# Patient Record
Sex: MALE | Race: WHITE | NOT HISPANIC OR LATINO | URBAN - METROPOLITAN AREA
[De-identification: names, ages, dates, MRNs, and addresses within clinical notes are randomized per-mention and may not be internally consistent; named-entity substitution may affect disease eponyms.]

---

## 2019-12-20 ENCOUNTER — INPATIENT (INPATIENT)
Facility: HOSPITAL | Age: 74
LOS: 3 days | Discharge: HOME | DRG: 862 | End: 2019-12-24
Attending: INTERNAL MEDICINE | Admitting: INTERNAL MEDICINE
Payer: MEDICARE

## 2019-12-20 VITALS — WEIGHT: 169.98 LBS

## 2019-12-20 DIAGNOSIS — A41.9 SEPSIS, UNSPECIFIED ORGANISM: ICD-10-CM

## 2019-12-20 DIAGNOSIS — N17.9 ACUTE KIDNEY FAILURE, UNSPECIFIED: ICD-10-CM

## 2019-12-20 DIAGNOSIS — R41.82 ALTERED MENTAL STATUS, UNSPECIFIED: ICD-10-CM

## 2019-12-20 DIAGNOSIS — E78.5 HYPERLIPIDEMIA, UNSPECIFIED: ICD-10-CM

## 2019-12-20 DIAGNOSIS — Z95.5 PRESENCE OF CORONARY ANGIOPLASTY IMPLANT AND GRAFT: Chronic | ICD-10-CM

## 2019-12-20 DIAGNOSIS — Z95.5 PRESENCE OF CORONARY ANGIOPLASTY IMPLANT AND GRAFT: ICD-10-CM

## 2019-12-20 DIAGNOSIS — K21.9 GASTRO-ESOPHAGEAL REFLUX DISEASE WITHOUT ESOPHAGITIS: ICD-10-CM

## 2019-12-20 DIAGNOSIS — J44.9 CHRONIC OBSTRUCTIVE PULMONARY DISEASE, UNSPECIFIED: ICD-10-CM

## 2019-12-20 DIAGNOSIS — D49.1 NEOPLASM OF UNSPECIFIED BEHAVIOR OF RESPIRATORY SYSTEM: Chronic | ICD-10-CM

## 2019-12-20 DIAGNOSIS — Z29.9 ENCOUNTER FOR PROPHYLACTIC MEASURES, UNSPECIFIED: ICD-10-CM

## 2019-12-20 LAB
ALBUMIN SERPL ELPH-MCNC: 3.3 G/DL — SIGNIFICANT CHANGE UP (ref 3.3–5)
ALP SERPL-CCNC: 128 U/L — HIGH (ref 40–120)
ALT FLD-CCNC: 38 U/L — SIGNIFICANT CHANGE UP (ref 12–78)
ANION GAP SERPL CALC-SCNC: 9 MMOL/L — SIGNIFICANT CHANGE UP (ref 5–17)
AST SERPL-CCNC: 39 U/L — HIGH (ref 15–37)
BASOPHILS # BLD AUTO: 0 K/UL — SIGNIFICANT CHANGE UP (ref 0–0.2)
BASOPHILS NFR BLD AUTO: 0 % — SIGNIFICANT CHANGE UP (ref 0–2)
BILIRUB SERPL-MCNC: 1.1 MG/DL — SIGNIFICANT CHANGE UP (ref 0.2–1.2)
BUN SERPL-MCNC: 27 MG/DL — HIGH (ref 7–23)
CALCIUM SERPL-MCNC: 8.8 MG/DL — SIGNIFICANT CHANGE UP (ref 8.5–10.1)
CHLORIDE SERPL-SCNC: 107 MMOL/L — SIGNIFICANT CHANGE UP (ref 96–108)
CO2 SERPL-SCNC: 23 MMOL/L — SIGNIFICANT CHANGE UP (ref 22–31)
CREAT SERPL-MCNC: 1.4 MG/DL — HIGH (ref 0.5–1.3)
EOSINOPHIL # BLD AUTO: 0 K/UL — SIGNIFICANT CHANGE UP (ref 0–0.5)
EOSINOPHIL NFR BLD AUTO: 0 % — SIGNIFICANT CHANGE UP (ref 0–6)
FLU A RESULT: SIGNIFICANT CHANGE UP
FLU A RESULT: SIGNIFICANT CHANGE UP
FLUAV AG NPH QL: SIGNIFICANT CHANGE UP
FLUBV AG NPH QL: SIGNIFICANT CHANGE UP
GLUCOSE SERPL-MCNC: 124 MG/DL — HIGH (ref 70–99)
HCT VFR BLD CALC: 42.6 % — SIGNIFICANT CHANGE UP (ref 39–50)
HGB BLD-MCNC: 14.8 G/DL — SIGNIFICANT CHANGE UP (ref 13–17)
LACTATE SERPL-SCNC: 2.8 MMOL/L — HIGH (ref 0.7–2)
LIDOCAIN IGE QN: 45 U/L — LOW (ref 73–393)
LYMPHOCYTES # BLD AUTO: 0.31 K/UL — LOW (ref 1–3.3)
LYMPHOCYTES # BLD AUTO: 8 % — LOW (ref 13–44)
MANUAL SMEAR VERIFICATION: SIGNIFICANT CHANGE UP
MCHC RBC-ENTMCNC: 32.2 PG — SIGNIFICANT CHANGE UP (ref 27–34)
MCHC RBC-ENTMCNC: 34.7 GM/DL — SIGNIFICANT CHANGE UP (ref 32–36)
MCV RBC AUTO: 92.6 FL — SIGNIFICANT CHANGE UP (ref 80–100)
MONOCYTES # BLD AUTO: 0 K/UL — SIGNIFICANT CHANGE UP (ref 0–0.9)
MONOCYTES NFR BLD AUTO: 0 % — LOW (ref 2–14)
MYELOCYTES NFR BLD: 3 % — HIGH (ref 0–0)
NEUTROPHILS # BLD AUTO: 3.32 K/UL — SIGNIFICANT CHANGE UP (ref 1.8–7.4)
NEUTROPHILS NFR BLD AUTO: 56 % — SIGNIFICANT CHANGE UP (ref 43–77)
NEUTS BAND # BLD: 29 % — HIGH (ref 0–8)
NRBC # BLD: 0 — SIGNIFICANT CHANGE UP
NRBC # BLD: SIGNIFICANT CHANGE UP /100 WBCS (ref 0–0)
PLAT MORPH BLD: NORMAL — SIGNIFICANT CHANGE UP
PLATELET # BLD AUTO: 114 K/UL — LOW (ref 150–400)
POTASSIUM SERPL-MCNC: 4 MMOL/L — SIGNIFICANT CHANGE UP (ref 3.5–5.3)
POTASSIUM SERPL-SCNC: 4 MMOL/L — SIGNIFICANT CHANGE UP (ref 3.5–5.3)
PROT SERPL-MCNC: 6.4 G/DL — SIGNIFICANT CHANGE UP (ref 6–8.3)
RBC # BLD: 4.6 M/UL — SIGNIFICANT CHANGE UP (ref 4.2–5.8)
RBC # FLD: 14 % — SIGNIFICANT CHANGE UP (ref 10.3–14.5)
RBC BLD AUTO: NORMAL — SIGNIFICANT CHANGE UP
RSV RESULT: SIGNIFICANT CHANGE UP
RSV RNA RESP QL NAA+PROBE: SIGNIFICANT CHANGE UP
SODIUM SERPL-SCNC: 139 MMOL/L — SIGNIFICANT CHANGE UP (ref 135–145)
VARIANT LYMPHS # BLD: 4 % — SIGNIFICANT CHANGE UP (ref 0–6)
WBC # BLD: 3.9 K/UL — SIGNIFICANT CHANGE UP (ref 3.8–10.5)
WBC # FLD AUTO: 3.9 K/UL — SIGNIFICANT CHANGE UP (ref 3.8–10.5)

## 2019-12-20 PROCEDURE — 70450 CT HEAD/BRAIN W/O DYE: CPT | Mod: 26

## 2019-12-20 PROCEDURE — 71045 X-RAY EXAM CHEST 1 VIEW: CPT | Mod: 26

## 2019-12-20 PROCEDURE — 99285 EMERGENCY DEPT VISIT HI MDM: CPT

## 2019-12-20 PROCEDURE — 74176 CT ABD & PELVIS W/O CONTRAST: CPT | Mod: 26

## 2019-12-20 PROCEDURE — 93010 ELECTROCARDIOGRAM REPORT: CPT

## 2019-12-20 RX ORDER — SODIUM CHLORIDE 9 MG/ML
1000 INJECTION INTRAMUSCULAR; INTRAVENOUS; SUBCUTANEOUS ONCE
Refills: 0 | Status: COMPLETED | OUTPATIENT
Start: 2019-12-20 | End: 2019-12-20

## 2019-12-20 RX ORDER — BUDESONIDE AND FORMOTEROL FUMARATE DIHYDRATE 160; 4.5 UG/1; UG/1
2 AEROSOL RESPIRATORY (INHALATION)
Refills: 0 | Status: DISCONTINUED | OUTPATIENT
Start: 2019-12-20 | End: 2019-12-21

## 2019-12-20 RX ORDER — SODIUM CHLORIDE 9 MG/ML
1000 INJECTION INTRAMUSCULAR; INTRAVENOUS; SUBCUTANEOUS
Refills: 0 | Status: COMPLETED | OUTPATIENT
Start: 2019-12-20 | End: 2019-12-20

## 2019-12-20 RX ORDER — TIOTROPIUM BROMIDE 18 UG/1
1 CAPSULE ORAL; RESPIRATORY (INHALATION)
Qty: 0 | Refills: 0 | DISCHARGE

## 2019-12-20 RX ORDER — ACETAMINOPHEN 500 MG
650 TABLET ORAL ONCE
Refills: 0 | Status: COMPLETED | OUTPATIENT
Start: 2019-12-20 | End: 2019-12-20

## 2019-12-20 RX ORDER — ACETAMINOPHEN 500 MG
650 TABLET ORAL EVERY 6 HOURS
Refills: 0 | Status: DISCONTINUED | OUTPATIENT
Start: 2019-12-20 | End: 2019-12-21

## 2019-12-20 RX ORDER — ASPIRIN/CALCIUM CARB/MAGNESIUM 324 MG
81 TABLET ORAL DAILY
Refills: 0 | Status: DISCONTINUED | OUTPATIENT
Start: 2019-12-20 | End: 2019-12-21

## 2019-12-20 RX ORDER — IPRATROPIUM/ALBUTEROL SULFATE 18-103MCG
3 AEROSOL WITH ADAPTER (GRAM) INHALATION ONCE
Refills: 0 | Status: COMPLETED | OUTPATIENT
Start: 2019-12-20 | End: 2019-12-20

## 2019-12-20 RX ORDER — MIDODRINE HYDROCHLORIDE 2.5 MG/1
10 TABLET ORAL THREE TIMES A DAY
Refills: 0 | Status: DISCONTINUED | OUTPATIENT
Start: 2019-12-20 | End: 2019-12-20

## 2019-12-20 RX ORDER — MIDODRINE HYDROCHLORIDE 2.5 MG/1
10 TABLET ORAL ONCE
Refills: 0 | Status: COMPLETED | OUTPATIENT
Start: 2019-12-20 | End: 2019-12-20

## 2019-12-20 RX ORDER — ENOXAPARIN SODIUM 100 MG/ML
40 INJECTION SUBCUTANEOUS DAILY
Refills: 0 | Status: DISCONTINUED | OUTPATIENT
Start: 2019-12-20 | End: 2019-12-23

## 2019-12-20 RX ORDER — SODIUM CHLORIDE 9 MG/ML
1000 INJECTION INTRAMUSCULAR; INTRAVENOUS; SUBCUTANEOUS
Refills: 0 | Status: DISCONTINUED | OUTPATIENT
Start: 2019-12-20 | End: 2019-12-21

## 2019-12-20 RX ORDER — IPRATROPIUM/ALBUTEROL SULFATE 18-103MCG
3 AEROSOL WITH ADAPTER (GRAM) INHALATION EVERY 6 HOURS
Refills: 0 | Status: DISCONTINUED | OUTPATIENT
Start: 2019-12-20 | End: 2019-12-21

## 2019-12-20 RX ORDER — PANTOPRAZOLE SODIUM 20 MG/1
40 TABLET, DELAYED RELEASE ORAL
Refills: 0 | Status: DISCONTINUED | OUTPATIENT
Start: 2019-12-20 | End: 2019-12-21

## 2019-12-20 RX ORDER — LACTOBACILLUS ACIDOPHILUS 100MM CELL
1 CAPSULE ORAL
Refills: 0 | Status: DISCONTINUED | OUTPATIENT
Start: 2019-12-20 | End: 2019-12-24

## 2019-12-20 RX ORDER — TAMSULOSIN HYDROCHLORIDE 0.4 MG/1
0.4 CAPSULE ORAL AT BEDTIME
Refills: 0 | Status: DISCONTINUED | OUTPATIENT
Start: 2019-12-20 | End: 2019-12-24

## 2019-12-20 RX ORDER — BUDESONIDE AND FORMOTEROL FUMARATE DIHYDRATE 160; 4.5 UG/1; UG/1
2 AEROSOL RESPIRATORY (INHALATION)
Qty: 0 | Refills: 0 | DISCHARGE

## 2019-12-20 RX ORDER — TIOTROPIUM BROMIDE 18 UG/1
1 CAPSULE ORAL; RESPIRATORY (INHALATION) DAILY
Refills: 0 | Status: DISCONTINUED | OUTPATIENT
Start: 2019-12-20 | End: 2019-12-21

## 2019-12-20 RX ORDER — PIPERACILLIN AND TAZOBACTAM 4; .5 G/20ML; G/20ML
3.38 INJECTION, POWDER, LYOPHILIZED, FOR SOLUTION INTRAVENOUS EVERY 8 HOURS
Refills: 0 | Status: DISCONTINUED | OUTPATIENT
Start: 2019-12-20 | End: 2019-12-21

## 2019-12-20 RX ORDER — CEFTRIAXONE 500 MG/1
1000 INJECTION, POWDER, FOR SOLUTION INTRAMUSCULAR; INTRAVENOUS ONCE
Refills: 0 | Status: COMPLETED | OUTPATIENT
Start: 2019-12-20 | End: 2019-12-20

## 2019-12-20 RX ORDER — ALBUTEROL 90 UG/1
2 AEROSOL, METERED ORAL EVERY 6 HOURS
Refills: 0 | Status: DISCONTINUED | OUTPATIENT
Start: 2019-12-20 | End: 2019-12-21

## 2019-12-20 RX ORDER — PIPERACILLIN AND TAZOBACTAM 4; .5 G/20ML; G/20ML
3.38 INJECTION, POWDER, LYOPHILIZED, FOR SOLUTION INTRAVENOUS ONCE
Refills: 0 | Status: COMPLETED | OUTPATIENT
Start: 2019-12-20 | End: 2019-12-20

## 2019-12-20 RX ORDER — ATORVASTATIN CALCIUM 80 MG/1
40 TABLET, FILM COATED ORAL AT BEDTIME
Refills: 0 | Status: DISCONTINUED | OUTPATIENT
Start: 2019-12-20 | End: 2019-12-24

## 2019-12-20 RX ORDER — AZITHROMYCIN 500 MG/1
500 TABLET, FILM COATED ORAL ONCE
Refills: 0 | Status: COMPLETED | OUTPATIENT
Start: 2019-12-20 | End: 2019-12-20

## 2019-12-20 RX ADMIN — Medication 650 MILLIGRAM(S): at 21:36

## 2019-12-20 RX ADMIN — SODIUM CHLORIDE 2000 MILLILITER(S): 9 INJECTION INTRAMUSCULAR; INTRAVENOUS; SUBCUTANEOUS at 19:32

## 2019-12-20 RX ADMIN — MIDODRINE HYDROCHLORIDE 10 MILLIGRAM(S): 2.5 TABLET ORAL at 23:35

## 2019-12-20 RX ADMIN — CEFTRIAXONE 100 MILLIGRAM(S): 500 INJECTION, POWDER, FOR SOLUTION INTRAMUSCULAR; INTRAVENOUS at 21:36

## 2019-12-20 RX ADMIN — AZITHROMYCIN 255 MILLIGRAM(S): 500 TABLET, FILM COATED ORAL at 21:36

## 2019-12-20 RX ADMIN — SODIUM CHLORIDE 1000 MILLILITER(S): 9 INJECTION INTRAMUSCULAR; INTRAVENOUS; SUBCUTANEOUS at 22:15

## 2019-12-20 RX ADMIN — SODIUM CHLORIDE 1000 MILLILITER(S): 9 INJECTION INTRAMUSCULAR; INTRAVENOUS; SUBCUTANEOUS at 22:11

## 2019-12-20 RX ADMIN — SODIUM CHLORIDE 100 MILLILITER(S): 9 INJECTION INTRAMUSCULAR; INTRAVENOUS; SUBCUTANEOUS at 22:41

## 2019-12-20 RX ADMIN — PIPERACILLIN AND TAZOBACTAM 200 GRAM(S): 4; .5 INJECTION, POWDER, LYOPHILIZED, FOR SOLUTION INTRAVENOUS at 20:16

## 2019-12-20 RX ADMIN — Medication 3 MILLILITER(S): at 22:08

## 2019-12-20 RX ADMIN — Medication 650 MILLIGRAM(S): at 19:33

## 2019-12-20 RX ADMIN — SODIUM CHLORIDE 1000 MILLILITER(S): 9 INJECTION INTRAMUSCULAR; INTRAVENOUS; SUBCUTANEOUS at 23:10

## 2019-12-20 RX ADMIN — SODIUM CHLORIDE 2000 MILLILITER(S): 9 INJECTION INTRAMUSCULAR; INTRAVENOUS; SUBCUTANEOUS at 20:17

## 2019-12-20 RX ADMIN — SODIUM CHLORIDE 2000 MILLILITER(S): 9 INJECTION INTRAMUSCULAR; INTRAVENOUS; SUBCUTANEOUS at 21:26

## 2019-12-20 NOTE — H&P ADULT - NSHPSOCIALHISTORY_GEN_ALL_CORE
quit smoking 5 years ago, 50 pack years   drinks 1 glass of wine 1 x a month socially  lives with wife, performs all ADLs, ambulates on own

## 2019-12-20 NOTE — H&P ADULT - PROBLEM SELECTOR PLAN 1
Sepsis-Fever, Bandemia   -Etiology - R/O Prostatitis   -s/p Recent Prostate Biopsy on Tuesday  with Dr Machado  -? Aspiration , CT Chest shows  - CT C/A/P done- B/L perinephric stranding-Nonspecific  -Blood c/s x 2, UA. Urine c/s   -IV Fluids- NS @ 100 ml/hr  -CBC with Diff in AM   -IV Zosyn 3.375 gm IV q 8 hrs Sepsis-Fever, Bandemia   -Etiology - R/O Prostatitis   -s/p Recent Prostate Biopsy on Tuesday  with Dr Machado  -? Aspiration , CT scan  shows- Groundglass opacity with underlying reticular prominence in the lateral right middle lobe and posterior right lower lobe.   - CT A/P done- B/L perinephric stranding-Nonspecific  -Blood c/s x 2, UA. Urine c/s , RVP to follow ,  Flu A/B/RSV -NEG   -IV Fluids- NS @ 100 ml/hr  -CBC with Diff in AM   -IV Zosyn 3.375 gm IV q 8 hrs  -ID Dr Marc Sepsis-Fever, Bandemia , High Lactate  -Etiology - R/O Prostatitis   -s/p Recent Prostate Biopsy on Tuesday  with Dr Machado  -? Aspiration , CT scan  shows- Groundglass opacity with underlying reticular prominence in the lateral right middle lobe and posterior right lower lobe.   - CT A/P done- B/L perinephric stranding-Nonspecific  -Blood c/s x 2, UA. Urine c/s , RVP to follow ,  Flu A/B/RSV -NEG   -IV Fluids- NS @ 100 ml/hr  -CBC with Diff in AM , Repeat Lactate  -IV Zosyn 3.375 gm IV q 8 hrs  -ID Dr Marc -Sepsis-Fever, Bandemia , High Lactate  -Etiology - R/O Prostatitis   -s/p Recent Prostate Biopsy on Tuesday  with Dr. Machado  -? Aspiration , CT scan  shows- Groundglass opacity with underlying reticular prominence in the lateral right middle lobe and posterior right lower lobe.   - CT A/P done- B/L perinephric stranding-Nonspecific  -Blood c/s x 2, UA. Urine c/s , RVP to follow ,  Flu A/B/RSV -NEG   -IV Fluids- NS @ 100 ml/hr  -CBC with Diff in AM , Repeat Lactate  -IV Zosyn 3.375 gm IV q 8 hrs  -ID Dr Marc

## 2019-12-20 NOTE — H&P ADULT - ATTENDING COMMENTS
Pt seen, Examined, case & care plan d/w pt, residents at detail.  D/W Wife at detail.  AM labs   ID Dr Monico Díaz

## 2019-12-20 NOTE — H&P ADULT - PROBLEM SELECTOR PLAN 4
-COPD in setting of asthma  -cont home dose spiriva, symbicort, ventolin HFA, iprat-albut, budesoinde -COPD in setting of asthma  -cont home dose Spiriva, Symbicort, ventolin HFA, iprat-albut, Budesonide

## 2019-12-20 NOTE — CHART NOTE - NSCHARTNOTEFT_GEN_A_CORE
Patient is a 74y old  Male who presents with a chief complaint of Fever (20 Dec 2019 21:52)      Rapid response was called because of hypotension.     Patient was seen and examined at the bedside by the rapid response team, ICU team. Patient resting comfortably. Denies chest pain, palpitations, SOB, abd pain, n/v/c/d. Reports hx of COPD and asthma, does not use supplemental O2 at baseline. Able to use stairs and ambulates without difficulty. Admitted for sepsis 2/2 aspiration PNA after prostatitis. Patient was placed on Venti-Mask during rapid for SpO2 88%.     Rapid Response Vital Signs  BP: 76/50s  HR: 101  RR: 16  SpO2:  100% on 50% venti-mask       PHYSICAL EXAM  GENERAL: elderly Male in NAD  HEENT: AT/NC, EOMI, PERRL, moist mucous membranes are moist   LUNGS: CTA B/L, no wheezing, rales or rhonchi  HEART: RRR,+S1/+S2, no murmurs, rubs, gallops  ABDOMEN: Soft, NT, ND, no rebound, guarding rigidity, +BS in all 4 quadrants  EXTREMITIES: no clubbing, cyanosis, edema   NEURO: A&Ox3, non-focal       Assessment- Rapid Response called for 74y year old Male with a past medical history of     Plan-    - Attending made aware  - Will continue to follow, RN to call if any changes. Patient is a 74y old  Male who presents with a chief complaint of Fever (20 Dec 2019 21:52)      Rapid response was called because of hypotension.     Patient was seen and examined at the bedside by the rapid response team, ICU team. Patient resting comfortably. Denies chest pain, palpitations, SOB, abd pain, n/v/c/d. Reports hx of COPD and asthma, does not use supplemental O2 at baseline. Able to use stairs and ambulates without difficulty. Admitted for sepsis 2/2 aspiration PNA after prostate biopsy. Patient was placed on Venti-Mask during rapid for SpO2 91%, with improvement to 100%. Denies hx of CHF. Has never been intubated. Patient received Duoneb treatment prior to rapid response.    Rapid Response Vital Signs  BP: 64/38  HR: 103  RR: 34  SpO2:  91% on 2L NC   Temp: 99.9    PHYSICAL EXAM  GENERAL: NAD  HEENT: AT/NC, EOMI, PERRL, dry membranes  LUNGS: poor inspiratory effort, minimal expiratory wheeze bilaterally  HEART: RRR,+S1/+S2, no murmurs, rubs, gallops  ABDOMEN: Soft, NT, ND, no rebound, guarding rigidity, +BS in all 4 quadrants  EXTREMITIES: no clubbing, cyanosis, edema   NEURO: A&Ox3, non-focal       Assessment- Rapid Response called for 74y year old Male with a past medical history of CAD (s/p 5 stents), COPD (not on home O2), asthma, HTN, HLD, admitted for sepsis 2/2 to aspiration PNA after prostate biopsy. Rapid response called for hypotension.  1. Hypotension likely 2/2 to sepsis   -Patient received 3L NS bolus prior to rapid response.   -Stat 1L NS bolus ordered  -Continue IVF 100cc/hr for maintenance fluid  -Bladder scan, patient unable to provide urine during H&P interview, states he has not had solid food since yesterday, appears clinically dry on exam  -Continue IVF, if patient remains hypotensive despite fluid resuscitation will consider starting Midodrine   -Patient comfortable, A&Ox3, has no complaints at this time.  -Attending, Dr. Lopez, made aware and agrees with above  -Will continue to follow, RN to call if any changes.

## 2019-12-20 NOTE — H&P ADULT - RS GEN PE MLT RESP DETAILS PC
no rales/normal/respirations non-labored/rhonchi/no chest wall tenderness/breath sounds equal/good air movement/no wheezes no chest wall tenderness/normal/breath sounds equal/b/l diffuse/no rales/no wheezes/good air movement/wheezes/respirations non-labored/rhonchi

## 2019-12-20 NOTE — H&P ADULT - MUSCULOSKELETAL
negative No joint pain, swelling or deformity; no limitation of movement details… detailed exam normal strength/ROM intact/no joint warmth/no joint swelling

## 2019-12-20 NOTE — ED ADULT NURSE NOTE - OBJECTIVE STATEMENT
Pt BIB EMS c/c of SOB and possible syncopal episode per family. Pt found on couch w/ vomit around him. 1 day history of cough, chills, nausea and vomiting that resolved today. per wife she left the house and when she came back he was SOB and disoriented. Pt presents now drowsy but alert, oriented x 3. RR 26, febrile tachycardic.

## 2019-12-20 NOTE — H&P ADULT - NSHPOUTPATIENTPROVIDERS_GEN_ALL_CORE
PMD- Dr Laurent  -Urology -DR Machado  -Pulmonary -Dr Dang  Cardiology: PMD- Dr Laurent  -Urology -DR Machado  -Pulmonary -Dr Dang  Cardiology: Dr Diaz PMD- Dr Laurent  -Urology -DR Machado  -Pulmonary -Dr Dang  Cardiology: Dr Diaz  -gi dr fajardo  -heme dr giraldo   -[facility] in [place]

## 2019-12-20 NOTE — ED ADULT NURSE NOTE - PMH
Asthma    Chronic obstructive pulmonary disease    GERD (gastroesophageal reflux disease)    HLD (hyperlipidemia)    HTN (hypertension)    Nephrolithiasis    Stented coronary artery

## 2019-12-20 NOTE — ED PROVIDER NOTE - CARE PLAN
Principal Discharge DX:	Altered mental status, unspecified altered mental status type  Secondary Diagnosis:	Pneumonia due to infectious organism, unspecified laterality, unspecified part of lung

## 2019-12-20 NOTE — H&P ADULT - ASSESSMENT
75 YO M with PMHX asthma, COPD, former smoker, hx of benign lung cancer R lobe surgically removed at [facility]) CAD (s/p 5 stents ~2001), GERD, HLD, HTN presents after shaking chills followed by vomiting and confusion. Admit for sepsis in setting of recent prostates biopsy, r/o prostatitis, aspiration PNA vs chemical pneumonitis.

## 2019-12-20 NOTE — ED PROVIDER NOTE - OBJECTIVE STATEMENT
73 yo M p/w starting having shaking chills last night, some dyspnea. pt was feeling improved this am, when saw his pulmonologist. Now pt with shaking chills again, weakness, vomited. Pt also more confused than usual as well. no cp. pt co gen myalgia. Unclear if any abd pain. No neck /.back pain. no HA. no neck pain / stiffness. no agg/allev factors. No other inj or co.

## 2019-12-20 NOTE — H&P ADULT - PROBLEM SELECTOR PLAN 3
-Pre Renal /Hemodynamic, Poor PO intake  -HOLD Losartan with YUE & Hypotension  -BMP in AM   -NS @ 100 ml /hr

## 2019-12-20 NOTE — H&P ADULT - NEUROLOGICAL DETAILS
cranial nerves intact/sensation intact/responds to verbal commands/alert and oriented x 3/normal strength

## 2019-12-20 NOTE — H&P ADULT - NEGATIVE OPHTHALMOLOGIC SYMPTOMS
no blurred vision R/no blurred vision L/no discharge L/no lacrimation L/no lacrimation R/no discharge R

## 2019-12-20 NOTE — H&P ADULT - GASTROINTESTINAL DETAILS
normal/soft/no rebound tenderness/no rigidity/no bruit/no guarding/no distention/nontender/no masses palpable/bowel sounds normal/no organomegaly

## 2019-12-20 NOTE — H&P ADULT - PROBLEM SELECTOR PLAN 2
-Pre Renal /Hemodynamic, Poor PO intake  -HOLD Losartan  -BMP in AM   NS @ 100 ml /hr -Pre Renal /Hemodynamic, Poor PO intake  -HOLD Losartan with YUE & Hypotension  -BMP in AM   NS @ 100 ml /hr -hypotension in setting of sepsis   -s/p 3 L ER, 2 L during RRT, midodrine drip, BP stabilized to 105 systolic   -cont to monitor vitals  -NS @ 100ml/hr -hypotension in setting of  severe sepsis   -s/p 3 L ER, 2 L during RRT, midodrine drip, BP stabilized to 105 systolic   -cont to monitor vitals  -NS @ 100ml/hr

## 2019-12-20 NOTE — H&P ADULT - PROBLEM SELECTOR PLAN 9
IMPROVE VTE Individual Risk Assessment        RISK                                                          Points  [  ] Previous VTE                                                3  [  ] Thrombophilia                                             2  [  ] Lower limb paralysis                                   2        (unable to hold up >15 seconds)    [  ] Current Cancer                                            2         (within 6 months)  [  ] Immobilization > 24 hrs                              1  [  ] ICU/CCU stay > 24 hours                            1  [ 1 ] Age > 60                                                    1  IMPROVE VTE Score _____1____    dvt ppx lovenox 40

## 2019-12-20 NOTE — H&P ADULT - HISTORY OF PRESENT ILLNESS
In the ED: Vitals sig for temp of 104.7 F. Labs sig for Plt of 114, bands of 29, BUN 27, Cr 1.40, glucose 124, alk phos 128, AST 39, eGFR 49, lactate 2.8, FLU A, B, RSV neg. CXR: No acute infiltrate. CT head: No acute intracranial bleeding, mass effect, or shift. Mild chronic microvascular ischemic changes. CT A/P: Right middle lobe and right lower lobe groundglass opacities with underlying reticular changes, likely chronic. Recommend follow-up to ensure stability or resolution and exclude acute pneumonia. Mild constipation. No bowel obstruction. Enlarged prostate. Nonspecific bilateral perinephric inflammatory stranding. Nonobstructing left renal calculi. Given: Rocephin x1, Azithromycin x1,  Zosyn x1,  ns bolus x3, tylenol x1, Duo neb x1. Of note, RRT was called for hypotension with BP of 63/48. Patient was seen by RRT team and ICU PA. Was given normal saline bolus x2 and midodrine 10mg x1 with improvement in BP of 105/56 75 YO M with PMHX asthma, COPD, former smoker, hx of benign lung cancer R lobe surgically removed at [facility]) CAD (s/p 5 stents ~2001), GERD, HLD, HTN presents after shakes, vomiting, confusion. Pt states he has prostate biopsy 3 days ago, started on antibiotics, the next day developed shaking chills, then became slightly SOB, used a nebulizer which helped his shortness of breath. Pt saw his pulmonologist yesterday, as per pt pulmonologist states everything was ok from a pulmonary standpoint and to continue using his nebulizer. Today pt developed shivering shaking chills again, NBNR emesis, confusion. Pt's wife found him confused, came to the ER for confusion. Of note, pt states he had a cough with sputum production last week. Pt denies fevers at home or sick contacts.     In the ED: Vitals sig for temp of 104.7 F. Labs sig for Plt of 114, bands of 29, BUN 27, Cr 1.40, glucose 124, alk phos 128, AST 39, eGFR 49, lactate 2.8, FLU A, B, RSV neg. CXR: No acute infiltrate. CT head: No acute intracranial bleeding, mass effect, or shift. Mild chronic microvascular ischemic changes. CT A/P: Right middle lobe and right lower lobe groundglass opacities with underlying reticular changes, likely chronic. Recommend follow-up to ensure stability or resolution and exclude acute pneumonia. Mild constipation. No bowel obstruction. Enlarged prostate. Nonspecific bilateral perinephric inflammatory stranding. Nonobstructing left renal calculi. Given: Rocephin x1, Azithromycin x1,  Zosyn x1,  ns bolus x3, tylenol x1, Duo neb x1. Of note, RRT was called for hypotension with BP of 63/48. Patient was seen by RRT team and ICU PA. Was given normal saline bolus x2 and midodrine 10mg x1 with improvement in BP of 105/56 75 YO M with PMHX asthma, COPD, Kidney stone s/p Lithotripsy , H/O GI Bleed in past former smoker, hx of benign lung cancer R lobe surgically removed at [facility]) CAD (s/p 5 stents ~2001), GERD, HLD, HTN presents after shakes, vomiting, confusion. Pt states he has prostate biopsy 3 days ago, started on antibiotics, the next day developed shaking chills, then became slightly SOB, used a nebulizer which helped his shortness of breath. Pt saw his pulmonologist yesterday, as per pt pulmonologist states everything was ok from a pulmonary standpoint and to continue using his nebulizer. Today pt developed shivering shaking chills again, NBNR emesis, confusion. Pt's wife found him confused, came to the ER for confusion. Of note, pt states he had a cough with sputum production last week. Pt denies fevers at home or sick contacts.     In the ED: Vitals sig for temp of 104.7 F. Labs sig for Plt of 114, bands of 29, BUN 27, Cr 1.40, glucose 124, alk phos 128, AST 39, eGFR 49, lactate 2.8, FLU A, B, RSV neg. CXR: No acute infiltrate. CT head: No acute intracranial bleeding, mass effect, or shift. Mild chronic microvascular ischemic changes. CT A/P: Right middle lobe and right lower lobe groundglass opacities with underlying reticular changes, likely chronic. Recommend follow-up to ensure stability or resolution and exclude acute pneumonia. Mild constipation. No bowel obstruction. Enlarged prostate. Nonspecific bilateral perinephric inflammatory stranding. Nonobstructing left renal calculi. Given: Rocephin x1, Azithromycin x1,  Zosyn x1,  ns bolus x3, tylenol x1, Duo neb x1. Of note, RRT was called for hypotension with BP of 63/48. Patient was seen by RRT team and ICU PA. Was given normal saline bolus x2 and midodrine 10mg x1 with improvement in BP of 105/56

## 2019-12-20 NOTE — ED ADULT NURSE REASSESSMENT NOTE - NS ED NURSE REASSESS COMMENT FT1
Received care at 1940.  No change in gen status.  A/Ox4.  Transported to CT at this time
Noted Chest sounding congested.  SOB.  Decreased to bases.  Wet sounding.  Duoneb given.  IVF stopped resident at bedside.  Noted BP 70's/40's.  Rapid response called
Rapid response called.  O2 sats improved.  N/S bolus initiated again.  Noted increasing BP 80's/40's.  Plan for IV steroids.

## 2019-12-20 NOTE — H&P ADULT - NEGATIVE GASTROINTESTINAL SYMPTOMS
no constipation/no nausea/no change in bowel habits/no abdominal pain no change in bowel habits/no hematochezia/no constipation/no abdominal pain/no melena/no nausea

## 2019-12-20 NOTE — H&P ADULT - NSICDXPASTSURGICALHX_GEN_ALL_CORE_FT
PAST SURGICAL HISTORY:  Lung tumor Rt Lung tumor resection,benign    S/P primary angioplasty with coronary stent

## 2019-12-20 NOTE — CONSULT NOTE ADULT - SUBJECTIVE AND OBJECTIVE BOX
In Brief:  74M with PMHx of nephrolithiasis, gerd, hld, htn, cad s/p stents x5, asthma with multiple hospitalizations (never been intubated), copd, right lung tumor s/p vats with resection, with recent prostate bx who presented to ED with c/o sob, chills, weakness, vomiting x1. Pt admitted with sepsis due to aspiration pna vs prostatitis. Rapid Response called overhead due to hypotension to 80's, with O2 saturation to 91%, + expiratory wheezing. Pt placed on VM with improvement in O2 saturation. Additional crystalloid bolus ordered. Pt on zosyn empirically. ICU consulted for further evaluation    24 hour events: Pt seen and examined at bedside. Chart/labs reviewed.     PAST MEDICAL & SURGICAL HISTORY:  Nephrolithiasis  GERD (gastroesophageal reflux disease)  HLD (hyperlipidemia)  HTN (hypertension)  Stented coronary artery  Asthma  Chronic obstructive pulmonary disease: Asthma  S/P primary angioplasty with coronary stent  Lung tumor: Rt Lung tumor resection,benign      Review of Systems:  Constitutional: + fever, +chills, + fatigue  Neuro: No headache, numbness, +weakness  Resp: + cough, +wheezing, +shortness of breath  CVS: No chest pain, palpitations, leg swelling  GI: No abdominal pain, nausea, +vomiting, No diarrhea   : No dysuria, frequency, incontinence  Skin: No itching, burning, rashes, or lesions   Msk: No joint pain or swelling  Psych: No depression, anxiety, mood swings      T(F): 99.9 (12-20-19 @ 22:09), Max: 104.7 (12-20-19 @ 19:21)  HR: 97 (12-20-19 @ 23:08) (94 - 103)  BP: 105/56 (12-20-19 @ 23:08) (64/38 - 130/60)  RR: 18 (12-20-19 @ 23:08) (18 - 34)  SpO2: 94% (12-20-19 @ 23:08) (88% - 96%)  Wt(kg): --        CAPILLARY BLOOD GLUCOSE      POCT Blood Glucose.: 104 mg/dL (20 Dec 2019 22:27)      I&O's Summary      Physical Exam:     Gen: Well developed, Well nourished  Neuro: A&Ox3, non-focal  HEENT: NC/AT  Resp: Bilateral expiratory wheeze  CVS: nl S1/S2, RRR  Abd: soft, nt, nd, +bs  Ext: no edema, +pulses  Skin: well perfused, warm      Meds:    piperacillin/tazobactam IVPB.. IV Intermittent  midodrine. Oral  tamsulosin Oral  atorvastatin Oral  ALBUTerol    90 MICROgram(s) HFA Inhaler Inhalation PRN  albuterol/ipratropium for Nebulization Nebulizer  budesonide 160 MICROgram(s)/formoterol 4.5 MICROgram(s) Inhaler Inhalation  tiotropium 18 MICROgram(s) Capsule Inhalation  acetaminophen   Tablet .. Oral PRN  aspirin enteric coated Oral  enoxaparin Injectable SubCutaneous  pantoprazole    Tablet Oral  sodium chloride 0.9% Bolus IV Bolus  sodium chloride 0.9%. IV Continuous  lactobacillus acidophilus Oral                            14.8   3.90  )-----------( 114      ( 20 Dec 2019 19:23 )             42.6     Bands 29.0    12-20    139  |  107  |  27<H>  ----------------------------<  124<H>  4.0   |  23  |  1.40<H>    Ca    8.8      20 Dec 2019 19:23    TPro  6.4  /  Alb  3.3  /  TBili  1.1  /  DBili  x   /  AST  39<H>  /  ALT  38  /  AlkPhos  128<H>  12-20    Lactate 2.8           12-20 @ 19:23            CXR:    PROCEDURE DATE:  12/20/2019          INTERPRETATION:  CLINICAL INDICATION: 74 years  Male with fever, chills, vomiting.    COMPARISON: 5/9/2016    Apical lordotic view of the chest demonstrates the lungs to be clear. There is no pleural effusion. There is no pneumothorax.    The heart is normal in size. The mediastinum and parvez cannot be assessed due to projection.     The pulmonary vasculature is normal.     Mild thoracic degenerative changes are present.    IMPRESSION:    No acute infiltrate    CTH:    PROCEDURE DATE:  12/20/2019          INTERPRETATION:  HISTORY: Altered mental status. Fever, chills, and vomiting.    COMPARISON: None.    TECHNIQUE: Axial noncontrast CT images from the skull base to the vertex were obtained and submitted for interpretation. Coronal and sagittal reformatted images were performed. Bone and soft tissue windows were evaluated.    FINDINGS:    There is no acute intracranial mass-effect, hemorrhage, midline shift, or abnormal extra-axial fluid collection. Gray-white differentiation is maintained.    Mild chronic microvascular ischemic changes are noted.    Ventricles, sulci, and cisterns are normal in size for the patient's age without hydrocephalus. Basal cisterns are patent.     Visualized paranasal sinuses  and mastoid air cells are clear. Calvarium is intact.     IMPRESSION:     No acute intracranial bleeding, mass effect, or shift. Mild chronic microvascular ischemic changes.      CT A/P:    PROCEDURE DATE:  12/20/2019          INTERPRETATION:  CLINICAL INDICATION: 74 years  Male with fever today, vomited.     COMPARISON: 1/27/2016    PROCEDURE:   CT of the Abdomen and Pelvis was performed without intravenous contrast.   Intravenous contrast: None.  Oral contrast: None.  Sagittal and coronal reformats were performed.    LIMITATIONS: Evaluation of the solid organs and GI tract is limited without oral and IV contrast.    FINDINGS:    LOWER CHEST: Groundglass opacity with underlying reticular prominence in the lateral right middle lobe and posterior right lower lobe. Coronary artery calcifications.    LIVER: 3.4 x 2.3 cm right hepatic lobe.  BILE DUCTS: Normal caliber.  GALLBLADDER: Within normal limits.  SPLEEN: Within normal limits.  PANCREAS: Within normal limits.  ADRENALS: Within normal limits.  KIDNEYS/URETERS: Mild nonspecific bilateral perinephric fat stranding. No hydroureteronephrosis or calculi. Centimeter left midpole renal cyst. 1.3 cm left upper pole renal cortical cyst. 3.4 cm left lower pole renal cyst.  2. One to 2 mm nonobstructing calculi in the left renal collecting system.    BLADDER: Within normal limits.  REPRODUCTIVE ORGANS: Enlarged prostate measuring 6.6 x 4.7 x 4.8 cm with central calcifications. The prostate indents the bladder base.    BOWEL: No bowel obstruction. Appendix is normal. Mild retained fecal matter throughout the colon.  PERITONEUM: No ascites.  VESSELS: Atherosclerotic aorta. Mildly ectatic infrarenal abdominal aorta measuring up to 2.7 cm in caliber, unchanged. Retroaortic left renal vein.  RETROPERITONEUM/LYMPH NODES: No lymphadenopathy.    ABDOMINAL WALL: Small bilateral fat-containing inguinal hernias.  BONES: Within normal limits.    IMPRESSION:     Evaluation of the solid organs and GI tract is limited without oral and IV contrast.    Right middle lobe and right lower lobe groundglass opacities with underlying reticular changes, likely chronic. Recommend follow-up to ensure stability or resolution and exclude acute pneumonia    Mild constipation. No bowel obstruction.    Enlarged prostate.    Nonspecific bilateral perinephric inflammatory stranding. Nonobstructing left renal calculi.              CODE STATUS: Full  GOC discussion: Y

## 2019-12-20 NOTE — INPATIENT CERTIFICATION FOR MEDICARE PATIENTS - RISKS OF ADVERSE EVENTS
Concern for delay in diagnosis and treatment/Concern for worsening infectious process/Concern for renal deterioration

## 2019-12-20 NOTE — H&P ADULT - NSICDXPASTMEDICALHX_GEN_ALL_CORE_FT
PAST MEDICAL HISTORY:  Asthma     Chronic obstructive pulmonary disease Asthma    GERD (gastroesophageal reflux disease)     HLD (hyperlipidemia)     HTN (hypertension)     Nephrolithiasis     Stented coronary artery PAST MEDICAL HISTORY:  Asthma     Chronic obstructive pulmonary disease Asthma    GERD (gastroesophageal reflux disease)     GI bleed while on Antiplatelets    HLD (hyperlipidemia)     HTN (hypertension)     Nephrolithiasis s/p Lithotripsy    Stented coronary artery

## 2019-12-20 NOTE — CONSULT NOTE ADULT - ASSESSMENT
74M with PMHx as above, with recent prostate bx, now admitted with severe sepsis (aspiration pneumonitis vs possible prostatitis), YUE, lactic acidosis. Pt now with Rapid Response 2/2 hypotension and mild desaturation    -Neuro stable  -Hypotension in setting of severe sepsis and dehydration. Pt ordered and currently on 3 of 4 liters of crystalloid bolus. Recommend adding midodrine 10mg q8 with first dose now  -If no improvement in in HD's then may require admission to ICU for pressor support. SBP currently 104.   -Hold HTN medications  -Acute exacerbation of asthma with possible aspiration pneumonitis. Continue nebs. Pt on prednisone 5mg at home. If no improvement then may require stress dose steroids  -Monitor O2 saturation and titrate FiO2 as tolerated to > 92%  -Keep HOB elevated 30 degrees  -Continue empiric abx. Monitor wbc/temp. Obtain UA/Ucx. Repeat lactate  -Continue IVF  -CT abd/p noted. Consider Urology consult  -No acute ICU admission criteria at this time. Please reconsult as needed

## 2019-12-21 DIAGNOSIS — N41.0 ACUTE PROSTATITIS: ICD-10-CM

## 2019-12-21 DIAGNOSIS — N40.0 BENIGN PROSTATIC HYPERPLASIA WITHOUT LOWER URINARY TRACT SYMPTOMS: ICD-10-CM

## 2019-12-21 DIAGNOSIS — R78.81 BACTEREMIA: ICD-10-CM

## 2019-12-21 DIAGNOSIS — I95.9 HYPOTENSION, UNSPECIFIED: ICD-10-CM

## 2019-12-21 LAB
ALBUMIN SERPL ELPH-MCNC: 2.6 G/DL — LOW (ref 3.3–5)
ALP SERPL-CCNC: 76 U/L — SIGNIFICANT CHANGE UP (ref 40–120)
ALT FLD-CCNC: 41 U/L — SIGNIFICANT CHANGE UP (ref 12–78)
ANION GAP SERPL CALC-SCNC: 7 MMOL/L — SIGNIFICANT CHANGE UP (ref 5–17)
APPEARANCE UR: ABNORMAL
AST SERPL-CCNC: 59 U/L — HIGH (ref 15–37)
BACTERIA # UR AUTO: ABNORMAL
BASE EXCESS BLDA CALC-SCNC: -3.4 MMOL/L — LOW (ref -2–2)
BASE EXCESS BLDA CALC-SCNC: -3.9 MMOL/L — LOW (ref -2–2)
BASE EXCESS BLDA CALC-SCNC: -4.7 MMOL/L — LOW (ref -2–2)
BASE EXCESS BLDA CALC-SCNC: -5.8 MMOL/L — LOW (ref -2–2)
BASOPHILS # BLD AUTO: 0.18 K/UL — SIGNIFICANT CHANGE UP (ref 0–0.2)
BASOPHILS NFR BLD AUTO: 1 % — SIGNIFICANT CHANGE UP (ref 0–2)
BILIRUB SERPL-MCNC: 0.8 MG/DL — SIGNIFICANT CHANGE UP (ref 0.2–1.2)
BILIRUB UR-MCNC: NEGATIVE — SIGNIFICANT CHANGE UP
BLOOD GAS COMMENTS ARTERIAL: SIGNIFICANT CHANGE UP
BUN SERPL-MCNC: 23 MG/DL — SIGNIFICANT CHANGE UP (ref 7–23)
CALCIUM SERPL-MCNC: 7.5 MG/DL — LOW (ref 8.5–10.1)
CHLORIDE SERPL-SCNC: 111 MMOL/L — HIGH (ref 96–108)
CO2 SERPL-SCNC: 23 MMOL/L — SIGNIFICANT CHANGE UP (ref 22–31)
COLOR SPEC: YELLOW — SIGNIFICANT CHANGE UP
CREAT SERPL-MCNC: 1.3 MG/DL — SIGNIFICANT CHANGE UP (ref 0.5–1.3)
DIFF PNL FLD: ABNORMAL
E COLI DNA BLD POS QL NAA+NON-PROBE: SIGNIFICANT CHANGE UP
EOSINOPHIL # BLD AUTO: 0 K/UL — SIGNIFICANT CHANGE UP (ref 0–0.5)
EOSINOPHIL NFR BLD AUTO: 0 % — SIGNIFICANT CHANGE UP (ref 0–6)
EPI CELLS # UR: NEGATIVE — SIGNIFICANT CHANGE UP
GLUCOSE SERPL-MCNC: 98 MG/DL — SIGNIFICANT CHANGE UP (ref 70–99)
GLUCOSE UR QL: NEGATIVE — SIGNIFICANT CHANGE UP
GRAM STN FLD: SIGNIFICANT CHANGE UP
HCO3 BLDA-SCNC: 20 MMOL/L — LOW (ref 23–27)
HCO3 BLDA-SCNC: 21 MMOL/L — LOW (ref 23–27)
HCO3 BLDA-SCNC: 21 MMOL/L — LOW (ref 23–27)
HCO3 BLDA-SCNC: 22 MMOL/L — LOW (ref 23–27)
HCT VFR BLD CALC: 38.6 % — LOW (ref 39–50)
HCV AB S/CO SERPL IA: 0.25 S/CO — SIGNIFICANT CHANGE UP (ref 0–0.99)
HCV AB SERPL-IMP: SIGNIFICANT CHANGE UP
HGB BLD-MCNC: 13 G/DL — SIGNIFICANT CHANGE UP (ref 13–17)
HOROWITZ INDEX BLDA+IHG-RTO: 100 — SIGNIFICANT CHANGE UP
HOROWITZ INDEX BLDA+IHG-RTO: 40 — SIGNIFICANT CHANGE UP
HOROWITZ INDEX BLDA+IHG-RTO: 40 — SIGNIFICANT CHANGE UP
HOROWITZ INDEX BLDA+IHG-RTO: 60 — SIGNIFICANT CHANGE UP
KETONES UR-MCNC: NEGATIVE — SIGNIFICANT CHANGE UP
LACTATE SERPL-SCNC: 1.7 MMOL/L — SIGNIFICANT CHANGE UP (ref 0.7–2)
LEUKOCYTE ESTERASE UR-ACNC: ABNORMAL
LYMPHOCYTES # BLD AUTO: 0.55 K/UL — LOW (ref 1–3.3)
LYMPHOCYTES # BLD AUTO: 3 % — LOW (ref 13–44)
MCHC RBC-ENTMCNC: 31.9 PG — SIGNIFICANT CHANGE UP (ref 27–34)
MCHC RBC-ENTMCNC: 33.7 GM/DL — SIGNIFICANT CHANGE UP (ref 32–36)
MCV RBC AUTO: 94.8 FL — SIGNIFICANT CHANGE UP (ref 80–100)
METHOD TYPE: SIGNIFICANT CHANGE UP
MONOCYTES # BLD AUTO: 0.37 K/UL — SIGNIFICANT CHANGE UP (ref 0–0.9)
MONOCYTES NFR BLD AUTO: 2 % — SIGNIFICANT CHANGE UP (ref 2–14)
NEUTROPHILS # BLD AUTO: 17.15 K/UL — HIGH (ref 1.8–7.4)
NEUTROPHILS NFR BLD AUTO: 64 % — SIGNIFICANT CHANGE UP (ref 43–77)
NITRITE UR-MCNC: NEGATIVE — SIGNIFICANT CHANGE UP
NRBC # BLD: SIGNIFICANT CHANGE UP /100 WBCS (ref 0–0)
PCO2 BLDA: 30 MMHG — LOW (ref 32–46)
PCO2 BLDA: 37 MMHG — SIGNIFICANT CHANGE UP (ref 32–46)
PCO2 BLDA: 37 MMHG — SIGNIFICANT CHANGE UP (ref 32–46)
PCO2 BLDA: 39 MMHG — SIGNIFICANT CHANGE UP (ref 32–46)
PH BLDA: 7.32 — LOW (ref 7.35–7.45)
PH BLDA: 7.36 — SIGNIFICANT CHANGE UP (ref 7.35–7.45)
PH BLDA: 7.38 — SIGNIFICANT CHANGE UP (ref 7.35–7.45)
PH BLDA: 7.42 — SIGNIFICANT CHANGE UP (ref 7.35–7.45)
PH UR: 5 — SIGNIFICANT CHANGE UP (ref 5–8)
PLATELET # BLD AUTO: 108 K/UL — LOW (ref 150–400)
PO2 BLDA: 125 MMHG — HIGH (ref 74–108)
PO2 BLDA: 130 MMHG — HIGH (ref 74–108)
PO2 BLDA: 387 MMHG — HIGH (ref 74–108)
PO2 BLDA: 85 MMHG — SIGNIFICANT CHANGE UP (ref 74–108)
POTASSIUM SERPL-MCNC: 4.4 MMOL/L — SIGNIFICANT CHANGE UP (ref 3.5–5.3)
POTASSIUM SERPL-SCNC: 4.4 MMOL/L — SIGNIFICANT CHANGE UP (ref 3.5–5.3)
PROT SERPL-MCNC: 5.4 G/DL — LOW (ref 6–8.3)
PROT UR-MCNC: 25 MG/DL
RAPID RVP RESULT: SIGNIFICANT CHANGE UP
RBC # BLD: 4.07 M/UL — LOW (ref 4.2–5.8)
RBC # FLD: 14.2 % — SIGNIFICANT CHANGE UP (ref 10.3–14.5)
RBC CASTS # UR COMP ASSIST: SIGNIFICANT CHANGE UP /HPF (ref 0–4)
SAO2 % BLDA: 100 % — HIGH (ref 92–96)
SAO2 % BLDA: 96 % — SIGNIFICANT CHANGE UP (ref 92–96)
SAO2 % BLDA: 98 % — HIGH (ref 92–96)
SAO2 % BLDA: 99 % — HIGH (ref 92–96)
SODIUM SERPL-SCNC: 141 MMOL/L — SIGNIFICANT CHANGE UP (ref 135–145)
SP GR SPEC: 1.01 — SIGNIFICANT CHANGE UP (ref 1.01–1.02)
SPECIMEN SOURCE: SIGNIFICANT CHANGE UP
SPECIMEN SOURCE: SIGNIFICANT CHANGE UP
UROBILINOGEN FLD QL: NEGATIVE — SIGNIFICANT CHANGE UP
WBC # BLD: 18.44 K/UL — HIGH (ref 3.8–10.5)
WBC # FLD AUTO: 18.44 K/UL — HIGH (ref 3.8–10.5)
WBC UR QL: ABNORMAL

## 2019-12-21 PROCEDURE — 93308 TTE F-UP OR LMTD: CPT | Mod: 26

## 2019-12-21 PROCEDURE — 71045 X-RAY EXAM CHEST 1 VIEW: CPT | Mod: 26,77

## 2019-12-21 PROCEDURE — 71045 X-RAY EXAM CHEST 1 VIEW: CPT | Mod: 26

## 2019-12-21 PROCEDURE — 76604 US EXAM CHEST: CPT | Mod: 26

## 2019-12-21 PROCEDURE — 99291 CRITICAL CARE FIRST HOUR: CPT

## 2019-12-21 PROCEDURE — 99292 CRITICAL CARE ADDL 30 MIN: CPT

## 2019-12-21 RX ORDER — ALBUTEROL 90 UG/1
2.5 AEROSOL, METERED ORAL EVERY 4 HOURS
Refills: 0 | Status: DISCONTINUED | OUTPATIENT
Start: 2019-12-21 | End: 2019-12-24

## 2019-12-21 RX ORDER — PROPOFOL 10 MG/ML
30 INJECTION, EMULSION INTRAVENOUS
Qty: 1000 | Refills: 0 | Status: DISCONTINUED | OUTPATIENT
Start: 2019-12-21 | End: 2019-12-22

## 2019-12-21 RX ORDER — ERTAPENEM SODIUM 1 G/1
1000 INJECTION, POWDER, LYOPHILIZED, FOR SOLUTION INTRAMUSCULAR; INTRAVENOUS ONCE
Refills: 0 | Status: COMPLETED | OUTPATIENT
Start: 2019-12-21 | End: 2019-12-21

## 2019-12-21 RX ORDER — KETAMINE HYDROCHLORIDE 100 MG/ML
100 INJECTION INTRAMUSCULAR; INTRAVENOUS ONCE
Refills: 0 | Status: DISCONTINUED | OUTPATIENT
Start: 2019-12-21 | End: 2019-12-21

## 2019-12-21 RX ORDER — ACETAMINOPHEN 500 MG
1000 TABLET ORAL ONCE
Refills: 0 | Status: COMPLETED | OUTPATIENT
Start: 2019-12-21 | End: 2019-12-21

## 2019-12-21 RX ORDER — ASPIRIN/CALCIUM CARB/MAGNESIUM 324 MG
81 TABLET ORAL DAILY
Refills: 0 | Status: DISCONTINUED | OUTPATIENT
Start: 2019-12-21 | End: 2019-12-23

## 2019-12-21 RX ORDER — PROPOFOL 10 MG/ML
5 INJECTION, EMULSION INTRAVENOUS
Qty: 1000 | Refills: 0 | Status: DISCONTINUED | OUTPATIENT
Start: 2019-12-21 | End: 2019-12-21

## 2019-12-21 RX ORDER — CISATRACURIUM BESYLATE 2 MG/ML
3 INJECTION INTRAVENOUS
Qty: 200 | Refills: 0 | Status: DISCONTINUED | OUTPATIENT
Start: 2019-12-21 | End: 2019-12-21

## 2019-12-21 RX ORDER — IPRATROPIUM/ALBUTEROL SULFATE 18-103MCG
3 AEROSOL WITH ADAPTER (GRAM) INHALATION EVERY 4 HOURS
Refills: 0 | Status: DISCONTINUED | OUTPATIENT
Start: 2019-12-21 | End: 2019-12-24

## 2019-12-21 RX ORDER — ACETAMINOPHEN 500 MG
650 TABLET ORAL EVERY 6 HOURS
Refills: 0 | Status: DISCONTINUED | OUTPATIENT
Start: 2019-12-21 | End: 2019-12-21

## 2019-12-21 RX ORDER — IPRATROPIUM/ALBUTEROL SULFATE 18-103MCG
3 AEROSOL WITH ADAPTER (GRAM) INHALATION
Refills: 0 | Status: DISCONTINUED | OUTPATIENT
Start: 2019-12-21 | End: 2019-12-21

## 2019-12-21 RX ORDER — KETAMINE HYDROCHLORIDE 100 MG/ML
0.1 INJECTION INTRAMUSCULAR; INTRAVENOUS
Qty: 200 | Refills: 0 | Status: DISCONTINUED | OUTPATIENT
Start: 2019-12-21 | End: 2019-12-21

## 2019-12-21 RX ORDER — ERTAPENEM SODIUM 1 G/1
INJECTION, POWDER, LYOPHILIZED, FOR SOLUTION INTRAMUSCULAR; INTRAVENOUS
Refills: 0 | Status: DISCONTINUED | OUTPATIENT
Start: 2019-12-21 | End: 2019-12-24

## 2019-12-21 RX ORDER — PANTOPRAZOLE SODIUM 20 MG/1
40 TABLET, DELAYED RELEASE ORAL DAILY
Refills: 0 | Status: DISCONTINUED | OUTPATIENT
Start: 2019-12-21 | End: 2019-12-22

## 2019-12-21 RX ORDER — CHLORHEXIDINE GLUCONATE 213 G/1000ML
15 SOLUTION TOPICAL EVERY 12 HOURS
Refills: 0 | Status: DISCONTINUED | OUTPATIENT
Start: 2019-12-21 | End: 2019-12-22

## 2019-12-21 RX ORDER — MAGNESIUM SULFATE 500 MG/ML
2 VIAL (ML) INJECTION ONCE
Refills: 0 | Status: COMPLETED | OUTPATIENT
Start: 2019-12-21 | End: 2019-12-21

## 2019-12-21 RX ORDER — ACETAMINOPHEN 500 MG
650 TABLET ORAL EVERY 6 HOURS
Refills: 0 | Status: DISCONTINUED | OUTPATIENT
Start: 2019-12-21 | End: 2019-12-22

## 2019-12-21 RX ORDER — BUDESONIDE AND FORMOTEROL FUMARATE DIHYDRATE 160; 4.5 UG/1; UG/1
2 AEROSOL RESPIRATORY (INHALATION) ONCE
Refills: 0 | Status: COMPLETED | OUTPATIENT
Start: 2019-12-21 | End: 2019-12-21

## 2019-12-21 RX ORDER — ROCURONIUM BROMIDE 10 MG/ML
50 VIAL (ML) INTRAVENOUS ONCE
Refills: 0 | Status: COMPLETED | OUTPATIENT
Start: 2019-12-21 | End: 2019-12-21

## 2019-12-21 RX ORDER — ERTAPENEM SODIUM 1 G/1
1000 INJECTION, POWDER, LYOPHILIZED, FOR SOLUTION INTRAMUSCULAR; INTRAVENOUS EVERY 24 HOURS
Refills: 0 | Status: DISCONTINUED | OUTPATIENT
Start: 2019-12-22 | End: 2019-12-24

## 2019-12-21 RX ORDER — MIDAZOLAM HYDROCHLORIDE 1 MG/ML
2 INJECTION, SOLUTION INTRAMUSCULAR; INTRAVENOUS EVERY 4 HOURS
Refills: 0 | Status: DISCONTINUED | OUTPATIENT
Start: 2019-12-21 | End: 2019-12-22

## 2019-12-21 RX ORDER — PROPOFOL 10 MG/ML
100 INJECTION, EMULSION INTRAVENOUS ONCE
Refills: 0 | Status: COMPLETED | OUTPATIENT
Start: 2019-12-21 | End: 2019-12-21

## 2019-12-21 RX ORDER — KETAMINE HYDROCHLORIDE 100 MG/ML
0.1 INJECTION INTRAMUSCULAR; INTRAVENOUS
Qty: 1000 | Refills: 0 | Status: DISCONTINUED | OUTPATIENT
Start: 2019-12-21 | End: 2019-12-21

## 2019-12-21 RX ORDER — KETAMINE HYDROCHLORIDE 100 MG/ML
300 INJECTION INTRAMUSCULAR; INTRAVENOUS ONCE
Refills: 0 | Status: DISCONTINUED | OUTPATIENT
Start: 2019-12-21 | End: 2019-12-21

## 2019-12-21 RX ORDER — NOREPINEPHRINE BITARTRATE/D5W 8 MG/250ML
0.05 PLASTIC BAG, INJECTION (ML) INTRAVENOUS
Qty: 8 | Refills: 0 | Status: DISCONTINUED | OUTPATIENT
Start: 2019-12-21 | End: 2019-12-23

## 2019-12-21 RX ADMIN — Medication 3 MILLILITER(S): at 03:13

## 2019-12-21 RX ADMIN — BUDESONIDE AND FORMOTEROL FUMARATE DIHYDRATE 2 PUFF(S): 160; 4.5 AEROSOL RESPIRATORY (INHALATION) at 00:55

## 2019-12-21 RX ADMIN — PROPOFOL 2.49 MICROGRAM(S)/KG/MIN: 10 INJECTION, EMULSION INTRAVENOUS at 12:37

## 2019-12-21 RX ADMIN — Medication 81 MILLIGRAM(S): at 13:23

## 2019-12-21 RX ADMIN — ENOXAPARIN SODIUM 40 MILLIGRAM(S): 100 INJECTION SUBCUTANEOUS at 13:23

## 2019-12-21 RX ADMIN — Medication 650 MILLIGRAM(S): at 08:24

## 2019-12-21 RX ADMIN — PANTOPRAZOLE SODIUM 40 MILLIGRAM(S): 20 TABLET, DELAYED RELEASE ORAL at 13:26

## 2019-12-21 RX ADMIN — Medication 3 MILLILITER(S): at 15:54

## 2019-12-21 RX ADMIN — Medication 7.78 MICROGRAM(S)/KG/MIN: at 22:21

## 2019-12-21 RX ADMIN — PROPOFOL 14.94 MICROGRAM(S)/KG/MIN: 10 INJECTION, EMULSION INTRAVENOUS at 23:26

## 2019-12-21 RX ADMIN — Medication 125 MILLIGRAM(S): at 10:33

## 2019-12-21 RX ADMIN — Medication 1000 MILLIGRAM(S): at 11:45

## 2019-12-21 RX ADMIN — Medication 7.78 MICROGRAM(S)/KG/MIN: at 12:37

## 2019-12-21 RX ADMIN — PROPOFOL 100 MILLIGRAM(S): 10 INJECTION, EMULSION INTRAVENOUS at 10:57

## 2019-12-21 RX ADMIN — KETAMINE HYDROCHLORIDE 100 MILLIGRAM(S): 100 INJECTION INTRAMUSCULAR; INTRAVENOUS at 10:32

## 2019-12-21 RX ADMIN — PANTOPRAZOLE SODIUM 40 MILLIGRAM(S): 20 TABLET, DELAYED RELEASE ORAL at 06:23

## 2019-12-21 RX ADMIN — Medication 50 MILLIGRAM(S): at 10:55

## 2019-12-21 RX ADMIN — TAMSULOSIN HYDROCHLORIDE 0.4 MILLIGRAM(S): 0.4 CAPSULE ORAL at 22:20

## 2019-12-21 RX ADMIN — KETAMINE HYDROCHLORIDE 300 MILLIGRAM(S): 100 INJECTION INTRAMUSCULAR; INTRAVENOUS at 10:55

## 2019-12-21 RX ADMIN — Medication 50 GRAM(S): at 10:34

## 2019-12-21 RX ADMIN — ALBUTEROL 2 PUFF(S): 90 AEROSOL, METERED ORAL at 00:45

## 2019-12-21 RX ADMIN — PROPOFOL 14.94 MICROGRAM(S)/KG/MIN: 10 INJECTION, EMULSION INTRAVENOUS at 15:07

## 2019-12-21 RX ADMIN — Medication 1 TABLET(S): at 17:10

## 2019-12-21 RX ADMIN — Medication 3 MILLILITER(S): at 08:08

## 2019-12-21 RX ADMIN — Medication 1 TABLET(S): at 06:23

## 2019-12-21 RX ADMIN — Medication 650 MILLIGRAM(S): at 09:20

## 2019-12-21 RX ADMIN — PIPERACILLIN AND TAZOBACTAM 25 GRAM(S): 4; .5 INJECTION, POWDER, LYOPHILIZED, FOR SOLUTION INTRAVENOUS at 00:24

## 2019-12-21 RX ADMIN — PIPERACILLIN AND TAZOBACTAM 25 GRAM(S): 4; .5 INJECTION, POWDER, LYOPHILIZED, FOR SOLUTION INTRAVENOUS at 08:06

## 2019-12-21 RX ADMIN — ERTAPENEM SODIUM 120 MILLIGRAM(S): 1 INJECTION, POWDER, LYOPHILIZED, FOR SOLUTION INTRAMUSCULAR; INTRAVENOUS at 13:23

## 2019-12-21 RX ADMIN — CHLORHEXIDINE GLUCONATE 15 MILLILITER(S): 213 SOLUTION TOPICAL at 17:10

## 2019-12-21 RX ADMIN — PROPOFOL 14.94 MICROGRAM(S)/KG/MIN: 10 INJECTION, EMULSION INTRAVENOUS at 13:30

## 2019-12-21 RX ADMIN — ATORVASTATIN CALCIUM 40 MILLIGRAM(S): 80 TABLET, FILM COATED ORAL at 22:19

## 2019-12-21 RX ADMIN — Medication 3 MILLILITER(S): at 20:00

## 2019-12-21 RX ADMIN — Medication 3 MILLILITER(S): at 11:33

## 2019-12-21 RX ADMIN — Medication 3 MILLILITER(S): at 23:04

## 2019-12-21 RX ADMIN — PROPOFOL 14.94 MICROGRAM(S)/KG/MIN: 10 INJECTION, EMULSION INTRAVENOUS at 19:13

## 2019-12-21 RX ADMIN — Medication 400 MILLIGRAM(S): at 10:45

## 2019-12-21 NOTE — PROGRESS NOTE ADULT - PROBLEM SELECTOR PLAN 5
-5 coronary artery stents ~2001  -cont home dose aspirin 81, statin, -5 coronary artery stents ~2001  - H/O CAD -stable , cardiology Dr Degroot on case  -cont home dose aspirin 81, statin,

## 2019-12-21 NOTE — PROGRESS NOTE ADULT - ASSESSMENT
75 yo male, PMHx of nephrolithiasis, GERD, HTN, HLD, CAD s/p stents x5, asthma and COPD with multiple prior hospitalizations (never been intubated), right lung tumor s/p vats with resection, with recent prostate bx 12/17 admitted with sepsis likely due to bacteremia from hematogenous spread during prostate bx. RRT called overnight for septic shock, hypoxia, and acute bronchospasm. Patient was transferred to ICU for further care. Today developed acute bronchospasm, status asthmaticus, acute hypercapnic respiratory failure suspected due to SIRS.

## 2019-12-21 NOTE — CONSULT NOTE ADULT - PROBLEM SELECTOR RECOMMENDATION 3
as above. ultimate duration may be prolonged for abx.    Thank you for consulting us and involving us in the management of this most interesting and challenging case.     We will follow along in the care of this patient.

## 2019-12-21 NOTE — PROCEDURE NOTE - NSPROCDETAILS_GEN_ALL_CORE
prior to placement, an active physician order for the placement of a urinary catheter was verified/a urinary catheter insertion kit was used for all insertion materials/sterile technique, indwelling urinary device inserted

## 2019-12-21 NOTE — DIETITIAN INITIAL EVALUATION ADULT. - PROBLEM SELECTOR PLAN 4
-COPD in setting of asthma  -cont home dose spiriva, symbicort, ventolin HFA, iprat-albut, budesoinde

## 2019-12-21 NOTE — PROGRESS NOTE ADULT - PROBLEM SELECTOR PLAN 1
-Sepsis-Fever,  High WBC Bandemia , S/P elevated Lactate- Resolved   -E Coli Sepsis - IV Abx -sepsis. Repeat Blood c/s x 2 in AM   -Dr cui With high rate of ESBL organisms in this context recommend escalation to ertapenem pending sensitivity data.  -Etiology -likely Prostatitis   -s/p Recent Prostate Biopsy on Tuesday 12/17 with Dr. Machado  -? Aspiration , CT scan  shows- Groundglass opacity with underlying reticular prominence in the lateral right middle lobe and posterior right lower lobe. NO PNA   - CT A/P done- B/L perinephric stranding-Nonspecific  -Blood c/s x 2, -E Coli UA. Urine c/s , RVP -NEG   Flu A/B/RSV -NEG   -IV Fluids- Stopped

## 2019-12-21 NOTE — DIETITIAN INITIAL EVALUATION ADULT. - PROBLEM SELECTOR PLAN 2
-hypotension in setting of sepsis   -s/p 3 L ER, 2 L during RRT, midodrine drip, BP stabilized to 105 systolic   -cont to monitor vitals  -NS @ 100ml/hr

## 2019-12-21 NOTE — PROCEDURE NOTE - ADDITIONAL PROCEDURE DETAILS
indications: acute hypercapnic respiratory failure, status asthmaticus
indications: BPH, prostatitis, urinary retention
indications: status asthmaticus, acute hypercapnic respiratory failure

## 2019-12-21 NOTE — CONSULT NOTE ADULT - SUBJECTIVE AND OBJECTIVE BOX
HPI:  73 YO M with PMHX asthma, COPD, former smoker, hx of benign lung cancer R lobe surgically removed at [facility]) CAD (s/p 5 stents ~), GERD, HLD, HTN presents after shakes, vomiting, confusion. Pt states he has prostate biopsy 3 days ago, started on antibiotics, the next day developed shaking chills, then became slightly SOB, used a nebulizer which helped his shortness of breath. Pt saw his pulmonologist, as per pt pulmonologist states everything was ok from a pulmonary standpoint and to continue using his nebulizer. Today pt developed shivering shaking chills again, NBNR emesis, confusion. Pt's wife found him confused, came to the ER for confusion. Of note, pt states he had a cough with sputum production last week. Pt denies fevers at home or sick contacts.     In the ED: Vitals sig for temp of 104.7 F. Labs sig for Plt of 114, bands of 29, BUN 27, Cr 1.40, glucose 124, alk phos 128, AST 39, eGFR 49, lactate 2.8, FLU A, B, RSV neg. CXR: No acute infiltrate. CT head: No acute intracranial bleeding, mass effect, or shift. Mild chronic microvascular ischemic changes. CT A/P: Right middle lobe and right lower lobe groundglass opacities with underlying reticular changes, likely chronic. Recommend follow-up to ensure stability or resolution and exclude acute pneumonia. Mild constipation. No bowel obstruction. Enlarged prostate. Nonspecific bilateral perinephric inflammatory stranding. Nonobstructing left renal calculi. Given: Rocephin x1, Azithromycin x1,  Zosyn x1,  ns bolus x3, tylenol x1, Duo neb x1. Of note, RRT was called for hypotension with BP of 63/48. Patient was seen by RRT team and ICU PA. Was given normal saline bolus x2 and midodrine 10mg x1 with improvement in BP of 105/56 (20 Dec 2019 21:52)    Pt improving and ready to be downgraded per ICU staff then had bronchospasm event and is now intubated.      PAST MEDICAL & SURGICAL HISTORY:  Nephrolithiasis  GERD (gastroesophageal reflux disease)  HLD (hyperlipidemia)  HTN (hypertension)  Stented coronary artery  Asthma  Chronic obstructive pulmonary disease: Asthma  S/P primary angioplasty with coronary stent  Lung tumor: Rt Lung tumor resection,benign      Antimicrobials  piperacillin/tazobactam IVPB.. 3.375 Gram(s) IV Intermittent every 8 hours      Immunological      Other  acetaminophen    Suspension .. 650 milliGRAM(s) Oral every 6 hours PRN  ALBUTerol    0.083% 2.5 milliGRAM(s) Nebulizer every 4 hours PRN  albuterol/ipratropium for Nebulization 3 milliLiter(s) Nebulizer every 4 hours  aspirin enteric coated 81 milliGRAM(s) Oral daily  atorvastatin 40 milliGRAM(s) Oral at bedtime  chlorhexidine 0.12% Liquid 15 milliLiter(s) Oral Mucosa every 12 hours  cisatracurium Infusion 3 MICROgram(s)/kG/Min IV Continuous <Continuous>  enoxaparin Injectable 40 milliGRAM(s) SubCutaneous daily  ketamine Infusion 0.1 mG/kG/Hr IV Continuous <Continuous>  ketamine Injectable 300 milliGRAM(s) IV Push once  lactobacillus acidophilus 1 Tablet(s) Oral two times a day  norepinephrine Infusion 0.05 MICROgram(s)/kG/Min IV Continuous <Continuous>  pantoprazole  Injectable 40 milliGRAM(s) IV Push daily  propofol Infusion 5 MICROgram(s)/kG/Min IV Continuous <Continuous>  propofol Injectable 100 milliGRAM(s) IV Push once  rocuronium Injectable 50 milliGRAM(s) IV Push once  tamsulosin 0.4 milliGRAM(s) Oral at bedtime      Allergies    No Known Allergies    Intolerances      SOCIAL HISTORY:  quit smoking 5 years ago, 50 pack years   drinks 1 glass of wine 1 x a month socially  lives with wife, performs all ADLs, ambulates on own (20 Dec 2019 21:52)      FAMILY HISTORY:  Family history of stroke  Family history of heart disease      ROS:  limited  currently as pt is intubated and nonverbal    Vital Signs Last 24 Hrs  T(C): 37.9 (21 Dec 2019 10:00), Max: 40.4 (20 Dec 2019 19:21)  T(F): 100.2 (21 Dec 2019 10:00), Max: 104.7 (20 Dec 2019 19:21)  HR: 117 (21 Dec 2019 11:30) (74 - 118)  BP: 115/57 (21 Dec 2019 11:30) (64/38 - 130/60)  BP(mean): 80 (21 Dec 2019 11:30) (63 - 83)  RR: 28 (21 Dec 2019 11:30) (18 - 34)  SpO2: 97% (21 Dec 2019 11:30) (88% - 100%)    PE:  WDWN in no distress, intubated  HEENT:  NC, PERRL, sclerae anicteric, conjunctivae clear, EOMI.  Sinuses nontender, no nasal exudate. intubated  Neck:  Supple, no adenopathy  Lungs:  No accessory muscle use, bilaterally clear to auscultation and failry summetrical  Cor:  RRR, S1, S2, no murmur appreciated  Abd:  Symmetric, normoactive BS.  Soft, nontender, no masses, guarding or rebound.  Liver and spleen not enlarged  Extrem:  No cyanosis or edema  Skin:  No rashes.      LABS:                        13.0   18.44 )-----------( 108      ( 21 Dec 2019 05:21 )             38.6     Band Neutrophils %: 29.0 % (19 @ 05:21)    Band Neutrophils %: 29.0 % (19 @ 19:23)    Auto Eosinophil #: 0.00 K/uL (19 @ 05:21)        WBC Count: 18.44 K/uL (19 @ 05:21)  WBC Count: 3.90 K/uL (19 @ 19:23)          141  |  111<H>  |  23  ----------------------------<  98  4.4   |  23  |  1.30    Ca    7.5<L>      21 Dec 2019 05:21    TPro  5.4<L>  /  Alb  2.6<L>  /  TBili  0.8  /  DBili  x   /  AST  59<H>  /  ALT  41  /  AlkPhos  76        Creatinine, Serum: 1.30 mg/dL (19 @ 05:21)  Creatinine, Serum: 1.40 mg/dL (19 @ 19:23)      Urinalysis Basic - ( 21 Dec 2019 00:56 )    Color: Yellow / Appearance: Slightly Turbid / S.010 / pH: x  Gluc: x / Ketone: Negative  / Bili: Negative / Urobili: Negative   Blood: x / Protein: 25 mg/dL / Nitrite: Negative   Leuk Esterase: Small / RBC: 0-2 /HPF / WBC 26-50   Sq Epi: x / Non Sq Epi: Negative / Bacteria: Few            MICROBIOLOGY:  Culture Results:   Growth in aerobic bottle: Gram Negative Rods  Growth in anaerobic bottle: Gram Negative Rods  "Due to technical problems, Proteus sp. will Not be reported as part of  the BCID panel until further notice"  ***Blood Panel PCR results on this specimenare available  approximately 3 hours after the Gram stain result.***  Gram stain, PCR, and/or culture results may not always  correspond due to difference in methodologies.  ************************************************************  This PCR assaywas performed using [employer].  The following targets are tested for: Enterococcus,  vancomycin resistant enterococci, Listeria monocytogenes,  coagulase negative staphylococci, S. aureus,  methicillin resistant S. aureus, Streptococcus agalactiae  (Group B), S. pneumoniae, S. pyogenes (Group A),  Acinetobacter baumannii, Enterobacter cloacae, E. coli,  Klebsiella oxytoca, K. pneumoniae, Proteus sp.,  Serratia marcescens, Haemophilus influenzae,  Neisseria meningitidis, Pseudomonas aeruginosa, Candida  albicans, C. glabrata, C krusei, C parapsilosis,  C. tropicalis and the KPC resistance gene. ( @ 00:55)  Culture Results:   Growth in aerobic bottle: Gram Negative Rods  Growth in anaerobic bottle: Gram Negative Rods ( @ 00:55)      RADIOLOGY & ADDITIONAL STUDIES:    --< from: CT Abdomen and Pelvis No Cont (12.20.19 @ 20:02) >    EXAM:  CT ABDOMEN AND PELVIS                            PROCEDURE DATE:  2019          INTERPRETATION:  CLINICAL INDICATION: 74 years  Male with fever today, vomited.     COMPARISON: 2016    PROCEDURE:   CT of the Abdomen and Pelvis was performed without intravenous contrast.   Intravenous contrast: None.  Oral contrast: None.  Sagittal and coronal reformats were performed.    LIMITATIONS: Evaluation of the solid organs and GI tract is limited without oral and IV contrast.    FINDINGS:    LOWER CHEST: Groundglass opacity with underlying reticular prominence in the lateral right middle lobe and posterior right lower lobe. Coronary artery calcifications.    LIVER: 3.4 x 2.3 cm right hepatic lobe.  BILE DUCTS: Normal caliber.  GALLBLADDER: Within normal limits.  SPLEEN: Within normal limits.  PANCREAS: Within normal limits.  ADRENALS: Within normal limits.  KIDNEYS/URETERS: Mild nonspecific bilateral perinephric fat stranding. No hydroureteronephrosis or calculi. Centimeter leftmidpole renal cyst. 1.3 cm left upper pole renal cortical cyst. 3.4 cm left lower pole renal cyst.  2. One to 2 mm nonobstructing calculi in the left renal collecting system.    BLADDER: Within normal limits.  REPRODUCTIVE ORGANS: Enlarged prostate measuring 6.6 x 4.7 x 4.8 cm with central calcifications. The prostate indents the bladder base.    BOWEL: No bowel obstruction. Appendix is normal. Mild retained fecal matter throughout the colon.  PERITONEUM: No ascites.  VESSELS: Atherosclerotic aorta. Mildly ectatic infrarenal abdominal aorta measuring up to 2.7 cm in caliber, unchanged. Retroaortic left renal vein.  RETROPERITONEUM/LYMPH NODES: No lymphadenopathy.    ABDOMINAL WALL: Small bilateral fat-containing inguinal hernias.  BONES: Within normal limits.    IMPRESSION:     Evaluation of the solid organs and GI tract is limited without oral and IV contrast.    Right middle lobe and right lower lobe groundglass opacities with underlying reticular changes, likely chronic. Recommend follow-up to ensure stability or resolution and exclude acute pneumonia    Mild constipation. No bowel obstruction.    Enlarged prostate.    Nonspecific bilateral perinephric inflammatory stranding. Nonobstructing left renal calculi.

## 2019-12-21 NOTE — PROCEDURE NOTE - NSURITECHNIQUE_GU_A_CORE
The urinary drainage system is closed at the end of the procedure/Proper hand hygiene was performed/Sterile gloves were worn for the duration of the procedure/A sterile drape was used to cover all adjacent areas/The catheter was secured with a securement device (e.g. StatLock)/All applicable medical record documentation is completed/The catheter was appropriately lubricated/The collection bag is below the level of the patient and urinary bladder/The site was cleaned with soap/water and sterile solution (betadine)

## 2019-12-21 NOTE — PROGRESS NOTE ADULT - SUBJECTIVE AND OBJECTIVE BOX
Patient is a 74y old  Male who presents with a chief complaint of Fever (21 Dec 2019 12:00)      INTERVAL HPI:  75 YO M with PMHX asthma, COPD, former smoker, hx of benign lung cancer R lobe surgically removed at [facility]) CAD (s/p 5 stents ~), GERD, HLD, HTN presents after shakes, vomiting, confusion. Pt states he has prostate biopsy 3 days ago, started on antibiotics, the next day developed shaking chills, then became slightly SOB, used a nebulizer which helped his shortness of breath. Pt saw his pulmonologist yesterday, as per pt pulmonologist states everything was ok from a pulmonary standpoint and to continue using his nebulizer. Today pt developed shivering shaking chills again, NBNR emesis, confusion. Pt's wife found him confused, came to the ER for confusion. Of note, pt states he had a cough with sputum production last week. Pt denies fevers at home or sick contacts.     In the ED: Vitals sig for temp of 104.7 F. Labs sig for Plt of 114, bands of 29, BUN 27, Cr 1.40, glucose 124, alk phos 128, AST 39, eGFR 49, lactate 2.8, FLU A, B, RSV neg. CXR: No acute infiltrate. CT head: No acute intracranial bleeding, mass effect, or shift. Mild chronic microvascular ischemic changes. CT A/P: Right middle lobe and right lower lobe groundglass opacities with underlying reticular changes, likely chronic. Recommend follow-up to ensure stability or resolution and exclude acute pneumonia. Mild constipation. No bowel obstruction. Enlarged prostate. Nonspecific bilateral perinephric inflammatory stranding. Nonobstructing left renal calculi. Given: Rocephin x1, Azithromycin x1,  Zosyn x1,  ns bolus x3, tylenol x1, Duo neb x1. Of note, RRT was called for hypotension with BP of 63/48. Patient was seen by RRT team and ICU PA. Was given normal saline bolus x2 and midodrine 10mg x1 with improvement in BP of 105/56    19: Pt seen, Examined, S/P  Intubation now , Pt with septic shock 2/2 GNR, E. Coli sepsis , seen by ID Dr Marc, IV Abx changed to Invanz ,S/P Vasopressors IV, Leukocytosis with Bandemia, & Low platelets 2/2 sepsis,       OVERNIGHT EVENTS: S/P RRT called for hypotension , several IV Fluid Boluses given, pt was upgraded to ICU for septic shock.    Home Medications:  Aspir 81 81 mg oral tablet: 1 tab(s) orally once a day (20 Dec 2019 22:44)  atorvastatin 40 mg oral tablet: 1 tab(s) orally once a day (at bedtime) (20 Dec 2019 22:44)  budesonide 0.5 mg/2 mL inhalation suspension: 2 milliliter(s) inhaled 2 times a day, As Needed (20 Dec 2019 22:44)  DuoNeb 0.5 mg-2.5 mg/3 mL inhalation solution: 1 milliliter(s) inhaled every 6 hours (20 Dec 2019 22:44)  losartan 25 mg oral tablet: 1 tab(s) orally once a day (20 Dec 2019 22:44)  predniSONE 5 mg oral tablet: 1 tab(s) orally once a day, As Needed (20 Dec 2019 22:44)  Protonix 40 mg oral delayed release tablet: 1 tab(s) orally once a day (20 Dec 2019 22:44)  silodosin 8 mg oral capsule: 1 cap(s) orally once a day (20 Dec 2019 22:44)  Spiriva 18 mcg inhalation capsule: 1  inhaled once a day (20 Dec 2019 22:44)  Symbicort 160 mcg-4.5 mcg/inh inhalation aerosol: 2  inhaled 2 times a day (20 Dec 2019 22:44)  Ventolin HFA 90 mcg/inh inhalation aerosol: 2 puff(s) inhaled 4 times a day, As Needed (20 Dec 2019 22:44)  Vitamin B-12 500 mcg oral tablet:  orally  (20 Dec 2019 22:44)  Vitamin B6 100 mg oral tablet: 1 tab(s) orally once a day (20 Dec 2019 22:44)  Vitamin D: 1000 international unit(s) orally once a day (20 Dec 2019 22:44)      MEDICATIONS  (STANDING):  albuterol/ipratropium for Nebulization 3 milliLiter(s) Nebulizer every 4 hours  aspirin  chewable 81 milliGRAM(s) Oral daily  atorvastatin 40 milliGRAM(s) Oral at bedtime  chlorhexidine 0.12% Liquid 15 milliLiter(s) Oral Mucosa every 12 hours  cisatracurium Infusion 3 MICROgram(s)/kG/Min (14.94 mL/Hr) IV Continuous <Continuous>  enoxaparin Injectable 40 milliGRAM(s) SubCutaneous daily  ertapenem  IVPB      ketamine Infusion 0.1 mG/kG/Hr (4.15 mL/Hr) IV Continuous <Continuous>  lactobacillus acidophilus 1 Tablet(s) Oral two times a day  norepinephrine Infusion 0.05 MICROgram(s)/kG/Min (7.781 mL/Hr) IV Continuous <Continuous>  pantoprazole  Injectable 40 milliGRAM(s) IV Push daily  propofol Infusion 30 MICROgram(s)/kG/Min (14.94 mL/Hr) IV Continuous <Continuous>  tamsulosin 0.4 milliGRAM(s) Oral at bedtime    MEDICATIONS  (PRN):  acetaminophen    Suspension .. 650 milliGRAM(s) Oral every 6 hours PRN Temp greater or equal to 38C (100.4F), Mild Pain (1 - 3)  ALBUTerol    0.083% 2.5 milliGRAM(s) Nebulizer every 4 hours PRN Shortness of Breath and/or Wheezing  midazolam Injectable 2 milliGRAM(s) IV Push every 4 hours PRN Agitation or Anxiety      Allergies    No Known Allergies    Intolerances        Social History:  quit smoking 5 years ago, 50 pack years   drinks 1 glass of wine 1 x a month socially  lives with wife, performs all ADLs, ambulates on own (20 Dec 2019 21:52)      REVIEW OF SYSTEMS: Pt on Vent  now     ROS Unable to obtain due to - [  ] Dementia  [  ] Lethargy [  ] Drowsy [ x ] Sedated [  ] non verbal  REST OF REVIEW Of SYSTEM - [  ] Normal     Vital Signs Last 24 Hrs  T(C): 38.3 (21 Dec 2019 13:00), Max: 40.4 (20 Dec 2019 19:21)  T(F): 100.9 (21 Dec 2019 13:00), Max: 104.7 (20 Dec 2019 19:21)  HR: 93 (21 Dec 2019 14:30) (74 - 118)  BP: 110/57 (21 Dec 2019 14:30) (64/38 - 150/70)  BP(mean): 78 (21 Dec 2019 14:30) (55 - 101)  RR: 18 (21 Dec 2019 14:30) (18 - 36)  SpO2: 99% (21 Dec 2019 14:30) (88% - 100%)  Finger Stick        - @ 07: @ 07:00  --------------------------------------------------------  IN: 120 mL / OUT: 200 mL / NET: -80 mL     @ 07:01  -   @ 15:19  --------------------------------------------------------  IN: 401.3 mL / OUT: 180 mL / NET: 221.3 mL        PHYSICAL EXAM:  GENERAL:  [  ] NAD , [  ] well appearing, [  ] Agitated, [  ] Drowsy,  [  ] Lethargy, [  ] confused   HEAD:  [  ] Normal, [  ] Other  EYES:  [  ] EOMI, [  ] PERRLA, [  ] conjunctiva and sclera clear normal, [  ] Other,  [  ] Pallor,[  ] Discharge  ENMT:  [  ] Normal, [  ] Moist mucous membranes, [  ] Good dentition, [  ] No Thrush  NECK:  [  ] Supple, [  ] No JVD, [  ] Normal thyroid, [  ] Lymphadenopathy [  ] Other  CHEST/LUNG:  [  ] Clear to auscultation bilaterally, [  ] Breath Sounds equal B/L / Decrease, [  ] poor effort  [  ] No rales, [  ] No rhonchi  [  ]  No wheezing,   HEART:  [  ] Regular rate and rhythm, [  ] tachycardia, [  ] Bradycardia,  [  ] irregular  [  ] No murmurs, No rubs, No gallops, [  ] PPM in place (Mfr:  )  ABDOMEN:  [  ] Soft, [  ] Nontender, [  ] Nondistended, [  ] No mass, [  ] Bowel sounds present, [  ] obese  NERVOUS SYSTEM:  [  ] Alert & Oriented X3, [  ] Nonfocal  [  ] Confusion  [  ] Encephalopathic [  ] Sedated [  ] Unable to assess, [  ] Dementia [  ] Other-  EXTREMITIES: [  ] 2+ Peripheral Pulses, No clubbing, No cyanosis,  [  ] edema B/L lower EXT. [  ] PVD stasis skin changes B/L Lower EXT, [  ] wound  LYMPH: No lymphadenopathy noted  SKIN:  [  ] No rashes or lesions, [  ] Pressure Ulcers, [  ] ecchymosis, [  ] Skin Tears, [  ] Other    DIET:     LABS:                        13.0   18.44 )-----------( 108      ( 21 Dec 2019 05:21 )             38.6     21 Dec 2019 05:21    141    |  111    |  23     ----------------------------<  98     4.4     |  23     |  1.30     Ca    7.5        21 Dec 2019 05:21    TPro  5.4    /  Alb  2.6    /  TBili  0.8    /  DBili  x      /  AST  59     /  ALT  41     /  AlkPhos  76     21 Dec 2019 05:21      Urinalysis Basic - ( 21 Dec 2019 00:56 )    Color: Yellow / Appearance: Slightly Turbid / S.010 / pH: x  Gluc: x / Ketone: Negative  / Bili: Negative / Urobili: Negative   Blood: x / Protein: 25 mg/dL / Nitrite: Negative   Leuk Esterase: Small / RBC: 0-2 /HPF / WBC 26-50   Sq Epi: x / Non Sq Epi: Negative / Bacteria: Few        Culture Results:   Growth in aerobic bottle: Gram Negative Rods  Growth in anaerobic bottle: Gram Negative Rods  "Due to technical problems, Proteus sp. will Not be reported as part of  the BCID panel until further notice"  ***Blood Panel PCR results on this specimenare available  approximately 3 hours after the Gram stain result.***  Gram stain, PCR, and/or culture results may not always  correspond due to difference in methodologies.  ************************************************************  This PCR assaywas performed using [employer].  The following targets are tested for: Enterococcus,  vancomycin resistant enterococci, Listeria monocytogenes,  coagulase negative staphylococci, S. aureus,  methicillin resistant S. aureus, Streptococcus agalactiae  (Group B), S. pneumoniae, S. pyogenes (Group A),  Acinetobacter baumannii, Enterobacter cloacae, E. coli,  Klebsiella oxytoca, K. pneumoniae, Proteus sp.,  Serratia marcescens, Haemophilus influenzae,  Neisseria meningitidis, Pseudomonas aeruginosa, Candida  albicans, C. glabrata, C krusei, C parapsilosis,  C. tropicalis and the KPC resistance gene. ( @ 00:55)  Culture Results:   Growth in aerobic bottle: Gram Negative Rods  Growth in anaerobic bottle: Gram Negative Rods ( @ 00:55)      culture blood  -- .Blood Blood-Peripheral  00:55    culture urine  --   @ 00:55    Culture - Blood (collected 21 Dec 2019 00:55)  Source: .Blood Blood-Peripheral  Gram Stain (21 Dec 2019 10:39):    Growth in aerobic bottle: Gram Negative Rods    Growth in anaerobic bottle: Gram Negative Rods  Preliminary Report (21 Dec 2019 10:40):    Growth in aerobic bottle: Gram Negative Rods    Growth in anaerobic bottle: Gram Negative Rods    Culture - Blood (collected 21 Dec 2019 00:55)  Source: .Blood Blood-Peripheral  Gram Stain (21 Dec 2019 10:01):    Growth in aerobic bottle: Gram Negative Rods    Growth in anaerobic bottle: Gram Negative Rods  Preliminary Report (21 Dec 2019 10:01):    Growth in aerobic bottle: Gram Negative Rods    Growth in anaerobic bottle: Gram Negative Rods    "Due to technical problems, Proteus sp. will Not be reported as part of    the BCID panel until further notice"    ***Blood Panel PCR results on this specimenare available    approximately 3 hours after the Gram stain result.***    Gram stain, PCR, and/or culture results may not always    correspond due to difference in methodologies.    ************************************************************    This PCR assaywas performed using [employer].    The following targets are tested for: Enterococcus,    vancomycin resistant enterococci, Listeria monocytogenes,    coagulase negative staphylococci, S. aureus,    methicillin resistant S. aureus, Streptococcus agalactiae    (Group B), S. pneumoniae, S. pyogenes (Group A),    Acinetobacter baumannii, Enterobacter cloacae, E. coli,    Klebsiella oxytoca, K. pneumoniae, Proteus sp.,    Serratia marcescens, Haemophilus influenzae,    Neisseria meningitidis, Pseudomonas aeruginosa, Candida    albicans, C. glabrata, C krusei, C parapsilosis,    C. tropicalis and the KPC resistance gene.  Organism: Blood Culture PCR (21 Dec 2019 11:13)  Organism: Blood Culture PCR (21 Dec 2019 11:13)    Lipase, Serum: 45 U/L (19 @ 19:23)    RADIOLOGY & ADDITIONAL TESTS:  < from: Xray Chest 1 View-PORTABLE IMMEDIATE (19 @ 11:44) >    EXAM:  XR CHEST PORTABLE IMMED 1V                            PROCEDURE DATE:  2019          INTERPRETATION:  Portable chest radiograph       CLINICAL INFORMATION: ET tube placement    TECHNIQUE:  Single portable frontal AP view of the chest was obtained.    FINDINGS: The examination of 2019 is available for review.    The lungs are free of consolidation.  No pleural abnormality is seen.      The heart and mediastinum appear intact.    No destructive lesion is seen in the visualized skeleton.    The tip of the endotracheal tube is projecting over the trachea, well above the scotty. The distal NG tube is projecting over the upper abdomen. The tip of the NG tube is not included on this examination.      IMPRESSION:   No evidence ofactive chest disease.          < end of copied text >        HEALTH ISSUES - PROBLEM Dx:  Prostatitis, acute: Prostatitis, acute  Bacteremia due to Escherichia coli: Bacteremia due to Escherichia coli  BPH (benign prostatic hyperplasia): BPH (benign prostatic hyperplasia)  Hypotension: Hypotension  Prophylactic measure: Prophylactic measure  GERD (gastroesophageal reflux disease): GERD (gastroesophageal reflux disease)  HLD (hyperlipidemia): HLD (hyperlipidemia)  Stented coronary artery: Stented coronary artery  Chronic obstructive pulmonary disease: Chronic obstructive pulmonary disease  YUE (acute kidney injury): YUE (acute kidney injury)  Sepsis: Sepsis    Consultant(s) Notes Reviewed:  [  ] YES     Care Discussed with [X] Consultants  [  ] Patient  [  ] Family [  ] HCP [  ]   [  ] Social Service  [  ] RN, [  ] Physical Therapy,[  ] Palliative care team  DVT PPX: [  ] Lovenox, [  ] S C Heparin, [  ] Coumadin, [  ] Xarelto, [  ] Eliquis, [  ] Pradaxa, [  ] IV Heparin drip, [  ] SCD [  ] Contraindication 2 to GI Bleed,[  ] Ambulation [  ] Contraindicated 2 to  bleed [  ] Contraindicated 2 to Brain Bleed  Advanced directive: [  ] None, [  ] DNR/DNI Patient is a 74y old  Male who presents with a chief complaint of Fever (21 Dec 2019 12:00)      INTERVAL HPI:  75 YO M with PMHX asthma, COPD, former smoker, hx of benign lung cancer R lobe surgically removed at [facility]) CAD (s/p 5 stents ~), GERD, HLD, HTN presents after shakes, vomiting, confusion. Pt states he has prostate biopsy 3 days ago, started on antibiotics, the next day developed shaking chills, then became slightly SOB, used a nebulizer which helped his shortness of breath. Pt saw his pulmonologist yesterday, as per pt pulmonologist states everything was ok from a pulmonary standpoint and to continue using his nebulizer. Today pt developed shivering shaking chills again, NBNR emesis, confusion. Pt's wife found him confused, came to the ER for confusion. Of note, pt states he had a cough with sputum production last week. Pt denies fevers at home or sick contacts.     In the ED: Vitals sig for temp of 104.7 F. Labs sig for Plt of 114, bands of 29, BUN 27, Cr 1.40, glucose 124, alk phos 128, AST 39, eGFR 49, lactate 2.8, FLU A, B, RSV neg. CXR: No acute infiltrate. CT head: No acute intracranial bleeding, mass effect, or shift. Mild chronic microvascular ischemic changes. CT A/P: Right middle lobe and right lower lobe groundglass opacities with underlying reticular changes, likely chronic. Recommend follow-up to ensure stability or resolution and exclude acute pneumonia. Mild constipation. No bowel obstruction. Enlarged prostate. Nonspecific bilateral perinephric inflammatory stranding. Nonobstructing left renal calculi. Given: Rocephin x1, Azithromycin x1,  Zosyn x1,  ns bolus x3, tylenol x1, Duo neb x1. Of note, RRT was called for hypotension with BP of 63/48. Patient was seen by RRT team and ICU PA. Was given normal saline bolus x2 and midodrine 10mg x1 with improvement in BP of 105/56    19: Pt seen, Examined, S/P  Intubation now with ac bronchospasm with  Hypoxia , ac respiratory failure , Pt with septic shock 2/2 GNR, E. Coli sepsis , seen by ID Dr Marc, IV Abx changed to Invanz ,S/P Vasopressors IV, Leukocytosis with Bandemia, & Low platelets 2/2 sepsis, pt got IV Solumedrol 125 mg x 1 dose, seen by cardiology Dr Degroot,       OVERNIGHT EVENTS: S/P RRT called for hypotension , several IV Fluid Boluses given, pt was upgraded to ICU for septic shock/ Hypoxia .    Home Medications:  Aspir 81 81 mg oral tablet: 1 tab(s) orally once a day (20 Dec 2019 22:44)  atorvastatin 40 mg oral tablet: 1 tab(s) orally once a day (at bedtime) (20 Dec 2019 22:44)  budesonide 0.5 mg/2 mL inhalation suspension: 2 milliliter(s) inhaled 2 times a day, As Needed (20 Dec 2019 22:44)  DuoNeb 0.5 mg-2.5 mg/3 mL inhalation solution: 1 milliliter(s) inhaled every 6 hours (20 Dec 2019 22:44)  losartan 25 mg oral tablet: 1 tab(s) orally once a day (20 Dec 2019 22:44)  predniSONE 5 mg oral tablet: 1 tab(s) orally once a day, As Needed (20 Dec 2019 22:44)  Protonix 40 mg oral delayed release tablet: 1 tab(s) orally once a day (20 Dec 2019 22:44)  silodosin 8 mg oral capsule: 1 cap(s) orally once a day (20 Dec 2019 22:44)  Spiriva 18 mcg inhalation capsule: 1  inhaled once a day (20 Dec 2019 22:44)  Symbicort 160 mcg-4.5 mcg/inh inhalation aerosol: 2  inhaled 2 times a day (20 Dec 2019 22:44)  Ventolin HFA 90 mcg/inh inhalation aerosol: 2 puff(s) inhaled 4 times a day, As Needed (20 Dec 2019 22:44)  Vitamin B-12 500 mcg oral tablet:  orally  (20 Dec 2019 22:44)  Vitamin B6 100 mg oral tablet: 1 tab(s) orally once a day (20 Dec 2019 22:44)  Vitamin D: 1000 international unit(s) orally once a day (20 Dec 2019 22:44)      MEDICATIONS  (STANDING):  albuterol/ipratropium for Nebulization 3 milliLiter(s) Nebulizer every 4 hours  aspirin  chewable 81 milliGRAM(s) Oral daily  atorvastatin 40 milliGRAM(s) Oral at bedtime  chlorhexidine 0.12% Liquid 15 milliLiter(s) Oral Mucosa every 12 hours  cisatracurium Infusion 3 MICROgram(s)/kG/Min (14.94 mL/Hr) IV Continuous <Continuous>  enoxaparin Injectable 40 milliGRAM(s) SubCutaneous daily  ertapenem  IVPB      ketamine Infusion 0.1 mG/kG/Hr (4.15 mL/Hr) IV Continuous <Continuous>  lactobacillus acidophilus 1 Tablet(s) Oral two times a day  norepinephrine Infusion 0.05 MICROgram(s)/kG/Min (7.781 mL/Hr) IV Continuous <Continuous>  pantoprazole  Injectable 40 milliGRAM(s) IV Push daily  propofol Infusion 30 MICROgram(s)/kG/Min (14.94 mL/Hr) IV Continuous <Continuous>  tamsulosin 0.4 milliGRAM(s) Oral at bedtime    MEDICATIONS  (PRN):  acetaminophen    Suspension .. 650 milliGRAM(s) Oral every 6 hours PRN Temp greater or equal to 38C (100.4F), Mild Pain (1 - 3)  ALBUTerol    0.083% 2.5 milliGRAM(s) Nebulizer every 4 hours PRN Shortness of Breath and/or Wheezing  midazolam Injectable 2 milliGRAM(s) IV Push every 4 hours PRN Agitation or Anxiety      Allergies    No Known Allergies    Intolerances        Social History:  quit smoking 5 years ago, 50 pack years   drinks 1 glass of wine 1 x a month socially  lives with wife, performs all ADLs, ambulates on own (20 Dec 2019 21:52)      REVIEW OF SYSTEMS: Pt on Vent  now   ROS Unable to obtain due to - [  ] Dementia  [  ] Lethargy [  ] Drowsy [ x ] Sedated [  ] non verbal  REST OF REVIEW Of SYSTEM - [  ] Normal     Vital Signs Last 24 Hrs  T(C): 38.3 (21 Dec 2019 13:00), Max: 40.4 (20 Dec 2019 19:21)  T(F): 100.9 (21 Dec 2019 13:00), Max: 104.7 (20 Dec 2019 19:21)  HR: 93 (21 Dec 2019 14:30) (74 - 118)  BP: 110/57 (21 Dec 2019 14:30) (64/38 - 150/70)  BP(mean): 78 (21 Dec 2019 14:30) (55 - 101)  RR: 18 (21 Dec 2019 14:30) (18 - 36)  SpO2: 99% (21 Dec 2019 14:30) (88% - 100%)  Finger Stick         @ 07:  -   @ 07:00  --------------------------------------------------------  IN: 120 mL / OUT: 200 mL / NET: -80 mL     @ 07:01  -   @ 15:19  --------------------------------------------------------  IN: 401.3 mL / OUT: 180 mL / NET: 221.3 mL      PHYSICAL EXAM: Vent, NG Tube   GENERAL:  [  x] NAD , [x  ] well appearing, [  ] Agitated, [  ] Drowsy,  [  ] Lethargy, [  ] confused   HEAD:  [x  ] Normal, [  ] Other  EYES:  [x  ] EOMI, [  ] PERRLA, [ x ] conjunctiva and sclera clear normal, [  ] Other,  [  ] Pallor,[  ] Discharge  ENMT:  [x  ] Normal, [x  ] Moist mucous membranes, [ x ] Good dentition, [ x ] No Thrush  NECK:  [x ] Supple, [x ] No JVD, [ x ] Normal thyroid, [  ] Lymphadenopathy [  ] Other  CHEST/LUNG:  [x  ] Clear to auscultation bilaterally, [ x ] Breath Sounds  B/L / Decrease, [  ] poor effort  [ x ] No rales, [x  ] No rhonchi  [ x ]  No wheezing,   HEART:  [ x ] Regular rate and rhythm, [  ] tachycardia, [  ] Bradycardia,  [  ] irregular  [ x ] No murmurs, No rubs, No gallops, [  ] PPM in place (Mfr:  )  ABDOMEN:  [ x ] Soft, [ x ] Nontender, [x  ] Nondistended, [x  ] No mass, [ x ] Bowel sounds present, [x ] obese  NERVOUS SYSTEM:  [  ] Alert & Oriented X3, [x  ] Nonfocal  [  ] Confusion  [  ] Encephalopathic [x  ] Sedated [  ] Unable to assess, [  ] Dementia [  ] Other-  EXTREMITIES: [ x ] 2+ Peripheral Pulses, No clubbing, No cyanosis,  [  ] edema B/L lower EXT. [  ] PVD stasis skin changes B/L Lower EXT, [  ] wound  LYMPH: No lymphadenopathy noted  SKIN:  [x  ] No rashes or lesions, [  ] Pressure Ulcers, [  ] ecchymosis, [  ] Skin Tears, [  ] Other    DIET: NPO    LABS:                        13.0   18.44 )-----------( 108      ( 21 Dec 2019 05:21 )             38.6     21 Dec 2019 05:21    141    |  111    |  23     ----------------------------<  98     4.4     |  23     |  1.30     Ca    7.5        21 Dec 2019 05:21    TPro  5.4    /  Alb  2.6    /  TBili  0.8    /  DBili  x      /  AST  59     /  ALT  41     /  AlkPhos  76     21 Dec 2019 05:21      Urinalysis Basic - ( 21 Dec 2019 00:56 )    Color: Yellow / Appearance: Slightly Turbid / S.010 / pH: x  Gluc: x / Ketone: Negative  / Bili: Negative / Urobili: Negative   Blood: x / Protein: 25 mg/dL / Nitrite: Negative   Leuk Esterase: Small / RBC: 0-2 /HPF / WBC 26-50   Sq Epi: x / Non Sq Epi: Negative / Bacteria: Few        Culture Results:   Growth in aerobic bottle: Gram Negative Rods  Growth in anaerobic bottle: Gram Negative Rods  "Due to technical problems, Proteus sp. will Not be reported as part of  the BCID panel until further notice"  ***Blood Panel PCR results on this specimenare available  approximately 3 hours after the Gram stain result.***  Gram stain, PCR, and/or culture results may not always  correspond due to difference in methodologies.  ************************************************************  This PCR assaywas performed using [employer].  The following targets are tested for: Enterococcus,  vancomycin resistant enterococci, Listeria monocytogenes,  coagulase negative staphylococci, S. aureus,  methicillin resistant S. aureus, Streptococcus agalactiae  (Group B), S. pneumoniae, S. pyogenes (Group A),  Acinetobacter baumannii, Enterobacter cloacae, E. coli,  Klebsiella oxytoca, K. pneumoniae, Proteus sp.,  Serratia marcescens, Haemophilus influenzae,  Neisseria meningitidis, Pseudomonas aeruginosa, Candida  albicans, C. glabrata, C krusei, C parapsilosis,  C. tropicalis and the KPC resistance gene. ( @ 00:55)  Culture Results:   Growth in aerobic bottle: Gram Negative Rods  Growth in anaerobic bottle: Gram Negative Rods ( @ 00:55)      culture blood  -- .Blood Blood-Peripheral  @ 00:55    culture urine  --   @ 00:55    Culture - Blood (collected 21 Dec 2019 00:55)  Source: .Blood Blood-Peripheral  Gram Stain (21 Dec 2019 10:39):    Growth in aerobic bottle: Gram Negative Rods    Growth in anaerobic bottle: Gram Negative Rods  Preliminary Report (21 Dec 2019 10:40):    Growth in aerobic bottle: Gram Negative Rods    Growth in anaerobic bottle: Gram Negative Rods    Culture - Blood (collected 21 Dec 2019 00:55)  Source: .Blood Blood-Peripheral  Gram Stain (21 Dec 2019 10:01):    Growth in aerobic bottle: Gram Negative Rods    Growth in anaerobic bottle: Gram Negative Rods  Preliminary Report (21 Dec 2019 10:01):    Growth in aerobic bottle: Gram Negative Rods    Growth in anaerobic bottle: Gram Negative Rods    "Due to technical problems, Proteus sp. will Not be reported as part of    the BCID panel until further notice"    ***Blood Panel PCR results on this specimenare available    approximately 3 hours after the Gram stain result.***    Gram stain, PCR, and/or culture results may not always    correspond due to difference in methodologies.    ************************************************************    This PCR assaywas performed using [employer].    The following targets are tested for: Enterococcus,    vancomycin resistant enterococci, Listeria monocytogenes,    coagulase negative staphylococci, S. aureus,    methicillin resistant S. aureus, Streptococcus agalactiae    (Group B), S. pneumoniae, S. pyogenes (Group A),    Acinetobacter baumannii, Enterobacter cloacae, E. coli,    Klebsiella oxytoca, K. pneumoniae, Proteus sp.,    Serratia marcescens, Haemophilus influenzae,    Neisseria meningitidis, Pseudomonas aeruginosa, Candida    albicans, C. glabrata, C krusei, C parapsilosis,    C. tropicalis and the KPC resistance gene.  Organism: Blood Culture PCR (21 Dec 2019 11:13)  Organism: Blood Culture PCR (21 Dec 2019 11:13)    Lipase, Serum: 45 U/L (19 @ 19:23)    RADIOLOGY & ADDITIONAL TESTS:  < from: Xray Chest 1 View-PORTABLE IMMEDIATE (19 @ 11:44) >    EXAM:  XR CHEST PORTABLE IMMED 1V                            PROCEDURE DATE:  2019          INTERPRETATION:  Portable chest radiograph       CLINICAL INFORMATION: ET tube placement    TECHNIQUE:  Single portable frontal AP view of the chest was obtained.    FINDINGS: The examination of 2019 is available for review.    The lungs are free of consolidation.  No pleural abnormality is seen.      The heart and mediastinum appear intact.    No destructive lesion is seen in the visualized skeleton.    The tip of the endotracheal tube is projecting over the trachea, well above the scotty. The distal NG tube is projecting over the upper abdomen. The tip of the NG tube is not included on this examination.      IMPRESSION:   No evidence ofactive chest disease.          < end of copied text >        HEALTH ISSUES - PROBLEM Dx:  Prostatitis, acute: Prostatitis, acute  Bacteremia due to Escherichia coli: Bacteremia due to Escherichia coli  BPH (benign prostatic hyperplasia): BPH (benign prostatic hyperplasia)  Hypotension: Hypotension  Prophylactic measure: Prophylactic measure  GERD (gastroesophageal reflux disease): GERD (gastroesophageal reflux disease)  HLD (hyperlipidemia): HLD (hyperlipidemia)  Stented coronary artery: Stented coronary artery  Chronic obstructive pulmonary disease: Chronic obstructive pulmonary disease  YUE (acute kidney injury): YUE (acute kidney injury)  Sepsis: Sepsis    Consultant(s) Notes Reviewed:  [x  ] YES     Care Discussed with [X] Consultants  [  ] Patient  [  ] Family [  ] HCP [  ]   [  ] Social Service  [ x ] RN, [  ] Physical Therapy,[  ] Palliative care team  DVT PPX: [ x ] Lovenox, [  ] S C Heparin, [  ] Coumadin, [  ] Xarelto, [  ] Eliquis, [  ] Pradaxa, [  ] IV Heparin drip, [x  ] SCD [  ] Contraindication 2 to GI Bleed,[  ] Ambulation [  ] Contraindicated 2 to  bleed [  ] Contraindicated 2 to Brain Bleed  Advanced directive: [x  ] None, [  ] DNR/DNI Patient is a 74y old  Male who presents with a chief complaint of Fever (21 Dec 2019 12:00)      INTERVAL HPI:  75 YO M with PMHX asthma, COPD, former smoker, hx of benign lung cancer R lobe surgically removed at [facility]) CAD (s/p 5 stents ~), GERD, HLD, HTN presents after shakes, vomiting, confusion. Pt states he has prostate biopsy 3 days ago, started on antibiotics, the next day developed shaking chills, then became slightly SOB, used a nebulizer which helped his shortness of breath. Pt saw his pulmonologist yesterday, as per pt pulmonologist states everything was ok from a pulmonary standpoint and to continue using his nebulizer. Today pt developed shivering shaking chills again, NBNR emesis, confusion. Pt's wife found him confused, came to the ER for confusion. Of note, pt states he had a cough with sputum production last week. Pt denies fevers at home or sick contacts.     In the ED: Vitals sig for temp of 104.7 F. Labs sig for Plt of 114, bands of 29, BUN 27, Cr 1.40, glucose 124, alk phos 128, AST 39, eGFR 49, lactate 2.8, FLU A, B, RSV neg. CXR: No acute infiltrate. CT head: No acute intracranial bleeding, mass effect, or shift. Mild chronic microvascular ischemic changes. CT A/P: Right middle lobe and right lower lobe groundglass opacities with underlying reticular changes, likely chronic. Recommend follow-up to ensure stability or resolution and exclude acute pneumonia. Mild constipation. No bowel obstruction. Enlarged prostate. Nonspecific bilateral perinephric inflammatory stranding. Nonobstructing left renal calculi. Given: Rocephin x1, Azithromycin x1,  Zosyn x1,  ns bolus x3, tylenol x1, Duo neb x1. Of note, RRT was called for hypotension with BP of 63/48. Patient was seen by RRT team and ICU PA. Was given normal saline bolus x2 and midodrine 10mg x1 with improvement in BP of 105/56    19: Pt seen, Examined, S/P  Intubation now with ac bronchospasm with  Hypoxia , ac respiratory failure , Pt with septic shock 2/2 GNR, E. Coli sepsis , seen by ID Dr Marc, IV Abx changed to Invanz ,S/P Vasopressors IV, Leukocytosis with Bandemia, & Low platelets 2/2 sepsis, pt got IV Solumedrol 125 mg x 1 dose, seen by cardiology Dr Degroot, RVP -NEG      OVERNIGHT EVENTS: S/P RRT called for hypotension , several IV Fluid Boluses given, pt was upgraded to ICU for septic shock/ Hypoxia .    Home Medications:  Aspir 81 81 mg oral tablet: 1 tab(s) orally once a day (20 Dec 2019 22:44)  atorvastatin 40 mg oral tablet: 1 tab(s) orally once a day (at bedtime) (20 Dec 2019 22:44)  budesonide 0.5 mg/2 mL inhalation suspension: 2 milliliter(s) inhaled 2 times a day, As Needed (20 Dec 2019 22:44)  DuoNeb 0.5 mg-2.5 mg/3 mL inhalation solution: 1 milliliter(s) inhaled every 6 hours (20 Dec 2019 22:44)  losartan 25 mg oral tablet: 1 tab(s) orally once a day (20 Dec 2019 22:44)  predniSONE 5 mg oral tablet: 1 tab(s) orally once a day, As Needed (20 Dec 2019 22:44)  Protonix 40 mg oral delayed release tablet: 1 tab(s) orally once a day (20 Dec 2019 22:44)  silodosin 8 mg oral capsule: 1 cap(s) orally once a day (20 Dec 2019 22:44)  Spiriva 18 mcg inhalation capsule: 1  inhaled once a day (20 Dec 2019 22:44)  Symbicort 160 mcg-4.5 mcg/inh inhalation aerosol: 2  inhaled 2 times a day (20 Dec 2019 22:44)  Ventolin HFA 90 mcg/inh inhalation aerosol: 2 puff(s) inhaled 4 times a day, As Needed (20 Dec 2019 22:44)  Vitamin B-12 500 mcg oral tablet:  orally  (20 Dec 2019 22:44)  Vitamin B6 100 mg oral tablet: 1 tab(s) orally once a day (20 Dec 2019 22:44)  Vitamin D: 1000 international unit(s) orally once a day (20 Dec 2019 22:44)      MEDICATIONS  (STANDING):  albuterol/ipratropium for Nebulization 3 milliLiter(s) Nebulizer every 4 hours  aspirin  chewable 81 milliGRAM(s) Oral daily  atorvastatin 40 milliGRAM(s) Oral at bedtime  chlorhexidine 0.12% Liquid 15 milliLiter(s) Oral Mucosa every 12 hours  cisatracurium Infusion 3 MICROgram(s)/kG/Min (14.94 mL/Hr) IV Continuous <Continuous>  enoxaparin Injectable 40 milliGRAM(s) SubCutaneous daily  ertapenem  IVPB      ketamine Infusion 0.1 mG/kG/Hr (4.15 mL/Hr) IV Continuous <Continuous>  lactobacillus acidophilus 1 Tablet(s) Oral two times a day  norepinephrine Infusion 0.05 MICROgram(s)/kG/Min (7.781 mL/Hr) IV Continuous <Continuous>  pantoprazole  Injectable 40 milliGRAM(s) IV Push daily  propofol Infusion 30 MICROgram(s)/kG/Min (14.94 mL/Hr) IV Continuous <Continuous>  tamsulosin 0.4 milliGRAM(s) Oral at bedtime    MEDICATIONS  (PRN):  acetaminophen    Suspension .. 650 milliGRAM(s) Oral every 6 hours PRN Temp greater or equal to 38C (100.4F), Mild Pain (1 - 3)  ALBUTerol    0.083% 2.5 milliGRAM(s) Nebulizer every 4 hours PRN Shortness of Breath and/or Wheezing  midazolam Injectable 2 milliGRAM(s) IV Push every 4 hours PRN Agitation or Anxiety      Allergies    No Known Allergies    Intolerances        Social History:  quit smoking 5 years ago, 50 pack years   drinks 1 glass of wine 1 x a month socially  lives with wife, performs all ADLs, ambulates on own (20 Dec 2019 21:52)      REVIEW OF SYSTEMS: Pt on Vent  now   ROS Unable to obtain due to - [  ] Dementia  [  ] Lethargy [  ] Drowsy [ x ] Sedated [  ] non verbal  REST OF REVIEW Of SYSTEM - [  ] Normal     Vital Signs Last 24 Hrs  T(C): 38.3 (21 Dec 2019 13:00), Max: 40.4 (20 Dec 2019 19:21)  T(F): 100.9 (21 Dec 2019 13:00), Max: 104.7 (20 Dec 2019 19:21)  HR: 93 (21 Dec 2019 14:30) (74 - 118)  BP: 110/57 (21 Dec 2019 14:30) (64/38 - 150/70)  BP(mean): 78 (21 Dec 2019 14:30) (55 - 101)  RR: 18 (21 Dec 2019 14:30) (18 - 36)  SpO2: 99% (21 Dec 2019 14:30) (88% - 100%)  Finger Stick         @ :  -   @ 07:00  --------------------------------------------------------  IN: 120 mL / OUT: 200 mL / NET: -80 mL     @ 07:01  -   @ 15:19  --------------------------------------------------------  IN: 401.3 mL / OUT: 180 mL / NET: 221.3 mL      PHYSICAL EXAM: Vent, NG Tube   GENERAL:  [  x] NAD , [x  ] well appearing, [  ] Agitated, [  ] Drowsy,  [  ] Lethargy, [  ] confused   HEAD:  [x  ] Normal, [  ] Other  EYES:  [x  ] EOMI, [  ] PERRLA, [ x ] conjunctiva and sclera clear normal, [  ] Other,  [  ] Pallor,[  ] Discharge  ENMT:  [x  ] Normal, [x  ] Moist mucous membranes, [ x ] Good dentition, [ x ] No Thrush  NECK:  [x ] Supple, [x ] No JVD, [ x ] Normal thyroid, [  ] Lymphadenopathy [  ] Other  CHEST/LUNG:  [x  ] Clear to auscultation bilaterally, [ x ] Breath Sounds  B/L / Decrease, [  ] poor effort  [ x ] No rales, [x  ] No rhonchi  [ x ]  No wheezing,   HEART:  [ x ] Regular rate and rhythm, [  ] tachycardia, [  ] Bradycardia,  [  ] irregular  [ x ] No murmurs, No rubs, No gallops, [  ] PPM in place (Mfr:  )  ABDOMEN:  [ x ] Soft, [ x ] Nontender, [x  ] Nondistended, [x  ] No mass, [ x ] Bowel sounds present, [x ] obese  NERVOUS SYSTEM:  [  ] Alert & Oriented X3, [x  ] Nonfocal  [  ] Confusion  [  ] Encephalopathic [x  ] Sedated [  ] Unable to assess, [  ] Dementia [  ] Other-  EXTREMITIES: [ x ] 2+ Peripheral Pulses, No clubbing, No cyanosis,  [  ] edema B/L lower EXT. [  ] PVD stasis skin changes B/L Lower EXT, [  ] wound  LYMPH: No lymphadenopathy noted  SKIN:  [x  ] No rashes or lesions, [  ] Pressure Ulcers, [  ] ecchymosis, [  ] Skin Tears, [  ] Other    DIET: NPO    LABS:                        13.0   18.44 )-----------( 108      ( 21 Dec 2019 05:21 )             38.6     21 Dec 2019 05:21    141    |  111    |  23     ----------------------------<  98     4.4     |  23     |  1.30     Ca    7.5        21 Dec 2019 05:21    TPro  5.4    /  Alb  2.6    /  TBili  0.8    /  DBili  x      /  AST  59     /  ALT  41     /  AlkPhos  76     21 Dec 2019 05:21      Urinalysis Basic - ( 21 Dec 2019 00:56 )    Color: Yellow / Appearance: Slightly Turbid / S.010 / pH: x  Gluc: x / Ketone: Negative  / Bili: Negative / Urobili: Negative   Blood: x / Protein: 25 mg/dL / Nitrite: Negative   Leuk Esterase: Small / RBC: 0-2 /HPF / WBC 26-50   Sq Epi: x / Non Sq Epi: Negative / Bacteria: Few        Culture Results:   Growth in aerobic bottle: Gram Negative Rods  Growth in anaerobic bottle: Gram Negative Rods  "Due to technical problems, Proteus sp. will Not be reported as part of  the BCID panel until further notice"  ***Blood Panel PCR results on this specimenare available  approximately 3 hours after the Gram stain result.***  Gram stain, PCR, and/or culture results may not always  correspond due to difference in methodologies.  ************************************************************  This PCR assaywas performed using [employer].  The following targets are tested for: Enterococcus,  vancomycin resistant enterococci, Listeria monocytogenes,  coagulase negative staphylococci, S. aureus,  methicillin resistant S. aureus, Streptococcus agalactiae  (Group B), S. pneumoniae, S. pyogenes (Group A),  Acinetobacter baumannii, Enterobacter cloacae, E. coli,  Klebsiella oxytoca, K. pneumoniae, Proteus sp.,  Serratia marcescens, Haemophilus influenzae,  Neisseria meningitidis, Pseudomonas aeruginosa, Candida  albicans, C. glabrata, C krusei, C parapsilosis,  C. tropicalis and the KPC resistance gene. ( @ 00:55)  Culture Results:   Growth in aerobic bottle: Gram Negative Rods  Growth in anaerobic bottle: Gram Negative Rods ( @ 00:55)      culture blood  -- .Blood Blood-Peripheral  00:55    culture urine  --   @ 00:55    Culture - Blood (collected 21 Dec 2019 00:55)  Source: .Blood Blood-Peripheral  Gram Stain (21 Dec 2019 10:39):    Growth in aerobic bottle: Gram Negative Rods    Growth in anaerobic bottle: Gram Negative Rods  Preliminary Report (21 Dec 2019 10:40):    Growth in aerobic bottle: Gram Negative Rods    Growth in anaerobic bottle: Gram Negative Rods    Culture - Blood (collected 21 Dec 2019 00:55)  Source: .Blood Blood-Peripheral  Gram Stain (21 Dec 2019 10:01):    Growth in aerobic bottle: Gram Negative Rods    Growth in anaerobic bottle: Gram Negative Rods  Preliminary Report (21 Dec 2019 10:01):    Growth in aerobic bottle: Gram Negative Rods    Growth in anaerobic bottle: Gram Negative Rods    "Due to technical problems, Proteus sp. will Not be reported as part of    the BCID panel until further notice"    ***Blood Panel PCR results on this specimenare available    approximately 3 hours after the Gram stain result.***    Gram stain, PCR, and/or culture results may not always    correspond due to difference in methodologies.    ************************************************************    This PCR assaywas performed using [employer].    The following targets are tested for: Enterococcus,    vancomycin resistant enterococci, Listeria monocytogenes,    coagulase negative staphylococci, S. aureus,    methicillin resistant S. aureus, Streptococcus agalactiae    (Group B), S. pneumoniae, S. pyogenes (Group A),    Acinetobacter baumannii, Enterobacter cloacae, E. coli,    Klebsiella oxytoca, K. pneumoniae, Proteus sp.,    Serratia marcescens, Haemophilus influenzae,    Neisseria meningitidis, Pseudomonas aeruginosa, Candida    albicans, C. glabrata, C krusei, C parapsilosis,    C. tropicalis and the KPC resistance gene.  Organism: Blood Culture PCR (21 Dec 2019 11:13)  Organism: Blood Culture PCR (21 Dec 2019 11:13)    Lipase, Serum: 45 U/L (19 @ 19:23)    RADIOLOGY & ADDITIONAL TESTS:  < from: Xray Chest 1 View-PORTABLE IMMEDIATE (19 @ 11:44) >    EXAM:  XR CHEST PORTABLE IMMED 1V                            PROCEDURE DATE:  2019          INTERPRETATION:  Portable chest radiograph       CLINICAL INFORMATION: ET tube placement    TECHNIQUE:  Single portable frontal AP view of the chest was obtained.    FINDINGS: The examination of 2019 is available for review.    The lungs are free of consolidation.  No pleural abnormality is seen.      The heart and mediastinum appear intact.    No destructive lesion is seen in the visualized skeleton.    The tip of the endotracheal tube is projecting over the trachea, well above the scotty. The distal NG tube is projecting over the upper abdomen. The tip of the NG tube is not included on this examination.      IMPRESSION:   No evidence ofactive chest disease.          < end of copied text >        HEALTH ISSUES - PROBLEM Dx:  Prostatitis, acute: Prostatitis, acute  Bacteremia due to Escherichia coli: Bacteremia due to Escherichia coli  BPH (benign prostatic hyperplasia): BPH (benign prostatic hyperplasia)  Hypotension: Hypotension  Prophylactic measure: Prophylactic measure  GERD (gastroesophageal reflux disease): GERD (gastroesophageal reflux disease)  HLD (hyperlipidemia): HLD (hyperlipidemia)  Stented coronary artery: Stented coronary artery  Chronic obstructive pulmonary disease: Chronic obstructive pulmonary disease  YUE (acute kidney injury): YUE (acute kidney injury)  Sepsis: Sepsis    Consultant(s) Notes Reviewed:  [x  ] YES     Care Discussed with [X] Consultants  [  ] Patient  [  ] Family [  ] HCP [  ]   [  ] Social Service  [ x ] RN, [  ] Physical Therapy,[  ] Palliative care team  DVT PPX: [ x ] Lovenox, [  ] S C Heparin, [  ] Coumadin, [  ] Xarelto, [  ] Eliquis, [  ] Pradaxa, [  ] IV Heparin drip, [x  ] SCD [  ] Contraindication 2 to GI Bleed,[  ] Ambulation [  ] Contraindicated 2 to  bleed [  ] Contraindicated 2 to Brain Bleed  Advanced directive: [x  ] None, [  ] DNR/DNI

## 2019-12-21 NOTE — PROGRESS NOTE ADULT - PROBLEM SELECTOR PLAN 2
2/2 Acute bronchospasm, hypercapnic respiratory failure, status asthmaticus likely secondary to SIRS response,   -S/P IV Solumedrol, now on PO Prednisone 40 mg 1 tab daily with PPI  -CT Chest done -No PNA , ? Aspiration with Vomiting at home  -. CXR without focal infiltrate. Failed trial of HFNC/NIV,   - s/p intubation, Ketamine, serial nebs, IV steroids, and Mg.  -Vent Support, Serial ABG 2/2 Acute bronchospasm, hypercapnic respiratory failure, status asthmaticus likely secondary to SIRS response, RVP -NEG  -S/P IV Solumedrol, now on PO Prednisone 40 mg 1 tab daily with PPI  -CT Chest done -No PNA , ? Aspiration with Vomiting at home  -. CXR without focal infiltrate. Failed trial of HFNC/NIV,   - s/p intubation, Ketamine, serial nebs, IV steroids, and Mg.  -Vent Support, Serial ABG

## 2019-12-21 NOTE — CONSULT NOTE ADULT - SUBJECTIVE AND OBJECTIVE BOX
[facility] Cardiology Consult - Waqar Degroot Nga Diaz [phone]    CHIEF COMPLAINT: Patient is a 74y old  Male who presents with a chief complaint of Fever (20 Dec 2019 23:30)      HPI:  75 YO M with PMHX asthma, COPD, former smoker, hx of benign lung cancer R lobe surgically removed at [facility]) CAD (s/p 5 stents ~2001), GERD, HLD, HTN presents after shakes, vomiting, confusion. Pt states he has prostate biopsy 3 days ago, started on antibiotics, the next day developed shaking chills, then became slightly SOB, used a nebulizer which helped his shortness of breath. Pt saw his pulmonologist yesterday, as per pt pulmonologist states everything was ok from a pulmonary standpoint and to continue using his nebulizer. Today pt developed shivering shaking chills again, NBNR emesis, confusion. Pt's wife found him confused, came to the ER for confusion. Of note, pt states he had a cough with sputum production last week. Pt denies fevers at home or sick contacts.     In the ED: Vitals sig for temp of 104.7 F. Labs sig for Plt of 114, bands of 29, BUN 27, Cr 1.40, glucose 124, alk phos 128, AST 39, eGFR 49, lactate 2.8, FLU A, B, RSV neg. CXR: No acute infiltrate. CT head: No acute intracranial bleeding, mass effect, or shift. Mild chronic microvascular ischemic changes. CT A/P: Right middle lobe and right lower lobe groundglass opacities with underlying reticular changes, likely chronic. Recommend follow-up to ensure stability or resolution and exclude acute pneumonia. Mild constipation. No bowel obstruction. Enlarged prostate. Nonspecific bilateral perinephric inflammatory stranding. Nonobstructing left renal calculi. Given: Rocephin x1, Azithromycin x1,  Zosyn x1,  ns bolus x3, tylenol x1, Duo neb x1. Of note, RRT was called for hypotension with BP of 63/48. Patient was seen by RRT team and ICU PA. Was given normal saline bolus x2 and midodrine 10mg x1 with improvement in BP of 105/56 (20 Dec 2019 21:52)      PAST MEDICAL & SURGICAL HISTORY:  Nephrolithiasis  GERD (gastroesophageal reflux disease)  HLD (hyperlipidemia)  HTN (hypertension)  Stented coronary artery  Asthma  Chronic obstructive pulmonary disease: Asthma  S/P primary angioplasty with coronary stent  Lung tumor: Rt Lung tumor resection,benign      SOCIAL HISTORY: Alochol: Denied  Smoking: Nonsmoker  Drug Use: Denied  Marital Status:         FAMILY HISTORY: FAMILY HISTORY:  Family history of stroke  Family history of heart disease      MEDICATIONS  (STANDING):  albuterol/ipratropium for Nebulization 3 milliLiter(s) Nebulizer every 6 hours  aspirin enteric coated 81 milliGRAM(s) Oral daily  atorvastatin 40 milliGRAM(s) Oral at bedtime  budesonide 160 MICROgram(s)/formoterol 4.5 MICROgram(s) Inhaler 2 Puff(s) Inhalation two times a day  enoxaparin Injectable 40 milliGRAM(s) SubCutaneous daily  lactobacillus acidophilus 1 Tablet(s) Oral two times a day  pantoprazole    Tablet 40 milliGRAM(s) Oral before breakfast  piperacillin/tazobactam IVPB.. 3.375 Gram(s) IV Intermittent every 8 hours  tamsulosin 0.4 milliGRAM(s) Oral at bedtime  tiotropium 18 MICROgram(s) Capsule 1 Capsule(s) Inhalation daily    MEDICATIONS  (PRN):  acetaminophen   Tablet .. 650 milliGRAM(s) Oral every 6 hours PRN Temp greater or equal to 38C (100.4F)  ALBUTerol    90 MICROgram(s) HFA Inhaler 2 Puff(s) Inhalation every 6 hours PRN Shortness of Breath and/or Wheezing      Allergies    No Known Allergies    Intolerances        REVIEW OF SYSTEMS:  CONSTITUTIONAL: No weakness, fevers or chills  EYES/ENT: No visual changes;  No vertigo or throat pain   NECK: No pain or stiffness  RESPIRATORY: No cough, wheezing, hemoptysis; No shortness of breath  CARDIOVASCULAR: No chest pain or palpitations  GASTROINTESTINAL: No abdominal pain. No nausea, vomiting, or hematemesis; No diarrhea or constipation. No melena or hematochezia.  GENITOURINARY: No dysuria, frequency or hematuria  NEUROLOGICAL: No numbness or weakness  SKIN: No itching or rash  All other review of systems is negative unless indicated above    VITAL SIGNS:   Vital Signs Last 24 Hrs  T(C): 36.9 (21 Dec 2019 04:10), Max: 40.4 (20 Dec 2019 19:21)  T(F): 98.5 (21 Dec 2019 04:10), Max: 104.7 (20 Dec 2019 19:21)  HR: 76 (21 Dec 2019 06:30) (75 - 103)  BP: 90/50 (21 Dec 2019 06:30) (64/38 - 130/60)  BP(mean): 63 (21 Dec 2019 06:30) (63 - 75)  RR: 26 (21 Dec 2019 06:30) (18 - 34)  SpO2: 99% (21 Dec 2019 06:30) (88% - 100%)    I&O's Summary    20 Dec 2019 07:01  -  21 Dec 2019 07:00  --------------------------------------------------------  IN: 120 mL / OUT: 200 mL / NET: -80 mL        PHYSICAL EXAM:  Constitutional: Awake in NAD  HEENT:  LESTER, EOMI  Neck: No JVD  Pulmonary: CTA B/L No R/R/W  Cardiovascular: PMI not palpable non-displaced Regular S1 and S2, no murmurs, rubs, gallops or clicks  Gastrointestinal: Bowel Sounds present, soft, nontender.   Extremities: Trace peripheral edema.   Neuro: No gross focal deficits    LABS: All Labs Reviewed:                        13.0   18.44 )-----------( 108      ( 21 Dec 2019 05:21 )             38.6                         14.8   3.90  )-----------( 114      ( 20 Dec 2019 19:23 )             42.6     21 Dec 2019 05:21    141    |  111    |  23     ----------------------------<  98     4.4     |  23     |  1.30   20 Dec 2019 19:23    139    |  107    |  27     ----------------------------<  124    4.0     |  23     |  1.40     Ca    7.5        21 Dec 2019 05:21  Ca    8.8        20 Dec 2019 19:23    TPro  5.4    /  Alb  2.6    /  TBili  0.8    /  DBili  x      /  AST  59     /  ALT  41     /  AlkPhos  76     21 Dec 2019 05:21  TPro  6.4    /  Alb  3.3    /  TBili  1.1    /  DBili  x      /  AST  39     /  ALT  38     /  AlkPhos  128    20 Dec 2019 19:23          Blood Culture:         RADIOLOGY/EKG: [facility] Cardiology Consult - Waqar Degroot Nga Diaz [phone]    CHIEF COMPLAINT: Patient is a 74y old  Male who presents with a chief complaint of Fever (20 Dec 2019 23:30)      HPI:  73 YO M with PMHX asthma, COPD, former smoker, hx of benign lung cancer R lobe surgically removed at [facility]) CAD (s/p 5 stents ~2001), GERD, HLD, HTN presents after shakes, vomiting, confusion. Pt states he has prostate biopsy 3 days ago, started on antibiotics, the next day developed shaking chills, then became slightly SOB, used a nebulizer which helped his shortness of breath. Pt saw his pulmonologist yesterday, as per pt pulmonologist states everything was ok from a pulmonary standpoint and to continue using his nebulizer. Today pt developed shivering shaking chills again, NBNR emesis, confusion. Pt's wife found him confused, came to the ER for confusion. Of note, pt states he had a cough with sputum production last week. Pt denies fevers at home or sick contacts.     In the ED: Vitals sig for temp of 104.7 F. Labs sig for Plt of 114, bands of 29, BUN 27, Cr 1.40, glucose 124, alk phos 128, AST 39, eGFR 49, lactate 2.8, FLU A, B, RSV neg. CXR: No acute infiltrate. CT head: No acute intracranial bleeding, mass effect, or shift. Mild chronic microvascular ischemic changes. CT A/P: Right middle lobe and right lower lobe groundglass opacities with underlying reticular changes, likely chronic. Recommend follow-up to ensure stability or resolution and exclude acute pneumonia. Mild constipation. No bowel obstruction. Enlarged prostate. Nonspecific bilateral perinephric inflammatory stranding. Nonobstructing left renal calculi. Given: Rocephin x1, Azithromycin x1,  Zosyn x1,  ns bolus x3, tylenol x1, Duo neb x1. Of note, RRT was called for hypotension with BP of 63/48. Patient was seen by RRT team and ICU PA. Was given normal saline bolus x2 and midodrine 10mg x1 with improvement in BP of 105/56 (20 Dec 2019 21:52).  Currently he is on oxygen with good O2 sats.  He got approx 5 liters of fluid in the ER given hypotension and possible sepsis.  He has not required pressor medications.  He states that he is feeling better. No chest discomfort..      PAST MEDICAL & SURGICAL HISTORY:  Nephrolithiasis  GERD (gastroesophageal reflux disease)  HLD (hyperlipidemia)  HTN (hypertension)  Stented coronary artery  Asthma  Chronic obstructive pulmonary disease: Asthma  S/P primary angioplasty with coronary stent  Lung tumor: Rt Lung tumor resection,benign      SOCIAL HISTORY: Alochol: Denied  Smoking: Nonsmoker  Drug Use: Denied  Marital Status:         FAMILY HISTORY: FAMILY HISTORY:  Family history of stroke  Family history of heart disease      MEDICATIONS  (STANDING):  albuterol/ipratropium for Nebulization 3 milliLiter(s) Nebulizer every 6 hours  aspirin enteric coated 81 milliGRAM(s) Oral daily  atorvastatin 40 milliGRAM(s) Oral at bedtime  budesonide 160 MICROgram(s)/formoterol 4.5 MICROgram(s) Inhaler 2 Puff(s) Inhalation two times a day  enoxaparin Injectable 40 milliGRAM(s) SubCutaneous daily  lactobacillus acidophilus 1 Tablet(s) Oral two times a day  pantoprazole    Tablet 40 milliGRAM(s) Oral before breakfast  piperacillin/tazobactam IVPB.. 3.375 Gram(s) IV Intermittent every 8 hours  tamsulosin 0.4 milliGRAM(s) Oral at bedtime  tiotropium 18 MICROgram(s) Capsule 1 Capsule(s) Inhalation daily    MEDICATIONS  (PRN):  acetaminophen   Tablet .. 650 milliGRAM(s) Oral every 6 hours PRN Temp greater or equal to 38C (100.4F)  ALBUTerol    90 MICROgram(s) HFA Inhaler 2 Puff(s) Inhalation every 6 hours PRN Shortness of Breath and/or Wheezing      Allergies    No Known Allergies    Intolerances        REVIEW OF SYSTEMS:  CONSTITUTIONAL: No weakness, fevers or chills  EYES/ENT: No visual changes;  No vertigo or throat pain   NECK: No pain or stiffness  RESPIRATORY: No cough, wheezing, hemoptysis; No shortness of breath  CARDIOVASCULAR: No chest pain or palpitations  GASTROINTESTINAL: No abdominal pain. No nausea, vomiting, or hematemesis; No diarrhea or constipation. No melena or hematochezia.  GENITOURINARY: No dysuria, frequency or hematuria  NEUROLOGICAL: No numbness or weakness  SKIN: No itching or rash  All other review of systems is negative unless indicated above    VITAL SIGNS:   Vital Signs Last 24 Hrs  T(C): 36.9 (21 Dec 2019 04:10), Max: 40.4 (20 Dec 2019 19:21)  T(F): 98.5 (21 Dec 2019 04:10), Max: 104.7 (20 Dec 2019 19:21)  HR: 76 (21 Dec 2019 06:30) (75 - 103)  BP: 90/50 (21 Dec 2019 06:30) (64/38 - 130/60)  BP(mean): 63 (21 Dec 2019 06:30) (63 - 75)  RR: 26 (21 Dec 2019 06:30) (18 - 34)  SpO2: 99% (21 Dec 2019 06:30) (88% - 100%)    I&O's Summary    20 Dec 2019 07:01  -  21 Dec 2019 07:00  --------------------------------------------------------  IN: 120 mL / OUT: 200 mL / NET: -80 mL        PHYSICAL EXAM:  Constitutional: Awake in NAD  HEENT:  LESTER, EOMI  Neck: No JVD  Pulmonary: CTA B/L No R/R/W  Cardiovascular: PMI not palpable non-displaced Regular S1 and S2, sys murmur  Gastrointestinal: Bowel Sounds present, soft, nontender.   Extremities: Trace peripheral edema.   Neuro: No gross focal deficits    LABS: All Labs Reviewed:                        13.0   18.44 )-----------( 108      ( 21 Dec 2019 05:21 )             38.6                         14.8   3.90  )-----------( 114      ( 20 Dec 2019 19:23 )             42.6     21 Dec 2019 05:21    141    |  111    |  23     ----------------------------<  98     4.4     |  23     |  1.30   20 Dec 2019 19:23    139    |  107    |  27     ----------------------------<  124    4.0     |  23     |  1.40     Ca    7.5        21 Dec 2019 05:21  Ca    8.8        20 Dec 2019 19:23    TPro  5.4    /  Alb  2.6    /  TBili  0.8    /  DBili  x      /  AST  59     /  ALT  41     /  AlkPhos  76     21 Dec 2019 05:21  TPro  6.4    /  Alb  3.3    /  TBili  1.1    /  DBili  x      /  AST  39     /  ALT  38     /  AlkPhos  128    20 Dec 2019 19:23          Blood Culture:         RADIOLOGY/EKG:

## 2019-12-21 NOTE — DIETITIAN INITIAL EVALUATION ADULT. - OTHER INFO
Pt admit with sepsis, AMS, possible asp pn due to vomiting PTA. Tolerating diet well, eating breakfast with good appetite. Denies problems chewing/swallowing. wt stable at 170#. BMI with current wt of 169# 26. No pressure injuries. Hx constipation, will monitor for BM.

## 2019-12-21 NOTE — DIETITIAN INITIAL EVALUATION ADULT. - PROBLEM SELECTOR PLAN 1
-Sepsis-Fever, Bandemia , High Lactate  -Etiology - R/O Prostatitis   -s/p Recent Prostate Biopsy on Tuesday  with Dr. Machado  -? Aspiration , CT scan  shows- Groundglass opacity with underlying reticular prominence in the lateral right middle lobe and posterior right lower lobe.   - CT A/P done- B/L perinephric stranding-Nonspecific  -Blood c/s x 2, UA. Urine c/s , RVP to follow ,  Flu A/B/RSV -NEG   -IV Fluids- NS @ 100 ml/hr  -CBC with Diff in AM , Repeat Lactate  -IV Zosyn 3.375 gm IV q 8 hrs  -ID Dr Marc

## 2019-12-21 NOTE — CHART NOTE - NSCHARTNOTEFT_GEN_A_CORE
Rapid Response PA  Follow Up Note    Patient is a 74y old  Male  admitted for sepsis 2/2 to aspiration PNA after prostate biopsy.  Rapid response was called because of hypotension.  Follow up evaluation of patient 2 hours post rapid response    Patient was seen and examined at the bedside by the rapid response team. RN by bedside states that patient recently complained of another episode of SOB since rapid response, desated to 79% and was placed on nonrebreather. Patient now on nonrebreather saturating at 91%. Patient complaining of some SOB. Patient denies chest pain, palpitations, abd pain, N/V/C/D. Blood pressure now 90s systolic. Patient comfortable. AAO x 3.       Vital Signs Last 24 Hrs  T(C): 37.7 (20 Dec 2019 22:09), Max: 40.4 (20 Dec 2019 19:21)  T(F): 99.9 (20 Dec 2019 22:09), Max: 104.7 (20 Dec 2019 19:21)  HR: 91 (20 Dec 2019 23:59) (91 - 103)  BP: 99/55 (20 Dec 2019 23:59) (64/38 - 130/60)  BP(mean): --  RR: 18 (20 Dec 2019 23:59) (18 - 34)  SpO2: 99% (20 Dec 2019 23:59) (88% - 99%)      PHYSICAL EXAM  GENERAL: NAD  HEENT: AT/NC, EOMI, PERRL, dry membranes  LUNGS: poor inspiratory effort, minimal expiratory wheeze bilaterally  HEART: RRR,+S1/+S2, no murmurs, rubs, gallops  ABDOMEN: Soft, NT, ND, no rebound, guarding rigidity, +BS in all 4 quadrants  EXTREMITIES: no clubbing, cyanosis, edema   NEURO: A&Ox3, non-focal         Assessment- Rapid Response called for 74y year old Male with a past medical history of CAD (s/p 5 stents), COPD (not on home O2), asthma, HTN, HLD, admitted for sepsis 2/2 to aspiration PNA after prostate biopsy. Rapid response called for hypotension.    Plan-  1. Hypotension likely 2/2 sepsis  -S/p 4L NS bolus (1 L bolus since rapid response). Maintaince fluids held for now  - On rebreather, saturating at 91%  - CXR ordered, will F/u  - Bladder scan yielded 349ccs, will straight cath.  - Symbicort and Proventil given.  - Attending Dr. Lopez made aware, and agrees with above.  7. Will continue to follow, RN to call with any changes. Rapid Response PA  Follow Up Note    Patient is a 74y old  Male  admitted for sepsis 2/2 to aspiration PNA after prostate biopsy.  Rapid response was called because of hypotension.  Follow up evaluation of patient 2 hours post rapid response    Patient was seen and examined at the bedside by the rapid response team. RN by bedside states that patient recently complained of another episode of SOB since rapid response, desated to 79% and was placed on nonrebreather. Patient now on nonrebreather saturating at 91%. Patient complaining of some SOB. Patient denies chest pain, palpitations, abd pain, N/V/C/D. Blood pressure now 90s systolic. Patient comfortable. AAO x 3.       Vital Signs Last 24 Hrs  T(C): 37.7 (20 Dec 2019 22:09), Max: 40.4 (20 Dec 2019 19:21)  T(F): 99.9 (20 Dec 2019 22:09), Max: 104.7 (20 Dec 2019 19:21)  HR: 91 (20 Dec 2019 23:59) (91 - 103)  BP: 99/55 (20 Dec 2019 23:59) (64/38 - 130/60)  BP(mean): --  RR: 18 (20 Dec 2019 23:59) (18 - 34)  SpO2: 99% (20 Dec 2019 23:59) (88% - 99%)      PHYSICAL EXAM  GENERAL: NAD  HEENT: AT/NC, EOMI, PERRL, dry membranes  LUNGS: poor inspiratory effort, minimal expiratory wheeze bilaterally  HEART: RRR,+S1/+S2, no murmurs, rubs, gallops  ABDOMEN: Soft, NT, ND, no rebound, guarding rigidity, +BS in all 4 quadrants  EXTREMITIES: no clubbing, cyanosis, edema   NEURO: A&Ox3, non-focal         Assessment- Rapid Response called for 74y year old Male with a past medical history of CAD (s/p 5 stents), COPD (not on home O2), asthma, HTN, HLD, admitted for sepsis 2/2 to aspiration PNA after prostate biopsy. Rapid response called for hypotension.    Plan-  1. Hypotension likely 2/2 sepsis  -S/p 4L NS bolus (1 L bolus since rapid response). Maintenance fluids held for now.  - S/p 10mg Midodrine  - On rebreather, saturating at 91%  - CXR ordered, will F/u  - Bladder scan yielded 349ccs, will straight cath.  - Symbicort and Proventil given.  - Attending Dr. Lopez made aware, and agrees with above.  - Will continue to follow, RN to call with any changes. Rapid Response PA  Follow Up Note    Patient is a 74y old  Male  admitted for sepsis 2/2 to aspiration PNA after prostate biopsy.  Rapid response was called because of hypotension.  Follow up evaluation of patient 2 hours post rapid response    Patient was seen and examined at the bedside. RN by bedside states that patient recently complained of another episode of SOB after the rapid response, patient desated to 79% and was placed on 100% non-rebreather, with improvement in Spo2 to 99%. Denies chest pain, palpitations, abd pain, N/V/C/D. Blood pressure now 90s systolic.    Vital Signs Last 24 Hrs  T(C): 37.7 (20 Dec 2019 22:09), Max: 40.4 (20 Dec 2019 19:21)  T(F): 99.9 (20 Dec 2019 22:09), Max: 104.7 (20 Dec 2019 19:21)  HR: 91 (20 Dec 2019 23:59) (91 - 103)  BP: 99/55 (20 Dec 2019 23:59) (64/38 - 130/60)  BP(mean): --  RR: 18 (20 Dec 2019 23:59) (18 - 34)  SpO2: 99% (20 Dec 2019 23:59) (88% - 99%)    PHYSICAL EXAM  GENERAL: NAD  HEENT: AT/NC, EOMI, PERRL, dry membranes  LUNGS: poor inspiratory effort, minimal expiratory wheeze bilaterally  HEART: RRR,+S1/+S2, no murmurs, rubs, gallops  ABDOMEN: Soft, NT, ND, no rebound, guarding rigidity, +BS in all 4 quadrants  EXTREMITIES: no clubbing, cyanosis, edema   NEURO: A&Ox3, non-focal         Assessment- Rapid Response called for 74y year old Male with a past medical history of CAD (s/p 5 stents), COPD (not on home O2), asthma, HTN, HLD, admitted for sepsis 2/2 to aspiration PNA after prostate biopsy. Rapid response called for hypotension.    Plan-  1. Hypotension likely 2/2 sepsis  -S/p 6L NS bolus. Maintenance fluid held as patient reported SOB and desaturation  -S/p 10mg Midodrine  -On rebreather, saturating at 99%. Discussed with ICU PA and RT, plan to switch to HFNC   -Repeat CXR ordered  -Bladder scan yielded 349ccs, patient straight cathed  -Symbicort, Proventil, Duoneb given   -Attending Dr. Lopez made aware, and agrees with above.  -Will continue to follow, RN to call with any changes.

## 2019-12-21 NOTE — PROGRESS NOTE ADULT - PROBLEM SELECTOR PLAN 3
-Pre Renal /Hemodynamic, Poor PO intake- Cr slowly improving  -HOLD Losartan with YUE & Hypotension  -BMP in AM   -S/P IV Fluids

## 2019-12-21 NOTE — PROGRESS NOTE ADULT - ASSESSMENT
75 yo male, PMHx of nephrolithiasis, GERD, HTN, HLD, CAD s/p stents x5, asthma and COPD with multiple prior hospitalizations (never been intubated), right lung tumor s/p vats with resection, with recent prostate bx 12/17 admitted with sepsis likely due to bacteremia from hematogenous spread during prostate bx. RRT called overnight for septic shock, hypoxia, and acute bronchospasm. Patient was transferred to ICU for further care. Today developed acute bronchospasm, status asthmaticus, acute hypercapnic respiratory failure suspected due to SIRS.    Neuro: Deep sedation with Propofol infusion +/- Ketamine, Paralytics for vent compliance. Will lighten sedation burden once bronchospasm resolves.    Pulm: Acute bronchospasm, hypercapnic respiratory failure, status asthmaticus likely secondary to SIRS response, spiking fever. CXR without focal infiltrate. Failed trial of HFNC/NIV, now s/p intubation, Ketamine, serial nebs, IV steroids, and Mg. Ppeak improved, ABG compensated. Will hold current settings for now, attempt to wean FiO2 to maintain SPO2 >90%. Repeat ABG in 2 hours and augment vent settings to maintain pH >7.25 and paO2 >70. Start bronchodilators q4h with albuterol nebs PRN, and Prednisone taper. Hold off on Ketamine infusion and paralytics for now.    CV: Developed post-intubation hypotension, starting Norepinephrine titrating to maintain MAP >65. Continue daily asa and statin for CAD.    GI: Protonix daily for stress ulcer ppx    Renal: YUE improving, unknown if baseline CKD. Component of metabolic acidosis likely lactemia related to acute respiratory failure, compensated with mechanical vent support. Will continue to monitor renal indices, I&Os    ID: Gram negative seth bacteremia E. coli suspect hematogenous spread secondary to prostate bx, r/o prostatitis. Discussed with ID, will change from Zosyn to Ertapenem due to risk for ESBL. De-escalate pending culture sensitivity. Trend fever curve, leukocytosis. Lactate cleared.    Heme: Lovenox daily for DVT ppx 75 yo male, PMHx of nephrolithiasis, GERD, HTN, HLD, CAD s/p stents x5, asthma and COPD with multiple prior hospitalizations (never been intubated), right lung tumor s/p vats with resection, with recent prostate bx 12/17 admitted with sepsis likely due to bacteremia from hematogenous spread during prostate bx. RRT called overnight for septic shock, hypoxia, and acute bronchospasm. Patient was transferred to ICU for further care. Today developed acute bronchospasm, status asthmaticus, acute hypercapnic respiratory failure suspected due to SIRS.    Neuro: Deep sedation with Propofol infusion +/- Ketamine, Paralytics for vent compliance. Will lighten sedation burden once bronchospasm resolves.    Pulm: Acute bronchospasm, hypercapnic respiratory failure, status asthmaticus likely secondary to SIRS response, spiking fever. CXR without focal infiltrate. Failed trial of HFNC/NIV, now s/p intubation, Ketamine, serial nebs, IV steroids, and Mg. Ppeak improved, ABG compensated. Will hold current settings for now, attempt to wean FiO2 to maintain SPO2 >90%. Repeat ABG in 2 hours and augment vent settings to maintain pH >7.25 and paO2 >70. Start bronchodilators q4h with albuterol nebs PRN, and Prednisone taper. Hold off on Ketamine infusion and paralytics for now.    CV: Developed post-intubation hypotension, starting Norepinephrine titrating to maintain MAP >65. Continue daily asa and statin for CAD.    GI: Protonix daily for stress ulcer ppx    Renal: YUE improving, unknown if baseline CKD. Component of metabolic acidosis likely lactemia related to acute respiratory failure, compensated with mechanical vent support. Will continue to monitor renal indices, I&Os    ID: Gram negative seth bacteremia E. coli suspect hematogenous spread secondary to prostate bx, r/o prostatitis. Discussed with ID, will change from Zosyn to Ertapenem due to risk for ESBL given recent antibiotic exposure (fluoroquinolone and cephalosporin started post-procedurally). De-escalate pending culture sensitivity. Still febrile >102'F. Trend fever curve, leukocytosis. Lactate cleared.    Heme: Lovenox daily for DVT ppx

## 2019-12-21 NOTE — CONSULT NOTE ADULT - PROBLEM SELECTOR RECOMMENDATION 9
[id number] sequence of events involved infection from prostate biiopsy followed by rigors representing bacteremia and now pt requiring intubation with sepsis. With high rate of ESBL organisms in this context recommend escalation to ertapenem pending sensitivity data.

## 2019-12-21 NOTE — CONSULT NOTE ADULT - ASSESSMENT
74M with PMHx as above, with recent prostate bx, now admitted with severe sepsis (aspiration pneumonitis vs possible prostatitis), YUE, lactic acidosis. Pt now with Rapid Response 2/2 hypotension and mild desaturation    Suggest:  1. Hypotension in setting of severe sepsis and dehydration.  Agree with IV fluid hydration   Hold BP meds  Empiric abx per ICU team    DVT prophylaxis  Check Echo 74M with PMHx as above, with recent prostate bx, now admitted with severe sepsis (aspiration pneumonitis vs possible prostatitis), YUE, lactic acidosis. Pt now with Rapid Response 2/2 hypotension and mild desaturation    Suggest:  1. Hypotension stable in setting of severe sepsis and dehydration.  Encourage PO fluids.  May need diuretics.  Will follow serial CXR's  Hold BP meds temporarily  Empiric abx per ICU team    DVT prophylaxis  Check Echo

## 2019-12-21 NOTE — DIETITIAN INITIAL EVALUATION ADULT. - PROBLEM SELECTOR PROBLEM 8
Letter written and sent via [employer].  Patient notified that she can  a signed copy at one of the OB/Gyn offices.   BPH (benign prostatic hyperplasia)

## 2019-12-21 NOTE — PROGRESS NOTE ADULT - SUBJECTIVE AND OBJECTIVE BOX
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 Pt admitted with sepsis due to aspiration pna vs prostatitis. Rapid Response called overhead due to hypotension to 80's, with O2 saturation to 91%, + expiratory wheezing. Pt placed on VM with improvement in O2 saturation. Additional crystalloid bolus ordered. Pt on zosyn empirically. ICU consulted for further evaluation Patient is a 74y old  Male who presents with a chief complaint of Fever (21 Dec 2019 12:00)      BRIEF HOSPITAL COURSE: 73 yo male, PMHx of nephrolithiasis, GERD, HTN, HLD, CAD s/p stents x5, asthma and COPD with multiple prior hospitalizations (never been intubated), right lung tumor s/p vats with resection, with recent prostate bx who presented to ED with c/o sob, chills, weakness, vomiting x1. Pt admitted with sepsis likely due to bacteremia from hematogenous spread during prostate bx. RRT called overnight for septic shock, hypoxia, and acute bronchospasm. Patient was transferred to ICU for further care.     Events last 24 hours: Patient initially doing well this morning, hemodynamically stable. Remained on HFNC, which was titrated down to 40% FiO2 with SPO2 >96%. Called to bedside by RN for acute respiratory distress. On my evaluation, patient was in extremis, tripoding with accessory muscle movement       PAST MEDICAL & SURGICAL HISTORY:  Nephrolithiasis  GERD (gastroesophageal reflux disease)  HLD (hyperlipidemia)  HTN (hypertension)  Stented coronary artery  Asthma  Chronic obstructive pulmonary disease: Asthma  S/P primary angioplasty with coronary stent  Lung tumor: Rt Lung tumor resection,benign        SOCIAL HISTORY:      Review of Systems:  CONSTITUTIONAL: No fever, chills, or fatigue  EYES: No visual disturbances  ENMT:  No difficulty hearing  NECK: No pain  RESPIRATORY: No cough. No shortness of breath  CARDIOVASCULAR: No chest pain, palpitations, or leg swelling  GASTROINTESTINAL: No abdominal pain. No nausea, vomiting, diarrhea, or constipation. No hematemesis, melena or hematochezia  GENITOURINARY: No dysuria, frequency, hematuria, or incontinence  NEUROLOGICAL: No headaches, loss of strength, numbness, or tremors  SKIN: No rashes  MUSCULOSKELETAL: No back or extremity pain. No calf pain  PSYCHIATRIC: No depression, anxiety, or difficulty sleeping    [  ] Due to altered mental status/intubation, subjective information was not able to be obtained from the patient. History was obtained, to the extent possible, from review of the chart and collateral sources of information.      Medications:  ertapenem  IVPB      ertapenem  IVPB 1000 milliGRAM(s) IV Intermittent once    norepinephrine Infusion 0.05 MICROgram(s)/kG/Min IV Continuous <Continuous>  tamsulosin 0.4 milliGRAM(s) Oral at bedtime    ALBUTerol    0.083% 2.5 milliGRAM(s) Nebulizer every 4 hours PRN  albuterol/ipratropium for Nebulization 3 milliLiter(s) Nebulizer every 4 hours    acetaminophen    Suspension .. 650 milliGRAM(s) Oral every 6 hours PRN  cisatracurium Infusion 3 MICROgram(s)/kG/Min IV Continuous <Continuous>  ketamine Infusion 0.1 mG/kG/Hr IV Continuous <Continuous>  ketamine Injectable 300 milliGRAM(s) IV Push once  propofol Infusion 5 MICROgram(s)/kG/Min IV Continuous <Continuous>  propofol Injectable 100 milliGRAM(s) IV Push once  rocuronium Injectable 50 milliGRAM(s) IV Push once      aspirin enteric coated 81 milliGRAM(s) Oral daily  enoxaparin Injectable 40 milliGRAM(s) SubCutaneous daily    pantoprazole  Injectable 40 milliGRAM(s) IV Push daily      atorvastatin 40 milliGRAM(s) Oral at bedtime        chlorhexidine 0.12% Liquid 15 milliLiter(s) Oral Mucosa every 12 hours    lactobacillus acidophilus 1 Tablet(s) Oral two times a day      Mode: AC/ CMV (Assist Control/ Continuous Mandatory Ventilation)  RR (machine): 30  TV (machine): 500  FiO2: 60  PEEP: 5  ITime: 1  PIP: 26      ICU Vital Signs Last 24 Hrs  T(C): 37.9 (21 Dec 2019 10:00), Max: 40.4 (20 Dec 2019 19:21)  T(F): 100.2 (21 Dec 2019 10:00), Max: 104.7 (20 Dec 2019 19:21)  HR: 117 (21 Dec 2019 11:30) (74 - 118)  BP: 115/57 (21 Dec 2019 11:30) (64/38 - 130/60)  BP(mean): 80 (21 Dec 2019 11:30) (63 - 83)  ABP: --  ABP(mean): --  RR: 28 (21 Dec 2019 11:30) (18 - 34)  SpO2: 97% (21 Dec 2019 11:30) (88% - 100%)      ABG - ( 21 Dec 2019 11:48 )  pH, Arterial: 7.38  pH, Blood: x     /  pCO2: 37    /  pO2: 85    / HCO3: 22    / Base Excess: -3.4  /  SaO2: 96                  I&O's Detail    20 Dec 2019 07:01  -  21 Dec 2019 07:00  --------------------------------------------------------  IN:    Oral Fluid: 120 mL  Total IN: 120 mL    OUT:    Voided: 200 mL  Total OUT: 200 mL    Total NET: -80 mL      21 Dec 2019 07:01  -  21 Dec 2019 12:07  --------------------------------------------------------  IN:    Solution: 100 mL    Solution: 50 mL    Solution: 100 mL  Total IN: 250 mL    OUT:    Voided: 180 mL  Total OUT: 180 mL    Total NET: 70 mL            LABS:                        13.0   18.44 )-----------( 108      ( 21 Dec 2019 05:21 )             38.6     12-    141  |  111<H>  |  23  ----------------------------<  98  4.4   |  23  |  1.30    Ca    7.5<L>      21 Dec 2019 05:21    TPro  5.4<L>  /  Alb  2.6<L>  /  TBili  0.8  /  DBili  x   /  AST  59<H>  /  ALT  41  /  AlkPhos  76  12-          CAPILLARY BLOOD GLUCOSE      POCT Blood Glucose.: 104 mg/dL (20 Dec 2019 22:27)      Urinalysis Basic - ( 21 Dec 2019 00:56 )    Color: Yellow / Appearance: Slightly Turbid / S.010 / pH: x  Gluc: x / Ketone: Negative  / Bili: Negative / Urobili: Negative   Blood: x / Protein: 25 mg/dL / Nitrite: Negative   Leuk Esterase: Small / RBC: 0-2 /HPF / WBC 26-50   Sq Epi: x / Non Sq Epi: Negative / Bacteria: Few      CULTURES:  Culture Results:   Growth in aerobic bottle: Gram Negative Rods  Growth in anaerobic bottle: Gram Negative Rods  "Due to technical problems, Proteus sp. will Not be reported as part of  the BCID panel until further notice"  ***Blood Panel PCR results on this specimenare available  approximately 3 hours after the Gram stain result.***  Gram stain, PCR, and/or culture results may not always  correspond due to difference in methodologies.  ************************************************************  This PCR assaywas performed using [employer].  The following targets are tested for: Enterococcus,  vancomycin resistant enterococci, Listeria monocytogenes,  coagulase negative staphylococci, S. aureus,  methicillin resistant S. aureus, Streptococcus agalactiae  (Group B), S. pneumoniae, S. pyogenes (Group A),  Acinetobacter baumannii, Enterobacter cloacae, E. coli,  Klebsiella oxytoca, K. pneumoniae, Proteus sp.,  Serratia marcescens, Haemophilus influenzae,  Neisseria meningitidis, Pseudomonas aeruginosa, Candida  albicans, C. glabrata, C krusei, C parapsilosis,  C. tropicalis and the KPC resistance gene. ( @ 00:55)  Culture Results:   Growth in aerobic bottle: Gram Negative Rods  Growth in anaerobic bottle: Gram Negative Rods ( @ 00:55)        Physical Examination:    General: No acute distress.      HEENT: Pupils equal, reactive to light.  Symmetric.    PULM: Clear to auscultation bilaterally, no significant sputum production    CVS: Regular rate and rhythm, no murmurs, rubs, or gallops    ABD: Soft, nondistended, nontender, normoactive bowel sounds, no masses    EXT: No edema, nontender    SKIN: Warm and well perfused, no rashes noted.    NEURO: Alert, oriented, interactive, nonfocal        RADIOLOGY: ***    INVASIVE LINES:  INDWELLING DE JESUS:  VTE PROPHYLAXIS:  CAM ICU:  CODE STATUS:    CRITICAL CARE TIME SPENT: *** minutes spent performing frequent bedside reassessments and augmenting plan of care to address problems of acute critical illness that pose high probability of life threatening deterioration and/or end organ damage/dysfunction and discussing goals of care with patient/family, non-inclusive of time spent on procedures performed. 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 Pt admitted with sepsis likely due to bacteremia from hematogenous spread during prostate bx. RRT called overnight for septic shock, hypoxia, and acute bronchospasm. Patient was transferred to ICU for further care.       Events last 24 hours: Patient initially doing well this morning, hemodynamically stable. Remained on HFNC, which was titrated down to 40% FiO2 with SPO2 >96%. Called to bedside by RN for acute respiratory distress. On my evaluation, patient was in extremis, tripoding with accessory muscle use, RR ~45 breaths/min. Patient only able to speak 1-2 word sentences and lost bowel control during episode. Lung exam with no air movement and no audible wheezing. Oxygenation stable 93% on 40% FiO2, flow increased to 55 LPM. Patient given Solu-medrol 125mg IV and STAT duoneb. Patient was then placed on BiPAP 20/8/1.00, given additional duonebs x 3, magnesium, and Ketamine 100mg IV x 2 doses. Patient had some improvement in air movement on exam, however remained with RR 40's and developed AMS and at that time decision made to emergently intubate. ABG obtained 7.36/37/387/21. Patient was preoxygenated with BiPAP, given Ketamine and Propofol for induction, and subsequently intubated on first attempt via Glidescope with 7.5# ETT. Post-intubation paralyzed with Rocuronium to improve vent compliance. Vent settings were augmented for auto-PEEP, now PIP 26. Repeat ABG post-intubation 7.38/37/85/22 on 30/500/5/.60. Patient's family was present at the bedside and updated.      PAST MEDICAL & SURGICAL HISTORY:  Nephrolithiasis  GERD (gastroesophageal reflux disease)  HLD (hyperlipidemia)  HTN (hypertension)  Stented coronary artery  Asthma  Chronic obstructive pulmonary disease: Asthma  S/P primary angioplasty with coronary stent  Lung tumor: Rt Lung tumor resection,benign      SOCIAL HISTORY: quit smoking 5 years ago, 50 pack years, drinks 1 glass of wine 1 x a month socially. Lives with wife, performs all ADLs, ambulates on own      Review of Systems: Due to intubation, subjective information was not able to be obtained from the patient. History was obtained, to the extent possible, from review of the chart and collateral sources of information.      Medications:  ertapenem  IVPB      ertapenem  IVPB 1000 milliGRAM(s) IV Intermittent once  norepinephrine Infusion 0.05 MICROgram(s)/kG/Min IV Continuous <Continuous>  tamsulosin 0.4 milliGRAM(s) Oral at bedtime  ALBUTerol    0.083% 2.5 milliGRAM(s) Nebulizer every 4 hours PRN  albuterol/ipratropium for Nebulization 3 milliLiter(s) Nebulizer every 4 hours  acetaminophen    Suspension .. 650 milliGRAM(s) Oral every 6 hours PRN  cisatracurium Infusion 3 MICROgram(s)/kG/Min IV Continuous <Continuous>  ketamine Infusion 0.1 mG/kG/Hr IV Continuous <Continuous>  ketamine Injectable 300 milliGRAM(s) IV Push once  propofol Infusion 5 MICROgram(s)/kG/Min IV Continuous <Continuous>  propofol Injectable 100 milliGRAM(s) IV Push once  rocuronium Injectable 50 milliGRAM(s) IV Push once  aspirin enteric coated 81 milliGRAM(s) Oral daily  enoxaparin Injectable 40 milliGRAM(s) SubCutaneous daily  pantoprazole  Injectable 40 milliGRAM(s) IV Push daily  atorvastatin 40 milliGRAM(s) Oral at bedtime  chlorhexidine 0.12% Liquid 15 milliLiter(s) Oral Mucosa every 12 hours  lactobacillus acidophilus 1 Tablet(s) Oral two times a day      Mode: AC/ CMV (Assist Control/ Continuous Mandatory Ventilation)  RR (machine): 30  TV (machine): 500  FiO2: 60  PEEP: 5  ITime: 1  PIP: 26      ICU Vital Signs Last 24 Hrs  T(C): 37.9 (21 Dec 2019 10:00), Max: 40.4 (20 Dec 2019 19:21)  T(F): 100.2 (21 Dec 2019 10:00), Max: 104.7 (20 Dec 2019 19:21)  HR: 117 (21 Dec 2019 11:30) (74 - 118)  BP: 115/57 (21 Dec 2019 11:30) (64/38 - 130/60)  BP(mean): 80 (21 Dec 2019 11:30) (63 - 83)  ABP: --  ABP(mean): --  RR: 28 (21 Dec 2019 11:30) (18 - 34)  SpO2: 97% (21 Dec 2019 11:30) (88% - 100%)      ABG - ( 21 Dec 2019 11:48 )  pH, Arterial: 7.38  pH, Blood: x     /  pCO2: 37    /  pO2: 85    / HCO3: 22    / Base Excess: -3.4  /  SaO2: 96            I&O's Detail    20 Dec 2019 07:01  -  21 Dec 2019 07:00  --------------------------------------------------------  IN:    Oral Fluid: 120 mL  Total IN: 120 mL    OUT:    Voided: 200 mL  Total OUT: 200 mL    Total NET: -80 mL      21 Dec 2019 07:01  -  21 Dec 2019 12:07  --------------------------------------------------------  IN:    Solution: 100 mL    Solution: 50 mL    Solution: 100 mL  Total IN: 250 mL    OUT:    Voided: 180 mL  Total OUT: 180 mL    Total NET: 70 mL        LABS:                        13.0   18.44 )-----------( 108      ( 21 Dec 2019 05:21 )             38.6     12    141  |  111<H>  |  23  ----------------------------<  98  4.4   |  23  |  1.30    Ca    7.5<L>      21 Dec 2019 05:21    TPro  5.4<L>  /  Alb  2.6<L>  /  TBili  0.8  /  DBili  x   /  AST  59<H>  /  ALT  41  /  AlkPhos  76            CAPILLARY BLOOD GLUCOSE      POCT Blood Glucose.: 104 mg/dL (20 Dec 2019 22:27)      Urinalysis Basic - ( 21 Dec 2019 00:56 )    Color: Yellow / Appearance: Slightly Turbid / S.010 / pH: x  Gluc: x / Ketone: Negative  / Bili: Negative / Urobili: Negative   Blood: x / Protein: 25 mg/dL / Nitrite: Negative   Leuk Esterase: Small / RBC: 0-2 /HPF / WBC 26-50   Sq Epi: x / Non Sq Epi: Negative / Bacteria: Few      CULTURES:  Culture Results:   Growth in aerobic bottle: Gram Negative Rods  Growth in anaerobic bottle: Gram Negative Rods  "Due to technical problems, Proteus sp. will Not be reported as part of  the BCID panel until further notice"  ***Blood Panel PCR results on this specimenare available  approximately 3 hours after the Gram stain result.***  Gram stain, PCR, and/or culture results may not always  correspond due to difference in methodologies.  ************************************************************  This PCR assaywas performed using [employer].  The following targets are tested for: Enterococcus,  vancomycin resistant enterococci, Listeria monocytogenes,  coagulase negative staphylococci, S. aureus,  methicillin resistant S. aureus, Streptococcus agalactiae  (Group B), S. pneumoniae, S. pyogenes (Group A),  Acinetobacter baumannii, Enterobacter cloacae, E. coli,  Klebsiella oxytoca, K. pneumoniae, Proteus sp.,  Serratia marcescens, Haemophilus influenzae,  Neisseria meningitidis, Pseudomonas aeruginosa, Candida  albicans, C. glabrata, C krusei, C parapsilosis,  C. tropicalis and the KPC resistance gene. ( @ 00:55)  Culture Results:   Growth in aerobic bottle: Gram Negative Rods  Growth in anaerobic bottle: Gram Negative Rods ( @ 00:55)        Physical Examination:    General: No acute distress.      HEENT: Pupils equal, reactive to light.  Symmetric.    PULM: Severe inspiratory and expiratory wheezing and ronchi diffusely bilaterally, no significant sputum production    CVS: Sinus tachycardia     ABD: Soft, distended, nontender, normoactive bowel sounds, no masses    EXT: No edema, nontender    SKIN: Warm and well perfused, no rashes noted.    NEURO: Sedated and paralyzed RASS -5          RADIOLOGY: < from: Xray Chest 1 View-PORTABLE IMMEDIATE (19 @ 11:44) >  EXAM:  XR CHEST PORTABLE IMMED 1V                          PROCEDURE DATE:  2019      INTERPRETATION:  Portable chest radiograph       CLINICAL INFORMATION: ET tube placement    TECHNIQUE:  Single portable frontal AP view of the chest was obtained.    FINDINGS: The examination of 2019 is available for review.    The lungs are free of consolidation.  No pleural abnormality is seen.      The heart and mediastinum appear intact.    No destructive lesion is seen in the visualized skeleton.    The tip of the endotracheal tube is projecting over the trachea, well above the scotty. The distal NG tube is projecting over the upper abdomen. The tip of the NG tube is not included on this examination.      IMPRESSION:   No evidence ofactive chest disease.      KERRY NEVAREZ M.D. ATTENDING RADIOLOGIST  This document has been electronically signed. Dec 21 2019 11:46AM        INVASIVE LINES: X   INDWELLING DE JESUS: X   VTE PROPHYLAXIS: Lovenox   CAM ICU: N/A  CODE STATUS: FULL        CRITICAL CARE TIME SPENT: 108 minutes spent performing frequent bedside reassessments and augmenting plan of care to address problems of acute critical illness that pose high probability of life threatening deterioration and/or end organ damage/dysfunction and discussing goals of care with patient/family, non-inclusive of time spent on procedures performed. 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 Pt admitted with sepsis likely due to bacteremia from hematogenous spread during prostate bx. RRT called overnight for septic shock, hypoxia, and acute bronchospasm. Patient was transferred to ICU for further care.       Events last 24 hours: Patient initially doing well this morning, hemodynamically stable. Remained on HFNC, which was titrated down to 40% FiO2 with SPO2 >96%. Called to bedside by RN for acute respiratory distress. On my evaluation, patient was in extremis, tripoding with accessory muscle use, RR ~45 breaths/min. Patient only able to speak 1-2 word sentences and lost bowel control during episode. Lung exam with no air movement and no audible wheezing. Oxygenation stable 93% on 40% FiO2, flow increased to 55 LPM. Patient given Solu-medrol 125mg IV and STAT duoneb. Patient was then placed on BiPAP 20/8/1.00, given additional duonebs x 3, magnesium, and Ketamine 100mg IV x 2 doses. Patient had some improvement in air movement on exam, however remained with RR 40's and developed AMS and at that time decision made to emergently intubate. ABG obtained 7.36/37/387/21. Patient was preoxygenated with BiPAP, given Ketamine and Propofol for induction, and subsequently intubated on first attempt via Glidescope with 7.5# ETT. Post-intubation paralyzed with Rocuronium to improve vent compliance. Vent settings were augmented for auto-PEEP, now PIP 26. Repeat ABG post-intubation 7.38/37/85/22 on 30/500/5/.60. Patient's family was present at the bedside and updated.      PAST MEDICAL & SURGICAL HISTORY:  Nephrolithiasis  GERD (gastroesophageal reflux disease)  HLD (hyperlipidemia)  HTN (hypertension)  Stented coronary artery  Asthma  Chronic obstructive pulmonary disease: Asthma  S/P primary angioplasty with coronary stent  Lung tumor: Rt Lung tumor resection,benign      SOCIAL HISTORY: quit smoking 5 years ago, 50 pack years, drinks 1 glass of wine 1 x a month socially. Lives with wife, performs all ADLs, ambulates on own      Review of Systems: Due to intubation, subjective information was not able to be obtained from the patient. History was obtained, to the extent possible, from review of the chart and collateral sources of information.      Medications:  ertapenem  IVPB      ertapenem  IVPB 1000 milliGRAM(s) IV Intermittent once  norepinephrine Infusion 0.05 MICROgram(s)/kG/Min IV Continuous <Continuous>  tamsulosin 0.4 milliGRAM(s) Oral at bedtime  ALBUTerol    0.083% 2.5 milliGRAM(s) Nebulizer every 4 hours PRN  albuterol/ipratropium for Nebulization 3 milliLiter(s) Nebulizer every 4 hours  acetaminophen    Suspension .. 650 milliGRAM(s) Oral every 6 hours PRN  cisatracurium Infusion 3 MICROgram(s)/kG/Min IV Continuous <Continuous>  ketamine Infusion 0.1 mG/kG/Hr IV Continuous <Continuous>  ketamine Injectable 300 milliGRAM(s) IV Push once  propofol Infusion 5 MICROgram(s)/kG/Min IV Continuous <Continuous>  propofol Injectable 100 milliGRAM(s) IV Push once  rocuronium Injectable 50 milliGRAM(s) IV Push once  aspirin enteric coated 81 milliGRAM(s) Oral daily  enoxaparin Injectable 40 milliGRAM(s) SubCutaneous daily  pantoprazole  Injectable 40 milliGRAM(s) IV Push daily  atorvastatin 40 milliGRAM(s) Oral at bedtime  chlorhexidine 0.12% Liquid 15 milliLiter(s) Oral Mucosa every 12 hours  lactobacillus acidophilus 1 Tablet(s) Oral two times a day      Mode: AC/ CMV (Assist Control/ Continuous Mandatory Ventilation)  RR (machine): 30  TV (machine): 500  FiO2: 60  PEEP: 5  ITime: 1  PIP: 26      ICU Vital Signs Last 24 Hrs  T(C): 37.9 (21 Dec 2019 10:00), Max: 40.4 (20 Dec 2019 19:21)  T(F): 100.2 (21 Dec 2019 10:00), Max: 104.7 (20 Dec 2019 19:21)  HR: 117 (21 Dec 2019 11:30) (74 - 118)  BP: 115/57 (21 Dec 2019 11:30) (64/38 - 130/60)  BP(mean): 80 (21 Dec 2019 11:30) (63 - 83)  ABP: --  ABP(mean): --  RR: 28 (21 Dec 2019 11:30) (18 - 34)  SpO2: 97% (21 Dec 2019 11:30) (88% - 100%)      ABG - ( 21 Dec 2019 11:48 )  pH, Arterial: 7.38  pH, Blood: x     /  pCO2: 37    /  pO2: 85    / HCO3: 22    / Base Excess: -3.4  /  SaO2: 96            I&O's Detail    20 Dec 2019 07:01  -  21 Dec 2019 07:00  --------------------------------------------------------  IN:    Oral Fluid: 120 mL  Total IN: 120 mL    OUT:    Voided: 200 mL  Total OUT: 200 mL    Total NET: -80 mL      21 Dec 2019 07:01  -  21 Dec 2019 12:07  --------------------------------------------------------  IN:    Solution: 100 mL    Solution: 50 mL    Solution: 100 mL  Total IN: 250 mL    OUT:    Voided: 180 mL  Total OUT: 180 mL    Total NET: 70 mL        LABS:                        13.0   18.44 )-----------( 108      ( 21 Dec 2019 05:21 )             38.6     12-21    141  |  111<H>  |  23  ----------------------------<  98  4.4   |  23  |  1.30    Ca    7.5<L>      21 Dec 2019 05:21    TPro  5.4<L>  /  Alb  2.6<L>  /  TBili  0.8  /  DBili  x   /  AST  59<H>  /  ALT  41  /  AlkPhos  76  12          CAPILLARY BLOOD GLUCOSE      POCT Blood Glucose.: 104 mg/dL (20 Dec 2019 22:27)      Urinalysis Basic - ( 21 Dec 2019 00:56 )    Color: Yellow / Appearance: Slightly Turbid / S.010 / pH: x  Gluc: x / Ketone: Negative  / Bili: Negative / Urobili: Negative   Blood: x / Protein: 25 mg/dL / Nitrite: Negative   Leuk Esterase: Small / RBC: 0-2 /HPF / WBC 26-50   Sq Epi: x / Non Sq Epi: Negative / Bacteria: Few      CULTURES:  Culture Results:   Growth in aerobic bottle: Gram Negative Rods  Growth in anaerobic bottle: Gram Negative Rods  "Due to technical problems, Proteus sp. will Not be reported as part of  the BCID panel until further notice"  ***Blood Panel PCR results on this specimenare available  approximately 3 hours after the Gram stain result.***  Gram stain, PCR, and/or culture results may not always  correspond due to difference in methodologies.  ************************************************************  This PCR assaywas performed using [employer].  The following targets are tested for: Enterococcus,  vancomycin resistant enterococci, Listeria monocytogenes,  coagulase negative staphylococci, S. aureus,  methicillin resistant S. aureus, Streptococcus agalactiae  (Group B), S. pneumoniae, S. pyogenes (Group A),  Acinetobacter baumannii, Enterobacter cloacae, E. coli,  Klebsiella oxytoca, K. pneumoniae, Proteus sp.,  Serratia marcescens, Haemophilus influenzae,  Neisseria meningitidis, Pseudomonas aeruginosa, Candida  albicans, C. glabrata, C krusei, C parapsilosis,  C. tropicalis and the KPC resistance gene. ( @ 00:55)  Culture Results:   Growth in aerobic bottle: Gram Negative Rods  Growth in anaerobic bottle: Gram Negative Rods ( @ 00:55)        Physical Examination:    General: No acute distress.      HEENT: Pupils equal, reactive to light.  Symmetric.    PULM: Severe inspiratory and expiratory wheezing and ronchi diffusely bilaterally, no significant sputum production    CVS: Sinus tachycardia     ABD: Soft, distended, nontender, normoactive bowel sounds, no masses    EXT: No edema, nontender    SKIN: Warm and well perfused, no rashes noted.    NEURO: Sedated and paralyzed RASS -5          RADIOLOGY: < from: Xray Chest 1 View-PORTABLE IMMEDIATE (19 @ 11:44) >  EXAM:  XR CHEST PORTABLE IMMED 1V                          PROCEDURE DATE:  2019      INTERPRETATION:  Portable chest radiograph       CLINICAL INFORMATION: ET tube placement    TECHNIQUE:  Single portable frontal AP view of the chest was obtained.    FINDINGS: The examination of 2019 is available for review.    The lungs are free of consolidation.  No pleural abnormality is seen.      The heart and mediastinum appear intact.    No destructive lesion is seen in the visualized skeleton.    The tip of the endotracheal tube is projecting over the trachea, well above the scotty. The distal NG tube is projecting over the upper abdomen. The tip of the NG tube is not included on this examination.      IMPRESSION:   No evidence ofactive chest disease.      KERRY NEVAREZ M.D. ATTENDING RADIOLOGIST  This document has been electronically signed. Dec 21 2019 11:46AM        INVASIVE LINES: X   INDWELLING DE JESUS: X   VTE PROPHYLAXIS: Lovenox   CAM ICU: N/A  CODE STATUS: FULL        CRITICAL CARE TIME SPENT: 108 minutes spent performing frequent bedside reassessments and augmenting plan of care to address problems of acute critical illness that pose high probability of life threatening deterioration and/or end organ damage/dysfunction and discussing goals of care with patient/family, non-inclusive of time spent on procedures performed.

## 2019-12-21 NOTE — PROCEDURE NOTE - NSTRACHPOSTINTU_RESP_A_CORE
Breath sounds bilateral/Chest X-Ray/Appropriate capnography/Positive end tidal Co2 noted/Breath sounds equal/Chest excursion noted

## 2019-12-22 DIAGNOSIS — J96.90 RESPIRATORY FAILURE, UNSPECIFIED, UNSPECIFIED WHETHER WITH HYPOXIA OR HYPERCAPNIA: ICD-10-CM

## 2019-12-22 DIAGNOSIS — R33.9 RETENTION OF URINE, UNSPECIFIED: ICD-10-CM

## 2019-12-22 LAB
ANION GAP SERPL CALC-SCNC: 7 MMOL/L — SIGNIFICANT CHANGE UP (ref 5–17)
BASE EXCESS BLDA CALC-SCNC: -2.2 MMOL/L — LOW (ref -2–2)
BASOPHILS # BLD AUTO: 0 K/UL — SIGNIFICANT CHANGE UP (ref 0–0.2)
BASOPHILS NFR BLD AUTO: 0 % — SIGNIFICANT CHANGE UP (ref 0–2)
BLOOD GAS COMMENTS ARTERIAL: SIGNIFICANT CHANGE UP
BLOOD GAS COMMENTS ARTERIAL: SIGNIFICANT CHANGE UP
BUN SERPL-MCNC: 20 MG/DL — SIGNIFICANT CHANGE UP (ref 7–23)
CALCIUM SERPL-MCNC: 8.2 MG/DL — LOW (ref 8.5–10.1)
CHLORIDE SERPL-SCNC: 114 MMOL/L — HIGH (ref 96–108)
CO2 SERPL-SCNC: 23 MMOL/L — SIGNIFICANT CHANGE UP (ref 22–31)
CREAT SERPL-MCNC: 1 MG/DL — SIGNIFICANT CHANGE UP (ref 0.5–1.3)
CULTURE RESULTS: SIGNIFICANT CHANGE UP
EOSINOPHIL # BLD AUTO: 0 K/UL — SIGNIFICANT CHANGE UP (ref 0–0.5)
EOSINOPHIL NFR BLD AUTO: 0 % — SIGNIFICANT CHANGE UP (ref 0–6)
GLUCOSE SERPL-MCNC: 224 MG/DL — HIGH (ref 70–99)
HCO3 BLDA-SCNC: 23 MMOL/L — SIGNIFICANT CHANGE UP (ref 23–27)
HCT VFR BLD CALC: 39 % — SIGNIFICANT CHANGE UP (ref 39–50)
HGB BLD-MCNC: 13.4 G/DL — SIGNIFICANT CHANGE UP (ref 13–17)
HOROWITZ INDEX BLDA+IHG-RTO: 30 — SIGNIFICANT CHANGE UP
LYMPHOCYTES # BLD AUTO: 0.44 K/UL — LOW (ref 1–3.3)
LYMPHOCYTES # BLD AUTO: 2 % — LOW (ref 13–44)
MAGNESIUM SERPL-MCNC: 2.7 MG/DL — HIGH (ref 1.6–2.6)
MCHC RBC-ENTMCNC: 32.1 PG — SIGNIFICANT CHANGE UP (ref 27–34)
MCHC RBC-ENTMCNC: 34.4 GM/DL — SIGNIFICANT CHANGE UP (ref 32–36)
MCV RBC AUTO: 93.3 FL — SIGNIFICANT CHANGE UP (ref 80–100)
MONOCYTES # BLD AUTO: 0.88 K/UL — SIGNIFICANT CHANGE UP (ref 0–0.9)
MONOCYTES NFR BLD AUTO: 4 % — SIGNIFICANT CHANGE UP (ref 2–14)
NEUTROPHILS # BLD AUTO: 20.61 K/UL — HIGH (ref 1.8–7.4)
NEUTROPHILS NFR BLD AUTO: 67 % — SIGNIFICANT CHANGE UP (ref 43–77)
NRBC # BLD: SIGNIFICANT CHANGE UP /100 WBCS (ref 0–0)
PCO2 BLDA: 35 MMHG — SIGNIFICANT CHANGE UP (ref 32–46)
PH BLDA: 7.41 — SIGNIFICANT CHANGE UP (ref 7.35–7.45)
PHOSPHATE SERPL-MCNC: 1.8 MG/DL — LOW (ref 2.5–4.5)
PLATELET # BLD AUTO: 112 K/UL — LOW (ref 150–400)
PO2 BLDA: 86 MMHG — SIGNIFICANT CHANGE UP (ref 74–108)
POTASSIUM SERPL-MCNC: 4 MMOL/L — SIGNIFICANT CHANGE UP (ref 3.5–5.3)
POTASSIUM SERPL-SCNC: 4 MMOL/L — SIGNIFICANT CHANGE UP (ref 3.5–5.3)
RBC # BLD: 4.18 M/UL — LOW (ref 4.2–5.8)
RBC # FLD: 14.1 % — SIGNIFICANT CHANGE UP (ref 10.3–14.5)
SAO2 % BLDA: 96 % — SIGNIFICANT CHANGE UP (ref 92–96)
SODIUM SERPL-SCNC: 144 MMOL/L — SIGNIFICANT CHANGE UP (ref 135–145)
SPECIMEN SOURCE: SIGNIFICANT CHANGE UP
WBC # BLD: 21.93 K/UL — HIGH (ref 3.8–10.5)
WBC # FLD AUTO: 21.93 K/UL — HIGH (ref 3.8–10.5)

## 2019-12-22 PROCEDURE — 93010 ELECTROCARDIOGRAM REPORT: CPT

## 2019-12-22 PROCEDURE — 76604 US EXAM CHEST: CPT | Mod: 26

## 2019-12-22 PROCEDURE — 93308 TTE F-UP OR LMTD: CPT | Mod: 26

## 2019-12-22 PROCEDURE — 99291 CRITICAL CARE FIRST HOUR: CPT

## 2019-12-22 RX ORDER — DILTIAZEM HCL 120 MG
30 CAPSULE, EXT RELEASE 24 HR ORAL EVERY 6 HOURS
Refills: 0 | Status: DISCONTINUED | OUTPATIENT
Start: 2019-12-22 | End: 2019-12-22

## 2019-12-22 RX ORDER — MIDODRINE HYDROCHLORIDE 2.5 MG/1
10 TABLET ORAL EVERY 8 HOURS
Refills: 0 | Status: DISCONTINUED | OUTPATIENT
Start: 2019-12-22 | End: 2019-12-24

## 2019-12-22 RX ORDER — BUDESONIDE AND FORMOTEROL FUMARATE DIHYDRATE 160; 4.5 UG/1; UG/1
2 AEROSOL RESPIRATORY (INHALATION)
Refills: 0 | Status: DISCONTINUED | OUTPATIENT
Start: 2019-12-22 | End: 2019-12-24

## 2019-12-22 RX ORDER — PANTOPRAZOLE SODIUM 20 MG/1
40 TABLET, DELAYED RELEASE ORAL
Refills: 0 | Status: DISCONTINUED | OUTPATIENT
Start: 2019-12-22 | End: 2019-12-24

## 2019-12-22 RX ORDER — METOPROLOL TARTRATE 50 MG
25 TABLET ORAL ONCE
Refills: 0 | Status: DISCONTINUED | OUTPATIENT
Start: 2019-12-22 | End: 2019-12-22

## 2019-12-22 RX ORDER — IPRATROPIUM/ALBUTEROL SULFATE 18-103MCG
3 AEROSOL WITH ADAPTER (GRAM) INHALATION ONCE
Refills: 0 | Status: DISCONTINUED | OUTPATIENT
Start: 2019-12-22 | End: 2019-12-24

## 2019-12-22 RX ORDER — POTASSIUM PHOSPHATE, MONOBASIC POTASSIUM PHOSPHATE, DIBASIC 236; 224 MG/ML; MG/ML
30 INJECTION, SOLUTION INTRAVENOUS ONCE
Refills: 0 | Status: COMPLETED | OUTPATIENT
Start: 2019-12-22 | End: 2019-12-22

## 2019-12-22 RX ORDER — METOPROLOL TARTRATE 50 MG
25 TABLET ORAL
Refills: 0 | Status: DISCONTINUED | OUTPATIENT
Start: 2019-12-22 | End: 2019-12-22

## 2019-12-22 RX ORDER — METOPROLOL TARTRATE 50 MG
5 TABLET ORAL ONCE
Refills: 0 | Status: COMPLETED | OUTPATIENT
Start: 2019-12-22 | End: 2019-12-22

## 2019-12-22 RX ORDER — DILTIAZEM HCL 120 MG
10 CAPSULE, EXT RELEASE 24 HR ORAL ONCE
Refills: 0 | Status: COMPLETED | OUTPATIENT
Start: 2019-12-22 | End: 2019-12-22

## 2019-12-22 RX ORDER — ACETAMINOPHEN 500 MG
975 TABLET ORAL EVERY 8 HOURS
Refills: 0 | Status: DISCONTINUED | OUTPATIENT
Start: 2019-12-22 | End: 2019-12-24

## 2019-12-22 RX ADMIN — Medication 81 MILLIGRAM(S): at 11:48

## 2019-12-22 RX ADMIN — BUDESONIDE AND FORMOTEROL FUMARATE DIHYDRATE 2 PUFF(S): 160; 4.5 AEROSOL RESPIRATORY (INHALATION) at 11:51

## 2019-12-22 RX ADMIN — TAMSULOSIN HYDROCHLORIDE 0.4 MILLIGRAM(S): 0.4 CAPSULE ORAL at 21:20

## 2019-12-22 RX ADMIN — Medication 1 TABLET(S): at 05:22

## 2019-12-22 RX ADMIN — Medication 3 MILLILITER(S): at 15:26

## 2019-12-22 RX ADMIN — PANTOPRAZOLE SODIUM 40 MILLIGRAM(S): 20 TABLET, DELAYED RELEASE ORAL at 11:48

## 2019-12-22 RX ADMIN — ALBUTEROL 2.5 MILLIGRAM(S): 90 AEROSOL, METERED ORAL at 15:23

## 2019-12-22 RX ADMIN — ALBUTEROL 2.5 MILLIGRAM(S): 90 AEROSOL, METERED ORAL at 09:40

## 2019-12-22 RX ADMIN — ERTAPENEM SODIUM 120 MILLIGRAM(S): 1 INJECTION, POWDER, LYOPHILIZED, FOR SOLUTION INTRAMUSCULAR; INTRAVENOUS at 11:48

## 2019-12-22 RX ADMIN — Medication 3 MILLILITER(S): at 02:30

## 2019-12-22 RX ADMIN — CHLORHEXIDINE GLUCONATE 15 MILLILITER(S): 213 SOLUTION TOPICAL at 05:21

## 2019-12-22 RX ADMIN — ATORVASTATIN CALCIUM 40 MILLIGRAM(S): 80 TABLET, FILM COATED ORAL at 21:20

## 2019-12-22 RX ADMIN — PROPOFOL 14.94 MICROGRAM(S)/KG/MIN: 10 INJECTION, EMULSION INTRAVENOUS at 07:16

## 2019-12-22 RX ADMIN — ENOXAPARIN SODIUM 40 MILLIGRAM(S): 100 INJECTION SUBCUTANEOUS at 11:48

## 2019-12-22 RX ADMIN — POTASSIUM PHOSPHATE, MONOBASIC POTASSIUM PHOSPHATE, DIBASIC 83.33 MILLIMOLE(S): 236; 224 INJECTION, SOLUTION INTRAVENOUS at 08:27

## 2019-12-22 RX ADMIN — PROPOFOL 14.94 MICROGRAM(S)/KG/MIN: 10 INJECTION, EMULSION INTRAVENOUS at 03:18

## 2019-12-22 RX ADMIN — Medication 5 MILLIGRAM(S): at 08:23

## 2019-12-22 RX ADMIN — MIDODRINE HYDROCHLORIDE 10 MILLIGRAM(S): 2.5 TABLET ORAL at 21:20

## 2019-12-22 RX ADMIN — Medication 975 MILLIGRAM(S): at 18:09

## 2019-12-22 RX ADMIN — BUDESONIDE AND FORMOTEROL FUMARATE DIHYDRATE 2 PUFF(S): 160; 4.5 AEROSOL RESPIRATORY (INHALATION) at 19:07

## 2019-12-22 RX ADMIN — Medication 975 MILLIGRAM(S): at 18:45

## 2019-12-22 RX ADMIN — Medication 3 MILLILITER(S): at 12:23

## 2019-12-22 RX ADMIN — Medication 10 MILLIGRAM(S): at 09:41

## 2019-12-22 RX ADMIN — Medication 3 MILLILITER(S): at 20:19

## 2019-12-22 RX ADMIN — Medication 1 TABLET(S): at 17:21

## 2019-12-22 RX ADMIN — Medication 40 MILLIGRAM(S): at 05:23

## 2019-12-22 RX ADMIN — Medication 3 MILLILITER(S): at 08:14

## 2019-12-22 RX ADMIN — Medication 5 MILLIGRAM(S): at 07:31

## 2019-12-22 RX ADMIN — MIDODRINE HYDROCHLORIDE 10 MILLIGRAM(S): 2.5 TABLET ORAL at 13:22

## 2019-12-22 NOTE — PROGRESS NOTE ADULT - PROBLEM SELECTOR PLAN 4
Resolved -Pre Renal /Hemodynamic, Poor PO intake- Cr  Normal   -HOLD Losartan with YUE & Hypotension  -BMP in AM   -S/P IV Fluids

## 2019-12-22 NOTE — PROGRESS NOTE ADULT - ASSESSMENT
73 yo male, PMHx of nephrolithiasis, GERD, HTN, HLD, CAD s/p stents x5, asthma and COPD with multiple prior hospitalizations (never been intubated), right lung tumor s/p vats with resection, with recent prostate bx 12/17 admitted with sepsis likely due to bacteremia from hematogenous spread during prostate bx. RRT called overnight for septic shock, hypoxia, and acute bronchospasm. Patient was transferred to ICU for further care. Today developed acute bronchospasm, status asthmaticus, acute hypercapnic respiratory failure suspected due to SIRS.

## 2019-12-22 NOTE — PROGRESS NOTE ADULT - ASSESSMENT
74M with asthma, COPD, CAD s/p 5 stents, preserved LVEF who presents after recent prostate bx, now with septic shock 2/2 E. Coli bacteremia,  YUE, lactic acidosis. Intubated s/p acute bronchospasm possibly from asthma     Suggest:  1. Hypotension - Likely from infectious shock  No BP meds.  Levophed for hypotension.  Ween as tolerated  IVF as needed - no Gross evidence for CHF    2. Bacteremia  E. Coli bacteremia now on ertapenem. Abx per ICU team    3.DVT ppx    4. Will follow.

## 2019-12-22 NOTE — PROGRESS NOTE ADULT - PROBLEM SELECTOR PLAN 1
-Sepsis with shock -Fever,  High WBC &  Bandemia , 2/2 Ac Prostatitis likely  -Hypotension 2/2 distributive shock, On Vasopressor , Midodrine 10 mg 3x day  -E Coli Sepsis - IV Abx -. Repeat Blood c/s x 2 to follow  -Dr cui With high rate of ESBL organisms in this context recommend escalation to Ertapenem pending sensitivity data.  -Etiology -likely Prostatitis   -s/p Recent Prostate Biopsy on Tuesday 12/17 with Dr. Machado  -? Aspiration , CT scan  shows- Groundglass opacity with underlying reticular prominence in the lateral right middle lobe and posterior right lower lobe. NO PNA   - CT A/P done- B/L perinephric stranding-Nonspecific  -Blood c/s x 2, -E Coli UA. Urine c/s , RVP -NEG   Flu A/B/RSV -NEG   -IV Fluids- Stopped

## 2019-12-22 NOTE — PROGRESS NOTE ADULT - ASSESSMENT
74M with PMHx as above, with recent prostate bx, now admitted with septic shock 2/2 E. Coli bacteremia,  YUE, lactic acidosis. Pt now Intubated 2/2 acute bronchospasm, status asthmaticus       -Titrate propofol for sedation and vent synchrony. Versed prn. Will start paralytics with nimbex if persistent vent desynchrony  -Septic shock, now on levophed gtt. Titrate levophed to MAP > 65   -Hold HTN medications  -Wean FiO2 as tolerated by O2 saturation > 92%. Continue nebs/steroids. Will attempt SBT when bronchospasm resolves  -Keep HOB elevated 30 degrees  -Peridex oral care while on vent  -E. Coli bacteremia now on ertapenem. Monitor wbc/temp  -Continue IVF. Liz placed for I/O's  -H/H stable. Monitor thrombocytopenia  -DVT ppx  -Pt is critically ill

## 2019-12-22 NOTE — PROGRESS NOTE ADULT - PROBLEM SELECTOR PLAN 5
-5 coronary artery stents ~2001  -S/P Rapid A Fib ---> NSR now resolved after Extubation   - H/O CAD/ stents  -stable , cardiology Dr Degroot on case  -cont home dose aspirin 81, statin,

## 2019-12-22 NOTE — PROGRESS NOTE ADULT - SUBJECTIVE AND OBJECTIVE BOX
infectious diseases progress note:    BASILIO LANDRY is a 74y y. o. Male patient    Patient reports: feeling better    ROS:    EYES:  Negative  blurry vision or double vision  GASTROINTESTINAL:  Negative for nausea, vomiting, diarrhea  -otherwise negative except for subjective    Allergies    No Known Allergies    Intolerances        ANTIBIOTICS/RELEVANT:  antimicrobials  ertapenem  IVPB      ertapenem  IVPB 1000 milliGRAM(s) IV Intermittent every 24 hours    immunologic:    OTHER:  acetaminophen   Tablet .. 975 milliGRAM(s) Oral every 8 hours PRN  ALBUTerol    0.083% 2.5 milliGRAM(s) Nebulizer every 4 hours PRN  albuterol/ipratropium for Nebulization 3 milliLiter(s) Nebulizer every 4 hours  albuterol/ipratropium for Nebulization. 3 milliLiter(s) Nebulizer once  aspirin  chewable 81 milliGRAM(s) Oral daily  atorvastatin 40 milliGRAM(s) Oral at bedtime  budesonide 160 MICROgram(s)/formoterol 4.5 MICROgram(s) Inhaler 2 Puff(s) Inhalation two times a day  enoxaparin Injectable 40 milliGRAM(s) SubCutaneous daily  lactobacillus acidophilus 1 Tablet(s) Oral two times a day  norepinephrine Infusion 0.05 MICROgram(s)/kG/Min IV Continuous <Continuous>  pantoprazole    Tablet 40 milliGRAM(s) Oral before breakfast  predniSONE   Tablet 40 milliGRAM(s) Oral daily  tamsulosin 0.4 milliGRAM(s) Oral at bedtime      Objective:  Last 24-Vital Signs Last 24 Hrs  T(C): 36.7 (22 Dec 2019 12:00), Max: 38.3 (21 Dec 2019 13:00)  T(F): 98 (22 Dec 2019 12:00), Max: 100.9 (21 Dec 2019 13:00)  HR: 69 (22 Dec 2019 12:23) (66 - 160)  BP: 89/53 (22 Dec 2019 12:00) (70/42 - 150/70)  BP(mean): 64 (22 Dec 2019 12:00) (51 - 101)  RR: 27 (22 Dec 2019 12:00) (13 - 36)  SpO2: 98% (22 Dec 2019 12:23) (95% - 100%)    T(C): 36.7 (19 @ 12:00), Max: 40.4 (19 @ 19:21)  T(F): 98 (19 @ 12:00), Max: 104.7 (19 @ 19:21)  T(C): 36.7 (19 @ 12:00), Max: 40.4 (19 @ 19:21)  T(F): 98 (19 @ 12:00), Max: 104.7 (19 @ 19:21)  T(C): 36.7 (19 @ 12:00), Max: 40.4 (19 @ 19:21)  T(F): 98 (19 @ 12:00), Max: 104.7 (19 @ 19:21)    PHYSICAL EXAM:  Constitutional: Well-developed, well nourished  Eyes: PERRLA, EOMI  Ear/Nose/Throat: oropharynx normal	  Neck: no JVD, no lymphadenopathy, supple  Respiratory: no accessory muscle use, lung fields with few ronchi  Cardiovascular: RRR, normal S1, S2 no m/r/g  Gastrointestinal: soft, NT, no HSM, BS-normal  Extremities: no clubbing, no cyanosis, edema absent  Neuro: patient alert, oriented and appropriate  Skin: no sig lesions      LABS:                        13.4   21.93 )-----------( 112      ( 22 Dec 2019 05:26 )             39.0       WBC 21.93   @ 05:26  WBC 18.44   @ 05:21  WBC 3.90   @ 19:23          144  |  114<H>  |  20  ----------------------------<  224<H>  4.0   |  23  |  1.00    Ca    8.2<L>      22 Dec 2019 05:26  Phos  1.8       Mg     2.7         TPro  5.4<L>  /  Alb  2.6<L>  /  TBili  0.8  /  DBili  x   /  AST  59<H>  /  ALT  41  /  AlkPhos  76        Creatinine, Serum: 1.00 mg/dL (19 @ 05:26)  Creatinine, Serum: 1.30 mg/dL (19 @ 05:21)  Creatinine, Serum: 1.40 mg/dL (19 @ 19:23)        Urinalysis Basic - ( 21 Dec 2019 00:56 )    Color: Yellow / Appearance: Slightly Turbid / S.010 / pH: x  Gluc: x / Ketone: Negative  / Bili: Negative / Urobili: Negative   Blood: x / Protein: 25 mg/dL / Nitrite: Negative   Leuk Esterase: Small / RBC: 0-2 /HPF / WBC 26-50   Sq Epi: x / Non Sq Epi: Negative / Bacteria: Few            MICROBIOLOGY:              RADIOLOGY & ADDITIONAL STUDIES:

## 2019-12-22 NOTE — PROGRESS NOTE ADULT - SUBJECTIVE AND OBJECTIVE BOX
Interval events: Pt remained intubated overnight. In AM, lung sounds clear with good air movement so began SBT and pt doing well, extubated successfully. Pt went into AFib with RVR in AM s/p lopressor 5mg IVP x2 and cardizem 10mg IVP x1, resolved post-extubation. Now in NSR. De Jesus placed for urinary retention.     Review of Systems:  Constitutional: no fever, chills, fatigue  Neuro: no headache, numbness, weakness  Resp: no cough, wheezing, shortness of breath  CVS: no chest pain, palpitations, leg swelling  GI: no abdominal pain, nausea, vomiting, diarrhea   : no dysuria, frequency, incontinence  Skin: no itching, burning, rashes, or lesions   Msk: no joint pain or swelling  Psych: no depression, anxiety    T(F): 99.3 (19 @ 10:00), Max: 103 (19 @ 12:30)  HR: 73 (19 @ 10:30) (66 - 160)  BP: 88/53 (19 @ 10:30) (70/42 - 150/70)  RR: 36 (19 @ 10:30) (13 - 36)  SpO2: 95% (19 @ 10:30) (95% - 100%)  Wt(kg): --    Mode: 40% V/M    CAPILLARY BLOOD GLUCOSE      POCT Blood Glucose.: 104 mg/dL (20 Dec 2019 22:27)    I&O's Summary    21 Dec 2019 07:  -  22 Dec 2019 07:00  --------------------------------------------------------  IN: 1337.9 mL / OUT: 2920 mL / NET: -1582.1 mL    22 Dec 2019 07:  -  22 Dec 2019 10:56  --------------------------------------------------------  IN: 306.5 mL / OUT: 450 mL / NET: -143.5 mL        Physical Exam:     Gen: nad  CV: rrr, no murmurs  Pulm: clear lungs b/l w/o wheeze/rhonchi  GI: soft, nt, nd  Ext: no edema  Skin: no rash    Meds:  aspirin  chewable 81 milliGRAM(s) Oral daily  enoxaparin Injectable 40 milliGRAM(s) SubCutaneous daily  ertapenem  IVPB      ertapenem  IVPB 1000 milliGRAM(s) IV Intermittent every 24 hours  norepinephrine Infusion 0.05 MICROgram(s)/kG/Min IV Continuous <Continuous>  tamsulosin 0.4 milliGRAM(s) Oral at bedtime  atorvastatin 40 milliGRAM(s) Oral at bedtime  predniSONE   Tablet 40 milliGRAM(s) Oral daily  ALBUTerol    0.083% 2.5 milliGRAM(s) Nebulizer every 4 hours PRN  albuterol/ipratropium for Nebulization 3 milliLiter(s) Nebulizer every 4 hours  albuterol/ipratropium for Nebulization. 3 milliLiter(s) Nebulizer once  budesonide 160 MICROgram(s)/formoterol 4.5 MICROgram(s) Inhaler 2 Puff(s) Inhalation two times a day  acetaminophen   Tablet .. 975 milliGRAM(s) Oral every 8 hours PRN  pantoprazole    Tablet 40 milliGRAM(s) Oral before breakfast  lactobacillus acidophilus 1 Tablet(s) Oral two times a day                                13.4   21.93 )-----------( 112      ( 22 Dec 2019 05:26 )             39.0     Bands 27.0    12-    144  |  114<H>  |  20  ----------------------------<  224<H>  4.0   |  23  |  1.00    Ca    8.2<L>      22 Dec 2019 05:26  Phos  1.8     12-  Mg     2.7     -    TPro  5.4<L>  /  Alb  2.6<L>  /  TBili  0.8  /  DBili  x   /  AST  59<H>  /  ALT  41  /  AlkPhos  76  12-21            Urinalysis Basic - ( 21 Dec 2019 00:56 )    Color: Yellow / Appearance: Slightly Turbid / S.010 / pH: x  Gluc: x / Ketone: Negative  / Bili: Negative / Urobili: Negative   Blood: x / Protein: 25 mg/dL / Nitrite: Negative   Leuk Esterase: Small / RBC: 0-2 /HPF / WBC 26-50   Sq Epi: x / Non Sq Epi: Negative / Bacteria: Few      .Blood Blood-Peripheral   Growth in aerobic bottle: Gram Negative Rods  Growth in anaerobic bottle: Gram Negative Rods  "Due to technical problems, Proteus sp. will Not be reported as part of  the BCID panel until further notice"  ***Blood Panel PCR results on this specimenare available  approximately 3 hours after the Gram stain result.***  Gram stain, PCR, and/or culture results may not always  correspond due to difference in methodologies.  ************************************************************  This PCR assaywas performed using [email or web address].  The following targets are tested for: Enterococcus,  vancomycin resistant enterococci, Listeria monocytogenes,  coagulase negative staphylococci, S. aureus,  methicillin resistant S. aureus, Streptococcus agalactiae  (Group B), S. pneumoniae, S. pyogenes (Group A),  Acinetobacter baumannii, Enterobacter cloacae, E. coli,  Klebsiella oxytoca, K. pneumoniae, Proteus sp.,  Serratia marcescens, Haemophilus influenzae,  Neisseria meningitidis, Pseudomonas aeruginosa, Candida  albicans, C. glabrata, C krusei, C parapsilosis,  C. tropicalis and the KPC resistance gene.   Growth in aerobic bottle: Gram Negative Rods  Growth in anaerobic bottle: Gram Negative Rods  @ 00:55      Rapid RVP Result: NotDetec ( @ 10:12)    ABG - ( 22 Dec 2019 06:17 )  pH, Arterial: 7.41  pH, Blood: x     /  pCO2: 35    /  pO2: 86    / HCO3: 23    / Base Excess: -2.2  /  SaO2: 96          Bedside Lung U/S: + lung sliding with A-line predominance b/l with trace b/l pleural effusion, no consolidations       CENTRAL LINE: N     DE JESUS: Y      DATE INSERTED: 19       REMOVE: N  A-LINE: N        GLOBAL ISSUE/BEST PRACTICE:  Analgesia: tylenol prn  Sedation: n/a  CAM-ICU: negative  HOB elevation: yes  Stress ulcer prophylaxis: n/a  VTE prophylaxis: lovenox  Glycemic control: n/a  Nutrition: DASH/TLC    CODE STATUS: Full code

## 2019-12-22 NOTE — PROGRESS NOTE ADULT - SUBJECTIVE AND OBJECTIVE BOX
In Brief:  74M with PMHx of nephrolithiasis, gerd, hld, htn, cad s/p stents x5, asthma with multiple hospitalizations (never been intubated), copd, right lung tumor s/p vats with resection, with recent prostate bx who presented to ED with c/o sob, chills, weakness, vomiting x1. Pt admitted with sepsis due to aspiration pna vs prostatitis. Rapid Response called overhead due to hypotension to 80's, with O2 saturation to 91%, + expiratory wheezing. Pt placed on VM with improvement in O2 saturation. Additional crystalloid bolus ordered. Pt on zosyn empirically. ICU consulted for further evaluation    24 hour events: Pt seen and examined at bedside. Chart/labs reviewed. Pt emergently intubated 2/2 acute bronchospasm with status asthmaticus. Pt started on pressor support. Culture revealed E. Coli bacteremia. Abx changed to ertapenem. Sedated on propofol with prn versed    PAST MEDICAL & SURGICAL HISTORY:  Nephrolithiasis  GERD (gastroesophageal reflux disease)  HLD (hyperlipidemia)  HTN (hypertension)  Stented coronary artery  Asthma  Chronic obstructive pulmonary disease: Asthma  S/P primary angioplasty with coronary stent  Lung tumor: Rt Lung tumor resection,benign      Review of Systems:  Unable to obtain    T(F): 98.1 (19 @ 23:00), Max: 103 (19 @ 12:30)  HR: 69 (19 @ 23:11) (67 - 118)  BP: 126/65 (19 @ 23:00) (75/43 - 150/70)  RR: 26 (19 @ 23:00) (18 - 36)  SpO2: 97% (19 @ 23:11) (94% - 100%)  Wt(kg): --    Mode: AC/ CMV (Assist Control/ Continuous Mandatory Ventilation), RR (machine): 26, TV (machine): 500, FiO2: 30, PEEP: 5    CAPILLARY BLOOD GLUCOSE      POCT Blood Glucose.: 104 mg/dL (20 Dec 2019 22:27)      I&O's Summary    20 Dec 2019 07:01  -  21 Dec 2019 07:00  --------------------------------------------------------  IN: 120 mL / OUT: 200 mL / NET: -80 mL    21 Dec 2019 07:01  -  22 Dec 2019 00:33  --------------------------------------------------------  IN: 894.5 mL / OUT: 1820 mL / NET: -925.5 mL        Physical Exam:     Gen: sedated  Neuro: intubated and sedated  HEENT: NC/AT  Resp: expiratory wheeze  CVS: nl S1/S2, RRR  Abd: soft, nt, nd, +bs  Ext: no edema, +pulses  Skin: well perfused, warm      Meds:    ertapenem  IVPB   ertapenem  IVPB IV Intermittent  norepinephrine Infusion IV Continuous  tamsulosin Oral  atorvastatin Oral  predniSONE   Tablet Oral  ALBUTerol    0.083% Nebulizer PRN  albuterol/ipratropium for Nebulization Nebulizer  acetaminophen    Suspension .. Oral PRN  midazolam Injectable IV Push PRN  propofol Infusion IV Continuous  aspirin  chewable Oral  enoxaparin Injectable SubCutaneous  pantoprazole  Injectable IV Push  chlorhexidine 0.12% Liquid Oral Mucosa  lactobacillus acidophilus Oral                            13.0   18.44 )-----------( 108      ( 21 Dec 2019 05:21 )             38.6     Bands 29.0        141  |  111<H>  |  23  ----------------------------<  98  4.4   |  23  |  1.30    Ca    7.5<L>      21 Dec 2019 05:21    TPro  5.4<L>  /  Alb  2.6<L>  /  TBili  0.8  /  DBili  x   /  AST  59<H>  /  ALT  41  /  AlkPhos  76  12            Urinalysis Basic - ( 21 Dec 2019 00:56 )    Color: Yellow / Appearance: Slightly Turbid / S.010 / pH: x  Gluc: x / Ketone: Negative  / Bili: Negative / Urobili: Negative   Blood: x / Protein: 25 mg/dL / Nitrite: Negative   Leuk Esterase: Small / RBC: 0-2 /HPF / WBC 26-50   Sq Epi: x / Non Sq Epi: Negative / Bacteria: Few      .Blood Blood-Peripheral   Growth in aerobic bottle: Gram Negative Rods  Growth in anaerobic bottle: Gram Negative Rods  "Due to technical problems, Proteus sp. will Not be reported as part of  the BCID panel until further notice"  ***Blood Panel PCR results on this specimenare available  approximately 3 hours after the Gram stain result.***  Gram stain, PCR, and/or culture results may not always  correspond due to difference in methodologies.  ************************************************************  This PCR assaywas performed using [employer].  The following targets are tested for: Enterococcus,  vancomycin resistant enterococci, Listeria monocytogenes,  coagulase negative staphylococci, S. aureus,  methicillin resistant S. aureus, Streptococcus agalactiae  (Group B), S. pneumoniae, S. pyogenes (Group A),  Acinetobacter baumannii, Enterobacter cloacae, E. coli,  Klebsiella oxytoca, K. pneumoniae, Proteus sp.,  Serratia marcescens, Haemophilus influenzae,  Neisseria meningitidis, Pseudomonas aeruginosa, Candida  albicans, C. glabrata, C krusei, C parapsilosis,  C. tropicalis and the KPC resistance gene.   Growth in aerobic bottle: Gram Negative Rods  Growth in anaerobic bottle: Gram Negative Rods  @ 00:55      Rapid RVP Result: NotDetec ( @ 10:12)      CXR:    PROCEDURE DATE: 2019         INTERPRETATION: Portable chest radiograph     CLINICAL INFORMATION: ET tube placement     TECHNIQUE: Single portable frontal AP view of the chest was obtained.     FINDINGS: The examination of 2019 is available for review.     The lungs are free of consolidation. No pleural abnormality is seen.     The heart and mediastinum appear intact.     No destructive lesion is seen in the visualized skeleton.     The tip of the endotracheal tube is projecting over the trachea, well above   the scotty. The distal NG tube is projecting over the upper abdomen. The tip   of the NG tube is not included on this examination.       IMPRESSION: No evidence of active chest disease.           CENTRAL LINE: no    DE JESUS: yes    A-LINE: no    GLOBAL ISSUE/BEST PRACTICE:  Analgesia: no  Sedation: yes  HOB elevation: yes  Stress ulcer prophylaxis: yes  VTE prophylaxis: yes  Glycemic control: yes  Nutrition: TF      CODE STATUS: Full  GOC discussion: Y  Critical care time spent (mins): 39  (Reviewing data, imaging, discussing with multidisciplinary team, non inclusive of procedures, discussing goals of care with patient/family)

## 2019-12-22 NOTE — PROGRESS NOTE ADULT - PROBLEM SELECTOR PLAN 3
Ac Retention 2/2 sedation  Vs recent prostate Biopsy   -H/O BPH   -Liz cath in place , TOV as per ICU  Will d/w Dr Machado

## 2019-12-22 NOTE — PROGRESS NOTE ADULT - PROBLEM SELECTOR PLAN 2
2/2 Acute bronchospasm,  Acute hypercapnic respiratory failure, status asthmaticus likely secondary to SIRS response, RVP -NEG  -S/P IV Solumedrol, now on PO Prednisone 40 mg 1 tab daily with PPI  -On Duo nebs , Spiriva 1 cap Inh Daily,  Budesonide 2x day, O2 2lit NC  -CT Chest done -No PNA , ? Aspiration with Vomiting at home  -. CXR without focal infiltrate. Failed trial of HFNC/NIV,   - s/p intubation, Ketamine, serial nebs, IV steroids, and Mg.  -S/P Extubation on 12/22,

## 2019-12-22 NOTE — PROGRESS NOTE ADULT - ASSESSMENT
73 YO M with PMHX asthma, COPD, former smoker, hx of benign lung cancer R lobe surgically removed at [facility]) CAD (s/p 5 stents ~2001), GERD, HLD, HTN presents after shakes, vomiting, confusion following prostate biopsy 3 days prior to admission and now with E coli bacteremia.    Pt improving and ready to be downgraded per ICU staff then had bronchospasm event and is now intubated.

## 2019-12-22 NOTE — PROGRESS NOTE ADULT - SUBJECTIVE AND OBJECTIVE BOX
[facility] Cardiology Progress Note [phone] (Dr. Degroot, Waqar, Joe, Nga)    CHIEF COMPLAINT: Patient is a 74y old  Male who presents with a chief complaint of Fever (22 Dec 2019 00:32)      Follow Up Today: The patient denies any chest discomfort or shortness of breath.Pt emergently intubated 2/2 acute bronchospasm with status asthmaticus. Pt started on pressor support. Culture revealed E. Coli bacteremia. Abx changed to ertapenem. Sedated on propofol with prn versed    HPI:  75 YO M with PMHX asthma, COPD, former smoker, hx of benign lung cancer R lobe surgically removed at [facility]) CAD (s/p 5 stents ~2001), GERD, HLD, HTN presents after shakes, vomiting, confusion. Pt states he has prostate biopsy 3 days ago, started on antibiotics, the next day developed shaking chills, then became slightly SOB, used a nebulizer which helped his shortness of breath. Pt saw his pulmonologist yesterday, as per pt pulmonologist states everything was ok from a pulmonary standpoint and to continue using his nebulizer. Today pt developed shivering shaking chills again, NBNR emesis, confusion. Pt's wife found him confused, came to the ER for confusion. Of note, pt states he had a cough with sputum production last week. Pt denies fevers at home or sick contacts.     In the ED: Vitals sig for temp of 104.7 F. Labs sig for Plt of 114, bands of 29, BUN 27, Cr 1.40, glucose 124, alk phos 128, AST 39, eGFR 49, lactate 2.8, FLU A, B, RSV neg. CXR: No acute infiltrate. CT head: No acute intracranial bleeding, mass effect, or shift. Mild chronic microvascular ischemic changes. CT A/P: Right middle lobe and right lower lobe groundglass opacities with underlying reticular changes, likely chronic. Recommend follow-up to ensure stability or resolution and exclude acute pneumonia. Mild constipation. No bowel obstruction. Enlarged prostate. Nonspecific bilateral perinephric inflammatory stranding. Nonobstructing left renal calculi. Given: Rocephin x1, Azithromycin x1,  Zosyn x1,  ns bolus x3, tylenol x1, Duo neb x1. Of note, RRT was called for hypotension with BP of 63/48. Patient was seen by RRT team and ICU PA. Was given normal saline bolus x2 and midodrine 10mg x1 with improvement in BP of 105/56 (20 Dec 2019 21:52)      PAST MEDICAL & SURGICAL HISTORY:  Nephrolithiasis  GERD (gastroesophageal reflux disease)  HLD (hyperlipidemia)  HTN (hypertension)  Stented coronary artery  Asthma  Chronic obstructive pulmonary disease: Asthma  S/P primary angioplasty with coronary stent  Lung tumor: Rt Lung tumor resection,benign      MEDICATIONS  (STANDING):  albuterol/ipratropium for Nebulization 3 milliLiter(s) Nebulizer every 4 hours  aspirin  chewable 81 milliGRAM(s) Oral daily  atorvastatin 40 milliGRAM(s) Oral at bedtime  chlorhexidine 0.12% Liquid 15 milliLiter(s) Oral Mucosa every 12 hours  enoxaparin Injectable 40 milliGRAM(s) SubCutaneous daily  ertapenem  IVPB      ertapenem  IVPB 1000 milliGRAM(s) IV Intermittent every 24 hours  lactobacillus acidophilus 1 Tablet(s) Oral two times a day  metoprolol tartrate Injectable 5 milliGRAM(s) IV Push once  norepinephrine Infusion 0.05 MICROgram(s)/kG/Min (7.781 mL/Hr) IV Continuous <Continuous>  pantoprazole  Injectable 40 milliGRAM(s) IV Push daily  potassium phosphate IVPB 30 milliMole(s) IV Intermittent once  predniSONE   Tablet 40 milliGRAM(s) Oral daily  propofol Infusion 30 MICROgram(s)/kG/Min (14.94 mL/Hr) IV Continuous <Continuous>  tamsulosin 0.4 milliGRAM(s) Oral at bedtime    MEDICATIONS  (PRN):  acetaminophen    Suspension .. 650 milliGRAM(s) Oral every 6 hours PRN Temp greater or equal to 38C (100.4F), Mild Pain (1 - 3)  ALBUTerol    0.083% 2.5 milliGRAM(s) Nebulizer every 4 hours PRN Shortness of Breath and/or Wheezing  midazolam Injectable 2 milliGRAM(s) IV Push every 4 hours PRN Agitation or Anxiety      Allergies    No Known Allergies    Intolerances        REVIEW OF SYSTEMS:    All other review of systems is negative unless indicated above    Vital Signs Last 24 Hrs  T(C): 37.1 (22 Dec 2019 04:30), Max: 39.4 (21 Dec 2019 12:30)  T(F): 98.8 (22 Dec 2019 04:30), Max: 103 (21 Dec 2019 12:30)  HR: 66 (22 Dec 2019 07:00) (66 - 118)  BP: 117/59 (22 Dec 2019 07:00) (75/43 - 150/70)  BP(mean): 82 (22 Dec 2019 07:00) (55 - 101)  RR: 26 (22 Dec 2019 07:00) (18 - 36)  SpO2: 96% (22 Dec 2019 07:00) (94% - 100%)    I&O's Summary    21 Dec 2019 07:01  -  22 Dec 2019 07:00  --------------------------------------------------------  IN: 1337.9 mL / OUT: 2920 mL / NET: -1582.1 mL        PHYSICAL EXAM:    Constitutional: NAD, awake and alert, well-developed  Eyes:  EOMI,  Pupils round, No oral cyanosis.  HEENT: No exudate or erythema  Pulmonary: Non-labored, breath sounds are clear bilaterally, No wheezing, rales or rhonchi  Cardiovascular: Regular, S1 and S2, No murmurs, rubs, gallops oir clicks  Gastrointestinal: Bowel Sounds present, soft, nontender.   Ext: No significant LE edema with good pulses x 4  Neurological: Alert, no gross focal motor deficits  Skin: No rashes.  Psych:  Mood & affect appropriate    LABS: All Labs Reviewed:                        13.4   21.93 )-----------( 112      ( 22 Dec 2019 05:26 )             39.0                         13.0   18.44 )-----------( 108      ( 21 Dec 2019 05:21 )             38.6                         14.8   3.90  )-----------( 114      ( 20 Dec 2019 19:23 )             42.6     22 Dec 2019 05:26    144    |  114    |  20     ----------------------------<  224    4.0     |  23     |  1.00   21 Dec 2019 05:21    141    |  111    |  23     ----------------------------<  98     4.4     |  23     |  1.30   20 Dec 2019 19:23    139    |  107    |  27     ----------------------------<  124    4.0     |  23     |  1.40     Ca    8.2        22 Dec 2019 05:26  Ca    7.5        21 Dec 2019 05:21  Ca    8.8        20 Dec 2019 19:23  Phos  1.8       22 Dec 2019 05:26  Mg     2.7       22 Dec 2019 05:26    TPro  5.4    /  Alb  2.6    /  TBili  0.8    /  DBili  x      /  AST  59     /  ALT  41     /  AlkPhos  76     21 Dec 2019 05:21  TPro  6.4    /  Alb  3.3    /  TBili  1.1    /  DBili  x      /  AST  39     /  ALT  38     /  AlkPhos  128    20 Dec 2019 19:23          Blood Culture: Organism Blood Culture PCR  Gram Stain Blood -- Gram Stain   Growth in aerobic bottle: Gram Negative Rods  Growth in anaerobic bottle: Gram Negative Rods  Specimen Source .Blood Blood-Peripheral  Culture-Blood --            RADIOLOGY/EKG:    Attending Attestation:   20 minutes spent on total encounter; more than 50% of the visit was spent counseling and/or coordinating care by the attending physician.     ASSESSMENT AND PLAN [facility] Progress Note [phone] (Dr. Degroot, Waqar, Joe, Nga)    CHIEF COMPLAINT: Patient is a 74y old  Male who presents with a chief complaint of Fever (22 Dec 2019 00:32)      Follow Up Today: The patient is seen in ICU intubated sedated.  Pt emergently intubated 2/2 acute bronchospasm with status asthmaticus. Pt started on pressor support. Culture revealed E. Coli bacteremia. Abx changed to ertapenem. Sedated on propofol with prn versed        PAST MEDICAL & SURGICAL HISTORY:  Nephrolithiasis  GERD (gastroesophageal reflux disease)  HLD (hyperlipidemia)  HTN (hypertension)  Stented coronary artery  Asthma  Chronic obstructive pulmonary disease: Asthma  S/P primary angioplasty with coronary stent  Lung tumor: Rt Lung tumor resection,benign      MEDICATIONS  (STANDING):  albuterol/ipratropium for Nebulization 3 milliLiter(s) Nebulizer every 4 hours  aspirin  chewable 81 milliGRAM(s) Oral daily  atorvastatin 40 milliGRAM(s) Oral at bedtime  chlorhexidine 0.12% Liquid 15 milliLiter(s) Oral Mucosa every 12 hours  enoxaparin Injectable 40 milliGRAM(s) SubCutaneous daily  ertapenem  IVPB      ertapenem  IVPB 1000 milliGRAM(s) IV Intermittent every 24 hours  lactobacillus acidophilus 1 Tablet(s) Oral two times a day  metoprolol tartrate Injectable 5 milliGRAM(s) IV Push once  norepinephrine Infusion 0.05 MICROgram(s)/kG/Min (7.781 mL/Hr) IV Continuous <Continuous>  pantoprazole  Injectable 40 milliGRAM(s) IV Push daily  potassium phosphate IVPB 30 milliMole(s) IV Intermittent once  predniSONE   Tablet 40 milliGRAM(s) Oral daily  propofol Infusion 30 MICROgram(s)/kG/Min (14.94 mL/Hr) IV Continuous <Continuous>  tamsulosin 0.4 milliGRAM(s) Oral at bedtime    MEDICATIONS  (PRN):  acetaminophen    Suspension .. 650 milliGRAM(s) Oral every 6 hours PRN Temp greater or equal to 38C (100.4F), Mild Pain (1 - 3)  ALBUTerol    0.083% 2.5 milliGRAM(s) Nebulizer every 4 hours PRN Shortness of Breath and/or Wheezing  midazolam Injectable 2 milliGRAM(s) IV Push every 4 hours PRN Agitation or Anxiety      Allergies    No Known Allergies    Intolerances        REVIEW OF SYSTEMS:    All other review of systems is negative unless indicated above    Vital Signs Last 24 Hrs  T(C): 37.1 (22 Dec 2019 04:30), Max: 39.4 (21 Dec 2019 12:30)  T(F): 98.8 (22 Dec 2019 04:30), Max: 103 (21 Dec 2019 12:30)  HR: 66 (22 Dec 2019 07:00) (66 - 118)  BP: 117/59 (22 Dec 2019 07:00) (75/43 - 150/70)  BP(mean): 82 (22 Dec 2019 07:00) (55 - 101)  RR: 26 (22 Dec 2019 07:00) (18 - 36)  SpO2: 96% (22 Dec 2019 07:00) (94% - 100%)    I&O's Summary    21 Dec 2019 07:01  -  22 Dec 2019 07:00  --------------------------------------------------------  IN: 1337.9 mL / OUT: 2920 mL / NET: -1582.1 mL        PHYSICAL EXAM:    Constitutional: NAD, awake and alert, well-developed  Eyes:  EOMI,  Pupils round, No oral cyanosis.  HEENT: No exudate or erythema  Pulmonary: Non-labored, breath sounds are clear bilaterally, No wheezing, rales or rhonchi  Cardiovascular: Rapid irregular, S1 and S2, Sys murmur  Gastrointestinal: Bowel Sounds present, soft, nontender.   Ext: No significant LE edema  Neurological: Alert, no gross focal motor deficits  Skin: No rashes.  Psych:  Mood & affect appropriate    LABS: All Labs Reviewed:                        13.4   21.93 )-----------( 112      ( 22 Dec 2019 05:26 )             39.0                         13.0   18.44 )-----------( 108      ( 21 Dec 2019 05:21 )             38.6                         14.8   3.90  )-----------( 114      ( 20 Dec 2019 19:23 )             42.6     22 Dec 2019 05:26    144    |  114    |  20     ----------------------------<  224    4.0     |  23     |  1.00   21 Dec 2019 05:21    141    |  111    |  23     ----------------------------<  98     4.4     |  23     |  1.30   20 Dec 2019 19:23    139    |  107    |  27     ----------------------------<  124    4.0     |  23     |  1.40     Ca    8.2        22 Dec 2019 05:26  Ca    7.5        21 Dec 2019 05:21  Ca    8.8        20 Dec 2019 19:23  Phos  1.8       22 Dec 2019 05:26  Mg     2.7       22 Dec 2019 05:26    TPro  5.4    /  Alb  2.6    /  TBili  0.8    /  DBili  x      /  AST  59     /  ALT  41     /  AlkPhos  76     21 Dec 2019 05:21  TPro  6.4    /  Alb  3.3    /  TBili  1.1    /  DBili  x      /  AST  39     /  ALT  38     /  AlkPhos  128    20 Dec 2019 19:23          Blood Culture: Organism Blood Culture PCR  Gram Stain Blood -- Gram Stain   Growth in aerobic bottle: Gram Negative Rods  Growth in anaerobic bottle: Gram Negative Rods  Specimen Source .Blood Blood-Peripheral  Culture-Blood --            RADIOLOGY/EKG:    Attending Attestation:   20 minutes spent on total encounter; more than 50% of the visit was spent counseling and/or coordinating care by the attending physician.     ASSESSMENT AND PLAN

## 2019-12-22 NOTE — PROGRESS NOTE ADULT - SUBJECTIVE AND OBJECTIVE BOX
Patient is a 74y old  Male who presents with a chief complaint of Fever (22 Dec 2019 12:32)      INTERVAL HPI:  73 YO M with PMHX asthma, COPD, former smoker, hx of benign lung cancer R lobe surgically removed at [facility]) CAD (s/p 5 stents ~), GERD, HLD, HTN presents after shakes, vomiting, confusion. Pt states he has prostate biopsy 3 days ago, started on antibiotics, the next day developed shaking chills, then became slightly SOB, used a nebulizer which helped his shortness of breath. Pt saw his pulmonologist yesterday, as per pt pulmonologist states everything was ok from a pulmonary standpoint and to continue using his nebulizer. Today pt developed shivering shaking chills again, NBNR emesis, confusion. Pt's wife found him confused, came to the ER for confusion. Of note, pt states he had a cough with sputum production last week. Pt denies fevers at home or sick contacts.     In the ED: Vitals sig for temp of 104.7 F. Labs sig for Plt of 114, bands of 29, BUN 27, Cr 1.40, glucose 124, alk phos 128, AST 39, eGFR 49, lactate 2.8, FLU A, B, RSV neg. CXR: No acute infiltrate. CT head: No acute intracranial bleeding, mass effect, or shift. Mild chronic microvascular ischemic changes. CT A/P: Right middle lobe and right lower lobe groundglass opacities with underlying reticular changes, likely chronic. Recommend follow-up to ensure stability or resolution and exclude acute pneumonia. Mild constipation. No bowel obstruction. Enlarged prostate. Nonspecific bilateral perinephric inflammatory stranding. Nonobstructing left renal calculi. Given: Rocephin x1, Azithromycin x1,  Zosyn x1,  ns bolus x3, tylenol x1, Duo neb x1. Of note, RRT was called for hypotension with BP of 63/48. Patient was seen by RRT team and ICU PA. Was given normal saline bolus x2 and midodrine 10mg x1 with improvement in BP of 105/56    19: Pt seen, Examined, S/P  Intubation now with ac bronchospasm with  Hypoxia , ac respiratory failure , Pt with septic shock 2/2 GNR, E. Coli sepsis , seen by ID Dr Marc, IV Abx changed to Invanz ,S/P Vasopressors IV, Leukocytosis with Bandemia, & Low platelets 2/2 sepsis, pt got IV Solumedrol 125 mg x 1 dose, seen by cardiology Dr Degroot, RVP -NEG          OVERNIGHT EVENTS:    Home Medications:  Aspir 81 81 mg oral tablet: 1 tab(s) orally once a day (20 Dec 2019 22:44)  atorvastatin 40 mg oral tablet: 1 tab(s) orally once a day (at bedtime) (20 Dec 2019 22:44)  budesonide 0.5 mg/2 mL inhalation suspension: 2 milliliter(s) inhaled 2 times a day, As Needed (20 Dec 2019 22:44)  DuoNeb 0.5 mg-2.5 mg/3 mL inhalation solution: 1 milliliter(s) inhaled every 6 hours (20 Dec 2019 22:44)  losartan 25 mg oral tablet: 1 tab(s) orally once a day (20 Dec 2019 22:44)  predniSONE 5 mg oral tablet: 1 tab(s) orally once a day, As Needed (20 Dec 2019 22:44)  Protonix 40 mg oral delayed release tablet: 1 tab(s) orally once a day (20 Dec 2019 22:44)  silodosin 8 mg oral capsule: 1 cap(s) orally once a day (20 Dec 2019 22:44)  Spiriva 18 mcg inhalation capsule: 1  inhaled once a day (20 Dec 2019 22:44)  Symbicort 160 mcg-4.5 mcg/inh inhalation aerosol: 2  inhaled 2 times a day (20 Dec 2019 22:44)  Ventolin HFA 90 mcg/inh inhalation aerosol: 2 puff(s) inhaled 4 times a day, As Needed (20 Dec 2019 22:44)  Vitamin B-12 500 mcg oral tablet:  orally  (20 Dec 2019 22:44)  Vitamin B6 100 mg oral tablet: 1 tab(s) orally once a day (20 Dec 2019 22:44)  Vitamin D: 1000 international unit(s) orally once a day (20 Dec 2019 22:44)      MEDICATIONS  (STANDING):  albuterol/ipratropium for Nebulization 3 milliLiter(s) Nebulizer every 4 hours  albuterol/ipratropium for Nebulization. 3 milliLiter(s) Nebulizer once  aspirin  chewable 81 milliGRAM(s) Oral daily  atorvastatin 40 milliGRAM(s) Oral at bedtime  budesonide 160 MICROgram(s)/formoterol 4.5 MICROgram(s) Inhaler 2 Puff(s) Inhalation two times a day  enoxaparin Injectable 40 milliGRAM(s) SubCutaneous daily  ertapenem  IVPB      ertapenem  IVPB 1000 milliGRAM(s) IV Intermittent every 24 hours  lactobacillus acidophilus 1 Tablet(s) Oral two times a day  midodrine 10 milliGRAM(s) Oral every 8 hours  norepinephrine Infusion 0.05 MICROgram(s)/kG/Min (7.781 mL/Hr) IV Continuous <Continuous>  pantoprazole    Tablet 40 milliGRAM(s) Oral before breakfast  predniSONE   Tablet 40 milliGRAM(s) Oral daily  tamsulosin 0.4 milliGRAM(s) Oral at bedtime    MEDICATIONS  (PRN):  acetaminophen   Tablet .. 975 milliGRAM(s) Oral every 8 hours PRN Temp greater or equal to 38C (100.4F), Mild Pain (1 - 3)  ALBUTerol    0.083% 2.5 milliGRAM(s) Nebulizer every 4 hours PRN Shortness of Breath and/or Wheezing      Allergies    No Known Allergies    Intolerances        Social History:  quit smoking 5 years ago, 50 pack years   drinks 1 glass of wine 1 x a month socially  lives with wife, performs all ADLs, ambulates on own (20 Dec 2019 21:52)      REVIEW OF SYSTEMS:  CONSTITUTIONAL: No fever, No chills, No fatigue, No myalgia, No Body ache, No Weakness  EYES: No eye pain,  No visual disturbances, No discharge, NO Redness  ENMT:  No ear pain, No nose bleed, No vertigo; No sinus pain, NO throat pain, No Congestion  NECK: No pain, No stiffness  RESPIRATORY: No cough, NO wheezing, No  hemoptysis, NO  shortness of breath  CARDIOVASCULAR: No chest pain, palpitations  GASTROINTESTINAL: No abdominal pain, NO epigastric pain. No nausea, No vomiting; No diarrhea, No constipation. [  ] BM  GENITOURINARY: No dysuria, No frequency, No urgency, No hematuria, NO incontinence  NEUROLOGICAL: No headaches, No dizziness, No numbness, No tingling, No tremors, No weakness  EXT: No Swelling, No Pain, No Edema  SKIN:  [  ] No itching, burning, rashes, or lesions   MUSCULOSKELETAL: No joint pain ,No Jt swelling; No muscle pain, No back pain, No extremity pain  PSYCHIATRIC: No depression,  No anxiety,  No mood swings ,No difficulty sleeping at night  PAIN SCALE: [  ] None  [  ] Other-  ROS Unable to obtain due to - [  ] Dementia  [  ] Lethargy [  ] Drowsy [  ] Sedated [  ] non verbal  REST OF REVIEW Of SYSTEM - [  ] Normal     Vital Signs Last 24 Hrs  T(C): 36.7 (22 Dec 2019 12:00), Max: 37.6 (21 Dec 2019 15:00)  T(F): 98 (22 Dec 2019 12:00), Max: 99.6 (21 Dec 2019 15:00)  HR: 74 (22 Dec 2019 13:00) (66 - 160)  BP: 109/51 (22 Dec 2019 13:00) (70/42 - 142/67)  BP(mean): 73 (22 Dec 2019 13:00) (51 - 97)  RR: 28 (22 Dec 2019 13:00) (13 - 36)  SpO2: 96% (22 Dec 2019 13:00) (95% - 100%)  Finger Stick         @ 07:  -   @ 07:00  --------------------------------------------------------  IN: 1337.9 mL / OUT: 2920 mL / NET: -1582.1 mL     @ 07:01  -   @ 13:39  --------------------------------------------------------  IN: 643.1 mL / OUT: 485 mL / NET: 158.1 mL        PHYSICAL EXAM:  GENERAL:  [  ] NAD , [  ] well appearing, [  ] Agitated, [  ] Drowsy,  [  ] Lethargy, [  ] confused   HEAD:  [  ] Normal, [  ] Other  EYES:  [  ] EOMI, [  ] PERRLA, [  ] conjunctiva and sclera clear normal, [  ] Other,  [  ] Pallor,[  ] Discharge  ENMT:  [  ] Normal, [  ] Moist mucous membranes, [  ] Good dentition, [  ] No Thrush  NECK:  [  ] Supple, [  ] No JVD, [  ] Normal thyroid, [  ] Lymphadenopathy [  ] Other  CHEST/LUNG:  [  ] Clear to auscultation bilaterally, [  ] Breath Sounds equal B/L / Decrease, [  ] poor effort  [  ] No rales, [  ] No rhonchi  [  ]  No wheezing,   HEART:  [  ] Regular rate and rhythm, [  ] tachycardia, [  ] Bradycardia,  [  ] irregular  [  ] No murmurs, No rubs, No gallops, [  ] PPM in place (Mfr:  )  ABDOMEN:  [  ] Soft, [  ] Nontender, [  ] Nondistended, [  ] No mass, [  ] Bowel sounds present, [  ] obese  NERVOUS SYSTEM:  [  ] Alert & Oriented X3, [  ] Nonfocal  [  ] Confusion  [  ] Encephalopathic [  ] Sedated [  ] Unable to assess, [  ] Dementia [  ] Other-  EXTREMITIES: [  ] 2+ Peripheral Pulses, No clubbing, No cyanosis,  [  ] edema B/L lower EXT. [  ] PVD stasis skin changes B/L Lower EXT, [  ] wound  LYMPH: No lymphadenopathy noted  SKIN:  [  ] No rashes or lesions, [  ] Pressure Ulcers, [  ] ecchymosis, [  ] Skin Tears, [  ] Other    DIET:     LABS:                        13.4   21.93 )-----------( 112      ( 22 Dec 2019 05:26 )             39.0     22 Dec 2019 05:26    144    |  114    |  20     ----------------------------<  224    4.0     |  23     |  1.00     Ca    8.2        22 Dec 2019 05:26  Phos  1.8       22 Dec 2019 05:26  Mg     2.7       22 Dec 2019 05:26        Urinalysis Basic - ( 21 Dec 2019 00:56 )    Color: Yellow / Appearance: Slightly Turbid / S.010 / pH: x  Gluc: x / Ketone: Negative  / Bili: Negative / Urobili: Negative   Blood: x / Protein: 25 mg/dL / Nitrite: Negative   Leuk Esterase: Small / RBC: 0-2 /HPF / WBC 26-50   Sq Epi: x / Non Sq Epi: Negative / Bacteria: Few        Culture Results:   <10,000 CFU/mL Normal Urogenital Yancy ( @ 10:54)  Culture Results:   Growth in aerobic and anaerobic bottles: Escherichia coli  "Due to technical problems, Proteus sp. will Not be reported as part of  the BCID panel until further notice"  ***Blood Panel PCR results on this specimen are available  approximately 3 hours after the Gram stain result.***  Gram stain, PCR, and/or culture results may not always  correspond due to difference in methodologies.  ************************************************************  This PCR assay was performed using [employer].  The following targets are tested for: Enterococcus,  vancomycin resistant enterococci, Listeria monocytogenes,  coagulase negative staphylococci, S. aureus,  methicillin resistant S. aureus, Streptococcus agalactiae  (Group B), S. pneumoniae, S.pyogenes (Group A),  Acinetobacter baumannii, Enterobacter cloacae, E. coli,  Klebsiella oxytoca, K. pneumoniae, Proteus sp.,  Serratia marcescens, Haemophilus influenzae,  Neisseria meningitidis, Pseudomonas aeruginosa, Candida  albicans, C. glabrata, C krusei, C parapsilosis,  C. tropicalis and the KPC resistance gene. ( @ 00:55)  Culture Results:   Growth in aerobic and anaerobic bottles: Escherichia coli ( @ 00:55)      culture blood  -- .Urine Clean Catch (Midstream)  @ 10:54    culture urine  --   @ 10:54  culture blood  -- .Blood Blood-Peripheral  00:55    culture urine  --   @ 00:55              Culture - Urine (collected 21 Dec 2019 10:54)  Source: .Urine Clean Catch (Midstream)  Final Report (22 Dec 2019 11:43):    <10,000 CFU/mL Normal Urogenital Yancy    Culture - Blood (collected 21 Dec 2019 00:55)  Source: .Blood Blood-Peripheral  Gram Stain (21 Dec 2019 10:39):    Growth in aerobic bottle: Gram Negative Rods    Growth in anaerobic bottle: Gram Negative Rods  Preliminary Report (22 Dec 2019 11:39):    Growth in aerobic and anaerobic bottles: Escherichia coli    Culture - Blood (collected 21 Dec 2019 00:55)  Source: .Blood Blood-Peripheral  Gram Stain (21 Dec 2019 10:01):    Growth in aerobic bottle: Gram Negative Rods    Growth in anaerobic bottle: Gram Negative Rods  Preliminary Report (22 Dec 2019 11:35):    Growth in aerobic and anaerobic bottles: Escherichia coli    "Due to technical problems, Proteus sp. will Not be reported as part of    the BCID panel until further notice"    ***Blood Panel PCR results on this specimen are available    approximately 3 hours after the Gram stain result.***    Gram stain, PCR, and/or culture results may not always    correspond due to difference in methodologies.    ************************************************************    This PCR assay was performed using [employer].    The following targets are tested for: Enterococcus,    vancomycin resistant enterococci, Listeria monocytogenes,    coagulase negative staphylococci, S. aureus,    methicillin resistant S. aureus, Streptococcus agalactiae    (Group B), S. pneumoniae, S.pyogenes (Group A),    Acinetobacter baumannii, Enterobacter cloacae, E. coli,    Klebsiella oxytoca, K. pneumoniae, Proteus sp.,    Serratia marcescens, Haemophilus influenzae,    Neisseria meningitidis, Pseudomonas aeruginosa, Candida    albicans, C. glabrata, C krusei, C parapsilosis,    C. tropicalis and the KPC resistance gene.  Organism: Blood Culture PCR (21 Dec 2019 11:13)  Organism: Blood Culture PCR (21 Dec 2019 11:13)         Anemia Panel:      Thyroid Panel:  Thyroid Stimulating Hormone, Serum: 0.55 uIU/mL (19 @ 05:26)        Lipase, Serum: 45 U/L (19 @ 19:23)          RADIOLOGY & ADDITIONAL TESTS:      HEALTH ISSUES - PROBLEM Dx:  Respiratory failure: Respiratory failure  Prostatitis, acute: Prostatitis, acute  Bacteremia due to Escherichia coli: Bacteremia due to Escherichia coli  BPH (benign prostatic hyperplasia): BPH (benign prostatic hyperplasia)  Hypotension: Hypotension  Prophylactic measure: Prophylactic measure  GERD (gastroesophageal reflux disease): GERD (gastroesophageal reflux disease)  HLD (hyperlipidemia): HLD (hyperlipidemia)  Stented coronary artery: Stented coronary artery  Chronic obstructive pulmonary disease: Chronic obstructive pulmonary disease  YEU (acute kidney injury): YUE (acute kidney injury)  Sepsis: Sepsis          Consultant(s) Notes Reviewed:  [  ] YES     Care Discussed with [X] Consultants  [  ] Patient  [  ] Family [  ] HCP [  ]   [  ] Social Service  [  ] RN, [  ] Physical Therapy,[  ] Palliative care team  DVT PPX: [  ] Lovenox, [  ] S C Heparin, [  ] Coumadin, [  ] Xarelto, [  ] Eliquis, [  ] Pradaxa, [  ] IV Heparin drip, [  ] SCD [  ] Contraindication 2 to GI Bleed,[  ] Ambulation [  ] Contraindicated 2 to  bleed [  ] Contraindicated 2 to Brain Bleed  Advanced directive: [  ] None, [  ] DNR/DNI Patient is a 74y old  Male who presents with a chief complaint of Fever (22 Dec 2019 12:32)      INTERVAL HPI:  75 YO M with PMHX asthma, COPD, former smoker, hx of benign lung cancer R lobe surgically removed at [facility]) CAD (s/p 5 stents ~), GERD, HLD, HTN presents after shakes, vomiting, confusion. Pt states he has prostate biopsy 3 days ago, started on antibiotics, the next day developed shaking chills, then became slightly SOB, used a nebulizer which helped his shortness of breath. Pt saw his pulmonologist yesterday, as per pt pulmonologist states everything was ok from a pulmonary standpoint and to continue using his nebulizer. Today pt developed shivering shaking chills again, NBNR emesis, confusion. Pt's wife found him confused, came to the ER for confusion. Of note, pt states he had a cough with sputum production last week. Pt denies fevers at home or sick contacts.     In the ED: Vitals sig for temp of 104.7 F. Labs sig for Plt of 114, bands of 29, BUN 27, Cr 1.40, glucose 124, alk phos 128, AST 39, eGFR 49, lactate 2.8, FLU A, B, RSV neg. CXR: No acute infiltrate. CT head: No acute intracranial bleeding, mass effect, or shift. Mild chronic microvascular ischemic changes. CT A/P: Right middle lobe and right lower lobe groundglass opacities with underlying reticular changes, likely chronic. Recommend follow-up to ensure stability or resolution and exclude acute pneumonia. Mild constipation. No bowel obstruction. Enlarged prostate. Nonspecific bilateral perinephric inflammatory stranding. Nonobstructing left renal calculi. Given: Rocephin x1, Azithromycin x1,  Zosyn x1,  ns bolus x3, tylenol x1, Duo neb x1. Of note, RRT was called for hypotension with BP of 63/48. Patient was seen by RRT team and ICU PA. Was given normal saline bolus x2 and midodrine 10mg x1 with improvement in BP of 105/56  -S/P RRT called for hypotension , several IV Fluid Boluses given, pt was upgraded to ICU for septic shock/ Hypoxia .      19: Pt seen, Examined, S/P  Intubation now with ac bronchospasm with  Hypoxia , ac respiratory failure , Pt with septic shock 2/2 GNR, E. Coli sepsis , seen by ID Dr Marc, IV Abx changed to Invanz ,S/P Vasopressors IV, Leukocytosis with Bandemia, & Low platelets 2/2 sepsis, pt got IV Solumedrol 125 mg x 1 dose, seen by cardiology Dr Degroot, RVP -NEG  19: Pt seen, Examined, feels a lot better, well, extubated successfully. Pt went into AFib with RVR in AM s/p lopressor 5mg IVP x2 and cardizem 10mg IVP x1, resolved post-extubation. Now in NSR. Liz placed for urinary retention , pt remains on Levophed  for septic shock. Family at bed side, YUE resolved, Leukocytosis/Bandemia persist.    OVERNIGHT EVENTS: Urinary retention     Home Medications:  Aspir 81 81 mg oral tablet: 1 tab(s) orally once a day (20 Dec 2019 22:44)  atorvastatin 40 mg oral tablet: 1 tab(s) orally once a day (at bedtime) (20 Dec 2019 22:44)  budesonide 0.5 mg/2 mL inhalation suspension: 2 milliliter(s) inhaled 2 times a day, As Needed (20 Dec 2019 22:44)  DuoNeb 0.5 mg-2.5 mg/3 mL inhalation solution: 1 milliliter(s) inhaled every 6 hours (20 Dec 2019 22:44)  losartan 25 mg oral tablet: 1 tab(s) orally once a day (20 Dec 2019 22:44)  predniSONE 5 mg oral tablet: 1 tab(s) orally once a day, As Needed (20 Dec 2019 22:44)  Protonix 40 mg oral delayed release tablet: 1 tab(s) orally once a day (20 Dec 2019 22:44)  silodosin 8 mg oral capsule: 1 cap(s) orally once a day (20 Dec 2019 22:44)  Spiriva 18 mcg inhalation capsule: 1  inhaled once a day (20 Dec 2019 22:44)  Symbicort 160 mcg-4.5 mcg/inh inhalation aerosol: 2  inhaled 2 times a day (20 Dec 2019 22:44)  Ventolin HFA 90 mcg/inh inhalation aerosol: 2 puff(s) inhaled 4 times a day, As Needed (20 Dec 2019 22:44)  Vitamin B-12 500 mcg oral tablet:  orally  (20 Dec 2019 22:44)  Vitamin B6 100 mg oral tablet: 1 tab(s) orally once a day (20 Dec 2019 22:44)  Vitamin D: 1000 international unit(s) orally once a day (20 Dec 2019 22:44)      MEDICATIONS  (STANDING):  albuterol/ipratropium for Nebulization 3 milliLiter(s) Nebulizer every 4 hours  albuterol/ipratropium for Nebulization. 3 milliLiter(s) Nebulizer once  aspirin  chewable 81 milliGRAM(s) Oral daily  atorvastatin 40 milliGRAM(s) Oral at bedtime  budesonide 160 MICROgram(s)/formoterol 4.5 MICROgram(s) Inhaler 2 Puff(s) Inhalation two times a day  enoxaparin Injectable 40 milliGRAM(s) SubCutaneous daily  ertapenem  IVPB      ertapenem  IVPB 1000 milliGRAM(s) IV Intermittent every 24 hours  lactobacillus acidophilus 1 Tablet(s) Oral two times a day  midodrine 10 milliGRAM(s) Oral every 8 hours  norepinephrine Infusion 0.05 MICROgram(s)/kG/Min (7.781 mL/Hr) IV Continuous <Continuous>  pantoprazole    Tablet 40 milliGRAM(s) Oral before breakfast  predniSONE   Tablet 40 milliGRAM(s) Oral daily  tamsulosin 0.4 milliGRAM(s) Oral at bedtime    MEDICATIONS  (PRN):  acetaminophen   Tablet .. 975 milliGRAM(s) Oral every 8 hours PRN Temp greater or equal to 38C (100.4F), Mild Pain (1 - 3)  ALBUTerol    0.083% 2.5 milliGRAM(s) Nebulizer every 4 hours PRN Shortness of Breath and/or Wheezing      Allergies    No Known Allergies    Intolerances        Social History:  quit smoking 5 years ago, 50 pack years   drinks 1 glass of wine 1 x a month socially  lives with wife, performs all ADLs, ambulates on own (20 Dec 2019 21:52)      REVIEW OF SYSTEMS:i feel better,  CONSTITUTIONAL: No fever, No chills, No fatigue, No myalgia, No Body ache, No Weakness  EYES: No eye pain,  No visual disturbances, No discharge, NO Redness  ENMT:  No ear pain, No nose bleed, No vertigo; No sinus pain, NO throat pain, No Congestion  NECK: No pain, No stiffness  RESPIRATORY: No cough, NO wheezing, No  hemoptysis, NO  shortness of breath  CARDIOVASCULAR: No chest pain, palpitations  GASTROINTESTINAL: No abdominal pain, NO epigastric pain. No nausea, No vomiting; No diarrhea, No constipation. [  ] BM  GENITOURINARY: No dysuria, No frequency, No urgency, No hematuria, NO incontinence  NEUROLOGICAL: No headaches, No dizziness, No numbness, No tingling, No tremors, No weakness  EXT: No Swelling, No Pain, No Edema  SKIN:  [x  ] No itching, burning, rashes, or lesions   MUSCULOSKELETAL: No joint pain ,No Jt swelling; No muscle pain, No back pain, No extremity pain  PSYCHIATRIC: No depression,  No anxiety,  No mood swings ,No difficulty sleeping at night  PAIN SCALE: [ x ] None  [  ] Other-  ROS Unable to obtain due to - [  ] Dementia  [  ] Lethargy [  ] Drowsy [  ] Sedated [  ] non verbal  REST OF REVIEW Of SYSTEM - [ x ] Normal     Vital Signs Last 24 Hrs  T(C): 36.7 (22 Dec 2019 12:00), Max: 37.6 (21 Dec 2019 15:00)  T(F): 98 (22 Dec 2019 12:00), Max: 99.6 (21 Dec 2019 15:00)  HR: 74 (22 Dec 2019 13:00) (66 - 160)  BP: 109/51 (22 Dec 2019 13:00) (70/42 - 142/67)  BP(mean): 73 (22 Dec 2019 13:00) (51 - 97)  RR: 28 (22 Dec 2019 13:00) (13 - 36)  SpO2: 96% (22 Dec 2019 13:00) (95% - 100%)  Finger Stick         @ 07:  -   @ 07:00  --------------------------------------------------------  IN: 1337.9 mL / OUT: 2920 mL / NET: -1582.1 mL     @ 07:01  -   @ 13:39  --------------------------------------------------------  IN: 643.1 mL / OUT: 485 mL / NET: 158.1 mL        PHYSICAL EXAM: darryl cantrell  GENERAL:  [x  ] NAD , [x  ] well appearing, [  ] Agitated, [  ] Drowsy,  [  ] Lethargy, [  ] confused   HEAD:  [ x ] Normal, [  ] Other  EYES:  [x  ] EOMI, [x  ] PERRLA, [ x ] conjunctiva and sclera clear normal, [  ] Other,  [  ] Pallor,[  ] Discharge  ENMT:  [x  ] Normal, [x  ] Moist mucous membranes, [  ] Good dentition, [  ] No Thrush  NECK:  [x  ] Supple, [ x ] No JVD, [ x ] Normal thyroid, [  ] Lymphadenopathy [  ] Other  CHEST/LUNG:  [ x ] Clear to auscultation bilaterally, [x ] Breath Sounds equal B/L / Decrease, [  ] poor effort  [ x ] No rales, [x  ] few scattered B/L rhonchi  [ x ]  No wheezing,   HEART:  [x] Regular rate and rhythm, [  ] tachycardia, [  ] Bradycardia,  [  ] irregular  [  x] No murmurs, No rubs, No gallops, [  ] PPM in place (Mfr:  )  ABDOMEN:  [x  ] Soft, [ x ] Nontender, [ x ] Nondistended, [ x ] No mass, [ x ] Bowel sounds present, [x  ] obese  NERVOUS SYSTEM:  [ x ] Alert & Oriented X3, [x ] Nonfocal  [  ] Confusion  [  ] Encephalopathic [  ] Sedated [  ] Unable to assess, [  ] Dementia [  ] Other-  EXTREMITIES: [x  ] 2+ Peripheral Pulses, No clubbing, No cyanosis,  [  ] edema B/L lower EXT. [  ] PVD stasis skin changes B/L Lower EXT, [  ] wound  LYMPH: No lymphadenopathy noted  SKIN:  [x  ] No rashes or lesions, [  ] Pressure Ulcers, [  ] ecchymosis, [  ] Skin Tears, [  ] Other    DIET: DASH    LABS:                        13.4   21.93 )-----------( 112      ( 22 Dec 2019 05:26 )             39.0     22 Dec 2019 05:26    144    |  114    |  20     ----------------------------<  224    4.0     |  23     |  1.00     Ca    8.2        22 Dec 2019 05:26  Phos  1.8       22 Dec 2019 05:26  Mg     2.7       22 Dec 2019 05:26        Urinalysis Basic - ( 21 Dec 2019 00:56 )    Color: Yellow / Appearance: Slightly Turbid / S.010 / pH: x  Gluc: x / Ketone: Negative  / Bili: Negative / Urobili: Negative   Blood: x / Protein: 25 mg/dL / Nitrite: Negative   Leuk Esterase: Small / RBC: 0-2 /HPF / WBC 26-50   Sq Epi: x / Non Sq Epi: Negative / Bacteria: Few        Culture Results:   <10,000 CFU/mL Normal Urogenital Yancy ( @ 10:54)  Culture Results:   Growth in aerobic and anaerobic bottles: Escherichia coli  "Due to technical problems, Proteus sp. will Not be reported as part of  the BCID panel until further notice"  ***Blood Panel PCR results on this specimen are available  approximately 3 hours after the Gram stain result.***  Gram stain, PCR, and/or culture results may not always  correspond due to difference in methodologies.  ************************************************************  This PCR assay was performed using [employer].  The following targets are tested for: Enterococcus,  vancomycin resistant enterococci, Listeria monocytogenes,  coagulase negative staphylococci, S. aureus,  methicillin resistant S. aureus, Streptococcus agalactiae  (Group B), S. pneumoniae, S.pyogenes (Group A),  Acinetobacter baumannii, Enterobacter cloacae, E. coli,  Klebsiella oxytoca, K. pneumoniae, Proteus sp.,  Serratia marcescens, Haemophilus influenzae,  Neisseria meningitidis, Pseudomonas aeruginosa, Candida  albicans, C. glabrata, C krusei, C parapsilosis,  C. tropicalis and the KPC resistance gene. ( @ 00:55)  Culture Results:   Growth in aerobic and anaerobic bottles: Escherichia coli ( @ 00:55)      culture blood  -- .Urine Clean Catch (Midstream)  @ 10:54    culture urine  --   @ 10:54  culture blood  -- .Blood Blood-Peripheral  @ 00:55    culture urine  --   @ 00:55    Culture - Urine (collected 21 Dec 2019 10:54)  Source: .Urine Clean Catch (Midstream)  Final Report (22 Dec 2019 11:43):    <10,000 CFU/mL Normal Urogenital Yancy    Culture - Blood (collected 21 Dec 2019 00:55)  Source: .Blood Blood-Peripheral  Gram Stain (21 Dec 2019 10:39):    Growth in aerobic bottle: Gram Negative Rods    Growth in anaerobic bottle: Gram Negative Rods  Preliminary Report (22 Dec 2019 11:39):    Growth in aerobic and anaerobic bottles: Escherichia coli    Culture - Blood (collected 21 Dec 2019 00:55)  Source: .Blood Blood-Peripheral  Gram Stain (21 Dec 2019 10:01):    Growth in aerobic bottle: Gram Negative Rods    Growth in anaerobic bottle: Gram Negative Rods  Preliminary Report (22 Dec 2019 11:35):    Growth in aerobic and anaerobic bottles: Escherichia coli    "Due to technical problems, Proteus sp. will Not be reported as part of    the BCID panel until further notice"    ***Blood Panel PCR results on this specimen are available    approximately 3 hours after the Gram stain result.***    Gram stain, PCR, and/or culture results may not always    correspond due to difference in methodologies.    ************************************************************    This PCR assay was performed using [employer].    The following targets are tested for: Enterococcus,    vancomycin resistant enterococci, Listeria monocytogenes,    coagulase negative staphylococci, S. aureus,    methicillin resistant S. aureus, Streptococcus agalactiae    (Group B), S. pneumoniae, S.pyogenes (Group A),    Acinetobacter baumannii, Enterobacter cloacae, E. coli,    Klebsiella oxytoca, K. pneumoniae, Proteus sp.,    Serratia marcescens, Haemophilus influenzae,    Neisseria meningitidis, Pseudomonas aeruginosa, Candida    albicans, C. glabrata, C krusei, C parapsilosis,    C. tropicalis and the KPC resistance gene.  Organism: Blood Culture PCR (21 Dec 2019 11:13)  Organism: Blood Culture PCR (21 Dec 2019 11:13)         Anemia Panel:      Thyroid Panel:  Thyroid Stimulating Hormone, Serum: 0.55 uIU/mL (19 @ 05:26)        Lipase, Serum: 45 U/L (19 @ 19:23)      RADIOLOGY & ADDITIONAL TESTS:      HEALTH ISSUES - PROBLEM Dx:  Respiratory failure: Respiratory failure  Prostatitis, acute: Prostatitis, acute  Bacteremia due to Escherichia coli: Bacteremia due to Escherichia coli  BPH (benign prostatic hyperplasia): BPH (benign prostatic hyperplasia)  Hypotension: Hypotension  Prophylactic measure: Prophylactic measure  GERD (gastroesophageal reflux disease): GERD (gastroesophageal reflux disease)  HLD (hyperlipidemia): HLD (hyperlipidemia)  Stented coronary artery: Stented coronary artery  Chronic obstructive pulmonary disease: Chronic obstructive pulmonary disease  YUE (acute kidney injury): YUE (acute kidney injury)  Sepsis: Sepsis          Consultant(s) Notes Reviewed:  [ x ] YES     Care Discussed with [X] Consultants  [x  ] Patient  [x  ] Family [  ] HCP [  ]   [  ] Social Service  [x  ] RN, [  ] Physical Therapy,[  ] Palliative care team  DVT PPX: [ x ] Lovenox, [  ] S C Heparin, [  ] Coumadin, [  ] Xarelto, [  ] Eliquis, [  ] Pradaxa, [  ] IV Heparin drip, [  ] SCD [  ] Contraindication 2 to GI Bleed,[  ] Ambulation [  ] Contraindicated 2 to  bleed [  ] Contraindicated 2 to Brain Bleed  Advanced directive: [ x ] None, [  ] DNR/DNI

## 2019-12-22 NOTE — PROGRESS NOTE ADULT - PROBLEM SELECTOR PLAN 6
-COPD in setting of asthma  -ON Duo nebs, Budesonide   - home meds  Spiriva, Symbicort, ventolin HFA, iprat-albut, Budesonide

## 2019-12-23 ENCOUNTER — TRANSCRIPTION ENCOUNTER (OUTPATIENT)
Age: 74
End: 2019-12-23

## 2019-12-23 DIAGNOSIS — I48.91 UNSPECIFIED ATRIAL FIBRILLATION: ICD-10-CM

## 2019-12-23 LAB
-  AMIKACIN: SIGNIFICANT CHANGE UP
-  AMPICILLIN/SULBACTAM: SIGNIFICANT CHANGE UP
-  AMPICILLIN: SIGNIFICANT CHANGE UP
-  AZTREONAM: SIGNIFICANT CHANGE UP
-  CEFAZOLIN: SIGNIFICANT CHANGE UP
-  CEFEPIME: SIGNIFICANT CHANGE UP
-  CEFOXITIN: SIGNIFICANT CHANGE UP
-  CEFTRIAXONE: SIGNIFICANT CHANGE UP
-  CIPROFLOXACIN: SIGNIFICANT CHANGE UP
-  ERTAPENEM: SIGNIFICANT CHANGE UP
-  GENTAMICIN: SIGNIFICANT CHANGE UP
-  IMIPENEM: SIGNIFICANT CHANGE UP
-  LEVOFLOXACIN: SIGNIFICANT CHANGE UP
-  MEROPENEM: SIGNIFICANT CHANGE UP
-  PIPERACILLIN/TAZOBACTAM: SIGNIFICANT CHANGE UP
-  TOBRAMYCIN: SIGNIFICANT CHANGE UP
-  TRIMETHOPRIM/SULFAMETHOXAZOLE: SIGNIFICANT CHANGE UP
ANION GAP SERPL CALC-SCNC: 5 MMOL/L — SIGNIFICANT CHANGE UP (ref 5–17)
BASOPHILS # BLD AUTO: 0.03 K/UL — SIGNIFICANT CHANGE UP (ref 0–0.2)
BASOPHILS NFR BLD AUTO: 0.2 % — SIGNIFICANT CHANGE UP (ref 0–2)
BUN SERPL-MCNC: 23 MG/DL — SIGNIFICANT CHANGE UP (ref 7–23)
CALCIUM SERPL-MCNC: 7.8 MG/DL — LOW (ref 8.5–10.1)
CHLORIDE SERPL-SCNC: 113 MMOL/L — HIGH (ref 96–108)
CO2 SERPL-SCNC: 24 MMOL/L — SIGNIFICANT CHANGE UP (ref 22–31)
CREAT SERPL-MCNC: 0.79 MG/DL — SIGNIFICANT CHANGE UP (ref 0.5–1.3)
CULTURE RESULTS: SIGNIFICANT CHANGE UP
CULTURE RESULTS: SIGNIFICANT CHANGE UP
EOSINOPHIL # BLD AUTO: 0 K/UL — SIGNIFICANT CHANGE UP (ref 0–0.5)
EOSINOPHIL NFR BLD AUTO: 0 % — SIGNIFICANT CHANGE UP (ref 0–6)
GLUCOSE SERPL-MCNC: 180 MG/DL — HIGH (ref 70–99)
HCT VFR BLD CALC: 34.1 % — LOW (ref 39–50)
HGB BLD-MCNC: 11.9 G/DL — LOW (ref 13–17)
IMM GRANULOCYTES NFR BLD AUTO: 0.8 % — SIGNIFICANT CHANGE UP (ref 0–1.5)
LYMPHOCYTES # BLD AUTO: 0.72 K/UL — LOW (ref 1–3.3)
LYMPHOCYTES # BLD AUTO: 3.9 % — LOW (ref 13–44)
MAGNESIUM SERPL-MCNC: 2.6 MG/DL — SIGNIFICANT CHANGE UP (ref 1.6–2.6)
MCHC RBC-ENTMCNC: 32.1 PG — SIGNIFICANT CHANGE UP (ref 27–34)
MCHC RBC-ENTMCNC: 34.9 GM/DL — SIGNIFICANT CHANGE UP (ref 32–36)
MCV RBC AUTO: 91.9 FL — SIGNIFICANT CHANGE UP (ref 80–100)
METHOD TYPE: SIGNIFICANT CHANGE UP
MONOCYTES # BLD AUTO: 0.82 K/UL — SIGNIFICANT CHANGE UP (ref 0–0.9)
MONOCYTES NFR BLD AUTO: 4.4 % — SIGNIFICANT CHANGE UP (ref 2–14)
NEUTROPHILS # BLD AUTO: 16.79 K/UL — HIGH (ref 1.8–7.4)
NEUTROPHILS NFR BLD AUTO: 90.7 % — HIGH (ref 43–77)
NRBC # BLD: 0 /100 WBCS — SIGNIFICANT CHANGE UP (ref 0–0)
ORGANISM # SPEC MICROSCOPIC CNT: SIGNIFICANT CHANGE UP
PHOSPHATE SERPL-MCNC: 1.6 MG/DL — LOW (ref 2.5–4.5)
PLATELET # BLD AUTO: 113 K/UL — LOW (ref 150–400)
POTASSIUM SERPL-MCNC: 4.2 MMOL/L — SIGNIFICANT CHANGE UP (ref 3.5–5.3)
POTASSIUM SERPL-SCNC: 4.2 MMOL/L — SIGNIFICANT CHANGE UP (ref 3.5–5.3)
RBC # BLD: 3.71 M/UL — LOW (ref 4.2–5.8)
RBC # FLD: 13.9 % — SIGNIFICANT CHANGE UP (ref 10.3–14.5)
SODIUM SERPL-SCNC: 142 MMOL/L — SIGNIFICANT CHANGE UP (ref 135–145)
SPECIMEN SOURCE: SIGNIFICANT CHANGE UP
SPECIMEN SOURCE: SIGNIFICANT CHANGE UP
WBC # BLD: 18.51 K/UL — HIGH (ref 3.8–10.5)
WBC # FLD AUTO: 18.51 K/UL — HIGH (ref 3.8–10.5)

## 2019-12-23 PROCEDURE — 36573 INSJ PICC RS&I 5 YR+: CPT | Mod: 53

## 2019-12-23 PROCEDURE — 99233 SBSQ HOSP IP/OBS HIGH 50: CPT | Mod: GC

## 2019-12-23 PROCEDURE — 76937 US GUIDE VASCULAR ACCESS: CPT | Mod: 26

## 2019-12-23 RX ORDER — APIXABAN 2.5 MG/1
5 TABLET, FILM COATED ORAL ONCE
Refills: 0 | Status: COMPLETED | OUTPATIENT
Start: 2019-12-23 | End: 2019-12-23

## 2019-12-23 RX ORDER — SODIUM,POTASSIUM PHOSPHATES 278-250MG
1 POWDER IN PACKET (EA) ORAL
Refills: 0 | Status: COMPLETED | OUTPATIENT
Start: 2019-12-23 | End: 2019-12-23

## 2019-12-23 RX ORDER — APIXABAN 2.5 MG/1
5 TABLET, FILM COATED ORAL
Refills: 0 | Status: DISCONTINUED | OUTPATIENT
Start: 2019-12-23 | End: 2019-12-23

## 2019-12-23 RX ORDER — APIXABAN 2.5 MG/1
5 TABLET, FILM COATED ORAL
Refills: 0 | Status: DISCONTINUED | OUTPATIENT
Start: 2019-12-23 | End: 2019-12-24

## 2019-12-23 RX ORDER — POTASSIUM PHOSPHATE, MONOBASIC POTASSIUM PHOSPHATE, DIBASIC 236; 224 MG/ML; MG/ML
30 INJECTION, SOLUTION INTRAVENOUS ONCE
Refills: 0 | Status: COMPLETED | OUTPATIENT
Start: 2019-12-23 | End: 2019-12-23

## 2019-12-23 RX ADMIN — Medication 1 PACKET(S): at 21:24

## 2019-12-23 RX ADMIN — TAMSULOSIN HYDROCHLORIDE 0.4 MILLIGRAM(S): 0.4 CAPSULE ORAL at 21:24

## 2019-12-23 RX ADMIN — Medication 3 MILLILITER(S): at 00:02

## 2019-12-23 RX ADMIN — Medication 3 MILLILITER(S): at 03:00

## 2019-12-23 RX ADMIN — MIDODRINE HYDROCHLORIDE 10 MILLIGRAM(S): 2.5 TABLET ORAL at 14:30

## 2019-12-23 RX ADMIN — Medication 1 TABLET(S): at 18:11

## 2019-12-23 RX ADMIN — BUDESONIDE AND FORMOTEROL FUMARATE DIHYDRATE 2 PUFF(S): 160; 4.5 AEROSOL RESPIRATORY (INHALATION) at 21:25

## 2019-12-23 RX ADMIN — Medication 3 MILLILITER(S): at 07:53

## 2019-12-23 RX ADMIN — Medication 40 MILLIGRAM(S): at 06:42

## 2019-12-23 RX ADMIN — Medication 1 PACKET(S): at 14:30

## 2019-12-23 RX ADMIN — MIDODRINE HYDROCHLORIDE 10 MILLIGRAM(S): 2.5 TABLET ORAL at 06:42

## 2019-12-23 RX ADMIN — BUDESONIDE AND FORMOTEROL FUMARATE DIHYDRATE 2 PUFF(S): 160; 4.5 AEROSOL RESPIRATORY (INHALATION) at 06:43

## 2019-12-23 RX ADMIN — POTASSIUM PHOSPHATE, MONOBASIC POTASSIUM PHOSPHATE, DIBASIC 83.33 MILLIMOLE(S): 236; 224 INJECTION, SOLUTION INTRAVENOUS at 08:33

## 2019-12-23 RX ADMIN — APIXABAN 5 MILLIGRAM(S): 2.5 TABLET, FILM COATED ORAL at 10:43

## 2019-12-23 RX ADMIN — Medication 1 PACKET(S): at 10:01

## 2019-12-23 RX ADMIN — Medication 1 PACKET(S): at 18:11

## 2019-12-23 RX ADMIN — MIDODRINE HYDROCHLORIDE 10 MILLIGRAM(S): 2.5 TABLET ORAL at 21:24

## 2019-12-23 RX ADMIN — Medication 3 MILLILITER(S): at 19:59

## 2019-12-23 RX ADMIN — PANTOPRAZOLE SODIUM 40 MILLIGRAM(S): 20 TABLET, DELAYED RELEASE ORAL at 06:42

## 2019-12-23 RX ADMIN — Medication 3 MILLILITER(S): at 12:40

## 2019-12-23 RX ADMIN — Medication 1 TABLET(S): at 06:42

## 2019-12-23 RX ADMIN — ERTAPENEM SODIUM 120 MILLIGRAM(S): 1 INJECTION, POWDER, LYOPHILIZED, FOR SOLUTION INTRAMUSCULAR; INTRAVENOUS at 11:25

## 2019-12-23 RX ADMIN — ATORVASTATIN CALCIUM 40 MILLIGRAM(S): 80 TABLET, FILM COATED ORAL at 21:24

## 2019-12-23 RX ADMIN — APIXABAN 5 MILLIGRAM(S): 2.5 TABLET, FILM COATED ORAL at 18:11

## 2019-12-23 NOTE — PROGRESS NOTE ADULT - ASSESSMENT
73 YO M with PMHX asthma, COPD, former smoker, hx of benign lung cancer R lobe surgically removed at [facility]) CAD (s/p 5 stents ~2001), GERD, HLD, HTN presents after shakes, vomiting, confusion following prostate biopsy 3 days prior to admission and now with ESBL E coli bacteremia.    12/22 blood cultures NGTD

## 2019-12-23 NOTE — PROGRESS NOTE ADULT - PROBLEM SELECTOR PLAN 3
Ac Retention 2/2 sedation  Vs recent prostate Biopsy   -H/O BPH   -Liz cath in place , TOV as per ICU  Will d/w Dr Machado Urinary retention 2/2 sedation vs recent prostate Biopsy   -Patient has history of BPH.   -Liz cath in place, will likely remove today.  -Discuss with Dr. Machado. Urinary retention 2/2 sedation vs recent prostate biopsy.  -Patient has history of BPH.   -Liz cath in place, will likely remove today.  -Continue tamsulosin 0.4 mg PO daily.  -Discuss with Dr. Machado. Urinary retention 2/2 sedation vs recent prostate biopsy.  -Patient has history of BPH.   -Liz cath in place, will likely remove today.TOV  -Continue tamsulosin 0.4 mg PO daily.  -Discuss with Dr. Machado.

## 2019-12-23 NOTE — DISCHARGE NOTE PROVIDER - NSDCCPCAREPLAN_GEN_ALL_CORE_FT
PRINCIPAL DISCHARGE DIAGNOSIS  Diagnosis: Sepsis  Assessment and Plan of Treatment: You were admitted to the intensive care unit for bacteremia with E. coli and started on IV antibiotics. This was likely due to prostatitis from a recent prostate biopsy. You had a R upper arm midline placed for long term IV antibiotics. You should continue IV ertapenem 1gm daily until 1/19/20.      SECONDARY DISCHARGE DIAGNOSES  Diagnosis: Paroxysmal atrial fibrillation  Assessment and Plan of Treatment: You had an episode of an irregular heart beat while in the intensive care unit. You were given medications to better control your heart rate with improvement. Due to the risk of a blood clot, you were started on Eliquis 5mg twice daily which should be continued. You should follow up with your cardiologist within 1 week of discharge.    Diagnosis: Respiratory failure  Assessment and Plan of Treatment: You had an asthma exacerbation requiring airway protection  and extubated after 1 day as you showed much improvement in your breathing    Diagnosis: Sepsis  Assessment and Plan of Treatment: You were admitted to the intensive care unit for bacteremia and asthma exacerbation You were intubated to protect your airway and extubated after 1 day as you showed much improvement in your breathing PRINCIPAL DISCHARGE DIAGNOSIS  Diagnosis: Sepsis  Assessment and Plan of Treatment: You were admitted to the intensive care unit for bacteremia with E. coli and started on IV antibiotics. This was likely due to prostatitis from a recent prostate biopsy. You had a R upper arm midline placed for long term IV antibiotics. You should continue IV ertapenem 1gm daily until 1/19/20. Please follow up with Urology, Dr. Machado upon discharge.      SECONDARY DISCHARGE DIAGNOSES  Diagnosis: Paroxysmal atrial fibrillation  Assessment and Plan of Treatment: You had an episode of an irregular heart beat while in the intensive care unit. You were given medications to better control your heart rate with improvement. Due to the risk of a blood clot, you were started on Eliquis 5mg twice daily which should be continued. You should follow up with your cardiologist within 1 week of discharge.    Diagnosis: Respiratory failure  Assessment and Plan of Treatment: You had an asthma exacerbation requiring airway protection  and extubated after 1 day as you showed much improvement in your breathing. You received steroids and nebulizers. Please continue Prednisone 20mg for 5 days until 12/28. Please continue albuterol, symbicort, ventolin,  spiriva and prednisone as prescribed.    Diagnosis: YUE (acute kidney injury)  Assessment and Plan of Treatment: this was likely due to dehydration and poor oral intake. Your renal function improved and creatinine returned to normal. You received IV fluids. Please follow up with your PCP as outpatient.    Diagnosis: BPH (benign prostatic hyperplasia)  Assessment and Plan of Treatment: Please continue Tamsulosin 0.4mg PO daily. PRINCIPAL DISCHARGE DIAGNOSIS  Diagnosis: Sepsis  Assessment and Plan of Treatment: You were admitted to the intensive care unit for bacteremia with E. coli and started on IV antibiotics. This was likely due to prostatitis from a recent prostate biopsy. You had a right upper arm midline placed for long term IV antibiotics. You should continue IV ertapenem 1gm daily until 1/19/20. Please follow up with Urology, Dr. Machado upon discharge.      SECONDARY DISCHARGE DIAGNOSES  Diagnosis: YUE (acute kidney injury)  Assessment and Plan of Treatment: this was likely due to dehydration and poor oral intake. Your renal function improved and creatinine returned to normal. You received IV fluids. Please follow up with your PCP as outpatient.    Diagnosis: BPH (benign prostatic hyperplasia)  Assessment and Plan of Treatment: Please continue Tamsulosin 0.4mg PO daily.    Diagnosis: Respiratory failure  Assessment and Plan of Treatment: You had an asthma exacerbation requiring airway protection  and extubated after 1 day as you showed much improvement in your breathing. You received steroids and nebulizers. Please continue Prednisone 20mg for 5 days until 12/28. Please continue albuterol, symbicort, ventolin,  spiriva and prednisone as prescribed.    Diagnosis: Paroxysmal atrial fibrillation  Assessment and Plan of Treatment: You had an episode of an irregular heart beat while in the intensive care unit. You were given medications to better control your heart rate with improvement. Due to the risk of a blood clot, you were started on Eliquis 5mg twice daily which should be continued. You should follow up with your cardiologist (Dr. Degroot) within 1 week of discharge. PRINCIPAL DISCHARGE DIAGNOSIS  Diagnosis: Sepsis  Assessment and Plan of Treatment: You were admitted to the intensive care unit for bacteremia with E. coli and started on IV antibiotics. This was likely due to prostatitis from a recent prostate biopsy. You had a right upper arm midline placed for long term IV antibiotics. You should continue IV ertapenem 1gm daily until 1/19/20. Please test your blood for CBC once per week. I added that in your prescription. Please follow up with Urology, Dr. Machado upon discharge.      SECONDARY DISCHARGE DIAGNOSES  Diagnosis: Paroxysmal atrial fibrillation  Assessment and Plan of Treatment: You had an episode of an irregular heart beat while in the intensive care unit. You were given medications to better control your heart rate with improvement.   Due to the risk of a blood clot, you were started on Eliquis 5mg twice daily which should be continued. I sent this medication to your pharmacy. Please pick it up on day of discharge.  You should STOP taking aspirin because you are now taking Eliquis, which is what the cardiologist in the hospital recommended.  You should follow up with your cardiologist (Dr. Degroot) within 1 week of discharge.    Diagnosis: Respiratory failure  Assessment and Plan of Treatment: You had an asthma exacerbation requiring airway protection  and extubated after 1 day as you showed much improvement in your breathing. You received steroids and nebulizers. Please continue Prednisone 20mg for 4 days until 12/28. I sent this to your pharmacy.  Please continue your outpatient medications, albuterol, symbicort, ventolin,  spiriva and prednisone as prescribed.    Diagnosis: YUE (acute kidney injury)  Assessment and Plan of Treatment: This was likely due to dehydration and poor oral intake. Your renal function improved and creatinine returned to normal. You received IV fluids. Please follow up with your PCP as outpatient.    Diagnosis: Hypotension  Assessment and Plan of Treatment: You were noted to have low blood pressure in the hospital. This was likely due to the stress put on your body from your lung issues.  DO NOT take your blood pressure medication, losartan until you see your primary care doctor as your blood pressure is normal, but on the low end and I do not want you to you be lightheaded or faint. Please drink plenty of fluids on discharge.    Diagnosis: BPH (benign prostatic hyperplasia)  Assessment and Plan of Treatment: Please continue your BPH medication as prescribed. PRINCIPAL DISCHARGE DIAGNOSIS  Diagnosis: Sepsis  Assessment and Plan of Treatment: You were admitted to the intensive care unit for bacteremia with E. coli and started on IV antibiotics. This was likely due to prostatitis from a recent prostate biopsy. You had a right upper arm midline placed for long term IV antibiotics. You should continue IV ertapenem 1gm daily until 1/19/20. Please test your blood for CBC once per week. I added that in your prescription. Please follow up with Urology, Dr. Machado upon discharge.      SECONDARY DISCHARGE DIAGNOSES  Diagnosis: Hypotension  Assessment and Plan of Treatment: You were noted to have low blood pressure in the hospital. This was likely due to the stress put on your body from your lung issues.  DO NOT take your blood pressure medication, losartan until you see your primary care doctor as your blood pressure is normal, but on the low end and I do not want you to you be lightheaded or faint. Please drink plenty of fluids on discharge.    Diagnosis: YUE (acute kidney injury)  Assessment and Plan of Treatment: This was likely due to dehydration and poor oral intake. Your renal function improved and creatinine returned to normal. You received IV fluids. Please follow up with your PCP as outpatient.    Diagnosis: BPH (benign prostatic hyperplasia)  Assessment and Plan of Treatment: Please continue your BPH medication as prescribed.    Diagnosis: Respiratory failure  Assessment and Plan of Treatment: You had an asthma exacerbation requiring airway protection  and extubated after 1 day as you showed much improvement in your breathing. You received steroids and nebulizers. Please continue Prednisone 20mg for 4 days until 12/28. I sent this to your pharmacy.  Please continue your outpatient medications, albuterol, symbicort, ventolin,  spiriva and prednisone as prescribed.    Diagnosis: Paroxysmal atrial fibrillation  Assessment and Plan of Treatment: You had an episode of an irregular heart beat while in the intensive care unit. You were given medications to better control your heart rate with improvement.   Due to the risk of a blood clot, you were started on Eliquis 5mg twice daily which should be continued. I sent this medication to your pharmacy. Please pick it up on day of discharge.  You should STOP taking aspirin because you are now taking Eliquis, which is what the cardiologist in the hospital recommended.  You should follow up with your cardiologist (Dr. Degroot) within 1 week of discharge. PRINCIPAL DISCHARGE DIAGNOSIS  Diagnosis: Sepsis  Assessment and Plan of Treatment: You were admitted to the intensive care unit for bacteremia with ESBL  E. coli and started on IV antibiotics.   -This was likely due to prostatitis from a recent prostate biopsy.   -You had a right upper arm midline placed for long term IV antibiotics. - - continue IV ertapenem 1gm daily until 1/19/20.   -Take Bacid 1 tab 2x day for 4 weeks -Probiotics   -Please test your blood for CBC, BMP  once per week. I added that in your prescription. Please follow up with Urology, Dr. Machado upon discharge.      SECONDARY DISCHARGE DIAGNOSES  Diagnosis: Hypotension  Assessment and Plan of Treatment: You were noted to have low blood pressure in the hospital. This was likely due to Sepsis , YUE dehydration.  DO NOT take your blood pressure medication, losartan until you see Cardiologistin 1 week   as your blood pressure is normal with out Meds,   - Please drink plenty of fluids on discharge.    Diagnosis: YUE (acute kidney injury)  Assessment and Plan of Treatment: This was likely due to  Sepsis, dehydration and poor oral intake.  - Your renal function improved and creatinine returned to normal.   -You received IV fluids. Stay well hydrated at home.  - Please follow up with your PCP as outpatient.    Diagnosis: Paroxysmal atrial fibrillation  Assessment and Plan of Treatment: You had an episode of an irregular heart beat while in the intensive care unit. You were given medications to better control your heart rate with improvement.   Due to the risk of forming a blood clot, you were started on Eliquis 5mg twice daily which should be continued.start from today ,   You should STOP taking aspirin because you are now taking Eliquis, which is what the cardiologist in the hospital recommended.  You should follow up with your cardiologist (Dr. Degroot) within 1 week of discharge.    Diagnosis: Respiratory failure  Assessment and Plan of Treatment: H/O COPD & Asthma , you has an  asthma exacerbation requiring airway protection , Intubatipon  and extubated after 1 day as you showed much improvement in your   - You received steroids and nebulizers.  - Please continue Prednisone 20mg for 4 days until 12/28.  Always Take with food & Protonix 20 mg daily while on prednisone,   -Please continue your outpatient medications, albuterol, symbicort, ventolin,  spiriva and prednisone as prescribed.    Diagnosis: GERD (gastroesophageal reflux disease)  Assessment and Plan of Treatment: GERD (gastroesophageal reflux disease)- On Protonix 40 mg daily    Diagnosis: Urinary retention  Assessment and Plan of Treatment: Urinary retention in ICU , you had a andrews cath placed, - resolved now ,    You MUST -Follow up with DR Machado in 1 week    Diagnosis: BPH (benign prostatic hyperplasia)  Assessment and Plan of Treatment: Please continue your BPH medication at home as directed PRINCIPAL DISCHARGE DIAGNOSIS  Diagnosis: Sepsis  Assessment and Plan of Treatment: You were admitted to the intensive care unit for bacteremia with ESBL  E. coli and started on IV antibiotics.   -This was likely due to prostatitis from a recent prostate biopsy.   -You had a right upper arm midline placed for long term IV antibiotics. - - continue IV ertapenem 1gm daily until 1/19/20.   -Take Bacid 1 tab 2x day for 4 weeks -Probiotics   -Please test your blood for CBC, BMP  once per week. I added that in your prescription. Please follow up with Urology, Dr. Machado upon discharge.      SECONDARY DISCHARGE DIAGNOSES  Diagnosis: GERD (gastroesophageal reflux disease)  Assessment and Plan of Treatment: GERD (gastroesophageal reflux disease)- On Protonix 40 mg daily    Diagnosis: Urinary retention  Assessment and Plan of Treatment: Urinary retention in ICU , you had a andrews cath placed, - resolved now ,    You MUST -Follow up with DR Machado in 1 week    Diagnosis: Hypotension  Assessment and Plan of Treatment: You were noted to have low blood pressure in the hospital. This was likely due to Sepsis , YUE dehydration.  DO NOT take your blood pressure medication, losartan until you see Cardiologistin 1 week   as your blood pressure is normal with out Meds,   - Please drink plenty of fluids on discharge.    Diagnosis: YUE (acute kidney injury)  Assessment and Plan of Treatment: This was likely due to  Sepsis, dehydration and poor oral intake.  - Your renal function improved and creatinine returned to normal.   -You received IV fluids. Stay well hydrated at home.  - Please follow up with your PCP as outpatient.    Diagnosis: BPH (benign prostatic hyperplasia)  Assessment and Plan of Treatment: Please continue your BPH medication at home as directed    Diagnosis: Respiratory failure  Assessment and Plan of Treatment: H/O COPD & Asthma , you has an  asthma exacerbation requiring airway protection , Intubatipon  and extubated after 1 day as you showed much improvement in your   - You received steroids and nebulizers.  - Please continue Prednisone 20mg for 4 days until 12/28.  Always Take with food & Protonix 20 mg daily while on prednisone,   -Please continue your outpatient medications, albuterol, symbicort, ventolin,  spiriva and prednisone as prescribed.    Diagnosis: Paroxysmal atrial fibrillation  Assessment and Plan of Treatment: You had an episode of an irregular heart beat while in the intensive care unit. You were given medications to better control your heart rate with improvement.   Due to the risk of forming a blood clot, you were started on Eliquis 5mg twice daily which should be continued.start from today ,   You should STOP taking aspirin because you are now taking Eliquis, which is what the cardiologist in the hospital recommended.  You should follow up with your cardiologist (Dr. Degroot) within 1 week of discharge.

## 2019-12-23 NOTE — PROGRESS NOTE ADULT - PROBLEM SELECTOR PLAN 10
IMPROVE VTE Individual Risk Assessment        RISK                                                          Points  [  ] Previous VTE                                                3  [  ] Thrombophilia                                             2  [  ] Lower limb paralysis                                   2        (unable to hold up >15 seconds)    [  ] Current Cancer                                            2         (within 6 months)  [  ] Immobilization > 24 hrs                              1  [  ] ICU/CCU stay > 24 hours                            1  [ 1 ] Age > 60                                                    1  IMPROVE VTE Score _____1____    dvt ppx lovenox 40 Eliquis 5 mg PO BID.                                          IMPROVE VTE Score 1 Eliquis 5 mg PO BID.                                          IMPROVE VTE Score 1    11. GERD  Continue pantoprazole 40 mg PO daily.

## 2019-12-23 NOTE — PROGRESS NOTE ADULT - PROBLEM SELECTOR PLAN 7
-cont home dose Lipitor -Continue home dose atorvastatin. -Patient with history of COPD in setting of asthma.  -Received prednisone 40 mg PO today. Will receive prednisone 20 mg PO daily starting 12/24 with taper.  -Continue Symbicort 2 puffs BID, DuoNeb q4h, and albuterol 2.5 q4h prn.  -Home meds consist of Symbicort BID, ventolin, Spiriva, Duoneb, prednisone 5 mg PO daily, budesonide prn.

## 2019-12-23 NOTE — PROGRESS NOTE ADULT - PROBLEM SELECTOR PLAN 6
-COPD in setting of asthma  -ON Duo nebs, Budesonide   - home meds  Spiriva, Symbicort, ventolin HFA, iprat-albut, Budesonide -Patient with history of COPD in setting of asthma.  -Received prednisone 40 mg PO today. Will receive prednisone 20 mg PO daily starting 12/24 with taper.  -Continue Symbicort 2 puffs BID, DuoNeb q4h, and albuterol 2.5 q4h prn.  -Home meds consist of Symbicort BID, ventolin, Spiriva, Duoneb, prednisone 5 mg PO daily, budesonide prn. -History of 5 coronary artery stents ~2001  -S/p rapid A Fib ---> NSR now resolved after extubation.  -Eliquis 5 mg PO BID started 12/23.  -Stable; cardiology Dr Degroot on case  -Continue home dose atorvastatin 40 mg PO daily at bedtime.

## 2019-12-23 NOTE — PROGRESS NOTE ADULT - PROBLEM SELECTOR PLAN 1
-Sepsis with shock   -Fever,  High WBC &  Bandemia , 2/2 Ac Prostatitis likely  -Hypotension 2/2 distributive shock, On Vasopressor , Midodrine 10 mg 3x day  -E Coli Sepsis - IV Abx -. Repeat Blood c/s x 2 to follow  -Dr cui With high rate of ESBL organisms in this context recommend escalation to Ertapenem pending sensitivity data.  -Etiology -likely Prostatitis   -s/p Recent Prostate Biopsy on Tuesday 12/17 with Dr. Machado  -? Aspiration , CT scan  shows- Groundglass opacity with underlying reticular prominence in the lateral right middle lobe and posterior right lower lobe. NO PNA   - CT A/P done- B/L perinephric stranding-Nonspecific  -Blood c/s x 2, -E Coli UA. Urine c/s , RVP -NEG   Flu A/B/RSV -NEG -Sepsis with shock   -Fever, High WBC &  Bandemia, 2/2 prostatitis likely  -Hypotension 2/2 distributive shock. Levophed discontinued. On Midodrine 10 mg 3x day.  -E Coli Sepsis (12/21) sensitive to amikacin, ertapenem, gentamicin, imipenem, meropenem, tobramycin: Continue ertapenem IVPB q24 hours x 10 days day 3 (last day 1/1/19. Repeat blood culture from 12/22 no growth to date.  -Dr Marc on board.  -Etiology likely prostatitis s/p recent prostate biopsy on Tuesday 12/17 with Dr. Machado (pending results).  -CT scan  shows groundglass opacity with underlying reticular prominence in the lateral right middle lobe and posterior right lower lobe. No pneumonia.  -CT A/P done- B/L perinephric stranding (nonspecific).  -Urine culture (12/21): < 10,000 CFU/mL normal urogenital cathi.  -RVP and Flu A/B, RSV negative. -Sepsis with shock with ESBL and prostate involvement (concern for prostate source, appears to be complication of recent prostate biopsy).  -Afebrile since 12/21. WBC 18.51 today. (18.44 on 12/21)  -Hypotension 2/2 distributive shock. Levophed discontinued. On Midodrine 10 mg q8h.  -E Coli Sepsis (12/21) sensitive to amikacin, ertapenem, gentamicin, imipenem, meropenem, tobramycin: Continue ertapenem IVPB q24 hours x 4 weeks (last day 1/19/19). Repeat blood culture from 12/22 no growth to date.  -Dr Marc on board.  -Etiology likely prostatitis s/p recent prostate biopsy on Tuesday 12/17 with Dr. Machado (pending results).  -CT chest shows groundglass opacity with underlying reticular prominence in the lateral right middle lobe and posterior right lower lobe. No pneumonia.  -CT A/P B/L perinephric stranding (nonspecific).  -Urine culture (12/21): < 10,000 CFU/mL normal urogenital cathi.  -RVP and Flu A/B, RSV negative. -Sepsis with shock with ESBL and prostate involvement (concern for prostate source, appears to be complication of recent prostate biopsy).  -Afebrile since 12/21. WBC 18.51 today. (18.44 on 12/21)  -Hypotension 2/2 distributive shock. Levophed discontinued. On Midodrine 10 mg q8h.  -E Coli Sepsis (12/21) sensitive to amikacin, ertapenem, gentamicin, imipenem, meropenem, tobramycin: Continue ertapenem IVPB q24 hours x 4 weeks (last day 1/19/19). Repeat blood culture from 12/22 no growth to date.  -Midline placement 12/23 for IV antibiotics (last day 1/19/19)  -Case management consult for home IV antibiotics.  -Dr Marc on board.  -Etiology likely prostatitis s/p recent prostate biopsy on Tuesday 12/17 with Dr. Machado (pending results).  -CT chest shows groundglass opacity with underlying reticular prominence in the lateral right middle lobe and posterior right lower lobe. No pneumonia.  -CT A/P B/L perinephric stranding (nonspecific).  -Urine culture (12/21): < 10,000 CFU/mL normal urogenital cathi.  -RVP and Flu A/B, RSV negative. -Sepsis with shock 2/2  E Coli  ESBL with ac prostatitis (concern for prostate source, appears to be complication of recent prostate biopsy).  -Afebrile since 12/21. WBC 18.51 today. (18.44 on 12/21)  -Hypotension 2/2 distributive shock. Levophed discontinued.   -On Midodrine 10 mg q8h.  -E Coli Sepsis (12/21) sensitive to amikacin, ertapenem, gentamicin, imipenem, meropenem, tobramycin: Continue ertapenem IVPB q24 hours x 4 weeks (last day 1/19/19). Repeat blood culture from 12/22 no growth to date.  -Midline placement 12/23 for IV antibiotics (last day 1/19/19)  -Case management consult for home IV antibiotics.  -Dr Marc D/W at detail. CBC in AM   -Etiology likely prostatitis s/p recent prostate biopsy on Tuesday 12/17 with Dr. Machado (pending results).  -CT chest shows groundglass opacity with underlying reticular prominence in the lateral right middle lobe and posterior right lower lobe. No pneumonia.  -CT A/P B/L perinephric stranding (nonspecific).  -Urine culture (12/21): < 10,000 CFU/mL normal urogenital cathi.  -RVP and Flu A/B, RSV negative.

## 2019-12-23 NOTE — PROGRESS NOTE ADULT - SUBJECTIVE AND OBJECTIVE BOX
Patient is a 74y old  Male who presents with a chief complaint of Fever (22 Dec 2019 12:32)      INTERVAL HPI:  75 YO M with PMHX asthma, COPD, former smoker, hx of benign lung cancer R lobe surgically removed at [facility]) CAD (s/p 5 stents ~), GERD, HLD, HTN presents after shakes, vomiting, confusion. Pt states he has prostate biopsy 3 days ago, started on antibiotics, the next day developed shaking chills, then became slightly SOB, used a nebulizer which helped his shortness of breath. Pt saw his pulmonologist yesterday, as per pt pulmonologist states everything was ok from a pulmonary standpoint and to continue using his nebulizer. Today pt developed shivering shaking chills again, NBNR emesis, confusion. Pt's wife found him confused, came to the ER for confusion. Of note, pt states he had a cough with sputum production last week. Pt denies fevers at home or sick contacts.     In the ED: Vitals sig for temp of 104.7 F. Labs sig for Plt of 114, bands of 29, BUN 27, Cr 1.40, glucose 124, alk phos 128, AST 39, eGFR 49, lactate 2.8, FLU A, B, RSV neg. CXR: No acute infiltrate. CT head: No acute intracranial bleeding, mass effect, or shift. Mild chronic microvascular ischemic changes. CT A/P: Right middle lobe and right lower lobe groundglass opacities with underlying reticular changes, likely chronic. Recommend follow-up to ensure stability or resolution and exclude acute pneumonia. Mild constipation. No bowel obstruction. Enlarged prostate. Nonspecific bilateral perinephric inflammatory stranding. Nonobstructing left renal calculi. Given: Rocephin x1, Azithromycin x1,  Zosyn x1,  ns bolus x3, tylenol x1, Duo neb x1. Of note, RRT was called for hypotension with BP of 63/48. Patient was seen by RRT team and ICU PA. Was given normal saline bolus x2 and midodrine 10mg x1 with improvement in BP of 105/56  -S/P RRT called for hypotension , several IV Fluid Boluses given, pt was upgraded to ICU for septic shock/ Hypoxia .      19: Pt seen, Examined, S/P  Intubation now with ac bronchospasm with  Hypoxia , ac respiratory failure , Pt with septic shock 2/2 GNR, E. Coli sepsis , seen by ID Dr Marc, IV Abx changed to Invanz ,S/P Vasopressors IV, Leukocytosis with Bandemia, & Low platelets 2/2 sepsis, pt got IV Solumedrol 125 mg x 1 dose, seen by cardiology Dr Degroot, RVP -NEG  19: Pt seen, Examined, feels a lot better, well, extubated successfully. Pt went into AFib with RVR in AM s/p lopressor 5mg IVP x2 and cardizem 10mg IVP x1, resolved post-extubation. Now in NSR. Liz placed for urinary retention , pt remains on Levophed  for septic shock. Family at bed side, YUE resolved, Leukocytosis/Bandemia persist.    OVERNIGHT EVENTS: Urinary retention     Home Medications:  Aspir 81 81 mg oral tablet: 1 tab(s) orally once a day (20 Dec 2019 22:44)  atorvastatin 40 mg oral tablet: 1 tab(s) orally once a day (at bedtime) (20 Dec 2019 22:44)  budesonide 0.5 mg/2 mL inhalation suspension: 2 milliliter(s) inhaled 2 times a day, As Needed (20 Dec 2019 22:44)  DuoNeb 0.5 mg-2.5 mg/3 mL inhalation solution: 1 milliliter(s) inhaled every 6 hours (20 Dec 2019 22:44)  losartan 25 mg oral tablet: 1 tab(s) orally once a day (20 Dec 2019 22:44)  predniSONE 5 mg oral tablet: 1 tab(s) orally once a day, As Needed (20 Dec 2019 22:44)  Protonix 40 mg oral delayed release tablet: 1 tab(s) orally once a day (20 Dec 2019 22:44)  silodosin 8 mg oral capsule: 1 cap(s) orally once a day (20 Dec 2019 22:44)  Spiriva 18 mcg inhalation capsule: 1  inhaled once a day (20 Dec 2019 22:44)  Symbicort 160 mcg-4.5 mcg/inh inhalation aerosol: 2  inhaled 2 times a day (20 Dec 2019 22:44)  Ventolin HFA 90 mcg/inh inhalation aerosol: 2 puff(s) inhaled 4 times a day, As Needed (20 Dec 2019 22:44)  Vitamin B-12 500 mcg oral tablet:  orally  (20 Dec 2019 22:44)  Vitamin B6 100 mg oral tablet: 1 tab(s) orally once a day (20 Dec 2019 22:44)  Vitamin D: 1000 international unit(s) orally once a day (20 Dec 2019 22:44)    MEDICATIONS  (STANDING):  albuterol/ipratropium for Nebulization 3 milliLiter(s) Nebulizer every 4 hours  albuterol/ipratropium for Nebulization. 3 milliLiter(s) Nebulizer once  aspirin  chewable 81 milliGRAM(s) Oral daily  atorvastatin 40 milliGRAM(s) Oral at bedtime  budesonide 160 MICROgram(s)/formoterol 4.5 MICROgram(s) Inhaler 2 Puff(s) Inhalation two times a day  enoxaparin Injectable 40 milliGRAM(s) SubCutaneous daily  ertapenem  IVPB      ertapenem  IVPB 1000 milliGRAM(s) IV Intermittent every 24 hours  lactobacillus acidophilus 1 Tablet(s) Oral two times a day  midodrine 10 milliGRAM(s) Oral every 8 hours  norepinephrine Infusion 0.05 MICROgram(s)/kG/Min (7.781 mL/Hr) IV Continuous <Continuous>  pantoprazole    Tablet 40 milliGRAM(s) Oral before breakfast  potassium phosphate IVPB 30 milliMole(s) IV Intermittent once  predniSONE   Tablet 40 milliGRAM(s) Oral daily  tamsulosin 0.4 milliGRAM(s) Oral at bedtime    MEDICATIONS  (PRN):  acetaminophen   Tablet .. 975 milliGRAM(s) Oral every 8 hours PRN Temp greater or equal to 38C (100.4F), Mild Pain (1 - 3)  ALBUTerol    0.083% 2.5 milliGRAM(s) Nebulizer every 4 hours PRN Shortness of Breath and/or Wheezing      Allergies    No Known Allergies    Intolerances        Social History:  quit smoking 5 years ago, 50 pack years   drinks 1 glass of wine 1 x a month socially  lives with wife, performs all ADLs, ambulates on own (20 Dec 2019 21:52)      REVIEW OF SYSTEMS:i feel better,  CONSTITUTIONAL: No fever, No chills, No fatigue, No myalgia, No Body ache, No Weakness  EYES: No eye pain,  No visual disturbances, No discharge, NO Redness  ENMT:  No ear pain, No nose bleed, No vertigo; No sinus pain, NO throat pain, No Congestion  NECK: No pain, No stiffness  RESPIRATORY: No cough, NO wheezing, No  hemoptysis, NO  shortness of breath  CARDIOVASCULAR: No chest pain, palpitations  GASTROINTESTINAL: No abdominal pain, NO epigastric pain. No nausea, No vomiting; No diarrhea, No constipation. [  ] BM  GENITOURINARY: No dysuria, No frequency, No urgency, No hematuria, NO incontinence  NEUROLOGICAL: No headaches, No dizziness, No numbness, No tingling, No tremors, No weakness  EXT: No Swelling, No Pain, No Edema  SKIN:  [x  ] No itching, burning, rashes, or lesions   MUSCULOSKELETAL: No joint pain ,No Jt swelling; No muscle pain, No back pain, No extremity pain  PSYCHIATRIC: No depression,  No anxiety,  No mood swings ,No difficulty sleeping at night  PAIN SCALE: [ x ] None  [  ] Other-  ROS Unable to obtain due to - [  ] Dementia  [  ] Lethargy [  ] Drowsy [  ] Sedated [  ] non verbal  REST OF REVIEW Of SYSTEM - [ x ] Normal     Vital Signs Last 24 Hrs  T(C): 36.7 (23 Dec 2019 07:30), Max: 37.4 (22 Dec 2019 10:00)  T(F): 98 (23 Dec 2019 07:30), Max: 99.3 (22 Dec 2019 10:00)  HR: 62 (23 Dec 2019 07:00) (61 - 160)  BP: 94/55 (23 Dec 2019 07:00) (83/52 - 139/63)  BP(mean): 70 (23 Dec 2019 07:00) (62 - 90)  RR: 23 (23 Dec 2019 07:00) (13 - 36)  SpO2: 97% (23 Dec 2019 07:00) (92% - 100%)      I&O's Summary    22 Dec 2019 07:01  -  23 Dec 2019 07:00  --------------------------------------------------------  IN: 2326.8 mL / OUT: 1380 mL / NET: 946.8 mL        PHYSICAL EXAM: darryl cantrell  GENERAL:  [x  ] NAD , [x  ] well appearing, [  ] Agitated, [  ] Drowsy,  [  ] Lethargy, [  ] confused   HEAD:  [ x ] Normal, [  ] Other  EYES:  [x  ] EOMI, [x  ] PERRLA, [ x ] conjunctiva and sclera clear normal, [  ] Other,  [  ] Pallor,[  ] Discharge  ENMT:  [x  ] Normal, [x  ] Moist mucous membranes, [  ] Good dentition, [  ] No Thrush  NECK:  [x  ] Supple, [ x ] No JVD, [ x ] Normal thyroid, [  ] Lymphadenopathy [  ] Other  CHEST/LUNG:  [ x ] Clear to auscultation bilaterally, [x ] Breath Sounds equal B/L / Decrease, [  ] poor effort  [ x ] No rales, [x  ] few scattered B/L rhonchi  [ x ]  No wheezing,   HEART:  [x] Regular rate and rhythm, [  ] tachycardia, [  ] Bradycardia,  [  ] irregular  [  x] No murmurs, No rubs, No gallops, [  ] PPM in place (Mfr:  )  ABDOMEN:  [x  ] Soft, [ x ] Nontender, [ x ] Nondistended, [ x ] No mass, [ x ] Bowel sounds present, [x  ] obese  NERVOUS SYSTEM:  [ x ] Alert & Oriented X3, [x ] Nonfocal  [  ] Confusion  [  ] Encephalopathic [  ] Sedated [  ] Unable to assess, [  ] Dementia [  ] Other-  EXTREMITIES: [x  ] 2+ Peripheral Pulses, No clubbing, No cyanosis,  [  ] edema B/L lower EXT. [  ] PVD stasis skin changes B/L Lower EXT, [  ] wound  LYMPH: No lymphadenopathy noted  SKIN:  [x  ] No rashes or lesions, [  ] Pressure Ulcers, [  ] ecchymosis, [  ] Skin Tears, [  ] Other    DIET: DASH/TLC    LABS  WBC 18.51 (H), which decreased from 21.93 yesterday.  H/H 11.9/34.1, which decreased from yesterday (13.4/39.0).  Plt 113 (L), which is stable from yesterday at 112.  A1c 5.8  Low phosphorus at 1.6.   Normal Mg 2.6.                         11.9   18.51 )-----------( 113      ( 23 Dec 2019 05:17 )             34.1     12-    142  |  113<H>  |  23  ----------------------------<  180<H>  4.2   |  24  |  0.79    Ca    7.8<L>      23 Dec 2019 05:17  Phos  1.6       Mg     2.6         Urinalysis Basic - ( 21 Dec 2019 00:56 )  Color: Yellow / Appearance: Slightly Turbid / S.010 / pH: x  Gluc: x / Ketone: Negative  / Bili: Negative / Urobili: Negative   Blood: x / Protein: 25 mg/dL / Nitrite: Negative   Leuk Esterase: Small / RBC: 0-2 /HPF / WBC 26-50   Sq Epi: x / Non Sq Epi: Negative / Bacteria: Few      Culture Results:   <10,000 CFU/mL Normal Urogenital Cathi ( @ 10:54)      Thyroid Stimulating Hormone, Serum: 0.55 uIU/mL (19 @ 05:26)  Lipase, Serum: 45 U/L (19 @ 19:23)    ABG (): pH 7.41, pCO2 35, pO2 86, HCO3 23, FiO2 30, O2 sat 96    MICROBIOLOGY  Urine Culture (): < 10,000 CFU/mL normal urogenital cathi  Blood Culture (): Escherichia coli (Growth in aerobic and anaerobic bottles)  Flu A/B, RSV (): Negative      RADIOLOGY & ADDITIONAL TESTS:  CT Abdomen and Pelvis without Contrast (): Evaluation of the solid organs and GI tract is limited without oral and IV contrast. Right middle lobe and right lower lobe groundglass opacities with underlying reticular changes, likely chronic. Recommend follow-up to ensure stability or resolution and exclude acute pneumonia. Mild constipation. No bowel obstruction.   Enlarged prostate. Nonspecific bilateral perinephric inflammatory stranding. Nonobstructing left renal calculi.  CT Head without Contrast (): No acute intracranial bleeding, mass effect, or shift. Mild chronic microvascular ischemic changes.   CXR (): No acute infiltrate  TTE ():   1. This a technically limited study.  2. There is normal left ventricular size and left ventricular systolic function with an ejection fraction of 60-65%.  3. There is grade 1 diastolic dysfunction.  4. There is normal left ventricular wall thickness.  5. There is trivial mitral regurgitation.  6. There is trivial tricuspid regurgitation.  7. The aortic valve was not well visualized but appears mildly thickened leaflets. The aortic valve velocities were not measured on this study.  8. There is no significant pericardial effusion.  9. There appears to be normal right atrial and right ventricular size and systolic function.    HEALTH ISSUES - PROBLEM Dx:  Respiratory failure: Respiratory failure  Prostatitis, acute: Prostatitis, acute  Bacteremia due to Escherichia coli: Bacteremia due to Escherichia coli  BPH (benign prostatic hyperplasia): BPH (benign prostatic hyperplasia)  Hypotension: Hypotension  Prophylactic measure: Prophylactic measure  GERD (gastroesophageal reflux disease): GERD (gastroesophageal reflux disease)  HLD (hyperlipidemia): HLD (hyperlipidemia)  Stented coronary artery: Stented coronary artery  Chronic obstructive pulmonary disease: Chronic obstructive pulmonary disease  YUE (acute kidney injury): YUE (acute kidney injury)  Sepsis: Sepsis          Consultant(s) Notes Reviewed:  [ x ] YES     Care Discussed with [X] Consultants  [x  ] Patient  [x  ] Family [  ] HCP [  ]   [  ] Social Service  [x  ] RN, [  ] Physical Therapy,[  ] Palliative care team  DVT PPX: [ x ] Lovenox, [  ] S C Heparin, [  ] Coumadin, [  ] Xarelto, [  ] Eliquis, [  ] Pradaxa, [  ] IV Heparin drip, [  ] SCD [  ] Contraindication 2 to GI Bleed,[  ] Ambulation [  ] Contraindicated 2 to  bleed [  ] Contraindicated 2 to Brain Bleed  Advanced directive: [ x ] None, [  ] DNR/DNI Patient is a 74y old  Male who presents with a chief complaint of Fever (22 Dec 2019 12:32)    INTERVAL HPI:  75 YO M with PMHX asthma, COPD, former smoker, hx of benign lung cancer R lobe surgically removed at [facility]) CAD (s/p 5 stents ~), GERD, HLD, HTN presents after shakes, vomiting, confusion. Pt states he has prostate biopsy 3 days ago, started on antibiotics, the next day developed shaking chills, then became slightly SOB, used a nebulizer which helped his shortness of breath. Pt saw his pulmonologist yesterday, as per pt pulmonologist states everything was ok from a pulmonary standpoint and to continue using his nebulizer. Today pt developed shivering shaking chills again, NBNR emesis, confusion. Pt's wife found him confused, came to the ER for confusion. Of note, pt states he had a cough with sputum production last week. Pt denies fevers at home or sick contacts.     In the ED: Vitals sig for temp of 104.7 F. Labs sig for Plt of 114, bands of 29, BUN 27, Cr 1.40, glucose 124, alk phos 128, AST 39, eGFR 49, lactate 2.8, FLU A, B, RSV neg. CXR: No acute infiltrate. CT head: No acute intracranial bleeding, mass effect, or shift. Mild chronic microvascular ischemic changes. CT A/P: Right middle lobe and right lower lobe groundglass opacities with underlying reticular changes, likely chronic. Recommend follow-up to ensure stability or resolution and exclude acute pneumonia. Mild constipation. No bowel obstruction. Enlarged prostate. Nonspecific bilateral perinephric inflammatory stranding. Nonobstructing left renal calculi. Given: Rocephin x1, Azithromycin x1,  Zosyn x1,  ns bolus x3, tylenol x1, Duo neb x1. Of note, RRT was called for hypotension with BP of 63/48. Patient was seen by RRT team and ICU PA. Was given normal saline bolus x2 and midodrine 10mg x1 with improvement in BP of 105/56  -S/P RRT called for hypotension , several IV Fluid Boluses given, pt was upgraded to ICU for septic shock/ Hypoxia .      19: Pt seen, Examined, S/P  Intubation now with ac bronchospasm with  Hypoxia, ac respiratory failure. Pt with septic shock 2/2 GNR, E. Coli sepsis , seen by ID Dr Marc, IV Abx changed to Invanz ,S/P Vasopressors IV, Leukocytosis with Bandemia, & Low platelets 2/2 sepsis, pt got IV Solumedrol 125 mg x 1 dose, seen by cardiology Dr Degroot, RVP -NEG  19: Pt seen, Examined, feels a lot better, well, extubated successfully. Pt went into AFib with RVR in AM s/p lopressor 5mg IVP x2 and cardizem 10mg IVP x1, resolved post-extubation. Now in NSR. Andrews placed for urinary retention , pt remains on Levophed  for septic shock. Family at bed side, YUE resolved, Leukocytosis/Bandemia persist.  19: Patient seen and examined. Patient reports to be feeling better and denies any difficulty with breathing on O2 NC. Phos was repleted and patient reported burning of L arm as a result. Patient tolerated breakfast fine. Patient ambulated and andrews will be removed today. Patient had bowel movement. Pt received prednisone 40 mg PO today. Will receive prednisone 20 mg tomorrow for a couple of days. Repeat blood Cx from yesterday no growth to date.    OVERNIGHT EVENTS: NONE    Home Medications:  Aspir 81 81 mg oral tablet: 1 tab(s) orally once a day (20 Dec 2019 22:44)  atorvastatin 40 mg oral tablet: 1 tab(s) orally once a day (at bedtime) (20 Dec 2019 22:44)  budesonide 0.5 mg/2 mL inhalation suspension: 2 milliliter(s) inhaled 2 times a day, As Needed (20 Dec 2019 22:44)  DuoNeb 0.5 mg-2.5 mg/3 mL inhalation solution: 1 milliliter(s) inhaled every 6 hours (20 Dec 2019 22:44)  losartan 25 mg oral tablet: 1 tab(s) orally once a day (20 Dec 2019 22:44)  predniSONE 5 mg oral tablet: 1 tab(s) orally once a day, As Needed (20 Dec 2019 22:44)  Protonix 40 mg oral delayed release tablet: 1 tab(s) orally once a day (20 Dec 2019 22:44)  silodosin 8 mg oral capsule: 1 cap(s) orally once a day (20 Dec 2019 22:44)  Spiriva 18 mcg inhalation capsule: 1  inhaled once a day (20 Dec 2019 22:44)  Symbicort 160 mcg-4.5 mcg/inh inhalation aerosol: 2  inhaled 2 times a day (20 Dec 2019 22:44)  Ventolin HFA 90 mcg/inh inhalation aerosol: 2 puff(s) inhaled 4 times a day, As Needed (20 Dec 2019 22:44)  Vitamin B-12 500 mcg oral tablet:  orally  (20 Dec 2019 22:44)  Vitamin B6 100 mg oral tablet: 1 tab(s) orally once a day (20 Dec 2019 22:44)  Vitamin D: 1000 international unit(s) orally once a day (20 Dec 2019 22:44)    MEDICATIONS  (STANDING):  albuterol/ipratropium for Nebulization 3 milliLiter(s) Nebulizer every 4 hours  albuterol/ipratropium for Nebulization. 3 milliLiter(s) Nebulizer once  aspirin  chewable 81 milliGRAM(s) Oral daily  atorvastatin 40 milliGRAM(s) Oral at bedtime  budesonide 160 MICROgram(s)/formoterol 4.5 MICROgram(s) Inhaler 2 Puff(s) Inhalation two times a day  enoxaparin Injectable 40 milliGRAM(s) SubCutaneous daily  ertapenem  IVPB      ertapenem  IVPB 1000 milliGRAM(s) IV Intermittent every 24 hours  lactobacillus acidophilus 1 Tablet(s) Oral two times a day  midodrine 10 milliGRAM(s) Oral every 8 hours  norepinephrine Infusion 0.05 MICROgram(s)/kG/Min (7.781 mL/Hr) IV Continuous <Continuous>  pantoprazole    Tablet 40 milliGRAM(s) Oral before breakfast  potassium phosphate IVPB 30 milliMole(s) IV Intermittent once  predniSONE   Tablet 40 milliGRAM(s) Oral daily  tamsulosin 0.4 milliGRAM(s) Oral at bedtime    MEDICATIONS  (PRN):  acetaminophen   Tablet .. 975 milliGRAM(s) Oral every 8 hours PRN Temp greater or equal to 38C (100.4F), Mild Pain (1 - 3)  ALBUTerol    0.083% 2.5 milliGRAM(s) Nebulizer every 4 hours PRN Shortness of Breath and/or Wheezing      Allergies  No Known Allergies      Social History:  quit smoking 5 years ago, 50 pack years   drinks 1 glass of wine 1 x a month socially  lives with wife, performs all ADLs, ambulates on own (20 Dec 2019 21:52)      REVIEW OF SYSTEMS  CONSTITUTIONAL: No fever, No chills, No fatigue, No myalgia, No Body ache, No Weakness  EYES: No eye pain,  No visual disturbances, No discharge, NO Redness  ENMT:  No ear pain, No nose bleed, No vertigo; No sinus pain, NO throat pain, No Congestion  NECK: No pain, No stiffness  RESPIRATORY: No cough, NO wheezing, No  hemoptysis, NO  shortness of breath  CARDIOVASCULAR: No chest pain, palpitations  GASTROINTESTINAL: No abdominal pain, NO epigastric pain. No nausea, No vomiting; No diarrhea, No constipation. [ x ] BM  GENITOURINARY: No dysuria, No frequency, No urgency, No hematuria  NEUROLOGICAL: No headaches, No dizziness, No numbness, No tingling, No tremors, No weakness  EXT: No Swelling, No Pain, No Edema  SKIN:  Burning at L arm IV site after K phos given [ ] No itching, burning, rashes, or lesions   MUSCULOSKELETAL: No joint pain ,No Jt swelling; No muscle pain, No back pain, No extremity pain  PSYCHIATRIC: No depression,  No anxiety,  No mood swings ,No difficulty sleeping at night  PAIN SCALE: [ x ] None  [  ] Other-  ROS Unable to obtain due to - [  ] Dementia  [  ] Lethargy [  ] Drowsy [  ] Sedated [  ] non verbal  REST OF REVIEW Of SYSTEM - [ x ] Normal     Vital Signs Last 24 Hrs  T(C): 36.7 (23 Dec 2019 07:30), Max: 37.4 (22 Dec 2019 10:00)  T(F): 98 (23 Dec 2019 07:30), Max: 99.3 (22 Dec 2019 10:00)  HR: 62 (23 Dec 2019 07:00) (61 - 160)  BP: 94/55 (23 Dec 2019 07:00) (83/52 - 139/63). BP 97/51 at noon.  BP(mean): 70 (23 Dec 2019 07:00) (62 - 90)  RR: 23 (23 Dec 2019 07:00) (13 - 36)  SpO2: 97% (23 Dec 2019 07:00) (92% - 100%)      I&O's Summary    22 Dec 2019 07:01  -  23 Dec 2019 07:00  --------------------------------------------------------  IN: 2326.8 mL / OUT: 1380 mL / NET: 946.8 mL        PHYSICAL EXAM: Agusto cantrell  GENERAL:  [x  ] NAD , [x  ] well appearing, [  ] Agitated, [  ] Drowsy,  [  ] Lethargy, [  ] confused   HEAD:  [ x ] Normal, [  ] Other  EYES:  [x  ] EOMI, [x  ] PERRLA, [ x ] conjunctiva and sclera clear normal, [  ] Other,  [  ] Pallor,[  ] Discharge  ENMT:  [x  ] Normal, [x  ] Moist mucous membranes, [  ] Good dentition, [  ] No Thrush  NECK:  [x  ] Supple, [ x ] No JVD, [ x ] Normal thyroid, [  ] Lymphadenopathy [  ] Other  CHEST/LUNG:  [ x ] Clear to auscultation bilaterally, [x ] Breath Sounds equal B/L / Decrease, [  ] poor effort  [ x ] No rales, [x  ] few scattered B/L rhonchi  [ x ]  No wheezing,   HEART:  [x] Regular rate and rhythm, [  ] tachycardia, [  ] Bradycardia,  [  ] irregular  [  x] No murmurs, No rubs, No gallops, [  ] PPM in place (Mfr:  )  ABDOMEN:  [x  ] Soft, [ x ] Nontender, [ x ] Nondistended, [ x ] No mass, [ x ] Bowel sounds present, [x  ] obese  NERVOUS SYSTEM:  [ x ] Alert & Oriented X3, [x ] Nonfocal  [  ] Confusion  [  ] Encephalopathic [  ] Sedated [  ] Unable to assess, [  ] Dementia [  ] Other-  EXTREMITIES: [x  ] 2+ Peripheral Pulses, No clubbing, No cyanosis,  [  ] edema B/L lower EXT. [  ] PVD stasis skin changes B/L Lower EXT, [  ] wound  LYMPH: No lymphadenopathy noted  SKIN:  [x  ] No rashes or lesions, [  ] Pressure Ulcers, [  ] ecchymosis, [  ] Skin Tears, [  ] Other    DIET: DASH/TLC    LABS  WBC 18.51 (H), which decreased from 21.93 yesterday.  H/H 11.9/34.1, which decreased from yesterday (13.4/39.0).  Plt 113 (L), which is stable from yesterday at 112.  A1c 5.8  Low phosphorus at 1.6.   Normal Mg 2.6.                         11.9   18.51 )-----------( 113      ( 23 Dec 2019 05:17 )             34.1     12    142  |  113<H>  |  23  ----------------------------<  180<H>  4.2   |  24  |  0.79    Ca    7.8<L>      23 Dec 2019 05:17  Phos  1.6       Mg     2.6         Urinalysis Basic - ( 21 Dec 2019 00:56 )  Color: Yellow / Appearance: Slightly Turbid / S.010 / pH: x  Gluc: x / Ketone: Negative  / Bili: Negative / Urobili: Negative   Blood: x / Protein: 25 mg/dL / Nitrite: Negative   Leuk Esterase: Small / RBC: 0-2 /HPF / WBC 26-50   Sq Epi: x / Non Sq Epi: Negative / Bacteria: Few      Thyroid Stimulating Hormone, Serum: 0.55 uIU/mL (19 @ 05:26)  Lipase, Serum: 45 U/L (19 @ 19:23)    ABG (): pH 7.41, pCO2 35, pO2 86, HCO3 23, FiO2 30, O2 sat 96    MICROBIOLOGY  Urine Culture (): < 10,000 CFU/mL normal urogenital cathi  Blood Culture (): Escherichia coli (Growth in aerobic and anaerobic bottles)  Flu A/B, RSV (): Negative    RADIOLOGY & ADDITIONAL TESTS:  CT Abdomen and Pelvis without Contrast (): Evaluation of the solid organs and GI tract is limited without oral and IV contrast. Right middle lobe and right lower lobe groundglass opacities with underlying reticular changes, likely chronic. Recommend follow-up to ensure stability or resolution and exclude acute pneumonia. Mild constipation. No bowel obstruction.   Enlarged prostate. Nonspecific bilateral perinephric inflammatory stranding. Nonobstructing left renal calculi.  CT Head without Contrast (): No acute intracranial bleeding, mass effect, or shift. Mild chronic microvascular ischemic changes.   CXR (): No acute infiltrate  TTE (): Normal left ventricular size, wall thickness, and left ventricular systolic function with an ejection fraction of 60-65%. Grade 1 diastolic dysfunction. Trivial mitral regurgitation, trivial tricuspid regurgitation. The aortic valve was not well visualized but appears mildly thickened leaflets. No significant pericardial effusion. Normal right atrial and right ventricular size and systolic function.    HEALTH ISSUES - PROBLEM Dx:  Respiratory failure  Prostatitis, acute  Bacteremia due to Escherichia coli  BPH (benign prostatic hyperplasia)  Hypotension  Prophylactic measure  GERD (gastroesophageal reflux disease)  HLD (hyperlipidemia)  Stented coronary artery  Chronic obstructive pulmonary disease  YUE (acute kidney injury)  Sepsis      Consultant(s) Notes Reviewed:  [ x ] YES     Care Discussed with [X] Consultants  [x  ] Patient  [x  ] Family [  ] HCP [  ]   [  ] Social Service  [x  ] RN, [  ] Physical Therapy,[  ] Palliative care team  DVT PPX: [ x ] Lovenox, [  ] S C Heparin, [  ] Coumadin, [  ] Xarelto, [  ] Eliquis, [  ] Pradaxa, [  ] IV Heparin drip, [  ] SCD [  ] Contraindication 2 to GI Bleed,[  ] Ambulation [  ] Contraindicated 2 to  bleed [  ] Contraindicated 2 to Brain Bleed  Advanced directive: [ x ] None, [  ] DNR/DNI Patient is a 74y old  Male who presents with a chief complaint of Fever (22 Dec 2019 12:32)    INTERVAL HPI:  75 YO M with PMHX asthma, COPD, former smoker, hx of benign lung cancer R lobe surgically removed at [facility]) CAD (s/p 5 stents ~), GERD, HLD, HTN presents after shakes, vomiting, confusion. Pt states he has prostate biopsy 3 days ago, started on antibiotics, the next day developed shaking chills, then became slightly SOB, used a nebulizer which helped his shortness of breath. Pt saw his pulmonologist yesterday, as per pt pulmonologist states everything was ok from a pulmonary standpoint and to continue using his nebulizer. Today pt developed shivering shaking chills again, NBNR emesis, confusion. Pt's wife found him confused, came to the ER for confusion. Of note, pt states he had a cough with sputum production last week. Pt denies fevers at home or sick contacts.     In the ED: Vitals sig for temp of 104.7 F. Labs sig for Plt of 114, bands of 29, BUN 27, Cr 1.40, glucose 124, alk phos 128, AST 39, eGFR 49, lactate 2.8, FLU A, B, RSV neg. CXR: No acute infiltrate. CT head: No acute intracranial bleeding, mass effect, or shift. Mild chronic microvascular ischemic changes. CT A/P: Right middle lobe and right lower lobe groundglass opacities with underlying reticular changes, likely chronic. Recommend follow-up to ensure stability or resolution and exclude acute pneumonia. Mild constipation. No bowel obstruction. Enlarged prostate. Nonspecific bilateral perinephric inflammatory stranding. Nonobstructing left renal calculi. Given: Rocephin x1, Azithromycin x1,  Zosyn x1,  ns bolus x3, tylenol x1, Duo neb x1. Of note, RRT was called for hypotension with BP of 63/48. Patient was seen by RRT team and ICU PA. Was given normal saline bolus x2 and midodrine 10mg x1 with improvement in BP of 105/56  -S/P RRT called for hypotension , several IV Fluid Boluses given, pt was upgraded to ICU for septic shock/ Hypoxia .      19: Pt seen, Examined, S/P  Intubation now with ac bronchospasm with  Hypoxia, ac respiratory failure. Pt with septic shock 2/2 GNR, E. Coli sepsis , seen by ID Dr Marc, IV Abx changed to Invanz ,S/P Vasopressors IV, Leukocytosis with Bandemia, & Low platelets 2/2 sepsis, pt got IV Solumedrol 125 mg x 1 dose, seen by cardiology Dr Degroot, RVP -NEG  19: Pt seen, Examined, feels a lot better, well, extubated successfully. Pt went into AFib with RVR in AM s/p lopressor 5mg IVP x2 and cardizem 10mg IVP x1, resolved post-extubation. Now in NSR. Andrews placed for urinary retention , pt remains on Levophed  for septic shock. Family at bed side, YUE resolved, Leukocytosis/Bandemia persist.  19: Patient seen and examined. Patient reports to be feeling better and denies any difficulty with breathing on O2 NC. Phos was repleted and patient reported burning of L arm as a result. Patient tolerated breakfast fine. Patient ambulated and andrews will be removed today. Patient had bowel movement. Pt received prednisone 40 mg PO today. Will receive prednisone 20 mg tomorrow for a couple of days. Repeat blood Cx from yesterday no growth to date. Plan for midline insertion for abx until .    OVERNIGHT EVENTS: NONE    Home Medications:  Aspir 81 81 mg oral tablet: 1 tab(s) orally once a day (20 Dec 2019 22:44)  atorvastatin 40 mg oral tablet: 1 tab(s) orally once a day (at bedtime) (20 Dec 2019 22:44)  budesonide 0.5 mg/2 mL inhalation suspension: 2 milliliter(s) inhaled 2 times a day, As Needed (20 Dec 2019 22:44)  DuoNeb 0.5 mg-2.5 mg/3 mL inhalation solution: 1 milliliter(s) inhaled every 6 hours (20 Dec 2019 22:44)  losartan 25 mg oral tablet: 1 tab(s) orally once a day (20 Dec 2019 22:44)  predniSONE 5 mg oral tablet: 1 tab(s) orally once a day, As Needed (20 Dec 2019 22:44)  Protonix 40 mg oral delayed release tablet: 1 tab(s) orally once a day (20 Dec 2019 22:44)  silodosin 8 mg oral capsule: 1 cap(s) orally once a day (20 Dec 2019 22:44)  Spiriva 18 mcg inhalation capsule: 1  inhaled once a day (20 Dec 2019 22:44)  Symbicort 160 mcg-4.5 mcg/inh inhalation aerosol: 2  inhaled 2 times a day (20 Dec 2019 22:44)  Ventolin HFA 90 mcg/inh inhalation aerosol: 2 puff(s) inhaled 4 times a day, As Needed (20 Dec 2019 22:44)  Vitamin B-12 500 mcg oral tablet:  orally  (20 Dec 2019 22:44)  Vitamin B6 100 mg oral tablet: 1 tab(s) orally once a day (20 Dec 2019 22:44)  Vitamin D: 1000 international unit(s) orally once a day (20 Dec 2019 22:44)    MEDICATIONS  (STANDING):  albuterol/ipratropium for Nebulization 3 milliLiter(s) Nebulizer every 4 hours  albuterol/ipratropium for Nebulization. 3 milliLiter(s) Nebulizer once  aspirin  chewable 81 milliGRAM(s) Oral daily  atorvastatin 40 milliGRAM(s) Oral at bedtime  budesonide 160 MICROgram(s)/formoterol 4.5 MICROgram(s) Inhaler 2 Puff(s) Inhalation two times a day  enoxaparin Injectable 40 milliGRAM(s) SubCutaneous daily  ertapenem  IVPB      ertapenem  IVPB 1000 milliGRAM(s) IV Intermittent every 24 hours  lactobacillus acidophilus 1 Tablet(s) Oral two times a day  midodrine 10 milliGRAM(s) Oral every 8 hours  norepinephrine Infusion 0.05 MICROgram(s)/kG/Min (7.781 mL/Hr) IV Continuous <Continuous>  pantoprazole    Tablet 40 milliGRAM(s) Oral before breakfast  potassium phosphate IVPB 30 milliMole(s) IV Intermittent once  predniSONE   Tablet 40 milliGRAM(s) Oral daily  tamsulosin 0.4 milliGRAM(s) Oral at bedtime    MEDICATIONS  (PRN):  acetaminophen   Tablet .. 975 milliGRAM(s) Oral every 8 hours PRN Temp greater or equal to 38C (100.4F), Mild Pain (1 - 3)  ALBUTerol    0.083% 2.5 milliGRAM(s) Nebulizer every 4 hours PRN Shortness of Breath and/or Wheezing      Allergies  No Known Allergies      Social History:  quit smoking 5 years ago, 50 pack years   drinks 1 glass of wine 1 x a month socially  lives with wife, performs all ADLs, ambulates on own (20 Dec 2019 21:52)      REVIEW OF SYSTEMS  CONSTITUTIONAL: No fever, No chills, No fatigue, No myalgia, No Body ache, No Weakness  EYES: No eye pain,  No visual disturbances, No discharge, NO Redness  ENMT:  No ear pain, No nose bleed, No vertigo; No sinus pain, NO throat pain, No Congestion  NECK: No pain, No stiffness  RESPIRATORY: No cough, NO wheezing, No  hemoptysis, NO  shortness of breath  CARDIOVASCULAR: No chest pain, palpitations  GASTROINTESTINAL: No abdominal pain, NO epigastric pain. No nausea, No vomiting; No diarrhea, No constipation. [ x ] BM  GENITOURINARY: No dysuria, No frequency, No urgency, No hematuria  NEUROLOGICAL: No headaches, No dizziness, No numbness, No tingling, No tremors, No weakness  EXT: No Swelling, No Pain, No Edema  SKIN:  Burning at L arm IV site after K phos given [ ] No itching, burning, rashes, or lesions   MUSCULOSKELETAL: No joint pain ,No Jt swelling; No muscle pain, No back pain, No extremity pain  PSYCHIATRIC: No depression,  No anxiety,  No mood swings ,No difficulty sleeping at night  PAIN SCALE: [ x ] None  [  ] Other-  ROS Unable to obtain due to - [  ] Dementia  [  ] Lethargy [  ] Drowsy [  ] Sedated [  ] non verbal  REST OF REVIEW Of SYSTEM - [ x ] Normal     Vital Signs Last 24 Hrs  T(C): 36.7 (23 Dec 2019 07:30), Max: 37.4 (22 Dec 2019 10:00)  T(F): 98 (23 Dec 2019 07:30), Max: 99.3 (22 Dec 2019 10:00)  HR: 62 (23 Dec 2019 07:00) (61 - 160)  BP: 94/55 (23 Dec 2019 07:00) (83/52 - 139/63). BP 97/51 at noon.  BP(mean): 70 (23 Dec 2019 07:00) (62 - 90)  RR: 23 (23 Dec 2019 07:00) (13 - 36)  SpO2: 97% (23 Dec 2019 07:00) (92% - 100%)      I&O's Summary    22 Dec 2019 07:01  -  23 Dec 2019 07:00  --------------------------------------------------------  IN: 2326.8 mL / OUT: 1380 mL / NET: 946.8 mL        PHYSICAL EXAM: Agusto cantrell  GENERAL:  [x  ] NAD , [x  ] well appearing, [  ] Agitated, [  ] Drowsy,  [  ] Lethargy, [  ] confused   HEAD:  [ x ] Normal, [  ] Other  EYES:  [x  ] EOMI, [x  ] PERRLA, [ x ] conjunctiva and sclera clear normal, [  ] Other,  [  ] Pallor,[  ] Discharge  ENMT:  [x  ] Normal, [x  ] Moist mucous membranes, [  ] Good dentition, [  ] No Thrush  NECK:  [x  ] Supple, [ x ] No JVD, [ x ] Normal thyroid, [  ] Lymphadenopathy [  ] Other  CHEST/LUNG:  [ x ] Clear to auscultation bilaterally, [x ] Breath Sounds equal B/L / Decrease, [  ] poor effort  [ x ] No rales, [x  ] few scattered B/L rhonchi  [ x ]  No wheezing,   HEART:  [x] Regular rate and rhythm, [  ] tachycardia, [  ] Bradycardia,  [  ] irregular  [  x] No murmurs, No rubs, No gallops, [  ] PPM in place (Mfr:  )  ABDOMEN:  [x  ] Soft, [ x ] Nontender, [ x ] Nondistended, [ x ] No mass, [ x ] Bowel sounds present, [x  ] obese  NERVOUS SYSTEM:  [ x ] Alert & Oriented X3, [x ] Nonfocal  [  ] Confusion  [  ] Encephalopathic [  ] Sedated [  ] Unable to assess, [  ] Dementia [  ] Other-  EXTREMITIES: [x  ] 2+ Peripheral Pulses, No clubbing, No cyanosis,  [  ] edema B/L lower EXT. [  ] PVD stasis skin changes B/L Lower EXT, [  ] wound  LYMPH: No lymphadenopathy noted  SKIN:  [x  ] No rashes or lesions, [  ] Pressure Ulcers, [  ] ecchymosis, [  ] Skin Tears, [  ] Other    DIET: DASH/TLC    LABS  WBC 18.51 (H), which decreased from 21.93 yesterday.  H/H 11.9/34.1, which decreased from yesterday (13.4/39.0).  Plt 113 (L), which is stable from yesterday at 112.  A1c 5.8  Low phosphorus at 1.6.   Normal Mg 2.6.                         11.9   18.51 )-----------( 113      ( 23 Dec 2019 05:17 )             34.1         142  |  113<H>  |  23  ----------------------------<  180<H>  4.2   |  24  |  0.79    Ca    7.8<L>      23 Dec 2019 05:17  Phos  1.6       Mg     2.6         Urinalysis Basic - ( 21 Dec 2019 00:56 )  Color: Yellow / Appearance: Slightly Turbid / S.010 / pH: x  Gluc: x / Ketone: Negative  / Bili: Negative / Urobili: Negative   Blood: x / Protein: 25 mg/dL / Nitrite: Negative   Leuk Esterase: Small / RBC: 0-2 /HPF / WBC 26-50   Sq Epi: x / Non Sq Epi: Negative / Bacteria: Few      Thyroid Stimulating Hormone, Serum: 0.55 uIU/mL (19 @ 05:26)  Lipase, Serum: 45 U/L (19 @ 19:23)    ABG (): pH 7.41, pCO2 35, pO2 86, HCO3 23, FiO2 30, O2 sat 96    MICROBIOLOGY  Urine Culture (): < 10,000 CFU/mL normal urogenital cathi  Blood Culture (): Escherichia coli (Growth in aerobic and anaerobic bottles)  Flu A/B, RSV (): Negative    RADIOLOGY & ADDITIONAL TESTS:  CT Abdomen and Pelvis without Contrast (): Evaluation of the solid organs and GI tract is limited without oral and IV contrast. Right middle lobe and right lower lobe groundglass opacities with underlying reticular changes, likely chronic. Recommend follow-up to ensure stability or resolution and exclude acute pneumonia. Mild constipation. No bowel obstruction.   Enlarged prostate. Nonspecific bilateral perinephric inflammatory stranding. Nonobstructing left renal calculi.  CT Head without Contrast (): No acute intracranial bleeding, mass effect, or shift. Mild chronic microvascular ischemic changes.   CXR (): No acute infiltrate  TTE (): Normal left ventricular size, wall thickness, and left ventricular systolic function with an ejection fraction of 60-65%. Grade 1 diastolic dysfunction. Trivial mitral regurgitation, trivial tricuspid regurgitation. The aortic valve was not well visualized but appears mildly thickened leaflets. No significant pericardial effusion. Normal right atrial and right ventricular size and systolic function.    HEALTH ISSUES - PROBLEM Dx:  Respiratory failure  Prostatitis, acute  Bacteremia due to Escherichia coli  BPH (benign prostatic hyperplasia)  Hypotension  Prophylactic measure  GERD (gastroesophageal reflux disease)  HLD (hyperlipidemia)  Stented coronary artery  Chronic obstructive pulmonary disease  YUE (acute kidney injury)  Sepsis      Consultant(s) Notes Reviewed:  [ x ] YES     Care Discussed with [X] Consultants  [x  ] Patient  [x  ] Family [  ] HCP [  ]   [  ] Social Service  [x  ] RN, [  ] Physical Therapy,[  ] Palliative care team  DVT PPX: [ x ] Lovenox, [  ] S C Heparin, [  ] Coumadin, [  ] Xarelto, [  ] Eliquis, [  ] Pradaxa, [  ] IV Heparin drip, [  ] SCD [  ] Contraindication 2 to GI Bleed,[  ] Ambulation [  ] Contraindicated 2 to  bleed [  ] Contraindicated 2 to Brain Bleed  Advanced directive: [ x ] None, [  ] DNR/DNI Patient is a 74y old  Male who presents with a chief complaint of Fever (22 Dec 2019 12:32)    INTERVAL HPI:  75 YO M with PMHX asthma, COPD, former smoker, hx of benign lung cancer R lobe surgically removed at [facility]) CAD (s/p 5 stents ~), GERD, HLD, HTN presents after shakes, vomiting, confusion. Pt states he has prostate biopsy 3 days ago, started on antibiotics, the next day developed shaking chills, then became slightly SOB, used a nebulizer which helped his shortness of breath. Pt saw his pulmonologist yesterday, as per pt pulmonologist states everything was ok from a pulmonary standpoint and to continue using his nebulizer. Today pt developed shivering shaking chills again, NBNR emesis, confusion. Pt's wife found him confused, came to the ER for confusion. Of note, pt states he had a cough with sputum production last week. Pt denies fevers at home or sick contacts.     In the ED: Vitals sig for temp of 104.7 F. Labs sig for Plt of 114, bands of 29, BUN 27, Cr 1.40, glucose 124, alk phos 128, AST 39, eGFR 49, lactate 2.8, FLU A, B, RSV neg. CXR: No acute infiltrate. CT head: No acute intracranial bleeding, mass effect, or shift. Mild chronic microvascular ischemic changes. CT A/P: Right middle lobe and right lower lobe groundglass opacities with underlying reticular changes, likely chronic. Recommend follow-up to ensure stability or resolution and exclude acute pneumonia. Mild constipation. No bowel obstruction. Enlarged prostate. Nonspecific bilateral perinephric inflammatory stranding. Nonobstructing left renal calculi. Given: Rocephin x1, Azithromycin x1,  Zosyn x1,  ns bolus x3, tylenol x1, Duo neb x1. Of note, RRT was called for hypotension with BP of 63/48. Patient was seen by RRT team and ICU PA. Was given normal saline bolus x2 and midodrine 10mg x1 with improvement in BP of 105/56  -S/P RRT called for hypotension , several IV Fluid Boluses given, pt was upgraded to ICU for septic shock/ Hypoxia .      19: Pt seen, Examined, S/P  Intubation now with ac bronchospasm with  Hypoxia, ac respiratory failure. Pt with septic shock 2/2 GNR, E. Coli sepsis , seen by ID Dr Marc, IV Abx changed to Invanz ,S/P Vasopressors IV, Leukocytosis with Bandemia, & Low platelets 2/2 sepsis, pt got IV Solumedrol 125 mg x 1 dose, seen by cardiology Dr Degroot, RVP -NEG  19: Pt seen, Examined, feels a lot better, well, extubated successfully. Pt went into AFib with RVR in AM s/p lopressor 5mg IVP x2 and cardizem 10mg IVP x1, resolved post-extubation. Now in NSR. Andrews placed for urinary retention , pt remains on Levophed  for septic shock. Family at bed side, YUE resolved, Leukocytosis/Bandemia persist.  19: Patient seen and examined. Patient reports to be feeling better and denies any difficulty with breathing on O2 NC. Phos was repleted and patient reported burning of L arm as a result. Patient tolerated breakfast fine. Patient ambulated and andrews will be removed today. Patient had bowel movement. Pt received prednisone 40 mg PO today. Will receive prednisone 20 mg tomorrow for a couple of days. Repeat blood Cx from yesterday no growth to date. Plan for midline insertion for abx until . PT eval, started on Eliquis as per cardiology. TOV today, WBC improving    OVERNIGHT EVENTS: NONE    Home Medications:  Aspir 81 81 mg oral tablet: 1 tab(s) orally once a day (20 Dec 2019 22:44)  atorvastatin 40 mg oral tablet: 1 tab(s) orally once a day (at bedtime) (20 Dec 2019 22:44)  budesonide 0.5 mg/2 mL inhalation suspension: 2 milliliter(s) inhaled 2 times a day, As Needed (20 Dec 2019 22:44)  DuoNeb 0.5 mg-2.5 mg/3 mL inhalation solution: 1 milliliter(s) inhaled every 6 hours (20 Dec 2019 22:44)  losartan 25 mg oral tablet: 1 tab(s) orally once a day (20 Dec 2019 22:44)  predniSONE 5 mg oral tablet: 1 tab(s) orally once a day, As Needed (20 Dec 2019 22:44)  Protonix 40 mg oral delayed release tablet: 1 tab(s) orally once a day (20 Dec 2019 22:44)  silodosin 8 mg oral capsule: 1 cap(s) orally once a day (20 Dec 2019 22:44)  Spiriva 18 mcg inhalation capsule: 1  inhaled once a day (20 Dec 2019 22:44)  Symbicort 160 mcg-4.5 mcg/inh inhalation aerosol: 2  inhaled 2 times a day (20 Dec 2019 22:44)  Ventolin HFA 90 mcg/inh inhalation aerosol: 2 puff(s) inhaled 4 times a day, As Needed (20 Dec 2019 22:44)  Vitamin B-12 500 mcg oral tablet:  orally  (20 Dec 2019 22:44)  Vitamin B6 100 mg oral tablet: 1 tab(s) orally once a day (20 Dec 2019 22:44)  Vitamin D: 1000 international unit(s) orally once a day (20 Dec 2019 22:44)    MEDICATIONS  (STANDING):  albuterol/ipratropium for Nebulization 3 milliLiter(s) Nebulizer every 4 hours  albuterol/ipratropium for Nebulization. 3 milliLiter(s) Nebulizer once  aspirin  chewable 81 milliGRAM(s) Oral daily  atorvastatin 40 milliGRAM(s) Oral at bedtime  budesonide 160 MICROgram(s)/formoterol 4.5 MICROgram(s) Inhaler 2 Puff(s) Inhalation two times a day  enoxaparin Injectable 40 milliGRAM(s) SubCutaneous daily  ertapenem  IVPB      ertapenem  IVPB 1000 milliGRAM(s) IV Intermittent every 24 hours  lactobacillus acidophilus 1 Tablet(s) Oral two times a day  midodrine 10 milliGRAM(s) Oral every 8 hours  norepinephrine Infusion 0.05 MICROgram(s)/kG/Min (7.781 mL/Hr) IV Continuous <Continuous>  pantoprazole    Tablet 40 milliGRAM(s) Oral before breakfast  potassium phosphate IVPB 30 milliMole(s) IV Intermittent once  predniSONE   Tablet 40 milliGRAM(s) Oral daily  tamsulosin 0.4 milliGRAM(s) Oral at bedtime    MEDICATIONS  (PRN):  acetaminophen   Tablet .. 975 milliGRAM(s) Oral every 8 hours PRN Temp greater or equal to 38C (100.4F), Mild Pain (1 - 3)  ALBUTerol    0.083% 2.5 milliGRAM(s) Nebulizer every 4 hours PRN Shortness of Breath and/or Wheezing      Allergies  No Known Allergies      Social History:  quit smoking 5 years ago, 50 pack years   drinks 1 glass of wine 1 x a month socially  lives with wife, performs all ADLs, ambulates on own (20 Dec 2019 21:52)      REVIEW OF SYSTEMS: i feel fine   CONSTITUTIONAL: No fever, No chills, No fatigue, No myalgia, No Body ache, No Weakness  EYES: No eye pain,  No visual disturbances, No discharge, NO Redness  ENMT:  No ear pain, No nose bleed, No vertigo; No sinus pain, NO throat pain, No Congestion  NECK: No pain, No stiffness  RESPIRATORY: No cough, NO wheezing, No  hemoptysis, NO  shortness of breath  CARDIOVASCULAR: No chest pain, palpitations  GASTROINTESTINAL: No abdominal pain, NO epigastric pain. No nausea, No vomiting; No diarrhea, No constipation. [ x ] BM  GENITOURINARY: No dysuria, No frequency, No urgency, No hematuria  NEUROLOGICAL: No headaches, No dizziness, No numbness, No tingling, No tremors, No weakness  EXT: No Swelling, No Pain, No Edema  SKIN:  Burning at L arm IV site after K phos given [ ] No itching, burning, rashes, or lesions   MUSCULOSKELETAL: No joint pain ,No Jt swelling; No muscle pain, No back pain, No extremity pain  PSYCHIATRIC: No depression,  No anxiety,  No mood swings ,No difficulty sleeping at night  PAIN SCALE: [ x ] None  [  ] Other-  ROS Unable to obtain due to - [  ] Dementia  [  ] Lethargy [  ] Drowsy [  ] Sedated [  ] non verbal  REST OF REVIEW Of SYSTEM - [ x ] Normal     Vital Signs Last 24 Hrs  T(C): 36.7 (23 Dec 2019 07:30), Max: 37.4 (22 Dec 2019 10:00)  T(F): 98 (23 Dec 2019 07:30), Max: 99.3 (22 Dec 2019 10:00)  HR: 62 (23 Dec 2019 07:00) (61 - 160)  BP: 94/55 (23 Dec 2019 07:00) (83/52 - 139/63). BP 97/51 at noon.  BP(mean): 70 (23 Dec 2019 07:00) (62 - 90)  RR: 23 (23 Dec 2019 07:00) (13 - 36)  SpO2: 97% (23 Dec 2019 07:00) (92% - 100%)      I&O's Summary    22 Dec 2019 07:01  -  23 Dec 2019 07:00  --------------------------------------------------------  IN: 2326.8 mL / OUT: 1380 mL / NET: 946.8 mL        PHYSICAL EXAM: Agusto cantrell  GENERAL:  [x  ] NAD , [x  ] well appearing, [  ] Agitated, [  ] Drowsy,  [  ] Lethargy, [  ] confused   HEAD:  [ x ] Normal, [  ] Other  EYES:  [x  ] EOMI, [x  ] PERRLA, [ x ] conjunctiva and sclera clear normal, [  ] Other,  [  ] Pallor,[  ] Discharge  ENMT:  [x  ] Normal, [x  ] Moist mucous membranes, [  ] Good dentition, [  ] No Thrush  NECK:  [x  ] Supple, [ x ] No JVD, [ x ] Normal thyroid, [  ] Lymphadenopathy [  ] Other  CHEST/LUNG:  [ x ] Clear to auscultation bilaterally, [x ] Breath Sounds equal B/L / Decrease, [  ] poor effort  [ x ] No rales, [x  ] few scattered B/L rhonchi  [ x ]  No wheezing,   HEART:  [x] Regular rate and rhythm, [  ] tachycardia, [  ] Bradycardia,  [  ] irregular  [  x] No murmurs, No rubs, No gallops, [  ] PPM in place (Mfr:  )  ABDOMEN:  [x  ] Soft, [ x ] Nontender, [ x ] Nondistended, [ x ] No mass, [ x ] Bowel sounds present, [x  ] obese  NERVOUS SYSTEM:  [ x ] Alert & Oriented X3, [x ] Nonfocal  [  ] Confusion  [  ] Encephalopathic [  ] Sedated [  ] Unable to assess, [  ] Dementia [  ] Other-  EXTREMITIES: [x  ] 2+ Peripheral Pulses, No clubbing, No cyanosis,  [  ] edema B/L lower EXT. [  ] PVD stasis skin changes B/L Lower EXT, [  ] wound  LYMPH: No lymphadenopathy noted  SKIN:  [x  ] No rashes or lesions, [  ] Pressure Ulcers, [  ] ecchymosis, [  ] Skin Tears, [  ] Other    DIET: DASH/TLC    LABS  WBC 18.51 (H), which decreased from 21.93 yesterday.  H/H 11.9/34.1, which decreased from yesterday (13.4/39.0).  Plt 113 (L), which is stable from yesterday at 112.  A1c 5.8  Low phosphorus at 1.6.   Normal Mg 2.6.                         11.9   18.51 )-----------( 113      ( 23 Dec 2019 05:17 )             34.1         142  |  113<H>  |  23  ----------------------------<  180<H>  4.2   |  24  |  0.79    Ca    7.8<L>      23 Dec 2019 05:17  Phos  1.6       Mg     2.6         Urinalysis Basic - ( 21 Dec 2019 00:56 )  Color: Yellow / Appearance: Slightly Turbid / S.010 / pH: x  Gluc: x / Ketone: Negative  / Bili: Negative / Urobili: Negative   Blood: x / Protein: 25 mg/dL / Nitrite: Negative   Leuk Esterase: Small / RBC: 0-2 /HPF / WBC 26-50   Sq Epi: x / Non Sq Epi: Negative / Bacteria: Few      Thyroid Stimulating Hormone, Serum: 0.55 uIU/mL (19 @ 05:26)  Lipase, Serum: 45 U/L (19 @ 19:23)    ABG (): pH 7.41, pCO2 35, pO2 86, HCO3 23, FiO2 30, O2 sat 96    MICROBIOLOGY  Urine Culture (): < 10,000 CFU/mL normal urogenital cathi  Blood Culture (): Escherichia coli (Growth in aerobic and anaerobic bottles)  Flu A/B, RSV (): Negative    RADIOLOGY & ADDITIONAL TESTS:  CT Abdomen and Pelvis without Contrast (): Evaluation of the solid organs and GI tract is limited without oral and IV contrast. Right middle lobe and right lower lobe groundglass opacities with underlying reticular changes, likely chronic. Recommend follow-up to ensure stability or resolution and exclude acute pneumonia. Mild constipation. No bowel obstruction.   Enlarged prostate. Nonspecific bilateral perinephric inflammatory stranding. Nonobstructing left renal calculi.  CT Head without Contrast (): No acute intracranial bleeding, mass effect, or shift. Mild chronic microvascular ischemic changes.   CXR (): No acute infiltrate  TTE (): Normal left ventricular size, wall thickness, and left ventricular systolic function with an ejection fraction of 60-65%. Grade 1 diastolic dysfunction. Trivial mitral regurgitation, trivial tricuspid regurgitation. The aortic valve was not well visualized but appears mildly thickened leaflets. No significant pericardial effusion. Normal right atrial and right ventricular size and systolic function.    HEALTH ISSUES - PROBLEM Dx:  Respiratory failure  Prostatitis, acute  Bacteremia due to Escherichia coli  BPH (benign prostatic hyperplasia)  Hypotension  Prophylactic measure  GERD (gastroesophageal reflux disease)  HLD (hyperlipidemia)  Stented coronary artery  Chronic obstructive pulmonary disease  YUE (acute kidney injury)  Sepsis      Consultant(s) Notes Reviewed:  [ x ] YES     Care Discussed with [X] Consultants  [x  ] Patient  [x  ] Family [  ] HCP [  ]   [  ] Social Service  [x  ] RN, [  ] Physical Therapy,[  ] Palliative care team  DVT PPX: [ x ] Lovenox, [  ] S C Heparin, [  ] Coumadin, [  ] Xarelto, [  ] Eliquis, [  ] Pradaxa, [  ] IV Heparin drip, [  ] SCD [  ] Contraindication 2 to GI Bleed,[  ] Ambulation [  ] Contraindicated 2 to  bleed [  ] Contraindicated 2 to Brain Bleed  Advanced directive: [ x ] None, [  ] DNR/DNI

## 2019-12-23 NOTE — PROGRESS NOTE ADULT - ASSESSMENT
74M with asthma, COPD, CAD s/p 5 stents, preserved LVEF who presents after recent prostate bx, now with septic shock 2/2 E. Coli bacteremia,  YUE, lactic acidosis. Intubated s/p acute bronchospasm possibly from asthma. New afib yesterday.  Patient with known prior GI bleeds on blood thinners.    Suggest:  1. Hypotension - Likely from infectious shock  No BP meds.  Levophed for hypotension.  Ween as tolerated  IVF as needed - no Gross evidence for CHF    2. New Afib.  Stop aspirin and start Eliquis 5mg bid.  Continue on Protonix 40mg daily.    3. Bacteremia  E. Coli bacteremia now on ertapenem. Abx per ICU team    4. DVT ppx    5. Will follow.  < from: TTE Echo Doppler w/o Cont (12.22.19 @ 11:46) >  Conclusion:   1. This a technically limited study.  2. There is normal left ventricular size and left ventricular systolic function with an ejection fraction of 60-65%.  3. There is grade 1 diastolic dysfunction.  4. There is normal left ventricular wall thickness.  5. There is trivial mitral regurgitation.  6. There is trivial tricuspid regurgitation.  7. The aortic valve was not well visualized but appears mildly thickened leaflets. The aortic valve velocities were not measured on this study.  8. There is no significant pericardial effusion.  9. There appears to be normal right atrial and right ventricular size and systolic function.    < end of copied text > 74M with asthma, COPD, CAD s/p 5 stents, preserved LVEF who presents after recent prostate bx, now with septic shock 2/2 E. Coli bacteremia,  YUE, lactic acidosis. Intubated s/p acute bronchospasm possibly from asthma. New afib yesterday.  Patient with known prior GI bleeds on blood thinners.    Suggest:  1. Hypotension - Likely from infectious shock now improving   No BP meds yet.  Now off Levophed   IVF as needed - no Gross evidence for CHF    2. New Afib.  Stop aspirin and start Eliquis 5mg bid.  Continue on Protonix 40mg daily.    3. Bacteremia  E. Coli bacteremia now on ertapenem. Abx per ICU team    4. DVT ppx    5. Will follow.  < from: TTE Echo Doppler w/o Cont (12.22.19 @ 11:46) >  Conclusion:   1. This a technically limited study.  2. There is normal left ventricular size and left ventricular systolic function with an ejection fraction of 60-65%.  3. There is grade 1 diastolic dysfunction.  4. There is normal left ventricular wall thickness.  5. There is trivial mitral regurgitation.  6. There is trivial tricuspid regurgitation.  7. The aortic valve was not well visualized but appears mildly thickened leaflets. The aortic valve velocities were not measured on this study.  8. There is no significant pericardial effusion.  9. There appears to be normal right atrial and right ventricular size and systolic function.    < end of copied text >

## 2019-12-23 NOTE — DISCHARGE NOTE PROVIDER - NSDCACTIVITY_GEN_ALL_CORE
No restrictions Showering allowed/No heavy lifting/straining/Walking - Indoors allowed/No restrictions/Bathing allowed

## 2019-12-23 NOTE — CHART NOTE - NSCHARTNOTEFT_GEN_A_CORE
Patient has bacteremia. Needs tong term IV antibiotics. PICC placement. An informed consent obtained.

## 2019-12-23 NOTE — DISCHARGE NOTE PROVIDER - PROVIDER TOKENS
PROVIDER:[TOKEN:[[id number]]],PROVIDER:[TOKEN:[[id number]]],PROVIDER:[TOKEN:[[id number]]],PROVIDER:[TOKEN:[[id number]]],PROVIDER:[TOKEN:[[id number]]]

## 2019-12-23 NOTE — DISCHARGE NOTE PROVIDER - NSDCMRMEDTOKEN_GEN_ALL_CORE_FT
Aspir 81 81 mg oral tablet: 1 tab(s) orally once a day  atorvastatin 40 mg oral tablet: 1 tab(s) orally once a day (at bedtime)  budesonide 0.5 mg/2 mL inhalation suspension: 2 milliliter(s) inhaled 2 times a day, As Needed  DuoNeb 0.5 mg-2.5 mg/3 mL inhalation solution: 1 milliliter(s) inhaled every 6 hours  losartan 25 mg oral tablet: 1 tab(s) orally once a day  predniSONE 5 mg oral tablet: 1 tab(s) orally once a day, As Needed  Protonix 40 mg oral delayed release tablet: 1 tab(s) orally once a day  silodosin 8 mg oral capsule: 1 cap(s) orally once a day  Spiriva 18 mcg inhalation capsule: 1  inhaled once a day  Symbicort 160 mcg-4.5 mcg/inh inhalation aerosol: 2  inhaled 2 times a day  Ventolin HFA 90 mcg/inh inhalation aerosol: 2 puff(s) inhaled 4 times a day, As Needed  Vitamin B-12 500 mcg oral tablet:  orally   Vitamin B6 100 mg oral tablet: 1 tab(s) orally once a day  Vitamin D: 1000 international unit(s) orally once a day apixaban 5 mg oral tablet: 1 tab(s) orally 2 times a day  atorvastatin 40 mg oral tablet: 1 tab(s) orally once a day (at bedtime)  budesonide 0.5 mg/2 mL inhalation suspension: 2 milliliter(s) inhaled 2 times a day, As Needed  DuoNeb 0.5 mg-2.5 mg/3 mL inhalation solution: 1 milliliter(s) inhaled every 6 hours  ertapenem 1 g injection: 1 gram(s) injectable once a day over 30  minutes  CBC once per week  predniSONE 20 mg oral tablet: 1 tab(s) orally once a day   predniSONE 5 mg oral tablet: 1 tab(s) orally once a day, As Needed  Protonix 40 mg oral delayed release tablet: 1 tab(s) orally once a day  silodosin 8 mg oral capsule: 1 cap(s) orally once a day  Spiriva 18 mcg inhalation capsule: 1  inhaled once a day  Symbicort 160 mcg-4.5 mcg/inh inhalation aerosol: 2  inhaled 2 times a day  Ventolin HFA 90 mcg/inh inhalation aerosol: 2 puff(s) inhaled 4 times a day, As Needed  Vitamin B-12 500 mcg oral tablet:  orally   Vitamin B6 100 mg oral tablet: 1 tab(s) orally once a day  Vitamin D: 1000 international unit(s) orally once a day apixaban 5 mg oral tablet: 1 tab(s) orally 2 times a day  atorvastatin 40 mg oral tablet: 1 tab(s) orally once a day (at bedtime)  budesonide 0.5 mg/2 mL inhalation suspension: 2 milliliter(s) inhaled 2 times a day, As Needed  DuoNeb 0.5 mg-2.5 mg/3 mL inhalation solution: 1 milliliter(s) inhaled every 6 hours  ertapenem 1 g injection: 1 gram(s) injectable once a day over 30  minutes  CBC once per week  lactobacillus acidophilus oral capsule: 1 tab(s) orally 2 times a day  predniSONE 20 mg oral tablet: 1 tab(s) orally once a day   predniSONE 5 mg oral tablet: 1 tab(s) orally once a day, As Needed  Protonix 40 mg oral delayed release tablet: 1 tab(s) orally once a day  silodosin 8 mg oral capsule: 1 cap(s) orally once a day  Spiriva 18 mcg inhalation capsule: 1  inhaled once a day  Symbicort 160 mcg-4.5 mcg/inh inhalation aerosol: 2  inhaled 2 times a day  Ventolin HFA 90 mcg/inh inhalation aerosol: 2 puff(s) inhaled 4 times a day, As Needed  Vitamin B-12 500 mcg oral tablet:  orally   Vitamin B6 100 mg oral tablet: 1 tab(s) orally once a day  Vitamin D: 1000 international unit(s) orally once a day

## 2019-12-23 NOTE — DISCHARGE NOTE PROVIDER - HOSPITAL COURSE
FROM ADMISSION H+P:     HPI: 73 YO M with PMHX asthma, COPD, former smoker, hx of benign lung cancer R lobe surgically removed at [facility]) CAD (s/p 5 stents ~2001), GERD, HLD, HTN presents after shakes, vomiting, confusion. Pt states he has prostate biopsy 3 days ago, started on antibiotics, the next day developed shaking chills, then became slightly SOB, used a nebulizer which helped his shortness of breath. Pt saw his pulmonologist yesterday, as per pt pulmonologist states everything was ok from a pulmonary standpoint and to continue using his nebulizer. Today pt developed shivering shaking chills again, NBNR emesis, confusion. Pt's wife found him confused, came to the ER for confusion. Of note, pt states he had a cough with sputum production last week. Pt denies fevers at home or sick contacts.         In the ED: Vitals sig for temp of 104.7 F. Labs sig for Plt of 114, bands of 29, BUN 27, Cr 1.40, glucose 124, alk phos 128, AST 39, eGFR 49, lactate 2.8, FLU A, B, RSV neg. CXR: No acute infiltrate. CT head: No acute intracranial bleeding, mass effect, or shift. Mild chronic microvascular ischemic changes. CT A/P: Right middle lobe and right lower lobe groundglass opacities with underlying reticular changes, likely chronic. Recommend follow-up to ensure stability or resolution and exclude acute pneumonia. Mild constipation. No bowel obstruction. Enlarged prostate. Nonspecific bilateral perinephric inflammatory stranding. Nonobstructing left renal calculi. Given: Rocephin x1, Azithromycin x1,  Zosyn x1,  ns bolus x3, tylenol x1, Duo neb x1. Of note, RRT was called for hypotension with BP of 63/48. Patient was seen by RRT team and ICU PA. Was given normal saline bolus x2 and midodrine 10mg x1 with improvement in BP of 105/56 (20 Dec 2019 21:52)            ---    HOSPITAL COURSE: 74M h/o nephrolithiasis, GERD, HTN, HLD, CAD s/p stents x5, asthma and COPD with multiple prior hospitalizations (no previous intubations), R lung tumor s/p VATS w/ resection, recent prostate bx 12/17 admitted with septic shock 2/2 E.coli bacteremia from hematogenous spread s/p prostate bx  c/b acute bronchospasm, status asthmaticus suspected due to SIRS after spiking fever to 102 requiring intubation now successfully extubated.             ---    CONSULTANTS:     ID- Monico    Cardio- Mikayla FROM ADMISSION H+P:     HPI: 75 YO M with PMHX asthma, COPD, former smoker, hx of benign lung cancer R lobe surgically removed at [facility]) CAD (s/p 5 stents ~2001), GERD, HLD, HTN presents after shakes, vomiting, confusion. Pt states he has prostate biopsy 3 days ago, started on antibiotics, the next day developed shaking chills, then became slightly SOB, used a nebulizer which helped his shortness of breath. Pt saw his pulmonologist yesterday, as per pt pulmonologist states everything was ok from a pulmonary standpoint and to continue using his nebulizer. Today pt developed shivering shaking chills again, NBNR emesis, confusion. Pt's wife found him confused, came to the ER for confusion. Of note, pt states he had a cough with sputum production last week. Pt denies fevers at home or sick contacts.         In the ED: Vitals sig for temp of 104.7 F. Labs sig for Plt of 114, bands of 29, BUN 27, Cr 1.40, glucose 124, alk phos 128, AST 39, eGFR 49, lactate 2.8, FLU A, B, RSV neg. CXR: No acute infiltrate. CT head: No acute intracranial bleeding, mass effect, or shift. Mild chronic microvascular ischemic changes. CT A/P: Right middle lobe and right lower lobe groundglass opacities with underlying reticular changes, likely chronic. Recommend follow-up to ensure stability or resolution and exclude acute pneumonia. Mild constipation. No bowel obstruction. Enlarged prostate. Nonspecific bilateral perinephric inflammatory stranding. Nonobstructing left renal calculi. Given: Rocephin x1, Azithromycin x1,  Zosyn x1,  ns bolus x3, tylenol x1, Duo neb x1. Of note, RRT was called for hypotension with BP of 63/48. Patient was seen by RRT team and ICU PA. Was given normal saline bolus x2 and midodrine 10mg x1 with improvement in BP of 105/56 (20 Dec 2019 21:52)            ---    HOSPITAL COURSE: 74M h/o nephrolithiasis, GERD, HTN, HLD, CAD s/p stents x5, asthma and COPD with multiple prior hospitalizations (no previous intubations), R lung tumor s/p VATS w/ resection, recent prostate bx 12/17 admitted to ICU with septic shock 2/2 E.coli bacteremia from hematogenous spread s/p prostate bx  c/b acute bronchospasm, status asthmaticus suspected due to SIRS after spiking fever to 102 requiring intubation and extubated successfully extubated after 1 day with improvement. Patient required IV antibiotics for 4 weeks and had right upper arm midline placed on 12/23/19. He will be on Ertapenem 1 g IV qd until 1/19/20. Pt was educated on how to administer IV antibiotics. He is medically stable for discharge home with close follow up.             ---    CONSULTANTS:     ID- Monico    Cardio- Mikayla FROM ADMISSION H+P:     HPI: 73 YO M with PMHX asthma, COPD, former smoker, hx of benign lung cancer R lobe surgically removed at [facility]) CAD (s/p 5 stents ~2001), GERD, HLD, HTN presents after shakes, vomiting, confusion. Pt states he has prostate biopsy 3 days ago, started on antibiotics, the next day developed shaking chills, then became slightly SOB, used a nebulizer which helped his shortness of breath. Pt saw his pulmonologist yesterday, as per pt pulmonologist states everything was ok from a pulmonary standpoint and to continue using his nebulizer. Today pt developed shivering shaking chills again, NBNR emesis, confusion. Pt's wife found him confused, came to the ER for confusion. Of note, pt states he had a cough with sputum production last week. Pt denies fevers at home or sick contacts.             ---    HOSPITAL COURSE: In the ED: Vitals sig for temp of 104.7 F. Labs sig for Plt of 114, bands of 29, BUN 27, Cr 1.40, glucose 124, alk phos 128, AST 39, eGFR 49, lactate 2.8, FLU A, B, RSV neg. CXR: No acute infiltrate. CT head: No acute intracranial bleeding, mass effect, or shift. Mild chronic microvascular ischemic changes. CT A/P: Right middle lobe and right lower lobe groundglass opacities with underlying reticular changes, likely chronic. Recommend follow-up to ensure stability or resolution and exclude acute pneumonia. Mild constipation. No bowel obstruction. Enlarged prostate. Nonspecific bilateral perinephric inflammatory stranding. Nonobstructing left renal calculi. Given: Rocephin x1, Azithromycin x1,  Zosyn x1,  ns bolus x3, tylenol x1, Duo neb x1. Of note, RRT was called for hypotension with BP of 63/48. Patient was seen by RRT team and ICU PA. Was given normal saline bolus x2 and midodrine 10mg x1 with improvement in BP of 105/56 (20 Dec 2019 21:52) 74M h/o nephrolithiasis, GERD, HTN, HLD, CAD s/p stents x5, asthma and COPD with multiple prior hospitalizations (no previous intubations), R lung tumor s/p VATS w/ resection, recent prostate bx 12/17 admitted to ICU with septic shock 2/2 E.coli bacteremia from hematogenous spread s/p prostate bx  c/b acute bronchospasm, status asthmaticus suspected due to SIRS after spiking fever to 102 requiring intubation and extubated successfully extubated after 1 day with improvement. Patient developed A fib with RVR on 12/22, requiring lopressor 5 mg IVp and cardizem 10 mg IVp, which resolved post-extubation. Patient should follow up with cardiology Dr. Degroot as an outpatient. Eliquis 5 mg PO BID was started on 12/23. Patient required IV antibiotics for 4 weeks and had right upper arm midline placed on 12/23/19. He will be on Ertapenem 1 g IV qd until 1/19/20. Pt was educated on how to administer IV antibiotics. He is medically stable for discharge home with close follow up.             ---    CONSULTANTS:     ID- Monico    Cardio- Mikayla        VITAL SIGNS            PHYSICAL EXAM FROM ADMISSION H+P:     HPI: 73 YO M with PMHX asthma, COPD, former smoker, hx of benign lung cancer R lobe surgically removed at [facility]) CAD (s/p 5 stents ~2001), GERD, HLD, HTN presents after shakes, vomiting, confusion. Pt states he has prostate biopsy 3 days ago, started on antibiotics, the next day developed shaking chills, then became slightly SOB, used a nebulizer which helped his shortness of breath. Pt saw his pulmonologist yesterday, as per pt pulmonologist states everything was ok from a pulmonary standpoint and to continue using his nebulizer. Today pt developed shivering shaking chills again, NBNR emesis, confusion. Pt's wife found him confused, came to the ER for confusion. Of note, pt states he had a cough with sputum production last week. Pt denies fevers at home or sick contacts.             ---    HOSPITAL COURSE: In the ED: Vitals sig for temp of 104.7 F. Labs sig for Plt of 114, bands of 29, BUN 27, Cr 1.40, glucose 124, alk phos 128, AST 39, eGFR 49, lactate 2.8, FLU A, B, RSV neg. CXR: No acute infiltrate. CT head: No acute intracranial bleeding, mass effect, or shift. Mild chronic microvascular ischemic changes. CT A/P: Right middle lobe and right lower lobe groundglass opacities with underlying reticular changes, likely chronic. Recommend follow-up to ensure stability or resolution and exclude acute pneumonia. Mild constipation. No bowel obstruction. Enlarged prostate. Nonspecific bilateral perinephric inflammatory stranding. Nonobstructing left renal calculi. Given: Rocephin x1, Azithromycin x1,  Zosyn x1,  ns bolus x3, tylenol x1, Duo neb x1. Of note, RRT was called for hypotension with BP of 63/48. Patient was seen by RRT team and ICU PA. Was given normal saline bolus x2 and midodrine 10mg x1 with improvement in BP of 105/56 (20 Dec 2019 21:52) 74M h/o nephrolithiasis, GERD, HTN, HLD, CAD s/p stents x5, asthma and COPD with multiple prior hospitalizations (no previous intubations), R lung tumor s/p VATS w/ resection, recent prostate bx 12/17 admitted to ICU with septic shock 2/2 E.coli bacteremia from hematogenous spread s/p prostate bx  c/b acute bronchospasm, status asthmaticus suspected due to SIRS after spiking fever to 102 requiring intubation and extubated successfully extubated after 1 day with improvement. Patient developed A fib with RVR on 12/22, requiring lopressor 5 mg IVp and cardizem 10 mg IVp, which resolved post-extubation. Patient should follow up with cardiology Dr. Degroot as an outpatient. Eliquis 5 mg PO BID was started on 12/23. Patient was sent Eliquis 5 mg PO BID to outpatient pharmacy. Patient required IV antibiotics for 4 weeks and had right upper arm midline placed on 12/23/19. He will be on Ertapenem 1 g IV qd until 1/19/20. Pt was educated on how to administer IV antibiotics. He is medically stable for discharge home with close follow up.             ---    CONSULTANTS:     ID- Monico    Cardio- Mikayla        VITAL SIGNS    Vital Signs Last 24 Hrs    T(C): 36.6 (24 Dec 2019 07:30), Max: 36.6 (23 Dec 2019 11:57)    T(F): 97.8 (24 Dec 2019 07:30), Max: 97.9 (23 Dec 2019 17:07)    HR: 68 (24 Dec 2019 08:48) (63 - 78)    BP: 111/68 (24 Dec 2019 07:30) (97/51 - 118/54)    BP(mean): 81 (23 Dec 2019 14:00) (71 - 81)    RR: 17 (24 Dec 2019 07:30) (17 - 26)    SpO2: 91% (24 Dec 2019 08:48) (90% - 98%)        PHYSICAL EXAM    PHYSICAL EXAM    GENERAL:  [x  ] NAD , [x  ] well appearing, [  ] Agitated, [  ] Drowsy,  [  ] Lethargy, [  ] confused     HEAD:  [ x ] Normal, [  ] Other    EYES:  [x  ] EOMI, [x  ] PERRLA, [ x ] conjunctiva and sclera clear normal, [  ] Other,  [  ] Pallor,[  ] Discharge    ENMT:  [x  ] Normal, [x  ] Moist mucous membranes, [  ] Good dentition, [  ] No Thrush    NECK:  [x  ] Supple, [ x ] No JVD, [ x ] Normal thyroid, [  ] Lymphadenopathy [  ] Other    CHEST/LUNG:  [ x ] Clear to auscultation bilaterally, [x ] Breath Sounds equal B/L / Decrease, [  ] poor effort  [ x ] No rales, [x  ] few scattered B/L rhonchi  [ x ]  No wheezing,     HEART:  [x] Regular rate and rhythm, [  ] tachycardia, [  ] Bradycardia,  [  ] irregular  [  x] No murmurs, No rubs, No gallops, [  ] PPM in place (Mfr:  )    ABDOMEN:  [x  ] Soft, [ x ] Nontender, [ x ] Nondistended, [ x ] No mass, [ x ] Bowel sounds present, [x  ] obese    NERVOUS SYSTEM:  [ x ] Alert & Oriented X3, [x ] Nonfocal  [  ] Confusion  [  ] Encephalopathic [  ] Sedated [  ] Unable to assess, [  ] Dementia [  ] Other-    EXTREMITIES: [x  ] 2+ Peripheral Pulses, No clubbing, No cyanosis,  [  ] edema B/L lower EXT. [  ] PVD stasis skin changes B/L Lower EXT, [  ] wound    LYMPH: No lymphadenopathy noted    SKIN:  [x  ] No rashes or lesions, [  ] Pressure Ulcers, [  ] ecchymosis, [  ] Skin Tears, [  ] Other FROM ADMISSION H+P:     HPI: 73 YO M with PMHX asthma, COPD, former smoker, hx of benign lung cancer R lobe surgically removed at [facility]) CAD (s/p 5 stents ~2001), GERD, HLD, HTN presents after shakes, vomiting, confusion. Pt states he has prostate biopsy 3 days ago, started on antibiotics, the next day developed shaking chills, then became slightly SOB, used a nebulizer which helped his shortness of breath. Pt saw his pulmonologist yesterday, as per pt pulmonologist states everything was ok from a pulmonary standpoint and to continue using his nebulizer. Today pt developed shivering shaking chills again, NBNR emesis, confusion. Pt's wife found him confused, came to the ER for confusion. Of note, pt states he had a cough with sputum production last week. Pt denies fevers at home or sick contacts.             ---    HOSPITAL COURSE: In the ED: Vitals sig for temp of 104.7 F. Labs sig for Plt of 114, bands of 29, BUN 27, Cr 1.40, glucose 124, alk phos 128, AST 39, eGFR 49, lactate 2.8, FLU A, B, RSV neg. CXR: No acute infiltrate. CT head: No acute intracranial bleeding, mass effect, or shift. Mild chronic microvascular ischemic changes. CT A/P: Right middle lobe and right lower lobe groundglass opacities with underlying reticular changes, likely chronic. Recommend follow-up to ensure stability or resolution and exclude acute pneumonia. Mild constipation. No bowel obstruction. Enlarged prostate. Nonspecific bilateral perinephric inflammatory stranding. Nonobstructing left renal calculi. Given: Rocephin x1, Azithromycin x1,  Zosyn x1,  ns bolus x3, tylenol x1, Duo neb x1. Of note, RRT was called for hypotension with BP of 63/48. Patient was seen by RRT team and ICU PA. Was given normal saline bolus x2 and midodrine 10mg x1 with improvement in BP of 105/56 (20 Dec 2019 21:52) 74M h/o nephrolithiasis, GERD, HTN, HLD, CAD s/p stents x5, asthma and COPD with multiple prior hospitalizations (no previous intubations), R lung tumor s/p VATS w/ resection, recent prostate bx 12/17 admitted to ICU with septic shock 2/2 E.coli bacteremia from hematogenous spread s/p prostate bx  c/b acute bronchospasm, status asthmaticus suspected due to SIRS after spiking fever to 102 requiring intubation and extubated successfully extubated after 1 day with improvement. Patient developed A fib with RVR on 12/22, requiring lopressor 5 mg IVp and cardizem 10 mg IVp, which resolved post-extubation. Patient should follow up with cardiology Dr. Degroot as an outpatient. Eliquis 5 mg PO BID was started on 12/23. Patient was sent Eliquis 5 mg PO BID to outpatient pharmacy. Patient required IV antibiotics for 4 weeks and had right upper arm midline placed on 12/23/19. He will be on Ertapenem 1 g IV qd until 1/19/20. Pt was educated on how to administer IV antibiotics. He is medically stable for discharge home with close follow up.             ---    CONSULTANTS:     ID- Monico Degroot FROM ADMISSION H+P:     HPI: 73 YO M with PMHX asthma,  Kidney stone, lithotripsy , GI Bleed ,COPD, former smoker, hx of benign lung cancer R lobe surgically removed at [facility]) CAD (s/p 5 stents ~2001), GERD, HLD, HTN presents after shakes, vomiting, confusion. Pt states he has prostate biopsy 3 days ago, started on antibiotics, the next day developed shaking chills, then became slightly SOB, used a nebulizer which helped his shortness of breath. Pt saw his pulmonologist yesterday, as per pt pulmonologist states everything was ok from a pulmonary standpoint and to continue using his nebulizer. Today pt developed shivering shaking chills again, NBNR emesis, confusion. Pt's wife found him confused, came to the ER for confusion. Of note, pt states he had a cough with sputum production last week. Pt denies fevers at home or sick contacts.             ---    HOSPITAL COURSE: In the ED: Vitals sig for temp of 104.7 F. Labs sig for Plt of 114, bands of 29, BUN 27, Cr 1.40, glucose 124, alk phos 128, AST 39, eGFR 49, lactate 2.8, FLU A, B, RSV neg. CXR: No acute infiltrate. CT head: No acute intracranial bleeding, mass effect, or shift. Mild chronic microvascular ischemic changes. CT A/P: Right middle lobe and right lower lobe groundglass opacities with underlying reticular changes, likely chronic. Recommend follow-up to ensure stability or resolution and exclude acute pneumonia. Mild constipation. No bowel obstruction. Enlarged prostate. Nonspecific bilateral perinephric inflammatory stranding. Nonobstructing left renal calculi. Given: Rocephin x1, Azithromycin x1,  Zosyn x1,  ns bolus x3, tylenol x1, Duo neb x1. Of note, RRT was called for hypotension with BP of 63/48. Patient was seen by RRT team and ICU PA. Was given normal saline bolus x2 and midodrine 10mg x1 with improvement in BP of 105/56 (20 Dec 2019 21:52) 74M h/o nephrolithiasis, GERD, HTN, HLD, CAD s/p stents x5, asthma and COPD with multiple prior hospitalizations (no previous intubations), R lung tumor s/p VATS w/ resection, recent prostate bx 12/17 admitted to ICU with septic shock 2/2 E.coli bacteremia from hematogenous spread s/p prostate bx  c/b acute bronchospasm, status asthmaticus suspected due to SIRS after spiking fever to 102 requiring intubation and extubated successfully extubated after 1 day with improvement. Patient developed A fib with RVR on 12/22, requiring lopressor 5 mg IVp and cardizem 10 mg IVp, which resolved post-extubation. Patient should follow up with cardiology Dr. Degroot as an outpatient. Eliquis 5 mg PO BID was started on 12/23. Patient was sent Eliquis 5 mg PO BID to outpatient pharmacy. Patient required IV antibiotics for 4 weeks and had right upper arm midline placed on 12/23/19. He will be on Ertapenem 1 g IV qd until 1/19/20. Pt was educated on how to administer IV antibiotics. He is medically stable for discharge home with close follow up.             ---    CONSULTANTS:     ID- Monico Degroot

## 2019-12-23 NOTE — PHYSICAL THERAPY INITIAL EVALUATION ADULT - PERTINENT HX OF CURRENT PROBLEM, REHAB EVAL
73 YO M with PMHX asthma, COPD, former smoker, hx of benign lung cancer R lobe surgically removed at [facility]) CAD (s/p 5 stents ~2001), GERD, HLD, HTN presents after shakes, vomiting, confusion

## 2019-12-23 NOTE — PROGRESS NOTE ADULT - PROBLEM SELECTOR PLAN 4
Resolved -Pre Renal /Hemodynamic, Poor PO intake- Cr  Normal   -HOLD Losartan with YUE & Hypotension  -BMP in AM   -S/P IV Fluids Resolved. Likely pre-renal/hemodynamic as evidenced by Poor PO intake.  -Cr now normal   -Continue to hold Losartan with YUE & hypotension.  -S/P IV fluids.

## 2019-12-23 NOTE — PROGRESS NOTE ADULT - SUBJECTIVE AND OBJECTIVE BOX
Patient is a 74y old  Male who presents with a chief complaint of Fever (23 Dec 2019 07:25)    24 hour events: Pt extubated successfully yesterday. Pt went into AFib with RVR in yesterday AM s/p lopressor 5mg IVP x2 and cardizem 10mg IVP x1, resolved post-extubation. Now in NSR. Liz placed for urinary retention.     Review of Systems:  Constitutional: no fever, chills, fatigue  Neuro: no headache, numbness, weakness  Resp: no cough, wheezing, shortness of breath  CVS: no chest pain, palpitations, leg swelling  GI: no abdominal pain, nausea, vomiting, diarrhea   : no dysuria, frequency, incontinence  Skin: no itching, burning, rashes, or lesions   Msk: no joint pain or swelling  Psych: no depression, anxiety    T(F): 98 (12-23-19 @ 07:30), Max: 99.3 (12-22-19 @ 10:00)  HR: 62 (12-23-19 @ 07:00) (61 - 160)  BP: 94/55 (12-23-19 @ 07:00) (83/52 - 139/63)  RR: 23 (12-23-19 @ 07:00) (13 - 36)  SpO2: 97% (12-23-19 @ 07:00) (92% - 100%)  Wt(kg): --    Mode: 40% V/M        I&O's Summary    12-22 @ 07:01  -  12-23 @ 07:00  --------------------------------------------------------  IN: 2326.8 mL / OUT: 1380 mL / NET: 946.8 mL      PHYSICAL EXAM  General:   CNS:   HEENT:   Resp:   CVS:   Abd:   Ext:   Skin:     MEDICATIONS  ertapenem  IVPB   ertapenem  IVPB IV Intermittent    midodrine Oral  norepinephrine Infusion IV Continuous  tamsulosin Oral    atorvastatin Oral  predniSONE   Tablet Oral    ALBUTerol    0.083% Nebulizer PRN  albuterol/ipratropium for Nebulization Nebulizer  albuterol/ipratropium for Nebulization. Nebulizer  budesonide 160 MICROgram(s)/formoterol 4.5 MICROgram(s) Inhaler Inhalation    acetaminophen   Tablet .. Oral PRN      aspirin  chewable Oral  enoxaparin Injectable SubCutaneous    pantoprazole    Tablet Oral      potassium phosphate IVPB IV Intermittent        lactobacillus acidophilus Oral                          11.9   18.51 )-----------( 113      ( 23 Dec 2019 05:17 )             34.1       12-23    142  |  113<H>  |  23  ----------------------------<  180<H>  4.2   |  24  |  0.79    Ca    7.8<L>      23 Dec 2019 05:17  Phos  1.6     12-23  Mg     2.6     12-23                .Urine Clean Catch (Midstream)   <10,000 CFU/mL Normal Urogenital Yancy -- 12-21 @ 10:54  .Blood Blood-Peripheral   Growth in aerobic and anaerobic bottles: Escherichia coli  "Due to technical problems, Proteus sp. will Not be reported as part of  the BCID panel until further notice"  ***Blood Panel PCR results on this specimen are available  approximately 3 hours after the Gram stain result.***  Gram stain, PCR, and/or culture results may not always  correspond due to difference in methodologies.  ************************************************************  This PCR assay was performed using [employer].  The following targets are tested for: Enterococcus,  vancomycin resistant enterococci, Listeria monocytogenes,  coagulase negative staphylococci, S. aureus,  methicillin resistant S. aureus, Streptococcus agalactiae  (Group B), S. pneumoniae, S.pyogenes (Group A),  Acinetobacter baumannii, Enterobacter cloacae, E. coli,  Klebsiella oxytoca, K. pneumoniae, Proteus sp.,  Serratia marcescens, Haemophilus influenzae,  Neisseria meningitidis, Pseudomonas aeruginosa, Candida  albicans, C. glabrata, C krusei, C parapsilosis,  C. tropicalis and the KPC resistance gene.   Growth in aerobic bottle: Gram Negative Rods  Growth in anaerobic bottle: Gram Negative Rods 12-21 @ 00:55      Rapid RVP Result: NotDetec (12-21 @ 10:12)    ABG - ( 22 Dec 2019 06:17 )  pH, Arterial: 7.41  pH, Blood: x     /  pCO2: 35    /  pO2: 86    / HCO3: 23    / Base Excess: -2.2  /  SaO2: 96          Bedside Lung U/S: + lung sliding with A-line predominance b/l with trace b/l pleural effusion, no consolidations       CENTRAL LINE: N     NEAL: Y      DATE INSERTED: 12/21/19       REMOVE: N  A-LINE: N        GLOBAL ISSUE/BEST PRACTICE:  Analgesia: tylenol prn  Sedation: n/a  CAM-ICU: negative  HOB elevation: yes  Stress ulcer prophylaxis: n/a  VTE prophylaxis: lovenox  Glycemic control: n/a  Nutrition: DASH/TLC    CODE STATUS: Full code Patient is a 74y old  Male who presents with a chief complaint of Fever (23 Dec 2019 07:25)    24 hour events: Pt extubated successfully yesterday. Pt went into AFib with RVR in yesterday AM s/p lopressor 5mg IVP x2 and cardizem 10mg IVP x1, resolved post-extubation. Now in NSR. De Jesus placed for urinary retention.     Review of Systems:  Constitutional: no fever, chills, fatigue  Neuro: no headache, numbness, weakness  Resp: no cough, wheezing, shortness of breath  CVS: no chest pain, palpitations, leg swelling  GI: no abdominal pain, nausea, vomiting, diarrhea   : no dysuria, frequency, incontinence  Skin: no itching, burning, rashes, or lesions   Msk: no joint pain or swelling  Psych: no depression, anxiety    T(F): 98 (12-23-19 @ 07:30), Max: 99.3 (12-22-19 @ 10:00)  HR: 62 (12-23-19 @ 07:00) (61 - 160)  BP: 94/55 (12-23-19 @ 07:00) (83/52 - 139/63)  RR: 23 (12-23-19 @ 07:00) (13 - 36)  SpO2: 97% (12-23-19 @ 07:00) (92% - 100%)  Wt(kg): --    Mode: 40% V/M        I&O's Summary    12-22 @ 07:01  -  12-23 @ 07:00  --------------------------------------------------------  IN: 2326.8 mL / OUT: 1380 mL / NET: 946.8 mL      Physical Exam:     Gen: nad  CV: rrr, no murmurs  Pulm: clear lungs b/l w/o wheeze/rhonchi  GI: soft, nt, nd  Ext: no edema  Skin: no rash    MEDICATIONS  ertapenem  IVPB   ertapenem  IVPB IV Intermittent    midodrine Oral  norepinephrine Infusion IV Continuous  tamsulosin Oral    atorvastatin Oral  predniSONE   Tablet Oral    ALBUTerol    0.083% Nebulizer PRN  albuterol/ipratropium for Nebulization Nebulizer  albuterol/ipratropium for Nebulization. Nebulizer  budesonide 160 MICROgram(s)/formoterol 4.5 MICROgram(s) Inhaler Inhalation    acetaminophen   Tablet .. Oral PRN      aspirin  chewable Oral  enoxaparin Injectable SubCutaneous    pantoprazole    Tablet Oral      potassium phosphate IVPB IV Intermittent        lactobacillus acidophilus Oral                          11.9   18.51 )-----------( 113      ( 23 Dec 2019 05:17 )             34.1       12-23    142  |  113<H>  |  23  ----------------------------<  180<H>  4.2   |  24  |  0.79    Ca    7.8<L>      23 Dec 2019 05:17  Phos  1.6     12-23  Mg     2.6     12-23                .Urine Clean Catch (Midstream)   <10,000 CFU/mL Normal Urogenital Yancy -- 12-21 @ 10:54  .Blood Blood-Peripheral   Growth in aerobic and anaerobic bottles: Escherichia coli  "Due to technical problems, Proteus sp. will Not be reported as part of  the BCID panel until further notice"  ***Blood Panel PCR results on this specimen are available  approximately 3 hours after the Gram stain result.***  Gram stain, PCR, and/or culture results may not always  correspond due to difference in methodologies.  ************************************************************  This PCR assay was performed using [employer].  The following targets are tested for: Enterococcus,  vancomycin resistant enterococci, Listeria monocytogenes,  coagulase negative staphylococci, S. aureus,  methicillin resistant S. aureus, Streptococcus agalactiae  (Group B), S. pneumoniae, S.pyogenes (Group A),  Acinetobacter baumannii, Enterobacter cloacae, E. coli,  Klebsiella oxytoca, K. pneumoniae, Proteus sp.,  Serratia marcescens, Haemophilus influenzae,  Neisseria meningitidis, Pseudomonas aeruginosa, Candida  albicans, C. glabrata, C krusei, C parapsilosis,  C. tropicalis and the KPC resistance gene.   Growth in aerobic bottle: Gram Negative Rods  Growth in anaerobic bottle: Gram Negative Rods 12-21 @ 00:55      Rapid RVP Result: NotDetec (12-21 @ 10:12)    ABG - ( 22 Dec 2019 06:17 )  pH, Arterial: 7.41  pH, Blood: x     /  pCO2: 35    /  pO2: 86    / HCO3: 23    / Base Excess: -2.2  /  SaO2: 96          Bedside Lung U/S: + lung sliding with A-line predominance b/l with trace b/l pleural effusion, no consolidations       CENTRAL LINE: N     DE JESUS: Y      DATE INSERTED: 12/21/19       REMOVE: N  A-LINE: N        GLOBAL ISSUE/BEST PRACTICE:  Analgesia: tylenol prn  Sedation: n/a  CAM-ICU: negative  HOB elevation: yes  Stress ulcer prophylaxis: n/a  VTE prophylaxis: lovenox  Glycemic control: n/a  Nutrition: DASH/TLC    CODE STATUS: Full code Patient is a 74y old  Male who presents with a chief complaint of Fever (23 Dec 2019 07:25)    24 hour events: Pt extubated successfully yesterday. Pt went into AFib with RVR in yesterday AM s/p lopressor 5mg IVP x2 and cardizem 10mg IVP x1, resolved post-extubation. Now in NSR. Andrews placed for urinary retention likely 2/2 to BPH. DC andrews and trial of void when more mobile. PT consulted, encourage mobility. Pt states he is feeling well. Hypotension resolving. Will need PICC when blood cultures NG for 48hrs for a total of 4 weeks IV ertapenem. Pt started on Eliquis today, dc Lovenox and aspirin.     Review of Systems:  Constitutional: no fever, chills, fatigue  Neuro: no headache, numbness, weakness  Resp: no cough, wheezing, shortness of breath  CVS: no chest pain, palpitations, leg swelling  GI: no abdominal pain, nausea, vomiting, diarrhea   : no dysuria, frequency, incontinence  Skin: no itching, burning, rashes, or lesions   Msk: no joint pain or swelling  Psych: no depression, anxiety    T(F): 98 (12-23-19 @ 07:30), Max: 99.3 (12-22-19 @ 10:00)  HR: 62 (12-23-19 @ 07:00) (61 - 160)  BP: 94/55 (12-23-19 @ 07:00) (83/52 - 139/63)  RR: 23 (12-23-19 @ 07:00) (13 - 36)  SpO2: 97% (12-23-19 @ 07:00) (92% - 100%)  Wt(kg): --    Mode: 40% V/M        I&O's Summary    12-22 @ 07:01  -  12-23 @ 07:00  --------------------------------------------------------  IN: 2326.8 mL / OUT: 1380 mL / NET: 946.8 mL      Physical Exam:     Gen: nad  CV: rrr, no murmurs  Pulm: clear lungs b/l w/o wheeze/rhonchi  GI: soft, nt, nd  Ext: no edema  Skin: no rash    MEDICATIONS  ertapenem  IVPB   ertapenem  IVPB IV Intermittent    midodrine Oral  norepinephrine Infusion IV Continuous  tamsulosin Oral    atorvastatin Oral  predniSONE   Tablet Oral    ALBUTerol    0.083% Nebulizer PRN  albuterol/ipratropium for Nebulization Nebulizer  albuterol/ipratropium for Nebulization. Nebulizer  budesonide 160 MICROgram(s)/formoterol 4.5 MICROgram(s) Inhaler Inhalation    acetaminophen   Tablet .. Oral PRN      aspirin  chewable Oral  enoxaparin Injectable SubCutaneous    pantoprazole    Tablet Oral      potassium phosphate IVPB IV Intermittent        lactobacillus acidophilus Oral                          11.9   18.51 )-----------( 113      ( 23 Dec 2019 05:17 )             34.1       12-23    142  |  113<H>  |  23  ----------------------------<  180<H>  4.2   |  24  |  0.79    Ca    7.8<L>      23 Dec 2019 05:17  Phos  1.6     12-23  Mg     2.6     12-23                .Urine Clean Catch (Midstream)   <10,000 CFU/mL Normal Urogenital Yancy -- 12-21 @ 10:54  .Blood Blood-Peripheral   Growth in aerobic and anaerobic bottles: Escherichia coli  "Due to technical problems, Proteus sp. will Not be reported as part of  the BCID panel until further notice"  ***Blood Panel PCR results on this specimen are available  approximately 3 hours after the Gram stain result.***  Gram stain, PCR, and/or culture results may not always  correspond due to difference in methodologies.  ************************************************************  This PCR assay was performed using [employer].  The following targets are tested for: Enterococcus,  vancomycin resistant enterococci, Listeria monocytogenes,  coagulase negative staphylococci, S. aureus,  methicillin resistant S. aureus, Streptococcus agalactiae  (Group B), S. pneumoniae, S.pyogenes (Group A),  Acinetobacter baumannii, Enterobacter cloacae, E. coli,  Klebsiella oxytoca, K. pneumoniae, Proteus sp.,  Serratia marcescens, Haemophilus influenzae,  Neisseria meningitidis, Pseudomonas aeruginosa, Candida  albicans, C. glabrata, C krusei, C parapsilosis,  C. tropicalis and the KPC resistance gene.   Growth in aerobic bottle: Gram Negative Rods  Growth in anaerobic bottle: Gram Negative Rods 12-21 @ 00:55      Rapid RVP Result: NotDetec (12-21 @ 10:12)    ABG - ( 22 Dec 2019 06:17 )  pH, Arterial: 7.41  pH, Blood: x     /  pCO2: 35    /  pO2: 86    / HCO3: 23    / Base Excess: -2.2  /  SaO2: 96          Bedside Lung U/S: + lung sliding with A-line predominance b/l with trace b/l pleural effusion, no consolidations       CENTRAL LINE: N     ANDREWS: Y      DATE INSERTED: 12/21/19       REMOVE: N  A-LINE: N        GLOBAL ISSUE/BEST PRACTICE:  Analgesia: tylenol prn  Sedation: n/a  CAM-ICU: negative  HOB elevation: yes  Stress ulcer prophylaxis: n/a  VTE prophylaxis: eliquis  Glycemic control: n/a  Nutrition: DASH/TLC    CODE STATUS: Full code

## 2019-12-23 NOTE — DISCHARGE NOTE PROVIDER - CARE PROVIDER_API CALL
Yaniv Laurent)  Medicine  [address] [place] [address]  Phone: [phone]  Fax: [phone]  Follow Up Time:     Yasmani Machado)  Urology  [address], [address]  [place] [address]  Phone: [phone]  Fax: [phone]  Follow Up Time:     Cesar Marc; PhD)  Infectious Disease; Internal Medicine  [address]  [place] [address]  Phone: [phone]  Fax: [phone]  Follow Up Time:     Dianna Dang)  Critical Care Medicine; Internal Medicine; Pulmonary Disease  [address], [address]  [place] [address]  Phone: [phone]  Fax: [phone]  Follow Up Time:     Yaniv Diaz)  Cardiovascular Disease; Internal Medicine  [address], [address]  [place] [address]  Phone: [phone]  Fax: [phone]  Follow Up Time:

## 2019-12-23 NOTE — PROGRESS NOTE ADULT - PROBLEM SELECTOR PLAN 5
-5 coronary artery stents ~2001  -S/P Rapid A Fib ---> NSR now resolved after Extubation   - H/O CAD/ stents  -stable , cardiology Dr Degroot on case  -cont home dose aspirin 81, statin, -5 coronary artery stents ~2001  -S/p rapid A Fib ---> NSR now resolved after Extubation   - H/O CAD/ stents  -stable , cardiology Dr Degroot on case  -cont home dose aspirin 81, statin, -History of 5 coronary artery stents ~2001  -S/p rapid A Fib ---> NSR now resolved after extubation.  -Eliquis 5 mg PO BID started 12/23.  -Stable; cardiology Dr Degroot on case  -Continue home dose atorvastatin 40 mg PO daily at bedtime. -On 12/2, patient went into A fib with RVR on 12/22. S/p lopressor 5 mg IVp and cardizem 10 mg IVp x 1, which resolved post-extubation.  -Cardiology on board. Eliquis 5 mg PO BID started 12/23. -On 12/2, patient went into A fib with RVR on 12/22.in NSR now   - S/p lopressor 5 mg IVp and Cardizem 10 mg IVp x 1, which resolved post-extubation.  -Cardiology Dr Gay=[facility] [place] follow up   -Start   Eliquis 5 mg PO BID started 12/23.

## 2019-12-23 NOTE — DISCHARGE NOTE NURSING/CASE MANAGEMENT/SOCIAL WORK - PATIENT PORTAL LINK FT
You can access the FollowMyHealth Patient Portal offered by [facility] by registering at the following website: [email or web address]. By joining [employer]’s FollowMyHealth portal, you will also be able to view your health information using other applications (apps) compatible with our system.

## 2019-12-23 NOTE — PROGRESS NOTE ADULT - SUBJECTIVE AND OBJECTIVE BOX
infectious diseases progress note:    BASILIO LANDRY is a 74y y. o. Male patient    Patient reports: "feeling better"    ROS:    EYES:  Negative  blurry vision or double vision  GASTROINTESTINAL:  Negative for nausea, vomiting, diarrhea  -otherwise negative except for subjective    Allergies    No Known Allergies    Intolerances        ANTIBIOTICS/RELEVANT:  antimicrobials  ertapenem  IVPB      ertapenem  IVPB 1000 milliGRAM(s) IV Intermittent every 24 hours    immunologic:    OTHER:  acetaminophen   Tablet .. 975 milliGRAM(s) Oral every 8 hours PRN  ALBUTerol    0.083% 2.5 milliGRAM(s) Nebulizer every 4 hours PRN  albuterol/ipratropium for Nebulization 3 milliLiter(s) Nebulizer every 4 hours  albuterol/ipratropium for Nebulization. 3 milliLiter(s) Nebulizer once  apixaban 5 milliGRAM(s) Oral once  apixaban 5 milliGRAM(s) Oral two times a day  atorvastatin 40 milliGRAM(s) Oral at bedtime  budesonide 160 MICROgram(s)/formoterol 4.5 MICROgram(s) Inhaler 2 Puff(s) Inhalation two times a day  lactobacillus acidophilus 1 Tablet(s) Oral two times a day  midodrine 10 milliGRAM(s) Oral every 8 hours  norepinephrine Infusion 0.05 MICROgram(s)/kG/Min IV Continuous <Continuous>  pantoprazole    Tablet 40 milliGRAM(s) Oral before breakfast  potassium phosphate / sodium phosphate powder 1 Packet(s) Oral four times a day  tamsulosin 0.4 milliGRAM(s) Oral at bedtime      Objective:  Last 24-Vital Signs Last 24 Hrs  T(C): 36.7 (23 Dec 2019 07:30), Max: 37.4 (22 Dec 2019 10:00)  T(F): 98 (23 Dec 2019 07:30), Max: 99.3 (22 Dec 2019 10:00)  HR: 69 (23 Dec 2019 09:00) (61 - 128)  BP: 100/63 (23 Dec 2019 09:00) (83/52 - 114/55)  BP(mean): 77 (23 Dec 2019 09:00) (62 - 82)  RR: 30 (23 Dec 2019 09:00) (16 - 36)  SpO2: 95% (23 Dec 2019 09:00) (92% - 99%)    T(C): 36.7 (12-23-19 @ 07:30), Max: 39.4 (12-21-19 @ 12:30)  T(F): 98 (12-23-19 @ 07:30), Max: 103 (12-21-19 @ 12:30)  T(C): 36.7 (12-23-19 @ 07:30), Max: 40.4 (12-20-19 @ 19:21)  T(F): 98 (12-23-19 @ 07:30), Max: 104.7 (12-20-19 @ 19:21)  T(C): 36.7 (12-23-19 @ 07:30), Max: 40.4 (12-20-19 @ 19:21)  T(F): 98 (12-23-19 @ 07:30), Max: 104.7 (12-20-19 @ 19:21)    PHYSICAL EXAM:  Constitutional: Well-developed, well nourished  Eyes: PERRLA, EOMI  Ear/Nose/Throat: oropharynx normal	  Neck: no JVD, no lymphadenopathy, supple  Respiratory: no accessory muscle use, lung fields bilaterally clear  Cardiovascular: RRR, normal S1, S2 no m/r/g  Gastrointestinal: soft, NT, no HSM, BS-normal  Extremities: no clubbing, no cyanosis, edema absent  Neuro: patient alert, oriented and appropriate  Skin: no sig lesions      LABS:                        11.9   18.51 )-----------( 113      ( 23 Dec 2019 05:17 )             34.1       WBC 18.51  12-23 @ 05:17  WBC 21.93  12-22 @ 05:26  WBC 18.44  12-21 @ 05:21  WBC 3.90  12-20 @ 19:23      12-23    142  |  113<H>  |  23  ----------------------------<  180<H>  4.2   |  24  |  0.79    Ca    7.8<L>      23 Dec 2019 05:17  Phos  1.6     12-23  Mg     2.6     12-23        Creatinine, Serum: 0.79 mg/dL (12-23-19 @ 05:17)  Creatinine, Serum: 1.00 mg/dL (12-22-19 @ 05:26)  Creatinine, Serum: 1.30 mg/dL (12-21-19 @ 05:21)  Creatinine, Serum: 1.40 mg/dL (12-20-19 @ 19:23)      MICROBIOLOGY:    Culture - Blood (12.21.19 @ 00:55)    Gram Stain:   Growth in aerobic bottle: Gram Negative Rods  Growth in anaerobic bottle: Gram Negative Rods    -  Amikacin: S <=16    -  Ampicillin: R >16 These ampicillin results predict results for amoxicillin    -  Ampicillin/Sulbactam: R 8/4 Enterobacter, Citrobacter, and Serratia may develop resistance during prolonged therapy (3-4 days)    -  Aztreonam: R 16    -  Cefazolin: R >16 Enterobacter, Citrobacter, and Serratia may develop resistance during prolonged therapy (3-4 days)    -  Cefepime: R >16    -  Cefoxitin: S <=8    -  Ceftriaxone: R >32 Enterobacter, Citrobacter, and Serratia may develop resistance during prolonged therapy    -  Ciprofloxacin: R >2    -  Ertapenem: S <=0.5    -  Gentamicin: S <=2    -  Imipenem: S <=1    -  Levofloxacin: R >4    -  Meropenem: S <=1    -  Piperacillin/Tazobactam: R <=8    -  Tobramycin: S <=2    -  Trimethoprim/Sulfamethoxazole: R >2/38    Specimen Source: .Blood Blood-Peripheral    Organism: Escherichia coli ESBL    Culture Results:   Growth in aerobic and anaerobic bottles: Escherichia coli ESBL    Organism Identification: Escherichia coli ESBL    Method Type: EFRAÍN        RADIOLOGY & ADDITIONAL STUDIES:

## 2019-12-23 NOTE — PROGRESS NOTE ADULT - PROBLEM SELECTOR PLAN 2
2/2 Acute bronchospasm,  Acute hypercapnic respiratory failure, status asthmaticus likely secondary to SIRS response, RVP -NEG  -S/P IV Solumedrol, now on PO Prednisone 40 mg 1 tab daily with PPI  -On Duo nebs , Spiriva 1 cap Inh Daily,  Budesonide 2x day, O2 2lit NC  -CT Chest done -No PNA , ? Aspiration with Vomiting at home  -. CXR without focal infiltrate. Failed trial of HFNC/NIV,   - s/p intubation, Ketamine, serial nebs, IV steroids, and Mg.  -S/P Extubation on 12/22, Likely secondary to acute bronchospasm, acute hypercapnic respiratory failure, status asthmaticus likely secondary to SIRS response, RVP negative.  -S/P IV Solumedrol. Patient received prednisone 40 mg today. Tomorrow, will receive prednisone 20 mg PO daily for 5 days. Continue pantoprazole 40 mg PO daily and lactobacillus acidophilus BID.  -On Duoneb q4h, Albuterol q4h prn, Symbicort 2 puffs BID, O2 NC.  -CT Chest done no pneumonia.   -CXR without focal infiltrate. Failed trial of HFNC/NIV,   -S/p intubation and extubation (12/22/19). Saturating well on O2 NC. Resolved , Likely secondary to acute bronchospasm, acute hypercapnic respiratory failure, status asthmaticus likely secondary to SIRS response, RVP negative.  -S/P IV Solumedrol. Patient received prednisone 40 mg today. Tomorrow, will receive prednisone 20 mg PO daily for 5 days. Continue pantoprazole 40 mg PO daily and lactobacillus acidophilus BID.  -On Duoneb q4h, Albuterol q4h prn, Symbicort 2 puffs BID, O2 NC.  -CT Chest done no pneumonia.   -CXR without focal infiltrate. Failed trial of HFNC/NIV,   -S/p intubation and extubation (12/22/19). Saturating well on O2 NC.

## 2019-12-23 NOTE — PROGRESS NOTE ADULT - PROBLEM SELECTOR PLAN 1
concern for prostate source, and appears to be a complication of biopsy  -with ESBL and prostate involvement anticipate 4 weeks of effective therapy past date of negative blood cultures, so   ertapenem 1 gram IV q-day with last day 1/19/20  when blood cultures negative at 48 hours fine to place PICC to finish course of abx

## 2019-12-23 NOTE — PROGRESS NOTE ADULT - ASSESSMENT
74M h/o nephrolithiasis, GERD, HTN, HLD, CAD s/p stents x5, asthma and COPD with multiple prior hospitalizations (no previous intubations), R lung tumor s/p VATS w/ resection, recent prostate bx 12/17 admitted with septic shock 2/2 E.coli bacteremia from hematogenous spread s/p prostate bx  c/b acute bronchospasm, status asthmaticus suspected due to SIRS after spiking fever to 102 requiring intubation now successfully extubated    - Neuro: mental status at baseline s/p intubation/sedation, no acute issues  - CV: remains hypotensive off sedation, likely distributive shock in setting of sepsis from E.coli bacteremia; continue levophed for pressure support, titrate to goal MAP > 65  - Pulm: restarted symbicort, spiriva held while getting DuoNeb qhr; continue albuterol prn and prednisone 40mg daily to complete 5 days; nasal canula O2 for goal O2 sat > 92%  - GI: restarted DASH/TLC diet, continue home protonix for GERD  - Renal: YUE on admission resolved; urinary retention overnight requiring andrews placement, sedation related vs BPH - can do trial of void when pt more mobile continue to trend; monitor I/Os  - ID: E.coli bacteremia likely 2/2 recent prostate biopsy, Day 3 of ertapenem today, awaiting sensitivities, can de-escalate if not ESBL species; leukocytosis likely stress response and steroid related, will trend; bands unchanged; surveillance cultures sent  - Heme: dvt ppx with lovenox 74M h/o nephrolithiasis, GERD, HTN, HLD, CAD s/p stents x5, asthma and COPD with multiple prior hospitalizations (no previous intubations), R lung tumor s/p VATS w/ resection, recent prostate bx 12/17 admitted with septic shock 2/2 E.coli bacteremia from hematogenous spread s/p prostate bx  c/b acute bronchospasm, status asthmaticus suspected due to SIRS after spiking fever to 102 requiring intubation now successfully extubated    - Neuro: mental status at baseline s/p intubation/sedation, no acute issues  - CV: hypotension resovled, dc levophed for pressure support, titrate to goal MAP > 65  - Pulm: restarted symbicort, spiriva held while getting DuoNeb qhr; continue albuterol prn and prednisone taper to 20mg daily today, continue to taper down to home dose of 5mg daily; nasal canula O2 for goal O2 sat > 92%  - GI: restarted DASH/TLC diet, continue home protonix for GERD  - Renal: YUE on admission resolved; urinary retention overnight requiring andrews placement, likely 2/2 to BPH - can dc andrews and do trial of void when pt more mobile continue to trend; monitor I/Os. K repleted w/ 4 doses K-phos, cont to trend BMP  - ID: Bacteremia due to Escherichia coli.  Plan: concern for prostate source, and appears to be a complication of biopsy with ESBL and prostate involvement anticipate 4 weeks of effective therapy past date of negative blood cultures, so ertapenem 1 gram IV q-day with last day 1/19/20when blood cultures negative at 48 hours fine to place PICC to finish course of abx.   - Heme: dc'd lovenox and aspirin, started on Eliquis 5mg bid for new onset afib.

## 2019-12-23 NOTE — PROGRESS NOTE ADULT - SUBJECTIVE AND OBJECTIVE BOX
[facility] Cardiology Progress Note [phone] (Dr. Degroot, Waqar, Joe, Nga)    CHIEF COMPLAINT: Patient is a 74y old  Male who presents with a chief complaint of Fever (22 Dec 2019 13:39)      Follow Up Today: The patient denies any chest discomfort or shortness of breath.    HPI:  73 YO M with PMHX asthma, COPD, former smoker, hx of benign lung cancer R lobe surgically removed at [facility]) CAD (s/p 5 stents ~2001), GERD, HLD, HTN presents after shakes, vomiting, confusion. Pt states he has prostate biopsy 3 days ago, started on antibiotics, the next day developed shaking chills, then became slightly SOB, used a nebulizer which helped his shortness of breath. Pt saw his pulmonologist yesterday, as per pt pulmonologist states everything was ok from a pulmonary standpoint and to continue using his nebulizer. Today pt developed shivering shaking chills again, NBNR emesis, confusion. Pt's wife found him confused, came to the ER for confusion. Of note, pt states he had a cough with sputum production last week. Pt denies fevers at home or sick contacts.     In the ED: Vitals sig for temp of 104.7 F. Labs sig for Plt of 114, bands of 29, BUN 27, Cr 1.40, glucose 124, alk phos 128, AST 39, eGFR 49, lactate 2.8, FLU A, B, RSV neg. CXR: No acute infiltrate. CT head: No acute intracranial bleeding, mass effect, or shift. Mild chronic microvascular ischemic changes. CT A/P: Right middle lobe and right lower lobe groundglass opacities with underlying reticular changes, likely chronic. Recommend follow-up to ensure stability or resolution and exclude acute pneumonia. Mild constipation. No bowel obstruction. Enlarged prostate. Nonspecific bilateral perinephric inflammatory stranding. Nonobstructing left renal calculi. Given: Rocephin x1, Azithromycin x1,  Zosyn x1,  ns bolus x3, tylenol x1, Duo neb x1. Of note, RRT was called for hypotension with BP of 63/48. Patient was seen by RRT team and ICU PA. Was given normal saline bolus x2 and midodrine 10mg x1 with improvement in BP of 105/56 (20 Dec 2019 21:52)      PAST MEDICAL & SURGICAL HISTORY:  Nephrolithiasis  GERD (gastroesophageal reflux disease)  HLD (hyperlipidemia)  HTN (hypertension)  Stented coronary artery  Asthma  Chronic obstructive pulmonary disease: Asthma  S/P primary angioplasty with coronary stent  Lung tumor: Rt Lung tumor resection,benign      MEDICATIONS  (STANDING):  albuterol/ipratropium for Nebulization 3 milliLiter(s) Nebulizer every 4 hours  albuterol/ipratropium for Nebulization. 3 milliLiter(s) Nebulizer once  aspirin  chewable 81 milliGRAM(s) Oral daily  atorvastatin 40 milliGRAM(s) Oral at bedtime  budesonide 160 MICROgram(s)/formoterol 4.5 MICROgram(s) Inhaler 2 Puff(s) Inhalation two times a day  enoxaparin Injectable 40 milliGRAM(s) SubCutaneous daily  ertapenem  IVPB      ertapenem  IVPB 1000 milliGRAM(s) IV Intermittent every 24 hours  lactobacillus acidophilus 1 Tablet(s) Oral two times a day  midodrine 10 milliGRAM(s) Oral every 8 hours  norepinephrine Infusion 0.05 MICROgram(s)/kG/Min (7.781 mL/Hr) IV Continuous <Continuous>  pantoprazole    Tablet 40 milliGRAM(s) Oral before breakfast  predniSONE   Tablet 40 milliGRAM(s) Oral daily  tamsulosin 0.4 milliGRAM(s) Oral at bedtime    MEDICATIONS  (PRN):  acetaminophen   Tablet .. 975 milliGRAM(s) Oral every 8 hours PRN Temp greater or equal to 38C (100.4F), Mild Pain (1 - 3)  ALBUTerol    0.083% 2.5 milliGRAM(s) Nebulizer every 4 hours PRN Shortness of Breath and/or Wheezing      Allergies    No Known Allergies    Intolerances        REVIEW OF SYSTEMS:    All other review of systems is negative unless indicated above    Vital Signs Last 24 Hrs  T(C): 36.6 (23 Dec 2019 03:50), Max: 37.4 (22 Dec 2019 10:00)  T(F): 97.9 (23 Dec 2019 03:50), Max: 99.3 (22 Dec 2019 10:00)  HR: 62 (23 Dec 2019 07:00) (61 - 160)  BP: 94/55 (23 Dec 2019 07:00) (70/42 - 139/63)  BP(mean): 70 (23 Dec 2019 07:00) (51 - 90)  RR: 23 (23 Dec 2019 07:00) (13 - 36)  SpO2: 97% (23 Dec 2019 07:00) (92% - 100%)    I&O's Summary    22 Dec 2019 07:01  -  23 Dec 2019 07:00  --------------------------------------------------------  IN: 2326.8 mL / OUT: 1380 mL / NET: 946.8 mL        PHYSICAL EXAM:    Constitutional: NAD, awake and alert, well-developed  Eyes:  EOMI,  Pupils round, No oral cyanosis.  HEENT: No exudate or erythema  Pulmonary: Non-labored, breath sounds are clear bilaterally, No wheezing, rales or rhonchi  Cardiovascular: Regular, S1 and S2, No murmurs, rubs, gallops oir clicks  Gastrointestinal: Bowel Sounds present, soft, nontender.   Ext: No significant LE edema with good pulses x 4  Neurological: Alert, no gross focal motor deficits  Skin: No rashes.  Psych:  Mood & affect appropriate    LABS: All Labs Reviewed:                        11.9   18.51 )-----------( 113      ( 23 Dec 2019 05:17 )             34.1                         13.4   21.93 )-----------( 112      ( 22 Dec 2019 05:26 )             39.0                         13.0   18.44 )-----------( 108      ( 21 Dec 2019 05:21 )             38.6     23 Dec 2019 05:17    142    |  113    |  23     ----------------------------<  180    4.2     |  24     |  0.79   22 Dec 2019 05:26    144    |  114    |  20     ----------------------------<  224    4.0     |  23     |  1.00   21 Dec 2019 05:21    141    |  111    |  23     ----------------------------<  98     4.4     |  23     |  1.30     Ca    7.8        23 Dec 2019 05:17  Ca    8.2        22 Dec 2019 05:26  Ca    7.5        21 Dec 2019 05:21  Phos  1.6       23 Dec 2019 05:17  Phos  1.8       22 Dec 2019 05:26  Mg     2.6       23 Dec 2019 05:17  Mg     2.7       22 Dec 2019 05:26    TPro  5.4    /  Alb  2.6    /  TBili  0.8    /  DBili  x      /  AST  59     /  ALT  41     /  AlkPhos  76     21 Dec 2019 05:21  TPro  6.4    /  Alb  3.3    /  TBili  1.1    /  DBili  x      /  AST  39     /  ALT  38     /  AlkPhos  128    20 Dec 2019 19:23          Blood Culture: Organism --  Gram Stain Blood -- Gram Stain --  Specimen Source .Urine Clean Catch (Midstream)  Culture-Blood --    Organism Blood Culture PCR  Gram Stain Blood -- Gram Stain   Growth in aerobic bottle: Gram Negative Rods  Growth in anaerobic bottle: Gram Negative Rods  Specimen Source .Blood Blood-Peripheral  Culture-Blood --        12-22 @ 05:26  TSH: 0.55      RADIOLOGY/EKG:    Attending Attestation:   20 minutes spent on total encounter; more than 50% of the visit was spent counseling and/or coordinating care by the attending physician.     ASSESSMENT AND PLAN [facility] Cardiology Progress Note [phone] (Dr. Degroot, Waqar, Joe, Nga)    CHIEF COMPLAINT: Patient is a 74y old  Male who presents with a chief complaint of Fever (22 Dec 2019 13:39)      Follow Up Today: The patient denies any chest discomfort or shortness of breath. He had a bad night of sleep last night.  He is back in SR on monitor. Now off pressors with adequate BP.    HPI:  75 YO M with PMHX asthma, COPD, former smoker, hx of benign lung cancer R lobe surgically removed at [facility]) CAD (s/p 5 stents ~2001), GERD, HLD, HTN presents after shakes, vomiting, confusion. Pt states he has prostate biopsy 3 days ago, started on antibiotics, the next day developed shaking chills, then became slightly SOB, used a nebulizer which helped his shortness of breath. Pt saw his pulmonologist yesterday, as per pt pulmonologist states everything was ok from a pulmonary standpoint and to continue using his nebulizer. Today pt developed shivering shaking chills again, NBNR emesis, confusion. Pt's wife found him confused, came to the ER for confusion. Of note, pt states he had a cough with sputum production last week. Pt denies fevers at home or sick contacts.     In the ED: Vitals sig for temp of 104.7 F. Labs sig for Plt of 114, bands of 29, BUN 27, Cr 1.40, glucose 124, alk phos 128, AST 39, eGFR 49, lactate 2.8, FLU A, B, RSV neg. CXR: No acute infiltrate. CT head: No acute intracranial bleeding, mass effect, or shift. Mild chronic microvascular ischemic changes. CT A/P: Right middle lobe and right lower lobe groundglass opacities with underlying reticular changes, likely chronic. Recommend follow-up to ensure stability or resolution and exclude acute pneumonia. Mild constipation. No bowel obstruction. Enlarged prostate. Nonspecific bilateral perinephric inflammatory stranding. Nonobstructing left renal calculi. Given: Rocephin x1, Azithromycin x1,  Zosyn x1,  ns bolus x3, tylenol x1, Duo neb x1. Of note, RRT was called for hypotension with BP of 63/48. Patient was seen by RRT team and ICU PA. Was given normal saline bolus x2 and midodrine 10mg x1 with improvement in BP of 105/56 (20 Dec 2019 21:52)      PAST MEDICAL & SURGICAL HISTORY:  Nephrolithiasis  GERD (gastroesophageal reflux disease)  HLD (hyperlipidemia)  HTN (hypertension)  Stented coronary artery  Asthma  Chronic obstructive pulmonary disease: Asthma  S/P primary angioplasty with coronary stent  Lung tumor: Rt Lung tumor resection,benign      MEDICATIONS  (STANDING):  albuterol/ipratropium for Nebulization 3 milliLiter(s) Nebulizer every 4 hours  albuterol/ipratropium for Nebulization. 3 milliLiter(s) Nebulizer once  aspirin  chewable 81 milliGRAM(s) Oral daily  atorvastatin 40 milliGRAM(s) Oral at bedtime  budesonide 160 MICROgram(s)/formoterol 4.5 MICROgram(s) Inhaler 2 Puff(s) Inhalation two times a day  enoxaparin Injectable 40 milliGRAM(s) SubCutaneous daily  ertapenem  IVPB      ertapenem  IVPB 1000 milliGRAM(s) IV Intermittent every 24 hours  lactobacillus acidophilus 1 Tablet(s) Oral two times a day  midodrine 10 milliGRAM(s) Oral every 8 hours  norepinephrine Infusion 0.05 MICROgram(s)/kG/Min (7.781 mL/Hr) IV Continuous <Continuous>  pantoprazole    Tablet 40 milliGRAM(s) Oral before breakfast  predniSONE   Tablet 40 milliGRAM(s) Oral daily  tamsulosin 0.4 milliGRAM(s) Oral at bedtime    MEDICATIONS  (PRN):  acetaminophen   Tablet .. 975 milliGRAM(s) Oral every 8 hours PRN Temp greater or equal to 38C (100.4F), Mild Pain (1 - 3)  ALBUTerol    0.083% 2.5 milliGRAM(s) Nebulizer every 4 hours PRN Shortness of Breath and/or Wheezing      Allergies    No Known Allergies    Intolerances        REVIEW OF SYSTEMS:    All other review of systems is negative unless indicated above    Vital Signs Last 24 Hrs  T(C): 36.6 (23 Dec 2019 03:50), Max: 37.4 (22 Dec 2019 10:00)  T(F): 97.9 (23 Dec 2019 03:50), Max: 99.3 (22 Dec 2019 10:00)  HR: 62 (23 Dec 2019 07:00) (61 - 160)  BP: 94/55 (23 Dec 2019 07:00) (70/42 - 139/63)  BP(mean): 70 (23 Dec 2019 07:00) (51 - 90)  RR: 23 (23 Dec 2019 07:00) (13 - 36)  SpO2: 97% (23 Dec 2019 07:00) (92% - 100%)    I&O's Summary    22 Dec 2019 07:01  -  23 Dec 2019 07:00  --------------------------------------------------------  IN: 2326.8 mL / OUT: 1380 mL / NET: 946.8 mL        PHYSICAL EXAM:    Constitutional: NAD, awake and alert, well-developed  Eyes:  EOMI,  Pupils round, No oral cyanosis.  HEENT: No exudate or erythema  Pulmonary: Non-labored, breath sounds are clear bilaterally, No wheezing, rales or rhonchi  Cardiovascular: Regular, S1 and S2, No murmur  Gastrointestinal: Bowel Sounds present, soft, nontender.   Ext: No significant LE edema with good pulses x 4  Neurological: Alert, no gross focal motor deficits  Skin: No rashes.  Psych:  Mood & affect appropriate    LABS: All Labs Reviewed:                        11.9   18.51 )-----------( 113      ( 23 Dec 2019 05:17 )             34.1                         13.4   21.93 )-----------( 112      ( 22 Dec 2019 05:26 )             39.0                         13.0   18.44 )-----------( 108      ( 21 Dec 2019 05:21 )             38.6     23 Dec 2019 05:17    142    |  113    |  23     ----------------------------<  180    4.2     |  24     |  0.79   22 Dec 2019 05:26    144    |  114    |  20     ----------------------------<  224    4.0     |  23     |  1.00   21 Dec 2019 05:21    141    |  111    |  23     ----------------------------<  98     4.4     |  23     |  1.30     Ca    7.8        23 Dec 2019 05:17  Ca    8.2        22 Dec 2019 05:26  Ca    7.5        21 Dec 2019 05:21  Phos  1.6       23 Dec 2019 05:17  Phos  1.8       22 Dec 2019 05:26  Mg     2.6       23 Dec 2019 05:17  Mg     2.7       22 Dec 2019 05:26    TPro  5.4    /  Alb  2.6    /  TBili  0.8    /  DBili  x      /  AST  59     /  ALT  41     /  AlkPhos  76     21 Dec 2019 05:21  TPro  6.4    /  Alb  3.3    /  TBili  1.1    /  DBili  x      /  AST  39     /  ALT  38     /  AlkPhos  128    20 Dec 2019 19:23          Blood Culture: Organism --  Gram Stain Blood -- Gram Stain --  Specimen Source .Urine Clean Catch (Midstream)  Culture-Blood --    Organism Blood Culture PCR  Gram Stain Blood -- Gram Stain   Growth in aerobic bottle: Gram Negative Rods  Growth in anaerobic bottle: Gram Negative Rods  Specimen Source .Blood Blood-Peripheral  Culture-Blood --        12-22 @ 05:26  TSH: 0.55      RADIOLOGY/EKG:    Attending Attestation:   20 minutes spent on total encounter; more than 50% of the visit was spent counseling and/or coordinating care by the attending physician.     ASSESSMENT AND PLAN

## 2019-12-23 NOTE — DISCHARGE NOTE NURSING/CASE MANAGEMENT/SOCIAL WORK - NSDCPECAREGIVERED_GEN_ALL_CORE
Yes/pt printed education on sepsis, gerd, prostatitis, ertapenem urinary retention, prednisone, Eliquis, paroxysmal atrial fibrillation

## 2019-12-23 NOTE — PHYSICAL THERAPY INITIAL EVALUATION ADULT - GAIT DEVIATIONS NOTED, PT EVAL
increased time in double stance/decreased velocity of limb motion/arm-swing decreased/decreased clotilde

## 2019-12-23 NOTE — PHYSICAL THERAPY INITIAL EVALUATION ADULT - ADDITIONAL COMMENTS
Pt was previously independent with all activities, no AD prior to admission to hospital. Is not on supplemental O2 at home.

## 2019-12-23 NOTE — DISCHARGE NOTE PROVIDER - CARE PROVIDERS DIRECT ADDRESSES
,DirectAddress_Unknown,[email or web address],DirectAddress_Unknown

## 2019-12-23 NOTE — DISCHARGE NOTE PROVIDER - NSDCFUADDINST_GEN_ALL_CORE_FT
DO NOT Wet the MID line dressing while shower,   Follow up with DR Machado in 1 week  Follow up with DR Dang in 1 week  Follow up with Cardiology in 1 week DR Diaz  Follow up with Dr Laurent in 1-2 weeks  CBC, BMP in 1 week

## 2019-12-24 VITALS
DIASTOLIC BLOOD PRESSURE: 62 MMHG | RESPIRATION RATE: 17 BRPM | TEMPERATURE: 98 F | OXYGEN SATURATION: 93 % | SYSTOLIC BLOOD PRESSURE: 121 MMHG | HEART RATE: 67 BPM

## 2019-12-24 LAB
ANION GAP SERPL CALC-SCNC: 4 MMOL/L — LOW (ref 5–17)
BASOPHILS # BLD AUTO: 0.04 K/UL — SIGNIFICANT CHANGE UP (ref 0–0.2)
BASOPHILS NFR BLD AUTO: 0.3 % — SIGNIFICANT CHANGE UP (ref 0–2)
BUN SERPL-MCNC: 21 MG/DL — SIGNIFICANT CHANGE UP (ref 7–23)
CALCIUM SERPL-MCNC: 8.1 MG/DL — LOW (ref 8.5–10.1)
CHLORIDE SERPL-SCNC: 113 MMOL/L — HIGH (ref 96–108)
CO2 SERPL-SCNC: 29 MMOL/L — SIGNIFICANT CHANGE UP (ref 22–31)
CREAT SERPL-MCNC: 0.82 MG/DL — SIGNIFICANT CHANGE UP (ref 0.5–1.3)
EOSINOPHIL # BLD AUTO: 0.01 K/UL — SIGNIFICANT CHANGE UP (ref 0–0.5)
EOSINOPHIL NFR BLD AUTO: 0.1 % — SIGNIFICANT CHANGE UP (ref 0–6)
GLUCOSE SERPL-MCNC: 132 MG/DL — HIGH (ref 70–99)
HCT VFR BLD CALC: 35.9 % — LOW (ref 39–50)
HGB BLD-MCNC: 12.4 G/DL — LOW (ref 13–17)
IMM GRANULOCYTES NFR BLD AUTO: 1.6 % — HIGH (ref 0–1.5)
LYMPHOCYTES # BLD AUTO: 1.02 K/UL — SIGNIFICANT CHANGE UP (ref 1–3.3)
LYMPHOCYTES # BLD AUTO: 7.4 % — LOW (ref 13–44)
MAGNESIUM SERPL-MCNC: 2 MG/DL — SIGNIFICANT CHANGE UP (ref 1.6–2.6)
MCHC RBC-ENTMCNC: 32.1 PG — SIGNIFICANT CHANGE UP (ref 27–34)
MCHC RBC-ENTMCNC: 34.5 GM/DL — SIGNIFICANT CHANGE UP (ref 32–36)
MCV RBC AUTO: 93 FL — SIGNIFICANT CHANGE UP (ref 80–100)
MONOCYTES # BLD AUTO: 0.78 K/UL — SIGNIFICANT CHANGE UP (ref 0–0.9)
MONOCYTES NFR BLD AUTO: 5.7 % — SIGNIFICANT CHANGE UP (ref 2–14)
NEUTROPHILS # BLD AUTO: 11.64 K/UL — HIGH (ref 1.8–7.4)
NEUTROPHILS NFR BLD AUTO: 84.9 % — HIGH (ref 43–77)
NRBC # BLD: 0 /100 WBCS — SIGNIFICANT CHANGE UP (ref 0–0)
PHOSPHATE SERPL-MCNC: 2.3 MG/DL — LOW (ref 2.5–4.5)
PLATELET # BLD AUTO: 123 K/UL — LOW (ref 150–400)
POTASSIUM SERPL-MCNC: 4.4 MMOL/L — SIGNIFICANT CHANGE UP (ref 3.5–5.3)
POTASSIUM SERPL-SCNC: 4.4 MMOL/L — SIGNIFICANT CHANGE UP (ref 3.5–5.3)
RBC # BLD: 3.86 M/UL — LOW (ref 4.2–5.8)
RBC # FLD: 13.8 % — SIGNIFICANT CHANGE UP (ref 10.3–14.5)
SODIUM SERPL-SCNC: 146 MMOL/L — HIGH (ref 135–145)
WBC # BLD: 13.71 K/UL — HIGH (ref 3.8–10.5)
WBC # FLD AUTO: 13.71 K/UL — HIGH (ref 3.8–10.5)

## 2019-12-24 PROCEDURE — 94003 VENT MGMT INPAT SUBQ DAY: CPT

## 2019-12-24 PROCEDURE — 99285 EMERGENCY DEPT VISIT HI MDM: CPT | Mod: 25

## 2019-12-24 PROCEDURE — 96375 TX/PRO/DX INJ NEW DRUG ADDON: CPT

## 2019-12-24 PROCEDURE — 84100 ASSAY OF PHOSPHORUS: CPT

## 2019-12-24 PROCEDURE — 80053 COMPREHEN METABOLIC PANEL: CPT

## 2019-12-24 PROCEDURE — 36600 WITHDRAWAL OF ARTERIAL BLOOD: CPT

## 2019-12-24 PROCEDURE — 83735 ASSAY OF MAGNESIUM: CPT

## 2019-12-24 PROCEDURE — 96374 THER/PROPH/DIAG INJ IV PUSH: CPT

## 2019-12-24 PROCEDURE — 83690 ASSAY OF LIPASE: CPT

## 2019-12-24 PROCEDURE — 87040 BLOOD CULTURE FOR BACTERIA: CPT

## 2019-12-24 PROCEDURE — 83036 HEMOGLOBIN GLYCOSYLATED A1C: CPT

## 2019-12-24 PROCEDURE — 82962 GLUCOSE BLOOD TEST: CPT

## 2019-12-24 PROCEDURE — 97116 GAIT TRAINING THERAPY: CPT

## 2019-12-24 PROCEDURE — 87486 CHLMYD PNEUM DNA AMP PROBE: CPT

## 2019-12-24 PROCEDURE — 71045 X-RAY EXAM CHEST 1 VIEW: CPT

## 2019-12-24 PROCEDURE — 94640 AIRWAY INHALATION TREATMENT: CPT

## 2019-12-24 PROCEDURE — 74176 CT ABD & PELVIS W/O CONTRAST: CPT

## 2019-12-24 PROCEDURE — 94799 UNLISTED PULMONARY SVC/PX: CPT

## 2019-12-24 PROCEDURE — C1751: CPT

## 2019-12-24 PROCEDURE — 87086 URINE CULTURE/COLONY COUNT: CPT

## 2019-12-24 PROCEDURE — 87798 DETECT AGENT NOS DNA AMP: CPT

## 2019-12-24 PROCEDURE — 94002 VENT MGMT INPAT INIT DAY: CPT

## 2019-12-24 PROCEDURE — C8929: CPT

## 2019-12-24 PROCEDURE — 36410 VNPNXR 3YR/> PHY/QHP DX/THER: CPT

## 2019-12-24 PROCEDURE — 99231 SBSQ HOSP IP/OBS SF/LOW 25: CPT

## 2019-12-24 PROCEDURE — 93306 TTE W/DOPPLER COMPLETE: CPT

## 2019-12-24 PROCEDURE — 94760 N-INVAS EAR/PLS OXIMETRY 1: CPT

## 2019-12-24 PROCEDURE — 82803 BLOOD GASES ANY COMBINATION: CPT

## 2019-12-24 PROCEDURE — 83605 ASSAY OF LACTIC ACID: CPT

## 2019-12-24 PROCEDURE — 36415 COLL VENOUS BLD VENIPUNCTURE: CPT

## 2019-12-24 PROCEDURE — 80048 BASIC METABOLIC PNL TOTAL CA: CPT

## 2019-12-24 PROCEDURE — 81001 URINALYSIS AUTO W/SCOPE: CPT

## 2019-12-24 PROCEDURE — 86803 HEPATITIS C AB TEST: CPT

## 2019-12-24 PROCEDURE — 87581 M.PNEUMON DNA AMP PROBE: CPT

## 2019-12-24 PROCEDURE — 97162 PT EVAL MOD COMPLEX 30 MIN: CPT

## 2019-12-24 PROCEDURE — 36573 INSJ PICC RS&I 5 YR+: CPT

## 2019-12-24 PROCEDURE — 87186 SC STD MICRODIL/AGAR DIL: CPT

## 2019-12-24 PROCEDURE — 87631 RESP VIRUS 3-5 TARGETS: CPT

## 2019-12-24 PROCEDURE — 70450 CT HEAD/BRAIN W/O DYE: CPT

## 2019-12-24 PROCEDURE — 93005 ELECTROCARDIOGRAM TRACING: CPT

## 2019-12-24 PROCEDURE — 87633 RESP VIRUS 12-25 TARGETS: CPT

## 2019-12-24 PROCEDURE — 76937 US GUIDE VASCULAR ACCESS: CPT

## 2019-12-24 PROCEDURE — 84443 ASSAY THYROID STIM HORMONE: CPT

## 2019-12-24 PROCEDURE — 85027 COMPLETE CBC AUTOMATED: CPT

## 2019-12-24 PROCEDURE — 87150 DNA/RNA AMPLIFIED PROBE: CPT

## 2019-12-24 PROCEDURE — 99221 1ST HOSP IP/OBS SF/LOW 40: CPT

## 2019-12-24 RX ORDER — ERTAPENEM SODIUM 1 G/1
1 INJECTION, POWDER, LYOPHILIZED, FOR SOLUTION INTRAMUSCULAR; INTRAVENOUS
Qty: 26 | Refills: 0
Start: 2019-12-24 | End: 2020-01-18

## 2019-12-24 RX ORDER — APIXABAN 2.5 MG/1
1 TABLET, FILM COATED ORAL
Qty: 60 | Refills: 0
Start: 2019-12-24 | End: 2020-01-22

## 2019-12-24 RX ORDER — SODIUM,POTASSIUM PHOSPHATES 278-250MG
1 POWDER IN PACKET (EA) ORAL
Refills: 0 | Status: DISCONTINUED | OUTPATIENT
Start: 2019-12-24 | End: 2019-12-24

## 2019-12-24 RX ORDER — LACTOBACILLUS ACIDOPHILUS 100MM CELL
1 CAPSULE ORAL
Qty: 0 | Refills: 0 | DISCHARGE
Start: 2019-12-24

## 2019-12-24 RX ADMIN — Medication 3 MILLILITER(S): at 11:55

## 2019-12-24 RX ADMIN — APIXABAN 5 MILLIGRAM(S): 2.5 TABLET, FILM COATED ORAL at 05:30

## 2019-12-24 RX ADMIN — MIDODRINE HYDROCHLORIDE 10 MILLIGRAM(S): 2.5 TABLET ORAL at 05:30

## 2019-12-24 RX ADMIN — Medication 3 MILLILITER(S): at 00:44

## 2019-12-24 RX ADMIN — Medication 1 TABLET(S): at 17:45

## 2019-12-24 RX ADMIN — Medication 975 MILLIGRAM(S): at 09:01

## 2019-12-24 RX ADMIN — PANTOPRAZOLE SODIUM 40 MILLIGRAM(S): 20 TABLET, DELAYED RELEASE ORAL at 05:30

## 2019-12-24 RX ADMIN — Medication 975 MILLIGRAM(S): at 10:00

## 2019-12-24 RX ADMIN — Medication 3 MILLILITER(S): at 15:21

## 2019-12-24 RX ADMIN — Medication 1 TABLET(S): at 15:33

## 2019-12-24 RX ADMIN — Medication 1 TABLET(S): at 10:14

## 2019-12-24 RX ADMIN — Medication 20 MILLIGRAM(S): at 05:30

## 2019-12-24 RX ADMIN — Medication 1 TABLET(S): at 05:30

## 2019-12-24 RX ADMIN — Medication 3 MILLILITER(S): at 03:53

## 2019-12-24 RX ADMIN — ERTAPENEM SODIUM 120 MILLIGRAM(S): 1 INJECTION, POWDER, LYOPHILIZED, FOR SOLUTION INTRAMUSCULAR; INTRAVENOUS at 10:45

## 2019-12-24 RX ADMIN — BUDESONIDE AND FORMOTEROL FUMARATE DIHYDRATE 2 PUFF(S): 160; 4.5 AEROSOL RESPIRATORY (INHALATION) at 09:00

## 2019-12-24 RX ADMIN — APIXABAN 5 MILLIGRAM(S): 2.5 TABLET, FILM COATED ORAL at 17:45

## 2019-12-24 RX ADMIN — Medication 3 MILLILITER(S): at 07:54

## 2019-12-24 NOTE — PROGRESS NOTE ADULT - ASSESSMENT
74M with asthma, COPD, CAD s/p 5 stents, preserved LVEF who presents after recent prostate bx, now with septic shock 2/2 E. Coli bacteremia,  YUE, lactic acidosis. Intubated s/p acute bronchospasm possibly from asthma. New afib yesterday.  Patient with known prior GI bleeds on blood thinners.    Suggest:  1. Hypotension - Likely from infectious shock now improving   No BP meds yet.  Now off Levophed   IVF as needed - no Gross evidence for CHF    2. New Afib.  Stop aspirin and start Eliquis 5mg bid.  Continue on Protonix 40mg daily.    3. Bacteremia  E. Coli bacteremia now on ertapenem. Abx per ICU team    4. DVT ppx    5. Will follow.  < from: TTE Echo Doppler w/o Cont (12.22.19 @ 11:46) >  Conclusion:   1. This a technically limited study.  2. There is normal left ventricular size and left ventricular systolic function with an ejection fraction of 60-65%.  3. There is grade 1 diastolic dysfunction.  4. There is normal left ventricular wall thickness.  5. There is trivial mitral regurgitation.  6. There is trivial tricuspid regurgitation.  7. The aortic valve was not well visualized but appears mildly thickened leaflets. The aortic valve velocities were not measured on this study.  8. There is no significant pericardial effusion.  9. There appears to be normal right atrial and right ventricular size and systolic function.    < end of copied text > 74M with asthma, COPD, CAD s/p 5 stents, preserved LVEF who presents after recent prostate bx, now with septic shock 2/2 E. Coli bacteremia,  YUE, lactic acidosis. Intubated s/p acute bronchospasm possibly from asthma. New afib yesterday.  Patient with known prior GI bleeds on blood thinners.    Suggest:  1. Hypotension - Likely from infectious shock now improving   No BP meds yet.  Now off Levophed   IVF as needed - no Gross evidence for CHF    2. New Afib.  Stop aspirin and start Eliquis 5mg bid.  Continue on Protonix 40mg daily.   If oxygen sat still low, would give one dose 40mg IV lasix    3. Bacteremia  E. Coli bacteremia now on ertapenem. Abx per ICU team    4. DVT ppx    5. Will follow.  < from: TTE Echo Doppler w/o Cont (12.22.19 @ 11:46) >  Conclusion:   1. This a technically limited study.  2. There is normal left ventricular size and left ventricular systolic function with an ejection fraction of 60-65%.  3. There is grade 1 diastolic dysfunction.  4. There is normal left ventricular wall thickness.  5. There is trivial mitral regurgitation.  6. There is trivial tricuspid regurgitation.  7. The aortic valve was not well visualized but appears mildly thickened leaflets. The aortic valve velocities were not measured on this study.  8. There is no significant pericardial effusion.  9. There appears to be normal right atrial and right ventricular size and systolic function.    < end of copied text >

## 2019-12-24 NOTE — PROGRESS NOTE ADULT - PROBLEM SELECTOR PLAN 10
Eliquis 5 mg PO BID.                                          IMPROVE VTE Score 1    11. GERD  Continue pantoprazole 40 mg PO daily.

## 2019-12-24 NOTE — PROGRESS NOTE ADULT - PROBLEM SELECTOR PLAN 7
-Patient with history of COPD in setting of asthma.  -Received prednisone 40 mg PO 12/23. Will receive prednisone 20 mg PO daily starting 12/24 with taper.  -Continue Symbicort 2 puffs BID, DuoNeb q4h, and albuterol 2.5 q4h prn.  -Home meds consist of Symbicort BID, ventolin, Spiriva, Duoneb, prednisone 5 mg PO daily, budesonide prn. -Patient with history of COPD in setting of asthma.  -Received prednisone 40 mg PO 12/23. Will receive prednisone 20 mg PO daily until 12/28. Prednisone 20 mg PO daily x4 days sent to patient's pharmacy.   -Continue Symbicort 2 puffs BID, DuoNeb q4h, and albuterol 2.5 q4h prn.  -Home meds consist of Symbicort BID, ventolin, Spiriva, Duoneb, prednisone 5 mg PO daily, budesonide prn.

## 2019-12-24 NOTE — PROGRESS NOTE ADULT - PROBLEM SELECTOR PLAN 3
Urinary retention 2/2 sedation vs recent prostate biopsy.  -Patient has history of BPH.   -S/p Liz removal.  -Continue tamsulosin 0.4 mg PO daily.  -Discuss with Dr. Machado.

## 2019-12-24 NOTE — PROGRESS NOTE ADULT - PROBLEM SELECTOR PLAN 1
-Sepsis with shock 2/2  E Coli  ESBL with prostatitis (concern for prostate source, appears to be complication of recent prostate biopsy).  -Afebrile since 12/21. WBC 13.71, which improved from 18.51 today. (18.44 on 12/21)  -Hypotension 2/2 distributive shock. Off Levophed.  -On Midodrine 10 mg q8h. /68 this morning.  -E Coli Sepsis (12/21) sensitive to ertapenem. Continue ertapenem IVPB q24 hours x 4 weeks (last day 1/19/20). Repeat blood culture from 12/22 no growth to date.  -Midline placement 12/23 for IV antibiotics (last day 1/19/20)  -Case management consult for home IV antibiotics.  -Dr Marc on board.  -Etiology likely prostatitis s/p recent prostate biopsy on Tuesday 12/17 with Dr. Machado (pending results).  -CT chest shows groundglass opacity with underlying reticular prominence in the lateral right middle lobe and posterior right lower lobe. No pneumonia.  -CT A/P B/L perinephric stranding (nonspecific).  -Urine culture (12/21): < 10,000 CFU/mL normal urogenital cathi.  -RVP and Flu A/B, RSV negative. -Sepsis with shock 2/2 E Coli  ESBL with prostatitis (concern for prostate source, appears to be complication of recent prostate biopsy).  -Remains afebrile since 12/21. WBC 13.71, which improved from 18.51.  -Hypotension 2/2 distributive shock. Off Levophed.  -/68 this morning. Midodrine d/honorio today.  -E Coli Sepsis (12/21) sensitive to ertapenem. Continue ertapenem IVPB q24 hours x 4 weeks (last day 1/19/20). IV abx sent. Repeat blood culture from 12/22 no growth to date.  -Midline placement 12/23 for IV antibiotics (last day 1/19/20)  -Case management consult for home IV antibiotics.  -Dr Marc on board.  -Etiology likely prostatitis s/p recent prostate biopsy on Tuesday 12/17 with Dr. Machado (pending results).  -CT chest shows groundglass opacity with underlying reticular prominence in the lateral right middle lobe and posterior right lower lobe. No pneumonia.  -CT A/P B/L perinephric stranding (nonspecific).  -Urine culture (12/21): < 10,000 CFU/mL normal urogenital cathi.  -RVP and Flu A/B, RSV negative.

## 2019-12-24 NOTE — PROGRESS NOTE ADULT - PROBLEM SELECTOR PLAN 2
Resolved. Likely secondary to acute bronchospasm, acute hypercapnic respiratory failure, status asthmaticus likely secondary to SIRS response, RVP negative.  -S/P IV Solumedrol. Patient received prednisone 40 mg 12/24. 12/23, will receive prednisone 20 mg PO daily for 5 days. Continue pantoprazole 40 mg PO daily and lactobacillus acidophilus BID.  -On Duoneb q4h standing, Albuterol q4h prn, Symbicort 2 puffs BID, O2 NC.  -CT Chest done no pneumonia.   -CXR without focal infiltrate.   -S/p intubation and extubation (12/22/19). Saturating well on O2 NC (95-98%). Saturated 90% on room air 12/23. Continue to taper oxygen. Resolved. Likely secondary to acute bronchospasm, acute hypercapnic respiratory failure, status asthmaticus likely secondary to SIRS response, RVP negative.  -S/P IV Solumedrol. Patient received prednisone 40 mg 12/24. 12/23, will receive prednisone 20 mg PO daily for 5 days. Sent prednisone 20 mg PO daily for 4 days (starting 12/25).  -On Duoneb q4h standing, Albuterol q4h prn, Symbicort 2 puffs BID, O2 NC.  -CT Chest done no pneumonia.   -CXR without focal infiltrate.   -S/p intubation and extubation (12/22/19). Saturating well on O2 NC (95-98%). Change to room air. Patient saturated well and was able to ambulate with PT on room air without difficulty today. Resolved. Likely secondary to acute bronchospasm, acute hypercapnic respiratory failure, status asthmaticus likely secondary to SIRS response, RVP negative.  -S/P IV Solumedrol. Patient received prednisone 40 mg 12/24. 12/23, will receive prednisone 20 mg PO daily for 5 days. Sent prednisone 20 mg PO daily for 4 days (starting 12/25).Continue Prednisone 5 mg daily after that as before  -On Duoneb q4h standing, Albuterol q4h prn, Symbicort 2 puffs BID, O2 NC.  -CT Chest done no pneumonia.   -CXR without focal infiltrate.   -S/p intubation and extubation (12/22/19). Saturating well on O2 NC (95-98%). Change to room air. Patient saturated well and was able to ambulate with PT on room air without difficulty today.

## 2019-12-24 NOTE — PROGRESS NOTE ADULT - PROBLEM SELECTOR PLAN 6
-History of 5 coronary artery stents ~2001  -S/p rapid A Fib ---> NSR now resolved after extubation.  -Eliquis 5 mg PO BID started 12/23.  -Stable; cardiology Dr Degroot on case  -Continue home dose atorvastatin 40 mg PO daily at bedtime. -History of 5 coronary artery stents ~2001, NO ASA as per Dr Degroot  -S/p rapid A Fib ---> NSR now resolved after extubation.  -Eliquis 5 mg PO BID started 12/23.  -Stable; cardiology Dr Degroot on case  -Continue home dose atorvastatin 40 mg PO daily at bedtime.

## 2019-12-24 NOTE — PROGRESS NOTE ADULT - PROBLEM SELECTOR PLAN 5
-On 12/2, patient went into A fib with RVR on 12/22. NSR currently.   - S/p lopressor 5 mg IVp and Cardizem 10 mg IVp x 1, which resolved post-extubation.  -Follow up with cardiology, Dr Degroot.  -Eliquis 5 mg PO BID started 12/23. -On 12/2, patient went into A fib with RVR on 12/22. NSR currently.   - S/p lopressor 5 mg IVp and Cardizem 10 mg IVp x 1, which resolved post-extubation.  -Follow up with cardiology, Dr Degroot.  -Eliquis 5 mg PO BID started 12/23. Eliquis 5 mg PO BID sent to patient's pharmacy.

## 2019-12-24 NOTE — PROGRESS NOTE ADULT - SUBJECTIVE AND OBJECTIVE BOX
[facility] Cardiology Progress Note [phone] (Dr. Degroot, Waqar, Joe, Nga)    CHIEF COMPLAINT: Patient is a 74y old  Male who presents with a chief complaint of Fever (23 Dec 2019 12:25)      Follow Up Today: The patient denies any chest discomfort or shortness of breath.    HPI:  75 YO M with PMHX asthma, COPD, former smoker, hx of benign lung cancer R lobe surgically removed at [facility]) CAD (s/p 5 stents ~2001), GERD, HLD, HTN presents after shakes, vomiting, confusion. Pt states he has prostate biopsy 3 days ago, started on antibiotics, the next day developed shaking chills, then became slightly SOB, used a nebulizer which helped his shortness of breath. Pt saw his pulmonologist yesterday, as per pt pulmonologist states everything was ok from a pulmonary standpoint and to continue using his nebulizer. Today pt developed shivering shaking chills again, NBNR emesis, confusion. Pt's wife found him confused, came to the ER for confusion. Of note, pt states he had a cough with sputum production last week. Pt denies fevers at home or sick contacts.     In the ED: Vitals sig for temp of 104.7 F. Labs sig for Plt of 114, bands of 29, BUN 27, Cr 1.40, glucose 124, alk phos 128, AST 39, eGFR 49, lactate 2.8, FLU A, B, RSV neg. CXR: No acute infiltrate. CT head: No acute intracranial bleeding, mass effect, or shift. Mild chronic microvascular ischemic changes. CT A/P: Right middle lobe and right lower lobe groundglass opacities with underlying reticular changes, likely chronic. Recommend follow-up to ensure stability or resolution and exclude acute pneumonia. Mild constipation. No bowel obstruction. Enlarged prostate. Nonspecific bilateral perinephric inflammatory stranding. Nonobstructing left renal calculi. Given: Rocephin x1, Azithromycin x1,  Zosyn x1,  ns bolus x3, tylenol x1, Duo neb x1. Of note, RRT was called for hypotension with BP of 63/48. Patient was seen by RRT team and ICU PA. Was given normal saline bolus x2 and midodrine 10mg x1 with improvement in BP of 105/56 (20 Dec 2019 21:52)      PAST MEDICAL & SURGICAL HISTORY:  Nephrolithiasis  GERD (gastroesophageal reflux disease)  HLD (hyperlipidemia)  HTN (hypertension)  Stented coronary artery  Asthma  Chronic obstructive pulmonary disease: Asthma  S/P primary angioplasty with coronary stent  Lung tumor: Rt Lung tumor resection,benign      MEDICATIONS  (STANDING):  albuterol/ipratropium for Nebulization 3 milliLiter(s) Nebulizer every 4 hours  albuterol/ipratropium for Nebulization. 3 milliLiter(s) Nebulizer once  apixaban 5 milliGRAM(s) Oral two times a day  atorvastatin 40 milliGRAM(s) Oral at bedtime  budesonide 160 MICROgram(s)/formoterol 4.5 MICROgram(s) Inhaler 2 Puff(s) Inhalation two times a day  ertapenem  IVPB      ertapenem  IVPB 1000 milliGRAM(s) IV Intermittent every 24 hours  lactobacillus acidophilus 1 Tablet(s) Oral two times a day  midodrine 10 milliGRAM(s) Oral every 8 hours  pantoprazole    Tablet 40 milliGRAM(s) Oral before breakfast  potassium acid phosphate/sodium acid phosphate tablet (K-PHOS No. 2) 1 Tablet(s) Oral four times a day with meals  predniSONE   Tablet 20 milliGRAM(s) Oral daily  tamsulosin 0.4 milliGRAM(s) Oral at bedtime    MEDICATIONS  (PRN):  acetaminophen   Tablet .. 975 milliGRAM(s) Oral every 8 hours PRN Temp greater or equal to 38C (100.4F), Mild Pain (1 - 3)  ALBUTerol    0.083% 2.5 milliGRAM(s) Nebulizer every 4 hours PRN Shortness of Breath and/or Wheezing      Allergies    No Known Allergies    Intolerances        REVIEW OF SYSTEMS:    All other review of systems is negative unless indicated above    Vital Signs Last 24 Hrs  T(C): 36.6 (24 Dec 2019 07:30), Max: 36.6 (23 Dec 2019 11:57)  T(F): 97.8 (24 Dec 2019 07:30), Max: 97.9 (23 Dec 2019 17:07)  HR: 76 (24 Dec 2019 07:30) (63 - 78)  BP: 111/68 (24 Dec 2019 07:30) (95/55 - 118/54)  BP(mean): 81 (23 Dec 2019 14:00) (70 - 81)  RR: 17 (24 Dec 2019 07:30) (17 - 31)  SpO2: 98% (24 Dec 2019 07:30) (90% - 98%)    I&O's Summary    23 Dec 2019 07:01  -  24 Dec 2019 07:00  --------------------------------------------------------  IN: 1163.3 mL / OUT: 1230 mL / NET: -66.7 mL        PHYSICAL EXAM:    Constitutional: NAD, awake and alert, well-developed  Eyes:  EOMI,  Pupils round, No oral cyanosis.  HEENT: No exudate or erythema  Pulmonary: Non-labored, breath sounds are clear bilaterally, No wheezing, rales or rhonchi  Cardiovascular: Regular, S1 and S2, No murmurs, rubs, gallops oir clicks  Gastrointestinal: Bowel Sounds present, soft, nontender.   Ext: No significant LE edema with good pulses x 4  Neurological: Alert, no gross focal motor deficits  Skin: No rashes.  Psych:  Mood & affect appropriate    LABS: All Labs Reviewed:                        12.4   13.71 )-----------( 123      ( 24 Dec 2019 06:14 )             35.9                         11.9   18.51 )-----------( 113      ( 23 Dec 2019 05:17 )             34.1                         13.4   21.93 )-----------( 112      ( 22 Dec 2019 05:26 )             39.0     24 Dec 2019 06:14    146    |  113    |  21     ----------------------------<  132    4.4     |  29     |  0.82   23 Dec 2019 05:17    142    |  113    |  23     ----------------------------<  180    4.2     |  24     |  0.79   22 Dec 2019 05:26    144    |  114    |  20     ----------------------------<  224    4.0     |  23     |  1.00     Ca    8.1        24 Dec 2019 06:14  Ca    7.8        23 Dec 2019 05:17  Ca    8.2        22 Dec 2019 05:26  Phos  2.3       24 Dec 2019 06:14  Phos  1.6       23 Dec 2019 05:17  Phos  1.8       22 Dec 2019 05:26  Mg     2.0       24 Dec 2019 06:14  Mg     2.6       23 Dec 2019 05:17  Mg     2.7       22 Dec 2019 05:26            Blood Culture: Organism --  Gram Stain Blood -- Gram Stain --  Specimen Source .Blood Blood  Culture-Blood --    Organism --  Gram Stain Blood -- Gram Stain --  Specimen Source .Urine Clean Catch (Midstream)  Culture-Blood --    Organism Blood Culture PCR  Gram Stain Blood -- Gram Stain   Growth in aerobic bottle: Gram Negative Rods  Growth in anaerobic bottle: Gram Negative Rods  Specimen Source .Blood Blood-Peripheral  Culture-Blood --        12-22 @ 05:26  TSH: 0.55      RADIOLOGY/EKG:    Attending Attestation:   20 minutes spent on total encounter; more than 50% of the visit was spent counseling and/or coordinating care by the attending physician.     ASSESSMENT AND PLAN [facility] Progress Note [phone] (Dr. Degroot, Waqar, Joe, Nga)    CHIEF COMPLAINT: Patient is a 74y old  Male who presents with a chief complaint of Fever (23 Dec 2019 12:25)      Follow Up Today: The patient denies any chest discomfort or shortness of breath. His oxygen sat has been low with ambulating    HPI:  75 YO M with PMHX asthma, COPD, former smoker, hx of benign lung cancer R lobe surgically removed at [facility]) CAD (s/p 5 stents ~2001), GERD, HLD, HTN presents after shakes, vomiting, confusion. Pt states he has prostate biopsy 3 days ago, started on antibiotics, the next day developed shaking chills, then became slightly SOB, used a nebulizer which helped his shortness of breath. Pt saw his pulmonologist yesterday, as per pt pulmonologist states everything was ok from a pulmonary standpoint and to continue using his nebulizer. Today pt developed shivering shaking chills again, NBNR emesis, confusion. Pt's wife found him confused, came to the ER for confusion. Of note, pt states he had a cough with sputum production last week. Pt denies fevers at home or sick contacts.     In the ED: Vitals sig for temp of 104.7 F. Labs sig for Plt of 114, bands of 29, BUN 27, Cr 1.40, glucose 124, alk phos 128, AST 39, eGFR 49, lactate 2.8, FLU A, B, RSV neg. CXR: No acute infiltrate. CT head: No acute intracranial bleeding, mass effect, or shift. Mild chronic microvascular ischemic changes. CT A/P: Right middle lobe and right lower lobe groundglass opacities with underlying reticular changes, likely chronic. Recommend follow-up to ensure stability or resolution and exclude acute pneumonia. Mild constipation. No bowel obstruction. Enlarged prostate. Nonspecific bilateral perinephric inflammatory stranding. Nonobstructing left renal calculi. Given: Rocephin x1, Azithromycin x1,  Zosyn x1,  ns bolus x3, tylenol x1, Duo neb x1. Of note, RRT was called for hypotension with BP of 63/48. Patient was seen by RRT team and ICU PA. Was given normal saline bolus x2 and midodrine 10mg x1 with improvement in BP of 105/56 (20 Dec 2019 21:52)      PAST MEDICAL & SURGICAL HISTORY:  Nephrolithiasis  GERD (gastroesophageal reflux disease)  HLD (hyperlipidemia)  HTN (hypertension)  Stented coronary artery  Asthma  Chronic obstructive pulmonary disease: Asthma  S/P primary angioplasty with coronary stent  Lung tumor: Rt Lung tumor resection,benign      MEDICATIONS  (STANDING):  albuterol/ipratropium for Nebulization 3 milliLiter(s) Nebulizer every 4 hours  albuterol/ipratropium for Nebulization. 3 milliLiter(s) Nebulizer once  apixaban 5 milliGRAM(s) Oral two times a day  atorvastatin 40 milliGRAM(s) Oral at bedtime  budesonide 160 MICROgram(s)/formoterol 4.5 MICROgram(s) Inhaler 2 Puff(s) Inhalation two times a day  ertapenem  IVPB      ertapenem  IVPB 1000 milliGRAM(s) IV Intermittent every 24 hours  lactobacillus acidophilus 1 Tablet(s) Oral two times a day  midodrine 10 milliGRAM(s) Oral every 8 hours  pantoprazole    Tablet 40 milliGRAM(s) Oral before breakfast  potassium acid phosphate/sodium acid phosphate tablet (K-PHOS No. 2) 1 Tablet(s) Oral four times a day with meals  predniSONE   Tablet 20 milliGRAM(s) Oral daily  tamsulosin 0.4 milliGRAM(s) Oral at bedtime    MEDICATIONS  (PRN):  acetaminophen   Tablet .. 975 milliGRAM(s) Oral every 8 hours PRN Temp greater or equal to 38C (100.4F), Mild Pain (1 - 3)  ALBUTerol    0.083% 2.5 milliGRAM(s) Nebulizer every 4 hours PRN Shortness of Breath and/or Wheezing      Allergies    No Known Allergies    Intolerances        REVIEW OF SYSTEMS:    All other review of systems is negative unless indicated above    Vital Signs Last 24 Hrs  T(C): 36.6 (24 Dec 2019 07:30), Max: 36.6 (23 Dec 2019 11:57)  T(F): 97.8 (24 Dec 2019 07:30), Max: 97.9 (23 Dec 2019 17:07)  HR: 76 (24 Dec 2019 07:30) (63 - 78)  BP: 111/68 (24 Dec 2019 07:30) (95/55 - 118/54)  BP(mean): 81 (23 Dec 2019 14:00) (70 - 81)  RR: 17 (24 Dec 2019 07:30) (17 - 31)  SpO2: 98% (24 Dec 2019 07:30) (90% - 98%)    I&O's Summary    23 Dec 2019 07:01  -  24 Dec 2019 07:00  --------------------------------------------------------  IN: 1163.3 mL / OUT: 1230 mL / NET: -66.7 mL        PHYSICAL EXAM:    Constitutional: NAD, awake and alert, well-developed  Eyes:  EOMI,  Pupils round, No oral cyanosis.  HEENT: No exudate or erythema  Pulmonary: Non-labored, breath sounds are clear bilaterally, No wheezing, rales or rhonchi  Cardiovascular: Regular, S1 and S2, No murmurs,   Gastrointestinal: Bowel Sounds present, soft, nontender.   Ext: No significant LE edema   Neurological: Alert, no gross focal motor deficits  Skin: No rashes.  Psych:  Mood & affect appropriate    LABS: All Labs Reviewed:                        12.4   13.71 )-----------( 123      ( 24 Dec 2019 06:14 )             35.9                         11.9   18.51 )-----------( 113      ( 23 Dec 2019 05:17 )             34.1                         13.4   21.93 )-----------( 112      ( 22 Dec 2019 05:26 )             39.0     24 Dec 2019 06:14    146    |  113    |  21     ----------------------------<  132    4.4     |  29     |  0.82   23 Dec 2019 05:17    142    |  113    |  23     ----------------------------<  180    4.2     |  24     |  0.79   22 Dec 2019 05:26    144    |  114    |  20     ----------------------------<  224    4.0     |  23     |  1.00     Ca    8.1        24 Dec 2019 06:14  Ca    7.8        23 Dec 2019 05:17  Ca    8.2        22 Dec 2019 05:26  Phos  2.3       24 Dec 2019 06:14  Phos  1.6       23 Dec 2019 05:17  Phos  1.8       22 Dec 2019 05:26  Mg     2.0       24 Dec 2019 06:14  Mg     2.6       23 Dec 2019 05:17  Mg     2.7       22 Dec 2019 05:26            Blood Culture: Organism --  Gram Stain Blood -- Gram Stain --  Specimen Source .Blood Blood  Culture-Blood --    Organism --  Gram Stain Blood -- Gram Stain --  Specimen Source .Urine Clean Catch (Midstream)  Culture-Blood --    Organism Blood Culture PCR  Gram Stain Blood -- Gram Stain   Growth in aerobic bottle: Gram Negative Rods  Growth in anaerobic bottle: Gram Negative Rods  Specimen Source .Blood Blood-Peripheral  Culture-Blood --        12-22 @ 05:26  TSH: 0.55      RADIOLOGY/EKG:    Attending Attestation:   20 minutes spent on total encounter; more than 50% of the visit was spent counseling and/or coordinating care by the attending physician.     ASSESSMENT AND PLAN

## 2019-12-24 NOTE — PROGRESS NOTE ADULT - PROBLEM SELECTOR PROBLEM 2
Respiratory failure
Respiratory failure
Sepsis
Respiratory failure
Respiratory failure

## 2019-12-24 NOTE — PROGRESS NOTE ADULT - ATTENDING COMMENTS
74M h/o nephrolithiasis, GERD, HTN, HLD, CAD s/p stents x5, asthma and COPD with multiple prior hospitalizations (no previous intubations), R lung tumor s/p VATS w/ resection, recent prostate bx 12/17 admitted with septic shock 2/2 E.coli bacteremia from hematogenous spread s/p prostate bx  c/b acute bronchospasm, status asthmaticus suspected due to SIRS after spiking fever to 102 requiring intubation now successfully extubated    Clinically stable   Off Levo     Neuro stable  In NSR, start Eliquis per cardiology, off aspirin   Continue statin   Pulm stable, wean steroids, takes prednisone 5mg daily at home   Continue budesonide, DuoNebs   Tolerating diet   Afebrile, WBC improving, BCx from 12/22 neg so far   D/w ID - will need 4 weeks of ertapenem for ESBL E. coli bacteremia - will order PICC   Replete hypophosphatemia   D/c Liz   OOB  Eliquis for DVT ppx     Stable for remote tele
74M h/o nephrolithiasis, GERD, HTN, HLD, CAD s/p stents x5, asthma and COPD with multiple prior hospitalizations (no previous intubations), R lung tumor s/p vats with resection, recent prostate bx 12/17 admitted with septic shock 2/2 E.coli bacteremia from hematogenous spread s/p prostate bx  c/b acute bronchospasm, status asthmaticus suspected due to SIRS after spiking fever to 102 requiring intubation.    - Neuro: now deeply sedated to allow for vent synchrony, continue propofol infusion, can add versed prn if needed; if becomes dysynchronous with vent low threshold to start nimbex given severity of asthma exacerbation  - CV: previously on midodrine for hypotension on admission, now with worsening hypotension post-intubation, likely multifactorial distributive shock 2/2 sepsis and vasoplegia from propofol; startd on levophed to titrate to goal MAP > 65  - Pulm: had been weaning down O2 on high flow prior to acute status asthmaticus not improved with duoneb x4, solumedrol, magnesium, ketamine and BiPAP, ultimately required intubation due to continued increased work of breathing with concern for impending airway compromise - ABG wnl pre and post-intubation, low PEEP requirements, return to baseline on pressure curves suggesting acute bronchospasm improved, continue to monitor plateau pressures; continue prednisone daily, duoneb q4hr today with PRN albuterol, and monitor airway dynamics  - GI: NPO with NGT in place, likely start tube feeds tomorrow  - Renal: YUE on admission improved, continue to trend; monitor I/Os, replete electrolytes prn  - ID: GNR bacteremia likely 2/2 recent prostate biopsy, had been on ppx abx as outpt, will change zosyn to ertapenem to cover ESBL species and adjust when culture sensitivities available  - Heme: dvt ppx with lovenox
Pt seen, Examined, case & care plan d/w  Dr cui & ICU Attending  at detail.  AM labs   Supportive care
Pt seen, Examined, case & care plan d/w  pt & , family   at detail.  AM labs   Supportive care
Pt seen, Examined, Case & care plan d/w pt, residents at detail.  D/W Wife  & Son at detail   D/C Home today, Total d/c care time is 50 minutes  D/W DR Machado, Dr Dang. DR Laurent, DR Degroot at detail, H/O GI Bleed in past No ASA with Eliquis  All question answered to best of my abilities.
Pt  seen, Examined, case & care plan d/w pt, residents at detail.  AM labs , D/W DR Marc, DR Dang, Dr Laurent-PMD   D/W Dr Machado as well.  D/W pt, wife at detail.  AM labs   PT Bre, D/c Planning

## 2019-12-24 NOTE — PROGRESS NOTE ADULT - PROBLEM SELECTOR PROBLEM 3
Prostatitis, acute
Urinary retention
YUE (acute kidney injury)

## 2019-12-24 NOTE — PROGRESS NOTE ADULT - SUBJECTIVE AND OBJECTIVE BOX
Patient is a 74y old  Male who presents with a chief complaint of Fever (22 Dec 2019 12:32)    INTERVAL HPI:  73 YO M with PMHX asthma, COPD, former smoker, hx of benign lung cancer R lobe surgically removed at [facility]) CAD (s/p 5 stents ~), GERD, HLD, HTN presents after shakes, vomiting, confusion. Pt states he has prostate biopsy 3 days ago, started on antibiotics, the next day developed shaking chills, then became slightly SOB, used a nebulizer which helped his shortness of breath. Pt saw his pulmonologist yesterday, as per pt pulmonologist states everything was ok from a pulmonary standpoint and to continue using his nebulizer. Today pt developed shivering shaking chills again, NBNR emesis, confusion. Pt's wife found him confused, came to the ER for confusion. Of note, pt states he had a cough with sputum production last week. Pt denies fevers at home or sick contacts.     In the ED: Vitals sig for temp of 104.7 F. Labs sig for Plt of 114, bands of 29, BUN 27, Cr 1.40, glucose 124, alk phos 128, AST 39, eGFR 49, lactate 2.8, FLU A, B, RSV neg. CXR: No acute infiltrate. CT head: No acute intracranial bleeding, mass effect, or shift. Mild chronic microvascular ischemic changes. CT A/P: Right middle lobe and right lower lobe groundglass opacities with underlying reticular changes, likely chronic. Recommend follow-up to ensure stability or resolution and exclude acute pneumonia. Mild constipation. No bowel obstruction. Enlarged prostate. Nonspecific bilateral perinephric inflammatory stranding. Nonobstructing left renal calculi. Given: Rocephin x1, Azithromycin x1,  Zosyn x1,  ns bolus x3, tylenol x1, Duo neb x1. Of note, RRT was called for hypotension with BP of 63/48. Patient was seen by RRT team and ICU PA. Was given normal saline bolus x2 and midodrine 10mg x1 with improvement in BP of 105/56  -S/P RRT called for hypotension , several IV Fluid Boluses given, pt was upgraded to ICU for septic shock/ Hypoxia .    19: Pt seen, Examined, S/P  Intubation now with ac bronchospasm with  Hypoxia, ac respiratory failure. Pt with septic shock 2/2 GNR, E. Coli sepsis , seen by ID Dr Marc, IV Abx changed to Invanz ,S/P Vasopressors IV, Leukocytosis with Bandemia, & Low platelets 2/2 sepsis, pt got IV Solumedrol 125 mg x 1 dose, seen by cardiology Dr Degroot, RVP -NEG  19: Pt seen, Examined, feels a lot better, well, extubated successfully. Pt went into AFib with RVR in AM s/p lopressor 5mg IVP x2 and cardizem 10mg IVP x1, resolved post-extubation. Now in NSR. Andrews placed for urinary retention , pt remains on Levophed  for septic shock. Family at bed side, YUE resolved, Leukocytosis/Bandemia persist.  19: Patient seen and examined. Patient reports to be feeling better and denies any difficulty with breathing on O2 NC. Phos was repleted and patient reported burning of L arm as a result. Patient tolerated breakfast fine. Patient ambulated and andrews will be removed today. Patient had bowel movement. Pt received prednisone 40 mg PO today. Will receive prednisone 20 mg tomorrow for a couple of days. Repeat blood Cx from yesterday no growth to date. Plan for midline insertion for abx until . PT eval, started on Eliquis as per cardiology. TOV today, WBC improving  19:    OVERNIGHT EVENTS: NONE    Home Medications:  Aspir 81 81 mg oral tablet: 1 tab(s) orally once a day (20 Dec 2019 22:44)  atorvastatin 40 mg oral tablet: 1 tab(s) orally once a day (at bedtime) (20 Dec 2019 22:44)  budesonide 0.5 mg/2 mL inhalation suspension: 2 milliliter(s) inhaled 2 times a day, As Needed (20 Dec 2019 22:44)  DuoNeb 0.5 mg-2.5 mg/3 mL inhalation solution: 1 milliliter(s) inhaled every 6 hours (20 Dec 2019 22:44)  losartan 25 mg oral tablet: 1 tab(s) orally once a day (20 Dec 2019 22:44)  predniSONE 5 mg oral tablet: 1 tab(s) orally once a day, As Needed (20 Dec 2019 22:44)  Protonix 40 mg oral delayed release tablet: 1 tab(s) orally once a day (20 Dec 2019 22:44)  silodosin 8 mg oral capsule: 1 cap(s) orally once a day (20 Dec 2019 22:44)  Spiriva 18 mcg inhalation capsule: 1  inhaled once a day (20 Dec 2019 22:44)  Symbicort 160 mcg-4.5 mcg/inh inhalation aerosol: 2  inhaled 2 times a day (20 Dec 2019 22:44)  Ventolin HFA 90 mcg/inh inhalation aerosol: 2 puff(s) inhaled 4 times a day, As Needed (20 Dec 2019 22:44)  Vitamin B-12 500 mcg oral tablet:  orally  (20 Dec 2019 22:44)  Vitamin B6 100 mg oral tablet: 1 tab(s) orally once a day (20 Dec 2019 22:44)  Vitamin D: 1000 international unit(s) orally once a day (20 Dec 2019 22:44)    MEDICATIONS  (STANDING):  albuterol/ipratropium for Nebulization 3 milliLiter(s) Nebulizer every 4 hours  albuterol/ipratropium for Nebulization. 3 milliLiter(s) Nebulizer once  apixaban 5 milliGRAM(s) Oral two times a day  atorvastatin 40 milliGRAM(s) Oral at bedtime  budesonide 160 MICROgram(s)/formoterol 4.5 MICROgram(s) Inhaler 2 Puff(s) Inhalation two times a day  ertapenem  IVPB      ertapenem  IVPB 1000 milliGRAM(s) IV Intermittent every 24 hours  lactobacillus acidophilus 1 Tablet(s) Oral two times a day  midodrine 10 milliGRAM(s) Oral every 8 hours  pantoprazole    Tablet 40 milliGRAM(s) Oral before breakfast  potassium acid phosphate/sodium acid phosphate tablet (K-PHOS No. 2) 1 Tablet(s) Oral four times a day with meals  predniSONE   Tablet 20 milliGRAM(s) Oral daily  tamsulosin 0.4 milliGRAM(s) Oral at bedtime    MEDICATIONS  (PRN):  acetaminophen   Tablet .. 975 milliGRAM(s) Oral every 8 hours PRN Temp greater or equal to 38C (100.4F), Mild Pain (1 - 3)  ALBUTerol    0.083% 2.5 milliGRAM(s) Nebulizer every 4 hours PRN Shortness of Breath and/or Wheezing    Allergies  No Known Allergies    Social History:  quit smoking 5 years ago, 50 pack years   drinks 1 glass of wine 1 x a month socially  lives with wife, performs all ADLs, ambulates on own (20 Dec 2019 21:52)      REVIEW OF SYSTEMS: i feel fine   CONSTITUTIONAL: No fever, No chills, No fatigue, No myalgia, No Body ache, No Weakness  EYES: No eye pain,  No visual disturbances, No discharge, NO Redness  ENMT:  No ear pain, No nose bleed, No vertigo; No sinus pain, NO throat pain, No Congestion  NECK: No pain, No stiffness  RESPIRATORY: No cough, NO wheezing, No  hemoptysis, NO  shortness of breath  CARDIOVASCULAR: No chest pain, palpitations  GASTROINTESTINAL: No abdominal pain, NO epigastric pain. No nausea, No vomiting; No diarrhea, No constipation. [ x ] BM  GENITOURINARY: No dysuria, No frequency, No urgency, No hematuria  NEUROLOGICAL: No headaches, No dizziness, No numbness, No tingling, No tremors, No weakness  EXT: No Swelling, No Pain, No Edema  SKIN:  Burning at L arm IV site after K phos given [ ] No itching, burning, rashes, or lesions   MUSCULOSKELETAL: No joint pain ,No Jt swelling; No muscle pain, No back pain, No extremity pain  PSYCHIATRIC: No depression,  No anxiety,  No mood swings ,No difficulty sleeping at night  PAIN SCALE: [ x ] None  [  ] Other-  ROS Unable to obtain due to - [  ] Dementia  [  ] Lethargy [  ] Drowsy [  ] Sedated [  ] non verbal  REST OF REVIEW Of SYSTEM - [ x ] Normal     Vital Signs Last 24 Hrs  T(C): 36.6 (23 Dec 2019 23:37), Max: 36.6 (23 Dec 2019 11:57)  T(F): 97.9 (23 Dec 2019 23:37), Max: 97.9 (23 Dec 2019 17:07)  HR: 71 (24 Dec 2019 03:53) (63 - 78)  BP: 107/66 (23 Dec 2019 23:37) (95/55 - 118/54)  BP(mean): 81 (23 Dec 2019 14:00) (70 - 81)  RR: 18 (23 Dec 2019 23:37) (17 - 31)  SpO2: 95% (24 Dec 2019 03:53) (90% - 98%)    I&O's Summary    23 Dec 2019 07:01  -  24 Dec 2019 07:00  --------------------------------------------------------  IN: 1163.3 mL / OUT: 1230 mL / NET: -66.7 mL      PHYSICAL EXAM: Agusto cantrell  GENERAL:  [x  ] NAD , [x  ] well appearing, [  ] Agitated, [  ] Drowsy,  [  ] Lethargy, [  ] confused   HEAD:  [ x ] Normal, [  ] Other  EYES:  [x  ] EOMI, [x  ] PERRLA, [ x ] conjunctiva and sclera clear normal, [  ] Other,  [  ] Pallor,[  ] Discharge  ENMT:  [x  ] Normal, [x  ] Moist mucous membranes, [  ] Good dentition, [  ] No Thrush  NECK:  [x  ] Supple, [ x ] No JVD, [ x ] Normal thyroid, [  ] Lymphadenopathy [  ] Other  CHEST/LUNG:  [ x ] Clear to auscultation bilaterally, [x ] Breath Sounds equal B/L / Decrease, [  ] poor effort  [ x ] No rales, [x  ] few scattered B/L rhonchi  [ x ]  No wheezing,   HEART:  [x] Regular rate and rhythm, [  ] tachycardia, [  ] Bradycardia,  [  ] irregular  [  x] No murmurs, No rubs, No gallops, [  ] PPM in place (Mfr:  )  ABDOMEN:  [x  ] Soft, [ x ] Nontender, [ x ] Nondistended, [ x ] No mass, [ x ] Bowel sounds present, [x  ] obese  NERVOUS SYSTEM:  [ x ] Alert & Oriented X3, [x ] Nonfocal  [  ] Confusion  [  ] Encephalopathic [  ] Sedated [  ] Unable to assess, [  ] Dementia [  ] Other-  EXTREMITIES: [x  ] 2+ Peripheral Pulses, No clubbing, No cyanosis,  [  ] edema B/L lower EXT. [  ] PVD stasis skin changes B/L Lower EXT, [  ] wound  LYMPH: No lymphadenopathy noted  SKIN:  [x  ] No rashes or lesions, [  ] Pressure Ulcers, [  ] ecchymosis, [  ] Skin Tears, [  ] Other    DIET: DASH/TLC      LABS:                        12.4   13.71 )-----------( 123      ( 24 Dec 2019 06:14 )             35.9     12-24    146<H>  |  113<H>  |  21  ----------------------------<  132<H>  4.4   |  29  |  0.82    Ca    8.1<L>      24 Dec 2019 06:14  Phos  2.3     12-24. Provided KPhos 4x/day with meals.  Mg     2.0       A1c 5.8 ()      Urinalysis Basic - ( 21 Dec 2019 00:56 )  Color: Yellow / Appearance: Slightly Turbid / S.010 / pH: x  Gluc: x / Ketone: Negative  / Bili: Negative / Urobili: Negative   Blood: x / Protein: 25 mg/dL / Nitrite: Negative   Leuk Esterase: Small / RBC: 0-2 /HPF / WBC 26-50   Sq Epi: x / Non Sq Epi: Negative / Bacteria: Few      Thyroid Stimulating Hormone, Serum: 0.55 uIU/mL (19 @ 05:26)  Lipase, Serum: 45 U/L (19 @ 19:23)    ABG (): pH 7.41, pCO2 35, pO2 86, HCO3 23, FiO2 30, O2 sat 96    MICROBIOLOGY  Urine Culture (): < 10,000 CFU/mL normal urogenital cathi  Blood Culture (): Escherichia coli (Growth in aerobic and anaerobic bottles)  Flu A/B, RSV (): Negative    RADIOLOGY & ADDITIONAL TESTS:  IR PICC (): Right upper extremity midline catheter. Catheter can be used for intravenous access. Ultrasound guided procedure. No radiation. No contrast utilized for the procedure.  CT Abdomen and Pelvis without Contrast (): Evaluation of the solid organs and GI tract is limited without oral and IV contrast. Right middle lobe and right lower lobe groundglass opacities with underlying reticular changes, likely chronic. Recommend follow-up to ensure stability or resolution and exclude acute pneumonia. Mild constipation. No bowel obstruction.   Enlarged prostate. Nonspecific bilateral perinephric inflammatory stranding. Nonobstructing left renal calculi.  CT Head without Contrast (): No acute intracranial bleeding, mass effect, or shift. Mild chronic microvascular ischemic changes.   CXR (): No acute infiltrate  TTE (): Normal left ventricular size, wall thickness, and left ventricular systolic function with an ejection fraction of 60-65%. Grade 1 diastolic dysfunction. Trivial mitral regurgitation, trivial tricuspid regurgitation. The aortic valve was not well visualized but appears mildly thickened leaflets. No significant pericardial effusion. Normal right atrial and right ventricular size and systolic function.    HEALTH ISSUES - PROBLEM Dx:  Respiratory failure  Prostatitis, acute  Bacteremia due to Escherichia coli  BPH (benign prostatic hyperplasia)  Hypotension  Prophylactic measure  GERD (gastroesophageal reflux disease)  HLD (hyperlipidemia)  Stented coronary artery  Chronic obstructive pulmonary disease  YUE (acute kidney injury)  Sepsis      Consultant(s) Notes Reviewed:  [ x ] YES     Care Discussed with [X] Consultants  [x  ] Patient  [x  ] Family [  ] HCP [  ]   [  ] Social Service  [x  ] RN, [  ] Physical Therapy,[  ] Palliative care team  DVT PPX: [ x ] Lovenox, [  ] S C Heparin, [  ] Coumadin, [  ] Xarelto, [  ] Eliquis, [  ] Pradaxa, [  ] IV Heparin drip, [  ] SCD [  ] Contraindication 2 to GI Bleed,[  ] Ambulation [  ] Contraindicated 2 to  bleed [  ] Contraindicated 2 to Brain Bleed  Advanced directive: [ x ] None, [  ] DNR/DNI Patient is a 74y old  Male who presents with a chief complaint of Fever (22 Dec 2019 12:32)    INTERVAL HPI:  75 YO M with PMHX asthma, COPD, former smoker, hx of benign lung cancer R lobe surgically removed at [facility]) CAD (s/p 5 stents ~), GERD, HLD, HTN presents after shakes, vomiting, confusion. Pt states he has prostate biopsy 3 days ago, started on antibiotics, the next day developed shaking chills, then became slightly SOB, used a nebulizer which helped his shortness of breath. Pt saw his pulmonologist yesterday, as per pt pulmonologist states everything was ok from a pulmonary standpoint and to continue using his nebulizer. Today pt developed shivering shaking chills again, NBNR emesis, confusion. Pt's wife found him confused, came to the ER for confusion. Of note, pt states he had a cough with sputum production last week. Pt denies fevers at home or sick contacts.     In the ED: Vitals sig for temp of 104.7 F. Labs sig for Plt of 114, bands of 29, BUN 27, Cr 1.40, glucose 124, alk phos 128, AST 39, eGFR 49, lactate 2.8, FLU A, B, RSV neg. CXR: No acute infiltrate. CT head: No acute intracranial bleeding, mass effect, or shift. Mild chronic microvascular ischemic changes. CT A/P: Right middle lobe and right lower lobe groundglass opacities with underlying reticular changes, likely chronic. Recommend follow-up to ensure stability or resolution and exclude acute pneumonia. Mild constipation. No bowel obstruction. Enlarged prostate. Nonspecific bilateral perinephric inflammatory stranding. Nonobstructing left renal calculi. Given: Rocephin x1, Azithromycin x1,  Zosyn x1,  ns bolus x3, tylenol x1, Duo neb x1. Of note, RRT was called for hypotension with BP of 63/48. Patient was seen by RRT team and ICU PA. Was given normal saline bolus x2 and midodrine 10mg x1 with improvement in BP of 105/56  -S/P RRT called for hypotension , several IV Fluid Boluses given, pt was upgraded to ICU for septic shock/ Hypoxia .    19: Pt seen, Examined, S/P  Intubation now with ac bronchospasm with  Hypoxia, ac respiratory failure. Pt with septic shock 2/2 GNR, E. Coli sepsis , seen by ID Dr Marc, IV Abx changed to Invanz ,S/P Vasopressors IV, Leukocytosis with Bandemia, & Low platelets 2/2 sepsis, pt got IV Solumedrol 125 mg x 1 dose, seen by cardiology Dr Degroot, RVP -NEG  19: Pt seen, Examined, feels a lot better, well, extubated successfully. Pt went into AFib with RVR in AM s/p lopressor 5mg IVP x2 and cardizem 10mg IVP x1, resolved post-extubation. Now in NSR. Andrews placed for urinary retention , pt remains on Levophed  for septic shock. Family at bed side, YUE resolved, Leukocytosis/Bandemia persist.  19: Patient seen and examined. Patient reports to be feeling better and denies any difficulty with breathing on O2 NC. Phos was repleted and patient reported burning of L arm as a result. Patient tolerated breakfast fine. Patient ambulated and andrews will be removed today. Patient had bowel movement. Pt received prednisone 40 mg PO today. Will receive prednisone 20 mg tomorrow for a couple of days. Repeat blood Cx from yesterday no growth to date. Plan for midline insertion for abx until . PT eval, started on Eliquis as per cardiology. TOV today, WBC improving  19:    OVERNIGHT EVENTS: NONE    Home Medications:  Aspir 81 81 mg oral tablet: 1 tab(s) orally once a day (20 Dec 2019 22:44)  atorvastatin 40 mg oral tablet: 1 tab(s) orally once a day (at bedtime) (20 Dec 2019 22:44)  budesonide 0.5 mg/2 mL inhalation suspension: 2 milliliter(s) inhaled 2 times a day, As Needed (20 Dec 2019 22:44)  DuoNeb 0.5 mg-2.5 mg/3 mL inhalation solution: 1 milliliter(s) inhaled every 6 hours (20 Dec 2019 22:44)  losartan 25 mg oral tablet: 1 tab(s) orally once a day (20 Dec 2019 22:44)  predniSONE 5 mg oral tablet: 1 tab(s) orally once a day, As Needed (20 Dec 2019 22:44)  Protonix 40 mg oral delayed release tablet: 1 tab(s) orally once a day (20 Dec 2019 22:44)  silodosin 8 mg oral capsule: 1 cap(s) orally once a day (20 Dec 2019 22:44)  Spiriva 18 mcg inhalation capsule: 1  inhaled once a day (20 Dec 2019 22:44)  Symbicort 160 mcg-4.5 mcg/inh inhalation aerosol: 2  inhaled 2 times a day (20 Dec 2019 22:44)  Ventolin HFA 90 mcg/inh inhalation aerosol: 2 puff(s) inhaled 4 times a day, As Needed (20 Dec 2019 22:44)  Vitamin B-12 500 mcg oral tablet:  orally  (20 Dec 2019 22:44)  Vitamin B6 100 mg oral tablet: 1 tab(s) orally once a day (20 Dec 2019 22:44)  Vitamin D: 1000 international unit(s) orally once a day (20 Dec 2019 22:44)    MEDICATIONS  (STANDING):  albuterol/ipratropium for Nebulization 3 milliLiter(s) Nebulizer every 4 hours  albuterol/ipratropium for Nebulization. 3 milliLiter(s) Nebulizer once  apixaban 5 milliGRAM(s) Oral two times a day  atorvastatin 40 milliGRAM(s) Oral at bedtime  budesonide 160 MICROgram(s)/formoterol 4.5 MICROgram(s) Inhaler 2 Puff(s) Inhalation two times a day  ertapenem  IVPB      ertapenem  IVPB 1000 milliGRAM(s) IV Intermittent every 24 hours  lactobacillus acidophilus 1 Tablet(s) Oral two times a day  midodrine 10 milliGRAM(s) Oral every 8 hours  pantoprazole    Tablet 40 milliGRAM(s) Oral before breakfast  potassium acid phosphate/sodium acid phosphate tablet (K-PHOS No. 2) 1 Tablet(s) Oral four times a day with meals  predniSONE   Tablet 20 milliGRAM(s) Oral daily  tamsulosin 0.4 milliGRAM(s) Oral at bedtime    MEDICATIONS  (PRN):  acetaminophen   Tablet .. 975 milliGRAM(s) Oral every 8 hours PRN Temp greater or equal to 38C (100.4F), Mild Pain (1 - 3)  ALBUTerol    0.083% 2.5 milliGRAM(s) Nebulizer every 4 hours PRN Shortness of Breath and/or Wheezing    Allergies  No Known Allergies    Social History:  quit smoking 5 years ago, 50 pack years   drinks 1 glass of wine 1 x a month socially  lives with wife, performs all ADLs, ambulates on own (20 Dec 2019 21:52)    REVIEW OF SYSTEMS:  CONSTITUTIONAL: No fever, No chills, No fatigue, No myalgia, No Body ache, No Weakness  EYES: No eye pain,  No visual disturbances, No discharge, NO Redness  ENMT:  No ear pain, No nose bleed, No vertigo; No sinus pain, NO throat pain, No Congestion  NECK: No pain, No stiffness  RESPIRATORY: No cough, NO wheezing, No  hemoptysis, NO  shortness of breath  CARDIOVASCULAR: No chest pain, palpitations  GASTROINTESTINAL: No abdominal pain, NO epigastric pain. No nausea, No vomiting; No diarrhea, No constipation. [ x ] BM  GENITOURINARY: No dysuria, No frequency, No urgency, No hematuria  NEUROLOGICAL: No headaches, No dizziness, No numbness, No tingling, No tremors, No weakness  EXT: No Swelling, No Pain, No Edema  SKIN:  Burning at L arm IV site after K phos given [ ] No itching, burning, rashes, or lesions   MUSCULOSKELETAL: No joint pain ,No Jt swelling; No muscle pain, No back pain, No extremity pain  PSYCHIATRIC: No depression,  No anxiety,  No mood swings ,No difficulty sleeping at night  PAIN SCALE: [ x ] None  [  ] Other-  ROS Unable to obtain due to - [  ] Dementia  [  ] Lethargy [  ] Drowsy [  ] Sedated [  ] non verbal  REST OF REVIEW Of SYSTEM - [ x ] Normal     Vital Signs Last 24 Hrs  T(C): 36.6 (24 Dec 2019 07:30), Max: 36.6 (23 Dec 2019 11:57)  T(F): 97.8 (24 Dec 2019 07:30), Max: 97.9 (23 Dec 2019 17:07)  HR: 63 (24 Dec 2019 07:56) (63 - 78)  BP: 111/68 (24 Dec 2019 07:30) (97/51 - 118/54)  BP(mean): 81 (23 Dec 2019 14:00) (71 - 81)  RR: 17 (24 Dec 2019 07:30) (17 - 30)  SpO2: 94% (24 Dec 2019 07:56) (90% - 98%)      I&O's Summary    23 Dec 2019 07:01  -  24 Dec 2019 07:00  --------------------------------------------------------  IN: 1163.3 mL / OUT: 1230 mL / NET: -66.7 mL      PHYSICAL EXAM: Agusto cantrell  GENERAL:  [x  ] NAD , [x  ] well appearing, [  ] Agitated, [  ] Drowsy,  [  ] Lethargy, [  ] confused   HEAD:  [ x ] Normal, [  ] Other  EYES:  [x  ] EOMI, [x  ] PERRLA, [ x ] conjunctiva and sclera clear normal, [  ] Other,  [  ] Pallor,[  ] Discharge  ENMT:  [x  ] Normal, [x  ] Moist mucous membranes, [  ] Good dentition, [  ] No Thrush  NECK:  [x  ] Supple, [ x ] No JVD, [ x ] Normal thyroid, [  ] Lymphadenopathy [  ] Other  CHEST/LUNG:  [ x ] Clear to auscultation bilaterally, [x ] Breath Sounds equal B/L / Decrease, [  ] poor effort  [ x ] No rales, [x  ] few scattered B/L rhonchi  [ x ]  No wheezing,   HEART:  [x] Regular rate and rhythm, [  ] tachycardia, [  ] Bradycardia,  [  ] irregular  [  x] No murmurs, No rubs, No gallops, [  ] PPM in place (Mfr:  )  ABDOMEN:  [x  ] Soft, [ x ] Nontender, [ x ] Nondistended, [ x ] No mass, [ x ] Bowel sounds present, [x  ] obese  NERVOUS SYSTEM:  [ x ] Alert & Oriented X3, [x ] Nonfocal  [  ] Confusion  [  ] Encephalopathic [  ] Sedated [  ] Unable to assess, [  ] Dementia [  ] Other-  EXTREMITIES: [x  ] 2+ Peripheral Pulses, No clubbing, No cyanosis,  [  ] edema B/L lower EXT. [  ] PVD stasis skin changes B/L Lower EXT, [  ] wound  LYMPH: No lymphadenopathy noted  SKIN:  [x  ] No rashes or lesions, [  ] Pressure Ulcers, [  ] ecchymosis, [  ] Skin Tears, [  ] Other    DIET: DASH/TLC      LABS:                        12.4   13.71 )-----------( 123      ( 24 Dec 2019 06:14 )             35.9     12-24    146<H>  |  113<H>  |  21  ----------------------------<  132<H>  4.4   |  29  |  0.82    Ca    8.1<L>      24 Dec 2019 06:14  Phos  2.3     12-24. Provided KPhos 4x/day with meals.  Mg     2.0       A1c 5.8 ()      Urinalysis Basic - ( 21 Dec 2019 00:56 )  Color: Yellow / Appearance: Slightly Turbid / S.010 / pH: x  Gluc: x / Ketone: Negative  / Bili: Negative / Urobili: Negative   Blood: x / Protein: 25 mg/dL / Nitrite: Negative   Leuk Esterase: Small / RBC: 0-2 /HPF / WBC 26-50   Sq Epi: x / Non Sq Epi: Negative / Bacteria: Few      Thyroid Stimulating Hormone, Serum: 0.55 uIU/mL (19 @ 05:26)  Lipase, Serum: 45 U/L (19 @ 19:23)    ABG (): pH 7.41, pCO2 35, pO2 86, HCO3 23, FiO2 30, O2 sat 96    MICROBIOLOGY  Urine Culture (): < 10,000 CFU/mL normal urogenital cathi  Blood Culture (): Escherichia coli (Growth in aerobic and anaerobic bottles)  Flu A/B, RSV (): Negative    RADIOLOGY & ADDITIONAL TESTS:  IR PICC (): Right upper extremity midline catheter. Catheter can be used for intravenous access. Ultrasound guided procedure. No radiation. No contrast utilized for the procedure.  CT Abdomen and Pelvis without Contrast (): Evaluation of the solid organs and GI tract is limited without oral and IV contrast. Right middle lobe and right lower lobe groundglass opacities with underlying reticular changes, likely chronic. Recommend follow-up to ensure stability or resolution and exclude acute pneumonia. Mild constipation. No bowel obstruction.   Enlarged prostate. Nonspecific bilateral perinephric inflammatory stranding. Nonobstructing left renal calculi.  CT Head without Contrast (): No acute intracranial bleeding, mass effect, or shift. Mild chronic microvascular ischemic changes.   CXR (): No acute infiltrate  TTE (): Normal left ventricular size, wall thickness, and left ventricular systolic function with an ejection fraction of 60-65%. Grade 1 diastolic dysfunction. Trivial mitral regurgitation, trivial tricuspid regurgitation. The aortic valve was not well visualized but appears mildly thickened leaflets. No significant pericardial effusion. Normal right atrial and right ventricular size and systolic function.    HEALTH ISSUES - PROBLEM Dx:  Respiratory failure  Prostatitis, acute  Bacteremia due to Escherichia coli  BPH (benign prostatic hyperplasia)  Hypotension  Prophylactic measure  GERD (gastroesophageal reflux disease)  HLD (hyperlipidemia)  Stented coronary artery  Chronic obstructive pulmonary disease  YUE (acute kidney injury)  Sepsis      Consultant(s) Notes Reviewed:  [ x ] YES     Care Discussed with [X] Consultants  [x  ] Patient  [x  ] Family [  ] HCP [  ]   [  ] Social Service  [x  ] RN, [  ] Physical Therapy,[  ] Palliative care team  DVT PPX: [ x ] Lovenox, [  ] S C Heparin, [  ] Coumadin, [  ] Xarelto, [  ] Eliquis, [  ] Pradaxa, [  ] IV Heparin drip, [  ] SCD [  ] Contraindication 2 to GI Bleed,[  ] Ambulation [  ] Contraindicated 2 to  bleed [  ] Contraindicated 2 to Brain Bleed  Advanced directive: [ x ] None, [  ] DNR/DNI Patient is a 74y old  Male who presents with a chief complaint of Fever (22 Dec 2019 12:32)    INTERVAL HPI:  73 YO M with PMHX asthma, COPD, former smoker, hx of benign lung cancer R lobe surgically removed at [facility]) CAD (s/p 5 stents ~), GERD, HLD, HTN presents after shakes, vomiting, confusion. Pt states he has prostate biopsy 3 days ago, started on antibiotics, the next day developed shaking chills, then became slightly SOB, used a nebulizer which helped his shortness of breath. Pt saw his pulmonologist yesterday, as per pt pulmonologist states everything was ok from a pulmonary standpoint and to continue using his nebulizer. Today pt developed shivering shaking chills again, NBNR emesis, confusion. Pt's wife found him confused, came to the ER for confusion. Of note, pt states he had a cough with sputum production last week. Pt denies fevers at home or sick contacts.     In the ED: Vitals sig for temp of 104.7 F. Labs sig for Plt of 114, bands of 29, BUN 27, Cr 1.40, glucose 124, alk phos 128, AST 39, eGFR 49, lactate 2.8, FLU A, B, RSV neg. CXR: No acute infiltrate. CT head: No acute intracranial bleeding, mass effect, or shift. Mild chronic microvascular ischemic changes. CT A/P: Right middle lobe and right lower lobe groundglass opacities with underlying reticular changes, likely chronic. Recommend follow-up to ensure stability or resolution and exclude acute pneumonia. Mild constipation. No bowel obstruction. Enlarged prostate. Nonspecific bilateral perinephric inflammatory stranding. Nonobstructing left renal calculi. Given: Rocephin x1, Azithromycin x1,  Zosyn x1,  ns bolus x3, tylenol x1, Duo neb x1. Of note, RRT was called for hypotension with BP of 63/48. Patient was seen by RRT team and ICU PA. Was given normal saline bolus x2 and midodrine 10mg x1 with improvement in BP of 105/56  -S/P RRT called for hypotension , several IV Fluid Boluses given, pt was upgraded to ICU for septic shock/ Hypoxia .    19: Pt seen, Examined, S/P  Intubation now with ac bronchospasm with  Hypoxia, ac respiratory failure. Pt with septic shock 2/2 GNR, E. Coli sepsis , seen by ID Dr Marc, IV Abx changed to Invanz ,S/P Vasopressors IV, Leukocytosis with Bandemia, & Low platelets 2/2 sepsis, pt got IV Solumedrol 125 mg x 1 dose, seen by cardiology Dr Degroot, RVP -NEG  19: Pt seen, Examined, feels a lot better, well, extubated successfully. Pt went into AFib with RVR in AM s/p lopressor 5mg IVP x2 and cardizem 10mg IVP x1, resolved post-extubation. Now in NSR. Andrews placed for urinary retention , pt remains on Levophed  for septic shock. Family at bed side, YUE resolved, Leukocytosis/Bandemia persist.  19: Patient seen and examined. Patient reports to be feeling better and denies any difficulty with breathing on O2 NC. Phos was repleted and patient reported burning of L arm as a result. Patient tolerated breakfast fine. Patient ambulated and andrews will be removed today. Patient had bowel movement. Pt received prednisone 40 mg PO today. Will receive prednisone 20 mg tomorrow for a couple of days. Repeat blood Cx from yesterday no growth to date. Plan for midline insertion for abx until . PT eval, started on Eliquis as per cardiology. TOV today, WBC improving  19: Patient feels much better. No difficulty breathing. PT ambulated patient on room air with no difficulty. Midodrine d/honorio. BP remained stable (). Pt discharged on IV antibiotics until 20, prednisone 20 mg PO daily (until ), Eliquis 5 mg PO daily. Pt advised to stop aspirin (as per cardiology) and losartan (due to low BP) until patient sees cardiologist as an outpatient.     OVERNIGHT EVENTS: NONE    Home Medications:  Aspir 81 81 mg oral tablet: 1 tab(s) orally once a day (20 Dec 2019 22:44)  atorvastatin 40 mg oral tablet: 1 tab(s) orally once a day (at bedtime) (20 Dec 2019 22:44)  budesonide 0.5 mg/2 mL inhalation suspension: 2 milliliter(s) inhaled 2 times a day, As Needed (20 Dec 2019 22:44)  DuoNeb 0.5 mg-2.5 mg/3 mL inhalation solution: 1 milliliter(s) inhaled every 6 hours (20 Dec 2019 22:44)  losartan 25 mg oral tablet: 1 tab(s) orally once a day (20 Dec 2019 22:44)  predniSONE 5 mg oral tablet: 1 tab(s) orally once a day, As Needed (20 Dec 2019 22:44)  Protonix 40 mg oral delayed release tablet: 1 tab(s) orally once a day (20 Dec 2019 22:44)  silodosin 8 mg oral capsule: 1 cap(s) orally once a day (20 Dec 2019 22:44)  Spiriva 18 mcg inhalation capsule: 1  inhaled once a day (20 Dec 2019 22:44)  Symbicort 160 mcg-4.5 mcg/inh inhalation aerosol: 2  inhaled 2 times a day (20 Dec 2019 22:44)  Ventolin HFA 90 mcg/inh inhalation aerosol: 2 puff(s) inhaled 4 times a day, As Needed (20 Dec 2019 22:44)  Vitamin B-12 500 mcg oral tablet:  orally  (20 Dec 2019 22:44)  Vitamin B6 100 mg oral tablet: 1 tab(s) orally once a day (20 Dec 2019 22:44)  Vitamin D: 1000 international unit(s) orally once a day (20 Dec 2019 22:44)    MEDICATIONS  (STANDING):  albuterol/ipratropium for Nebulization 3 milliLiter(s) Nebulizer every 4 hours  albuterol/ipratropium for Nebulization. 3 milliLiter(s) Nebulizer once  apixaban 5 milliGRAM(s) Oral two times a day  atorvastatin 40 milliGRAM(s) Oral at bedtime  budesonide 160 MICROgram(s)/formoterol 4.5 MICROgram(s) Inhaler 2 Puff(s) Inhalation two times a day  ertapenem  IVPB      ertapenem  IVPB 1000 milliGRAM(s) IV Intermittent every 24 hours  lactobacillus acidophilus 1 Tablet(s) Oral two times a day  midodrine 10 milliGRAM(s) Oral every 8 hours  pantoprazole    Tablet 40 milliGRAM(s) Oral before breakfast  potassium acid phosphate/sodium acid phosphate tablet (K-PHOS No. 2) 1 Tablet(s) Oral four times a day with meals  predniSONE   Tablet 20 milliGRAM(s) Oral daily  tamsulosin 0.4 milliGRAM(s) Oral at bedtime    MEDICATIONS  (PRN):  acetaminophen   Tablet .. 975 milliGRAM(s) Oral every 8 hours PRN Temp greater or equal to 38C (100.4F), Mild Pain (1 - 3)  ALBUTerol    0.083% 2.5 milliGRAM(s) Nebulizer every 4 hours PRN Shortness of Breath and/or Wheezing    Allergies  No Known Allergies    Social History:  quit smoking 5 years ago, 50 pack years   drinks 1 glass of wine 1 x a month socially  lives with wife, performs all ADLs, ambulates on own (20 Dec 2019 21:52)    REVIEW OF SYSTEMS:  CONSTITUTIONAL: No fever, No chills, No fatigue, No myalgia, No Body ache, No Weakness  EYES: No eye pain,  No visual disturbances, No discharge, NO Redness  ENMT:  No ear pain, No nose bleed, No vertigo; No sinus pain, NO throat pain, No Congestion  NECK: No pain, No stiffness  RESPIRATORY: No cough, NO wheezing, No  hemoptysis, NO  shortness of breath  CARDIOVASCULAR: No chest pain, palpitations  GASTROINTESTINAL: No abdominal pain, NO epigastric pain. No nausea, No vomiting; No diarrhea, No constipation. [ x ] BM  GENITOURINARY: No dysuria, No frequency, No urgency, No hematuria  NEUROLOGICAL: No headaches, No dizziness, No numbness, No tingling, No tremors, No weakness  EXT: No Swelling, No Pain, No Edema  SKIN:  Burning at L arm IV site after K phos given [ ] No itching, burning, rashes, or lesions   MUSCULOSKELETAL: No joint pain ,No Jt swelling; No muscle pain, No back pain, No extremity pain  PSYCHIATRIC: No depression,  No anxiety,  No mood swings ,No difficulty sleeping at night  PAIN SCALE: [ x ] None  [  ] Other-  ROS Unable to obtain due to - [  ] Dementia  [  ] Lethargy [  ] Drowsy [  ] Sedated [  ] non verbal  REST OF REVIEW Of SYSTEM - [ x ] Normal     Vital Signs Last 24 Hrs  T(C): 36.6 (24 Dec 2019 07:30), Max: 36.6 (23 Dec 2019 11:57)  T(F): 97.8 (24 Dec 2019 07:30), Max: 97.9 (23 Dec 2019 17:07)  HR: 63 (24 Dec 2019 07:56) (63 - 78)  BP: 111/68 (24 Dec 2019 07:30) (97/51 - 118/54)  BP(mean): 81 (23 Dec 2019 14:00) (71 - 81)  RR: 17 (24 Dec 2019 07:30) (17 - 30)  SpO2: 94% (24 Dec 2019 07:56) (90% - 98%)    I&O's Summary    23 Dec 2019 07:01  -  24 Dec 2019 07:00  --------------------------------------------------------  IN: 1163.3 mL / OUT: 1230 mL / NET: -66.7 mL    PHYSICAL EXAM: Agusto cantrell  GENERAL:  [x  ] NAD , [x  ] well appearing, [  ] Agitated, [  ] Drowsy,  [  ] Lethargy, [  ] confused   HEAD:  [ x ] Normal, [  ] Other  EYES:  [x  ] EOMI, [x  ] PERRLA, [ x ] conjunctiva and sclera clear normal, [  ] Other,  [  ] Pallor,[  ] Discharge  ENMT:  [x  ] Normal, [x  ] Moist mucous membranes, [  ] Good dentition, [  ] No Thrush  NECK:  [x  ] Supple, [ x ] No JVD, [ x ] Normal thyroid, [  ] Lymphadenopathy [  ] Other  CHEST/LUNG:  [ x ] Clear to auscultation bilaterally, [x ] Breath Sounds equal B/L / Decrease, [  ] poor effort  [ x ] No rales, [x  ] few scattered B/L rhonchi  [ x ]  No wheezing,   HEART:  [x] Regular rate and rhythm, [  ] tachycardia, [  ] Bradycardia,  [  ] irregular  [  x] No murmurs, No rubs, No gallops, [  ] PPM in place (Mfr:  )  ABDOMEN:  [x  ] Soft, [ x ] Nontender, [ x ] Nondistended, [ x ] No mass, [ x ] Bowel sounds present, [x  ] obese  NERVOUS SYSTEM:  [ x ] Alert & Oriented X3, [x ] Nonfocal  [  ] Confusion  [  ] Encephalopathic [  ] Sedated [  ] Unable to assess, [  ] Dementia [  ] Other-  EXTREMITIES: [x  ] 2+ Peripheral Pulses, No clubbing, No cyanosis,  [  ] edema B/L lower EXT. [  ] PVD stasis skin changes B/L Lower EXT, [  ] wound  LYMPH: No lymphadenopathy noted  SKIN:  [x  ] No rashes or lesions, [  ] Pressure Ulcers, [  ] ecchymosis, [  ] Skin Tears, [  ] Other    DIET: DASH/TLC    LABS:                        12.4   13.71 )-----------( 123      ( 24 Dec 2019 06:14 )             35.9     12    146<H>  |  113<H>  |  21  ----------------------------<  132<H>  4.4   |  29  |  0.82    Ca    8.1<L>      24 Dec 2019 06:14  Phos  2.3     . Provided KPhos 4x/day with meals.  Mg     2.0       A1c 5.8 ()      Urinalysis Basic - ( 21 Dec 2019 00:56 )  Color: Yellow / Appearance: Slightly Turbid / S.010 / pH: x  Gluc: x / Ketone: Negative  / Bili: Negative / Urobili: Negative   Blood: x / Protein: 25 mg/dL / Nitrite: Negative   Leuk Esterase: Small / RBC: 0-2 /HPF / WBC 26-50   Sq Epi: x / Non Sq Epi: Negative / Bacteria: Few      Thyroid Stimulating Hormone, Serum: 0.55 uIU/mL (19 @ 05:26)  Lipase, Serum: 45 U/L (19 @ 19:23)    ABG (): pH 7.41, pCO2 35, pO2 86, HCO3 23, FiO2 30, O2 sat 96    MICROBIOLOGY  Urine Culture (): < 10,000 CFU/mL normal urogenital cathi  Blood Culture (): Escherichia coli (Growth in aerobic and anaerobic bottles)  Flu A/B, RSV (): Negative    RADIOLOGY & ADDITIONAL TESTS:  IR PICC (): Right upper extremity midline catheter. Catheter can be used for intravenous access. Ultrasound guided procedure. No radiation. No contrast utilized for the procedure.  CT Abdomen and Pelvis without Contrast (): Evaluation of the solid organs and GI tract is limited without oral and IV contrast. Right middle lobe and right lower lobe groundglass opacities with underlying reticular changes, likely chronic. Recommend follow-up to ensure stability or resolution and exclude acute pneumonia. Mild constipation. No bowel obstruction.   Enlarged prostate. Nonspecific bilateral perinephric inflammatory stranding. Nonobstructing left renal calculi.  CT Head without Contrast (): No acute intracranial bleeding, mass effect, or shift. Mild chronic microvascular ischemic changes.   CXR (): No acute infiltrate  TTE (): Normal left ventricular size, wall thickness, and left ventricular systolic function with an ejection fraction of 60-65%. Grade 1 diastolic dysfunction. Trivial mitral regurgitation, trivial tricuspid regurgitation. The aortic valve was not well visualized but appears mildly thickened leaflets. No significant pericardial effusion. Normal right atrial and right ventricular size and systolic function.    HEALTH ISSUES - PROBLEM Dx:  Respiratory failure  Prostatitis, acute  Bacteremia due to Escherichia coli  BPH (benign prostatic hyperplasia)  Hypotension  Prophylactic measure  GERD (gastroesophageal reflux disease)  HLD (hyperlipidemia)  Stented coronary artery  Chronic obstructive pulmonary disease  YUE (acute kidney injury)  Sepsis      Consultant(s) Notes Reviewed:  [ x ] YES     Care Discussed with [X] Consultants  [x  ] Patient  [x  ] Family [  ] HCP [  ]   [  ] Social Service  [x  ] RN, [  ] Physical Therapy,[  ] Palliative care team  DVT PPX: [ x ] Lovenox, [  ] S C Heparin, [  ] Coumadin, [  ] Xarelto, [  ] Eliquis, [  ] Pradaxa, [  ] IV Heparin drip, [  ] SCD [  ] Contraindication 2 to GI Bleed,[  ] Ambulation [  ] Contraindicated 2 to  bleed [  ] Contraindicated 2 to Brain Bleed  Advanced directive: [ x ] None, [  ] DNR/DNI Patient is a 74y old  Male who presents with a chief complaint of Fever (22 Dec 2019 12:32)    INTERVAL HPI:  75 YO M with PMHX asthma, COPD, former smoker, hx of benign lung cancer R lobe surgically removed at [facility]) CAD (s/p 5 stents ~), GERD, HLD, HTN presents after shakes, vomiting, confusion. Pt states he has prostate biopsy 3 days ago, started on antibiotics, the next day developed shaking chills, then became slightly SOB, used a nebulizer which helped his shortness of breath. Pt saw his pulmonologist yesterday, as per pt pulmonologist states everything was ok from a pulmonary standpoint and to continue using his nebulizer. Today pt developed shivering shaking chills again, NBNR emesis, confusion. Pt's wife found him confused, came to the ER for confusion. Of note, pt states he had a cough with sputum production last week. Pt denies fevers at home or sick contacts.     In the ED: Vitals sig for temp of 104.7 F. Labs sig for Plt of 114, bands of 29, BUN 27, Cr 1.40, glucose 124, alk phos 128, AST 39, eGFR 49, lactate 2.8, FLU A, B, RSV neg. CXR: No acute infiltrate. CT head: No acute intracranial bleeding, mass effect, or shift. Mild chronic microvascular ischemic changes. CT A/P: Right middle lobe and right lower lobe groundglass opacities with underlying reticular changes, likely chronic. Recommend follow-up to ensure stability or resolution and exclude acute pneumonia. Mild constipation. No bowel obstruction. Enlarged prostate. Nonspecific bilateral perinephric inflammatory stranding. Nonobstructing left renal calculi. Given: Rocephin x1, Azithromycin x1,  Zosyn x1,  ns bolus x3, tylenol x1, Duo neb x1. Of note, RRT was called for hypotension with BP of 63/48. Patient was seen by RRT team and ICU PA. Was given normal saline bolus x2 and midodrine 10mg x1 with improvement in BP of 105/56  -S/P RRT called for hypotension , several IV Fluid Boluses given, pt was upgraded to ICU for septic shock/ Hypoxia .    19: Pt seen, Examined, S/P  Intubation now with ac bronchospasm with  Hypoxia, ac respiratory failure. Pt with septic shock 2/2 GNR, E. Coli sepsis , seen by ID Dr Marc, IV Abx changed to Invanz ,S/P Vasopressors IV, Leukocytosis with Bandemia, & Low platelets 2/2 sepsis, pt got IV Solumedrol 125 mg x 1 dose, seen by cardiology Dr Degroot, RVP -NEG  19: Pt seen, Examined, feels a lot better, well, extubated successfully. Pt went into AFib with RVR in AM s/p lopressor 5mg IVP x2 and cardizem 10mg IVP x1, resolved post-extubation. Now in NSR. Andrews placed for urinary retention , pt remains on Levophed  for septic shock. Family at bed side, YUE resolved, Leukocytosis/Bandemia persist.  19: Patient seen and examined. Patient reports to be feeling better and denies any difficulty with breathing on O2 NC. Phos was repleted and patient reported burning of L arm as a result. Patient tolerated breakfast fine. Patient ambulated and andrews will be removed today. Patient had bowel movement. Pt received prednisone 40 mg PO today. Will receive prednisone 20 mg tomorrow for a couple of days. Repeat blood Cx from yesterday no growth to date. Plan for midline insertion for abx until . PT eval, started on Eliquis as per cardiology. TOV today, WBC improving  19: Patient feels much better. No difficulty breathing. PT ambulated patient on room air with no difficulty. Midodrine d/honorio. BP remained stable (). Pt discharged on IV antibiotics until 20, prednisone 20 mg PO daily (until ), Eliquis 5 mg PO daily. Pt advised to stop aspirin (as per cardiology) and losartan (due to low BP) until patient sees cardiologist as an outpatient. Stable for D/C home with IV Invanz 1 gm daily.WBC improving    OVERNIGHT EVENTS: NONE    Home Medications:  Aspir 81 81 mg oral tablet: 1 tab(s) orally once a day (20 Dec 2019 22:44)  atorvastatin 40 mg oral tablet: 1 tab(s) orally once a day (at bedtime) (20 Dec 2019 22:44)  budesonide 0.5 mg/2 mL inhalation suspension: 2 milliliter(s) inhaled 2 times a day, As Needed (20 Dec 2019 22:44)  DuoNeb 0.5 mg-2.5 mg/3 mL inhalation solution: 1 milliliter(s) inhaled every 6 hours (20 Dec 2019 22:44)  losartan 25 mg oral tablet: 1 tab(s) orally once a day (20 Dec 2019 22:44)  predniSONE 5 mg oral tablet: 1 tab(s) orally once a day, As Needed (20 Dec 2019 22:44)  Protonix 40 mg oral delayed release tablet: 1 tab(s) orally once a day (20 Dec 2019 22:44)  silodosin 8 mg oral capsule: 1 cap(s) orally once a day (20 Dec 2019 22:44)  Spiriva 18 mcg inhalation capsule: 1  inhaled once a day (20 Dec 2019 22:44)  Symbicort 160 mcg-4.5 mcg/inh inhalation aerosol: 2  inhaled 2 times a day (20 Dec 2019 22:44)  Ventolin HFA 90 mcg/inh inhalation aerosol: 2 puff(s) inhaled 4 times a day, As Needed (20 Dec 2019 22:44)  Vitamin B-12 500 mcg oral tablet:  orally  (20 Dec 2019 22:44)  Vitamin B6 100 mg oral tablet: 1 tab(s) orally once a day (20 Dec 2019 22:44)  Vitamin D: 1000 international unit(s) orally once a day (20 Dec 2019 22:44)    MEDICATIONS  (STANDING):  albuterol/ipratropium for Nebulization 3 milliLiter(s) Nebulizer every 4 hours  albuterol/ipratropium for Nebulization. 3 milliLiter(s) Nebulizer once  apixaban 5 milliGRAM(s) Oral two times a day  atorvastatin 40 milliGRAM(s) Oral at bedtime  budesonide 160 MICROgram(s)/formoterol 4.5 MICROgram(s) Inhaler 2 Puff(s) Inhalation two times a day  ertapenem  IVPB      ertapenem  IVPB 1000 milliGRAM(s) IV Intermittent every 24 hours  lactobacillus acidophilus 1 Tablet(s) Oral two times a day  midodrine 10 milliGRAM(s) Oral every 8 hours  pantoprazole    Tablet 40 milliGRAM(s) Oral before breakfast  potassium acid phosphate/sodium acid phosphate tablet (K-PHOS No. 2) 1 Tablet(s) Oral four times a day with meals  predniSONE   Tablet 20 milliGRAM(s) Oral daily  tamsulosin 0.4 milliGRAM(s) Oral at bedtime    MEDICATIONS  (PRN):  acetaminophen   Tablet .. 975 milliGRAM(s) Oral every 8 hours PRN Temp greater or equal to 38C (100.4F), Mild Pain (1 - 3)  ALBUTerol    0.083% 2.5 milliGRAM(s) Nebulizer every 4 hours PRN Shortness of Breath and/or Wheezing    Allergies  No Known Allergies    Social History:  quit smoking 5 years ago, 50 pack years   drinks 1 glass of wine 1 x a month socially  lives with wife, performs all ADLs, ambulates on own (20 Dec 2019 21:52)    REVIEW OF SYSTEMS:  CONSTITUTIONAL: No fever, No chills, No fatigue, No myalgia, No Body ache, No Weakness  EYES: No eye pain,  No visual disturbances, No discharge, NO Redness  ENMT:  No ear pain, No nose bleed, No vertigo; No sinus pain, NO throat pain, No Congestion  NECK: No pain, No stiffness  RESPIRATORY: No cough, NO wheezing, No  hemoptysis, NO  shortness of breath  CARDIOVASCULAR: No chest pain, palpitations  GASTROINTESTINAL: No abdominal pain, NO epigastric pain. No nausea, No vomiting; No diarrhea, No constipation. [ x ] BM  GENITOURINARY: No dysuria, No frequency, No urgency, No hematuria  NEUROLOGICAL: No headaches, No dizziness, No numbness, No tingling, No tremors, No weakness  EXT: No Swelling, No Pain, No Edema  SKIN:  Burning at L arm IV site after K phos given [ ] No itching, burning, rashes, or lesions   MUSCULOSKELETAL: No joint pain ,No Jt swelling; No muscle pain, No back pain, No extremity pain  PSYCHIATRIC: No depression,  No anxiety,  No mood swings ,No difficulty sleeping at night  PAIN SCALE: [ x ] None  [  ] Other-  ROS Unable to obtain due to - [  ] Dementia  [  ] Lethargy [  ] Drowsy [  ] Sedated [  ] non verbal  REST OF REVIEW Of SYSTEM - [ x ] Normal     Vital Signs Last 24 Hrs  T(C): 36.6 (24 Dec 2019 07:30), Max: 36.6 (23 Dec 2019 11:57)  T(F): 97.8 (24 Dec 2019 07:30), Max: 97.9 (23 Dec 2019 17:07)  HR: 63 (24 Dec 2019 07:56) (63 - 78)  BP: 111/68 (24 Dec 2019 07:30) (97/51 - 118/54)  BP(mean): 81 (23 Dec 2019 14:00) (71 - 81)  RR: 17 (24 Dec 2019 07:30) (17 - 30)  SpO2: 94% (24 Dec 2019 07:56) (90% - 98%)    I&O's Summary    23 Dec 2019 07:01  -  24 Dec 2019 07:00  --------------------------------------------------------  IN: 1163.3 mL / OUT: 1230 mL / NET: -66.7 mL    PHYSICAL EXAM: [facility]o [facility]  GENERAL:  [x  ] NAD , [x  ] well appearing, [  ] Agitated, [  ] Drowsy,  [  ] Lethargy, [  ] confused   HEAD:  [ x ] Normal, [  ] Other  EYES:  [x  ] EOMI, [x  ] PERRLA, [ x ] conjunctiva and sclera clear normal, [  ] Other,  [  ] Pallor,[  ] Discharge  ENMT:  [x  ] Normal, [x  ] Moist mucous membranes, [  ] Good dentition, [  ] No Thrush  NECK:  [x  ] Supple, [ x ] No JVD, [ x ] Normal thyroid, [  ] Lymphadenopathy [  ] Other  CHEST/LUNG:  [ x ] Clear to auscultation bilaterally, [x ] Breath Sounds equal B/L / Decrease, [  ] poor effort  [ x ] No rales, [x  ] few scattered B/L rhonchi  [ x ]  No wheezing,   HEART:  [x] Regular rate and rhythm, [  ] tachycardia, [  ] Bradycardia,  [  ] irregular  [  x] No murmurs, No rubs, No gallops, [  ] PPM in place (Mfr:  )  ABDOMEN:  [x  ] Soft, [ x ] Nontender, [ x ] Nondistended, [ x ] No mass, [ x ] Bowel sounds present, [x  ] obese  NERVOUS SYSTEM:  [ x ] Alert & Oriented X3, [x ] Nonfocal  [  ] Confusion  [  ] Encephalopathic [  ] Sedated [  ] Unable to assess, [  ] Dementia [  ] Other-  EXTREMITIES: [x  ] 2+ Peripheral Pulses, No clubbing, No cyanosis,  [  ] edema B/L lower EXT. [  ] PVD stasis skin changes B/L Lower EXT, [  ] wound  LYMPH: No lymphadenopathy noted  SKIN:  [x  ] No rashes or lesions, [  ] Pressure Ulcers, [  ] ecchymosis, [  ] Skin Tears, [  ] Other    DIET: DASH/TLC    LABS:                        12.4   13.71 )-----------( 123      ( 24 Dec 2019 06:14 )             35.9     12-    146<H>  |  113<H>  |  21  ----------------------------<  132<H>  4.4   |  29  |  0.82    Ca    8.1<L>      24 Dec 2019 06:14  Phos  2.3     -. Provided KPhos 4x/day with meals.  Mg     2.0       A1c 5.8 ()      Urinalysis Basic - ( 21 Dec 2019 00:56 )  Color: Yellow / Appearance: Slightly Turbid / S.010 / pH: x  Gluc: x / Ketone: Negative  / Bili: Negative / Urobili: Negative   Blood: x / Protein: 25 mg/dL / Nitrite: Negative   Leuk Esterase: Small / RBC: 0-2 /HPF / WBC 26-50   Sq Epi: x / Non Sq Epi: Negative / Bacteria: Few      Thyroid Stimulating Hormone, Serum: 0.55 uIU/mL (19 @ 05:26)  Lipase, Serum: 45 U/L (19 @ 19:23)    ABG (): pH 7.41, pCO2 35, pO2 86, HCO3 23, FiO2 30, O2 sat 96    MICROBIOLOGY  Urine Culture (): < 10,000 CFU/mL normal urogenital cathi  Blood Culture (): Escherichia coli (Growth in aerobic and anaerobic bottles)  Flu A/B, RSV (): Negative    RADIOLOGY & ADDITIONAL TESTS:  IR PICC (): Right upper extremity midline catheter. Catheter can be used for intravenous access. Ultrasound guided procedure. No radiation. No contrast utilized for the procedure.  CT Abdomen and Pelvis without Contrast (): Evaluation of the solid organs and GI tract is limited without oral and IV contrast. Right middle lobe and right lower lobe groundglass opacities with underlying reticular changes, likely chronic. Recommend follow-up to ensure stability or resolution and exclude acute pneumonia. Mild constipation. No bowel obstruction.   Enlarged prostate. Nonspecific bilateral perinephric inflammatory stranding. Nonobstructing left renal calculi.  CT Head without Contrast (): No acute intracranial bleeding, mass effect, or shift. Mild chronic microvascular ischemic changes.   CXR (): No acute infiltrate  TTE (): Normal left ventricular size, wall thickness, and left ventricular systolic function with an ejection fraction of 60-65%. Grade 1 diastolic dysfunction. Trivial mitral regurgitation, trivial tricuspid regurgitation. The aortic valve was not well visualized but appears mildly thickened leaflets. No significant pericardial effusion. Normal right atrial and right ventricular size and systolic function. H/O GI Bleed in past No ASA with Eliquis    HEALTH ISSUES - PROBLEM Dx:  Respiratory failure  Prostatitis, acute  Bacteremia due to Escherichia coli  BPH (benign prostatic hyperplasia)  Hypotension  Prophylactic measure  GERD (gastroesophageal reflux disease)  HLD (hyperlipidemia)  Stented coronary artery  Chronic obstructive pulmonary disease  YUE (acute kidney injury)  Sepsis      Consultant(s) Notes Reviewed:  [ x ] YES     Care Discussed with [X] Consultants  [x  ] Patient  [x  ] Family [  ] HCP [  ]   [  ] Social Service  [x  ] RN, [  ] Physical Therapy,[  ] Palliative care team  DVT PPX: [ x ] Lovenox, [  ] S C Heparin, [  ] Coumadin, [  ] Xarelto, [  ] Eliquis, [  ] Pradaxa, [  ] IV Heparin drip, [  ] SCD [  ] Contraindication 2 to GI Bleed,[  ] Ambulation [  ] Contraindicated 2 to  bleed [  ] Contraindicated 2 to Brain Bleed  Advanced directive: [ x ] None, [  ] DNR/DNI

## 2019-12-24 NOTE — PROGRESS NOTE ADULT - PROBLEM SELECTOR PROBLEM 4
YUE (acute kidney injury)
YUE (acute kidney injury)
Chronic obstructive pulmonary disease
YUE (acute kidney injury)

## 2019-12-24 NOTE — PROGRESS NOTE ADULT - PROBLEM SELECTOR PLAN 1
concern for prostate source, and appears to be a complication of biopsy  -with ESBL and prostate involvement recommend 4 weeks of effective therapy past date of negative blood cultures, so   ertapenem 1 gram IV q-day with last day 1/19/20

## 2019-12-24 NOTE — PROGRESS NOTE ADULT - PROBLEM SELECTOR PLAN 3
as above.  From an ID standpoint no further requirement for inpatient status for the management of ID issues. Fine with discharge from ID standpoint when other medical issues no longer require inpatient care and social issues allow for a safe discharge plan.  Thank you for consulting us and involving us in the management of this most interesting and challenging case.     Please Call with any further questions

## 2019-12-24 NOTE — PROGRESS NOTE ADULT - PROBLEM SELECTOR PROBLEM 9
BPH (benign prostatic hyperplasia)
Prophylactic measure

## 2019-12-24 NOTE — PROGRESS NOTE ADULT - PROBLEM SELECTOR PROBLEM 1
Bacteremia due to Escherichia coli
Sepsis

## 2019-12-24 NOTE — PROGRESS NOTE ADULT - SUBJECTIVE AND OBJECTIVE BOX
infectious diseases progress note:    BASILIO LANDRY is a 74y y. o. Male patient    Patient reports: going home today    ROS:    EYES:  Negative  blurry vision or double vision  GASTROINTESTINAL:  Negative for nausea, vomiting, diarrhea  -otherwise negative except for subjective    Allergies    No Known Allergies    Intolerances        ANTIBIOTICS/RELEVANT:  antimicrobials  ertapenem  IVPB      ertapenem  IVPB 1000 milliGRAM(s) IV Intermittent every 24 hours    immunologic:    OTHER:  acetaminophen   Tablet .. 975 milliGRAM(s) Oral every 8 hours PRN  ALBUTerol    0.083% 2.5 milliGRAM(s) Nebulizer every 4 hours PRN  albuterol/ipratropium for Nebulization 3 milliLiter(s) Nebulizer every 4 hours  albuterol/ipratropium for Nebulization. 3 milliLiter(s) Nebulizer once  apixaban 5 milliGRAM(s) Oral two times a day  atorvastatin 40 milliGRAM(s) Oral at bedtime  budesonide 160 MICROgram(s)/formoterol 4.5 MICROgram(s) Inhaler 2 Puff(s) Inhalation two times a day  lactobacillus acidophilus 1 Tablet(s) Oral two times a day  pantoprazole    Tablet 40 milliGRAM(s) Oral before breakfast  potassium acid phosphate/sodium acid phosphate tablet (K-PHOS No. 2) 1 Tablet(s) Oral four times a day with meals  predniSONE   Tablet 20 milliGRAM(s) Oral daily  tamsulosin 0.4 milliGRAM(s) Oral at bedtime      Objective:  Vital Signs Last 24 Hrs  T(C): 36.6 (24 Dec 2019 07:30), Max: 36.6 (23 Dec 2019 17:07)  T(F): 97.8 (24 Dec 2019 07:30), Max: 97.9 (23 Dec 2019 17:07)  HR: 68 (24 Dec 2019 08:48) (63 - 76)  BP: 111/68 (24 Dec 2019 07:30) (97/51 - 117/56)  BP(mean): 81 (23 Dec 2019 14:00) (71 - 81)  RR: 17 (24 Dec 2019 07:30) (17 - 26)  SpO2: 91% (24 Dec 2019 08:48) (90% - 98%)    T(C): 36.6 (12-24-19 @ 07:30), Max: 36.8 (12-22-19 @ 15:41)  T(C): 36.6 (12-24-19 @ 07:30), Max: 39.4 (12-21-19 @ 12:30)  T(C): 36.6 (12-24-19 @ 07:30), Max: 40.4 (12-20-19 @ 19:21)    PHYSICAL EXAM:  Constitutional: Well-developed, well nourished  Eyes: PERRLA, EOMI  Ear/Nose/Throat: oropharynx normal	  Neck: no JVD, no lymphadenopathy, supple  Respiratory: no accessory muscle use  Cardiovascular: RRR,   Gastrointestinal: soft, NT  Extremities: no clubbing, no cyanosis, edema absent      LABS:                        12.4   13.71 )-----------( 123      ( 24 Dec 2019 06:14 )             35.9       13.71 12-24 @ 06:14  18.51 12-23 @ 05:17  21.93 12-22 @ 05:26  18.44 12-21 @ 05:21  3.90 12-20 @ 19:23      12-24    146<H>  |  113<H>  |  21  ----------------------------<  132<H>  4.4   |  29  |  0.82    Ca    8.1<L>      24 Dec 2019 06:14  Phos  2.3     12-24  Mg     2.0     12-24        Creatinine, Serum: 0.82 mg/dL (12-24-19 @ 06:14)  Creatinine, Serum: 0.79 mg/dL (12-23-19 @ 05:17)  Creatinine, Serum: 1.00 mg/dL (12-22-19 @ 05:26)  Creatinine, Serum: 1.30 mg/dL (12-21-19 @ 05:21)  Creatinine, Serum: 1.40 mg/dL (12-20-19 @ 19:23)                MICROBIOLOGY:              RADIOLOGY & ADDITIONAL STUDIES:

## 2019-12-24 NOTE — PROGRESS NOTE ADULT - ASSESSMENT
75 YO M with PMHX asthma, COPD, former smoker, hx of benign lung cancer R lobe surgically removed at [facility]) CAD (s/p 5 stents ~2001), GERD, HLD, HTN presents after shakes, vomiting, confusion following prostate biopsy 3 days prior to admission and now with ESBL E coli bacteremia.    12/22 blood cultures NGTD

## 2019-12-27 LAB
CULTURE RESULTS: SIGNIFICANT CHANGE UP
CULTURE RESULTS: SIGNIFICANT CHANGE UP
SPECIMEN SOURCE: SIGNIFICANT CHANGE UP
SPECIMEN SOURCE: SIGNIFICANT CHANGE UP

## 2020-02-21 NOTE — H&P ADULT - PROBLEM SELECTOR PLAN 7
Floor Called, nurse to nurse given. Spoke with Floor RN . Patients test results review, VS reported to receiving nurse. Any and all important information regarding patient disclosed. -cont home dose protonix

## 2020-10-10 ENCOUNTER — INPATIENT (INPATIENT)
Facility: HOSPITAL | Age: 75
LOS: 4 days | Discharge: HOME | DRG: 862 | End: 2020-10-15
Attending: INTERNAL MEDICINE | Admitting: INTERNAL MEDICINE
Payer: MEDICARE

## 2020-10-10 VITALS
DIASTOLIC BLOOD PRESSURE: 71 MMHG | TEMPERATURE: 98 F | HEART RATE: 102 BPM | HEIGHT: 68 IN | WEIGHT: 169.98 LBS | SYSTOLIC BLOOD PRESSURE: 123 MMHG | RESPIRATION RATE: 18 BRPM | OXYGEN SATURATION: 100 %

## 2020-10-10 DIAGNOSIS — Z95.5 PRESENCE OF CORONARY ANGIOPLASTY IMPLANT AND GRAFT: Chronic | ICD-10-CM

## 2020-10-10 DIAGNOSIS — D49.1 NEOPLASM OF UNSPECIFIED BEHAVIOR OF RESPIRATORY SYSTEM: Chronic | ICD-10-CM

## 2020-10-10 LAB
ALBUMIN SERPL ELPH-MCNC: 3.4 G/DL — SIGNIFICANT CHANGE UP (ref 3.3–5)
ALP SERPL-CCNC: 94 U/L — SIGNIFICANT CHANGE UP (ref 40–120)
ALT FLD-CCNC: 38 U/L — SIGNIFICANT CHANGE UP (ref 12–78)
ANION GAP SERPL CALC-SCNC: 7 MMOL/L — SIGNIFICANT CHANGE UP (ref 5–17)
APPEARANCE UR: ABNORMAL
APTT BLD: 25.6 SEC — LOW (ref 27.5–35.5)
AST SERPL-CCNC: 24 U/L — SIGNIFICANT CHANGE UP (ref 15–37)
BACTERIA # UR AUTO: ABNORMAL
BASOPHILS # BLD AUTO: 0.01 K/UL — SIGNIFICANT CHANGE UP (ref 0–0.2)
BASOPHILS NFR BLD AUTO: 0.1 % — SIGNIFICANT CHANGE UP (ref 0–2)
BILIRUB SERPL-MCNC: 0.3 MG/DL — SIGNIFICANT CHANGE UP (ref 0.2–1.2)
BILIRUB UR-MCNC: NEGATIVE — SIGNIFICANT CHANGE UP
BUN SERPL-MCNC: 15 MG/DL — SIGNIFICANT CHANGE UP (ref 7–23)
CALCIUM SERPL-MCNC: 8.8 MG/DL — SIGNIFICANT CHANGE UP (ref 8.5–10.1)
CHLORIDE SERPL-SCNC: 106 MMOL/L — SIGNIFICANT CHANGE UP (ref 96–108)
CO2 SERPL-SCNC: 31 MMOL/L — SIGNIFICANT CHANGE UP (ref 22–31)
COLOR SPEC: YELLOW — SIGNIFICANT CHANGE UP
CREAT SERPL-MCNC: 1.1 MG/DL — SIGNIFICANT CHANGE UP (ref 0.5–1.3)
DIFF PNL FLD: ABNORMAL
EOSINOPHIL # BLD AUTO: 0.17 K/UL — SIGNIFICANT CHANGE UP (ref 0–0.5)
EOSINOPHIL NFR BLD AUTO: 2.1 % — SIGNIFICANT CHANGE UP (ref 0–6)
EPI CELLS # UR: SIGNIFICANT CHANGE UP
GLUCOSE SERPL-MCNC: 99 MG/DL — SIGNIFICANT CHANGE UP (ref 70–99)
GLUCOSE UR QL: NEGATIVE — SIGNIFICANT CHANGE UP
HCT VFR BLD CALC: 39.3 % — SIGNIFICANT CHANGE UP (ref 39–50)
HGB BLD-MCNC: 13.8 G/DL — SIGNIFICANT CHANGE UP (ref 13–17)
IMM GRANULOCYTES NFR BLD AUTO: 0.4 % — SIGNIFICANT CHANGE UP (ref 0–1.5)
INR BLD: 1.18 RATIO — HIGH (ref 0.88–1.16)
KETONES UR-MCNC: ABNORMAL
LACTATE SERPL-SCNC: 2.2 MMOL/L — HIGH (ref 0.7–2)
LEUKOCYTE ESTERASE UR-ACNC: ABNORMAL
LYMPHOCYTES # BLD AUTO: 0.51 K/UL — LOW (ref 1–3.3)
LYMPHOCYTES # BLD AUTO: 6.3 % — LOW (ref 13–44)
MCHC RBC-ENTMCNC: 32.3 PG — SIGNIFICANT CHANGE UP (ref 27–34)
MCHC RBC-ENTMCNC: 35.1 GM/DL — SIGNIFICANT CHANGE UP (ref 32–36)
MCV RBC AUTO: 92 FL — SIGNIFICANT CHANGE UP (ref 80–100)
MONOCYTES # BLD AUTO: 0.08 K/UL — SIGNIFICANT CHANGE UP (ref 0–0.9)
MONOCYTES NFR BLD AUTO: 1 % — LOW (ref 2–14)
NEUTROPHILS # BLD AUTO: 7.35 K/UL — SIGNIFICANT CHANGE UP (ref 1.8–7.4)
NEUTROPHILS NFR BLD AUTO: 90.1 % — HIGH (ref 43–77)
NITRITE UR-MCNC: POSITIVE
NRBC # BLD: 0 /100 WBCS — SIGNIFICANT CHANGE UP (ref 0–0)
PH UR: 5 — SIGNIFICANT CHANGE UP (ref 5–8)
PLATELET # BLD AUTO: 177 K/UL — SIGNIFICANT CHANGE UP (ref 150–400)
POTASSIUM SERPL-MCNC: 4.1 MMOL/L — SIGNIFICANT CHANGE UP (ref 3.5–5.3)
POTASSIUM SERPL-SCNC: 4.1 MMOL/L — SIGNIFICANT CHANGE UP (ref 3.5–5.3)
PROT SERPL-MCNC: 6.4 G/DL — SIGNIFICANT CHANGE UP (ref 6–8.3)
PROT UR-MCNC: 100
PROTHROM AB SERPL-ACNC: 13.7 SEC — HIGH (ref 10.6–13.6)
RBC # BLD: 4.27 M/UL — SIGNIFICANT CHANGE UP (ref 4.2–5.8)
RBC # FLD: 14.1 % — SIGNIFICANT CHANGE UP (ref 10.3–14.5)
RBC CASTS # UR COMP ASSIST: >50 /HPF (ref 0–4)
SODIUM SERPL-SCNC: 144 MMOL/L — SIGNIFICANT CHANGE UP (ref 135–145)
SP GR SPEC: 1.02 — SIGNIFICANT CHANGE UP (ref 1.01–1.02)
UROBILINOGEN FLD QL: NEGATIVE — SIGNIFICANT CHANGE UP
WBC # BLD: 8.15 K/UL — SIGNIFICANT CHANGE UP (ref 3.8–10.5)
WBC # FLD AUTO: 8.15 K/UL — SIGNIFICANT CHANGE UP (ref 3.8–10.5)
WBC UR QL: >50

## 2020-10-10 PROCEDURE — 99284 EMERGENCY DEPT VISIT MOD MDM: CPT

## 2020-10-10 PROCEDURE — 93010 ELECTROCARDIOGRAM REPORT: CPT

## 2020-10-10 PROCEDURE — 71045 X-RAY EXAM CHEST 1 VIEW: CPT | Mod: 26

## 2020-10-10 RX ORDER — PANTOPRAZOLE SODIUM 20 MG/1
1 TABLET, DELAYED RELEASE ORAL
Qty: 0 | Refills: 0 | DISCHARGE

## 2020-10-10 RX ORDER — ALBUTEROL 90 UG/1
2 AEROSOL, METERED ORAL ONCE
Refills: 0 | Status: COMPLETED | OUTPATIENT
Start: 2020-10-10 | End: 2020-10-10

## 2020-10-10 RX ORDER — AZITHROMYCIN 500 MG/1
500 TABLET, FILM COATED ORAL ONCE
Refills: 0 | Status: COMPLETED | OUTPATIENT
Start: 2020-10-10 | End: 2020-10-10

## 2020-10-10 RX ORDER — BUDESONIDE, MICRONIZED 100 %
2 POWDER (GRAM) MISCELLANEOUS
Qty: 0 | Refills: 0 | DISCHARGE

## 2020-10-10 RX ORDER — SODIUM CHLORIDE 9 MG/ML
1000 INJECTION INTRAMUSCULAR; INTRAVENOUS; SUBCUTANEOUS ONCE
Refills: 0 | Status: COMPLETED | OUTPATIENT
Start: 2020-10-10 | End: 2020-10-10

## 2020-10-10 RX ORDER — CEFTRIAXONE 500 MG/1
1000 INJECTION, POWDER, FOR SOLUTION INTRAMUSCULAR; INTRAVENOUS ONCE
Refills: 0 | Status: COMPLETED | OUTPATIENT
Start: 2020-10-10 | End: 2020-10-10

## 2020-10-10 RX ORDER — CEFTRIAXONE 500 MG/1
1000 INJECTION, POWDER, FOR SOLUTION INTRAMUSCULAR; INTRAVENOUS EVERY 24 HOURS
Refills: 0 | Status: DISCONTINUED | OUTPATIENT
Start: 2020-10-10 | End: 2020-10-11

## 2020-10-10 RX ORDER — SILODOSIN 4 MG/1
1 CAPSULE ORAL
Qty: 0 | Refills: 0 | DISCHARGE

## 2020-10-10 RX ORDER — PYRIDOXINE HCL (VITAMIN B6) 100 MG
1 TABLET ORAL
Qty: 0 | Refills: 0 | DISCHARGE

## 2020-10-10 RX ORDER — ACETAMINOPHEN 500 MG
650 TABLET ORAL ONCE
Refills: 0 | Status: COMPLETED | OUTPATIENT
Start: 2020-10-10 | End: 2020-10-10

## 2020-10-10 RX ORDER — SODIUM CHLORIDE 9 MG/ML
1000 INJECTION INTRAMUSCULAR; INTRAVENOUS; SUBCUTANEOUS
Refills: 0 | Status: DISCONTINUED | OUTPATIENT
Start: 2020-10-10 | End: 2020-10-12

## 2020-10-10 RX ORDER — ALBUTEROL 90 UG/1
2 AEROSOL, METERED ORAL
Qty: 0 | Refills: 0 | DISCHARGE

## 2020-10-10 RX ADMIN — SODIUM CHLORIDE 1000 MILLILITER(S): 9 INJECTION INTRAMUSCULAR; INTRAVENOUS; SUBCUTANEOUS at 22:38

## 2020-10-10 RX ADMIN — SODIUM CHLORIDE 1000 MILLILITER(S): 9 INJECTION INTRAMUSCULAR; INTRAVENOUS; SUBCUTANEOUS at 23:36

## 2020-10-10 RX ADMIN — CEFTRIAXONE 100 MILLIGRAM(S): 500 INJECTION, POWDER, FOR SOLUTION INTRAMUSCULAR; INTRAVENOUS at 22:37

## 2020-10-10 RX ADMIN — SODIUM CHLORIDE 1000 MILLILITER(S): 9 INJECTION INTRAMUSCULAR; INTRAVENOUS; SUBCUTANEOUS at 23:34

## 2020-10-10 RX ADMIN — ALBUTEROL 2 PUFF(S): 90 AEROSOL, METERED ORAL at 22:38

## 2020-10-10 RX ADMIN — Medication 125 MILLIGRAM(S): at 22:38

## 2020-10-10 RX ADMIN — CEFTRIAXONE 1000 MILLIGRAM(S): 500 INJECTION, POWDER, FOR SOLUTION INTRAMUSCULAR; INTRAVENOUS at 23:12

## 2020-10-10 RX ADMIN — AZITHROMYCIN 255 MILLIGRAM(S): 500 TABLET, FILM COATED ORAL at 23:16

## 2020-10-10 RX ADMIN — Medication 650 MILLIGRAM(S): at 22:37

## 2020-10-10 NOTE — H&P ADULT - NEGATIVE GASTROINTESTINAL SYMPTOMS
no constipation/no vomiting/no nausea/no diarrhea no vomiting/no flatulence/no constipation/no abdominal pain/no diarrhea/no nausea/no change in bowel habits

## 2020-10-10 NOTE — H&P ADULT - PROBLEM SELECTOR PLAN 2
Chronic  - Continue home Eliquis 5 mg PO q12h Chronic  - EKG: NSR  - Continue home Eliquis 5 mg PO q12h

## 2020-10-10 NOTE — H&P ADULT - NEGATIVE RESPIRATORY AND THORAX SYMPTOMS
no wheezing/no dyspnea/no cough/no hemoptysis no wheezing/no dyspnea/no cough/no pleuritic chest pain/no hemoptysis

## 2020-10-10 NOTE — ED ADULT NURSE NOTE - NSSEPSISCRITERIAMET_ED_A_ED
Abnormal Lactate Abnormal Lactate/Abnormal VS & WBC Abnormal VS & WBC/Abnormal Lactate/Abnormal Bands

## 2020-10-10 NOTE — ED ADULT NURSE NOTE - OBJECTIVE STATEMENT
patient came in ED from home BIBA with c/o sudden onset of chills, shortness of breath X few minutes PTA. alert and oriented x 4. febrile, rectal temp 103.8F. + wheezing on auscultation. denies chest pain. + vomiting. patient added last Week Oct 1, 2020 he had laser surgery in the prostate. Dr Love s/e patient at the bedside.

## 2020-10-10 NOTE — H&P ADULT - NSHPSOCIALHISTORY_GEN_ALL_CORE
Tobacco:   EtOH:   Recreational drug use:  Lives with:  Ambulates:  ADLs:  Occupation:  Vaccinations: Denies use of tobacco, EtOH, recreational drug use  Ambulates: independent  ADLs: independent

## 2020-10-10 NOTE — ED PROVIDER NOTE - OBJECTIVE STATEMENT
75 male presents to ER by ambulance from home, wife at the bedside, states she called the ambulance because of shaking chills and low o2 levels 70's. Patient vomited x1 in ER, feels better with O2 from EMS. Patient states he was recently tested for COVID and tested negative, as did his wife. 75 male presents to ER by ambulance from home, wife at the bedside, states she called the ambulance because of shaking chills and low o2 levels 70's. Patient vomited x1 in ER, feels better with O2 from EMS. Patient states he was recently tested for COVID and tested negative, as did his wife. Patient was recently at [facility] for laser of prostate.

## 2020-10-10 NOTE — H&P ADULT - NSICDXPASTMEDICALHX_GEN_ALL_CORE_FT
PAST MEDICAL HISTORY:  Asthma     Chronic obstructive pulmonary disease Asthma    GERD (gastroesophageal reflux disease)     GI bleed while on Antiplatelets    HLD (hyperlipidemia)     HTN (hypertension)     Nephrolithiasis s/p Lithotripsy    Stented coronary artery

## 2020-10-10 NOTE — ED ADULT TRIAGE NOTE - CHIEF COMPLAINT QUOTE
BIBEMS from home C/O chills denies fever, noted to be hypoxic per EMS. No acute distress noted in ED, O2 sat at 100% on NRB.

## 2020-10-10 NOTE — H&P ADULT - ASSESSMENT
75 year old male with PMHX asthma, COPD, former smoker, hx of benign lung cancer R lobe surgically removed at [facility]) CAD (s/p 5 stents ~2001), GERD, HLD, HTN presents after shakes and low O2 70s admitted for 75 year old male with PMHX asthma, COPD, former smoker, hx of benign lung cancer R lobe surgically removed at [facility]) CAD (s/p 5 stents ~2001), GERD, HLD, HTN presents after shakes and low O2 70s admitted for UTI. 75 year old male with PMHx atrial fibrillation, asthma, COPD, former smoker, hx of benign lung cancer R lobe surgically removed at [facility]) CAD (s/p 5 stents ~2001), GERD, HLD, HTN presents after shakes and low O2 70s admitted for UTI. 75 year old male with PMHx atrial fibrillation (on Eliquis), asthma, COPD, former smoker, hx of benign lung cancer R lobe surgically removed at [facility]) CAD (s/p 5 stents ~2001), GERD, HLD, HTN presents after shakes and low O2 70s admitted for UTI.

## 2020-10-10 NOTE — H&P ADULT - PROBLEM SELECTOR PLAN 4
Chronic, history of COPD in setting of asthma  - Continue home Symbicort, Spiriva, Proventil Chronic, history of COPD in setting of asthma  - CTA chest: No significant interval change in the chest compared to the previous study of April 11, 2016. Redemonstration of emphysema and postoperative changes in the right upper   - Continue home Symbicort, Spiriva, Proventil Chronic, history of COPD in setting of asthma  - CTA chest: No significant interval change in the chest compared to the previous study of April 11, 2016. Redemonstration of emphysema and postoperative changes in the right upper   - Continue home Symbicort, Spiriva, Proventil  -CT Angio-

## 2020-10-10 NOTE — H&P ADULT - NEGATIVE GENERAL GENITOURINARY SYMPTOMS
normal urinary frequency/no hematuria normal urinary frequency/no hematuria/no flank pain L/no flank pain R

## 2020-10-10 NOTE — H&P ADULT - RS GEN PE MLT RESP DETAILS PC
normal/clear to auscultation bilaterally/good air movement/breath sounds equal clear to auscultation bilaterally/no rhonchi/no rales/normal/breath sounds equal/good air movement/no wheezes

## 2020-10-10 NOTE — H&P ADULT - PROBLEM SELECTOR PLAN 8
IMPROVE VTE Individual Risk Assessment          RISK                                                          Points  [  ] Previous VTE                                                3  [  ] Thrombophilia                                             2  [  ] Lower limb paralysis                                   2        (unable to hold up >15 seconds)    [  ] Current Cancer                                             2         (within 6 months)  [  ] Immobilization > 24 hrs                              1  [  ] ICU/CCU stay > 24 hours                             1  [  ] Age > 60                                                         1    IMPROVE VTE Score: 1    DVT PPx: Continue Eliquis 5 mg PO BID

## 2020-10-10 NOTE — H&P ADULT - NEUROLOGICAL DETAILS
alert and oriented x 3/responds to pain/responds to verbal commands responds to verbal commands/sensation intact/cranial nerves intact/alert and oriented x 3/responds to pain/normal strength

## 2020-10-10 NOTE — H&P ADULT - HISTORY OF PRESENT ILLNESS
75 year old male with PMHX asthma, COPD, former smoker, hx of benign lung cancer R lobe surgically removed at [facility]) CAD (s/p 5 stents ~2001), GERD, HLD, HTN presents after shakes and low O2 70s. Patient states he was recently tested for COVID and results were negative. Denies fever, abdominal pain. Admits nausea, vomiting x1 episode in the ER.     ED course:  Vitals: T 103.8, , /71, RR 18, SpO2 96% on RA  Labs: Neutrophil 90.1%, Lymphocyte 6.3%, Monocyte 1.0%, PT/INR 13.7/1.18, PTT 25.6, Lactate 2.2  UA: positive nitrite, moderate LE, >50 RBC, >50 WBC, many bacteria  CT abd/pelvis: pending  CTA: pending  CXR: pending  Given IV NS 1 L bolus x2, Tylenol 650 mg PO x1, Proventil 2 puffs x1, IV Solumedrol 125 mg IVP x1, IV Zithromax and IV Rocephin, continue IV Rocephin   75 year old male with PMHX atrial fibrillation, asthma, COPD, former smoker, hx of benign lung cancer R lobe surgically removed at [facility]) CAD (s/p 5 stents ~2001), GERD, HLD, HTN presents after shakes and low O2 70s. Patient endorses sudden onset of shaking and shortness of breath earlier today. Patient states he used his nebulizer treatment, however felt no relief. He decided to come to the ED for further evaluation. Denies sick contacts or recent travel. Of note, patient with history of intubation 2019, during admission to hospitals for acute hypercapnic respiratory failure. Patient states he was recently tested for COVID and results were negative. Denies fever, abdominal pain. Admits nausea, vomiting x1 episode in the ER.     ED course:  Vitals: T 103.8, , /71, RR 18, SpO2 96% on RA  Labs: Neutrophil 90.1%, Lymphocyte 6.3%, Monocyte 1.0%, PT/INR 13.7/1.18, PTT 25.6, Lactate 2.2  UA: positive nitrite, moderate LE, >50 RBC, >50 WBC, many bacteria  CT abd/pelvis: pending  CTA: pending  CXR: pending  Given IV NS 1 L bolus x2, Tylenol 650 mg PO x1, Proventil 2 puffs x1, IV Solumedrol 125 mg IVP x1, IV Zithromax and IV Rocephin, continue IV Rocephin   75 year old male with PMHX atrial fibrillation, asthma, COPD, former smoker, hx of benign lung cancer R lobe surgically removed at [facility]) CAD (s/p 5 stents ~2001), GERD, HLD, HTN presents after shakes and low O2 70s. Patient endorses sudden onset of shaking and shortness of breath earlier today. Patient states he used his nebulizer treatment, however felt no relief. He decided to come to the ED for further evaluation. Denies sick contacts or recent travel. Of note, patient with history of intubation 2019, during admission to [facility] for acute hypercapnic respiratory failure. Patient states he was recently tested for COVID and results were negative. Denies fever, abdominal pain. Admits nausea, vomiting x1 episode in the ER.     ED course:  Vitals: T 103.8, , /71, RR 18, SpO2 96% on RA  Labs: Neutrophil 90.1%, Lymphocyte 6.3%, Monocyte 1.0%, PT/INR 13.7/1.18, PTT 25.6, Lactate 2.2  UA: positive nitrite, moderate LE, >50 RBC, >50 WBC, many bacteria  CT abd/pelvis: Although the urinary bladder is incompletely distended, there appears to be wall thickening. Correlate with urinalysis for the possibility of cystitis.  CTA chest: No significant interval change in the chest compared to the previous study of April 11, 2016. Redemonstration of emphysema and postoperative changes in the right upper lobe  CXR: official read pending  EKG: NSR vent rate 100 bpm  Given IV NS 1 L bolus x2, Tylenol 650 mg PO x1, Proventil 2 puffs x1, IV Solumedrol 125 mg IVP x1, IV Zithromax and IV Rocephin, continue IV Rocephin   75 year old male with PMHX atrial fibrillation (on Eliquis), asthma, COPD, former smoker, hx of benign lung cancer R lobe surgically removed at [facility]) CAD (s/p 5 stents ~2001), GERD, HLD, HTN presents after shakes and low O2 70s. Patient endorses sudden onset of shaking and shortness of breath earlier today. Patient states he used his nebulizer treatment, however felt no relief. He decided to come to the ED for further evaluation. Denies sick contacts or recent travel. Of note, patient with history of intubation 2019, during admission to [facility] for acute hypercapnic respiratory failure. Patient states he was recently tested for COVID and results were negative. Denies fever, abdominal pain. Admits nausea, vomiting x1 episode in the ER.     ED course:  Vitals: T 103.8, , /71, RR 18, SpO2 96% on RA  Labs: Neutrophil 90.1%, Lymphocyte 6.3%, Monocyte 1.0%, PT/INR 13.7/1.18, PTT 25.6, Lactate 2.2  UA: positive nitrite, moderate LE, >50 RBC, >50 WBC, many bacteria  CT abd/pelvis: Although the urinary bladder is incompletely distended, there appears to be wall thickening. Correlate with urinalysis for the possibility of cystitis.  CTA chest: No significant interval change in the chest compared to the previous study of April 11, 2016. Redemonstration of emphysema and postoperative changes in the right upper lobe  CXR: official read pending  EKG: NSR vent rate 100 bpm  Given IV NS 1 L bolus x2, Tylenol 650 mg PO x1, Proventil 2 puffs x1, IV Solumedrol 125 mg IVP x1, IV Zithromax and IV Rocephin, continue IV Rocephin   75 year old male with PMHX atrial fibrillation (on Eliquis), asthma, COPD, former smoker, hx of benign lung cancer R lobe surgically removed at [facility]) CAD (s/p 5 stents ~2001), GERD, HLD, HTN presents after shakes and low O2 70s. Patient endorses sudden onset of shaking and shortness of breath earlier today. Patient states he used his nebulizer treatment, however felt no relief. He decided to come to the ED for further evaluation. Denies sick contacts or recent travel. Of note, patient with history of intubation 2019, during admission to [facility] for acute hypercapnic respiratory failure. Patient states he was recently tested for COVID and results were negative. Denies fever, abdominal pain. Admits nausea, vomiting x1 episode in the ER.     ED course:  Vitals: T 103.8, , /71, RR 18, SpO2 96% on RA  Labs: Neutrophil 90.1%, Lymphocyte 6.3%, Monocyte 1.0%, PT/INR 13.7/1.18, PTT 25.6, Lactate 2.2  UA: positive nitrite, moderate LE, >50 RBC, >50 WBC, many bacteria  CT abd/pelvis: Although the urinary bladder is incompletely distended, there appears to be wall thickening. Correlate with urinalysis for the possibility of cystitis.  CTA chest: No significant interval change in the chest compared to the previous study of April 11, 2016. Redemonstration of emphysema and postoperative changes in the right upper lobe  CXR: official read pending  EKG: NSR vent rate 100 bpm  Given IV NS 1 L bolus x2, Tylenol 650 mg PO x1, Proventil 2 puffs x1, IV Solumedrol 125 mg IVP x1, IV Zithromax and IV Rocephin, continue IV Meropenem  with H/O Previous ESBL E Coli sepsis/prostatitis in 2019

## 2020-10-10 NOTE — ED ADULT NURSE NOTE - PMH
Asthma    Chronic obstructive pulmonary disease  Asthma  GERD (gastroesophageal reflux disease)    GI bleed  while on Antiplatelets  HLD (hyperlipidemia)    HTN (hypertension)    Nephrolithiasis  s/p Lithotripsy  Stented coronary artery

## 2020-10-10 NOTE — H&P ADULT - GASTROINTESTINAL DETAILS
soft/nontender/no distention/normal bowel sounds normal/no bruit/soft/nontender/no distention/no masses palpable/no guarding/no rebound tenderness/no rigidity/no organomegaly/normal

## 2020-10-10 NOTE — H&P ADULT - PROBLEM SELECTOR PLAN 1
- Admit to [facility]  - Given IV NS 1 L bolus x2, Tylenol 650 mg PO x1, Proventil 2 puffs x1, IV Solumedrol 125 mg IVP x1  - Given IV Zithromax and IV Rocephin, continue IV Rocephin  - UA: positive nitrite, moderate LE, >50 RBC, >50 WBC, many bacteria  - F/u BCx x2, UCx  - Lactate 2.2, f/u repeat lactate AM  - ID (Dr. Marc) consulted, f/u recs - Admit to F  - Given IV NS 1 L bolus x2, Tylenol 650 mg PO x1, Proventil 2 puffs x1, IV Solumedrol 125 mg IVP x1  - Given IV Zithromax and IV Rocephin, continue IV Rocephin  - UA: positive nitrite, moderate LE, >50 RBC, >50 WBC, many bacteria  - F/u BCx x2, UCx  - Lactate 2.2, f/u repeat lactate AM  - Tylenol 650 mg PO q6h PRN for fever or mild pain  - Continue supplemental oxygen, wean as tolerated  - ID (Dr. Marc) consulted, f/u recs - Admit to [facility]  - CT abd/pelvis: Although the urinary bladder is incompletely distended, there appears to be wall thickening. Correlate with urinalysis for the possibility of cystitis.  - Given IV NS 1 L bolus x2, Tylenol 650 mg PO x1, Proventil 2 puffs x1, IV Solumedrol 125 mg IVP x1  - Given IV Zithromax and IV Rocephin, continue IV Rocephin  - UA: positive nitrite, moderate LE, >50 RBC, >50 WBC, many bacteria  - F/u BCx x2, UCx  - Lactate 2.2, f/u repeat lactate AM  - Tylenol 650 mg PO q6h PRN for fever or mild pain  - Continue supplemental oxygen, wean as tolerated  - ID (Dr. Marc) consulted, f/u recs - Admit to F UTI, Fever with recent  Laser Prostate procedure on 10/1/2020  -S/P IV Fluids boluses given  - CT abd/pelvis: Although the urinary bladder is incompletely distended, there appears to be wall thickening. Correlate with urinalysis for the possibility of cystitis.  - Given IV NS 1 L bolus x2, Tylenol 650 mg PO x1, Proventil 2 puffs x1, IV Solumedrol 125 mg IVP x1  - Given IV Zithromax and IV Rocephin, continue IV Meropenem   - UA: positive nitrite, moderate LE, >50 RBC, >50 WBC, many bacteria  - F/u BCx x2, UCx  - Lactate 2.2, f/u repeat lactate AM  - Tylenol 650 mg PO q6h PRN for fever or mild pain  - Continue supplemental oxygen, wean as tolerated  - ID (Dr. Marc) consulted, f/u recs - Admit to [facility] UTI, sepsis  Fever with recent  Laser Prostate procedure on 10/1/2020 ? Prostatitis   -S/P IV Fluids boluses given  - CT abd/pelvis: Although the urinary bladder is incompletely distended, there appears to be wall thickening. Correlate with urinalysis for the possibility of cystitis.  - Given IV NS 1 L bolus x2, Tylenol 650 mg PO x1, Proventil 2 puffs x1, IV Solumedrol 125 mg IVP x1  - Given IV Zithromax and IV Rocephin, continue IV Meropenem   - UA: positive nitrite, moderate LE, >50 RBC, >50 WBC, many bacteria  - F/u BCx x2, UCx  - Lactate 2.2, f/u repeat lactate AM  - Tylenol 650 mg PO q6h PRN for fever or mild pain  - Continue supplemental oxygen, wean as tolerated  - ID (Dr. Marc) consulted, f/u recs

## 2020-10-11 ENCOUNTER — TRANSCRIPTION ENCOUNTER (OUTPATIENT)
Age: 75
End: 2020-10-11

## 2020-10-11 DIAGNOSIS — I48.91 UNSPECIFIED ATRIAL FIBRILLATION: ICD-10-CM

## 2020-10-11 DIAGNOSIS — J44.9 CHRONIC OBSTRUCTIVE PULMONARY DISEASE, UNSPECIFIED: ICD-10-CM

## 2020-10-11 DIAGNOSIS — K21.9 GASTRO-ESOPHAGEAL REFLUX DISEASE WITHOUT ESOPHAGITIS: ICD-10-CM

## 2020-10-11 DIAGNOSIS — Z29.9 ENCOUNTER FOR PROPHYLACTIC MEASURES, UNSPECIFIED: ICD-10-CM

## 2020-10-11 DIAGNOSIS — I25.10 ATHEROSCLEROTIC HEART DISEASE OF NATIVE CORONARY ARTERY WITHOUT ANGINA PECTORIS: ICD-10-CM

## 2020-10-11 DIAGNOSIS — N41.9 INFLAMMATORY DISEASE OF PROSTATE, UNSPECIFIED: ICD-10-CM

## 2020-10-11 DIAGNOSIS — I10 ESSENTIAL (PRIMARY) HYPERTENSION: ICD-10-CM

## 2020-10-11 DIAGNOSIS — E78.5 HYPERLIPIDEMIA, UNSPECIFIED: ICD-10-CM

## 2020-10-11 DIAGNOSIS — N39.0 URINARY TRACT INFECTION, SITE NOT SPECIFIED: ICD-10-CM

## 2020-10-11 DIAGNOSIS — A41.9 SEPSIS, UNSPECIFIED ORGANISM: ICD-10-CM

## 2020-10-11 PROBLEM — K92.2 GASTROINTESTINAL HEMORRHAGE, UNSPECIFIED: Chronic | Status: ACTIVE | Noted: 2019-12-24

## 2020-10-11 LAB
ANION GAP SERPL CALC-SCNC: 8 MMOL/L — SIGNIFICANT CHANGE UP (ref 5–17)
BASOPHILS # BLD AUTO: 0 K/UL — SIGNIFICANT CHANGE UP (ref 0–0.2)
BASOPHILS NFR BLD AUTO: 0 % — SIGNIFICANT CHANGE UP (ref 0–2)
BUN SERPL-MCNC: 16 MG/DL — SIGNIFICANT CHANGE UP (ref 7–23)
CALCIUM SERPL-MCNC: 7.7 MG/DL — LOW (ref 8.5–10.1)
CHLORIDE SERPL-SCNC: 111 MMOL/L — HIGH (ref 96–108)
CO2 SERPL-SCNC: 26 MMOL/L — SIGNIFICANT CHANGE UP (ref 22–31)
CREAT SERPL-MCNC: 1.3 MG/DL — SIGNIFICANT CHANGE UP (ref 0.5–1.3)
EOSINOPHIL # BLD AUTO: 0 K/UL — SIGNIFICANT CHANGE UP (ref 0–0.5)
EOSINOPHIL NFR BLD AUTO: 0 % — SIGNIFICANT CHANGE UP (ref 0–6)
GLUCOSE SERPL-MCNC: 130 MG/DL — HIGH (ref 70–99)
HCT VFR BLD CALC: 35.5 % — LOW (ref 39–50)
HGB BLD-MCNC: 12.3 G/DL — LOW (ref 13–17)
LACTATE SERPL-SCNC: 2.1 MMOL/L — HIGH (ref 0.7–2)
LACTATE SERPL-SCNC: 3.1 MMOL/L — HIGH (ref 0.7–2)
LYMPHOCYTES # BLD AUTO: 0 % — LOW (ref 13–44)
LYMPHOCYTES # BLD AUTO: 0 K/UL — LOW (ref 1–3.3)
MCHC RBC-ENTMCNC: 32.5 PG — SIGNIFICANT CHANGE UP (ref 27–34)
MCHC RBC-ENTMCNC: 34.6 GM/DL — SIGNIFICANT CHANGE UP (ref 32–36)
MCV RBC AUTO: 93.9 FL — SIGNIFICANT CHANGE UP (ref 80–100)
MONOCYTES # BLD AUTO: 0 K/UL — SIGNIFICANT CHANGE UP (ref 0–0.9)
MONOCYTES NFR BLD AUTO: 0 % — LOW (ref 2–14)
NEUTROPHILS # BLD AUTO: 15.42 K/UL — HIGH (ref 1.8–7.4)
NEUTROPHILS NFR BLD AUTO: 76 % — SIGNIFICANT CHANGE UP (ref 43–77)
NRBC # BLD: SIGNIFICANT CHANGE UP /100 WBCS (ref 0–0)
PLATELET # BLD AUTO: 144 K/UL — LOW (ref 150–400)
POTASSIUM SERPL-MCNC: 4 MMOL/L — SIGNIFICANT CHANGE UP (ref 3.5–5.3)
POTASSIUM SERPL-SCNC: 4 MMOL/L — SIGNIFICANT CHANGE UP (ref 3.5–5.3)
RAPID RVP RESULT: SIGNIFICANT CHANGE UP
RBC # BLD: 3.78 M/UL — LOW (ref 4.2–5.8)
RBC # FLD: 14.2 % — SIGNIFICANT CHANGE UP (ref 10.3–14.5)
SARS-COV-2 RNA SPEC QL NAA+PROBE: SIGNIFICANT CHANGE UP
SODIUM SERPL-SCNC: 145 MMOL/L — SIGNIFICANT CHANGE UP (ref 135–145)
WBC # BLD: 15.42 K/UL — HIGH (ref 3.8–10.5)
WBC # FLD AUTO: 15.42 K/UL — HIGH (ref 3.8–10.5)

## 2020-10-11 PROCEDURE — 74177 CT ABD & PELVIS W/CONTRAST: CPT | Mod: 26

## 2020-10-11 PROCEDURE — 71275 CT ANGIOGRAPHY CHEST: CPT | Mod: 26

## 2020-10-11 PROCEDURE — 99291 CRITICAL CARE FIRST HOUR: CPT

## 2020-10-11 RX ORDER — CHOLECALCIFEROL (VITAMIN D3) 125 MCG
1000 CAPSULE ORAL DAILY
Refills: 0 | Status: DISCONTINUED | OUTPATIENT
Start: 2020-10-11 | End: 2020-10-11

## 2020-10-11 RX ORDER — PIPERACILLIN AND TAZOBACTAM 4; .5 G/20ML; G/20ML
3.38 INJECTION, POWDER, LYOPHILIZED, FOR SOLUTION INTRAVENOUS EVERY 8 HOURS
Refills: 0 | Status: DISCONTINUED | OUTPATIENT
Start: 2020-10-11 | End: 2020-10-11

## 2020-10-11 RX ORDER — MEROPENEM 1 G/30ML
INJECTION INTRAVENOUS
Refills: 0 | Status: DISCONTINUED | OUTPATIENT
Start: 2020-10-11 | End: 2020-10-14

## 2020-10-11 RX ORDER — MEROPENEM 1 G/30ML
1000 INJECTION INTRAVENOUS ONCE
Refills: 0 | Status: COMPLETED | OUTPATIENT
Start: 2020-10-11 | End: 2020-10-11

## 2020-10-11 RX ORDER — ACETAMINOPHEN 500 MG
650 TABLET ORAL EVERY 6 HOURS
Refills: 0 | Status: DISCONTINUED | OUTPATIENT
Start: 2020-10-11 | End: 2020-10-15

## 2020-10-11 RX ORDER — APIXABAN 2.5 MG/1
5 TABLET, FILM COATED ORAL EVERY 12 HOURS
Refills: 0 | Status: DISCONTINUED | OUTPATIENT
Start: 2020-10-11 | End: 2020-10-15

## 2020-10-11 RX ORDER — PREGABALIN 225 MG/1
1000 CAPSULE ORAL DAILY
Refills: 0 | Status: DISCONTINUED | OUTPATIENT
Start: 2020-10-11 | End: 2020-10-11

## 2020-10-11 RX ORDER — NOREPINEPHRINE BITARTRATE/D5W 8 MG/250ML
0.05 PLASTIC BAG, INJECTION (ML) INTRAVENOUS
Qty: 8 | Refills: 0 | Status: DISCONTINUED | OUTPATIENT
Start: 2020-10-11 | End: 2020-10-12

## 2020-10-11 RX ORDER — TIOTROPIUM BROMIDE 18 UG/1
1 CAPSULE ORAL; RESPIRATORY (INHALATION) DAILY
Refills: 0 | Status: DISCONTINUED | OUTPATIENT
Start: 2020-10-11 | End: 2020-10-15

## 2020-10-11 RX ORDER — PREGABALIN 225 MG/1
1000 CAPSULE ORAL AT BEDTIME
Refills: 0 | Status: DISCONTINUED | OUTPATIENT
Start: 2020-10-11 | End: 2020-10-15

## 2020-10-11 RX ORDER — ATORVASTATIN CALCIUM 80 MG/1
40 TABLET, FILM COATED ORAL AT BEDTIME
Refills: 0 | Status: DISCONTINUED | OUTPATIENT
Start: 2020-10-11 | End: 2020-10-15

## 2020-10-11 RX ORDER — SODIUM CHLORIDE 9 MG/ML
1000 INJECTION INTRAMUSCULAR; INTRAVENOUS; SUBCUTANEOUS ONCE
Refills: 0 | Status: COMPLETED | OUTPATIENT
Start: 2020-10-11 | End: 2020-10-11

## 2020-10-11 RX ORDER — HYDROCORTISONE 20 MG
100 TABLET ORAL THREE TIMES A DAY
Refills: 0 | Status: DISCONTINUED | OUTPATIENT
Start: 2020-10-11 | End: 2020-10-12

## 2020-10-11 RX ORDER — CHOLECALCIFEROL (VITAMIN D3) 125 MCG
1000 CAPSULE ORAL AT BEDTIME
Refills: 0 | Status: DISCONTINUED | OUTPATIENT
Start: 2020-10-11 | End: 2020-10-15

## 2020-10-11 RX ORDER — BUDESONIDE, MICRONIZED 100 %
0.5 POWDER (GRAM) MISCELLANEOUS
Refills: 0 | Status: DISCONTINUED | OUTPATIENT
Start: 2020-10-11 | End: 2020-10-12

## 2020-10-11 RX ORDER — LANOLIN ALCOHOL/MO/W.PET/CERES
3 CREAM (GRAM) TOPICAL AT BEDTIME
Refills: 0 | Status: DISCONTINUED | OUTPATIENT
Start: 2020-10-11 | End: 2020-10-12

## 2020-10-11 RX ORDER — BUDESONIDE AND FORMOTEROL FUMARATE DIHYDRATE 160; 4.5 UG/1; UG/1
2 AEROSOL RESPIRATORY (INHALATION)
Refills: 0 | Status: DISCONTINUED | OUTPATIENT
Start: 2020-10-11 | End: 2020-10-12

## 2020-10-11 RX ORDER — IPRATROPIUM/ALBUTEROL SULFATE 18-103MCG
3 AEROSOL WITH ADAPTER (GRAM) INHALATION EVERY 6 HOURS
Refills: 0 | Status: DISCONTINUED | OUTPATIENT
Start: 2020-10-11 | End: 2020-10-12

## 2020-10-11 RX ORDER — ALBUTEROL 90 UG/1
2 AEROSOL, METERED ORAL EVERY 6 HOURS
Refills: 0 | Status: DISCONTINUED | OUTPATIENT
Start: 2020-10-11 | End: 2020-10-12

## 2020-10-11 RX ORDER — PANTOPRAZOLE SODIUM 20 MG/1
40 TABLET, DELAYED RELEASE ORAL
Refills: 0 | Status: DISCONTINUED | OUTPATIENT
Start: 2020-10-11 | End: 2020-10-15

## 2020-10-11 RX ORDER — PIPERACILLIN AND TAZOBACTAM 4; .5 G/20ML; G/20ML
3.38 INJECTION, POWDER, LYOPHILIZED, FOR SOLUTION INTRAVENOUS ONCE
Refills: 0 | Status: DISCONTINUED | OUTPATIENT
Start: 2020-10-11 | End: 2020-10-11

## 2020-10-11 RX ORDER — MEROPENEM 1 G/30ML
1000 INJECTION INTRAVENOUS EVERY 8 HOURS
Refills: 0 | Status: DISCONTINUED | OUTPATIENT
Start: 2020-10-11 | End: 2020-10-14

## 2020-10-11 RX ORDER — CHLORHEXIDINE GLUCONATE 213 G/1000ML
1 SOLUTION TOPICAL
Refills: 0 | Status: DISCONTINUED | OUTPATIENT
Start: 2020-10-11 | End: 2020-10-11

## 2020-10-11 RX ADMIN — SODIUM CHLORIDE 1000 MILLILITER(S): 9 INJECTION INTRAMUSCULAR; INTRAVENOUS; SUBCUTANEOUS at 07:08

## 2020-10-11 RX ADMIN — BUDESONIDE AND FORMOTEROL FUMARATE DIHYDRATE 2 PUFF(S): 160; 4.5 AEROSOL RESPIRATORY (INHALATION) at 08:09

## 2020-10-11 RX ADMIN — Medication 650 MILLIGRAM(S): at 01:12

## 2020-10-11 RX ADMIN — MEROPENEM 100 MILLIGRAM(S): 1 INJECTION INTRAVENOUS at 21:05

## 2020-10-11 RX ADMIN — Medication 1000 UNIT(S): at 21:06

## 2020-10-11 RX ADMIN — Medication 650 MILLIGRAM(S): at 16:57

## 2020-10-11 RX ADMIN — Medication 0.5 MILLIGRAM(S): at 18:54

## 2020-10-11 RX ADMIN — SODIUM CHLORIDE 1000 MILLILITER(S): 9 INJECTION INTRAMUSCULAR; INTRAVENOUS; SUBCUTANEOUS at 05:18

## 2020-10-11 RX ADMIN — Medication 3 MILLILITER(S): at 01:30

## 2020-10-11 RX ADMIN — Medication 3 MILLILITER(S): at 18:54

## 2020-10-11 RX ADMIN — SODIUM CHLORIDE 60 MILLILITER(S): 9 INJECTION INTRAMUSCULAR; INTRAVENOUS; SUBCUTANEOUS at 09:13

## 2020-10-11 RX ADMIN — Medication 7.23 MICROGRAM(S)/KG/MIN: at 09:05

## 2020-10-11 RX ADMIN — Medication 100 MILLIGRAM(S): at 13:35

## 2020-10-11 RX ADMIN — APIXABAN 5 MILLIGRAM(S): 2.5 TABLET, FILM COATED ORAL at 18:04

## 2020-10-11 RX ADMIN — ALBUTEROL 2 PUFF(S): 90 AEROSOL, METERED ORAL at 12:14

## 2020-10-11 RX ADMIN — Medication 3 MILLIGRAM(S): at 21:31

## 2020-10-11 RX ADMIN — Medication 3 MILLILITER(S): at 13:16

## 2020-10-11 RX ADMIN — TIOTROPIUM BROMIDE 1 CAPSULE(S): 18 CAPSULE ORAL; RESPIRATORY (INHALATION) at 08:09

## 2020-10-11 RX ADMIN — SODIUM CHLORIDE 60 MILLILITER(S): 9 INJECTION INTRAMUSCULAR; INTRAVENOUS; SUBCUTANEOUS at 01:58

## 2020-10-11 RX ADMIN — ATORVASTATIN CALCIUM 40 MILLIGRAM(S): 80 TABLET, FILM COATED ORAL at 21:06

## 2020-10-11 RX ADMIN — Medication 100 MILLIGRAM(S): at 21:05

## 2020-10-11 RX ADMIN — PREGABALIN 1000 MICROGRAM(S): 225 CAPSULE ORAL at 21:06

## 2020-10-11 RX ADMIN — APIXABAN 5 MILLIGRAM(S): 2.5 TABLET, FILM COATED ORAL at 05:45

## 2020-10-11 RX ADMIN — PANTOPRAZOLE SODIUM 40 MILLIGRAM(S): 20 TABLET, DELAYED RELEASE ORAL at 06:17

## 2020-10-11 RX ADMIN — SODIUM CHLORIDE 60 MILLILITER(S): 9 INJECTION INTRAMUSCULAR; INTRAVENOUS; SUBCUTANEOUS at 19:02

## 2020-10-11 RX ADMIN — Medication 3 MILLILITER(S): at 07:10

## 2020-10-11 RX ADMIN — ALBUTEROL 2 PUFF(S): 90 AEROSOL, METERED ORAL at 06:17

## 2020-10-11 RX ADMIN — MEROPENEM 100 MILLIGRAM(S): 1 INJECTION INTRAVENOUS at 13:35

## 2020-10-11 RX ADMIN — MEROPENEM 100 MILLIGRAM(S): 1 INJECTION INTRAVENOUS at 09:12

## 2020-10-11 RX ADMIN — AZITHROMYCIN 500 MILLIGRAM(S): 500 TABLET, FILM COATED ORAL at 00:16

## 2020-10-11 NOTE — DISCHARGE NOTE PROVIDER - NSDCFUADDINST_GEN_ALL_CORE_FT
Follow up with DR Laurent in 1 week  Follow up with DR Machado in 2 weeks   Follow up with Cardiology Nga in 2 weeks   Weekly CBC,CMP with Home care as per ID Dr cui

## 2020-10-11 NOTE — CHART NOTE - NSCHARTNOTEFT_GEN_A_CORE
Patient seen and examined, see ICU consult note for full detail     74 y/o male with hx A.fib on apixaban, CAD, HTN, HLD, asthma/COPD, BPH, recent prostate procedure, presents with dysuria, hematuria, fever and chills    Hypotensive in the ED - placed on Levo   AAOx3, nonfocal neuro exam   Lungs with scattered rales, wheeze   S1S2 regular   Abd soft, NT, +BS  Ext w/o edema     CT A/P w/o renal stone/hydro, shows evidence of cystitis     Labs reviewed     Bedside POCUS with scattered anterior b-lines, no pleural effusion, normal LV sys fn, normal RV size, IVC 2.2 cm     Imp:   Septic shock due to acute cystitis vs prostatitis   MIld YUE     Recs:   Wean Levo for MAP>65  In view of hx ESBL E. Coli in 2019 will start meropenem, f/u cx  Stress dose steroids as on daily prednisone at home   Continue inhalers   Monitor UO, renal fn  Continue apixaban     Patient critically ill, 38 mins spent

## 2020-10-11 NOTE — CHART NOTE - NSCHARTNOTEFT_GEN_A_CORE
Called by RN for hypotension. Patient admitted for urosepsis. BPs initially stable, now downtrending to 75/40. Additional 1L NS bolus ordered (3L total) and repeat BP found to be 80/50. Patient feels well and denies any dizziness, weakness, fatigue, CP, SOB.     T(C): 36.6 (10-11-20 @ 05:10), Max: 39.9 (10-10-20 @ 22:00)  HR: 74 (10-11-20 @ 06:09) (74 - 109)  BP: 80/50 (10-11-20 @ 06:09) (75/40 - 123/71)  RR: 16 (10-11-20 @ 06:09) (16 - 21)  SpO2: 96% (10-11-20 @ 06:09) (94% - 100%)    GENERAL: patient appears well, no acute distress, appropriate, pleasant  EYES: sclera clear, no exudates  ENMT: oropharynx clear without erythema, no exudates, moist mucous membranes  LUNGS: good air entry bilaterally, clear to auscultation, symmetric breath sounds, no wheezing or rhonchi appreciated  HEART: soft S1/S2, regular rate and rhythm, no murmurs noted, no lower extremity edema  GASTROINTESTINAL: abdomen is soft, nontender, nondistended, normoactive bowel sounds, no palpable masses, no CVA tenderness  INTEGUMENT: good skin turgor, no lesions noted  MUSCULOSKELETAL: no clubbing or cyanosis, no obvious deformity  NEUROLOGIC: awake, alert, oriented x3, good muscle tone in 4 extremities, no obvious sensory deficits      A+P    75 year old male with PMHX asthma, COPD, former smoker, hx of benign lung cancer R lobe surgically removed at [facility]) CAD (s/p 5 stents ~2001), GERD, HLD, HTN presents after shakes and low O2 70s admitted for UTI, c/b hypotension    -hypotension likely 2/2 urosepsis  -patient currently asymptomatic  -s/p 3L NS bolus, fluid unresponsive  -ICU PA notified  -Will continue to monitor. RN to notify for any changes    THANG Juarez, PGY-1 Called by RN for hypotension. Patient admitted for urosepsis. BPs initially stable, now downtrending to 75/40. Additional 1L NS bolus ordered (3L total) and repeat BP found to be 80/50. Patient feels well and denies any dizziness, weakness, fatigue, CP, SOB.     T(C): 36.6 (10-11-20 @ 05:10), Max: 39.9 (10-10-20 @ 22:00)  HR: 74 (10-11-20 @ 06:09) (74 - 109)  BP: 80/50 (10-11-20 @ 06:09) (75/40 - 123/71)  RR: 16 (10-11-20 @ 06:09) (16 - 21)  SpO2: 96% (10-11-20 @ 06:09) (94% - 100%)    GENERAL: patient appears well, no acute distress, appropriate, pleasant  ENMT: oropharynx clear without erythema, no exudates, moist mucous membranes  LUNGS: good air entry bilaterally, clear to auscultation, symmetric breath sounds, no wheezing or rhonchi appreciated  HEART: soft S1/S2, regular rate and rhythm, no murmurs noted, no lower extremity edema  GASTROINTESTINAL: abdomen is soft, nontender, nondistended, normoactive bowel sounds, no palpable masses, no CVA tenderness  INTEGUMENT: good skin turgor, no lesions noted  MUSCULOSKELETAL: no clubbing or cyanosis, no obvious deformity  NEUROLOGIC: awake, alert, oriented x3, good muscle tone in 4 extremities, no obvious sensory deficits      A+P    75 year old male with PMHX asthma, COPD, former smoker, hx of benign lung cancer R lobe surgically removed at [facility]) CAD (s/p 5 stents ~2001), GERD, HLD, HTN presents after shakes and low O2 70s admitted for UTI, c/b hypotension    -hypotension likely 2/2 urosepsis  -patient currently asymptomatic  -s/p 3L NS bolus, fluid unresponsive  -ICU PA notified, will evaluate patient  -Will continue to monitor. RN to notify for any changes    THANG Juarez, PGY-1 Called by RN for hypotension. Patient admitted for urosepsis. BPs initially stable, now downtrending to 75/40. Additional 1L NS bolus ordered (3L total) and repeat BP found to be 80/50. Patient feels well and denies any dizziness, weakness, fatigue, CP, SOB.     T(C): 36.6 (10-11-20 @ 05:10), Max: 39.9 (10-10-20 @ 22:00)  HR: 74 (10-11-20 @ 06:09) (74 - 109)  BP: 80/50 (10-11-20 @ 06:09) (75/40 - 123/71)  RR: 16 (10-11-20 @ 06:09) (16 - 21)  SpO2: 96% (10-11-20 @ 06:09) (94% - 100%)    GENERAL: patient appears well, no acute distress, appropriate, pleasant  ENMT: oropharynx clear without erythema, no exudates, moist mucous membranes  LUNGS: good air entry bilaterally, clear to auscultation, symmetric breath sounds, no wheezing or rhonchi appreciated  HEART: soft S1/S2, regular rate and rhythm, no murmurs noted, no lower extremity edema  GASTROINTESTINAL: abdomen is soft, nontender, nondistended, normoactive bowel sounds, no palpable masses, no CVA tenderness  INTEGUMENT: good skin turgor, no lesions noted  MUSCULOSKELETAL: no clubbing or cyanosis, no obvious deformity  NEUROLOGIC: awake, alert, oriented x3, good muscle tone in 4 extremities, no obvious sensory deficits      A+P    75 year old male with PMHX asthma, COPD, former smoker, hx of benign lung cancer R lobe surgically removed at [facility]) CAD (s/p 5 stents ~2001), GERD, HLD, HTN presents after shakes and low O2 70s admitted for UTI, c/b hypotension    -hypotension likely 2/2 urosepsis  -patient currently asymptomatic  -s/p total of 3L NS bolus, fluid unresponsive  -ICU PA notified, will evaluate patient. May require pressor support  -Will continue to monitor. RN to notify for any changes    THANG Juarez, PGY-1

## 2020-10-11 NOTE — DISCHARGE NOTE PROVIDER - CARE PROVIDER_API CALL
Yaniv Laurent)  [facility] [address] [facility]  [address] [place] [address]  Phone: [phone]  Fax: [phone]  Established Patient  Follow Up Time: 2 weeks    BECKY ALONSO  Cardiovascular Diseases  [address] [place] [address]  Phone: [phone]  Fax: [phone]  Established Patient  Follow Up Time: Routine    Yasmani Machado  UROLOGY  [address], [address]  [place] [address]  Phone: [phone]  Fax: [phone]  Established Patient  Follow Up Time: 1 week

## 2020-10-11 NOTE — PROGRESS NOTE ADULT - PROBLEM SELECTOR PLAN 2
- CT abd/pelvis: Although the urinary bladder is incompletely distended, there appears to be wall thickening. Correlate with urinalysis for the possibility of cystitis.  - Given IV NS 1 L bolus x2, Tylenol 650 mg PO x1, Proventil 2 puffs x1, IV Solumedrol 125 mg IVP x1  - Given IV Zithromax and IV Rocephin,   - continue IV Rocephin X 7 days   - UA: positive nitrite, moderate LE, >50 RBC, >50 WBC, many bacteria  - F/u BCx x2, UCx  - Lactate 2.2 --> 2.1 --> 3.1  - Tylenol 650 mg PO q6h PRN for fever or mild pain  - Continue supplemental oxygen, wean as tolerated  - ID (Dr. Marc) consulted, f/u recs - CT abd/pelvis: Although the urinary bladder is incompletely distended, there appears to be wall thickening. Correlate with urinalysis for the possibility of cystitis.  - Given IV NS 1 L bolus x2, Tylenol 650 mg PO x1, Proventil 2 puffs x1, IV Solumedrol 125 mg IVP x1  - Given IV Zithromax and IV Rocephin,   - continue IV Rocephin X 7 days   - UA: positive nitrite, moderate LE, >50 RBC, >50 WBC, many bacteria  - F/u BCx x2, UCx  - Lactate 2.2 --> 2.1 --> 3.1  - Tylenol 650 mg PO q6h PRN for fever or mild pain  - Continue supplemental oxygen, wean as tolerated  - ID (Dr. Rebolledo) consulted, f/u recs - CT abd/pelvis: Although the urinary bladder is incompletely distended, there appears to be wall thickening. Correlate with urinalysis for the possibility of cystitis.  - Given IV NS 1 L bolus x2, Tylenol 650 mg PO x1, Proventil 2 puffs x1, IV Solumedrol 125 mg IVP x1  - Given IV Zithromax and IV Rocephin,   - Abx: meropenem   - UA: positive nitrite, moderate LE, >50 RBC, >50 WBC, many bacteria  - F/u BCx x2, UCx  - Lactate 2.2 --> 2.1 --> 3.1  - Tylenol 650 mg PO q6h PRN for fever or mild pain  - Continue supplemental oxygen, wean as tolerated  - ID (Dr. Rebolledo) consulted, f/u recs recent prostate laser procedure 10/1. 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  - likely source of infection, on meropenem   - plan of care as per ICU recent prostate laser procedure 10/1. 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  - likely source of infection, on meropenem   - plan of care as per ICU

## 2020-10-11 NOTE — ED ADULT NURSE REASSESSMENT NOTE - NS ED NURSE REASSESS COMMENT FT1
After ICU MELODY Oconnor assessed the patient he ordered to give another 1 liter of Normal saline bolus and to give the due Douneb nebulization.

## 2020-10-11 NOTE — CONSULT NOTE ADULT - SUBJECTIVE AND OBJECTIVE BOX
[facility], Division of Infectious Diseases  DONALD Ragsdale, KATTY Snow  [phone]  after hours and weekends [phone]    BASILIO LANDRY  75y, Male  [id number]    HPI--  HPI:  75 year old male with PMHX atrial fibrillation (on Eliquis), asthma, COPD, former smoker, hx of benign lung cancer R lobe surgically removed at [facility]) CAD (s/p 5 stents ~2001), GERD, HLD, HTN presents after shakes and low O2 70s. Patient endorses sudden onset of shaking and shortness of breath earlier today. Patient states he used his nebulizer treatment, however felt no relief. He decided to come to the ED for further evaluation. Denies sick contacts or recent travel. Of note, patient with history of intubation 2019, during admission to [facility] for acute hypercapnic respiratory failure. Patient states he was recently tested for COVID and results were negative. Denies fever, abdominal pain. Admits nausea, vomiting x1 episode in the ER.     ED course:  Vitals: T 103.8, , /71, RR 18, SpO2 96% on RA  Labs: Neutrophil 90.1%, Lymphocyte 6.3%, Monocyte 1.0%, PT/INR 13.7/1.18, PTT 25.6, Lactate 2.2  UA: positive nitrite, moderate LE, >50 RBC, >50 WBC, many bacteria  CT abd/pelvis: Although the urinary bladder is incompletely distended, there appears to be wall thickening. Correlate with urinalysis for the possibility of cystitis.  CTA chest: No significant interval change in the chest compared to the previous study of April 11, 2016. Redemonstration of emphysema and postoperative changes in the right upper lobe  CXR: official read pending  EKG: NSR vent rate 100 bpm  Given IV NS 1 L bolus x2, Tylenol 650 mg PO x1, Proventil 2 puffs x1, IV Solumedrol 125 mg IVP x1, IV Zithromax and IV Rocephin, continue IV Rocephin   (10 Oct 2020 23:41)      PMH/PSH--  GI bleed    Nephrolithiasis    GERD (gastroesophageal reflux disease)    HLD (hyperlipidemia)    HTN (hypertension)    Stented coronary artery    Asthma    Chronic obstructive pulmonary disease    S/P primary angioplasty with coronary stent    Lung tumor        Allergies--      Medications--  Antibiotics: meropenem  IVPB      meropenem  IVPB 1000 milliGRAM(s) IV Intermittent every 8 hours    Immunologic:   Other: acetaminophen   Tablet .. PRN  ALBUTerol    90 MICROgram(s) HFA Inhaler PRN  albuterol/ipratropium for Nebulization PRN  apixaban  atorvastatin  buDESOnide    Inhalation Suspension PRN  budesonide 160 MICROgram(s)/formoterol 4.5 MICROgram(s) Inhaler  chlorhexidine 4% Liquid  cholecalciferol  cyanocobalamin  norepinephrine Infusion  pantoprazole    Tablet  sodium chloride 0.9%.  tiotropium 18 MICROgram(s) Capsule      Social History--  EtOH: denies ***  Tobacco: denies ***  Drug Use: denies ***    Family/Marital History--  Family history of stroke    Family history of heart disease    No pertinent family history in first degree relatives          Travel/Environmental/Occupational History:  *** insert T/E/O Hx ***    Review of Systems:  A >=10-point review of systems was obtained.     Pertinent positives and negatives--  Constitutional: No fevers. No Chills. No Rigors.   Eyes:  ENMT:  Cardiovascular: No chest pain. No palpitations.  Respiratory: No shortness of breath. No cough.  Gastrointestinal: No nausea or vomiting. No diarrhea or constipation.   Genitourinary:  Musculoskeletal:  Skin:  Neurologic:  Psychiatric: Pleasant. Appropriate affect.  Endocrine:  Heme/Lymphatic:  Allergy/Immunologic:    Review of systems otherwise negative except as previously noted.    Physical Exam--  Vital Signs: T(F): 99.2 (10-11-20 @ 06:16), Max: 103.8 (10-10-20 @ 22:00)  HR: 82 (10-11-20 @ 08:17)  BP: 81/47 (10-11-20 @ 08:17)  RR: 18 (10-11-20 @ 08:17)  SpO2: 96% (10-11-20 @ 08:17)  Wt(kg): --  General: Nontoxic-appearing Male in no acute distress.  HEENT: AT/NC. PERRL. EOMI. Anicteric. Conjunctiva pink and moist. Oropharynx clear. Dentition fair.  Neck: Not rigid. No sense of mass.  Nodes: None palpable.  Lungs: Clear bilaterally without rales, wheezing or rhonchi  Heart: Regular rate and rhythm. No Murmur. No rub. No gallop. No palpable thrill.  Abdomen: Bowel sounds present and normoactive. Soft. Nondistended. Nontender. No sense of mass. No organomegaly.  Back: No spinal tenderness. No costovertebral angle tenderness.   Extremities: No cyanosis or clubbing. No edema.   Skin: Warm. Dry. Good turgor. No rash. No vasculitic stigmata.  Psychiatric: Appropriate affect and mood for situation.         Laboratory & Imaging Data--  CBC                        12.3   15.42 )-----------( 144      ( 11 Oct 2020 05:54 )             35.5       Chemistries  10-11    145  |  111<H>  |  16  ----------------------------<  130<H>  4.0   |  26  |  1.30    Ca    7.7<L>      11 Oct 2020 05:54    TPro  6.4  /  Alb  3.4  /  TBili  0.3  /  DBili  x   /  AST  24  /  ALT  38  /  AlkPhos  94  10-10      Culture Data           [facility], Division of Infectious Diseases  DONALD Ragsdale, KATTY Snow  [phone]  after hours and weekends [phone]    BASILIO LANDRY  75y, Male  [id number]    HPI:  75 m h/o atrial fibrillation (on Eliquis), asthma, COPD, former smoker, hx of lung mass R lobe surgically removed at [facility]) CAD (s/p 5 stents ~2001), GERD, HLD, HTN presents after shaking chills.  States he was in normal state of health developed shaking chills that was similar to presentation a year ago, when he had a blood infection.  so they called 911  denies cough, no dysuria  had a prostate laser procedure 10/1 at [facility].  has been on antibx that he finished yesterday, cannot remember which antix.  no sick contacts  in ED found be hypotensive-- ICU admission  now feels better.      PMH/PSH--  GI bleed  Nephrolithiasis  GERD (gastroesophageal reflux disease)  HLD (hyperlipidemia)  HTN (hypertension)  Stented coronary artery  Asthma  Chronic obstructive pulmonary disease  S/P primary angioplasty with coronary stent  Lung tumor        Allergies--NKDA      Medications--  Antibiotics: meropenem  IVPB      meropenem  IVPB 1000 milliGRAM(s) IV Intermittent every 8 hours    Immunologic:   Other: acetaminophen   Tablet .. PRN  ALBUTerol    90 MICROgram(s) HFA Inhaler PRN  albuterol/ipratropium for Nebulization PRN  apixaban  atorvastatin  buDESOnide    Inhalation Suspension PRN  budesonide 160 MICROgram(s)/formoterol 4.5 MICROgram(s) Inhaler  chlorhexidine 4% Liquid  cholecalciferol  cyanocobalamin  norepinephrine Infusion  pantoprazole    Tablet  sodium chloride 0.9%.  tiotropium 18 MICROgram(s) Capsule      Social History--  EtOH: denies ***  Tobacco: former   Drug Use: denies ***    Family/Marital History--  Family history of stroke   lives with wife  Family history of heart disease              Travel/Environmental/Occupational History:  retired jeweler    Review of Systems:  A >=10-point review of systems was obtained.     Pertinent positives and negatives--  Constitutional: No fevers. + Chills. No Rigors.   Eyes: no blurry vision  ENMT: no sore throat  Cardiovascular: No chest pain. No palpitations.  Respiratory: No shortness of breath. No cough.  Gastrointestinal: No nausea or vomiting. No diarrhea or constipation.   Genitourinary: no dysuria  Musculoskeletal: no myalgia  Skin: no rash  Neurologic: no headache  Psychiatric: no depression    Review of systems otherwise negative except as previously noted.    Physical Exam--  Vital Signs: T(F): 99.2 (10-11-20 @ 06:16), Max: 103.8 (10-10-20 @ 22:00)  HR: 82 (10-11-20 @ 08:17)  BP: 81/47 (10-11-20 @ 08:17)  RR: 18 (10-11-20 @ 08:17)  SpO2: 96% (10-11-20 @ 08:17)  Wt(kg): --  General: Nontoxic-appearing Male in no acute distress.  HEENT: AT/NC. . Anicteric.  Neck: Not rigid. No sense of mass.  Nodes: None palpable.  Lungs: Clear bilaterally without rales, wheezing or rhonchi  Heart: Regular rate and rhythm. No Murmur.   Abdomen: Bowel sounds present and normoactive. Soft. Nondistended. Nontender.   Back: No spinal tenderness. No costovertebral angle tenderness.   Extremities: No cyanosis or clubbing. No edema.   Skin: Warm. Dry. Good turgor. No rash. No vasculitic stigmata.  Psychiatric: Appropriate affect and mood for situation.         Laboratory & Imaging Data--  CBC                        12.3   15.42 )-----------( 144      ( 11 Oct 2020 05:54 )             35.5       Chemistries  10-11    145  |  111<H>  |  16  ----------------------------<  130<H>  4.0   |  26  |  1.30    Ca    7.7<L>      11 Oct 2020 05:54    TPro  6.4  /  Alb  3.4  /  TBili  0.3  /  DBili  x   /  AST  24  /  ALT  38  /  AlkPhos  94  10-10      Culture Data        irUrine Microscopic-Add On (NC) (10.10.20 @ 22:38)   Epithelial Cells: Occasional   Red Blood Cell - Urine: >50 /HPF   White Blood Cell - Urine: >50   Bacteria: Many     < from: CT Abdomen and Pelvis w/ IV Cont (10.11.20 @ 00:51) >    EXAM:  CT ABDOMEN AND PELVIS IC                          EXAM:  CT ANGIO CHEST (W)AW IC                            PROCEDURE DATE:  10/11/2020          INTERPRETATION:  CT of the chest, abdomen and pelvis    Indication: Fever, hypoxia, shortness ofbreath    Technique: Axial images were obtained from the thoracic inlet through pubic symphysis with IV contrast.  95 cc of Omnipaque 350 was administered intravenously without complication and 5 cc was discarded.  Reformatted coronal and sagittal images were submitted.    Comparison: Chest CT of April 11, 2016    FINDINGS:    The visualized neck, axilla and subcutaneous tissues are unremarkable.    The tracheobronchial tree is patent centrally.  There is no mediastinal hematoma.  The great vessels are not enlarged.  There is no significant mediastinal or hilar adenopathy.    The heart is not enlarged.  There is no pericardial effusion.    Lungs: Redemonstration of emphysematous changes with postoperative changes in the right upper lobe.    Pleura: Unremarkable.  There is no free air or focal collection.  No free fluid.    Liver: Cystic lesions are unchanged compared to the previous study of April 11, 2016.  Spleen: Normal.  Gallbladder: Unremarkable.  Biliary tree: Unremarkable.  Adrenalglands: Normal.  Pancreas: Normal.  Kidneys: Left renal cyst.    Bowel:  There is no small bowel obstruction.  The appendix is unremarkable. The colon is under distended.    Bladder: Incompletely distended. Apparent wall thickening.  Pelvic organs: Prostate gland is enlarged, measuring 5.4 x 4.5 cm.    There is no significant adenopathy.  Vasculature: The aorta is not dilated. Mild to moderate atherosclerotic vascular calcification    Retroperitoneum: There is no mass.  Bones: Unremarkable.  Subcutaneous tissues: Fat-containing umbilical hernia    IMPRESSION:    No significant interval change in the chest compared to the previous study of April 11, 2016.  Redemonstration of emphysema and postoperative changes in the right upper lobe.    Although the urinary bladder is incompletely distended, there appears to be wall thickening. Correlate with urinalysis for the possibility of cystitis.    < end of copied text >

## 2020-10-11 NOTE — PROGRESS NOTE ADULT - PROBLEM SELECTOR PLAN 4
Chronic  - Continue home Bystolic 5 mg PO qd, patient will bring medication from home Chronic  - EKG: NSR  - Continue home Eliquis 5 mg PO q12h

## 2020-10-11 NOTE — PROGRESS NOTE ADULT - PROBLEM SELECTOR PLAN 7
Chronic  - Continue home Protonix 40 mg PO qd History of 5 coronary artery stents ~2001  - Continue home Lipitor 40 mg PO qhs

## 2020-10-11 NOTE — CONSULT NOTE ADULT - SUBJECTIVE AND OBJECTIVE BOX
Chief Complaint: Urosepsis/Hypotension    Reason for Consult: s/p 3L Cystalloid with SBP in the 70's    Patient is a 75y old  Male who presents with a chief complaint of UTI     HPI:  75 year old male with PMHX atrial fibrillation (on Eliquis), asthma, COPD, former smoker, hx of benign lung cancer R lobe surgically removed at [facility]) CAD (s/p 5 stents ~), GERD, HLD, HTN presents after shakes and low O2 70s. Patient endorses sudden onset of shaking and shortness of breath earlier today. Patient states he used his nebulizer treatment, however felt no relief. He decided to come to the ED for further evaluation. Denies sick contacts or recent travel. Of note, patient with history of intubation , during admission to [facility] for acute hypercapnic respiratory failure. Patient states he was recently tested for COVID and results were negative. Denies fever, abdominal pain. Admits nausea, vomiting x1 episode in the ER.     ED course:  Vitals: T 103.8, , /71, RR 18, SpO2 96% on RA  Labs: Neutrophil 90.1%, Lymphocyte 6.3%, Monocyte 1.0%, PT/INR 13.7/1.18, PTT 25.6, Lactate 2.2  UA: positive nitrite, moderate LE, >50 RBC, >50 WBC, many bacteria  CT abd/pelvis: Although the urinary bladder is incompletely distended, there appears to be wall thickening. Correlate with urinalysis for the possibility of cystitis.  CTA chest: No significant interval change in the chest compared to the previous study of 2016. Redemonstration of emphysema and postoperative changes in the right upper lobe  CXR: official read pending  EKG: NSR vent rate 100 bpm  Given IV NS 1 L bolus x2, Tylenol 650 mg PO x1, Proventil 2 puffs x1, IV Solumedrol 125 mg IVP x1, IV Zithromax and IV Rocephin, continue IV Rocephin      He is now s/p 3 liters of crystalloid for resuscitative efforts.  Hyperlactatemia to 3 from 2.  Slight SOB.  POCUS with A line predominance anteriorly and posteriorly. No PLEFF.  IVC with respirophasic variation and flattening.  Albuterol x 1 for wheezing.  Emphysematous changes on CT Scan.  Given hypotension s/p fluid resuscitation and SOB, will admit to MICU.      Subjective:    Review of Systems         Constitutional: denies fever, chills, general malaise, fatigue, weight loss, weight gain, diaphoresis   HEENT: denies dry mouth, sore throat, runny nose, photophobia, blurry vision, double vision, discharge, eye pain, difficulty hearing, vertigo, dysphagia, epistaxis, recent dental work    Neck: denies pain or stiffness  Respiratory: + SOB denies FRANCIS, cough, phlegm, wheezing, hemoptysis  Cardiovascular: denies CP, palpitations, edema, diaphoresis   Gastrointestinal: denies nausea, vomiting or hematemesis, diarrhea, constipation, abdominal or epigastric pain, melena or hematochezia   Genitourinary: denies dysuria, frequency, urgency, incontinence, hematuria   Skin: denies rash, hives, itching, burning, masses  Musculoskeletal: denies myalgias, arthritis, joint swelling, muscle weakness  Neurologic: denies syncope, LOC, headache, weakness, dizziness, parasthesias, numbness, seizures, confusion, dementia   Psychiatric: denies feeling anxious, depressed, suicidal, homicidal  Endocrine: denies increased fingerstick glucoses, cold or heat intolerance, polydipsia, polyuria, polyphagia, hair loss  Hematology/Oncology: denies bruising, tender or enlarged lymph nodes   Allergy/immunologic: denies hives or eczema  ROS negative x 10 systems except as noted above        PAST MEDICAL & SURGICAL HISTORY:  GI bleed  while on Antiplatelets    Nephrolithiasis  s/p Lithotripsy    GERD (gastroesophageal reflux disease)    HLD (hyperlipidemia)    HTN (hypertension)    Stented coronary artery    Asthma    Chronic obstructive pulmonary disease  Asthma    S/P primary angioplasty with coronary stent    Lung tumor  Rt Lung tumor resection,benign      FAMILY HISTORY:  Family history of stroke    Family history of heart disease        Vitals   ICU Vital Signs Last 24 Hrs  T(C): 37.3 (11 Oct 2020 06:16), Max: 39.9 (10 Oct 2020 22:00)  T(F): 99.2 (11 Oct 2020 06:16), Max: 103.8 (10 Oct 2020 22:00)  HR: 74 (11 Oct 2020 06:09) (74 - 109)  BP: 80/50 (11 Oct 2020 06:09) (75/40 - 123/71)  BP(mean): --  ABP: --  ABP(mean): --  RR: 16 (11 Oct 2020 06:09) (16 - 21)  SpO2: 96% (11 Oct 2020 06:09) (94% - 100%)      Physical Exam:   Constitutional: NAD, well-groomed, well-developed  HEENT: PERRLA, EOMI, no drainage or redness  Neck: supple,  No JVD, Trachea midline  Back: Normal spine flexure, No CVA tenderness, No deformity or limitation of movement  Respiratory: Breath Sounds equal & clear bilaterally to auscultation, no accessory muscle use noted  Cardiovascular: Regular rate, regular rhythm, normal S1, S2; no murmurs or rub  Gastrointestinal: Soft, non-tender, non distended, no hepatosplenomegaly, normal bowel sounds  Extremities: GONSALES x 4, no peripheral edema, no cyanosis, no clubbing   Vascular: Equal and normal pulses: 2+ peripheral pulses throughout  Neurological: A+O x 3; speech clear and intact; no sensory, motor  deficits, normal reflexes  Psychiatric: calm, normal mood, normal affect  Musculoskeletal: No joint swelling or deformity; no limitation of movement  Skin: warm, dry, well perfused, no rashes        I&O's Detail      LABS                        12.3   15.42 )-----------( 144      ( 11 Oct 2020 05:54 )             35.5     10-11    145  |  111<H>  |  16  ----------------------------<  130<H>  4.0   |  26  |  1.30    Ca    7.7<L>      11 Oct 2020 05:54    TPro  6.4  /  Alb  3.4  /  TBili  0.3  /  DBili  x   /  AST  24  /  ALT  38  /  AlkPhos  94  10-10    LIVER FUNCTIONS - ( 10 Oct 2020 22:23 )  Alb: 3.4 g/dL / Pro: 6.4 g/dL / ALK PHOS: 94 U/L / ALT: 38 U/L / AST: 24 U/L / GGT: x           PT/INR - ( 10 Oct 2020 22:23 )   PT: 13.7 sec;   INR: 1.18 ratio         PTT - ( 10 Oct 2020 22:23 )  PTT:25.6 sec        Urinalysis Basic - ( 10 Oct 2020 22:38 )    Color: Yellow / Appearance: Turbid / S.020 / pH: x  Gluc: x / Ketone: Trace  / Bili: Negative / Urobili: Negative   Blood: x / Protein: 100 / Nitrite: Positive   Leuk Esterase: Moderate / RBC: >50 /HPF / WBC >50   Sq Epi: x / Non Sq Epi: Occasional / Bacteria: Many            MEDICATIONS  (STANDING):  apixaban 5 milliGRAM(s) Oral every 12 hours  atorvastatin 40 milliGRAM(s) Oral at bedtime  budesonide 160 MICROgram(s)/formoterol 4.5 MICROgram(s) Inhaler 2 Puff(s) Inhalation two times a day  cefTRIAXone   IVPB 1000 milliGRAM(s) IV Intermittent every 24 hours  cholecalciferol 1000 Unit(s) Oral daily  cyanocobalamin 1000 MICROGram(s) Oral daily  pantoprazole    Tablet 40 milliGRAM(s) Oral before breakfast  sodium chloride 0.9%. 1000 milliLiter(s) (60 mL/Hr) IV Continuous <Continuous>  tiotropium 18 MICROgram(s) Capsule 1 Capsule(s) Inhalation daily    MEDICATIONS  (PRN):  acetaminophen   Tablet .. 650 milliGRAM(s) Oral every 6 hours PRN Temp greater or equal to 38C (100.4F), Mild Pain (1 - 3)  ALBUTerol    90 MICROgram(s) HFA Inhaler 2 Puff(s) Inhalation every 6 hours PRN Shortness of Breath and/or Wheezing  albuterol/ipratropium for Nebulization 3 milliLiter(s) Nebulizer every 6 hours PRN Shortness of Breath and/or Wheezing  buDESOnide    Inhalation Suspension 0.5 milliGRAM(s) Inhalation two times a day PRN SOB/wheezing      Allergies:  No Known Allergies             Chief Complaint: Urosepsis/Hypotension    Reason for Consult: s/p 3L Cystalloid with SBP in the 70's    Patient is a 75y old  Male who presents with a chief complaint of UTI     HPI:  75 year old male with PMHX atrial fibrillation (on Eliquis), asthma, COPD, former smoker, hx of benign lung cancer R lobe surgically removed at [facility]) CAD (s/p 5 stents ~), GERD, HLD, HTN presents after shakes and low O2 70s. Patient endorses sudden onset of shaking and shortness of breath earlier today. Patient states he used his nebulizer treatment, however felt no relief. He decided to come to the ED for further evaluation. Denies sick contacts or recent travel. Of note, patient with history of intubation , during admission to [facility] for acute hypercapnic respiratory failure. Patient states he was recently tested for COVID and results were negative. Denies fever, abdominal pain. Admits nausea, vomiting x1 episode in the ER.     ED course:  Vitals: T 103.8, , /71, RR 18, SpO2 96% on RA  Labs: Neutrophil 90.1%, Lymphocyte 6.3%, Monocyte 1.0%, PT/INR 13.7/1.18, PTT 25.6, Lactate 2.2  UA: positive nitrite, moderate LE, >50 RBC, >50 WBC, many bacteria  CT abd/pelvis: Although the urinary bladder is incompletely distended, there appears to be wall thickening. Correlate with urinalysis for the possibility of cystitis.  CTA chest: No significant interval change in the chest compared to the previous study of 2016. Redemonstration of emphysema and postoperative changes in the right upper lobe  CXR: official read pending  EKG: NSR vent rate 100 bpm  Given IV NS 1 L bolus x2, Tylenol 650 mg PO x1, Proventil 2 puffs x1, IV Solumedrol 125 mg IVP x1, IV Zithromax and IV Rocephin, continue IV Rocephin      After further interviewing, he just underwent "laser surgery" for treatment of his BPH on 10/1 and completed a 5 day course of outpatient antibiotic treament.  He  has had persistent dysuric symptoms.  He is now s/p 3 liters of crystalloid for resuscitative efforts.  Hyperlactatemia to 3 from 2.  Slight SOB.  POCUS with A line predominance anteriorly and posteriorly. No PLEFF.  IVC with respirophasic variation and flattening.  Albuterol x 1 for wheezing.  Emphysematous changes on CT Scan.  Given hypotension s/p fluid resuscitation and SOB, will admit to MICU.      Subjective:    Review of Systems         Constitutional: denies fever, chills, general malaise, fatigue, weight loss, weight gain, diaphoresis   HEENT: denies dry mouth, sore throat, runny nose, photophobia, blurry vision, double vision, discharge, eye pain, difficulty hearing, vertigo, dysphagia, epistaxis, recent dental work    Neck: denies pain or stiffness  Respiratory: + SOB denies FRANCIS, cough, phlegm, wheezing, hemoptysis  Cardiovascular: denies CP, palpitations, edema, diaphoresis   Gastrointestinal: denies nausea, vomiting or hematemesis, diarrhea, constipation, abdominal or epigastric pain, melena or hematochezia   Genitourinary: denies dysuria, frequency, urgency, incontinence, hematuria   Skin: denies rash, hives, itching, burning, masses  Musculoskeletal: denies myalgias, arthritis, joint swelling, muscle weakness  Neurologic: denies syncope, LOC, headache, weakness, dizziness, parasthesias, numbness, seizures, confusion, dementia   Psychiatric: denies feeling anxious, depressed, suicidal, homicidal  Endocrine: denies increased fingerstick glucoses, cold or heat intolerance, polydipsia, polyuria, polyphagia, hair loss  Hematology/Oncology: denies bruising, tender or enlarged lymph nodes   Allergy/immunologic: denies hives or eczema  ROS negative x 10 systems except as noted above        PAST MEDICAL & SURGICAL HISTORY:  GI bleed  while on Antiplatelets    Nephrolithiasis  s/p Lithotripsy    GERD (gastroesophageal reflux disease)    HLD (hyperlipidemia)    HTN (hypertension)    Stented coronary artery    Asthma    Chronic obstructive pulmonary disease  Asthma    S/P primary angioplasty with coronary stent    Lung tumor  Rt Lung tumor resection,benign      FAMILY HISTORY:  Family history of stroke    Family history of heart disease        Vitals   ICU Vital Signs Last 24 Hrs  T(C): 37.3 (11 Oct 2020 06:16), Max: 39.9 (10 Oct 2020 22:00)  T(F): 99.2 (11 Oct 2020 06:16), Max: 103.8 (10 Oct 2020 22:00)  HR: 74 (11 Oct 2020 06:09) (74 - 109)  BP: 80/50 (11 Oct 2020 06:09) (75/40 - 123/71)  BP(mean): --  ABP: --  ABP(mean): --  RR: 16 (11 Oct 2020 06:09) (16 - 21)  SpO2: 96% (11 Oct 2020 06:09) (94% - 100%)      Physical Exam:   Constitutional: NAD, well-groomed, well-developed  HEENT: PERRLA, EOMI, no drainage or redness  Neck: supple,  No JVD, Trachea midline  Back: Normal spine flexure, No CVA tenderness, No deformity or limitation of movement  Respiratory: Breath Sounds equal & clear bilaterally to auscultation, no accessory muscle use noted  Cardiovascular: Regular rate, regular rhythm, normal S1, S2; no murmurs or rub  Gastrointestinal: Soft, non-tender, non distended, no hepatosplenomegaly, normal bowel sounds  Extremities: GONSALES x 4, no peripheral edema, no cyanosis, no clubbing   Vascular: Equal and normal pulses: 2+ peripheral pulses throughout  Neurological: A+O x 3; speech clear and intact; no sensory, motor  deficits, normal reflexes  Psychiatric: calm, normal mood, normal affect  Musculoskeletal: No joint swelling or deformity; no limitation of movement  Skin: warm, dry, well perfused, no rashes        I&O's Detail      LABS                        12.3   15.42 )-----------( 144      ( 11 Oct 2020 05:54 )             35.5     10-11    145  |  111<H>  |  16  ----------------------------<  130<H>  4.0   |  26  |  1.30    Ca    7.7<L>      11 Oct 2020 05:54    TPro  6.4  /  Alb  3.4  /  TBili  0.3  /  DBili  x   /  AST  24  /  ALT  38  /  AlkPhos  94  10-10    LIVER FUNCTIONS - ( 10 Oct 2020 22:23 )  Alb: 3.4 g/dL / Pro: 6.4 g/dL / ALK PHOS: 94 U/L / ALT: 38 U/L / AST: 24 U/L / GGT: x           PT/INR - ( 10 Oct 2020 22:23 )   PT: 13.7 sec;   INR: 1.18 ratio         PTT - ( 10 Oct 2020 22:23 )  PTT:25.6 sec        Urinalysis Basic - ( 10 Oct 2020 22:38 )    Color: Yellow / Appearance: Turbid / S.020 / pH: x  Gluc: x / Ketone: Trace  / Bili: Negative / Urobili: Negative   Blood: x / Protein: 100 / Nitrite: Positive   Leuk Esterase: Moderate / RBC: >50 /HPF / WBC >50   Sq Epi: x / Non Sq Epi: Occasional / Bacteria: Many            MEDICATIONS  (STANDING):  apixaban 5 milliGRAM(s) Oral every 12 hours  atorvastatin 40 milliGRAM(s) Oral at bedtime  budesonide 160 MICROgram(s)/formoterol 4.5 MICROgram(s) Inhaler 2 Puff(s) Inhalation two times a day  cefTRIAXone   IVPB 1000 milliGRAM(s) IV Intermittent every 24 hours  cholecalciferol 1000 Unit(s) Oral daily  cyanocobalamin 1000 MICROGram(s) Oral daily  pantoprazole    Tablet 40 milliGRAM(s) Oral before breakfast  sodium chloride 0.9%. 1000 milliLiter(s) (60 mL/Hr) IV Continuous <Continuous>  tiotropium 18 MICROgram(s) Capsule 1 Capsule(s) Inhalation daily    MEDICATIONS  (PRN):  acetaminophen   Tablet .. 650 milliGRAM(s) Oral every 6 hours PRN Temp greater or equal to 38C (100.4F), Mild Pain (1 - 3)  ALBUTerol    90 MICROgram(s) HFA Inhaler 2 Puff(s) Inhalation every 6 hours PRN Shortness of Breath and/or Wheezing  albuterol/ipratropium for Nebulization 3 milliLiter(s) Nebulizer every 6 hours PRN Shortness of Breath and/or Wheezing  buDESOnide    Inhalation Suspension 0.5 milliGRAM(s) Inhalation two times a day PRN SOB/wheezing      Allergies:  No Known Allergies             Chief Complaint: Urosepsis/Hypotension    Reason for Consult: s/p 3L Cystalloid with SBP in the 70's    Patient is a 75y old  Male who presents with a chief complaint of UTI     HPI:  75 year old male with PMHX atrial fibrillation (on Eliquis), asthma, COPD, former smoker, hx of benign lung cancer R lobe surgically removed at [facility]) CAD (s/p 5 stents ~), GERD, HLD, HTN presents after shakes and low O2 70s. Patient endorses sudden onset of shaking and shortness of breath earlier today. Patient states he used his nebulizer treatment, however felt no relief. He decided to come to the ED for further evaluation. Denies sick contacts or recent travel. Of note, patient with history of intubation , during admission to [facility] for acute hypercapnic respiratory failure. Patient states he was recently tested for COVID and results were negative. Denies fever, abdominal pain. Admits nausea, vomiting x1 episode in the ER.     ED course:  Vitals: T 103.8, , /71, RR 18, SpO2 96% on RA  Labs: Neutrophil 90.1%, Lymphocyte 6.3%, Monocyte 1.0%, PT/INR 13.7/1.18, PTT 25.6, Lactate 2.2  UA: positive nitrite, moderate LE, >50 RBC, >50 WBC, many bacteria  CT abd/pelvis: Although the urinary bladder is incompletely distended, there appears to be wall thickening. Correlate with urinalysis for the possibility of cystitis.  CTA chest: No significant interval change in the chest compared to the previous study of 2016. Redemonstration of emphysema and postoperative changes in the right upper lobe  CXR: official read pending  EKG: NSR vent rate 100 bpm  Given IV NS 1 L bolus x2, Tylenol 650 mg PO x1, Proventil 2 puffs x1, IV Solumedrol 125 mg IVP x1, IV Zithromax and IV Rocephin, continue IV Rocephin      After further interviewing, he just underwent "laser surgery" for treatment of his BPH on 10/1 and completed a 5 day course of outpatient antibiotic treament.  He has had persistent dysuric symptoms since then.  He is now s/p 3 liters of crystalloid for resuscitative efforts.  Hyperlactatemia to 3 from 2.  Slight SOB.  POCUS with A line predominance anteriorly and posteriorly. No PLEFF.  IVC with respirophasic variation and flattening.  Albuterol x 1 for wheezing.  Emphysematous changes on CT Scan.  Given hypotension s/p fluid resuscitation and SOB, will admit to MICU.      Subjective:    Review of Systems         Constitutional: denies fever, chills, general malaise, fatigue, weight loss, weight gain, diaphoresis   HEENT: denies dry mouth, sore throat, runny nose, photophobia, blurry vision, double vision, discharge, eye pain, difficulty hearing, vertigo, dysphagia, epistaxis, recent dental work    Neck: denies pain or stiffness  Respiratory: + SOB denies FRANCIS, cough, phlegm, wheezing, hemoptysis  Cardiovascular: denies CP, palpitations, edema, diaphoresis   Gastrointestinal: denies nausea, vomiting or hematemesis, diarrhea, constipation, abdominal or epigastric pain, melena or hematochezia   Genitourinary: denies dysuria, frequency, urgency, incontinence, hematuria   Skin: denies rash, hives, itching, burning, masses  Musculoskeletal: denies myalgias, arthritis, joint swelling, muscle weakness  Neurologic: denies syncope, LOC, headache, weakness, dizziness, parasthesias, numbness, seizures, confusion, dementia   Psychiatric: denies feeling anxious, depressed, suicidal, homicidal  Endocrine: denies increased fingerstick glucoses, cold or heat intolerance, polydipsia, polyuria, polyphagia, hair loss  Hematology/Oncology: denies bruising, tender or enlarged lymph nodes   Allergy/immunologic: denies hives or eczema  ROS negative x 10 systems except as noted above        PAST MEDICAL & SURGICAL HISTORY:  GI bleed  while on Antiplatelets    Nephrolithiasis  s/p Lithotripsy    GERD (gastroesophageal reflux disease)    HLD (hyperlipidemia)    HTN (hypertension)    Stented coronary artery    Asthma    Chronic obstructive pulmonary disease  Asthma    S/P primary angioplasty with coronary stent    Lung tumor  Rt Lung tumor resection,benign      FAMILY HISTORY:  Family history of stroke    Family history of heart disease        Vitals   ICU Vital Signs Last 24 Hrs  T(C): 37.3 (11 Oct 2020 06:16), Max: 39.9 (10 Oct 2020 22:00)  T(F): 99.2 (11 Oct 2020 06:16), Max: 103.8 (10 Oct 2020 22:00)  HR: 74 (11 Oct 2020 06:09) (74 - 109)  BP: 80/50 (11 Oct 2020 06:09) (75/40 - 123/71)  BP(mean): --  ABP: --  ABP(mean): --  RR: 16 (11 Oct 2020 06:09) (16 - 21)  SpO2: 96% (11 Oct 2020 06:09) (94% - 100%)      Physical Exam:   Constitutional: NAD, well-groomed, well-developed  HEENT: PERRLA, EOMI, no drainage or redness  Neck: supple,  No JVD, Trachea midline  Back: Normal spine flexure, No CVA tenderness, No deformity or limitation of movement  Respiratory: Breath Sounds equal & clear bilaterally to auscultation, no accessory muscle use noted  Cardiovascular: Regular rate, regular rhythm, normal S1, S2; no murmurs or rub  Gastrointestinal: Soft, non-tender, non distended, no hepatosplenomegaly, normal bowel sounds  Extremities: GONSALES x 4, no peripheral edema, no cyanosis, no clubbing   Vascular: Equal and normal pulses: 2+ peripheral pulses throughout  Neurological: A+O x 3; speech clear and intact; no sensory, motor  deficits, normal reflexes  Psychiatric: calm, normal mood, normal affect  Musculoskeletal: No joint swelling or deformity; no limitation of movement  Skin: warm, dry, well perfused, no rashes        I&O's Detail      LABS                        12.3   15.42 )-----------( 144      ( 11 Oct 2020 05:54 )             35.5     10-11    145  |  111<H>  |  16  ----------------------------<  130<H>  4.0   |  26  |  1.30    Ca    7.7<L>      11 Oct 2020 05:54    TPro  6.4  /  Alb  3.4  /  TBili  0.3  /  DBili  x   /  AST  24  /  ALT  38  /  AlkPhos  94  10-10    LIVER FUNCTIONS - ( 10 Oct 2020 22:23 )  Alb: 3.4 g/dL / Pro: 6.4 g/dL / ALK PHOS: 94 U/L / ALT: 38 U/L / AST: 24 U/L / GGT: x           PT/INR - ( 10 Oct 2020 22:23 )   PT: 13.7 sec;   INR: 1.18 ratio         PTT - ( 10 Oct 2020 22:23 )  PTT:25.6 sec        Urinalysis Basic - ( 10 Oct 2020 22:38 )    Color: Yellow / Appearance: Turbid / S.020 / pH: x  Gluc: x / Ketone: Trace  / Bili: Negative / Urobili: Negative   Blood: x / Protein: 100 / Nitrite: Positive   Leuk Esterase: Moderate / RBC: >50 /HPF / WBC >50   Sq Epi: x / Non Sq Epi: Occasional / Bacteria: Many            MEDICATIONS  (STANDING):  apixaban 5 milliGRAM(s) Oral every 12 hours  atorvastatin 40 milliGRAM(s) Oral at bedtime  budesonide 160 MICROgram(s)/formoterol 4.5 MICROgram(s) Inhaler 2 Puff(s) Inhalation two times a day  cefTRIAXone   IVPB 1000 milliGRAM(s) IV Intermittent every 24 hours  cholecalciferol 1000 Unit(s) Oral daily  cyanocobalamin 1000 MICROGram(s) Oral daily  pantoprazole    Tablet 40 milliGRAM(s) Oral before breakfast  sodium chloride 0.9%. 1000 milliLiter(s) (60 mL/Hr) IV Continuous <Continuous>  tiotropium 18 MICROgram(s) Capsule 1 Capsule(s) Inhalation daily    MEDICATIONS  (PRN):  acetaminophen   Tablet .. 650 milliGRAM(s) Oral every 6 hours PRN Temp greater or equal to 38C (100.4F), Mild Pain (1 - 3)  ALBUTerol    90 MICROgram(s) HFA Inhaler 2 Puff(s) Inhalation every 6 hours PRN Shortness of Breath and/or Wheezing  albuterol/ipratropium for Nebulization 3 milliLiter(s) Nebulizer every 6 hours PRN Shortness of Breath and/or Wheezing  buDESOnide    Inhalation Suspension 0.5 milliGRAM(s) Inhalation two times a day PRN SOB/wheezing      Allergies:  No Known Allergies

## 2020-10-11 NOTE — DISCHARGE NOTE PROVIDER - NSDCCPCAREPLAN_GEN_ALL_CORE_FT
PRINCIPAL DISCHARGE DIAGNOSIS  Diagnosis: Prostatitis  Assessment and Plan of Treatment: Prostatitis  You were found to have prostatitis which was the likely source of your sepsis. We treated you in the hospital with IV antibiotics.      SECONDARY DISCHARGE DIAGNOSES  Diagnosis: Septic shock  Assessment and Plan of Treatment: Septic shock  You were found to have septic shock in the hospital, which means that your blood pressure was low. This occurs in response to infection. We treated you in the hospital with fluids and levophed, a drug that keeps your blood pressure up.    Diagnosis: UTI (urinary tract infection)  Assessment and Plan of Treatment: You were found to have a UTI based on urinalysis. We treated you in the hospital with IV antibiotics.    Diagnosis: COPD (chronic obstructive pulmonary disease)  Assessment and Plan of Treatment: COPD (chronic obstructive pulmonary disease)  You have COPD.        PRINCIPAL DISCHARGE DIAGNOSIS  Diagnosis: Prostatitis  Assessment and Plan of Treatment: Prostatitis  You were found to have prostatitis which was the likely source of your sepsis. We treated you in the hospital with IV antibiotics.      SECONDARY DISCHARGE DIAGNOSES  Diagnosis: Septic shock  Assessment and Plan of Treatment: Septic shock  You were found to have septic shock in the hospital, which means that your blood pressure was low. This occurs in response to infection. We treated you in the hospital with fluids and levophed and IV steroids, a drug that keeps your blood pressure up.    Diagnosis: UTI (urinary tract infection)  Assessment and Plan of Treatment: You were found to have a UTI based on urinalysis. We treated you in the hospital with IV antibiotics.    Diagnosis: COPD (chronic obstructive pulmonary disease)  Assessment and Plan of Treatment: COPD (chronic obstructive pulmonary disease)  You have COPD. We continued your inhaler and nebulizer treatment in the hospital and your breathing improved.        PRINCIPAL DISCHARGE DIAGNOSIS  Diagnosis: Prostatitis  Assessment and Plan of Treatment: Prostatitis  You were found to have prostatitis which was the likely source of your sepsis. We treated you in the hospital with IV antibiotics.      SECONDARY DISCHARGE DIAGNOSES  Diagnosis: Septic shock  Assessment and Plan of Treatment: Septic shock  You were found to have septic shock in the hospital, which means that your blood pressure was low in response to infection. This occurs in response to infection. We treated you in the hospital with fluids and levophed and IV steroids, a drug that keeps your blood pressure up.    Diagnosis: UTI (urinary tract infection)  Assessment and Plan of Treatment: You were found to have a UTI based on urinalysis. We treated you in the hospital with IV antibiotics.    Diagnosis: COPD (chronic obstructive pulmonary disease)  Assessment and Plan of Treatment: COPD (chronic obstructive pulmonary disease)  You have COPD. We continued your inhaler and nebulizer treatment in the hospital and your breathing improved.        PRINCIPAL DISCHARGE DIAGNOSIS  Diagnosis: Prostatitis  Assessment and Plan of Treatment: Prostatitis  You were found to have prostatitis which was the likely source of your sepsis. We treated you in the hospital with IV antibiotics.      SECONDARY DISCHARGE DIAGNOSES  Diagnosis: Septic shock  Assessment and Plan of Treatment: Septic shock  You were found to have septic shock in the hospital, which means that your blood pressure was low in response to infection. This occurs in response to infection. We treated you in the hospital with fluids and levophed and IV steroids, a drug that keeps your blood pressure up. COME BACK TO THE EMERGENCY ROOM IF YOU EXPERIENCE: high fevers, or acutely worsening pain with urination, aylin blood in urine, profuse watery diarrhea.    Diagnosis: UTI (urinary tract infection)  Assessment and Plan of Treatment: You were found to have a UTI based on urinalysis. We treated you in the hospital with IV antibiotics. We placed a midline to complete your antibiotic course of ertapenem at home until 11/7 (total of 28 days). Please comply with home care and sanitary procedure. GO TO THE EMERGENCY ROOM IF YOU EXPERIENCE: pain or severe redness at the site of the midline, pus drainage, or high fevers.    Diagnosis: CAD (coronary artery disease)  Assessment and Plan of Treatment: CAD (coronary artery disease)  You have coronary artery disease. Please continue to take your atorvastatin 40 mg daily as prescribed and folow up with your primary care doctor within 2 weeks of discharge.    Diagnosis: Atrial fibrillation  Assessment and Plan of Treatment: Atrial fibrillation  You have atrial fibrillation. Continue to take your eliquis 5 mg twice a day as prescribed and follow up routinely with your primary care doctor and cardiologist.    Diagnosis: HTN (hypertension)  Assessment and Plan of Treatment: HTN (hypertension)  Continue your bystolic 5 mg daily as prescribed and follow up with your primary care doctor within 2 weeks of discharge.    Diagnosis: HLD (hyperlipidemia)  Assessment and Plan of Treatment: HLD (hyperlipidemia)  Please continue to take your atorvastatin 40 mg daily as prescribed and folow up with your primary care doctor within 2 weeks of discharge.    Diagnosis: GERD (gastroesophageal reflux disease)  Assessment and Plan of Treatment: GERD (gastroesophageal reflux disease)  You have gastric reflux. Please continue to take your pantoprazole 40 mg daily as prescribed. Follow up with your primary care doctor within 2 weeks of discharge.    Diagnosis: COPD (chronic obstructive pulmonary disease)  Assessment and Plan of Treatment: COPD (chronic obstructive pulmonary disease)  You have COPD. We continued your inhaler and nebulizer treatment in the hospital and your breathing improved.        PRINCIPAL DISCHARGE DIAGNOSIS  Diagnosis: Prostatitis  Assessment and Plan of Treatment: Prostatitis  You were found to have prostatitis which was the likely source of your sepsis. We treated you in the hospital with IV antibiotics and placed a midline to complete your Ertapenem for a total 28 day course until 11/7.      SECONDARY DISCHARGE DIAGNOSES  Diagnosis: Septic shock  Assessment and Plan of Treatment: Septic shock  You were found to have septic shock in the hospital, which means that your blood pressure was low in response to infection. This occurs in response to infection. We treated you in the hospital with fluids and levophed and IV steroids, a drug that keeps your blood pressure up. COME BACK TO THE EMERGENCY ROOM IF YOU EXPERIENCE: high fevers, or acutely worsening pain with urination, aylin blood in urine, profuse watery diarrhea.    Diagnosis: UTI (urinary tract infection)  Assessment and Plan of Treatment: You were found to have a UTI based on urinalysis. We treated you in the hospital with IV antibiotics. We placed a midline to complete your antibiotic course of ertapenem at home until 11/7 (total of 28 days). Please comply with home care and sanitary procedure. GO TO THE EMERGENCY ROOM IF YOU EXPERIENCE: pain or severe redness at the site of the midline, pus drainage, or high fevers.    Diagnosis: CAD (coronary artery disease)  Assessment and Plan of Treatment: CAD (coronary artery disease)  You have coronary artery disease. Please continue to take your atorvastatin 40 mg daily as prescribed and folow up with your primary care doctor within 2 weeks of discharge.    Diagnosis: Atrial fibrillation  Assessment and Plan of Treatment: Atrial fibrillation  You have atrial fibrillation. Continue to take your eliquis 5 mg twice a day as prescribed and follow up routinely with your primary care doctor and cardiologist.    Diagnosis: HTN (hypertension)  Assessment and Plan of Treatment: HTN (hypertension)  Continue your bystolic 5 mg daily as prescribed and follow up with your primary care doctor within 2 weeks of discharge.    Diagnosis: HLD (hyperlipidemia)  Assessment and Plan of Treatment: HLD (hyperlipidemia)  Please continue to take your atorvastatin 40 mg daily as prescribed and folow up with your primary care doctor within 2 weeks of discharge.    Diagnosis: GERD (gastroesophageal reflux disease)  Assessment and Plan of Treatment: GERD (gastroesophageal reflux disease)  You have gastric reflux. Please continue to take your pantoprazole 40 mg daily as prescribed. Follow up with your primary care doctor within 2 weeks of discharge.    Diagnosis: COPD (chronic obstructive pulmonary disease)  Assessment and Plan of Treatment: COPD (chronic obstructive pulmonary disease)  You have COPD. We continued your inhaler and nebulizer treatment in the hospital and your breathing improved.        PRINCIPAL DISCHARGE DIAGNOSIS  Diagnosis: Prostatitis  Assessment and Plan of Treatment: Ac Prostatitis, E Coli  S/P Sepsis with Shock   You were found to have prostatitis which was the likely source of your sepsis. We treated you in the hospital with IV antibiotics and placed a midline to complete your Ertapenem for a total 28 day course until 11/7.  -Follow up with Dr Marc if any qiuestion  -Follow up with DR Machado in 1-2 weeks      SECONDARY DISCHARGE DIAGNOSES  Diagnosis: HTN (hypertension)  Assessment and Plan of Treatment: HTN (hypertension)  Continue your bystolic 5 mg daily as prescribed and follow up with your Cardiology within 2 weeks of discharge.    Diagnosis: Atrial fibrillation  Assessment and Plan of Treatment: Atrial fibrillation  You have atrial fibrillation. Continue to take your eliquis 5 mg twice a day as prescribed and follow up routinely with your primary care doctor and cardiologist.    Diagnosis: HLD (hyperlipidemia)  Assessment and Plan of Treatment: HLD (hyperlipidemia)  Please continue to take your atorvastatin 40 mg daily as prescribed and folow up with your primary care doctor within 2 weeks of discharge.    Diagnosis: GERD (gastroesophageal reflux disease)  Assessment and Plan of Treatment: GERD (gastroesophageal reflux disease)  You have gastric reflux. Please continue to take your pantoprazole 40 mg daily as prescribed. Follow up with your primary care doctor within 2 weeks of discharge.    Diagnosis: CAD (coronary artery disease)  Assessment and Plan of Treatment: CAD (coronary artery disease)  You have coronary artery disease. Please continue to take your atorvastatin 40 mg daily as prescribed and folow up with your Cardiologist  within 2 weeks of discharge.    Diagnosis: COPD (chronic obstructive pulmonary disease)  Assessment and Plan of Treatment: COPD (chronic obstructive pulmonary disease)  You have COPD. We continued All  your inhaler and nebulizer treatment in the hospital and your breathing improved.       Diagnosis: UTI (urinary tract infection)  Assessment and Plan of Treatment: Urine culture + for E Coli ESBL + We treated you in the hospital with IV antibiotics. We placed a midline to complete your antibiotic course of ertapenem at home until 11/7 (total of 28 days). Please comply with home care and sanitary procedure. GO TO THE EMERGENCY ROOM IF YOU EXPERIENCE: pain or severe redness at the site of the midline, pus drainage, or high fevers.

## 2020-10-11 NOTE — PROGRESS NOTE ADULT - ATTENDING COMMENTS
Pt seen, Examined, Case & care plan d/w pt, residents at detail.  AM labs   D/W DR césar ROSADO   D/W DR BIRGIT Urena

## 2020-10-11 NOTE — DISCHARGE NOTE PROVIDER - HOSPITAL COURSE
FROM ADMISSION H+P:   HPI:  75 year old male with PMHX atrial fibrillation (on Eliquis), asthma, COPD, former smoker, hx of benign lung cancer R lobe surgically removed at [facility]) CAD (s/p 5 stents ~2001), GERD, HLD, HTN presents after shakes and low O2 70s. Patient endorses sudden onset of shaking and shortness of breath earlier today. Patient states he used his nebulizer treatment, however felt no relief. He decided to come to the ED for further evaluation. Denies sick contacts or recent travel. Of note, patient with history of intubation 2019, during admission to [facility] for acute hypercapnic respiratory failure. Patient states he was recently tested for COVID and results were negative. Denies fever, abdominal pain. Admits nausea, vomiting x1 episode in the ER.     ED course:  Vitals: T 103.8, , /71, RR 18, SpO2 96% on RA  Labs: Neutrophil 90.1%, Lymphocyte 6.3%, Monocyte 1.0%, PT/INR 13.7/1.18, PTT 25.6, Lactate 2.2  UA: positive nitrite, moderate LE, >50 RBC, >50 WBC, many bacteria  CT abd/pelvis: Although the urinary bladder is incompletely distended, there appears to be wall thickening. Correlate with urinalysis for the possibility of cystitis.  CTA chest: No significant interval change in the chest compared to the previous study of April 11, 2016. Redemonstration of emphysema and postoperative changes in the right upper lobe  CXR: official read pending  EKG: NSR vent rate 100 bpm  Given IV NS 1 L bolus x2, Tylenol 650 mg PO x1, Proventil 2 puffs x1, IV Solumedrol 125 mg IVP x1, IV Zithromax and IV Rocephin, continue IV Rocephin   (10 Oct 2020 23:41)      ---  HOSPITAL COURSE:   Patient was admitted for septic shock 2/2 to prostatitis. He was started on rocephin then switched to meropenem for history of recent prostate laser procedure with concomitant enlarged prostate on CT A/P scan. ID was consulted Dr. Marc. Was found to be hypotensive in the ED unresponsive to fluid bolus. ICU was consulted and the patient was subsequently transferred to the ICU and started on levophed.   ---  CONSULTANTS:   ID: Dr. Marc   ---  TIME SPENT:  I, the attending physician, was physically present for the key portions of the evaluation and management (E/M) service provided. The total amount of time spent reviewing the hospital notes, laboratory values, imaging findings, assessing/counseling the patient, discussing with consultant physicians, social work, nursing staff was -- minutes    ---  Primary care provider was made aware of plan for discharge:      [  ] NO     [  ] YES   FROM ADMISSION H+P:   HPI:  75 year old male with PMHX atrial fibrillation (on Eliquis), asthma, COPD, former smoker, hx of benign lung cancer R lobe surgically removed at [facility]) CAD (s/p 5 stents ~2001), GERD, HLD, HTN presents after shakes and low O2 70s. Patient endorses sudden onset of shaking and shortness of breath earlier today. Patient states he used his nebulizer treatment, however felt no relief. He decided to come to the ED for further evaluation. Denies sick contacts or recent travel. Of note, patient with history of intubation 2019, during admission to [facility] for acute hypercapnic respiratory failure. Patient states he was recently tested for COVID and results were negative. Denies fever, abdominal pain. Admits nausea, vomiting x1 episode in the ER.     ED course:  Vitals: T 103.8, , /71, RR 18, SpO2 96% on RA  Labs: Neutrophil 90.1%, Lymphocyte 6.3%, Monocyte 1.0%, PT/INR 13.7/1.18, PTT 25.6, Lactate 2.2  UA: positive nitrite, moderate LE, >50 RBC, >50 WBC, many bacteria  CT abd/pelvis: Although the urinary bladder is incompletely distended, there appears to be wall thickening. Correlate with urinalysis for the possibility of cystitis.  CTA chest: No significant interval change in the chest compared to the previous study of April 11, 2016. Redemonstration of emphysema and postoperative changes in the right upper lobe  CXR: official read pending  EKG: NSR vent rate 100 bpm  Given IV NS 1 L bolus x2, Tylenol 650 mg PO x1, Proventil 2 puffs x1, IV Solumedrol 125 mg IVP x1, IV Zithromax and IV Rocephin, continue IV Rocephin   (10 Oct 2020 23:41)      ---  HOSPITAL COURSE:   Patient was admitted for septic shock 2/2 to prostatitis. White count trended up likely 2/2 to infection vs. IV steroids. The patient remained afebrile. He was started on rocephin then switched to meropenem for history of recent prostate laser procedure with concomitant enlarged prostate on CT A/P scan. ID was consulted Dr. Marc. Was found to be hypotensive in the ED unresponsive to fluid bolus. ICU was consulted and the patient was subsequently transferred to the ICU and started on levophed and stress dose steroids (solucortef). Home hypertension medication Bystolic was held due to persistent hypotension requiring pressors. The patient was continued on his spiriva, nebulizer treatment for COPD and the patient's shortness of breath improved.     Patient is hemodynamically stable and medically optimized for discharge to _____.   ---  CONSULTANTS:   ID: Dr. Marc   ---  TIME SPENT:  I, the attending physician, was physically present for the key portions of the evaluation and management (E/M) service provided. The total amount of time spent reviewing the hospital notes, laboratory values, imaging findings, assessing/counseling the patient, discussing with consultant physicians, social work, nursing staff was -- minutes    ---  Primary care provider was made aware of plan for discharge:      [  ] NO     [  ] YES   FROM ADMISSION H+P:   HPI:  75 year old male with PMHX atrial fibrillation (on Eliquis), asthma, COPD, former smoker, hx of benign lung cancer R lobe surgically removed at [facility]) CAD (s/p 5 stents ~2001), GERD, HLD, HTN presents after shakes and low O2 70s. Patient endorses sudden onset of shaking and shortness of breath earlier today. Patient states he used his nebulizer treatment, however felt no relief. He decided to come to the ED for further evaluation. Denies sick contacts or recent travel. Of note, patient with history of intubation 2019, during admission to [facility] for acute hypercapnic respiratory failure. Patient states he was recently tested for COVID and results were negative. Denies fever, abdominal pain. Admits nausea, vomiting x1 episode in the ER.     ED course:  Vitals: T 103.8, , /71, RR 18, SpO2 96% on RA  Labs: Neutrophil 90.1%, Lymphocyte 6.3%, Monocyte 1.0%, PT/INR 13.7/1.18, PTT 25.6, Lactate 2.2  UA: positive nitrite, moderate LE, >50 RBC, >50 WBC, many bacteria  CT abd/pelvis: Although the urinary bladder is incompletely distended, there appears to be wall thickening. Correlate with urinalysis for the possibility of cystitis.  CTA chest: No significant interval change in the chest compared to the previous study of April 11, 2016. Redemonstration of emphysema and postoperative changes in the right upper lobe  CXR: official read pending  EKG: NSR vent rate 100 bpm  Given IV NS 1 L bolus x2, Tylenol 650 mg PO x1, Proventil 2 puffs x1, IV Solumedrol 125 mg IVP x1, IV Zithromax and IV Rocephin, continue IV Rocephin   (10 Oct 2020 23:41)      ---  HOSPITAL COURSE:   Patient was admitted for septic shock 2/2 to prostatitis. White count trended up likely 2/2 to infection vs. IV steroids. The patient remained afebrile. He was started on rocephin then switched to meropenem for history of recent prostate laser procedure with concomitant enlarged prostate on CT A/P scan. ID was consulted Dr. Marc. Was found to be hypotensive in the ED unresponsive to fluid bolus. ICU was consulted and the patient was subsequently transferred to the ICU and started on levophed and stress dose steroids (solucortef). He was weaned off levophed and completed a taper of solu cortef and subsequently downgraded to GMF. Home hypertension medication Bystolic was held due to persistent hypotension requiring pressors. The patient was continued on his spiriva, nebulizer treatment for COPD and the patient's shortness of breath improved.     Patient is hemodynamically stable and medically optimized for discharge to _____.   ---  CONSULTANTS:   ID: Dr. Marc   ---  TIME SPENT:  I, the attending physician, was physically present for the key portions of the evaluation and management (E/M) service provided. The total amount of time spent reviewing the hospital notes, laboratory values, imaging findings, assessing/counseling the patient, discussing with consultant physicians, social work, nursing staff was -- minutes    ---  Primary care provider was made aware of plan for discharge:      [  ] NO     [  ] YES   FROM ADMISSION H+P:   HPI:  75 year old male with PMHX atrial fibrillation (on Eliquis), asthma, COPD, former smoker, hx of benign lung cancer R lobe surgically removed at [facility]) CAD (s/p 5 stents ~2001), GERD, HLD, HTN presents after shakes and low O2 70s. Patient endorses sudden onset of shaking and shortness of breath earlier today. Patient states he used his nebulizer treatment, however felt no relief. He decided to come to the ED for further evaluation. Denies sick contacts or recent travel. Of note, patient with history of intubation 2019, during admission to [facility] for acute hypercapnic respiratory failure. Patient states he was recently tested for COVID and results were negative. Denies fever, abdominal pain. Admits nausea, vomiting x1 episode in the ER.     ED course:  Vitals: T 103.8, , /71, RR 18, SpO2 96% on RA  Labs: Neutrophil 90.1%, Lymphocyte 6.3%, Monocyte 1.0%, PT/INR 13.7/1.18, PTT 25.6, Lactate 2.2  UA: positive nitrite, moderate LE, >50 RBC, >50 WBC, many bacteria  CT abd/pelvis: Although the urinary bladder is incompletely distended, there appears to be wall thickening. Correlate with urinalysis for the possibility of cystitis.  CTA chest: No significant interval change in the chest compared to the previous study of April 11, 2016. Redemonstration of emphysema and postoperative changes in the right upper lobe  CXR: official read pending  EKG: NSR vent rate 100 bpm  Given IV NS 1 L bolus x2, Tylenol 650 mg PO x1, Proventil 2 puffs x1, IV Solumedrol 125 mg IVP x1, IV Zithromax and IV Rocephin, continue IV Rocephin   (10 Oct 2020 23:41)      ---  HOSPITAL COURSE:   Patient was admitted for septic shock 2/2 to prostatitis. White count trended up likely 2/2 to infection vs. IV steroids. The patient remained afebrile. He was started on rocephin then switched to meropenem for history of recent prostate laser procedure with concomitant enlarged prostate on CT A/P scan. ID was consulted Dr. Marc. Was found to be hypotensive in the ED unresponsive to fluid bolus. ICU was consulted and the patient was subsequently transferred to the ICU and started on levophed and stress dose steroids (solucortef). He was weaned off levophed and completed a taper of solu cortef and subsequently downgraded to GMF. Blood cultures grew no growth to date. Urine cultures grew ESBL and the patient's was switched to ertapenem. A midline was placed for patient to complete 28 days of ertapenem (11/7).  Home hypertension medication Bystolic was held due to persistent hypotension requiring pressors. The patient was continued on his spiriva, nebulizer treatment for COPD and the patient's shortness of breath improved. PT was consulted and found that the patient did not require skilled PT needs.     Patient is hemodynamically stable and medically optimized for discharge to home with midline to complete 28 day course of ertapenem.   ---  CONSULTANTS:   ID: Dr. Marc   ---  TIME SPENT:  I, the attending physician, was physically present for the key portions of the evaluation and management (E/M) service provided. The total amount of time spent reviewing the hospital notes, laboratory values, imaging findings, assessing/counseling the patient, discussing with consultant physicians, social work, nursing staff was -- minutes    ---  Primary care provider was made aware of plan for discharge:      [  ] NO     [  ] YES   FROM ADMISSION H+P:   HPI:  75 year old male with PMHX atrial fibrillation (on Eliquis), asthma, COPD, former smoker, hx of benign lung cancer R lobe surgically removed at [facility]) CAD (s/p 5 stents ~2001), GERD, HLD, HTN presents after shakes and low O2 70s. Patient endorses sudden onset of shaking and shortness of breath earlier today. Patient states he used his nebulizer treatment, however felt no relief. He decided to come to the ED for further evaluation. Denies sick contacts or recent travel. Of note, patient with history of intubation 2019, during admission to [facility] for acute hypercapnic respiratory failure. Patient states he was recently tested for COVID and results were negative. Denies fever, abdominal pain. Admits nausea, vomiting x1 episode in the ER.     ED course:  Vitals: T 103.8, , /71, RR 18, SpO2 96% on RA  Labs: Neutrophil 90.1%, Lymphocyte 6.3%, Monocyte 1.0%, PT/INR 13.7/1.18, PTT 25.6, Lactate 2.2  UA: positive nitrite, moderate LE, >50 RBC, >50 WBC, many bacteria  CT abd/pelvis: Although the urinary bladder is incompletely distended, there appears to be wall thickening. Correlate with urinalysis for the possibility of cystitis.  CTA chest: No significant interval change in the chest compared to the previous study of April 11, 2016. Redemonstration of emphysema and postoperative changes in the right upper lobe  CXR: official read pending  EKG: NSR vent rate 100 bpm  Given IV NS 1 L bolus x2, Tylenol 650 mg PO x1, Proventil 2 puffs x1, IV Solumedrol 125 mg IVP x1, IV Zithromax and IV Rocephin, continue IV Rocephin   (10 Oct 2020 23:41)      ---  HOSPITAL COURSE:   Patient was admitted for septic shock 2/2 to prostatitis. White count trended up likely 2/2 to infection vs. IV steroids. The patient remained afebrile. He was started on rocephin then switched to meropenem for history of recent prostate laser procedure with concomitant enlarged prostate on CT A/P scan. ID was consulted Dr. Marc. Was found to be hypotensive in the ED unresponsive to fluid bolus. ICU was consulted and the patient was subsequently transferred to the ICU and started on levophed and stress dose steroids (solucortef). He was weaned off levophed and completed a taper of solu cortef and subsequently downgraded to GMF. Blood cultures grew no growth to date. Urine cultures grew ESBL and the patient's was switched to ertapenem. A midline was placed for patient to complete 28 days of ertapenem (11/7).  Home hypertension medication Bystolic was held due to persistent hypotension requiring pressors and restarted once patient normotensive. The patient was continued on his spiriva, nebulizer treatment for COPD and the patient's shortness of breath improved. PT was consulted and found that the patient did not require skilled PT needs.     Patient is hemodynamically stable and medically optimized for discharge to home with midline to complete 28 day course of ertapenem.   ---  CONSULTANTS:   ID: Dr. Marc   ---  TIME SPENT:  I, the attending physician, was physically present for the key portions of the evaluation and management (E/M) service provided. The total amount of time spent reviewing the hospital notes, laboratory values, imaging findings, assessing/counseling the patient, discussing with consultant physicians, social work, nursing staff was -- minutes    ---  Primary care provider was made aware of plan for discharge:      [  ] NO     [  ] YES

## 2020-10-11 NOTE — PROGRESS NOTE ADULT - PROBLEM SELECTOR PLAN 3
Chronic  - EKG: NSR  - Continue home Eliquis 5 mg PO q12h - CT abd/pelvis: Although the urinary bladder is incompletely distended, there appears to be wall thickening. Correlate with urinalysis for the possibility of cystitis.  - Given IV NS 1 L bolus x2, Tylenol 650 mg PO x1, Proventil 2 puffs x1, IV Solumedrol 125 mg IVP x1  - Given IV Zithromax and IV Rocephin,   - Abx: meropenem   - UA: positive nitrite, moderate LE, >50 RBC, >50 WBC, many bacteria  - F/u BCx x2, UCx  - Lactate 2.2 --> 2.1 --> 3.1  - Tylenol 650 mg PO q6h PRN for fever or mild pain  - Continue supplemental oxygen, wean as tolerated  - ID (Dr. Rebolledo) consulted, f/u recs

## 2020-10-11 NOTE — PROGRESS NOTE ADULT - PROBLEM SELECTOR PLAN 6
History of 5 coronary artery stents ~2001  - Continue home Lipitor 40 mg PO qhs Chronic, history of COPD in setting of asthma  - CTA chest: No significant interval change in the chest compared to the previous study of April 11, 2016. Redemonstration of emphysema and postoperative changes in the right upper   - Continue home Symbicort, Spiriva, Proventil

## 2020-10-11 NOTE — PROGRESS NOTE ADULT - ASSESSMENT
75 year old male with PMHx atrial fibrillation (on Eliquis), asthma, COPD, former smoker, hx of benign lung cancer R lobe surgically removed at [facility]) CAD (s/p 5 stents ~2001), GERD, HLD, HTN presents after shakes and low O2 70s admitted for septic shock 2/2 to UTI 75 year old male with PMHx atrial fibrillation (on Eliquis), asthma, COPD, former smoker, hx of benign lung cancer R lobe surgically removed at [facility]) CAD (s/p 5 stents ~2001), GERD, HLD, HTN presents after shakes and low O2 70s transferred to  septic shock 2/2 to UTI

## 2020-10-11 NOTE — CONSULT NOTE ADULT - PROBLEM SELECTOR RECOMMENDATION 9
recent prostate laser procedure.  f/u blood cx   urine cx  in 12/2019 pt grew esbl ecoli in blood  cont meropenem  monitor wbc and temp  bp support per icu

## 2020-10-11 NOTE — ED ADULT NURSE REASSESSMENT NOTE - NS ED NURSE REASSESS COMMENT FT1
Rechecked vital signs after fluid bolus of Normal saline 1 liter and noted BP= 80/50, Dr. Juarez made aware.

## 2020-10-11 NOTE — DISCHARGE NOTE PROVIDER - NSDCACTIVITY_GEN_ALL_CORE
No restrictions No heavy lifting/straining/No restrictions/Walking - Indoors allowed/Bathing allowed/Showering allowed

## 2020-10-11 NOTE — DISCHARGE NOTE PROVIDER - NSDCMRMEDTOKEN_GEN_ALL_CORE_FT
atorvastatin 40 mg oral tablet: 1 tab(s) orally once a day  budesonide 0.5 mg/2 mL inhalation suspension: 2 milliliter(s) inhaled 2 times a day  Bystolic 5 mg oral tablet: 1 tab(s) orally once a day  Eliquis 5 mg oral tablet: 1 tab(s) orally 2 times a day  ipratropium-albuterol 0.5 mg-2.5 mg/3 mLinhalation solution: 3 milliliter(s) inhaled 4 times a day, As Needed  pantoprazole 40 mg oral delayed release tablet: 1 tab(s) orally once a day  predniSONE 5 mg oral tablet: 1 tab(s) orally once a day, As Needed  Proventil 90 mcg/inh inhalation aerosol: 2 puff(s) inhaled 2 times a day, As Needed  Spiriva HandiHaler 18 mcg inhalation capsule: 1 cap(s) inhaled once a day  Symbicort 160 mcg-4.5 mcg/inh inhalation aerosol: 2 puff(s) inhaled 2 times a day  Vitamin B12 500 mcg oral tablet: 1 tab(s) orally once a day  Vitamin D3 1000 intl units (25 mcg) oral tablet: 1 tab(s) orally once a day   atorvastatin 40 mg oral tablet: 1 tab(s) orally once a day  budesonide 0.5 mg/2 mL inhalation suspension: 2 milliliter(s) inhaled 2 times a day  Bystolic 5 mg oral tablet: 1 tab(s) orally once a day  Eliquis 5 mg oral tablet: 1 tab(s) orally 2 times a day  ertapenem 1 g injection: 1 gram(s) intravenously every 24 hours via midline.  Please fax weekly BMP, LFTs and CBC to Dr. Mary Lopez at [phone]  Please remove midline at end of therapy.  ipratropium-albuterol 0.5 mg-2.5 mg/3 mLinhalation solution: 3 milliliter(s) inhaled 4 times a day, As Needed  pantoprazole 40 mg oral delayed release tablet: 1 tab(s) orally once a day  predniSONE 5 mg oral tablet: 1 tab(s) orally once a day, As Needed  Proventil 90 mcg/inh inhalation aerosol: 2 puff(s) inhaled 2 times a day, As Needed  Spiriva HandiHaler 18 mcg inhalation capsule: 1 cap(s) inhaled once a day  Symbicort 160 mcg-4.5 mcg/inh inhalation aerosol: 2 puff(s) inhaled 2 times a day  Vitamin B12 500 mcg oral tablet: 1 tab(s) orally once a day  Vitamin D3 1000 intl units (25 mcg) oral tablet: 1 tab(s) orally once a day   atorvastatin 40 mg oral tablet: 1 tab(s) orally once a day  budesonide 0.5 mg/2 mL inhalation suspension: 2 milliliter(s) inhaled 2 times a day  Bystolic 5 mg oral tablet: 1 tab(s) orally once a day  Eliquis 5 mg oral tablet: 1 tab(s) orally 2 times a day  ertapenem 1 g injection: 1 gram(s) intravenously every 24 hours via midline.  Please fax weekly BMP, LFTs and CBC to Dr. Mary Lopez at [phone]  Please remove midline at end of therapy.  ipratropium-albuterol 0.5 mg-2.5 mg/3 mLinhalation solution: 3 milliliter(s) inhaled 4 times a day, As Needed  pantoprazole 40 mg oral delayed release tablet: 1 tab(s) orally once a day  predniSONE 5 mg oral tablet: 1 tab(s) orally once a day with food  Take 2 tab 10 mg 1 tab daily on 10/16 &amp; 10/17.  Followed by Prednisone 5 mg daily as before  Proventil 90 mcg/inh inhalation aerosol: 2 puff(s) inhaled 2 times a day, As Needed  Spiriva HandiHaler 18 mcg inhalation capsule: 1 cap(s) inhaled once a day  Symbicort 160 mcg-4.5 mcg/inh inhalation aerosol: 2 puff(s) inhaled 2 times a day  Vitamin B12 500 mcg oral tablet: 1 tab(s) orally once a day  Vitamin D3 1000 intl units (25 mcg) oral tablet: 1 tab(s) orally once a day

## 2020-10-11 NOTE — DISCHARGE NOTE PROVIDER - PROVIDER TOKENS
PROVIDER:[[id number]],FOLLOWUP:[2 weeks],ESTABLISHEDPATIENT:[T]],PROVIDER:[TOKEN:[[id number]],FOLLOWUP:[Routine],ESTABLISHEDPATIENT:[T]],PROVIDER:[[id number]],FOLLOWUP:[1 week],ESTABLISHEDPATIENT:[T]]

## 2020-10-11 NOTE — PROGRESS NOTE ADULT - PROBLEM SELECTOR PLAN 5
Chronic, history of COPD in setting of asthma  - CTA chest: No significant interval change in the chest compared to the previous study of April 11, 2016. Redemonstration of emphysema and postoperative changes in the right upper   - Continue home Symbicort, Spiriva, Proventil Chronic  - Continue home Bystolic 5 mg PO qd, patient will bring medication from home Chronic, history of COPD in setting of asthma  -- Currently, saturations above 92% on 3LNC.   - Albuterol/Ipratropium treatments q6 PRN for wheezing  - CTA chest: No significant interval change in the chest compared to the previous study of April 11, 2016. Redemonstration of emphysema and postoperative changes in the right upper   - Continue home Symbicort, Spiriva, Proventil

## 2020-10-11 NOTE — PROGRESS NOTE ADULT - PROBLEM SELECTOR PLAN 1
- Hypotensive s/p 3 L NS bolus, now on IVF 60 cc/hour (78/46)  - ICU consulted:   - Currently, saturations above 92% on 3LNC.  Will escalate supplemental oxygen delivery as needed  - Albuterol/Ipratropium treatments q6 PRN for wheezing  - Admit to ICU 2/2 Septic Shock 2/2 UTI.    - Blood and urine cultures pending - Hypotensive s/p 3 L NS bolus, now on IVF 60 cc/hour (78/46)  - recent prostate laser procedure 10/1. hx of recent admission to ICU 12/2019, for hypercapnic respiratory failure.  - source likely prostatitis   - ICU consulted:   - Currently, saturations above 92% on 3LNC.  Will escalate supplemental oxygen delivery as needed  - Albuterol/Ipratropium treatments q6 PRN for wheezing  - Admit to ICU 2/2 Septic Shock 2/2 UTI.    - Blood and urine cultures pending - Hypotensive s/p 3 L NS bolus, now on IVF 60 cc/hour (78/46)  - recent prostate laser procedure 10/1. hx of admission to ICU 12/2019, for hypercapnic respiratory failure, intubated, found to have resistant e coli prostatitis.  - source likely prostatitis   - ICU consulted:   - Currently, saturations above 92% on 3LNC.  Will escalate supplemental oxygen delivery as needed  - Albuterol/Ipratropium treatments q6 PRN for wheezing  - Admit to ICU 2/2 Septic Shock 2/2 UTI.    - Blood and urine cultures pending  - now on levophed, care as per ICU - Hypotensive s/p 3 L NS bolus, now on IVF 60 cc/hour (78/46)  - source likely prostatitis   - ICU consulted:   - Currently, saturations above 92% on 3LNC.  Will escalate supplemental oxygen delivery as needed  - Albuterol/Ipratropium treatments q6 PRN for wheezing  - Admit to ICU 2/2 Septic Shock 2/2 UTI.    - Blood and urine cultures pending  - now on levophed, care as per ICU sepsis Fever, POA now with   Leukocytosis with Bandemia - Hypotensive s/p 3 L NS bolus, now on IVF 60 cc/hour (78/46)  - source likely  UTI  ? prostatitis with recent  prostate surgery   - ICU consulted: for septic shock  - Admit to ICU 2/2 Septic Shock 2/2 UTI.    - Blood and urine cultures pending  - now on levophed, care as per ICU  -Solu-Cortef 100 mg q 8 hrs-Stress dose steroids

## 2020-10-11 NOTE — ED ADULT NURSE REASSESSMENT NOTE - NS ED NURSE REASSESS COMMENT FT1
Patient c/o shortness of breath with wheezing, states albuterol puff is not helping him, called up Resident Dr. Corona with order to give Douneb. Placed a Hepa filter at patient room.

## 2020-10-11 NOTE — CONSULT NOTE ADULT - ASSESSMENT
75 year old male with a significant PMH of atrial fibrillation (on Eliquis), asthma, COPD, former smoker, hx of benign lung cancer R lobe surgically removed at [facility]) CAD (s/p 5 stents ~2001), GERD, HLD, HTN presented with hypoxia and rigors and admitted for Urosepsis.  MICU consulted for hypotension despite attempts at fluid resuscitation.  Admit to MICU for Septic shock 2/2 UTI.      Septic Shock 2/2 UTI  - s/p 3 L of crystalloid, systolic blood pressure in the 70's - 80's  - Lactate 3.1 from 2.1  - CT scan with emphysematous lung;  bladder wall thickening with possible cystitis  - Urinalysis positive Nitrates and moderate leukocytes  - Patient just finished 3rd liter of crystalloid.  Will reultrasound patient to determine ability to receive more volume  - Defend blood pressure with a goal MAP >65; Will escalate to vasopressors if volume resuscitation ineffective  - Ceftriaxone for UTI   - Blood and urine cultures send and pending; COVID PCR and RVP negative  - Monitor fever and WBC  - Currently, saturations above 92% on 3LNC.  Will escalate supplemental oxygen delivery as needed  - Albuterol/Ipratropium treatments q6 PRN for wheezing  - Admit to ICU 2/2 Septic Shock 2/2 UTI.    - Case discussed and plan formulated with ICU attending Dr. Urena        CRITICAL CARE TIME SPENT: 30 min  (Assessing presenting problems of acute illness, which pose high probability of life threatening deterioration or end organ damage/dysfunction, as well as medical decision making including initiating plan of care, reviewing data, reviewing radiologic exams, discussing with multidisciplinary team,  discussing goals of care with patient/family, and writing this note.  Non-inclusive of procedures performed)     75 year old male with a significant PMH of atrial fibrillation (on Eliquis), asthma, COPD, former smoker, hx of benign lung cancer R lobe surgically removed at [facility]) CAD (s/p 5 stents ~2001), GERD, HLD, HTN presented with hypoxia and rigors and admitted for Urosepsis.  MICU consulted for hypotension despite attempts at fluid resuscitation.  Admit to MICU for Septic shock 2/2 UTI.      Septic Shock 2/2 UTI likely 2/2 recent instrumentation from laser ablation of his prostate  - s/p 3 L of crystalloid, systolic blood pressure in the 70's - 80's  - Lactate 3.1 from 2.1  - CT scan with emphysematous lung;  bladder wall thickening with possible cystitis  - Urinalysis positive Nitrates and moderate leukocytes  - Patient just finished 3rd liter of crystalloid.  Will reultrasound patient to determine ability to receive more volume  - Defend blood pressure with a goal MAP >65; Will escalate to vasopressors if volume resuscitation ineffective  - Ceftriaxone for UTI   - Blood and urine cultures send and pending; COVID PCR and RVP negative  - Monitor fever and WBC  - Currently, saturations above 92% on 3LNC.  Will escalate supplemental oxygen delivery as needed  - Albuterol/Ipratropium treatments q6 PRN for wheezing  - Admit to ICU 2/2 Septic Shock 2/2 UTI.    - Case discussed and plan formulated with ICU attending Dr. Urena        CRITICAL CARE TIME SPENT: 30 min  (Assessing presenting problems of acute illness, which pose high probability of life threatening deterioration or end organ damage/dysfunction, as well as medical decision making including initiating plan of care, reviewing data, reviewing radiologic exams, discussing with multidisciplinary team,  discussing goals of care with patient/family, and writing this note.  Non-inclusive of procedures performed)     75 year old male with a significant PMH of atrial fibrillation (on Eliquis), asthma, COPD, former smoker, hx of benign lung cancer R lobe surgically removed at [facility]) CAD (s/p 5 stents ~2001), GERD, HLD, HTN presented with hypoxia and rigors and admitted for Urosepsis.  MICU consulted for hypotension despite attempts at fluid resuscitation.  Admit to MICU for Septic shock 2/2 UTI.      Septic Shock 2/2 UTI likely 2/2 recent instrumentation from laser ablation of his prostate  - s/p 3 L of crystalloid, systolic blood pressure in the 70's - 80's  - Lactate 3.1 from 2.1  - CT scan with emphysematous lung;  bladder wall thickening with possible cystitis  - Urinalysis positive Nitrates and moderate leukocytes  - Patient just finished 3rd liter of crystalloid.  Will reultrasound patient to determine ability to receive more volume  - Defend blood pressure with a goal MAP >65; Will escalate to vasopressors if volume resuscitation ineffective  - Meropenem given history of UTI/Bacteremia last year  - Blood and urine cultures send and pending; COVID PCR and RVP negative  - Monitor fever and WBC  - Currently, saturations above 92% on 3LNC.  Will escalate supplemental oxygen delivery as needed  - Albuterol/Ipratropium treatments q6 PRN for wheezing  - Admit to ICU 2/2 Septic Shock 2/2 UTI.    - Case discussed and plan formulated with ICU attending Dr. Urena        CRITICAL CARE TIME SPENT: 30 min  (Assessing presenting problems of acute illness, which pose high probability of life threatening deterioration or end organ damage/dysfunction, as well as medical decision making including initiating plan of care, reviewing data, reviewing radiologic exams, discussing with multidisciplinary team,  discussing goals of care with patient/family, and writing this note.  Non-inclusive of procedures performed)

## 2020-10-11 NOTE — CONSULT NOTE ADULT - ASSESSMENT
75m with h/o enlarged prostate, htn, cad, copd admitted with chills/rigors  found to have septic shock likely -- gu source  leukocytosis with bandemia, fever, hypotensive 75m with h/o enlarged prostate, htn, cad, copd admitted with chills/rigors  found to have septic shock likely -- gu source/prostate  leukocytosis with bandemia, fever, hypotensive

## 2020-10-11 NOTE — ED ADULT NURSE REASSESSMENT NOTE - NS ED NURSE REASSESS COMMENT FT1
Patient woke up from getting blood drawn by the , vital signs taken and noted BP= 75/40 manually- patient is asymptomatic, called Resident Doctor Larry and ordered to give fluids Normal saline 1 liter bolus once.

## 2020-10-11 NOTE — PROGRESS NOTE ADULT - SUBJECTIVE AND OBJECTIVE BOX
Patient is a 75y old  Male who presents with a chief complaint of UTI (11 Oct 2020 07:23)      INTERVAL HPI:75 year old male with PMHX atrial fibrillation (on Eliquis), asthma, COPD, former smoker, hx of benign lung cancer R lobe surgically removed at [facility]) CAD (s/p 5 stents ~), GERD, HLD, HTN presents after shakes and low O2 70s. Patient endorses sudden onset of shaking and shortness of breath earlier today. Patient states he used his nebulizer treatment, however felt no relief. He decided to come to the ED for further evaluation. Denies sick contacts or recent travel. Of note, patient with history of intubation , during admission to [facility] for acute hypercapnic respiratory failure. Patient states he was recently tested for COVID and results were negative. Denies fever, abdominal pain. Admits nausea, vomiting x1 episode in the ER.     ED course:  Vitals: T 103.8, , /71, RR 18, SpO2 96% on RA  Labs: Neutrophil 90.1%, Lymphocyte 6.3%, Monocyte 1.0%, PT/INR 13.7/1.18, PTT 25.6, Lactate 2.2  UA: positive nitrite, moderate LE, >50 RBC, >50 WBC, many bacteria  CT abd/pelvis: Although the urinary bladder is incompletely distended, there appears to be wall thickening. Correlate with urinalysis for the possibility of cystitis.  CTA chest: No significant interval change in the chest compared to the previous study of 2016. Redemonstration of emphysema and postoperative changes in the right upper lobe  CXR: official read pending  EKG: NSR vent rate 100 bpm  Given IV NS 1 L bolus x2, Tylenol 650 mg PO x1, Proventil 2 puffs x1, IV Solumedrol 125 mg IVP x1, IV Zithromax and IV Rocephin, continue IV Rocephin    10/11/2020: Patient seen and examined at bedside in ED. Admitted for septic shock 2/2 to UTI overnight. Feeling well, no acute complaints. On 2.5 L NC 92 % O2 sat well. BP 78/46 s/p 4 L NS bolus and maintenance NS @ 60 cc/hour.   - afebrile, WBC 8 --> 15.42 overnight, lactate 2.2 --> 2.1 --> 3.1   - ICU PA consult noted and reviewed. Patient to be transferred to MICU due to fluid unresponsiveness likely to be require pressors.   - Patient admits to  "laser surgery" for treatment of his BPH on 10/1 and completed a 5 day course of outpatient antibiotic treatment. states he was told he likely has scars and is concerned for infection in prostate. has had pain with urination since then.      OVERNIGHT EVENTS:    Home Medications:  atorvastatin 40 mg oral tablet: 1 tab(s) orally once a day (11 Oct 2020 00:18)  budesonide 0.5 mg/2 mL inhalation suspension: 2 milliliter(s) inhaled 2 times a day (11 Oct 2020 00:18)  Bystolic 5 mg oral tablet: 1 tab(s) orally once a day (11 Oct 2020 00:18)  Eliquis 5 mg oral tablet: 1 tab(s) orally 2 times a day (11 Oct 2020 00:18)  ipratropium-albuterol 0.5 mg-2.5 mg/3 mLinhalation solution: 3 milliliter(s) inhaled 4 times a day, As Needed (11 Oct 2020 00:18)  pantoprazole 40 mg oral delayed release tablet: 1 tab(s) orally once a day (11 Oct 2020 00:18)  predniSONE 5 mg oral tablet: 1 tab(s) orally once a day, As Needed (11 Oct 2020 00:18)  Proventil 90 mcg/inh inhalation aerosol: 2 puff(s) inhaled 2 times a day, As Needed (11 Oct 2020 00:18)  Spiriva HandiHaler 18 mcg inhalation capsule: 1 cap(s) inhaled once a day (11 Oct 2020 00:18)  Symbicort 160 mcg-4.5 mcg/inh inhalation aerosol: 2 puff(s) inhaled 2 times a day (11 Oct 2020 00:18)  Vitamin B12 500 mcg oral tablet: 1 tab(s) orally once a day (11 Oct 2020 00:18)  Vitamin D3 1000 intl units (25 mcg) oral tablet: 1 tab(s) orally once a day (11 Oct 2020 00:18)      MEDICATIONS  (STANDING):  apixaban 5 milliGRAM(s) Oral every 12 hours  atorvastatin 40 milliGRAM(s) Oral at bedtime  budesonide 160 MICROgram(s)/formoterol 4.5 MICROgram(s) Inhaler 2 Puff(s) Inhalation two times a day  chlorhexidine 4% Liquid 1 Application(s) Topical <User Schedule>  cholecalciferol 1000 Unit(s) Oral daily  cyanocobalamin 1000 MICROGram(s) Oral daily  meropenem  IVPB      meropenem  IVPB 1000 milliGRAM(s) IV Intermittent once  meropenem  IVPB 1000 milliGRAM(s) IV Intermittent every 8 hours  norepinephrine Infusion 0.05 MICROgram(s)/kG/Min (7.23 mL/Hr) IV Continuous <Continuous>  pantoprazole    Tablet 40 milliGRAM(s) Oral before breakfast  sodium chloride 0.9%. 1000 milliLiter(s) (60 mL/Hr) IV Continuous <Continuous>  tiotropium 18 MICROgram(s) Capsule 1 Capsule(s) Inhalation daily    MEDICATIONS  (PRN):  acetaminophen   Tablet .. 650 milliGRAM(s) Oral every 6 hours PRN Temp greater or equal to 38C (100.4F), Mild Pain (1 - 3)  ALBUTerol    90 MICROgram(s) HFA Inhaler 2 Puff(s) Inhalation every 6 hours PRN Shortness of Breath and/or Wheezing  albuterol/ipratropium for Nebulization 3 milliLiter(s) Nebulizer every 6 hours PRN Shortness of Breath and/or Wheezing  buDESOnide    Inhalation Suspension 0.5 milliGRAM(s) Inhalation two times a day PRN SOB/wheezing      No Known Allergies      Social History:  Denies use of tobacco, EtOH, recreational drug use  Ambulates: independent  ADLs: independent (10 Oct 2020 23:41)      REVIEW OF SYSTEMS:  CONSTITUTIONAL: No fever, No chills, No fatigue, No myalgia, No Body ache, No Weakness  EYES: No eye pain,  No visual disturbances, No discharge, No Redness  ENMT: No ear pain, No nose bleed, No vertigo; No sinus pain, No throat pain, No Congestion  NECK: No pain, No stiffness  RESPIRATORY: No cough, No wheezing, No hemoptysis, No shortness of breath  CARDIOVASCULAR: No chest pain, No palpitations  GASTROINTESTINAL: No abdominal pain, No epigastric pain. No nausea, No vomiting, No diarrhea, No constipation; [  ] BM  GENITOURINARY: No dysuria, No frequency, No urgency, No hematuria, No incontinence  NEUROLOGICAL: No headaches, No dizziness, No numbness, No tingling, No tremors, No weakness  EXTREMITIES: No Swelling, No Pain, No Edema  SKIN: [ x ] No itching, burning, rashes, or lesions   MUSCULOSKELETAL: No joint pain, No joint swelling; No muscle pain, No back pain, No extremity pain  PSYCHIATRIC: No depression, No anxiety, No mood swings, No difficulty sleeping at night  PAIN SCALE: [  ] None  [  ] Other-  ROS Unable to obtain due to: [  ] Dementia  [  ] Lethargy  [  ] Sedated  [  ] Non verbal  REST OF REVIEW OF SYSTEMS: [  ] Normal     Vital Signs Last 24 Hrs  T(C): 37.3 (11 Oct 2020 06:16), Max: 39.9 (10 Oct 2020 22:00)  T(F): 99.2 (11 Oct 2020 06:16), Max: 103.8 (10 Oct 2020 22:00)  HR: 82 (11 Oct 2020 08:17) (74 - 109)  BP: 81/47 (11 Oct 2020 08:17) (75/40 - 123/71)  BP(mean): --  RR: 18 (11 Oct 2020 08:17) (16 - 21)  SpO2: 96% (11 Oct 2020 08:17) (94% - 100%)    CAPILLARY BLOOD GLUCOSE          I&O's Summary    PHYSICAL EXAM:  GENERAL:  [ x ] NAD, [ x ] Well appearing, [  ] Agitated, [  ] Drowsy, [  ] Lethargy, [  ] Confused   HEAD:  [x  ] Normal, [  ] Other  EYES:  [ x ] EOMI, [ x ] PERRLA, [ x ] Conjunctiva and sclera clear normal, [  ] Other, [  ] Pallor, [  ] Discharge  ENMT:  [ x ] Normal, [ x ] dry mucous membranes, [ x ] Good dentition, [x  ] No thrush  NECK:  [ x ] Supple, [x  ] No JVD, [ x ] Normal thyroid, [x  ] Lymphadenopathy, [  ] Other  CHEST/LUNG:  [  ] Clear to auscultation bilaterally, [  ] Breath Sounds equal B/L / decreased, [  ] Poor effort, [ x ] No rales, [ x ] No rhonchi, [ x ] wheezing noted in the middle lobes bilaterally   HEART:  [ x ] Regular rate and rhythm, [  ] Tachycardia, [  ] Bradycardia, [  ] Irregular, [ x ] No murmurs, No rubs, No gallops, [  ] PPM in place (Mfr:  )  ABDOMEN:  [ x ] Soft, [ x ] Nontender, [ x ] Nondistended, [ x ] No mass, [  ] Bowel sounds present, [  ] Obese  NERVOUS SYSTEM:  [ x ] Alert & Oriented x3, [  ] Nonfocal, [  ] Confusion, [  ] Encephalopathic, [  ] Sedated, [  ] Unable to assess, [  ] Dementia, [  ] Other-  EXTREMITIES:  [ x ] 2+ Peripheral Pulses, No clubbing, No cyanosis,  [  ] Edema B/L lower EXT, [  ] PVD stasis skin changes B/L lower EXT, [  ] Wound  LYMPH:  No lymphadenopathy noted  SKIN:  [x  ] No rashes or lesions, [  ] Pressure ulcers, [  ] Ecchymosis, [  ] Skin tears, [  ] Other    DIET: Diet, DASH/TLC:   Sodium & Cholesterol Restricted (10-10-20 @ 23:52)      LABS:                        12.3   15.42 )-----------( 144      ( 11 Oct 2020 05:54 )             35.5     11 Oct 2020 05:54    145    |  111    |  16     ----------------------------<  130    4.0     |  26     |  1.30     Ca    7.7        11 Oct 2020 05:54    TPro  6.4    /  Alb  3.4    /  TBili  0.3    /  DBili  x      /  AST  24     /  ALT  38     /  AlkPhos  94     10 Oct 2020 22:23    PT/INR - ( 10 Oct 2020 22:23 )   PT: 13.7 sec;   INR: 1.18 ratio         PTT - ( 10 Oct 2020 22:23 )  PTT:25.6 sec  Urinalysis Basic - ( 10 Oct 2020 22:38 )    Color: Yellow / Appearance: Turbid / S.020 / pH: x  Gluc: x / Ketone: Trace  / Bili: Negative / Urobili: Negative   Blood: x / Protein: 100 / Nitrite: Positive   Leuk Esterase: Moderate / RBC: >50 /HPF / WBC >50   Sq Epi: x / Non Sq Epi: Occasional / Bacteria: Many                 Anemia Panel:      Thyroid Panel:                RADIOLOGY & ADDITIONAL TESTS:      HEALTH ISSUES - PROBLEM Dx:  CAD (coronary artery disease)  CAD (coronary artery disease)    COPD (chronic obstructive pulmonary disease)  COPD (chronic obstructive pulmonary disease)    Atrial fibrillation  Atrial fibrillation    Need for prophylactic measure  Need for prophylactic measure    HLD (hyperlipidemia)  HLD (hyperlipidemia)    HTN (hypertension)  HTN (hypertension)    GERD (gastroesophageal reflux disease)  GERD (gastroesophageal reflux disease)    Urinary tract infection without hematuria, site unspecified  Urinary tract infection without hematuria, site unspecified          Consultant(s) Notes Reviewed:  [ x ] YES     Care Discussed with [ x ] Consultants, [ x ] Patient, [  ] Family, [  ] HCP, [  ] , [  ] Social Service, [  ] RN, [  ] Physical Therapy, [  ] Palliative Care Team  DVT PPX: [ x ] Lovenox, [  ] SC Heparin, [  ] Coumadin, [  ] Xarelto, [  ] Eliquis, [  ] Pradaxa, [  ] IV Heparin drip, [  ] SCD, [  ] Ambulation, [  ] Contraindicated 2/2 GI Bleed, [  ] Contraindicated 2/2  Bleed, [  ] Contraindicated 2/2 Brain Bleed  Advanced Directive: [ x ] None, [  ] DNR/DNI Patient is a 75y old  Male who presents with a chief complaint of UTI (11 Oct 2020 07:23)      INTERVAL HPI:75 year old male with PMHX atrial fibrillation (on Eliquis), asthma, COPD, former smoker, hx of benign lung cancer R lobe surgically removed at [facility]) CAD (s/p 5 stents ~), GERD, HLD, HTN presents after shakes and low O2 70s. Patient endorses sudden onset of shaking and shortness of breath earlier today. Patient states he used his nebulizer treatment, however felt no relief. He decided to come to the ED for further evaluation. Denies sick contacts or recent travel. Of note, patient with history of intubation , during admission to [facility] for acute hypercapnic respiratory failure. Patient states he was recently tested for COVID and results were negative. Denies fever, abdominal pain. Admits nausea, vomiting x1 episode in the ER.     ED course:  Vitals: T 103.8, , /71, RR 18, SpO2 96% on RA  Labs: Neutrophil 90.1%, Lymphocyte 6.3%, Monocyte 1.0%, PT/INR 13.7/1.18, PTT 25.6, Lactate 2.2  UA: positive nitrite, moderate LE, >50 RBC, >50 WBC, many bacteria  CT abd/pelvis: Although the urinary bladder is incompletely distended, there appears to be wall thickening. Correlate with urinalysis for the possibility of cystitis.  CTA chest: No significant interval change in the chest compared to the previous study of 2016. Redemonstration of emphysema and postoperative changes in the right upper lobe  CXR: official read pending  EKG: NSR vent rate 100 bpm  Given IV NS 1 L bolus x2, Tylenol 650 mg PO x1, Proventil 2 puffs x1, IV Solumedrol 125 mg IVP x1, IV Zithromax and IV Rocephin, continue IV Rocephin    10/11/2020: Patient seen and examined at bedside in ED. Admitted for septic shock 2/2 to UTI overnight. Feeling well, no acute complaints. On 2.5 L NC 92 % O2 sat well. BP 78/46 s/p 4 L NS bolus and maintenance NS @ 60 cc/hour.   - afebrile, WBC 8 --> 15.42 overnight, lactate 2.2 --> 2.1 --> 3.1   - ICU PA consult noted and reviewed. Patient to be transferred to MICU due to fluid unresponsiveness likely to be require pressors.   - Patient admits to  "laser surgery" for treatment of his BPH on 10/1 and completed a 5 day course of outpatient antibiotic treatment. states he was told he likely has scars and is concerned for infection in prostate. has had pain with urination since then.      OVERNIGHT EVENTS:    Home Medications:  atorvastatin 40 mg oral tablet: 1 tab(s) orally once a day (11 Oct 2020 00:18)  budesonide 0.5 mg/2 mL inhalation suspension: 2 milliliter(s) inhaled 2 times a day (11 Oct 2020 00:18)  Bystolic 5 mg oral tablet: 1 tab(s) orally once a day (11 Oct 2020 00:18)  Eliquis 5 mg oral tablet: 1 tab(s) orally 2 times a day (11 Oct 2020 00:18)  ipratropium-albuterol 0.5 mg-2.5 mg/3 mLinhalation solution: 3 milliliter(s) inhaled 4 times a day, As Needed (11 Oct 2020 00:18)  pantoprazole 40 mg oral delayed release tablet: 1 tab(s) orally once a day (11 Oct 2020 00:18)  predniSONE 5 mg oral tablet: 1 tab(s) orally once a day, As Needed (11 Oct 2020 00:18)  Proventil 90 mcg/inh inhalation aerosol: 2 puff(s) inhaled 2 times a day, As Needed (11 Oct 2020 00:18)  Spiriva HandiHaler 18 mcg inhalation capsule: 1 cap(s) inhaled once a day (11 Oct 2020 00:18)  Symbicort 160 mcg-4.5 mcg/inh inhalation aerosol: 2 puff(s) inhaled 2 times a day (11 Oct 2020 00:18)  Vitamin B12 500 mcg oral tablet: 1 tab(s) orally once a day (11 Oct 2020 00:18)  Vitamin D3 1000 intl units (25 mcg) oral tablet: 1 tab(s) orally once a day (11 Oct 2020 00:18)      MEDICATIONS  (STANDING):  apixaban 5 milliGRAM(s) Oral every 12 hours  atorvastatin 40 milliGRAM(s) Oral at bedtime  budesonide 160 MICROgram(s)/formoterol 4.5 MICROgram(s) Inhaler 2 Puff(s) Inhalation two times a day  chlorhexidine 4% Liquid 1 Application(s) Topical <User Schedule>  cholecalciferol 1000 Unit(s) Oral daily  cyanocobalamin 1000 MICROGram(s) Oral daily  meropenem  IVPB      meropenem  IVPB 1000 milliGRAM(s) IV Intermittent once  meropenem  IVPB 1000 milliGRAM(s) IV Intermittent every 8 hours  norepinephrine Infusion 0.05 MICROgram(s)/kG/Min (7.23 mL/Hr) IV Continuous <Continuous>  pantoprazole    Tablet 40 milliGRAM(s) Oral before breakfast  sodium chloride 0.9%. 1000 milliLiter(s) (60 mL/Hr) IV Continuous <Continuous>  tiotropium 18 MICROgram(s) Capsule 1 Capsule(s) Inhalation daily    MEDICATIONS  (PRN):  acetaminophen   Tablet .. 650 milliGRAM(s) Oral every 6 hours PRN Temp greater or equal to 38C (100.4F), Mild Pain (1 - 3)  ALBUTerol    90 MICROgram(s) HFA Inhaler 2 Puff(s) Inhalation every 6 hours PRN Shortness of Breath and/or Wheezing  albuterol/ipratropium for Nebulization 3 milliLiter(s) Nebulizer every 6 hours PRN Shortness of Breath and/or Wheezing  buDESOnide    Inhalation Suspension 0.5 milliGRAM(s) Inhalation two times a day PRN SOB/wheezing      No Known Allergies      Social History:  Denies use of tobacco, EtOH, recreational drug use  Ambulates: independent  ADLs: independent (10 Oct 2020 23:41)      REVIEW OF SYSTEMS:  CONSTITUTIONAL: No fever, No chills, No fatigue, No myalgia, No Body ache, No Weakness  EYES: No eye pain,  No visual disturbances, No discharge, No Redness  ENMT: No ear pain, No nose bleed, No vertigo; No sinus pain, No throat pain, No Congestion  NECK: No pain, No stiffness  RESPIRATORY: No cough, No wheezing, No hemoptysis, No shortness of breath  CARDIOVASCULAR: No chest pain, No palpitations  GASTROINTESTINAL: No abdominal pain, No epigastric pain. No nausea, No vomiting, No diarrhea, No constipation; [  ] BM  GENITOURINARY: No dysuria, No frequency, No urgency, No hematuria, No incontinence  NEUROLOGICAL: No headaches, No dizziness, No numbness, No tingling, No tremors, No weakness  EXTREMITIES: No Swelling, No Pain, No Edema  SKIN: [ x ] No itching, burning, rashes, or lesions   MUSCULOSKELETAL: No joint pain, No joint swelling; No muscle pain, No back pain, No extremity pain  PSYCHIATRIC: No depression, No anxiety, No mood swings, No difficulty sleeping at night  PAIN SCALE: [ x ] None  [  ] Other-  ROS Unable to obtain due to: [  ] Dementia  [  ] Lethargy  [  ] Sedated  [  ] Non verbal  REST OF REVIEW OF SYSTEMS: [x  ] Normal     Vital Signs Last 24 Hrs  T(C): 37.3 (11 Oct 2020 06:16), Max: 39.9 (10 Oct 2020 22:00)  T(F): 99.2 (11 Oct 2020 06:16), Max: 103.8 (10 Oct 2020 22:00)  HR: 82 (11 Oct 2020 08:17) (74 - 109)  BP: 81/47 (11 Oct 2020 08:17) (75/40 - 123/71)  BP(mean): --  RR: 18 (11 Oct 2020 08:17) (16 - 21)  SpO2: 96% (11 Oct 2020 08:17) (94% - 100%)    CAPILLARY BLOOD GLUCOSE          I&O's Summary    PHYSICAL EXAM:  GENERAL:  [ x ] NAD, [ x ] Well appearing, [  ] Agitated, [  ] Drowsy, [  ] Lethargy, [  ] Confused   HEAD:  [x  ] Normal, [  ] Other  EYES:  [ x ] EOMI, [ x ] PERRLA, [ x ] Conjunctiva and sclera clear normal, [  ] Other, [  ] Pallor, [  ] Discharge  ENMT:  [ x ] Normal, [ x ] dry mucous membranes, [ x ] Good dentition, [x  ] No thrush  NECK:  [ x ] Supple, [x  ] No JVD, [ x ] Normal thyroid, [x  ] Lymphadenopathy, [  ] Other  CHEST/LUNG:  [  ] Clear to auscultation bilaterally, [  ] Breath Sounds equal B/L / decreased, [  ] Poor effort, [ x ] No rales, [ x ] No rhonchi, [ x ] wheezing noted in the middle lobes bilaterally   HEART:  [ x ] Regular rate and rhythm, [  ] Tachycardia, [  ] Bradycardia, [  ] Irregular, [ x ] No murmurs, No rubs, No gallops, [  ] PPM in place (Mfr:  )  ABDOMEN:  [ x ] Soft, [ x ] Nontender, [ x ] Nondistended, [ x ] No mass, [  ] Bowel sounds present, [  ] Obese  NERVOUS SYSTEM:  [ x ] Alert & Oriented x3, [  ] Nonfocal, [  ] Confusion, [  ] Encephalopathic, [  ] Sedated, [  ] Unable to assess, [  ] Dementia, [  ] Other-  EXTREMITIES:  [ x ] 2+ Peripheral Pulses, No clubbing, No cyanosis,  [  ] Edema B/L lower EXT, [  ] PVD stasis skin changes B/L lower EXT, [  ] Wound  LYMPH:  No lymphadenopathy noted  SKIN:  [x  ] No rashes or lesions, [  ] Pressure ulcers, [  ] Ecchymosis, [  ] Skin tears, [  ] Other    DIET: Diet, DASH/TLC:   Sodium & Cholesterol Restricted (10-10-20 @ 23:52)      LABS:                        12.3   15.42 )-----------( 144      ( 11 Oct 2020 05:54 )             35.5     11 Oct 2020 05:54    145    |  111    |  16     ----------------------------<  130    4.0     |  26     |  1.30     Ca    7.7        11 Oct 2020 05:54    TPro  6.4    /  Alb  3.4    /  TBili  0.3    /  DBili  x      /  AST  24     /  ALT  38     /  AlkPhos  94     10 Oct 2020 22:23    PT/INR - ( 10 Oct 2020 22:23 )   PT: 13.7 sec;   INR: 1.18 ratio         PTT - ( 10 Oct 2020 22:23 )  PTT:25.6 sec  Urinalysis Basic - ( 10 Oct 2020 22:38 )    Color: Yellow / Appearance: Turbid / S.020 / pH: x  Gluc: x / Ketone: Trace  / Bili: Negative / Urobili: Negative   Blood: x / Protein: 100 / Nitrite: Positive   Leuk Esterase: Moderate / RBC: >50 /HPF / WBC >50   Sq Epi: x / Non Sq Epi: Occasional / Bacteria: Many                 Anemia Panel:      Thyroid Panel:                RADIOLOGY & ADDITIONAL TESTS:      HEALTH ISSUES - PROBLEM Dx:  CAD (coronary artery disease)  CAD (coronary artery disease)    COPD (chronic obstructive pulmonary disease)  COPD (chronic obstructive pulmonary disease)    Atrial fibrillation  Atrial fibrillation    Need for prophylactic measure  Need for prophylactic measure    HLD (hyperlipidemia)  HLD (hyperlipidemia)    HTN (hypertension)  HTN (hypertension)    GERD (gastroesophageal reflux disease)  GERD (gastroesophageal reflux disease)    Urinary tract infection without hematuria, site unspecified  Urinary tract infection without hematuria, site unspecified          Consultant(s) Notes Reviewed:  [ x ] YES     Care Discussed with [ x ] Consultants, [ x ] Patient, [  ] Family, [  ] HCP, [  ] , [  ] Social Service, [  ] RN, [  ] Physical Therapy, [  ] Palliative Care Team  DVT PPX: [ x ] Lovenox, [  ] SC Heparin, [  ] Coumadin, [  ] Xarelto, [  ] Eliquis, [  ] Pradaxa, [  ] IV Heparin drip, [  ] SCD, [  ] Ambulation, [  ] Contraindicated 2/2 GI Bleed, [  ] Contraindicated 2/2  Bleed, [  ] Contraindicated 2/2 Brain Bleed  Advanced Directive: [ x ] None, [  ] DNR/DNI Patient is a 75y old  Male who presents with a chief complaint of UTI (11 Oct 2020 07:23)      INTERVAL HPI:75 year old male with PMHX atrial fibrillation (on Eliquis), asthma, COPD, former smoker, hx of benign lung cancer R lobe surgically removed at [facility]) CAD (s/p 5 stents ~), GERD, HLD, HTN presents after shakes and low O2 70s. Patient endorses sudden onset of shaking and shortness of breath earlier today. Patient states he used his nebulizer treatment, however felt no relief. He decided to come to the ED for further evaluation. Denies sick contacts or recent travel. Of note, patient with history of intubation , during admission to [facility] for acute hypercapnic respiratory failure. Patient states he was recently tested for COVID and results were negative. Denies fever, abdominal pain. Admits nausea, vomiting x1 episode in the ER.     ED course:  Vitals: T 103.8, , /71, RR 18, SpO2 96% on RA  Labs: Neutrophil 90.1%, Lymphocyte 6.3%, Monocyte 1.0%, PT/INR 13.7/1.18, PTT 25.6, Lactate 2.2  UA: positive nitrite, moderate LE, >50 RBC, >50 WBC, many bacteria  CT abd/pelvis: Although the urinary bladder is incompletely distended, there appears to be wall thickening. Correlate with urinalysis for the possibility of cystitis.  CTA chest: No significant interval change in the chest compared to the previous study of 2016. Redemonstration of emphysema and postoperative changes in the right upper lobe  CXR: official read pending  EKG: NSR vent rate 100 bpm  Given IV NS 1 L bolus x2, Tylenol 650 mg PO x1, Proventil 2 puffs x1, IV Solumedrol 125 mg IVP x1, IV Zithromax and IV Rocephin, continue IV Rocephin    10/11/2020: Patient seen and examined at bedside in ED. Admitted for septic shock 2/2 to UTI overnight. Feeling well, no acute complaints. On 2.5 L NC 92 % O2 sat well. BP 78/46 s/p 4 L NS bolus and maintenance NS @ 60 cc/hour.   - afebrile, WBC 8 --> 15.42 overnight, lactate 2.2 --> 2.1 --> 3.1   - ICU PA consult noted and reviewed. Patient to be transferred to MICU due to fluid unresponsiveness likely to be require pressors.   - Patient admits to  "laser surgery" for treatment of his BPH on 10/1 and completed a 5 day course of outpatient antibiotic treatment. states he was told he likely has scars and is concerned for infection in prostate. has had pain with urination since then.    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 line for completion of ertapenem course     OVERNIGHT EVENTS:    Home Medications:  atorvastatin 40 mg oral tablet: 1 tab(s) orally once a day (11 Oct 2020 00:18)  budesonide 0.5 mg/2 mL inhalation suspension: 2 milliliter(s) inhaled 2 times a day (11 Oct 2020 00:18)  Bystolic 5 mg oral tablet: 1 tab(s) orally once a day (11 Oct 2020 00:18)  Eliquis 5 mg oral tablet: 1 tab(s) orally 2 times a day (11 Oct 2020 00:18)  ipratropium-albuterol 0.5 mg-2.5 mg/3 mLinhalation solution: 3 milliliter(s) inhaled 4 times a day, As Needed (11 Oct 2020 00:18)  pantoprazole 40 mg oral delayed release tablet: 1 tab(s) orally once a day (11 Oct 2020 00:18)  predniSONE 5 mg oral tablet: 1 tab(s) orally once a day, As Needed (11 Oct 2020 00:18)  Proventil 90 mcg/inh inhalation aerosol: 2 puff(s) inhaled 2 times a day, As Needed (11 Oct 2020 00:18)  Spiriva HandiHaler 18 mcg inhalation capsule: 1 cap(s) inhaled once a day (11 Oct 2020 00:18)  Symbicort 160 mcg-4.5 mcg/inh inhalation aerosol: 2 puff(s) inhaled 2 times a day (11 Oct 2020 00:18)  Vitamin B12 500 mcg oral tablet: 1 tab(s) orally once a day (11 Oct 2020 00:18)  Vitamin D3 1000 intl units (25 mcg) oral tablet: 1 tab(s) orally once a day (11 Oct 2020 00:18)      MEDICATIONS  (STANDING):  apixaban 5 milliGRAM(s) Oral every 12 hours  atorvastatin 40 milliGRAM(s) Oral at bedtime  budesonide 160 MICROgram(s)/formoterol 4.5 MICROgram(s) Inhaler 2 Puff(s) Inhalation two times a day  chlorhexidine 4% Liquid 1 Application(s) Topical <User Schedule>  cholecalciferol 1000 Unit(s) Oral daily  cyanocobalamin 1000 MICROGram(s) Oral daily  meropenem  IVPB      meropenem  IVPB 1000 milliGRAM(s) IV Intermittent once  meropenem  IVPB 1000 milliGRAM(s) IV Intermittent every 8 hours  norepinephrine Infusion 0.05 MICROgram(s)/kG/Min (7.23 mL/Hr) IV Continuous <Continuous>  pantoprazole    Tablet 40 milliGRAM(s) Oral before breakfast  sodium chloride 0.9%. 1000 milliLiter(s) (60 mL/Hr) IV Continuous <Continuous>  tiotropium 18 MICROgram(s) Capsule 1 Capsule(s) Inhalation daily    MEDICATIONS  (PRN):  acetaminophen   Tablet .. 650 milliGRAM(s) Oral every 6 hours PRN Temp greater or equal to 38C (100.4F), Mild Pain (1 - 3)  ALBUTerol    90 MICROgram(s) HFA Inhaler 2 Puff(s) Inhalation every 6 hours PRN Shortness of Breath and/or Wheezing  albuterol/ipratropium for Nebulization 3 milliLiter(s) Nebulizer every 6 hours PRN Shortness of Breath and/or Wheezing  buDESOnide    Inhalation Suspension 0.5 milliGRAM(s) Inhalation two times a day PRN SOB/wheezing      No Known Allergies      Social History:  Denies use of tobacco, EtOH, recreational drug use  Ambulates: independent  ADLs: independent (10 Oct 2020 23:41)      REVIEW OF SYSTEMS:  CONSTITUTIONAL: No fever, No chills, No fatigue, No myalgia, No Body ache, No Weakness  EYES: No eye pain,  No visual disturbances, No discharge, No Redness  ENMT: No ear pain, No nose bleed, No vertigo; No sinus pain, No throat pain, No Congestion  NECK: No pain, No stiffness  RESPIRATORY: No cough, No wheezing, No hemoptysis, No shortness of breath  CARDIOVASCULAR: No chest pain, No palpitations  GASTROINTESTINAL: No abdominal pain, No epigastric pain. No nausea, No vomiting, No diarrhea, No constipation; [  ] BM  GENITOURINARY: No dysuria, No frequency, No urgency, No hematuria, No incontinence  NEUROLOGICAL: No headaches, No dizziness, No numbness, No tingling, No tremors, No weakness  EXTREMITIES: No Swelling, No Pain, No Edema  SKIN: [ x ] No itching, burning, rashes, or lesions   MUSCULOSKELETAL: No joint pain, No joint swelling; No muscle pain, No back pain, No extremity pain  PSYCHIATRIC: No depression, No anxiety, No mood swings, No difficulty sleeping at night  PAIN SCALE: [ x ] None  [  ] Other-  ROS Unable to obtain due to: [  ] Dementia  [  ] Lethargy  [  ] Sedated  [  ] Non verbal  REST OF REVIEW OF SYSTEMS: [x  ] Normal     Vital Signs Last 24 Hrs  T(C): 37.3 (11 Oct 2020 06:16), Max: 39.9 (10 Oct 2020 22:00)  T(F): 99.2 (11 Oct 2020 06:16), Max: 103.8 (10 Oct 2020 22:00)  HR: 82 (11 Oct 2020 08:17) (74 - 109)  BP: 81/47 (11 Oct 2020 08:17) (75/40 - 123/71)  BP(mean): --  RR: 18 (11 Oct 2020 08:17) (16 - 21)  SpO2: 96% (11 Oct 2020 08:17) (94% - 100%)    CAPILLARY BLOOD GLUCOSE          I&O's Summary    PHYSICAL EXAM:  GENERAL:  [ x ] NAD, [ x ] Well appearing, [  ] Agitated, [  ] Drowsy, [  ] Lethargy, [  ] Confused   HEAD:  [x  ] Normal, [  ] Other  EYES:  [ x ] EOMI, [ x ] PERRLA, [ x ] Conjunctiva and sclera clear normal, [  ] Other, [  ] Pallor, [  ] Discharge  ENMT:  [ x ] Normal, [ x ] dry mucous membranes, [ x ] Good dentition, [x  ] No thrush  NECK:  [ x ] Supple, [x  ] No JVD, [ x ] Normal thyroid, [x  ] Lymphadenopathy, [  ] Other  CHEST/LUNG:  [  ] Clear to auscultation bilaterally, [  ] Breath Sounds equal B/L / decreased, [  ] Poor effort, [ x ] No rales, [ x ] No rhonchi, [ x ] wheezing noted in the middle lobes bilaterally   HEART:  [ x ] Regular rate and rhythm, [  ] Tachycardia, [  ] Bradycardia, [  ] Irregular, [ x ] No murmurs, No rubs, No gallops, [  ] PPM in place (Mfr:  )  ABDOMEN:  [ x ] Soft, [ x ] Nontender, [ x ] Nondistended, [ x ] No mass, [  ] Bowel sounds present, [  ] Obese  NERVOUS SYSTEM:  [ x ] Alert & Oriented x3, [  ] Nonfocal, [  ] Confusion, [  ] Encephalopathic, [  ] Sedated, [  ] Unable to assess, [  ] Dementia, [  ] Other-  EXTREMITIES:  [ x ] 2+ Peripheral Pulses, No clubbing, No cyanosis,  [  ] Edema B/L lower EXT, [  ] PVD stasis skin changes B/L lower EXT, [  ] Wound  LYMPH:  No lymphadenopathy noted  SKIN:  [x  ] No rashes or lesions, [  ] Pressure ulcers, [  ] Ecchymosis, [  ] Skin tears, [  ] Other    DIET: Diet, DASH/TLC:   Sodium & Cholesterol Restricted (10-10-20 @ 23:52)      LABS:                        12.3   15.42 )-----------( 144      ( 11 Oct 2020 05:54 )             35.5     11 Oct 2020 05:54    145    |  111    |  16     ----------------------------<  130    4.0     |  26     |  1.30     Ca    7.7        11 Oct 2020 05:54    TPro  6.4    /  Alb  3.4    /  TBili  0.3    /  DBili  x      /  AST  24     /  ALT  38     /  AlkPhos  94     10 Oct 2020 22:23    PT/INR - ( 10 Oct 2020 22:23 )   PT: 13.7 sec;   INR: 1.18 ratio         PTT - ( 10 Oct 2020 22:23 )  PTT:25.6 sec  Urinalysis Basic - ( 10 Oct 2020 22:38 )    Color: Yellow / Appearance: Turbid / S.020 / pH: x  Gluc: x / Ketone: Trace  / Bili: Negative / Urobili: Negative   Blood: x / Protein: 100 / Nitrite: Positive   Leuk Esterase: Moderate / RBC: >50 /HPF / WBC >50   Sq Epi: x / Non Sq Epi: Occasional / Bacteria: Many                 Anemia Panel:      Thyroid Panel:                RADIOLOGY & ADDITIONAL TESTS:      HEALTH ISSUES - PROBLEM Dx:  CAD (coronary artery disease)  CAD (coronary artery disease)    COPD (chronic obstructive pulmonary disease)  COPD (chronic obstructive pulmonary disease)    Atrial fibrillation  Atrial fibrillation    Need for prophylactic measure  Need for prophylactic measure    HLD (hyperlipidemia)  HLD (hyperlipidemia)    HTN (hypertension)  HTN (hypertension)    GERD (gastroesophageal reflux disease)  GERD (gastroesophageal reflux disease)    Urinary tract infection without hematuria, site unspecified  Urinary tract infection without hematuria, site unspecified          Consultant(s) Notes Reviewed:  [ x ] YES     Care Discussed with [ x ] Consultants, [ x ] Patient, [  ] Family, [  ] HCP, [  ] , [  ] Social Service, [  ] RN, [  ] Physical Therapy, [  ] Palliative Care Team  DVT PPX: [ x ] Lovenox, [  ] SC Heparin, [  ] Coumadin, [  ] Xarelto, [  ] Eliquis, [  ] Pradaxa, [  ] IV Heparin drip, [  ] SCD, [  ] Ambulation, [  ] Contraindicated 2/2 GI Bleed, [  ] Contraindicated 2/2  Bleed, [  ] Contraindicated 2/2 Brain Bleed  Advanced Directive: [ x ] None, [  ] DNR/DNI Patient is a 75y old  Male who presents with a chief complaint of UTI (11 Oct 2020 07:23)      INTERVAL HPI:75 year old male with PMHX atrial fibrillation (on Eliquis), asthma, COPD, former smoker, hx of benign lung cancer R lobe surgically removed at [facility]) CAD (s/p 5 stents ~), GERD, HLD, HTN presents after shakes and low O2 70s. Patient endorses sudden onset of shaking and shortness of breath earlier today. Patient states he used his nebulizer treatment, however felt no relief. He decided to come to the ED for further evaluation. Denies sick contacts or recent travel. Of note, patient with history of intubation , during admission to [facility] for acute hypercapnic respiratory failure. Patient states he was recently tested for COVID and results were negative. Denies fever, abdominal pain. Admits nausea, vomiting x1 episode in the ER.     ED course:  Vitals: T 103.8, , /71, RR 18, SpO2 96% on RA  Labs: Neutrophil 90.1%, Lymphocyte 6.3%, Monocyte 1.0%, PT/INR 13.7/1.18, PTT 25.6, Lactate 2.2  UA: positive nitrite, moderate LE, >50 RBC, >50 WBC, many bacteria  CT abd/pelvis: Although the urinary bladder is incompletely distended, there appears to be wall thickening. Correlate with urinalysis for the possibility of cystitis.  CTA chest: No significant interval change in the chest compared to the previous study of 2016. Redemonstration of emphysema and postoperative changes in the right upper lobe  CXR: official read pending  EKG: NSR vent rate 100 bpm  Given IV NS 1 L bolus x2, Tylenol 650 mg PO x1, Proventil 2 puffs x1, IV Solumedrol 125 mg IVP x1, IV Zithromax and IV Rocephin, continue IV Rocephin    10/11/2020: Patient seen and examined at bedside in ED. Admitted for septic shock 2/2 to UTI overnight. Feeling well, no acute complaints. On 2.5 L NC 92 % O2 sat well. BP 78/46 s/p 4 L NS bolus and maintenance NS @ 60 cc/hour.   - afebrile, WBC 8 --> 15.42 overnight, lactate 2.2 --> 2.1 --> 3.1   - ICU PA consult noted and reviewed. Patient to be transferred to MICU due to fluid unresponsiveness, started on levophed   - Patient admits to  "laser surgery" for treatment of his BPH on 10/1 and completed a 5 day course of outpatient antibiotic treatment. states he was told he likely has scars and is concerned for infection in prostate. has had pain with urination since then.    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 line for completion of ertapenem course     OVERNIGHT EVENTS:    Home Medications:  atorvastatin 40 mg oral tablet: 1 tab(s) orally once a day (11 Oct 2020 00:18)  budesonide 0.5 mg/2 mL inhalation suspension: 2 milliliter(s) inhaled 2 times a day (11 Oct 2020 00:18)  Bystolic 5 mg oral tablet: 1 tab(s) orally once a day (11 Oct 2020 00:18)  Eliquis 5 mg oral tablet: 1 tab(s) orally 2 times a day (11 Oct 2020 00:18)  ipratropium-albuterol 0.5 mg-2.5 mg/3 mLinhalation solution: 3 milliliter(s) inhaled 4 times a day, As Needed (11 Oct 2020 00:18)  pantoprazole 40 mg oral delayed release tablet: 1 tab(s) orally once a day (11 Oct 2020 00:18)  predniSONE 5 mg oral tablet: 1 tab(s) orally once a day, As Needed (11 Oct 2020 00:18)  Proventil 90 mcg/inh inhalation aerosol: 2 puff(s) inhaled 2 times a day, As Needed (11 Oct 2020 00:18)  Spiriva HandiHaler 18 mcg inhalation capsule: 1 cap(s) inhaled once a day (11 Oct 2020 00:18)  Symbicort 160 mcg-4.5 mcg/inh inhalation aerosol: 2 puff(s) inhaled 2 times a day (11 Oct 2020 00:18)  Vitamin B12 500 mcg oral tablet: 1 tab(s) orally once a day (11 Oct 2020 00:18)  Vitamin D3 1000 intl units (25 mcg) oral tablet: 1 tab(s) orally once a day (11 Oct 2020 00:18)      MEDICATIONS  (STANDING):  apixaban 5 milliGRAM(s) Oral every 12 hours  atorvastatin 40 milliGRAM(s) Oral at bedtime  budesonide 160 MICROgram(s)/formoterol 4.5 MICROgram(s) Inhaler 2 Puff(s) Inhalation two times a day  chlorhexidine 4% Liquid 1 Application(s) Topical <User Schedule>  cholecalciferol 1000 Unit(s) Oral daily  cyanocobalamin 1000 MICROGram(s) Oral daily  meropenem  IVPB      meropenem  IVPB 1000 milliGRAM(s) IV Intermittent once  meropenem  IVPB 1000 milliGRAM(s) IV Intermittent every 8 hours  norepinephrine Infusion 0.05 MICROgram(s)/kG/Min (7.23 mL/Hr) IV Continuous <Continuous>  pantoprazole    Tablet 40 milliGRAM(s) Oral before breakfast  sodium chloride 0.9%. 1000 milliLiter(s) (60 mL/Hr) IV Continuous <Continuous>  tiotropium 18 MICROgram(s) Capsule 1 Capsule(s) Inhalation daily    MEDICATIONS  (PRN):  acetaminophen   Tablet .. 650 milliGRAM(s) Oral every 6 hours PRN Temp greater or equal to 38C (100.4F), Mild Pain (1 - 3)  ALBUTerol    90 MICROgram(s) HFA Inhaler 2 Puff(s) Inhalation every 6 hours PRN Shortness of Breath and/or Wheezing  albuterol/ipratropium for Nebulization 3 milliLiter(s) Nebulizer every 6 hours PRN Shortness of Breath and/or Wheezing  buDESOnide    Inhalation Suspension 0.5 milliGRAM(s) Inhalation two times a day PRN SOB/wheezing      No Known Allergies      Social History:  Denies use of tobacco, EtOH, recreational drug use  Ambulates: independent  ADLs: independent (10 Oct 2020 23:41)      REVIEW OF SYSTEMS:  CONSTITUTIONAL: No fever, No chills, No fatigue, No myalgia, No Body ache, No Weakness  EYES: No eye pain,  No visual disturbances, No discharge, No Redness  ENMT: No ear pain, No nose bleed, No vertigo; No sinus pain, No throat pain, No Congestion  NECK: No pain, No stiffness  RESPIRATORY: No cough, No wheezing, No hemoptysis, No shortness of breath  CARDIOVASCULAR: No chest pain, No palpitations  GASTROINTESTINAL: No abdominal pain, No epigastric pain. No nausea, No vomiting, No diarrhea, No constipation; [  ] BM  GENITOURINARY: No dysuria, No frequency, No urgency, No hematuria, No incontinence  NEUROLOGICAL: No headaches, No dizziness, No numbness, No tingling, No tremors, No weakness  EXTREMITIES: No Swelling, No Pain, No Edema  SKIN: [ x ] No itching, burning, rashes, or lesions   MUSCULOSKELETAL: No joint pain, No joint swelling; No muscle pain, No back pain, No extremity pain  PSYCHIATRIC: No depression, No anxiety, No mood swings, No difficulty sleeping at night  PAIN SCALE: [ x ] None  [  ] Other-  ROS Unable to obtain due to: [  ] Dementia  [  ] Lethargy  [  ] Sedated  [  ] Non verbal  REST OF REVIEW OF SYSTEMS: [x  ] Normal     Vital Signs Last 24 Hrs  T(C): 37.3 (11 Oct 2020 06:16), Max: 39.9 (10 Oct 2020 22:00)  T(F): 99.2 (11 Oct 2020 06:16), Max: 103.8 (10 Oct 2020 22:00)  HR: 82 (11 Oct 2020 08:17) (74 - 109)  BP: 81/47 (11 Oct 2020 08:17) (75/40 - 123/71)  BP(mean): --  RR: 18 (11 Oct 2020 08:17) (16 - 21)  SpO2: 96% (11 Oct 2020 08:17) (94% - 100%)    CAPILLARY BLOOD GLUCOSE          I&O's Summary    PHYSICAL EXAM:  GENERAL:  [ x ] NAD, [ x ] Well appearing, [  ] Agitated, [  ] Drowsy, [  ] Lethargy, [  ] Confused   HEAD:  [x  ] Normal, [  ] Other  EYES:  [ x ] EOMI, [ x ] PERRLA, [ x ] Conjunctiva and sclera clear normal, [  ] Other, [  ] Pallor, [  ] Discharge  ENMT:  [ x ] Normal, [ x ] dry mucous membranes, [ x ] Good dentition, [x  ] No thrush  NECK:  [ x ] Supple, [x  ] No JVD, [ x ] Normal thyroid, [x  ] Lymphadenopathy, [  ] Other  CHEST/LUNG:  [  ] Clear to auscultation bilaterally, [  ] Breath Sounds equal B/L / decreased, [  ] Poor effort, [ x ] No rales, [ x ] No rhonchi, [ x ] wheezing noted in the middle lobes bilaterally   HEART:  [ x ] Regular rate and rhythm, [  ] Tachycardia, [  ] Bradycardia, [  ] Irregular, [ x ] No murmurs, No rubs, No gallops, [  ] PPM in place (Mfr:  )  ABDOMEN:  [ x ] Soft, [ x ] Nontender, [ x ] Nondistended, [ x ] No mass, [  ] Bowel sounds present, [  ] Obese  NERVOUS SYSTEM:  [ x ] Alert & Oriented x3, [  ] Nonfocal, [  ] Confusion, [  ] Encephalopathic, [  ] Sedated, [  ] Unable to assess, [  ] Dementia, [  ] Other-  EXTREMITIES:  [ x ] 2+ Peripheral Pulses, No clubbing, No cyanosis,  [  ] Edema B/L lower EXT, [  ] PVD stasis skin changes B/L lower EXT, [  ] Wound  LYMPH:  No lymphadenopathy noted  SKIN:  [x  ] No rashes or lesions, [  ] Pressure ulcers, [  ] Ecchymosis, [  ] Skin tears, [  ] Other    DIET: Diet, DASH/TLC:   Sodium & Cholesterol Restricted (10-10-20 @ 23:52)      LABS:                        12.3   15.42 )-----------( 144      ( 11 Oct 2020 05:54 )             35.5     11 Oct 2020 05:54    145    |  111    |  16     ----------------------------<  130    4.0     |  26     |  1.30     Ca    7.7        11 Oct 2020 05:54    TPro  6.4    /  Alb  3.4    /  TBili  0.3    /  DBili  x      /  AST  24     /  ALT  38     /  AlkPhos  94     10 Oct 2020 22:23    PT/INR - ( 10 Oct 2020 22:23 )   PT: 13.7 sec;   INR: 1.18 ratio         PTT - ( 10 Oct 2020 22:23 )  PTT:25.6 sec  Urinalysis Basic - ( 10 Oct 2020 22:38 )    Color: Yellow / Appearance: Turbid / S.020 / pH: x  Gluc: x / Ketone: Trace  / Bili: Negative / Urobili: Negative   Blood: x / Protein: 100 / Nitrite: Positive   Leuk Esterase: Moderate / RBC: >50 /HPF / WBC >50   Sq Epi: x / Non Sq Epi: Occasional / Bacteria: Many                 Anemia Panel:      Thyroid Panel:                RADIOLOGY & ADDITIONAL TESTS:      HEALTH ISSUES - PROBLEM Dx:  CAD (coronary artery disease)  CAD (coronary artery disease)    COPD (chronic obstructive pulmonary disease)  COPD (chronic obstructive pulmonary disease)    Atrial fibrillation  Atrial fibrillation    Need for prophylactic measure  Need for prophylactic measure    HLD (hyperlipidemia)  HLD (hyperlipidemia)    HTN (hypertension)  HTN (hypertension)    GERD (gastroesophageal reflux disease)  GERD (gastroesophageal reflux disease)    Urinary tract infection without hematuria, site unspecified  Urinary tract infection without hematuria, site unspecified          Consultant(s) Notes Reviewed:  [ x ] YES     Care Discussed with [ x ] Consultants, [ x ] Patient, [  ] Family, [  ] HCP, [  ] , [  ] Social Service, [  ] RN, [  ] Physical Therapy, [  ] Palliative Care Team  DVT PPX: [ x ] Lovenox, [  ] SC Heparin, [  ] Coumadin, [  ] Xarelto, [  ] Eliquis, [  ] Pradaxa, [  ] IV Heparin drip, [  ] SCD, [  ] Ambulation, [  ] Contraindicated 2/2 GI Bleed, [  ] Contraindicated 2/2  Bleed, [  ] Contraindicated 2/2 Brain Bleed  Advanced Directive: [ x ] None, [  ] DNR/DNI Patient is a 75y old  Male who presents with a chief complaint of UTI (11 Oct 2020 07:23)      INTERVAL HPI:75 year old male with PMHX atrial fibrillation (on Eliquis), asthma, COPD, former smoker, hx of benign lung cancer R lobe surgically removed at [facility]) CAD (s/p 5 stents ~), GERD, HLD, HTN presents after shakes and low O2 70s. Patient endorses sudden onset of shaking and shortness of breath earlier today. Patient states he used his nebulizer treatment, however felt no relief. He decided to come to the ED for further evaluation. Denies sick contacts or recent travel. Of note, patient with history of intubation , during admission to [facility] for acute hypercapnic respiratory failure. Patient states he was recently tested for COVID and results were negative. Denies fever, abdominal pain. Admits nausea, vomiting x1 episode in the ER.     ED course:  Vitals: T 103.8, , /71, RR 18, SpO2 96% on RA  Labs: Neutrophil 90.1%, Lymphocyte 6.3%, Monocyte 1.0%, PT/INR 13.7/1.18, PTT 25.6, Lactate 2.2  UA: positive nitrite, moderate LE, >50 RBC, >50 WBC, many bacteria  CT abd/pelvis: Although the urinary bladder is incompletely distended, there appears to be wall thickening. Correlate with urinalysis for the possibility of cystitis.  CTA chest: No significant interval change in the chest compared to the previous study of 2016. Redemonstration of emphysema and postoperative changes in the right upper lobe  CXR: official read pending  EKG: NSR vent rate 100 bpm  Given IV NS 1 L bolus x2, Tylenol 650 mg PO x1, Proventil 2 puffs x1, IV Solumedrol 125 mg IVP x1, IV Zithromax and IV Rocephin, continue IV Rocephin    10/11/2020: Patient seen and examined at bedside in ED. Admitted for septic shock 2/2 to UTI overnight. Feeling well, no acute complaints. On 2.5 L NC 92 % O2 sat well. BP 78/46 s/p 4 L NS bolus and maintenance NS @ 60 cc/hour.   - afebrile, WBC 8 --> 15.42 overnight, lactate 2.2 --> 2.1 --> 3.1   - ICU PA consult noted and reviewed. Patient to be transferred to MICU due to fluid unresponsiveness, started on levophed   - Patient admits to  "laser surgery" for treatment of his BPH on 10/1 and completed a 5 day course of outpatient antibiotic treatment. states he was told he likely has scars and is concerned for infection in prostate. has had pain with urination since then.    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 given additional NS bolus, unresponsive, transferred to ICU on levophed.     Home Medications:  atorvastatin 40 mg oral tablet: 1 tab(s) orally once a day (11 Oct 2020 00:)  budesonide 0.5 mg/2 mL inhalation suspension: 2 milliliter(s) inhaled 2 times a day (11 Oct 2020 00:18)  Bystolic 5 mg oral tablet: 1 tab(s) orally once a day (11 Oct 2020 00:18)  Eliquis 5 mg oral tablet: 1 tab(s) orally 2 times a day (11 Oct 2020 00:18)  ipratropium-albuterol 0.5 mg-2.5 mg/3 mLinhalation solution: 3 milliliter(s) inhaled 4 times a day, As Needed (11 Oct 2020 00:18)  pantoprazole 40 mg oral delayed release tablet: 1 tab(s) orally once a day (11 Oct 2020 00:)  predniSONE 5 mg oral tablet: 1 tab(s) orally once a day, As Needed (11 Oct 2020 00:18)  Proventil 90 mcg/inh inhalation aerosol: 2 puff(s) inhaled 2 times a day, As Needed (11 Oct 2020 00:18)  Spiriva HandiHaler 18 mcg inhalation capsule: 1 cap(s) inhaled once a day (11 Oct 2020 00:18)  Symbicort 160 mcg-4.5 mcg/inh inhalation aerosol: 2 puff(s) inhaled 2 times a day (11 Oct 2020 00:18)  Vitamin B12 500 mcg oral tablet: 1 tab(s) orally once a day (11 Oct 2020 00:18)  Vitamin D3 1000 intl units (25 mcg) oral tablet: 1 tab(s) orally once a day (11 Oct 2020 00:18)      MEDICATIONS  (STANDING):  apixaban 5 milliGRAM(s) Oral every 12 hours  atorvastatin 40 milliGRAM(s) Oral at bedtime  budesonide 160 MICROgram(s)/formoterol 4.5 MICROgram(s) Inhaler 2 Puff(s) Inhalation two times a day  chlorhexidine 4% Liquid 1 Application(s) Topical <User Schedule>  cholecalciferol 1000 Unit(s) Oral daily  cyanocobalamin 1000 MICROGram(s) Oral daily  meropenem  IVPB      meropenem  IVPB 1000 milliGRAM(s) IV Intermittent once  meropenem  IVPB 1000 milliGRAM(s) IV Intermittent every 8 hours  norepinephrine Infusion 0.05 MICROgram(s)/kG/Min (7.23 mL/Hr) IV Continuous <Continuous>  pantoprazole    Tablet 40 milliGRAM(s) Oral before breakfast  sodium chloride 0.9%. 1000 milliLiter(s) (60 mL/Hr) IV Continuous <Continuous>  tiotropium 18 MICROgram(s) Capsule 1 Capsule(s) Inhalation daily    MEDICATIONS  (PRN):  acetaminophen   Tablet .. 650 milliGRAM(s) Oral every 6 hours PRN Temp greater or equal to 38C (100.4F), Mild Pain (1 - 3)  ALBUTerol    90 MICROgram(s) HFA Inhaler 2 Puff(s) Inhalation every 6 hours PRN Shortness of Breath and/or Wheezing  albuterol/ipratropium for Nebulization 3 milliLiter(s) Nebulizer every 6 hours PRN Shortness of Breath and/or Wheezing  buDESOnide    Inhalation Suspension 0.5 milliGRAM(s) Inhalation two times a day PRN SOB/wheezing      No Known Allergies      Social History:  Denies use of tobacco, EtOH, recreational drug use  Ambulates: independent  ADLs: independent (10 Oct 2020 23:41)      REVIEW OF SYSTEMS:  CONSTITUTIONAL: No fever, No chills, No fatigue, No myalgia, No Body ache, No Weakness  EYES: No eye pain,  No visual disturbances, No discharge, No Redness  ENMT: No ear pain, No nose bleed, No vertigo; No sinus pain, No throat pain, No Congestion  NECK: No pain, No stiffness  RESPIRATORY: No cough, No wheezing, No hemoptysis, No shortness of breath  CARDIOVASCULAR: No chest pain, No palpitations  GASTROINTESTINAL: No abdominal pain, No epigastric pain. No nausea, No vomiting, No diarrhea, No constipation; [  ] BM  GENITOURINARY: No dysuria, No frequency, No urgency, No hematuria, No incontinence  NEUROLOGICAL: No headaches, No dizziness, No numbness, No tingling, No tremors, No weakness  EXTREMITIES: No Swelling, No Pain, No Edema  SKIN: [ x ] No itching, burning, rashes, or lesions   MUSCULOSKELETAL: No joint pain, No joint swelling; No muscle pain, No back pain, No extremity pain  PSYCHIATRIC: No depression, No anxiety, No mood swings, No difficulty sleeping at night  PAIN SCALE: [ x ] None  [  ] Other-  ROS Unable to obtain due to: [  ] Dementia  [  ] Lethargy  [  ] Sedated  [  ] Non verbal  REST OF REVIEW OF SYSTEMS: [x  ] Normal     Vital Signs Last 24 Hrs  T(C): 37.3 (11 Oct 2020 06:16), Max: 39.9 (10 Oct 2020 22:00)  T(F): 99.2 (11 Oct 2020 06:16), Max: 103.8 (10 Oct 2020 22:00)  HR: 82 (11 Oct 2020 08:17) (74 - 109)  BP: 81/47 (11 Oct 2020 08:17) (75/40 - 123/71)  BP(mean): --  RR: 18 (11 Oct 2020 08:17) (16 - 21)  SpO2: 96% (11 Oct 2020 08:17) (94% - 100%)    CAPILLARY BLOOD GLUCOSE          I&O's Summary    PHYSICAL EXAM:  GENERAL:  [ x ] NAD, [ x ] Well appearing, [  ] Agitated, [  ] Drowsy, [  ] Lethargy, [  ] Confused   HEAD:  [x  ] Normal, [  ] Other  EYES:  [ x ] EOMI, [ x ] PERRLA, [ x ] Conjunctiva and sclera clear normal, [  ] Other, [  ] Pallor, [  ] Discharge  ENMT:  [ x ] Normal, [ x ] dry mucous membranes, [ x ] Good dentition, [x  ] No thrush  NECK:  [ x ] Supple, [x  ] No JVD, [ x ] Normal thyroid, [x  ] Lymphadenopathy, [  ] Other  CHEST/LUNG:  [  ] Clear to auscultation bilaterally, [  ] Breath Sounds equal B/L / decreased, [  ] Poor effort, [ x ] No rales, [ x ] No rhonchi, [ x ] wheezing noted in the middle lobes bilaterally   HEART:  [ x ] Regular rate and rhythm, [  ] Tachycardia, [  ] Bradycardia, [  ] Irregular, [ x ] No murmurs, No rubs, No gallops, [  ] PPM in place (Mfr:  )  ABDOMEN:  [ x ] Soft, [ x ] Nontender, [ x ] Nondistended, [ x ] No mass, [  ] Bowel sounds present, [  ] Obese  NERVOUS SYSTEM:  [ x ] Alert & Oriented x3, [  ] Nonfocal, [  ] Confusion, [  ] Encephalopathic, [  ] Sedated, [  ] Unable to assess, [  ] Dementia, [  ] Other-  EXTREMITIES:  [ x ] 2+ Peripheral Pulses, No clubbing, No cyanosis,  [  ] Edema B/L lower EXT, [  ] PVD stasis skin changes B/L lower EXT, [  ] Wound  LYMPH:  No lymphadenopathy noted  SKIN:  [x  ] No rashes or lesions, [  ] Pressure ulcers, [  ] Ecchymosis, [  ] Skin tears, [  ] Other    DIET: Diet, DASH/TLC:   Sodium & Cholesterol Restricted (10-10-20 @ 23:52)      LABS:                        12.3   15.42 )-----------( 144      ( 11 Oct 2020 05:54 )             35.5     11 Oct 2020 05:54    145    |  111    |  16     ----------------------------<  130    4.0     |  26     |  1.30     Ca    7.7        11 Oct 2020 05:54    TPro  6.4    /  Alb  3.4    /  TBili  0.3    /  DBili  x      /  AST  24     /  ALT  38     /  AlkPhos  94     10 Oct 2020 22:23    PT/INR - ( 10 Oct 2020 22:23 )   PT: 13.7 sec;   INR: 1.18 ratio         PTT - ( 10 Oct 2020 22:23 )  PTT:25.6 sec  Urinalysis Basic - ( 10 Oct 2020 22:38 )    Color: Yellow / Appearance: Turbid / S.020 / pH: x  Gluc: x / Ketone: Trace  / Bili: Negative / Urobili: Negative   Blood: x / Protein: 100 / Nitrite: Positive   Leuk Esterase: Moderate / RBC: >50 /HPF / WBC >50   Sq Epi: x / Non Sq Epi: Occasional / Bacteria: Many                 Anemia Panel:      Thyroid Panel:                RADIOLOGY & ADDITIONAL TESTS:      HEALTH ISSUES - PROBLEM Dx:  CAD (coronary artery disease)  CAD (coronary artery disease)    COPD (chronic obstructive pulmonary disease)  COPD (chronic obstructive pulmonary disease)    Atrial fibrillation  Atrial fibrillation    Need for prophylactic measure  Need for prophylactic measure    HLD (hyperlipidemia)  HLD (hyperlipidemia)    HTN (hypertension)  HTN (hypertension)    GERD (gastroesophageal reflux disease)  GERD (gastroesophageal reflux disease)    Urinary tract infection without hematuria, site unspecified  Urinary tract infection without hematuria, site unspecified          Consultant(s) Notes Reviewed:  [ x ] YES     Care Discussed with [ x ] Consultants, [ x ] Patient, [  ] Family, [  ] HCP, [  ] , [  ] Social Service, [  ] RN, [  ] Physical Therapy, [  ] Palliative Care Team  DVT PPX: [ x ] Lovenox, [  ] SC Heparin, [  ] Coumadin, [  ] Xarelto, [  ] Eliquis, [  ] Pradaxa, [  ] IV Heparin drip, [  ] SCD, [  ] Ambulation, [  ] Contraindicated 2/2 GI Bleed, [  ] Contraindicated 2/2  Bleed, [  ] Contraindicated 2/2 Brain Bleed  Advanced Directive: [ x ] None, [  ] DNR/DNI Patient is a 75y old  Male who presents with a chief complaint of UTI (11 Oct 2020 07:23)      INTERVAL HPI:75 year old male with PMHX atrial fibrillation (on Eliquis), asthma, COPD, former smoker, hx of benign lung cancer R lobe surgically removed at [facility]) CAD (s/p 5 stents ~), GERD, HLD, HTN presents after shakes and low O2 70s. Patient endorses sudden onset of shaking and shortness of breath earlier today. Patient states he used his nebulizer treatment, however felt no relief. He decided to come to the ED for further evaluation. Denies sick contacts or recent travel. Of note, patient with history of intubation , during admission to [facility] for acute hypercapnic respiratory failure. Patient states he was recently tested for COVID and results were negative. Denies fever, abdominal pain. Admits nausea, vomiting x1 episode in the ER.     ED course:  Vitals: T 103.8, , /71, RR 18, SpO2 96% on RA  Labs: Neutrophil 90.1%, Lymphocyte 6.3%, Monocyte 1.0%, PT/INR 13.7/1.18, PTT 25.6, Lactate 2.2  UA: positive nitrite, moderate LE, >50 RBC, >50 WBC, many bacteria  CT abd/pelvis: Although the urinary bladder is incompletely distended, there appears to be wall thickening. Correlate with urinalysis for the possibility of cystitis.  CTA chest: No significant interval change in the chest compared to the previous study of 2016. Redemonstration of emphysema and postoperative changes in the right upper lobe  CXR: official read pending  EKG: NSR vent rate 100 bpm  Given IV NS 1 L bolus x2, Tylenol 650 mg PO x1, Proventil 2 puffs x1, IV Solumedrol 125 mg IVP x1, IV Zithromax and IV Rocephin, continue IV Meropenem     10/11/2020: Patient seen and examined at bedside in ED. Admitted for UTI, fever. Pt had no complaints of pain. Pt checked on throughout the night.   On 2.5 L NC 92 % O2 sat well. BP 78/46 s/p 4 L NS bolus and maintenance NS @ 60 cc/hour.   - afebrile, WBC 8 --> 15.42 overnight, lactate 2.2 --> 2.1 --> 3.1   - ICU PA consult noted and reviewed. Patient to be transferred to MICU ,  Now pt with septic shock 2/2 , UTIs/p IV Fluid resuscitation,  started on levophed   - Patient admits to  "laser surgery" for treatment of his BPH on 10/1  at [facility] and completed a 5 day course of outpatient antibiotic treatment. states he was told he likely has scars and is concerned for infection in prostate. has had pain with urination since then.    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/ESBL  sent home with a PICC line for completion of ertapenem course , Septic Shock Leukocytosis with Bandemia .    OVERNIGHT EVENTS: Resident called in mornring for hypotension. given additional NS bolus, unresponsive, transferred to ICU on levophed.     Home Medications:  atorvastatin 40 mg oral tablet: 1 tab(s) orally once a day (11 Oct 2020 00:)  budesonide 0.5 mg/2 mL inhalation suspension: 2 milliliter(s) inhaled 2 times a day (11 Oct 2020 00:18)  Bystolic 5 mg oral tablet: 1 tab(s) orally once a day (11 Oct 2020 00:18)  Eliquis 5 mg oral tablet: 1 tab(s) orally 2 times a day (11 Oct 2020 00:18)  ipratropium-albuterol 0.5 mg-2.5 mg/3 mLinhalation solution: 3 milliliter(s) inhaled 4 times a day, As Needed (11 Oct 2020 00:)  pantoprazole 40 mg oral delayed release tablet: 1 tab(s) orally once a day (11 Oct 2020 00:18)  predniSONE 5 mg oral tablet: 1 tab(s) orally once a day, As Needed (11 Oct 2020 00:18)  Proventil 90 mcg/inh inhalation aerosol: 2 puff(s) inhaled 2 times a day, As Needed (11 Oct 2020 00:18)  Spiriva HandiHaler 18 mcg inhalation capsule: 1 cap(s) inhaled once a day (11 Oct 2020 00:18)  Symbicort 160 mcg-4.5 mcg/inh inhalation aerosol: 2 puff(s) inhaled 2 times a day (11 Oct 2020 00:18)  Vitamin B12 500 mcg oral tablet: 1 tab(s) orally once a day (11 Oct 2020 00:18)  Vitamin D3 1000 intl units (25 mcg) oral tablet: 1 tab(s) orally once a day (11 Oct 2020 00:18)      MEDICATIONS  (STANDING):  apixaban 5 milliGRAM(s) Oral every 12 hours  atorvastatin 40 milliGRAM(s) Oral at bedtime  budesonide 160 MICROgram(s)/formoterol 4.5 MICROgram(s) Inhaler 2 Puff(s) Inhalation two times a day  chlorhexidine 4% Liquid 1 Application(s) Topical <User Schedule>  cholecalciferol 1000 Unit(s) Oral daily  cyanocobalamin 1000 MICROGram(s) Oral daily  meropenem  IVPB      meropenem  IVPB 1000 milliGRAM(s) IV Intermittent once  meropenem  IVPB 1000 milliGRAM(s) IV Intermittent every 8 hours  norepinephrine Infusion 0.05 MICROgram(s)/kG/Min (7.23 mL/Hr) IV Continuous <Continuous>  pantoprazole    Tablet 40 milliGRAM(s) Oral before breakfast  sodium chloride 0.9%. 1000 milliLiter(s) (60 mL/Hr) IV Continuous <Continuous>  tiotropium 18 MICROgram(s) Capsule 1 Capsule(s) Inhalation daily    MEDICATIONS  (PRN):  acetaminophen   Tablet .. 650 milliGRAM(s) Oral every 6 hours PRN Temp greater or equal to 38C (100.4F), Mild Pain (1 - 3)  ALBUTerol    90 MICROgram(s) HFA Inhaler 2 Puff(s) Inhalation every 6 hours PRN Shortness of Breath and/or Wheezing  albuterol/ipratropium for Nebulization 3 milliLiter(s) Nebulizer every 6 hours PRN Shortness of Breath and/or Wheezing  buDESOnide    Inhalation Suspension 0.5 milliGRAM(s) Inhalation two times a day PRN SOB/wheezing      No Known Allergies      Social History:  Denies use of tobacco, EtOH, recreational drug use  Ambulates: independent  ADLs: independent (10 Oct 2020 23:41)      REVIEW OF SYSTEMS: i feel fine now  CONSTITUTIONAL: No fever, No chills, No fatigue, No myalgia, No Body ache, No Weakness  EYES: No eye pain,  No visual disturbances, No discharge, No Redness  ENMT: No ear pain, No nose bleed, No vertigo; No sinus pain, No throat pain, No Congestion  NECK: No pain, No stiffness  RESPIRATORY: No cough, No wheezing, No hemoptysis, No shortness of breath  CARDIOVASCULAR: No chest pain, No palpitations  GASTROINTESTINAL: No abdominal pain, No epigastric pain. No nausea, No vomiting, No diarrhea, No constipation; [  ] BM  GENITOURINARY: No dysuria, No frequency, No urgency, No hematuria, No incontinence  NEUROLOGICAL: No headaches, No dizziness, No numbness, No tingling, No tremors, No weakness  EXTREMITIES: No Swelling, No Pain, No Edema  SKIN: [ x ] No itching, burning, rashes, or lesions   MUSCULOSKELETAL: No joint pain, No joint swelling; No muscle pain, No back pain, No extremity pain  PSYCHIATRIC: No depression, No anxiety, No mood swings, No difficulty sleeping at night  PAIN SCALE: [ x ] None  [  ] Other-  ROS Unable to obtain due to: [  ] Dementia  [  ] Lethargy  [  ] Sedated  [  ] Non verbal  REST OF REVIEW OF SYSTEMS: [x  ] Normal     Vital Signs Last 24 Hrs  T(C): 37.3 (11 Oct 2020 06:16), Max: 39.9 (10 Oct 2020 22:00)  T(F): 99.2 (11 Oct 2020 06:16), Max: 103.8 (10 Oct 2020 22:00)  HR: 82 (11 Oct 2020 08:17) (74 - 109)  BP: 81/47 (11 Oct 2020 08:17) (75/40 - 123/71)  BP(mean): --  RR: 18 (11 Oct 2020 08:17) (16 - 21)  SpO2: 96% (11 Oct 2020 08:17) (94% - 100%)    CAPILLARY BLOOD GLUCOSE          I&O's Summary    PHYSICAL EXAM:  GENERAL:  [ x ] NAD, [ x ] Well appearing, [  ] Agitated, [  ] Drowsy, [  ] Lethargy, [  ] Confused   HEAD:  [x  ] Normal, [  ] Other  EYES:  [ x ] EOMI, [ x ] PERRLA, [ x ] Conjunctiva and sclera clear normal, [  ] Other, [  ] Pallor, [  ] Discharge  ENMT:  [ x ] Normal, [ x ] dry mucous membranes, [ x ] Good dentition, [x  ] No thrush  NECK:  [ x ] Supple, [x  ] No JVD, [ x ] Normal thyroid, [x  ] Lymphadenopathy, [  ] Other  CHEST/LUNG:  [  ] Clear to auscultation bilaterally, [  ] Breath Sounds equal B/L / decreased, [  ] Poor effort, [ x ] No rales, [ x ] No rhonchi, [ x ] wheezing noted in the middle lobes bilaterally   HEART:  [ x ] Regular rate and rhythm, [  ] Tachycardia, [  ] Bradycardia, [  ] Irregular, [ x ] No murmurs, No rubs, No gallops, [  ] PPM in place (Mfr:  )  ABDOMEN:  [ x ] Soft, [ x ] Nontender, [ x ] Nondistended, [ x ] No mass, [ x ] Bowel sounds present, [  x] Obese  NERVOUS SYSTEM:  [ x ] Alert & Oriented x3, [ x ] Nonfocal, [  ] Confusion, [  ] Encephalopathic, [  ] Sedated, [  ] Unable to assess, [  ] Dementia, [  ] Other-  EXTREMITIES:  [ x ] 2+ Peripheral Pulses, No clubbing, No cyanosis,  [  ] Edema B/L lower EXT, [  ] PVD stasis skin changes B/L lower EXT, [  ] Wound  LYMPH:  No lymphadenopathy noted  SKIN:  [x  ] No rashes or lesions, [  ] Pressure ulcers, [  ] Ecchymosis, [  ] Skin tears, [  ] Other    DIET: Diet, DASH/TLC:   Sodium & Cholesterol Restricted (10-10-20 @ 23:52)      LABS:                        12.3   15.42 )-----------( 144      ( 11 Oct 2020 05:54 )             35.5     11 Oct 2020 05:54    145    |  111    |  16     ----------------------------<  130    4.0     |  26     |  1.30     Ca    7.7        11 Oct 2020 05:54    TPro  6.4    /  Alb  3.4    /  TBili  0.3    /  DBili  x      /  AST  24     /  ALT  38     /  AlkPhos  94     10 Oct 2020 22:23    PT/INR - ( 10 Oct 2020 22:23 )   PT: 13.7 sec;   INR: 1.18 ratio         PTT - ( 10 Oct 2020 22:23 )  PTT:25.6 sec  Urinalysis Basic - ( 10 Oct 2020 22:38 )    Color: Yellow / Appearance: Turbid / S.020 / pH: x  Gluc: x / Ketone: Trace  / Bili: Negative / Urobili: Negative   Blood: x / Protein: 100 / Nitrite: Positive   Leuk Esterase: Moderate / RBC: >50 /HPF / WBC >50   Sq Epi: x / Non Sq Epi: Occasional / Bacteria: Many    RADIOLOGY & ADDITIONAL TESTS:  NONE      HEALTH ISSUES - PROBLEM Dx:  CAD (coronary artery disease)  CAD (coronary artery disease)    COPD (chronic obstructive pulmonary disease)  COPD (chronic obstructive pulmonary disease)    Atrial fibrillation  Atrial fibrillation    Need for prophylactic measure  Need for prophylactic measure    HLD (hyperlipidemia)  HLD (hyperlipidemia)    HTN (hypertension)  HTN (hypertension)    GERD (gastroesophageal reflux disease)  GERD (gastroesophageal reflux disease)    Urinary tract infection without hematuria, site unspecified  Urinary tract infection without hematuria, site unspecified          Consultant(s) Notes Reviewed:  [ x ] YES     Care Discussed with [ x ] Consultants, [ x ] Patient, [  ] Family, [  ] HCP, [  ] , [  ] Social Service, [ x ] RN, [  ] Physical Therapy, [  ] Palliative Care Team  DVT PPX: [ x ] Lovenox, [  ] SC Heparin, [  ] Coumadin, [  ] Xarelto, [  ] Eliquis, [  ] Pradaxa, [  ] IV Heparin drip, [  ] SCD, [  ] Ambulation, [  ] Contraindicated 2/2 GI Bleed, [  ] Contraindicated 2/2  Bleed, [  ] Contraindicated 2/2 Brain Bleed  Advanced Directive: [ x ] None, [  ] DNR/DNI

## 2020-10-11 NOTE — PROGRESS NOTE ADULT - PROBLEM SELECTOR PLAN 9
IMPROVE VTE Individual Risk Assessment          RISK                                                          Points  [  ] Previous VTE                                                3  [  ] Thrombophilia                                             2  [  ] Lower limb paralysis                                   2        (unable to hold up >15 seconds)    [  ] Current Cancer                                             2         (within 6 months)  [  ] Immobilization > 24 hrs                              1  [  ] ICU/CCU stay > 24 hours                             1  [  ] Age > 60                                                         1    IMPROVE VTE Score: 1    DVT PPx: Continue Eliquis 5 mg PO BID Chronic  - Continue home Lipitor 40 mg PO qhs

## 2020-10-12 LAB
ANION GAP SERPL CALC-SCNC: 5 MMOL/L — SIGNIFICANT CHANGE UP (ref 5–17)
BASOPHILS # BLD AUTO: 0.09 K/UL — SIGNIFICANT CHANGE UP (ref 0–0.2)
BASOPHILS NFR BLD AUTO: 0.3 % — SIGNIFICANT CHANGE UP (ref 0–2)
BUN SERPL-MCNC: 17 MG/DL — SIGNIFICANT CHANGE UP (ref 7–23)
CALCIUM SERPL-MCNC: 7.7 MG/DL — LOW (ref 8.5–10.1)
CHLORIDE SERPL-SCNC: 115 MMOL/L — HIGH (ref 96–108)
CO2 SERPL-SCNC: 27 MMOL/L — SIGNIFICANT CHANGE UP (ref 22–31)
CREAT SERPL-MCNC: 0.9 MG/DL — SIGNIFICANT CHANGE UP (ref 0.5–1.3)
EOSINOPHIL # BLD AUTO: 0 K/UL — SIGNIFICANT CHANGE UP (ref 0–0.5)
EOSINOPHIL NFR BLD AUTO: 0 % — SIGNIFICANT CHANGE UP (ref 0–6)
GLUCOSE SERPL-MCNC: 146 MG/DL — HIGH (ref 70–99)
HCT VFR BLD CALC: 33.9 % — LOW (ref 39–50)
HGB BLD-MCNC: 11.6 G/DL — LOW (ref 13–17)
IMM GRANULOCYTES NFR BLD AUTO: 7.8 % — HIGH (ref 0–1.5)
LACTATE SERPL-SCNC: 1.6 MMOL/L — SIGNIFICANT CHANGE UP (ref 0.7–2)
LYMPHOCYTES # BLD AUTO: 0.64 K/UL — LOW (ref 1–3.3)
LYMPHOCYTES # BLD AUTO: 1.9 % — LOW (ref 13–44)
MCHC RBC-ENTMCNC: 32 PG — SIGNIFICANT CHANGE UP (ref 27–34)
MCHC RBC-ENTMCNC: 34.2 GM/DL — SIGNIFICANT CHANGE UP (ref 32–36)
MCV RBC AUTO: 93.6 FL — SIGNIFICANT CHANGE UP (ref 80–100)
MONOCYTES # BLD AUTO: 1.44 K/UL — HIGH (ref 0–0.9)
MONOCYTES NFR BLD AUTO: 4.2 % — SIGNIFICANT CHANGE UP (ref 2–14)
NEUTROPHILS # BLD AUTO: 29.62 K/UL — HIGH (ref 1.8–7.4)
NEUTROPHILS NFR BLD AUTO: 85.8 % — HIGH (ref 43–77)
NRBC # BLD: 0 /100 WBCS — SIGNIFICANT CHANGE UP (ref 0–0)
PLATELET # BLD AUTO: 125 K/UL — LOW (ref 150–400)
POTASSIUM SERPL-MCNC: 4.2 MMOL/L — SIGNIFICANT CHANGE UP (ref 3.5–5.3)
POTASSIUM SERPL-SCNC: 4.2 MMOL/L — SIGNIFICANT CHANGE UP (ref 3.5–5.3)
RBC # BLD: 3.62 M/UL — LOW (ref 4.2–5.8)
RBC # FLD: 14.6 % — HIGH (ref 10.3–14.5)
SARS-COV-2 IGG SERPL QL IA: POSITIVE
SARS-COV-2 IGM SERPL IA-ACNC: 80.5 INDEX — HIGH
SODIUM SERPL-SCNC: 147 MMOL/L — HIGH (ref 135–145)
WBC # BLD: 34.49 K/UL — HIGH (ref 3.8–10.5)
WBC # FLD AUTO: 34.49 K/UL — HIGH (ref 3.8–10.5)

## 2020-10-12 PROCEDURE — 99291 CRITICAL CARE FIRST HOUR: CPT

## 2020-10-12 RX ORDER — BUDESONIDE AND FORMOTEROL FUMARATE DIHYDRATE 160; 4.5 UG/1; UG/1
2 AEROSOL RESPIRATORY (INHALATION)
Refills: 0 | Status: DISCONTINUED | OUTPATIENT
Start: 2020-10-12 | End: 2020-10-15

## 2020-10-12 RX ORDER — IPRATROPIUM/ALBUTEROL SULFATE 18-103MCG
3 AEROSOL WITH ADAPTER (GRAM) INHALATION EVERY 6 HOURS
Refills: 0 | Status: DISCONTINUED | OUTPATIENT
Start: 2020-10-12 | End: 2020-10-12

## 2020-10-12 RX ORDER — IPRATROPIUM/ALBUTEROL SULFATE 18-103MCG
3 AEROSOL WITH ADAPTER (GRAM) INHALATION
Refills: 0 | Status: DISCONTINUED | OUTPATIENT
Start: 2020-10-12 | End: 2020-10-15

## 2020-10-12 RX ORDER — HYDROCORTISONE 20 MG
50 TABLET ORAL THREE TIMES A DAY
Refills: 0 | Status: DISCONTINUED | OUTPATIENT
Start: 2020-10-12 | End: 2020-10-13

## 2020-10-12 RX ORDER — BUDESONIDE, MICRONIZED 100 %
0.5 POWDER (GRAM) MISCELLANEOUS EVERY 12 HOURS
Refills: 0 | Status: DISCONTINUED | OUTPATIENT
Start: 2020-10-12 | End: 2020-10-15

## 2020-10-12 RX ORDER — ZALEPLON 10 MG
5 CAPSULE ORAL AT BEDTIME
Refills: 0 | Status: DISCONTINUED | OUTPATIENT
Start: 2020-10-12 | End: 2020-10-15

## 2020-10-12 RX ADMIN — MEROPENEM 100 MILLIGRAM(S): 1 INJECTION INTRAVENOUS at 06:40

## 2020-10-12 RX ADMIN — TIOTROPIUM BROMIDE 1 CAPSULE(S): 18 CAPSULE ORAL; RESPIRATORY (INHALATION) at 06:44

## 2020-10-12 RX ADMIN — Medication 100 MILLIGRAM(S): at 06:38

## 2020-10-12 RX ADMIN — APIXABAN 5 MILLIGRAM(S): 2.5 TABLET, FILM COATED ORAL at 18:22

## 2020-10-12 RX ADMIN — Medication 5 MILLIGRAM(S): at 22:15

## 2020-10-12 RX ADMIN — ATORVASTATIN CALCIUM 40 MILLIGRAM(S): 80 TABLET, FILM COATED ORAL at 21:29

## 2020-10-12 RX ADMIN — Medication 1000 UNIT(S): at 21:29

## 2020-10-12 RX ADMIN — PANTOPRAZOLE SODIUM 40 MILLIGRAM(S): 20 TABLET, DELAYED RELEASE ORAL at 06:38

## 2020-10-12 RX ADMIN — MEROPENEM 100 MILLIGRAM(S): 1 INJECTION INTRAVENOUS at 14:54

## 2020-10-12 RX ADMIN — MEROPENEM 100 MILLIGRAM(S): 1 INJECTION INTRAVENOUS at 21:29

## 2020-10-12 RX ADMIN — Medication 100 MILLIGRAM(S): at 14:54

## 2020-10-12 RX ADMIN — Medication 3 MILLILITER(S): at 19:40

## 2020-10-12 RX ADMIN — Medication 3 MILLILITER(S): at 13:38

## 2020-10-12 RX ADMIN — BUDESONIDE AND FORMOTEROL FUMARATE DIHYDRATE 2 PUFF(S): 160; 4.5 AEROSOL RESPIRATORY (INHALATION) at 06:44

## 2020-10-12 RX ADMIN — Medication 0.5 MILLIGRAM(S): at 19:40

## 2020-10-12 RX ADMIN — Medication 50 MILLIGRAM(S): at 21:28

## 2020-10-12 RX ADMIN — Medication 3 MILLILITER(S): at 18:31

## 2020-10-12 RX ADMIN — PREGABALIN 1000 MICROGRAM(S): 225 CAPSULE ORAL at 21:29

## 2020-10-12 RX ADMIN — APIXABAN 5 MILLIGRAM(S): 2.5 TABLET, FILM COATED ORAL at 06:38

## 2020-10-12 RX ADMIN — BUDESONIDE AND FORMOTEROL FUMARATE DIHYDRATE 2 PUFF(S): 160; 4.5 AEROSOL RESPIRATORY (INHALATION) at 19:18

## 2020-10-12 RX ADMIN — Medication 3 MILLILITER(S): at 08:21

## 2020-10-12 RX ADMIN — Medication 650 MILLIGRAM(S): at 03:20

## 2020-10-12 RX ADMIN — Medication 650 MILLIGRAM(S): at 02:50

## 2020-10-12 NOTE — PROGRESS NOTE ADULT - ATTENDING COMMENTS
76 y/o male with hx A.fib on apixaban, CAD, HTN, HLD, asthma/COPD, BPH, recent prostate procedure, with septic shock from UTI/pyelnonephritis.    --mentating well  --septic shock   on tiny dose of levophed, wean off today  continue stress dose steroids for relative adrenal insufficiency  --Afib controlled, continue AC with eliquis  --COPD/asthma, continue symbicort and spiriva, budesonide neb prn and standing duonebs (home regimen)  --tolerating diet  --YUE improved, d/c IVF  --continue empiric alverto for UTI, pyelonephritis  doubt prostatitis given nontender prostate on rectal exam  --plan discussed with pt, critically ill, CC time 50min

## 2020-10-12 NOTE — DIETITIAN INITIAL EVALUATION ADULT. - PROBLEM SELECTOR PLAN 1
- Admit to [facility] UTI, sepsis  Fever with recent  Laser Prostate procedure on 10/1/2020 ? Prostatitis   -S/P IV Fluids boluses given  - CT abd/pelvis: Although the urinary bladder is incompletely distended, there appears to be wall thickening. Correlate with urinalysis for the possibility of cystitis.  - Given IV NS 1 L bolus x2, Tylenol 650 mg PO x1, Proventil 2 puffs x1, IV Solumedrol 125 mg IVP x1  - Given IV Zithromax and IV Rocephin, continue IV Meropenem   - UA: positive nitrite, moderate LE, >50 RBC, >50 WBC, many bacteria  - F/u BCx x2, UCx  - Lactate 2.2, f/u repeat lactate AM  - Tylenol 650 mg PO q6h PRN for fever or mild pain  - Continue supplemental oxygen, wean as tolerated  - ID (Dr. Marc) consulted, f/u recs

## 2020-10-12 NOTE — PROGRESS NOTE ADULT - PROBLEM SELECTOR PLAN 1
sepsis Fever, POA now with   Leukocytosis with Bandemia - Hypotensive s/p 3 L NS bolus, now on IVF 60 cc/hour (78/46)  - source likely  UTI  ? prostatitis with recent  prostate surgery   - Admit to ICU 2/2 Septic Shock 2/2 UTI.    - Blood cx prelim NGTD  - now on levophed and solu cortef (stress dose steroids), care as per ICU  -Solu-Cortef 100 mg q 8 hrs-Stress dose steroids sepsis Fever, POA now with   Leukocytosis with Bandemia - Hypotensive s/p 3 L NS bolus, now on IVF 60 cc/hour (78/46)  - source likely  UTI  ? prostatitis with recent  prostate surgery   - Admited to ICU 2/2 Septic Shock 2/2 UTI.    - White count 34.4 from 15 yesterday likely 2/2 to infection v. initiation of solu cortef.  - Blood cx prelim NGTD  - now on levophed and solu cortef (stress dose steroids), care as per ICU  -Solu-Cortef 100 mg q 8 hrs-Stress dose steroids sepsis Fever, POA now with   Leukocytosis with Bandemia - Hypotensive s/p 3 L NS bolus, now on IVF 60 cc/hour (78/46)  - source likely  UTI  ? prostatitis with recent  prostate surgery   - Admited to ICU 2/2 Septic Shock 2/2 UTI.    - White count 34.4 from 15 yesterday likely 2/2 to infection v. initiation of solu cortef.  - Blood cx prelim NGTD  - now on levophed and solu cortef (stress dose steroids), care as per ICU  -Solu-Cortef 100 mg q 8 hrs-Stress dose steroids  - COVID antibody positive (80.50) sepsis Fever, POA now with   Leukocytosis with Bandemia - Hypotensive s/p 3 L NS bolus, now on IVF 60 cc/hour (78/46)  - source likely  UTI  ? prostatitis with recent  prostate surgery   - Admited to ICU 2/2 Septic Shock 2/2 UTI.    - White count 34.4 from 15 yesterday likely 2/2 to infection v. initiation of solu cortef.  - Blood cx prelim NGTD  - now on levophed and solu cortef (stress dose steroids), care as per ICU  -Solu-Cortef 100 mg q 8 hrs-Stress dose steroids  - COVID antibody positive (80.50)  - CXR (admission): No active parenchymal disease in the chest. sepsis Fever, POA now with   Leukocytosis with Bandemia - Hypotensive s/p 3 L NS bolus, now on IVF 60 cc/hour (78/46)  - source likely  UTI  ? prostatitis with recent  prostate surgery   - Admited to ICU 2/2 Septic Shock 2/2 UTI.    - White count 34.4 from 15 yesterday likely 2/2 to infection &  solu cortef.  - Blood cx prelim NGTD  - now on levophed and solu cortef (stress dose steroids),   -Solu-Cortef 100 mg q 8 hrs--taper as per ICU care   - COVID antibody positive (80.50)  - CXR (admission): No active parenchymal disease in the chest.

## 2020-10-12 NOTE — PROGRESS NOTE ADULT - PROBLEM SELECTOR PLAN 4
Chronic  - continue to hold home Bystolic 5 mg PO qd, as patient is hypotensive on pressors Chronic  - continue to hold home Bystolic 5 mg PO QD and antihypertensives, as patient is hypotensive on pressors

## 2020-10-12 NOTE — DIETITIAN INITIAL EVALUATION ADULT. - OTHER INFO
Pt with good appetite, eating breakfast. States #; wt stable. no pressure injuries. C/o constipation, but declines medical intervention/Rx, sttes able to have BM after coffee. Admit to ICU with septic shock, possible due to UTI. Glu elevated, on solucortef-will follow.

## 2020-10-12 NOTE — PROGRESS NOTE ADULT - PROBLEM SELECTOR PLAN 5
Chronic, history of COPD in setting of asthma  -- Currently, saturations above 94% on 2 L NC.   - Albuterol/Ipratropium treatments q6 PRN for wheezing  - CTA chest: No significant interval change in the chest compared to the previous study of April 11, 2016. Redemonstration of emphysema and postoperative changes in the right upper   - Continue home Symbicort, Spiriva, Proventil Chronic, history of COPD in setting of asthma  -- Currently, saturations above 94% on 2 L NC.   - Albuterol/Ipratropium treatments q6 PRN for wheezing  - CTA chest: No significant interval change in the chest compared to the previous study of April 11, 2016. Redemonstration of emphysema and postoperative changes in the right upper   - Continue home Symbicort, Spiriva, Proventil  - zaleplon prn for insomnia

## 2020-10-12 NOTE — PROGRESS NOTE ADULT - SUBJECTIVE AND OBJECTIVE BOX
[facility], Division of Infectious   [phone]  after hours and weekends [phone]    Name: BASILIO LANDRY  Age: 75y  Gender: Male  MRN: [id number]    Interval History--  Notes reviewed  off pressors  feels better         Allergies    No Known Allergies    Intolerances        Medications--  Antibiotics:  meropenem  IVPB      meropenem  IVPB 1000 milliGRAM(s) IV Intermittent every 8 hours    Immunologic:    Other:  acetaminophen   Tablet .. PRN  albuterol/ipratropium for Nebulization  apixaban  atorvastatin  budesonide 160 MICROgram(s)/formoterol 4.5 MICROgram(s) Inhaler  cholecalciferol  cyanocobalamin  hydrocortisone sodium succinate Injectable  pantoprazole    Tablet  tiotropium 18 MICROgram(s) Capsule  zaleplon PRN      Review of Systems--  A 10-point review of systems was obtained.     Pertinent positives and negatives--  Constitutional: No fevers. No Chills. No Rigors.   Cardiovascular: No chest pain. No palpitations.  Respiratory: No shortness of breath. No cough.  Gastrointestinal: No nausea or vomiting. No diarrhea or constipation.   Psychiatric: no anxiety     Review of systems otherwise negative except as previously noted.    Physical Examination--  Vital Signs: T(F): 97.7 (10-12-20 @ 15:37), Max: 98.6 (10-12-20 @ 07:10)  HR: 67 (10-12-20 @ 15:00)  BP: 101/56 (10-12-20 @ 15:00)  RR: 26 (10-12-20 @ 15:00)  SpO2: 92% (10-12-20 @ 15:00)  Wt(kg): --  General: Nontoxic-appearing Male in no acute distress.  HEENT: AT/NC. . Anictericr.  Neck: Not rigid. No sense of mass.  Nodes: None palpable.  Lungs: Clear bilaterally without rales, ++ wheezing  Heart: Regular rate and rhythm.   Abdomen: Bowel sounds present and normoactive. Soft. Nondistended. Nontender.   Extremities: No cyanosis or clubbing. No edema.   Skin: Warm. Dry. Good turgor. No rash. No vasculitic stigmata.  Psychiatric: Appropriate affect and mood for situation.         Laboratory Studies--  CBC                        11.6   34.49 )-----------( 125      ( 12 Oct 2020 05:32 )             33.9       Chemistries  10-12    147<H>  |  115<H>  |  17  ----------------------------<  146<H>  4.2   |  27  |  0.90    Ca    7.7<L>      12 Oct 2020 05:32    TPro  6.4  /  Alb  3.4  /  TBili  0.3  /  DBili  x   /  AST  24  /  ALT  38  /  AlkPhos  94  10-10      Culture Data    Culture - Blood (collected 11 Oct 2020 00:28)  Source: .Blood Blood-Peripheral  Preliminary Report (12 Oct 2020 01:03):    No growth to date.    Culture - Blood (collected 11 Oct 2020 00:28)  Source: .Blood Blood-Peripheral  Preliminary Report (12 Oct 2020 01:03):    No growth to date.        rine Microscopic-Add On (NC) (10.10.20 @ 22:38)   Epithelial Cells: Occasional   Red Blood Cell - Urine: >50 /HPF   White Blood Cell - Urine: >50   Bacteria: Many

## 2020-10-12 NOTE — CONSULT NOTE ADULT - ASSESSMENT
74M with known HTN, PAF on Eliquis, HLD CAD s/p JESSE to the LAD, LCx and RCA in 2006, COPD and reactive airway disease, recent iron deficiency anemia undergoing GI evaluation presents with UTI and resp failure 74M with known HTN, PAF on Eliquis, HLD CAD s/p JESSE to the LAD, LCx and RCA in 2006, COPD and reactive airway disease, recent iron deficiency anemia undergoing GI evaluation presents with UTI, fevers, chills, hypotension requiring Levophed now on Abx with improving BP and feeling better    PLan  Wean Levophed  Continue with abx per ID  Hold off on BP meds, Bystolic for now  DVT prophylaxis  WIll follow

## 2020-10-12 NOTE — PROGRESS NOTE ADULT - SUBJECTIVE AND OBJECTIVE BOX
Patient is a 75y old  Male who presents with a chief complaint of UTI (12 Oct 2020 08:27)      INTERVAL HPI: 75 year old male with PMHX atrial fibrillation (on Eliquis), asthma, COPD, former smoker, hx of benign lung cancer R lobe surgically removed at [facility]) CAD (s/p 5 stents ~), GERD, HLD, HTN presents after shakes and low O2 70s. Patient endorses sudden onset of shaking and shortness of breath earlier today. Patient states he used his nebulizer treatment, however felt no relief. He decided to come to the ED for further evaluation. Denies sick contacts or recent travel. Of note, patient with history of intubation , during admission to [facility] for acute hypercapnic respiratory failure. Patient states he was recently tested for COVID and results were negative. Denies fever, abdominal pain. Admits nausea, vomiting x1 episode in the ER.     ED course:  Vitals: T 103.8, , /71, RR 18, SpO2 96% on RA  Labs: Neutrophil 90.1%, Lymphocyte 6.3%, Monocyte 1.0%, PT/INR 13.7/1.18, PTT 25.6, Lactate 2.2  UA: positive nitrite, moderate LE, >50 RBC, >50 WBC, many bacteria  CT abd/pelvis: Although the urinary bladder is incompletely distended, there appears to be wall thickening. Correlate with urinalysis for the possibility of cystitis.  CTA chest: No significant interval change in the chest compared to the previous study of 2016. Redemonstration of emphysema and postoperative changes in the right upper lobe  CXR: official read pending  EKG: NSR vent rate 100 bpm  Given IV NS 1 L bolus x2, Tylenol 650 mg PO x1, Proventil 2 puffs x1, IV Solumedrol 125 mg IVP x1, IV Zithromax and IV Rocephin, continue IV Meropenem     10/11/2020: Patient seen and examined at bedside in ED. Admitted for UTI, fever. Pt had no complaints of pain. Pt checked on throughout the night.   On 2.5 L NC 92 % O2 sat well. BP 78/46 s/p 4 L NS bolus and maintenance NS @ 60 cc/hour.   - afebrile, WBC 8 --> 15.42 overnight, lactate 2.2 --> 2.1 --> 3.1   - ICU PA consult noted and reviewed. Patient to be transferred to MICU ,  Now pt with septic shock 2/2 , UTIs/p IV Fluid resuscitation,  started on levophed   - Patient admits to  "laser surgery" for treatment of his BPH on 10/1  at [facility] and completed a 5 day course of outpatient antibiotic treatment. states he was told he likely has scars and is concerned for infection in prostate. has had pain with urination since then.    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 Is anxious and mildly irritable. Says he has not slept in a few days. Melatonin does not help. Overall he does not feel well. Admits to constipation, had no BM since admission. States he is breathing better, had duonebs treatment this morning. admits to cough, unchanged from baseline (has asthma/COPD). no wheezing. Patient urinating adequately, notes he filled 250 cc at a time. Denies any fevers, chills, chest pain, palpitations, abdominal pain, n/v/d dysuria, hematuria.     - ICU: on levophed; now on 2.891 mL/hr, BP in 110s/50s + stress steroids solu -cortef   - on 2 L NC   - Patient reports he had a Green Light Prostatectomy on 10/1.     OVERNIGHT EVENTS: No acute overnight events noted.     Home Medications:  atorvastatin 40 mg oral tablet: 1 tab(s) orally once a day (11 Oct 2020 00:18)  budesonide 0.5 mg/2 mL inhalation suspension: 2 milliliter(s) inhaled 2 times a day (11 Oct 2020 00:18)  Bystolic 5 mg oral tablet: 1 tab(s) orally once a day (11 Oct 2020 00:18)  Eliquis 5 mg oral tablet: 1 tab(s) orally 2 times a day (11 Oct 2020 00:18)  ipratropium-albuterol 0.5 mg-2.5 mg/3 mLinhalation solution: 3 milliliter(s) inhaled 4 times a day, As Needed (11 Oct 2020 00:18)  pantoprazole 40 mg oral delayed release tablet: 1 tab(s) orally once a day (11 Oct 2020 00:18)  predniSONE 5 mg oral tablet: 1 tab(s) orally once a day, As Needed (11 Oct 2020 00:18)  Proventil 90 mcg/inh inhalation aerosol: 2 puff(s) inhaled 2 times a day, As Needed (11 Oct 2020 00:)  Spiriva HandiHaler 18 mcg inhalation capsule: 1 cap(s) inhaled once a day (11 Oct 2020 00:18)  Symbicort 160 mcg-4.5 mcg/inh inhalation aerosol: 2 puff(s) inhaled 2 times a day (11 Oct 2020 00:)  Vitamin B12 500 mcg oral tablet: 1 tab(s) orally once a day (11 Oct 2020 00:18)  Vitamin D3 1000 intl units (25 mcg) oral tablet: 1 tab(s) orally once a day (11 Oct 2020 00:18)      MEDICATIONS  (STANDING):  apixaban 5 milliGRAM(s) Oral every 12 hours  atorvastatin 40 milliGRAM(s) Oral at bedtime  budesonide 160 MICROgram(s)/formoterol 4.5 MICROgram(s) Inhaler 2 Puff(s) Inhalation two times a day  cholecalciferol 1000 Unit(s) Oral at bedtime  cyanocobalamin 1000 MICROGram(s) Oral at bedtime  hydrocortisone sodium succinate Injectable 100 milliGRAM(s) IV Push three times a day  meropenem  IVPB      meropenem  IVPB 1000 milliGRAM(s) IV Intermittent every 8 hours  norepinephrine Infusion 0.05 MICROgram(s)/kG/Min (7.23 mL/Hr) IV Continuous <Continuous>  pantoprazole    Tablet 40 milliGRAM(s) Oral before breakfast  sodium chloride 0.9%. 1000 milliLiter(s) (60 mL/Hr) IV Continuous <Continuous>  tiotropium 18 MICROgram(s) Capsule 1 Capsule(s) Inhalation daily    MEDICATIONS  (PRN):  acetaminophen   Tablet .. 650 milliGRAM(s) Oral every 6 hours PRN Temp greater or equal to 38C (100.4F), Mild Pain (1 - 3)  ALBUTerol    90 MICROgram(s) HFA Inhaler 2 Puff(s) Inhalation every 6 hours PRN Shortness of Breath and/or Wheezing  albuterol/ipratropium for Nebulization 3 milliLiter(s) Nebulizer every 6 hours PRN Shortness of Breath and/or Wheezing  buDESOnide    Inhalation Suspension 0.5 milliGRAM(s) Inhalation two times a day PRN SOB/wheezing  melatonin 3 milliGRAM(s) Oral at bedtime PRN Insomnia      No Known Allergies      Social History:  Denies use of tobacco, EtOH, recreational drug use  Ambulates: independent  ADLs: independent (10 Oct 2020 23:41)      REVIEW OF SYSTEMS:  CONSTITUTIONAL: No fever, No chills, No fatigue, No myalgia, No Body ache, No Weakness  EYES: No eye pain,  No visual disturbances, No discharge, No Redness  ENMT: No ear pain, No nose bleed, No vertigo; No sinus pain, No throat pain, No Congestion  NECK: No pain, No stiffness  RESPIRATORY: ADMITs to cough, No wheezing, No hemoptysis, No shortness of breath  CARDIOVASCULAR: No chest pain, No palpitations  GASTROINTESTINAL: No abdominal pain, No epigastric pain. No nausea, No vomiting, No diarrhea, No constipation; [  ] BM (no BM)   GENITOURINARY: No dysuria, No frequency, No urgency, No hematuria, No incontinence  NEUROLOGICAL: No headaches, No dizziness, No numbness, No tingling, No tremors, No weakness  EXTREMITIES: No Swelling, No Pain, No Edema  SKIN: [ x ] No itching, burning, rashes, or lesions   MUSCULOSKELETAL: No joint pain, No joint swelling; No muscle pain, No back pain, No extremity pain  PSYCHIATRIC: ADMITs to difficulty sleeping at night  PAIN SCALE: [ x ] None  [  ] Other-  ROS Unable to obtain due to: [  ] Dementia  [  ] Lethargy  [  ] Sedated  [  ] Non verbal  REST OF REVIEW OF SYSTEMS: [  ] Normal     Vital Signs Last 24 Hrs  T(C): 37 (12 Oct 2020 07:10), Max: 37 (12 Oct 2020 07:10)  T(F): 98.6 (12 Oct 2020 07:10), Max: 98.6 (12 Oct 2020 07:10)  HR: 60 (12 Oct 2020 08:30) (58 - 85)  BP: 120/58 (12 Oct 2020 08:30) (84/52 - 126/58)  BP(mean): 80 (12 Oct 2020 08:30) (63 - 84)  RR: 33 (12 Oct 2020 08:30) (17 - 52)  SpO2: 97% (12 Oct 2020 08:30) (91% - 98%)    CAPILLARY BLOOD GLUCOSE          I&O's Summary    11 Oct 2020 07:01  -  12 Oct 2020 07:00  --------------------------------------------------------  IN: 2892.4 mL / OUT: 1640 mL / NET: 1252.4 mL      PHYSICAL EXAM:  GENERAL:  [ x ] NAD, [ x ] Well appearing, [ x ] Agitated, [  ] Drowsy, [  ] Lethargy, [  ] Confused   HEAD:  [ x ] Normal, [  ] Other  EYES:  [ x ] EOMI, [ x ] PERRLA, [ x ] Conjunctiva and sclera clear normal, [  ] Other, [  ] Pallor, [  ] Discharge  ENMT:  [ x ] Normal, [ x ] Moist mucous membranes, [ x ] Good dentition, [ x ] No thrush  NECK:  [ x ] Supple, [  x] No JVD, [  ] Normal thyroid, [  ] Lymphadenopathy, [  ] Other  CHEST/LUNG:  [  ] Clear to auscultation bilaterally, [  ] Breath Sounds equal B/L / decreased, [  ] Poor effort, [ x ] No rales, [  ] No rhonchi, [ x ]  wheezing left middle lobe  HEART:  [ x ] Regular rate and rhythm, [  ] Tachycardia, [  ] Bradycardia, [  ] Irregular, [ x ] No murmurs, No rubs, No gallops, [  ] PPM in place (Mfr:  )  ABDOMEN:  [ x ] Soft, [ x ] Nontender, [ x ] Nondistended, [ x ] No mass, [x  ] Bowel sounds present, [  ] Obese  NERVOUS SYSTEM:  [ x ] Alert & Oriented x3, [  ] Nonfocal, [  ] Confusion, [  ] Encephalopathic, [  ] Sedated, [  ] Unable to assess, [  ] Dementia, [  ] Other-  EXTREMITIES:  [x ] 2+ Peripheral Pulses, No clubbing, No cyanosis,  [  ] Edema B/L lower EXT, [  ] PVD stasis skin changes B/L lower EXT, [  ] Wound  LYMPH:  No lymphadenopathy noted  SKIN:  [  ] No rashes or lesions, [  ] Pressure ulcers, [  ] Ecchymosis, [  ] Skin tears, [  ] Other    DIET: Diet, DASH/TLC:   Sodium & Cholesterol Restricted (10-10-20 @ 23:52)      LABS:                        11.6   34.49 )-----------( 125      ( 12 Oct 2020 05:32 )             33.9     12 Oct 2020 05:32    147    |  115    |  17     ----------------------------<  146    4.2     |  27     |  0.90     Ca    7.7        12 Oct 2020 05:32      PT/INR - ( 10 Oct 2020 22:23 )   PT: 13.7 sec;   INR: 1.18 ratio         PTT - ( 10 Oct 2020 22:23 )  PTT:25.6 sec  Urinalysis Basic - ( 10 Oct 2020 22:38 )    Color: Yellow / Appearance: Turbid / S.020 / pH: x  Gluc: x / Ketone: Trace  / Bili: Negative / Urobili: Negative   Blood: x / Protein: 100 / Nitrite: Positive   Leuk Esterase: Moderate / RBC: >50 /HPF / WBC >50   Sq Epi: x / Non Sq Epi: Occasional / Bacteria: Many      Culture Results:   No growth to date. (10-11 @ 00:28)  Culture Results:   No growth to date. (10-11 @ 00:28)    culture blood  -- .Blood Blood-Peripheral 10-11 @ 00:28    culture urine  --  10-11 @ 00:28          Culture - Blood (collected 11 Oct 2020 00:28)  Source: .Blood Blood-Peripheral  Preliminary Report (12 Oct 2020 01:03):    No growth to date.    Culture - Blood (collected 11 Oct 2020 00:28)  Source: .Blood Blood-Peripheral  Preliminary Report (12 Oct 2020 01:03):    No growth to date.       Anemia Panel:      Thyroid Panel:                RADIOLOGY & ADDITIONAL TESTS:      HEALTH ISSUES - PROBLEM Dx:  CAD (coronary artery disease)  CAD (coronary artery disease)    Atrial fibrillation  Atrial fibrillation    Need for prophylactic measure  Need for prophylactic measure    HLD (hyperlipidemia)  HLD (hyperlipidemia)    HTN (hypertension)  HTN (hypertension)    GERD (gastroesophageal reflux disease)  GERD (gastroesophageal reflux disease)    Urinary tract infection without hematuria, site unspecified  Urinary tract infection without hematuria, site unspecified    Prostatitis  Prostatitis    Septic shock  Septic shock    COPD (chronic obstructive pulmonary disease)  COPD (chronic obstructive pulmonary disease)          Consultant(s) Notes Reviewed:  [  ] YES     Care Discussed with [ x ] Consultants, [  ] Patient, [  ] Family, [  ] HCP, [  ] , [  ] Social Service, [  ] RN, [  ] Physical Therapy, [  ] Palliative Care Team  DVT PPX: [  ] Lovenox, [  ] SC Heparin, [  ] Coumadin, [  ] Xarelto, [ x ] Eliquis, [  ] Pradaxa, [  ] IV Heparin drip, [  ] SCD, [  ] Ambulation, [  ] Contraindicated 2/2 GI Bleed, [  ] Contraindicated 2/2  Bleed, [  ] Contraindicated 2/2 Brain Bleed  Advanced Directive: [  ] None, [  ] DNR/DNI Patient is a 75y old  Male who presents with a chief complaint of UTI (12 Oct 2020 08:27)      INTERVAL HPI: 75 year old male with PMHX atrial fibrillation (on Eliquis), asthma, COPD, former smoker, hx of benign lung cancer R lobe surgically removed at [facility]) CAD (s/p 5 stents ~), GERD, HLD, HTN presents after shakes and low O2 70s. Patient endorses sudden onset of shaking and shortness of breath earlier today. Patient states he used his nebulizer treatment, however felt no relief. He decided to come to the ED for further evaluation. Denies sick contacts or recent travel. Of note, patient with history of intubation , during admission to [facility] for acute hypercapnic respiratory failure. Patient states he was recently tested for COVID and results were negative. Denies fever, abdominal pain. Admits nausea, vomiting x1 episode in the ER.     ED course:  Vitals: T 103.8, , /71, RR 18, SpO2 96% on RA  Labs: Neutrophil 90.1%, Lymphocyte 6.3%, Monocyte 1.0%, PT/INR 13.7/1.18, PTT 25.6, Lactate 2.2  UA: positive nitrite, moderate LE, >50 RBC, >50 WBC, many bacteria  CT abd/pelvis: Although the urinary bladder is incompletely distended, there appears to be wall thickening. Correlate with urinalysis for the possibility of cystitis.  CTA chest: No significant interval change in the chest compared to the previous study of 2016. Redemonstration of emphysema and postoperative changes in the right upper lobe  CXR: official read pending  EKG: NSR vent rate 100 bpm  Given IV NS 1 L bolus x2, Tylenol 650 mg PO x1, Proventil 2 puffs x1, IV Solumedrol 125 mg IVP x1, IV Zithromax and IV Rocephin, continue IV Meropenem     10/11/2020: Patient seen and examined at bedside in ED. Admitted for UTI, fever. Pt had no complaints of pain. Pt checked on throughout the night.   On 2.5 L NC 92 % O2 sat well. BP 78/46 s/p 4 L NS bolus and maintenance NS @ 60 cc/hour.   - afebrile, WBC 8 --> 15.42 overnight, lactate 2.2 --> 2.1 --> 3.1   - ICU PA consult noted and reviewed. Patient to be transferred to MICU ,  Now pt with septic shock 2/2 , UTIs/p IV Fluid resuscitation,  started on levophed   - Patient admits to  "laser surgery" for treatment of his BPH on 10/1  at [facility] and completed a 5 day course of outpatient antibiotic treatment. states he was told he likely has scars and is concerned for infection in prostate. has had pain with urination since then.    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 Is anxious and mildly irritable. " I have not slept in a few days." Melatonin does not help. Overall he "does not feel well." however does not have any specific complaints. Admits to constipation, had no BM since admission. States he is breathing better, had duonebs treatment this morning. admits to cough, unchanged from baseline (has asthma/COPD). no wheezing. Patient urinating adequately, notes he filled 250 cc at a time. Denies any fevers, chills, chest pain, palpitations, abdominal pain, n/v/d dysuria, hematuria.     - White count elevated 34.4 this morning, worsening leukocytosis   - ICU: on levophed; now on 2.891 mL/hr, BP in 110s/50s + stress steroids solu -cortef   - on 2 L NC   - Patient reports he had a Green Light Procedure for enlarged prostate by Dr. Kelly, Urology on 10/1    OVERNIGHT EVENTS: No acute overnight events noted.     Home Medications:  atorvastatin 40 mg oral tablet: 1 tab(s) orally once a day (11 Oct 2020 00:18)  budesonide 0.5 mg/2 mL inhalation suspension: 2 milliliter(s) inhaled 2 times a day (11 Oct 2020 00:18)  Bystolic 5 mg oral tablet: 1 tab(s) orally once a day (11 Oct 2020 00:18)  Eliquis 5 mg oral tablet: 1 tab(s) orally 2 times a day (11 Oct 2020 00:18)  ipratropium-albuterol 0.5 mg-2.5 mg/3 mLinhalation solution: 3 milliliter(s) inhaled 4 times a day, As Needed (11 Oct 2020 00:18)  pantoprazole 40 mg oral delayed release tablet: 1 tab(s) orally once a day (11 Oct 2020 00:)  predniSONE 5 mg oral tablet: 1 tab(s) orally once a day, As Needed (11 Oct 2020 00:18)  Proventil 90 mcg/inh inhalation aerosol: 2 puff(s) inhaled 2 times a day, As Needed (11 Oct 2020 00:18)  Spiriva HandiHaler 18 mcg inhalation capsule: 1 cap(s) inhaled once a day (11 Oct 2020 00:18)  Symbicort 160 mcg-4.5 mcg/inh inhalation aerosol: 2 puff(s) inhaled 2 times a day (11 Oct 2020 00:18)  Vitamin B12 500 mcg oral tablet: 1 tab(s) orally once a day (11 Oct 2020 00:)  Vitamin D3 1000 intl units (25 mcg) oral tablet: 1 tab(s) orally once a day (11 Oct 2020 00:)      MEDICATIONS  (STANDING):  apixaban 5 milliGRAM(s) Oral every 12 hours  atorvastatin 40 milliGRAM(s) Oral at bedtime  budesonide 160 MICROgram(s)/formoterol 4.5 MICROgram(s) Inhaler 2 Puff(s) Inhalation two times a day  cholecalciferol 1000 Unit(s) Oral at bedtime  cyanocobalamin 1000 MICROGram(s) Oral at bedtime  hydrocortisone sodium succinate Injectable 100 milliGRAM(s) IV Push three times a day  meropenem  IVPB      meropenem  IVPB 1000 milliGRAM(s) IV Intermittent every 8 hours  norepinephrine Infusion 0.05 MICROgram(s)/kG/Min (7.23 mL/Hr) IV Continuous <Continuous>  pantoprazole    Tablet 40 milliGRAM(s) Oral before breakfast  sodium chloride 0.9%. 1000 milliLiter(s) (60 mL/Hr) IV Continuous <Continuous>  tiotropium 18 MICROgram(s) Capsule 1 Capsule(s) Inhalation daily    MEDICATIONS  (PRN):  acetaminophen   Tablet .. 650 milliGRAM(s) Oral every 6 hours PRN Temp greater or equal to 38C (100.4F), Mild Pain (1 - 3)  ALBUTerol    90 MICROgram(s) HFA Inhaler 2 Puff(s) Inhalation every 6 hours PRN Shortness of Breath and/or Wheezing  albuterol/ipratropium for Nebulization 3 milliLiter(s) Nebulizer every 6 hours PRN Shortness of Breath and/or Wheezing  buDESOnide    Inhalation Suspension 0.5 milliGRAM(s) Inhalation two times a day PRN SOB/wheezing  melatonin 3 milliGRAM(s) Oral at bedtime PRN Insomnia      No Known Allergies      Social History:  Denies use of tobacco, EtOH, recreational drug use  Ambulates: independent  ADLs: independent (10 Oct 2020 23:41)      REVIEW OF SYSTEMS:  CONSTITUTIONAL: No fever, No chills, No fatigue, No myalgia, No Body ache, No Weakness  EYES: No eye pain,  No visual disturbances, No discharge, No Redness  ENMT: No ear pain, No nose bleed, No vertigo; No sinus pain, No throat pain, No Congestion  NECK: No pain, No stiffness  RESPIRATORY: ADMITs to cough, No wheezing, No hemoptysis, No shortness of breath  CARDIOVASCULAR: No chest pain, No palpitations  GASTROINTESTINAL: No abdominal pain, No epigastric pain. No nausea, No vomiting, No diarrhea, No constipation; [  ] BM (no BM)   GENITOURINARY: No dysuria, No frequency, No urgency, No hematuria, No incontinence  NEUROLOGICAL: No headaches, No dizziness, No numbness, No tingling, No tremors, No weakness  EXTREMITIES: No Swelling, No Pain, No Edema  SKIN: [ x ] No itching, burning, rashes, or lesions   MUSCULOSKELETAL: No joint pain, No joint swelling; No muscle pain, No back pain, No extremity pain  PSYCHIATRIC: ADMITs to difficulty sleeping at night  PAIN SCALE: [ x ] None  [  ] Other-  ROS Unable to obtain due to: [  ] Dementia  [  ] Lethargy  [  ] Sedated  [  ] Non verbal  REST OF REVIEW OF SYSTEMS: [  ] Normal     Vital Signs Last 24 Hrs  T(C): 37 (12 Oct 2020 07:10), Max: 37 (12 Oct 2020 07:10)  T(F): 98.6 (12 Oct 2020 07:10), Max: 98.6 (12 Oct 2020 07:10)  HR: 60 (12 Oct 2020 08:30) (58 - 85)  BP: 120/58 (12 Oct 2020 08:30) (84/52 - 126/58)  BP(mean): 80 (12 Oct 2020 08:30) (63 - 84)  RR: 33 (12 Oct 2020 08:30) (17 - 52)  SpO2: 97% (12 Oct 2020 08:30) (91% - 98%)    CAPILLARY BLOOD GLUCOSE          I&O's Summary    11 Oct 2020 07:01  -  12 Oct 2020 07:00  --------------------------------------------------------  IN: 2892.4 mL / OUT: 1640 mL / NET: 1252.4 mL      PHYSICAL EXAM:  GENERAL:  [ x ] NAD, [ x ] Well appearing, [ x ] Agitated, [  ] Drowsy, [  ] Lethargy, [  ] Confused   HEAD:  [ x ] Normal, [  ] Other  EYES:  [ x ] EOMI, [ x ] PERRLA, [ x ] Conjunctiva and sclera clear normal, [  ] Other, [  ] Pallor, [  ] Discharge  ENMT:  [ x ] Normal, [ x ] Moist mucous membranes, [ x ] Good dentition, [ x ] No thrush  NECK:  [ x ] Supple, [  x] No JVD, [  ] Normal thyroid, [  ] Lymphadenopathy, [  ] Other  CHEST/LUNG:  [  ] Clear to auscultation bilaterally, [  ] Breath Sounds equal B/L / decreased, [  ] Poor effort, [ x ] No rales, [  ] No rhonchi, [ x ]  wheezing left middle lobe  HEART:  [ x ] Regular rate and rhythm, [  ] Tachycardia, [  ] Bradycardia, [  ] Irregular, [ x ] No murmurs, No rubs, No gallops, [  ] PPM in place (Mfr:  )  ABDOMEN:  [ x ] Soft, [ x ] Nontender, [ x ] Nondistended, [ x ] No mass, [x  ] Bowel sounds present, [  ] Obese  NERVOUS SYSTEM:  [ x ] Alert & Oriented x3, [  ] Nonfocal, [  ] Confusion, [  ] Encephalopathic, [  ] Sedated, [  ] Unable to assess, [  ] Dementia, [  ] Other-  EXTREMITIES:  [x ] 2+ Peripheral Pulses, No clubbing, No cyanosis,  [  ] Edema B/L lower EXT, [  ] PVD stasis skin changes B/L lower EXT, [  ] Wound  LYMPH:  No lymphadenopathy noted  SKIN:  [  ] No rashes or lesions, [  ] Pressure ulcers, [  ] Ecchymosis, [  ] Skin tears, [  ] Other    DIET: Diet, DASH/TLC:   Sodium & Cholesterol Restricted (10-10-20 @ 23:52)      LABS:                        11.6   34.49 )-----------( 125      ( 12 Oct 2020 05:32 )             33.9     12 Oct 2020 05:32    147    |  115    |  17     ----------------------------<  146    4.2     |  27     |  0.90     Ca    7.7        12 Oct 2020 05:32      PT/INR - ( 10 Oct 2020 22:23 )   PT: 13.7 sec;   INR: 1.18 ratio         PTT - ( 10 Oct 2020 22:23 )  PTT:25.6 sec  Urinalysis Basic - ( 10 Oct 2020 22:38 )    Color: Yellow / Appearance: Turbid / S.020 / pH: x  Gluc: x / Ketone: Trace  / Bili: Negative / Urobili: Negative   Blood: x / Protein: 100 / Nitrite: Positive   Leuk Esterase: Moderate / RBC: >50 /HPF / WBC >50   Sq Epi: x / Non Sq Epi: Occasional / Bacteria: Many      Culture Results:   No growth to date. (10-11 @ 00:28)  Culture Results:   No growth to date. (10-11 @ 00:28)    culture blood  -- .Blood Blood-Peripheral 10-11 @ 00:28    culture urine  --  10-11 @ 00:28          Culture - Blood (collected 11 Oct 2020 00:28)  Source: .Blood Blood-Peripheral  Preliminary Report (12 Oct 2020 01:03):    No growth to date.    Culture - Blood (collected 11 Oct 2020 00:28)  Source: .Blood Blood-Peripheral  Preliminary Report (12 Oct 2020 01:03):    No growth to date.       Anemia Panel:      Thyroid Panel:                RADIOLOGY & ADDITIONAL TESTS:      HEALTH ISSUES - PROBLEM Dx:  CAD (coronary artery disease)  CAD (coronary artery disease)    Atrial fibrillation  Atrial fibrillation    Need for prophylactic measure  Need for prophylactic measure    HLD (hyperlipidemia)  HLD (hyperlipidemia)    HTN (hypertension)  HTN (hypertension)    GERD (gastroesophageal reflux disease)  GERD (gastroesophageal reflux disease)    Urinary tract infection without hematuria, site unspecified  Urinary tract infection without hematuria, site unspecified    Prostatitis  Prostatitis    Septic shock  Septic shock    COPD (chronic obstructive pulmonary disease)  COPD (chronic obstructive pulmonary disease)          Consultant(s) Notes Reviewed:  [  ] YES     Care Discussed with [ x ] Consultants, [  ] Patient, [  ] Family, [  ] HCP, [  ] , [  ] Social Service, [  ] RN, [  ] Physical Therapy, [  ] Palliative Care Team  DVT PPX: [  ] Lovenox, [  ] SC Heparin, [  ] Coumadin, [  ] Xarelto, [ x ] Eliquis, [  ] Pradaxa, [  ] IV Heparin drip, [  ] SCD, [  ] Ambulation, [  ] Contraindicated 2/2 GI Bleed, [  ] Contraindicated 2/2  Bleed, [  ] Contraindicated 2/2 Brain Bleed  Advanced Directive: [  ] None, [  ] DNR/DNI Patient is a 75y old  Male who presents with a chief complaint of UTI (12 Oct 2020 08:27)      INTERVAL HPI: 75 year old male with PMHX atrial fibrillation (on Eliquis), asthma, COPD, former smoker, hx of benign lung cancer R lobe surgically removed at [facility]) CAD (s/p 5 stents ~), GERD, HLD, HTN presents after shakes and low O2 70s. Patient endorses sudden onset of shaking and shortness of breath earlier today. Patient states he used his nebulizer treatment, however felt no relief. He decided to come to the ED for further evaluation. Denies sick contacts or recent travel. Of note, patient with history of intubation , during admission to \[facility]\"" for acute hypercapnic respiratory failure. Patient states he was recently tested for COVID and results were negative. Denies fever, abdominal pain. Admits nausea, vomiting x1 episode in the ER.     ED course:  Vitals: T 103.8, , /71, RR 18, SpO2 96% on RA  Labs: Neutrophil 90.1%, Lymphocyte 6.3%, Monocyte 1.0%, PT/INR 13.7/1.18, PTT 25.6, Lactate 2.2  UA: positive nitrite, moderate LE, >50 RBC, >50 WBC, many bacteria  CT abd/pelvis: Although the urinary bladder is incompletely distended, there appears to be wall thickening. Correlate with urinalysis for the possibility of cystitis.  CTA chest: No significant interval change in the chest compared to the previous study of 2016. Redemonstration of emphysema and postoperative changes in the right upper lobe  CXR: official read pending  EKG: NSR vent rate 100 bpm  Given IV NS 1 L bolus x2, Tylenol 650 mg PO x1, Proventil 2 puffs x1, IV Solumedrol 125 mg IVP x1, IV Zithromax and IV Rocephin, continue IV Meropenem     10/11/2020: Patient seen and examined at bedside in ED. Admitted for UTI, fever. Pt had no complaints of pain. Pt checked on throughout the night.   On 2.5 L NC 92 % O2 sat well. BP 78/46 s/p 4 L NS bolus and maintenance NS @ 60 cc/hour.   - afebrile, WBC 8 --> 15.42 overnight, lactate 2.2 --> 2.1 --> 3.1   - ICU PA consult noted and reviewed. Patient to be transferred to MICU ,  Now pt with septic shock 2/2 , UTIs/p IV Fluid resuscitation,  started on levophed   - Patient admits to  "laser surgery" for treatment of his BPH on 10/1  at [facility] and completed a 5 day course of outpatient antibiotic treatment. states he was told he likely has scars and is concerned for infection in prostate. has had pain with urination since then.    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 Is anxious and mildly irritable. " I have not slept in a few days." Melatonin does not help. Overall he "does not feel well." however does not have any specific complaints. Admits to constipation, had no BM since admission. States he is breathing better, had duonebs treatment this morning. admits to cough, unchanged from baseline (has asthma/COPD). no wheezing. Patient urinating adequately, notes he filled 250 cc at a time. Denies any fevers, chills, chest pain, palpitations, abdominal pain, n/v/d dysuria, hematuria.     - White count elevated 34.4 this morning, worsening leukocytosis   - ICU: on levophed; now on 2.891 mL/hr, BP in 110s/50s + stress steroids solu -cortef   - on 2 L NC   - Patient reports he had a Green Light Procedure for enlarged prostate by Dr. Kelly, Urology on 10/1    OVERNIGHT EVENTS: No acute overnight events noted.     Home Medications:  atorvastatin 40 mg oral tablet: 1 tab(s) orally once a day (11 Oct 2020 00:18)  budesonide 0.5 mg/2 mL inhalation suspension: 2 milliliter(s) inhaled 2 times a day (11 Oct 2020 00:18)  Bystolic 5 mg oral tablet: 1 tab(s) orally once a day (11 Oct 2020 00:18)  Eliquis 5 mg oral tablet: 1 tab(s) orally 2 times a day (11 Oct 2020 00:18)  ipratropium-albuterol 0.5 mg-2.5 mg/3 mLinhalation solution: 3 milliliter(s) inhaled 4 times a day, As Needed (11 Oct 2020 00:18)  pantoprazole 40 mg oral delayed release tablet: 1 tab(s) orally once a day (11 Oct 2020 00:)  predniSONE 5 mg oral tablet: 1 tab(s) orally once a day, As Needed (11 Oct 2020 00:18)  Proventil 90 mcg/inh inhalation aerosol: 2 puff(s) inhaled 2 times a day, As Needed (11 Oct 2020 00:18)  Spiriva HandiHaler 18 mcg inhalation capsule: 1 cap(s) inhaled once a day (11 Oct 2020 00:18)  Symbicort 160 mcg-4.5 mcg/inh inhalation aerosol: 2 puff(s) inhaled 2 times a day (11 Oct 2020 00:18)  Vitamin B12 500 mcg oral tablet: 1 tab(s) orally once a day (11 Oct 2020 00:)  Vitamin D3 1000 intl units (25 mcg) oral tablet: 1 tab(s) orally once a day (11 Oct 2020 00:)      MEDICATIONS  (STANDING):  apixaban 5 milliGRAM(s) Oral every 12 hours  atorvastatin 40 milliGRAM(s) Oral at bedtime  budesonide 160 MICROgram(s)/formoterol 4.5 MICROgram(s) Inhaler 2 Puff(s) Inhalation two times a day  cholecalciferol 1000 Unit(s) Oral at bedtime  cyanocobalamin 1000 MICROGram(s) Oral at bedtime  hydrocortisone sodium succinate Injectable 100 milliGRAM(s) IV Push three times a day  meropenem  IVPB      meropenem  IVPB 1000 milliGRAM(s) IV Intermittent every 8 hours  norepinephrine Infusion 0.05 MICROgram(s)/kG/Min (7.23 mL/Hr) IV Continuous <Continuous>  pantoprazole    Tablet 40 milliGRAM(s) Oral before breakfast  sodium chloride 0.9%. 1000 milliLiter(s) (60 mL/Hr) IV Continuous <Continuous>  tiotropium 18 MICROgram(s) Capsule 1 Capsule(s) Inhalation daily    MEDICATIONS  (PRN):  acetaminophen   Tablet .. 650 milliGRAM(s) Oral every 6 hours PRN Temp greater or equal to 38C (100.4F), Mild Pain (1 - 3)  ALBUTerol    90 MICROgram(s) HFA Inhaler 2 Puff(s) Inhalation every 6 hours PRN Shortness of Breath and/or Wheezing  albuterol/ipratropium for Nebulization 3 milliLiter(s) Nebulizer every 6 hours PRN Shortness of Breath and/or Wheezing  buDESOnide    Inhalation Suspension 0.5 milliGRAM(s) Inhalation two times a day PRN SOB/wheezing  melatonin 3 milliGRAM(s) Oral at bedtime PRN Insomnia      No Known Allergies      Social History:  Denies use of tobacco, EtOH, recreational drug use  Ambulates: independent  ADLs: independent (10 Oct 2020 23:41)      REVIEW OF SYSTEMS:  CONSTITUTIONAL: No fever, No chills, No fatigue, No myalgia, No Body ache, No Weakness  EYES: No eye pain,  No visual disturbances, No discharge, No Redness  ENMT: No ear pain, No nose bleed, No vertigo; No sinus pain, No throat pain, No Congestion  NECK: No pain, No stiffness  RESPIRATORY: ADMITs to cough, No wheezing, No hemoptysis, No shortness of breath  CARDIOVASCULAR: No chest pain, No palpitations  GASTROINTESTINAL: No abdominal pain, No epigastric pain. No nausea, No vomiting, No diarrhea, No constipation; [  ] BM (no BM)   GENITOURINARY: No dysuria, No frequency, No urgency, No hematuria, No incontinence  NEUROLOGICAL: No headaches, No dizziness, No numbness, No tingling, No tremors, No weakness  EXTREMITIES: No Swelling, No Pain, No Edema  SKIN: [ x ] No itching, burning, rashes, or lesions   MUSCULOSKELETAL: No joint pain, No joint swelling; No muscle pain, No back pain, No extremity pain  PSYCHIATRIC: ADMITs to difficulty sleeping at night  PAIN SCALE: [ x ] None  [  ] Other-  ROS Unable to obtain due to: [  ] Dementia  [  ] Lethargy  [  ] Sedated  [  ] Non verbal  REST OF REVIEW OF SYSTEMS: [  ] Normal     Vital Signs Last 24 Hrs  T(C): 37 (12 Oct 2020 07:10), Max: 37 (12 Oct 2020 07:10)  T(F): 98.6 (12 Oct 2020 07:10), Max: 98.6 (12 Oct 2020 07:10)  HR: 60 (12 Oct 2020 08:30) (58 - 85)  BP: 120/58 (12 Oct 2020 08:30) (84/52 - 126/58)  BP(mean): 80 (12 Oct 2020 08:30) (63 - 84)  RR: 33 (12 Oct 2020 08:30) (17 - 52)  SpO2: 97% (12 Oct 2020 08:30) (91% - 98%)    CAPILLARY BLOOD GLUCOSE          I&O's Summary    11 Oct 2020 07:01  -  12 Oct 2020 07:00  --------------------------------------------------------  IN: 2892.4 mL / OUT: 1640 mL / NET: 1252.4 mL      PHYSICAL EXAM:  GENERAL:  [ x ] NAD, [ x ] Well appearing, [ x ] Agitated, [  ] Drowsy, [  ] Lethargy, [  ] Confused   HEAD:  [ x ] Normal, [  ] Other  EYES:  [ x ] EOMI, [ x ] PERRLA, [ x ] Conjunctiva and sclera clear normal, [  ] Other, [  ] Pallor, [  ] Discharge  ENMT:  [ x ] Normal, [ x ] Moist mucous membranes, [ x ] Good dentition, [ x ] No thrush  NECK:  [ x ] Supple, [  x] No JVD, [  ] Normal thyroid, [  ] Lymphadenopathy, [  ] Other  CHEST/LUNG:  [  ] Clear to auscultation bilaterally, [  ] Breath Sounds equal B/L / decreased, [  ] Poor effort, [ x ] No rales, [  ] No rhonchi, [ x ]  wheezing left middle lobe  HEART:  [ x ] Regular rate and rhythm, [  ] Tachycardia, [  ] Bradycardia, [  ] Irregular, [ x ] No murmurs, No rubs, No gallops, [  ] PPM in place (Mfr:  )  ABDOMEN:  [ x ] Soft, [ x ] Nontender, [ x ] Nondistended, [ x ] No mass, [x  ] Bowel sounds present, [  ] Obese  NERVOUS SYSTEM:  [ x ] Alert & Oriented x3, [  ] Nonfocal, [  ] Confusion, [  ] Encephalopathic, [  ] Sedated, [  ] Unable to assess, [  ] Dementia, [  ] Other-  EXTREMITIES:  [x ] 2+ Peripheral Pulses, No clubbing, No cyanosis,  [  ] Edema B/L lower EXT, [  ] PVD stasis skin changes B/L lower EXT, [  ] Wound  LYMPH:  No lymphadenopathy noted  SKIN:  [ x ] No rashes or lesions, [  ] Pressure ulcers, [  ] Ecchymosis, [  ] Skin tears, [  ] Other    DIET: Diet, DASH/TLC:   Sodium & Cholesterol Restricted (10-10-20 @ 23:52)      LABS:                        11.6   34.49 )-----------( 125      ( 12 Oct 2020 05:32 )             33.9     12 Oct 2020 05:32    147    |  115    |  17     ----------------------------<  146    4.2     |  27     |  0.90     Ca    7.7        12 Oct 2020 05:32      PT/INR - ( 10 Oct 2020 22:23 )   PT: 13.7 sec;   INR: 1.18 ratio         PTT - ( 10 Oct 2020 22:23 )  PTT:25.6 sec  Urinalysis Basic - ( 10 Oct 2020 22:38 )    Color: Yellow / Appearance: Turbid / S.020 / pH: x  Gluc: x / Ketone: Trace  / Bili: Negative / Urobili: Negative   Blood: x / Protein: 100 / Nitrite: Positive   Leuk Esterase: Moderate / RBC: >50 /HPF / WBC >50   Sq Epi: x / Non Sq Epi: Occasional / Bacteria: Many      Culture Results:   No growth to date. (10-11 @ 00:28)  Culture Results:   No growth to date. (10-11 @ 00:28)    culture blood  -- .Blood Blood-Peripheral 10-11 @ 00:28    culture urine  --  10-11 @ 00:28          Culture - Blood (collected 11 Oct 2020 00:28)  Source: .Blood Blood-Peripheral  Preliminary Report (12 Oct 2020 01:03):    No growth to date.    Culture - Blood (collected 11 Oct 2020 00:28)  Source: .Blood Blood-Peripheral  Preliminary Report (12 Oct 2020 01:03):    No growth to date.       Anemia Panel:      Thyroid Panel:                RADIOLOGY & ADDITIONAL TESTS:      HEALTH ISSUES - PROBLEM Dx:  CAD (coronary artery disease)  CAD (coronary artery disease)    Atrial fibrillation  Atrial fibrillation    Need for prophylactic measure  Need for prophylactic measure    HLD (hyperlipidemia)  HLD (hyperlipidemia)    HTN (hypertension)  HTN (hypertension)    GERD (gastroesophageal reflux disease)  GERD (gastroesophageal reflux disease)    Urinary tract infection without hematuria, site unspecified  Urinary tract infection without hematuria, site unspecified    Prostatitis  Prostatitis    Septic shock  Septic shock    COPD (chronic obstructive pulmonary disease)  COPD (chronic obstructive pulmonary disease)          Consultant(s) Notes Reviewed:  [  ] YES     Care Discussed with [ x ] Consultants, [  ] Patient, [  ] Family, [  ] HCP, [  ] , [  ] Social Service, [  ] RN, [  ] Physical Therapy, [  ] Palliative Care Team  DVT PPX: [  ] Lovenox, [  ] SC Heparin, [  ] Coumadin, [  ] Xarelto, [ x ] Eliquis, [  ] Pradaxa, [  ] IV Heparin drip, [  ] SCD, [  ] Ambulation, [  ] Contraindicated 2/2 GI Bleed, [  ] Contraindicated 2/2  Bleed, [  ] Contraindicated 2/2 Brain Bleed  Advanced Directive: [  ] None, [  ] DNR/DNI Patient is a 75y old  Male who presents with a chief complaint of UTI (12 Oct 2020 08:27)      INTERVAL HPI: 75 year old male with PMHX atrial fibrillation (on Eliquis), asthma, COPD, former smoker, hx of benign lung cancer R lobe surgically removed at [facility]) CAD (s/p 5 stents ~), GERD, HLD, HTN presents after shakes and low O2 70s. Patient endorses sudden onset of shaking and shortness of breath earlier today. Patient states he used his nebulizer treatment, however felt no relief. He decided to come to the ED for further evaluation. Denies sick contacts or recent travel. Of note, patient with history of intubation , during admission to [facility] for acute hypercapnic respiratory failure. Patient states he was recently tested for COVID and results were negative. Denies fever, abdominal pain. Admits nausea, vomiting x1 episode in the ER.     ED course:  Vitals: T 103.8, , /71, RR 18, SpO2 96% on RA  Labs: Neutrophil 90.1%, Lymphocyte 6.3%, Monocyte 1.0%, PT/INR 13.7/1.18, PTT 25.6, Lactate 2.2  UA: positive nitrite, moderate LE, >50 RBC, >50 WBC, many bacteria  CT abd/pelvis: Although the urinary bladder is incompletely distended, there appears to be wall thickening. Correlate with urinalysis for the possibility of cystitis.  CTA chest: No significant interval change in the chest compared to the previous study of 2016. Redemonstration of emphysema and postoperative changes in the right upper lobe  CXR: official read pending  EKG: NSR vent rate 100 bpm  Given IV NS 1 L bolus x2, Tylenol 650 mg PO x1, Proventil 2 puffs x1, IV Solumedrol 125 mg IVP x1, IV Zithromax and IV Rocephin, continue IV Meropenem     10/11/2020: Patient seen and examined at bedside in ED. Admitted for UTI, fever. Pt had no complaints of pain. Pt checked on throughout the night.   On 2.5 L NC 92 % O2 sat well. BP 78/46 s/p 4 L NS bolus and maintenance NS @ 60 cc/hour.   - afebrile, WBC 8 --> 15.42 overnight, lactate 2.2 --> 2.1 --> 3.1   - ICU PA consult noted and reviewed. Patient to be transferred to MICU ,  Now pt with septic shock 2/2 , UTIs/p IV Fluid resuscitation,  started on levophed   - Patient admits to  "laser surgery" for treatment of his BPH on 10/1  at [facility] and completed a 5 day course of outpatient antibiotic treatment. states he was told he likely has scars and is concerned for infection in prostate. has had pain with urination since then.    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 Is anxious and mildly irritable. " I have not slept in a few days." Melatonin does not help. Overall he "does not feel well." however does not have any specific complaints. Admits to constipation, had no BM since admission. States he is breathing better, had duonebs treatment this morning. admits to cough, unchanged from baseline (has asthma/COPD). no wheezing. Patient urinating adequately, notes he filled 250 cc at a time. Denies any fevers, chills, chest pain, palpitations, abdominal pain, n/v/d dysuria, hematuria. Leukocytosis.    - White count elevated 34.4 this morning, worsening leukocytosis   - ICU: on levophed; now on 2.891 mL/hr, BP in 110s/50s + stress steroids solu -cortef   - on 2 L NC   - Patient reports he had a Green Light Procedure for enlarged prostate by Dr. Kelly, Urology on 10/1    OVERNIGHT EVENTS: No acute overnight events noted.     Home Medications:  atorvastatin 40 mg oral tablet: 1 tab(s) orally once a day (11 Oct 2020 00:18)  budesonide 0.5 mg/2 mL inhalation suspension: 2 milliliter(s) inhaled 2 times a day (11 Oct 2020 00:18)  Bystolic 5 mg oral tablet: 1 tab(s) orally once a day (11 Oct 2020 00:18)  Eliquis 5 mg oral tablet: 1 tab(s) orally 2 times a day (11 Oct 2020 00:18)  ipratropium-albuterol 0.5 mg-2.5 mg/3 mLinhalation solution: 3 milliliter(s) inhaled 4 times a day, As Needed (11 Oct 2020 00:18)  pantoprazole 40 mg oral delayed release tablet: 1 tab(s) orally once a day (11 Oct 2020 00:18)  predniSONE 5 mg oral tablet: 1 tab(s) orally once a day, As Needed (11 Oct 2020 00:)  Proventil 90 mcg/inh inhalation aerosol: 2 puff(s) inhaled 2 times a day, As Needed (11 Oct 2020 00:)  Spiriva HandiHaler 18 mcg inhalation capsule: 1 cap(s) inhaled once a day (11 Oct 2020 00:)  Symbicort 160 mcg-4.5 mcg/inh inhalation aerosol: 2 puff(s) inhaled 2 times a day (11 Oct 2020 00:)  Vitamin B12 500 mcg oral tablet: 1 tab(s) orally once a day (11 Oct 2020 00:)  Vitamin D3 1000 intl units (25 mcg) oral tablet: 1 tab(s) orally once a day (11 Oct 2020 00:18)      MEDICATIONS  (STANDING):  apixaban 5 milliGRAM(s) Oral every 12 hours  atorvastatin 40 milliGRAM(s) Oral at bedtime  budesonide 160 MICROgram(s)/formoterol 4.5 MICROgram(s) Inhaler 2 Puff(s) Inhalation two times a day  cholecalciferol 1000 Unit(s) Oral at bedtime  cyanocobalamin 1000 MICROGram(s) Oral at bedtime  hydrocortisone sodium succinate Injectable 100 milliGRAM(s) IV Push three times a day  meropenem  IVPB      meropenem  IVPB 1000 milliGRAM(s) IV Intermittent every 8 hours  norepinephrine Infusion 0.05 MICROgram(s)/kG/Min (7.23 mL/Hr) IV Continuous <Continuous>  pantoprazole    Tablet 40 milliGRAM(s) Oral before breakfast  sodium chloride 0.9%. 1000 milliLiter(s) (60 mL/Hr) IV Continuous <Continuous>  tiotropium 18 MICROgram(s) Capsule 1 Capsule(s) Inhalation daily    MEDICATIONS  (PRN):  acetaminophen   Tablet .. 650 milliGRAM(s) Oral every 6 hours PRN Temp greater or equal to 38C (100.4F), Mild Pain (1 - 3)  ALBUTerol    90 MICROgram(s) HFA Inhaler 2 Puff(s) Inhalation every 6 hours PRN Shortness of Breath and/or Wheezing  albuterol/ipratropium for Nebulization 3 milliLiter(s) Nebulizer every 6 hours PRN Shortness of Breath and/or Wheezing  buDESOnide    Inhalation Suspension 0.5 milliGRAM(s) Inhalation two times a day PRN SOB/wheezing  melatonin 3 milliGRAM(s) Oral at bedtime PRN Insomnia      No Known Allergies      Social History:  Denies use of tobacco, EtOH, recreational drug use  Ambulates: independent  ADLs: independent (10 Oct 2020 23:41)      REVIEW OF SYSTEMS: i feel fine   CONSTITUTIONAL: No fever, No chills, No fatigue, No myalgia, No Body ache, No Weakness  EYES: No eye pain,  No visual disturbances, No discharge, No Redness  ENMT: No ear pain, No nose bleed, No vertigo; No sinus pain, No throat pain, No Congestion  NECK: No pain, No stiffness  RESPIRATORY: ADMITs to cough, No wheezing, No hemoptysis, No shortness of breath  CARDIOVASCULAR: No chest pain, No palpitations  GASTROINTESTINAL: No abdominal pain, No epigastric pain. No nausea, No vomiting, No diarrhea, No constipation; [  ] BM (no BM)   GENITOURINARY: No dysuria, No frequency, No urgency, No hematuria, No incontinence  NEUROLOGICAL: No headaches, No dizziness, No numbness, No tingling, No tremors, No weakness  EXTREMITIES: No Swelling, No Pain, No Edema  SKIN: [ x ] No itching, burning, rashes, or lesions   MUSCULOSKELETAL: No joint pain, No joint swelling; No muscle pain, No back pain, No extremity pain  PSYCHIATRIC: ADMITs to difficulty sleeping at night  PAIN SCALE: [ x ] None  [  ] Other-  ROS Unable to obtain due to: [  ] Dementia  [  ] Lethargy  [  ] Sedated  [  ] Non verbal  REST OF REVIEW OF SYSTEMS: [ x ] Normal     Vital Signs Last 24 Hrs  T(C): 37 (12 Oct 2020 07:10), Max: 37 (12 Oct 2020 07:10)  T(F): 98.6 (12 Oct 2020 07:10), Max: 98.6 (12 Oct 2020 07:10)  HR: 60 (12 Oct 2020 08:30) (58 - 85)  BP: 120/58 (12 Oct 2020 08:30) (84/52 - 126/58)  BP(mean): 80 (12 Oct 2020 08:30) (63 - 84)  RR: 33 (12 Oct 2020 08:30) (17 - 52)  SpO2: 97% (12 Oct 2020 08:30) (91% - 98%)    CAPILLARY BLOOD GLUCOSE          I&O's Summary    11 Oct 2020 07:01  -  12 Oct 2020 07:00  --------------------------------------------------------  IN: 2892.4 mL / OUT: 1640 mL / NET: 1252.4 mL      PHYSICAL EXAM:  GENERAL:  [ x ] NAD, [ x ] Well appearing, [ x ] Agitated, [  ] Drowsy, [  ] Lethargy, [  ] Confused   HEAD:  [ x ] Normal, [  ] Other  EYES:  [ x ] EOMI, [ x ] PERRLA, [ x ] Conjunctiva and sclera clear normal, [  ] Other, [  ] Pallor, [  ] Discharge  ENMT:  [ x ] Normal, [ x ] Moist mucous membranes, [ x ] Good dentition, [ x ] No thrush  NECK:  [ x ] Supple, [  x] No JVD, [  ] Normal thyroid, [  ] Lymphadenopathy, [  ] Other  CHEST/LUNG:  [  ] Clear to auscultation bilaterally, [  ] Breath Sounds equal B/L / decreased, [  ] Poor effort, [ x ] No rales, [x  ] B/L few  rhonchi, [ x ]  wheezing left middle lobe  HEART:  [ x ] Regular rate and rhythm, [  ] Tachycardia, [  ] Bradycardia, [  ] Irregular, [ x ] No murmurs, No rubs, No gallops, [  ] PPM in place (Mfr:  )  ABDOMEN:  [ x ] Soft, [ x ] Nontender, [ x ] Nondistended, [ x ] No mass, [x  ] Bowel sounds present, [  ] Obese  NERVOUS SYSTEM:  [ x ] Alert & Oriented x3, [ x] Nonfocal, [  ] Confusion, [  ] Encephalopathic, [  ] Sedated, [  ] Unable to assess, [  ] Dementia, [  ] Other-  EXTREMITIES:  [x ] 2+ Peripheral Pulses, No clubbing, No cyanosis,  [  ] Edema B/L lower EXT, [  ] PVD stasis skin changes B/L lower EXT, [  ] Wound  LYMPH:  No lymphadenopathy noted  SKIN:  [ x ] No rashes or lesions, [  ] Pressure ulcers, [  ] Ecchymosis, [  ] Skin tears, [  ] Other    DIET: Diet, DASH/TLC:   Sodium & Cholesterol Restricted (10-10-20 @ 23:52)      LABS:                        11.6   34.49 )-----------( 125      ( 12 Oct 2020 05:32 )             33.9     12 Oct 2020 05:32    147    |  115    |  17     ----------------------------<  146    4.2     |  27     |  0.90     Ca    7.7        12 Oct 2020 05:32      PT/INR - ( 10 Oct 2020 22:23 )   PT: 13.7 sec;   INR: 1.18 ratio         PTT - ( 10 Oct 2020 22:23 )  PTT:25.6 sec  Urinalysis Basic - ( 10 Oct 2020 22:38 )    Color: Yellow / Appearance: Turbid / S.020 / pH: x  Gluc: x / Ketone: Trace  / Bili: Negative / Urobili: Negative   Blood: x / Protein: 100 / Nitrite: Positive   Leuk Esterase: Moderate / RBC: >50 /HPF / WBC >50   Sq Epi: x / Non Sq Epi: Occasional / Bacteria: Many      Culture Results:   No growth to date. (10-11 @ 00:28)  Culture Results:   No growth to date. (10-11 @ 00:28)    culture blood  -- .Blood Blood-Peripheral 10-11 @ 00:28    culture urine  --  10-11 @ 00:28          Culture - Blood (collected 11 Oct 2020 00:28)  Source: .Blood Blood-Peripheral  Preliminary Report (12 Oct 2020 01:03):    No growth to date.    Culture - Blood (collected 11 Oct 2020 00:28)  Source: .Blood Blood-Peripheral  Preliminary Report (12 Oct 2020 01:03):    No growth to date.       Anemia Panel:      Thyroid Panel:                RADIOLOGY & ADDITIONAL TESTS:      HEALTH ISSUES - PROBLEM Dx:  CAD (coronary artery disease)  CAD (coronary artery disease)    Atrial fibrillation  Atrial fibrillation    Need for prophylactic measure  Need for prophylactic measure    HLD (hyperlipidemia)  HLD (hyperlipidemia)    HTN (hypertension)  HTN (hypertension)    GERD (gastroesophageal reflux disease)  GERD (gastroesophageal reflux disease)    Urinary tract infection without hematuria, site unspecified  Urinary tract infection without hematuria, site unspecified    Prostatitis  Prostatitis    Septic shock  Septic shock    COPD (chronic obstructive pulmonary disease)  COPD (chronic obstructive pulmonary disease)          Consultant(s) Notes Reviewed:  [  ] YES     Care Discussed with [ x ] Consultants, [  ] Patient, [  ] Family, [  ] HCP, [  ] , [  ] Social Service, [  ] RN, [  ] Physical Therapy, [  ] Palliative Care Team  DVT PPX: [  ] Lovenox, [  ] SC Heparin, [  ] Coumadin, [  ] Xarelto, [ x ] Eliquis, [  ] Pradaxa, [  ] IV Heparin drip, [  ] SCD, [  ] Ambulation, [  ] Contraindicated 2/2 GI Bleed, [  ] Contraindicated 2/2  Bleed, [  ] Contraindicated 2/2 Brain Bleed  Advanced Directive: [  ] None, [  ] DNR/DNI

## 2020-10-12 NOTE — PROGRESS NOTE ADULT - ASSESSMENT
75 year old male with PMHx atrial fibrillation (on Eliquis), asthma, COPD, former smoker, hx of benign lung cancer R lobe surgically removed at [facility]) CAD (s/p 5 stents ~2001), GERD, HLD, HTN presents after shakes and low O2 70s transferred to  septic shock 2/2 to prostatitis

## 2020-10-12 NOTE — PROGRESS NOTE ADULT - PROBLEM SELECTOR PLAN 1
resolved off pressor  pt continue to have leukocytosis  blood cx neg to date  f/u urine cx -- + pyruia  recent gu procedure  cont meropenem -- til urine cx back then will tailor may require prolonged treatment with antibx for prostatitis  trend wbc

## 2020-10-12 NOTE — PROGRESS NOTE ADULT - ASSESSMENT
75 year old male with PMHx atrial fibrillation (on Eliquis), asthma, COPD, former smoker, hx of benign lung cancer R lobe surgically removed at [facility]) CAD (s/p 5 stents ~2001), GERD, HLD, HTN presents after shakes and low O2 70s transferred to  septic shock 2/2 to UTI 5 year old male with a significant PMH of atrial fibrillation (on Eliquis), asthma, COPD, former smoker, hx of benign lung cancer R lobe surgically removed at [facility]) CAD (s/p 5 stents ~2001), GERD, HLD, HTN presented with hypoxia and rigors and admitted for Urosepsis.  MICU consulted for hypotension despite attempts at fluid resuscitation.  Admit to MICU for Septic shock 2/2 UTI.      Neuro- AAO x 3    Cardio-  Septic Shock 2/2 UTI likely 2/2 recent instrumentation from laser ablation of his prostate  - SBP in low 100's. Will continue low dose levophed for BP support  - Lactate downtrended 1.6<--- 3.1 <--- 2.1  - Afib-On eliquis. Rate controlled without medications. Continue to monitor.     Pulmonary-   GI  Renal  Endocrine  Heme        Septic Shock 2/2 UTI likely 2/2 recent instrumentation from laser ablation of his prostate  - s/p 3 L of crystalloid, systolic blood pressure in the 70's - 80's  - Lactate 3.1 from 2.1  - CT scan with emphysematous lung;  bladder wall thickening with possible cystitis  - Urinalysis positive Nitrates and moderate leukocytes  - Patient just finished 3rd liter of crystalloid.  Will reultrasound patient to determine ability to receive more volume  - Defend blood pressure with a goal MAP >65; Will escalate to vasopressors if volume resuscitation ineffective  - Meropenem given history of UTI/Bacteremia last year  - Blood and urine cultures send and pending; COVID PCR and RVP negative  - Monitor fever and WBC  - Currently, saturations above 92% on 3LNC.  Will escalate supplemental oxygen delivery as needed  - Albuterol/Ipratropium treatments q6 PRN for wheezing  - Admit to ICU 2/2 Septic Shock 2/2 UTI.    - Case discussed and plan formulated with ICU attending Dr. Urena   5 year old male with a significant PMH of atrial fibrillation (on Eliquis), asthma, COPD, former smoker, hx of benign lung cancer R lobe surgically removed at [facility]) CAD (s/p 5 stents ~2001), GERD, HLD, HTN presented with hypoxia and rigors and admitted for Urosepsis.  MICU consulted for hypotension despite attempts at fluid resuscitation.  Admit to MICU for Septic shock 2/2 UTI.      Neuro- AAO x 3    Cardio-  Septic Shock 2/2 UTI likely 2/2 recent instrumentation from laser ablation of his prostate  - SBP in low 100's, will d/c levophed at this time. Start solu-cortef 50 IVP q8. Will continue hemodynamic monitoring  - Lactate downtrended 1.6<--- 3.1 <--- 2.1  - Afib-On eliquis. Rate controlled without medications. Continue to monitor.     Pulmonary  - hx of COPD, Asthma  -  sating 95% on room air  - continue duoneb, Symbicort, Pulmicort, spiriva  - Will escalate supplemental oxygen delivery as needed  GI  - Dash diet, tolerating PO intake  - continue PPI    Renal  -  Renal indices stable  - Cr 0.90 today  - continue to monitor    ID  Septic Shock 2/2 UTI likely 2/2 recent instrumentation from laser ablation of his prostate  - Lactate 1.6 today  - CT scan with emphysematous lung;  bladder wall thickening with possible cystitis  - Urinalysis positive Nitrates and moderate leukocytes  - Continue Meropenem given history of UTI/Bacteremia last year  - Blood and urine cultures send and pending; COVID PCR and RVP negative  - Monitor fever and WBC      Heme  Thrombocytopenia  - PLTs 125 down from 144  - no signs or symptoms of bleeding  - will continue to monitor  - Continue eliquis for AFIB       5 year old male with a significant PMH of atrial fibrillation (on Eliquis), asthma, COPD, former smoker, hx of benign lung cancer R lobe surgically removed at [facility]) CAD (s/p 5 stents ~2001), GERD, HLD, HTN presented with hypoxia and rigors and admitted for Urosepsis.  MICU consulted for hypotension despite attempts at fluid resuscitation.  Admit to MICU for Septic shock 2/2 UTI.      Neuro- AAO x 3    Cardio-  Septic Shock 2/2 UTI likely 2/2 recent instrumentation from laser ablation of his prostate  - SBP in low 100's, will d/c levophed at this time. Start solu-cortef 50 IVP q8. Will continue hemodynamic monitoring  - Lactate downtrended 1.6<--- 3.1 <--- 2.1  - Afib-On eliquis. Rate controlled without medications. Continue to monitor.     Pulmonary  - hx of COPD, Asthma  -  sating 95% on room air  - continue duoneb, Symbicort, Pulmicort, spiriva  - Will escalate supplemental oxygen delivery as needed  GI  - Dash diet, tolerating PO intake  - continue PPI    Renal  -  Renal indices stable  - Cr 0.90 today  - continue to monitor    ID  Septic Shock 2/2 UTI likely 2/2 recent instrumentation from laser ablation of his prostate  - Lactate 1.6 today  - CT scan with emphysematous lung;  bladder wall thickening with possible cystitis  - Urinalysis positive Nitrates and moderate leukocytes  - Continue Meropenem given history of UTI/Bacteremia last year  - Blood and urine cultures send and pending; COVID PCR and RVP negative  - WBC 34.49 up from 15.42  - remains afebrile  - Monitor fever and WBC      Heme  Thrombocytopenia  - PLTs 125 down from 144  - no signs or symptoms of bleeding  - will continue to monitor  - Continue eliquis for AFIB

## 2020-10-12 NOTE — DIETITIAN INITIAL EVALUATION ADULT. - PROBLEM SELECTOR PLAN 4
Chronic, history of COPD in setting of asthma  - CTA chest: No significant interval change in the chest compared to the previous study of April 11, 2016. Redemonstration of emphysema and postoperative changes in the right upper   - Continue home Symbicort, Spiriva, Proventil  -CT Angio-

## 2020-10-12 NOTE — PROGRESS NOTE ADULT - SUBJECTIVE AND OBJECTIVE BOX
Patient is a 75y old  Male who presents with a chief complaint of UTI (11 Oct 2020 12:57)    24 hour events: ***    REVIEW OF SYSTEMS  Constitutional: No fever, chills, fatigue  Neuro: No headache, numbness, weakness  Resp: No cough, wheezing, shortness of breath  CVS: No chest pain, palpitations, leg swelling  GI: No abdominal pain, nausea, vomiting, diarrhea   : No dysuria, frequency, incontinence  Skin: No itching, burning, rashes, or lesions   Msk: No joint pain or swelling  Psych: No depression, anxiety, mood swings  Heme: No bleeding    T(F): 98 (10-12-20 @ 03:42), Max: 98.5 (10-11-20 @ 10:00)  HR: 61 (10-12-20 @ 07:00) (58 - 85)  BP: 102/58 (10-12-20 @ 07:00) (81/47 - 126/58)  RR: 26 (10-12-20 @ 07:00) (17 - 52)  SpO2: 95% (10-12-20 @ 07:00) (91% - 98%)  Wt(kg): --            I&O's Summary    10-11 @ 07:01  -  10-12 @ 07:00  --------------------------------------------------------  IN: 2892.4 mL / OUT: 1640 mL / NET: 1252.4 mL      PHYSICAL EXAM  General:   CNS:   HEENT:   Resp:   CVS:   Abd:   Ext:   Skin:     MEDICATIONS  meropenem  IVPB   meropenem  IVPB IV Intermittent    norepinephrine Infusion IV Continuous    atorvastatin Oral  hydrocortisone sodium succinate Injectable IV Push    ALBUTerol    90 MICROgram(s) HFA Inhaler Inhalation PRN  albuterol/ipratropium for Nebulization Nebulizer PRN  buDESOnide    Inhalation Suspension Inhalation PRN  budesonide 160 MICROgram(s)/formoterol 4.5 MICROgram(s) Inhaler Inhalation  tiotropium 18 MICROgram(s) Capsule Inhalation    acetaminophen   Tablet .. Oral PRN  melatonin Oral PRN      apixaban Oral    pantoprazole    Tablet Oral      cholecalciferol Oral  cyanocobalamin Oral  sodium chloride 0.9%. IV Continuous                                11.6   34.49 )-----------( 125      ( 12 Oct 2020 05:32 )             33.9       10-12    147<H>  |  115<H>  |  17  ----------------------------<  146<H>  4.2   |  27  |  0.90    Ca    7.7<L>      12 Oct 2020 05:32    TPro  6.4  /  Alb  3.4  /  TBili  0.3  /  DBili  x   /  AST  24  /  ALT  38  /  AlkPhos  94  10-10    Lactate 1.6           10-12 @ 05:32          PT/INR - ( 10 Oct 2020 22:23 )   PT: 13.7 sec;   INR: 1.18 ratio         PTT - ( 10 Oct 2020 22:23 )  PTT:25.6 sec  Urinalysis Basic - ( 10 Oct 2020 22:38 )    Color: Yellow / Appearance: Turbid / S.020 / pH: x  Gluc: x / Ketone: Trace  / Bili: Negative / Urobili: Negative   Blood: x / Protein: 100 / Nitrite: Positive   Leuk Esterase: Moderate / RBC: >50 /HPF / WBC >50   Sq Epi: x / Non Sq Epi: Occasional / Bacteria: Many      .Blood Blood-Peripheral   No growth to date. -- 10-11 @ 00:28      Rapid RVP Result: NotDetec (10-10 @ 22:32)    Radiology: ***  Bedside lung ultrasound: ***  Bedside ECHO: ***    CENTRAL LINE: Y/N          DATE INSERTED:              REMOVE: Y/N  DE JESUS: Y/N                        DATE INSERTED:              REMOVE: Y/N  A-LINE: Y/N                       DATE INSERTED:              REMOVE: Y/N    GLOBAL ISSUE/BEST PRACTICE  Analgesia:   Sedation:   CAM-ICU:   HOB elevation: yes  Stress ulcer prophylaxis:   VTE prophylaxis:   Glycemic control:   Nutrition:     CODE STATUS: ***  [facility] discussion: Y       Patient is a 75y old  Male who presents with a chief complaint of UTI (11 Oct 2020 12:57)    24 hour events: No acute events overnight. Patient seen and examined at bedside. Patient eating breakfast. No complaints at this time.    REVIEW OF SYSTEMS  Constitutional: No fever, chills, fatigue  Neuro: No headache, numbness, weakness  Resp: No cough, wheezing, shortness of breath  CVS: No chest pain, palpitations, leg swelling  GI: No abdominal pain, nausea, vomiting, diarrhea   : No dysuria, frequency, incontinence  Skin: No itching, burning, rashes, or lesions   Msk: No joint pain or swelling  Psych: No depression, anxiety, mood swings  Heme: No bleeding    T(F): 98 (10-12-20 @ 03:42), Max: 98.5 (10-11-20 @ 10:00)  HR: 61 (10-12-20 @ 07:00) (58 - 85)  BP: 102/58 (10-12-20 @ 07:00) (81/47 - 126/58)  RR: 26 (10-12-20 @ 07:00) (17 - 52)  SpO2: 95% (10-12-20 @ 07:00) (91% - 98%)  Wt(kg): --            I&O's Summary    10-11 @ 07:01  -  10-12 @ 07:00  --------------------------------------------------------  IN: 2892.4 mL / OUT: 1640 mL / NET: 1252.4 mL      PHYSICAL EXAM  General: NAD, appears well nourished, resting comfortably in bed, eating breakfast  CNS: AAOx3, no focal deficits, speech fluent  HEENT: Non traumatic, acephalic, PERRL, moist mucous membranes  Resp: clear to ascultation bilaterally. No wheezing, rales or ronchi  CVS: regular rate, rythym, +s1, +S2, no murmors  Abd: soft, non tender, nondistended, bowel sounds present  Ext: no clubbing, cyanosis, no lower extremity edema  Skin: dry, warm    MEDICATIONS  meropenem  IVPB   meropenem  IVPB IV Intermittent    norepinephrine Infusion IV Continuous    atorvastatin Oral  hydrocortisone sodium succinate Injectable IV Push    ALBUTerol    90 MICROgram(s) HFA Inhaler Inhalation PRN  albuterol/ipratropium for Nebulization Nebulizer PRN  buDESOnide    Inhalation Suspension Inhalation PRN  budesonide 160 MICROgram(s)/formoterol 4.5 MICROgram(s) Inhaler Inhalation  tiotropium 18 MICROgram(s) Capsule Inhalation    acetaminophen   Tablet .. Oral PRN  melatonin Oral PRN      apixaban Oral    pantoprazole    Tablet Oral      cholecalciferol Oral  cyanocobalamin Oral  sodium chloride 0.9%. IV Continuous                                11.6   34.49 )-----------( 125      ( 12 Oct 2020 05:32 )             33.9       10-12    147<H>  |  115<H>  |  17  ----------------------------<  146<H>  4.2   |  27  |  0.90    Ca    7.7<L>      12 Oct 2020 05:32    TPro  6.4  /  Alb  3.4  /  TBili  0.3  /  DBili  x   /  AST  24  /  ALT  38  /  AlkPhos  94  10-10    Lactate 1.6           10-12 @ 05:32          PT/INR - ( 10 Oct 2020 22:23 )   PT: 13.7 sec;   INR: 1.18 ratio         PTT - ( 10 Oct 2020 22:23 )  PTT:25.6 sec  Urinalysis Basic - ( 10 Oct 2020 22:38 )    Color: Yellow / Appearance: Turbid / S.020 / pH: x  Gluc: x / Ketone: Trace  / Bili: Negative / Urobili: Negative   Blood: x / Protein: 100 / Nitrite: Positive   Leuk Esterase: Moderate / RBC: >50 /HPF / WBC >50   Sq Epi: x / Non Sq Epi: Occasional / Bacteria: Many      .Blood Blood-Peripheral   No growth to date. -- 10-11 @ 00:28      Rapid RVP Result: NotDetec (10-10 @ 22:32)        CENTRAL LINE: N            DE JESUS: N                         A-LINE: N                           GLOBAL ISSUE/BEST PRACTICE  Analgesia:   Sedation:   CAM-ICU: Yes  HOB elevation: yes  Stress ulcer prophylaxis:   VTE prophylaxis:   Glycemic control:   Nutrition:     CODE STATUS: ***  [facility] discussion: Y       Patient is a 75y old  Male who presents with a chief complaint of UTI (11 Oct 2020 12:57)    24 hour events: No acute events overnight. Patient seen and examined at bedside. Patient eating breakfast. No complaints at this time.    REVIEW OF SYSTEMS  Constitutional: No fever, chills, fatigue  Neuro: No headache, numbness, weakness  Resp: No cough, wheezing, shortness of breath  CVS: No chest pain, palpitations, leg swelling  GI: No abdominal pain, nausea, vomiting, diarrhea   : No dysuria, frequency, incontinence  Skin: No itching, burning, rashes, or lesions   Msk: No joint pain or swelling  Psych: No depression, anxiety, mood swings  Heme: No bleeding    T(F): 98 (10-12-20 @ 03:42), Max: 98.5 (10-11-20 @ 10:00)  HR: 61 (10-12-20 @ 07:00) (58 - 85)  BP: 102/58 (10-12-20 @ 07:00) (81/47 - 126/58)  RR: 26 (10-12-20 @ 07:00) (17 - 52)  SpO2: 95% (10-12-20 @ 07:00) (91% - 98%)  Wt(kg): --            I&O's Summary    10-11 @ 07:01  -  10-12 @ 07:00  --------------------------------------------------------  IN: 2892.4 mL / OUT: 1640 mL / NET: 1252.4 mL      PHYSICAL EXAM  General: NAD, appears well nourished, resting comfortably in bed, eating breakfast  CNS: AAOx3, no focal deficits, speech fluent  HEENT: Non traumatic, acephalic, PERRL, moist mucous membranes  Resp: clear to ascultation bilaterally. No wheezing, rales or ronchi  CVS: regular rate, rythym, +s1, +S2, no murmors  Abd: soft, non tender, nondistended, bowel sounds present  Ext: no clubbing, cyanosis, no lower extremity edema  Skin: dry, warm  ZAMZAM: non prostate tenderness    MEDICATIONS  meropenem  IVPB   meropenem  IVPB IV Intermittent    norepinephrine Infusion IV Continuous    atorvastatin Oral  hydrocortisone sodium succinate Injectable IV Push    ALBUTerol    90 MICROgram(s) HFA Inhaler Inhalation PRN  albuterol/ipratropium for Nebulization Nebulizer PRN  buDESOnide    Inhalation Suspension Inhalation PRN  budesonide 160 MICROgram(s)/formoterol 4.5 MICROgram(s) Inhaler Inhalation  tiotropium 18 MICROgram(s) Capsule Inhalation    acetaminophen   Tablet .. Oral PRN  melatonin Oral PRN      apixaban Oral    pantoprazole    Tablet Oral      cholecalciferol Oral  cyanocobalamin Oral  sodium chloride 0.9%. IV Continuous                                11.6   34.49 )-----------( 125      ( 12 Oct 2020 05:32 )             33.9       10-12    147<H>  |  115<H>  |  17  ----------------------------<  146<H>  4.2   |  27  |  0.90    Ca    7.7<L>      12 Oct 2020 05:32    TPro  6.4  /  Alb  3.4  /  TBili  0.3  /  DBili  x   /  AST  24  /  ALT  38  /  AlkPhos  94  10-10    Lactate 1.6           10-12 @ 05:32          PT/INR - ( 10 Oct 2020 22:23 )   PT: 13.7 sec;   INR: 1.18 ratio         PTT - ( 10 Oct 2020 22:23 )  PTT:25.6 sec  Urinalysis Basic - ( 10 Oct 2020 22:38 )    Color: Yellow / Appearance: Turbid / S.020 / pH: x  Gluc: x / Ketone: Trace  / Bili: Negative / Urobili: Negative   Blood: x / Protein: 100 / Nitrite: Positive   Leuk Esterase: Moderate / RBC: >50 /HPF / WBC >50   Sq Epi: x / Non Sq Epi: Occasional / Bacteria: Many      .Blood Blood-Peripheral   No growth to date. -- 10-11 @ 00:28      Rapid RVP Result: NotDetec (10-10 @ 22:32)        CENTRAL LINE: N            DE JESUS: N                         A-LINE: N                           GLOBAL ISSUE/BEST PRACTICE  Analgesia: N  Sedation: N  CAM-ICU: Yes  HOB elevation: yes  Stress ulcer prophylaxis: protonix  VTE prophylaxis: on eliquis  Nutrition: DASH diet    CODE STATUS: ***  [facility] discussion: Y       Patient is a 75y old  Male who presents with a chief complaint of UTI (11 Oct 2020 12:57)    24 hour events: No acute events overnight. Patient seen and examined at bedside. Patient eating breakfast. No complaints at this time.    REVIEW OF SYSTEMS  Constitutional: No fever, chills, fatigue  Neuro: No headache, numbness, weakness  Resp: No cough, wheezing, shortness of breath  CVS: No chest pain, palpitations, leg swelling  GI: No abdominal pain, nausea, vomiting, diarrhea   : No dysuria, frequency, incontinence  Skin: No itching, burning, rashes, or lesions   Msk: No joint pain or swelling  Psych: No depression, anxiety, mood swings  Heme: No bleeding    T(F): 98 (10-12-20 @ 03:42), Max: 98.5 (10-11-20 @ 10:00)  HR: 61 (10-12-20 @ 07:00) (58 - 85)  BP: 102/58 (10-12-20 @ 07:00) (81/47 - 126/58)  RR: 26 (10-12-20 @ 07:00) (17 - 52)  SpO2: 95% (10-12-20 @ 07:00) (91% - 98%)  Wt(kg): --            I&O's Summary    10-11 @ 07:01  -  10-12 @ 07:00  --------------------------------------------------------  IN: 2892.4 mL / OUT: 1640 mL / NET: 1252.4 mL      PHYSICAL EXAM  General: NAD, appears well nourished, resting comfortably in bed, eating breakfast  CNS: AAOx3, no focal deficits, speech fluent  HEENT: Non traumatic, acephalic, PERRL, moist mucous membranes  Resp: clear to ascultation bilaterally. No wheezing, rales or ronchi  CVS: regular rate, rythym, +s1, +S2, no murmors  Abd: soft, non tender, nondistended, bowel sounds present  Ext: no clubbing, cyanosis, no lower extremity edema  Skin: dry, warm  ZAMZAM: non prostate tenderness    MEDICATIONS  meropenem  IVPB   meropenem  IVPB IV Intermittent    norepinephrine Infusion IV Continuous    atorvastatin Oral  hydrocortisone sodium succinate Injectable IV Push    ALBUTerol    90 MICROgram(s) HFA Inhaler Inhalation PRN  albuterol/ipratropium for Nebulization Nebulizer PRN  buDESOnide    Inhalation Suspension Inhalation PRN  budesonide 160 MICROgram(s)/formoterol 4.5 MICROgram(s) Inhaler Inhalation  tiotropium 18 MICROgram(s) Capsule Inhalation    acetaminophen   Tablet .. Oral PRN  melatonin Oral PRN      apixaban Oral    pantoprazole    Tablet Oral      cholecalciferol Oral  cyanocobalamin Oral  sodium chloride 0.9%. IV Continuous                                11.6   34.49 )-----------( 125      ( 12 Oct 2020 05:32 )             33.9       10-12    147<H>  |  115<H>  |  17  ----------------------------<  146<H>  4.2   |  27  |  0.90    Ca    7.7<L>      12 Oct 2020 05:32    TPro  6.4  /  Alb  3.4  /  TBili  0.3  /  DBili  x   /  AST  24  /  ALT  38  /  AlkPhos  94  1010    Lactate 1.6           10-12 @ 05:32          PT/INR - ( 10 Oct 2020 22:23 )   PT: 13.7 sec;   INR: 1.18 ratio         PTT - ( 10 Oct 2020 22:23 )  PTT:25.6 sec  Urinalysis Basic - ( 10 Oct 2020 22:38 )    Color: Yellow / Appearance: Turbid / S.020 / pH: x  Gluc: x / Ketone: Trace  / Bili: Negative / Urobili: Negative   Blood: x / Protein: 100 / Nitrite: Positive   Leuk Esterase: Moderate / RBC: >50 /HPF / WBC >50   Sq Epi: x / Non Sq Epi: Occasional / Bacteria: Many      .Blood Blood-Peripheral   No growth to date. -- 10-11 @ 00:28      Rapid RVP Result: NotDetec (10-10 @ 22:32)        CENTRAL LINE: N            DE JESUS: N                         A-LINE: N                           GLOBAL ISSUE/BEST PRACTICE  Analgesia: N  Sedation: N  CAM-ICU: Yes  HOB elevation: yes  Stress ulcer prophylaxis: protonix  VTE prophylaxis: on eliquis  Nutrition: DASH diet    CODE STATUS: full

## 2020-10-12 NOTE — PROGRESS NOTE ADULT - ATTENDING COMMENTS
Pt seen, examined, case & care plan d/w pt, residents at detail.  D/W DR Moreno-ICU & Dr lindsey- ID   AM labs   OOB to chair

## 2020-10-12 NOTE — PROGRESS NOTE ADULT - PROBLEM SELECTOR PLAN 9
DVT ppx: Eliquis 5 mg BID   IMPROVE VTE Individual Risk Assessment          RISK                                                          Points  [  ] Previous VTE                                                3  [  ] Thrombophilia                                             2  [  ] Lower limb paralysis                                   2        (unable to hold up >15 seconds)    [  ] Current Cancer                                             2         (within 6 months)  [  ] Immobilization > 24 hrs                              1  [  ] ICU/CCU stay > 24 hours                             1  [ x ] Age > 60                                                         1    IMPROVE VTE Score: 1    DVT PPx: Continue Eliquis 5 mg PO BID

## 2020-10-12 NOTE — PROGRESS NOTE ADULT - PROBLEM SELECTOR PLAN 2
recent prostate laser procedure 10/1. 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  - likely source of infection, on meropenem   - plan of care as per ICU recent prostate laser procedure 10/1. 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  - likely source of infection, on meropenem   - plan of care as per ICU  - blood culture prelim NGTD recent prostate laser procedure 10/1. 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  - likely source of infection, on meropenem   - plan of care as per ICU  - blood culture prelim NGTD

## 2020-10-12 NOTE — CONSULT NOTE ADULT - SUBJECTIVE AND OBJECTIVE BOX
[facility] Cardiology Consult - Waqar Degroot [place] [phone]    CHIEF COMPLAINT: Patient is a 75y old  Male who presents with a chief complaint of UTI (12 Oct 2020 08:09)      HPI:  75 year old male with PMHX atrial fibrillation (on Eliquis), asthma, COPD, former smoker, hx of benign lung cancer R lobe surgically removed at [facility]) CAD (s/p 5 stents ~2001), GERD, HLD, HTN presents after shakes and low O2 70s. Patient endorses sudden onset of shaking and shortness of breath earlier today. Patient states he used his nebulizer treatment, however felt no relief. He decided to come to the ED for further evaluation. Denies sick contacts or recent travel. Of note, patient with history of intubation 2019, during admission to [facility] for acute hypercapnic respiratory failure. Patient states he was recently tested for COVID and results were negative. Denies fever, abdominal pain. Admits nausea, vomiting x1 episode in the ER.     ED course:  Vitals: T 103.8, , /71, RR 18, SpO2 96% on RA  Labs: Neutrophil 90.1%, Lymphocyte 6.3%, Monocyte 1.0%, PT/INR 13.7/1.18, PTT 25.6, Lactate 2.2  UA: positive nitrite, moderate LE, >50 RBC, >50 WBC, many bacteria  CT abd/pelvis: Although the urinary bladder is incompletely distended, there appears to be wall thickening. Correlate with urinalysis for the possibility of cystitis.  CTA chest: No significant interval change in the chest compared to the previous study of April 11, 2016. Redemonstration of emphysema and postoperative changes in the right upper lobe  CXR: official read pending  EKG: NSR vent rate 100 bpm  Given IV NS 1 L bolus x2, Tylenol 650 mg PO x1, Proventil 2 puffs x1, IV Solumedrol 125 mg IVP x1, IV Zithromax and IV Rocephin, continue IV Meropenem  with H/O Previous ESBL E Coli sepsis/prostatitis in 2019   (10 Oct 2020 23:41)      PAST MEDICAL & SURGICAL HISTORY:  GI bleed  while on Antiplatelets    Nephrolithiasis  s/p Lithotripsy    GERD (gastroesophageal reflux disease)    HLD (hyperlipidemia)    HTN (hypertension)    Stented coronary artery    Asthma    Chronic obstructive pulmonary disease  Asthma    S/P primary angioplasty with coronary stent    Lung tumor  Rt Lung tumor resection,benign        SOCIAL HISTORY: Alochol: Denied  Smoking: Nonsmoker  Drug Use: Denied  Marital Status:         FAMILY HISTORY: FAMILY HISTORY:  Family history of stroke    Family history of heart disease        MEDICATIONS  (STANDING):  apixaban 5 milliGRAM(s) Oral every 12 hours  atorvastatin 40 milliGRAM(s) Oral at bedtime  budesonide 160 MICROgram(s)/formoterol 4.5 MICROgram(s) Inhaler 2 Puff(s) Inhalation two times a day  cholecalciferol 1000 Unit(s) Oral at bedtime  cyanocobalamin 1000 MICROGram(s) Oral at bedtime  hydrocortisone sodium succinate Injectable 100 milliGRAM(s) IV Push three times a day  meropenem  IVPB      meropenem  IVPB 1000 milliGRAM(s) IV Intermittent every 8 hours  norepinephrine Infusion 0.05 MICROgram(s)/kG/Min (7.23 mL/Hr) IV Continuous <Continuous>  pantoprazole    Tablet 40 milliGRAM(s) Oral before breakfast  sodium chloride 0.9%. 1000 milliLiter(s) (60 mL/Hr) IV Continuous <Continuous>  tiotropium 18 MICROgram(s) Capsule 1 Capsule(s) Inhalation daily    MEDICATIONS  (PRN):  acetaminophen   Tablet .. 650 milliGRAM(s) Oral every 6 hours PRN Temp greater or equal to 38C (100.4F), Mild Pain (1 - 3)  ALBUTerol    90 MICROgram(s) HFA Inhaler 2 Puff(s) Inhalation every 6 hours PRN Shortness of Breath and/or Wheezing  albuterol/ipratropium for Nebulization 3 milliLiter(s) Nebulizer every 6 hours PRN Shortness of Breath and/or Wheezing  buDESOnide    Inhalation Suspension 0.5 milliGRAM(s) Inhalation two times a day PRN SOB/wheezing  melatonin 3 milliGRAM(s) Oral at bedtime PRN Insomnia      Allergies    No Known Allergies    Intolerances        REVIEW OF SYSTEMS:  CONSTITUTIONAL: No weakness, fevers or chills  EYES/ENT: No visual changes;  No vertigo or throat pain   NECK: No pain or stiffness  RESPIRATORY: No cough, wheezing, hemoptysis; No shortness of breath  CARDIOVASCULAR: No chest pain or palpitations  GASTROINTESTINAL: No abdominal pain. No nausea, vomiting, or hematemesis; No diarrhea or constipation. No melena or hematochezia.  GENITOURINARY: No dysuria, frequency or hematuria  NEUROLOGICAL: No numbness or weakness  SKIN: No itching or rash  All other review of systems is negative unless indicated above    VITAL SIGNS:   Vital Signs Last 24 Hrs  T(C): 37 (12 Oct 2020 07:10), Max: 37 (12 Oct 2020 07:10)  T(F): 98.6 (12 Oct 2020 07:10), Max: 98.6 (12 Oct 2020 07:10)  HR: 65 (12 Oct 2020 08:24) (58 - 85)  BP: 93/52 (12 Oct 2020 08:00) (84/52 - 126/58)  BP(mean): 69 (12 Oct 2020 08:00) (63 - 84)  RR: 24 (12 Oct 2020 08:00) (17 - 52)  SpO2: 95% (12 Oct 2020 08:24) (91% - 98%)    I&O's Summary    11 Oct 2020 07:01  -  12 Oct 2020 07:00  --------------------------------------------------------  IN: 2892.4 mL / OUT: 1640 mL / NET: 1252.4 mL        PHYSICAL EXAM:  Constitutional: Awake in NAD  HEENT:  LESTER, EOMI  Neck: No JVD  Pulmonary: CTA B/L No R/R/W  Cardiovascular: PMI not palpable non-displaced Regular S1 and S2, no murmurs, rubs, gallops or clicks  Gastrointestinal: Bowel Sounds present, soft, nontender.   Extremities: Trace peripheral edema.   Neuro: No gross focal deficits    LABS: All Labs Reviewed:                        11.6   34.49 )-----------( 125      ( 12 Oct 2020 05:32 )             33.9                         12.3   15.42 )-----------( 144      ( 11 Oct 2020 05:54 )             35.5                         13.8   8.15  )-----------( 177      ( 10 Oct 2020 22:23 )             39.3     12 Oct 2020 05:32    147    |  115    |  17     ----------------------------<  146    4.2     |  27     |  0.90   11 Oct 2020 05:54    145    |  111    |  16     ----------------------------<  130    4.0     |  26     |  1.30   10 Oct 2020 22:23    144    |  106    |  15     ----------------------------<  99     4.1     |  31     |  1.10     Ca    7.7        12 Oct 2020 05:32  Ca    7.7        11 Oct 2020 05:54  Ca    8.8        10 Oct 2020 22:23    TPro  6.4    /  Alb  3.4    /  TBili  0.3    /  DBili  x      /  AST  24     /  ALT  38     /  AlkPhos  94     10 Oct 2020 22:23    PT/INR - ( 10 Oct 2020 22:23 )   PT: 13.7 sec;   INR: 1.18 ratio         PTT - ( 10 Oct 2020 22:23 )  PTT:25.6 sec      Blood Culture: Organism --  Gram Stain Blood -- Gram Stain --  Specimen Source .Blood Blood-Peripheral  Culture-Blood --            RADIOLOGY/EKG:

## 2020-10-12 NOTE — PROGRESS NOTE ADULT - ASSESSMENT
75m with h/o enlarged prostate, htn, cad, copd, covid antibodies admitted with chills/rigors  found to have septic shock likely -- gu source/prostate  leukocytosis with bandemia, fever, hypotensive

## 2020-10-13 LAB
-  AMIKACIN: SIGNIFICANT CHANGE UP
-  AMOXICILLIN/CLAVULANIC ACID: SIGNIFICANT CHANGE UP
-  AMPICILLIN/SULBACTAM: SIGNIFICANT CHANGE UP
-  AMPICILLIN: SIGNIFICANT CHANGE UP
-  AZTREONAM: SIGNIFICANT CHANGE UP
-  CEFAZOLIN: SIGNIFICANT CHANGE UP
-  CEFEPIME: SIGNIFICANT CHANGE UP
-  CEFOXITIN: SIGNIFICANT CHANGE UP
-  CEFTRIAXONE: SIGNIFICANT CHANGE UP
-  CIPROFLOXACIN: SIGNIFICANT CHANGE UP
-  ERTAPENEM: SIGNIFICANT CHANGE UP
-  GENTAMICIN: SIGNIFICANT CHANGE UP
-  IMIPENEM: SIGNIFICANT CHANGE UP
-  LEVOFLOXACIN: SIGNIFICANT CHANGE UP
-  MEROPENEM: SIGNIFICANT CHANGE UP
-  NITROFURANTOIN: SIGNIFICANT CHANGE UP
-  PIPERACILLIN/TAZOBACTAM: SIGNIFICANT CHANGE UP
-  TIGECYCLINE: SIGNIFICANT CHANGE UP
-  TOBRAMYCIN: SIGNIFICANT CHANGE UP
-  TRIMETHOPRIM/SULFAMETHOXAZOLE: SIGNIFICANT CHANGE UP
ALBUMIN SERPL ELPH-MCNC: 2.2 G/DL — LOW (ref 3.3–5)
ALP SERPL-CCNC: 83 U/L — SIGNIFICANT CHANGE UP (ref 40–120)
ALT FLD-CCNC: 48 U/L — SIGNIFICANT CHANGE UP (ref 12–78)
ANION GAP SERPL CALC-SCNC: 5 MMOL/L — SIGNIFICANT CHANGE UP (ref 5–17)
AST SERPL-CCNC: 21 U/L — SIGNIFICANT CHANGE UP (ref 15–37)
BASOPHILS # BLD AUTO: 0 K/UL — SIGNIFICANT CHANGE UP (ref 0–0.2)
BASOPHILS NFR BLD AUTO: 0 % — SIGNIFICANT CHANGE UP (ref 0–2)
BILIRUB SERPL-MCNC: 0.3 MG/DL — SIGNIFICANT CHANGE UP (ref 0.2–1.2)
BUN SERPL-MCNC: 21 MG/DL — SIGNIFICANT CHANGE UP (ref 7–23)
CALCIUM SERPL-MCNC: 8.1 MG/DL — LOW (ref 8.5–10.1)
CHLORIDE SERPL-SCNC: 115 MMOL/L — HIGH (ref 96–108)
CO2 SERPL-SCNC: 26 MMOL/L — SIGNIFICANT CHANGE UP (ref 22–31)
CREAT SERPL-MCNC: 0.87 MG/DL — SIGNIFICANT CHANGE UP (ref 0.5–1.3)
CULTURE RESULTS: SIGNIFICANT CHANGE UP
EOSINOPHIL # BLD AUTO: 0 K/UL — SIGNIFICANT CHANGE UP (ref 0–0.5)
EOSINOPHIL NFR BLD AUTO: 0 % — SIGNIFICANT CHANGE UP (ref 0–6)
GLUCOSE SERPL-MCNC: 157 MG/DL — HIGH (ref 70–99)
HCT VFR BLD CALC: 33.2 % — LOW (ref 39–50)
HGB BLD-MCNC: 11.1 G/DL — LOW (ref 13–17)
INR BLD: 1.54 RATIO — HIGH (ref 0.88–1.16)
LYMPHOCYTES # BLD AUTO: 0 % — LOW (ref 13–44)
LYMPHOCYTES # BLD AUTO: 0 K/UL — LOW (ref 1–3.3)
MAGNESIUM SERPL-MCNC: 2.1 MG/DL — SIGNIFICANT CHANGE UP (ref 1.6–2.6)
MCHC RBC-ENTMCNC: 31.7 PG — SIGNIFICANT CHANGE UP (ref 27–34)
MCHC RBC-ENTMCNC: 33.4 GM/DL — SIGNIFICANT CHANGE UP (ref 32–36)
MCV RBC AUTO: 94.9 FL — SIGNIFICANT CHANGE UP (ref 80–100)
METHOD TYPE: SIGNIFICANT CHANGE UP
MONOCYTES # BLD AUTO: 0.78 K/UL — SIGNIFICANT CHANGE UP (ref 0–0.9)
MONOCYTES NFR BLD AUTO: 3 % — SIGNIFICANT CHANGE UP (ref 2–14)
NEUTROPHILS # BLD AUTO: 25.38 K/UL — HIGH (ref 1.8–7.4)
NEUTROPHILS NFR BLD AUTO: 89 % — HIGH (ref 43–77)
NRBC # BLD: SIGNIFICANT CHANGE UP /100 WBCS (ref 0–0)
ORGANISM # SPEC MICROSCOPIC CNT: SIGNIFICANT CHANGE UP
ORGANISM # SPEC MICROSCOPIC CNT: SIGNIFICANT CHANGE UP
PHOSPHATE SERPL-MCNC: 1.8 MG/DL — LOW (ref 2.5–4.5)
PLATELET # BLD AUTO: 119 K/UL — LOW (ref 150–400)
POTASSIUM SERPL-MCNC: 4 MMOL/L — SIGNIFICANT CHANGE UP (ref 3.5–5.3)
POTASSIUM SERPL-SCNC: 4 MMOL/L — SIGNIFICANT CHANGE UP (ref 3.5–5.3)
PROT SERPL-MCNC: 5.2 G/DL — LOW (ref 6–8.3)
PROTHROM AB SERPL-ACNC: 17.7 SEC — HIGH (ref 10.6–13.6)
RBC # BLD: 3.5 M/UL — LOW (ref 4.2–5.8)
RBC # FLD: 14.6 % — HIGH (ref 10.3–14.5)
SODIUM SERPL-SCNC: 146 MMOL/L — HIGH (ref 135–145)
SPECIMEN SOURCE: SIGNIFICANT CHANGE UP
WBC # BLD: 26.16 K/UL — HIGH (ref 3.8–10.5)
WBC # FLD AUTO: 26.16 K/UL — HIGH (ref 3.8–10.5)

## 2020-10-13 PROCEDURE — 99233 SBSQ HOSP IP/OBS HIGH 50: CPT | Mod: GC

## 2020-10-13 RX ORDER — POTASSIUM PHOSPHATE, MONOBASIC POTASSIUM PHOSPHATE, DIBASIC 236; 224 MG/ML; MG/ML
30 INJECTION, SOLUTION INTRAVENOUS ONCE
Refills: 0 | Status: COMPLETED | OUTPATIENT
Start: 2020-10-13 | End: 2020-10-13

## 2020-10-13 RX ORDER — HYDROCORTISONE 20 MG
25 TABLET ORAL THREE TIMES A DAY
Refills: 0 | Status: DISCONTINUED | OUTPATIENT
Start: 2020-10-13 | End: 2020-10-14

## 2020-10-13 RX ADMIN — BUDESONIDE AND FORMOTEROL FUMARATE DIHYDRATE 2 PUFF(S): 160; 4.5 AEROSOL RESPIRATORY (INHALATION) at 06:11

## 2020-10-13 RX ADMIN — MEROPENEM 100 MILLIGRAM(S): 1 INJECTION INTRAVENOUS at 21:13

## 2020-10-13 RX ADMIN — ATORVASTATIN CALCIUM 40 MILLIGRAM(S): 80 TABLET, FILM COATED ORAL at 21:13

## 2020-10-13 RX ADMIN — Medication 1000 UNIT(S): at 21:13

## 2020-10-13 RX ADMIN — APIXABAN 5 MILLIGRAM(S): 2.5 TABLET, FILM COATED ORAL at 05:36

## 2020-10-13 RX ADMIN — Medication 3 MILLILITER(S): at 07:44

## 2020-10-13 RX ADMIN — Medication 0.5 MILLIGRAM(S): at 07:48

## 2020-10-13 RX ADMIN — APIXABAN 5 MILLIGRAM(S): 2.5 TABLET, FILM COATED ORAL at 17:35

## 2020-10-13 RX ADMIN — Medication 25 MILLIGRAM(S): at 21:13

## 2020-10-13 RX ADMIN — Medication 3 MILLILITER(S): at 11:53

## 2020-10-13 RX ADMIN — Medication 0.5 MILLIGRAM(S): at 19:53

## 2020-10-13 RX ADMIN — Medication 3 MILLILITER(S): at 16:02

## 2020-10-13 RX ADMIN — Medication 3 MILLILITER(S): at 19:53

## 2020-10-13 RX ADMIN — TIOTROPIUM BROMIDE 1 CAPSULE(S): 18 CAPSULE ORAL; RESPIRATORY (INHALATION) at 06:11

## 2020-10-13 RX ADMIN — BUDESONIDE AND FORMOTEROL FUMARATE DIHYDRATE 2 PUFF(S): 160; 4.5 AEROSOL RESPIRATORY (INHALATION) at 18:45

## 2020-10-13 RX ADMIN — Medication 50 MILLIGRAM(S): at 05:35

## 2020-10-13 RX ADMIN — PREGABALIN 1000 MICROGRAM(S): 225 CAPSULE ORAL at 21:13

## 2020-10-13 RX ADMIN — MEROPENEM 100 MILLIGRAM(S): 1 INJECTION INTRAVENOUS at 05:35

## 2020-10-13 RX ADMIN — PANTOPRAZOLE SODIUM 40 MILLIGRAM(S): 20 TABLET, DELAYED RELEASE ORAL at 05:36

## 2020-10-13 RX ADMIN — MEROPENEM 100 MILLIGRAM(S): 1 INJECTION INTRAVENOUS at 14:47

## 2020-10-13 RX ADMIN — Medication 25 MILLIGRAM(S): at 16:25

## 2020-10-13 RX ADMIN — Medication 5 MILLIGRAM(S): at 21:20

## 2020-10-13 RX ADMIN — POTASSIUM PHOSPHATE, MONOBASIC POTASSIUM PHOSPHATE, DIBASIC 83.33 MILLIMOLE(S): 236; 224 INJECTION, SOLUTION INTRAVENOUS at 10:58

## 2020-10-13 NOTE — PROGRESS NOTE ADULT - PROBLEM SELECTOR PLAN 5
Chronic, history of COPD in setting of asthma  -- Currently, saturations above 96% on RA    - Albuterol/Ipratropium treatments q6 PRN for wheezing  - budesonide (pulmicort) BID   - symbicort BID   - spiriva 1 capsule daily   - CTA chest: No significant interval change in the chest compared to the previous study of April 11, 2016. Redemonstration of emphysema and postoperative changes in the right upper   - zaleplon prn for insomnia Chronic, history of COPD in setting of asthma  -- Currently, saturations above 96% on RA    - Albuterol/Ipratropium treatments q6 PRN for wheezing  - budesonide (pulmicort) BID   - symbicort BID   - spiriva 1 capsule daily   - CTA chest: No significant interval change in the chest compared to the previous study of April 11, 2016. Redemonstration of emphysema and postoperative changes in the right upper   - zaleplon prn for insomnia  - on oral prednisone at home, was given stress steroids taper

## 2020-10-13 NOTE — PROGRESS NOTE ADULT - PROBLEM SELECTOR PLAN 2
recent prostate laser procedure 10/1. 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  - likely source of infection, on meropenem   - plan of care as per ICU  - blood culture prelim NGTD; urine cultures growing e coli; pending sensitivities   - ID: Dr. Marc following recent prostate laser procedure 10/1. 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  - likely source of infection, on meropenem   - blood culture prelim NGTD; urine cultures growing e coli; pending sensitivities   - ID: Dr. Marc following

## 2020-10-13 NOTE — PROGRESS NOTE ADULT - SUBJECTIVE AND OBJECTIVE BOX
Patient is a 75y old  Male who presents with a chief complaint of UTI (12 Oct 2020 16:25)    24 hour events: ***    REVIEW OF SYSTEMS  Constitutional: No fever, chills, fatigue  Neuro: No headache, numbness, weakness  Resp: No cough, wheezing, shortness of breath  CVS: No chest pain, palpitations, leg swelling  GI: No abdominal pain, nausea, vomiting, diarrhea   : No dysuria, frequency, incontinence  Skin: No itching, burning, rashes, or lesions   Msk: No joint pain or swelling  Psych: No depression, anxiety, mood swings  Heme: No bleeding    T(F): 98 (10-12-20 @ 23:34), Max: 98.9 (10-12-20 @ 19:40)  HR: 64 (10-13-20 @ 03:00) (60 - 89)  BP: 120/58 (10-13-20 @ 03:00) (81/51 - 136/60)  RR: 24 (10-13-20 @ 03:00) (21 - 42)  SpO2: 92% (10-13-20 @ 03:00) (89% - 97%)  Wt(kg): --            I&O's Summary    10-11 @ 07:01  -  10-12 @ 07:00  --------------------------------------------------------  IN: 2892.4 mL / OUT: 1640 mL / NET: 1252.4 mL    10-12 @ 07:01  -  10-13 @ 05:55  --------------------------------------------------------  IN: 1704.2 mL / OUT: 400 mL / NET: 1304.2 mL      PHYSICAL EXAM  General:   CNS:   HEENT:   Resp:   CVS:   Abd:   Ext:   Skin:     MEDICATIONS  meropenem  IVPB   meropenem  IVPB IV Intermittent      atorvastatin Oral  hydrocortisone sodium succinate Injectable IV Push    albuterol/ipratropium for Nebulization Nebulizer  buDESOnide    Inhalation Suspension Inhalation  budesonide 160 MICROgram(s)/formoterol 4.5 MICROgram(s) Inhaler Inhalation  tiotropium 18 MICROgram(s) Capsule Inhalation    acetaminophen   Tablet .. Oral PRN  zaleplon Oral PRN      apixaban Oral    pantoprazole    Tablet Oral      cholecalciferol Oral  cyanocobalamin Oral                                11.1   26.16 )-----------( 119      ( 13 Oct 2020 05:22 )             33.2       10-13    146<H>  |  115<H>  |  21  ----------------------------<  157<H>  4.0   |  26  |  0.87    Ca    8.1<L>      13 Oct 2020 05:22  Phos  1.8     10-13  Mg     2.1     10-13    TPro  5.2<L>  /  Alb  2.2<L>  /  TBili  0.3  /  DBili  x   /  AST  21  /  ALT  48  /  AlkPhos  83  10-13          PT/INR - ( 13 Oct 2020 05:22 )   PT: 17.7 sec;   INR: 1.54 ratio             .Urine Clean Catch (Midstream)   >100,000 CFU/ml Escherichia coli  <10,000 CFU/ml Normal Urogenital cathi present -- 10-11 @ 04:07  .Blood Blood-Peripheral   No growth to date. -- 10-11 @ 00:28      Rapid RVP Result: NotDetec (10-10 @ 22:32)    Radiology: ***  Bedside lung ultrasound: ***  Bedside ECHO: ***    CENTRAL LINE: Y/N          DATE INSERTED:              REMOVE: Y/N  DE JESUS: Y/N                        DATE INSERTED:              REMOVE: Y/N  A-LINE: Y/N                       DATE INSERTED:              REMOVE: Y/N    GLOBAL ISSUE/BEST PRACTICE  Analgesia:   Sedation:   CAM-ICU:   HOB elevation: yes  Stress ulcer prophylaxis:   VTE prophylaxis:   Glycemic control:   Nutrition:     CODE STATUS: ***  [facility] discussion: Y       Patient is a 75y old  Male who presents with a chief complaint of UTI (12 Oct 2020 16:25)    24 hour events: No acute events overnight. Patient seen and examined at bedside. Patient states he was able to sleep better last night.     REVIEW OF SYSTEMS  Resp: No cough, wheezing, shortness of breath  CVS: No chest pain, palpitations, leg swelling  GI: No abdominal pain, nausea, vomiting, diarrhea   : No dysuria, frequency, incontinence      T(F): 98 (10-12-20 @ 23:34), Max: 98.9 (10-12-20 @ 19:40)  HR: 64 (10-13-20 @ 03:00) (60 - 89)  BP: 120/58 (10-13-20 @ 03:00) (81/51 - 136/60)  RR: 24 (10-13-20 @ 03:00) (21 - 42)  SpO2: 92% (10-13-20 @ 03:00) (89% - 97%)  Wt(kg): --            I&O's Summary    10-11 @ 07:01  -  10-12 @ 07:00  --------------------------------------------------------  IN: 2892.4 mL / OUT: 1640 mL / NET: 1252.4 mL    10-12 @ 07:01  -  10-13 @ 05:55  --------------------------------------------------------  IN: 1704.2 mL / OUT: 400 mL / NET: 1304.2 mL      PHYSICAL EXAM  General: NAD, appears well nourished, resting comfortably in bed  CNS: AAOx3,  speech fluent  HEENT: PERRL, moist mucous membranes  Resp: clear to ascultation bilaterally. No wheezing, rales or ronchi  CVS: regular rate, rythym, +s1, +S2, no murmurs  Abd: soft, non tender, nondistended, bowel sounds present  Ext: no clubbing, cyanosis, no lower extremity edema  Skin: warm, dry    MEDICATIONS  meropenem  IVPB   meropenem  IVPB IV Intermittent      atorvastatin Oral  hydrocortisone sodium succinate Injectable IV Push    albuterol/ipratropium for Nebulization Nebulizer  buDESOnide    Inhalation Suspension Inhalation  budesonide 160 MICROgram(s)/formoterol 4.5 MICROgram(s) Inhaler Inhalation  tiotropium 18 MICROgram(s) Capsule Inhalation    acetaminophen   Tablet .. Oral PRN  zaleplon Oral PRN      apixaban Oral    pantoprazole    Tablet Oral      cholecalciferol Oral  cyanocobalamin Oral                                11.1   26.16 )-----------( 119      ( 13 Oct 2020 05:22 )             33.2       10-13    146<H>  |  115<H>  |  21  ----------------------------<  157<H>  4.0   |  26  |  0.87    Ca    8.1<L>      13 Oct 2020 05:22  Phos  1.8     10-13  Mg     2.1     10-13    TPro  5.2<L>  /  Alb  2.2<L>  /  TBili  0.3  /  DBili  x   /  AST  21  /  ALT  48  /  AlkPhos  83  10-13          PT/INR - ( 13 Oct 2020 05:22 )   PT: 17.7 sec;   INR: 1.54 ratio             .Urine Clean Catch (Midstream)   >100,000 CFU/ml Escherichia coli  <10,000 CFU/ml Normal Urogenital cathi present -- 10-11 @ 04:07  .Blood Blood-Peripheral   No growth to date. -- 10-11 @ 00:28      Rapid RVP Result: NotDetec (10-10 @ 22:32)    CENTRAL LINE: N            DE JESUS: N                         A-LINE: N                           GLOBAL ISSUE/BEST PRACTICE  Analgesia: N  Sedation: N  CAM-ICU: Yes  HOB elevation: yes  Stress ulcer prophylaxis: protonix  VTE prophylaxis: on eliquis  Nutrition: DASH diet    CODE STATUS: full

## 2020-10-13 NOTE — PROGRESS NOTE ADULT - PROBLEM SELECTOR PLAN 4
Chronic  - continue to hold home Bystolic 5 mg PO QD and antihypertensives, as patient is hypotensive on pressors Chronic  - continue to hold home Bystolic 5 mg PO QD and antihypertensives  - off pressors, will continue to hold bystolic pending clinical course  - continue to monitor routine hemodynamics  - Cardiology: Dr. Degroot following Chronic  - continue to hold home Bystolic 5 mg PO QD and antihypertensives  - off pressors, will continue to hold bystolic pending clinical course  - continue to monitor routine hemodynamics  - Cardiology: Dr. Degroot following; rec continue to hold bystolic in the setting of hypotension

## 2020-10-13 NOTE — PROGRESS NOTE ADULT - PROBLEM SELECTOR PLAN 1
sepsis Fever, POA now with   Leukocytosis with Bandemia - Hypotensive s/p 3 L NS bolus, now on IVF 60 cc/hour (78/46)  - source likely  UTI  ? prostatitis with recent  prostate surgery   - Admited to ICU 2/2 Septic Shock 2/2 UTI.    - White count 25.42 --> 34.49 --> 24.16 likely 2/2 to infection &  solu cortef.  - Urine cultures growing E Coli; Blood cx prelim NGTD  - tapered off levophed, now only on solu cortef taper   - COVID antibody positive (80.50)  - CXR (admission): No active parenchymal disease in the chest. sepsis Fever, POA now with   Leukocytosis with Bandemia - Hypotensive s/p 3 L NS bolus, now on IVF 60 cc/hour (78/46)  - source likely  UTI  ? prostatitis with recent  prostate surgery   - Admited to ICU 2/2 Septic Shock 2/2 UTI.    - White count 25.42 --> 34.49 --> 24.16 likely 2/2 to infection &  solu cortef.  - Urine cultures growing E Coli pending sensitivities; Blood cx prelim NGTD  - tapered off levophed, now only on solu cortef taper; BP tolerating well   - COVID antibody positive (80.50)  - CXR (admission): No active parenchymal disease in the chest. sepsis Fever, POA now with   Leukocytosis with Bandemia - Hypotensive s/p 3 L NS bolus, now on IVF 60 cc/hour (78/46)  - source likely  UTI  ? prostatitis with recent  prostate surgery   - Admited to ICU for septic shock on pressors, now downgraded to GMF with remote tele   - White count 25.42 --> 34.49 --> 24.16 likely 2/2 to infection &  solu cortef.  - Urine cultures growing E Coli pending sensitivities; Blood cx prelim NGTD  - tapered off levophed, now only on solu cortef taper; BP tolerating well   - COVID antibody positive (80.50)  - CXR (admission): No active parenchymal disease in the chest. sepsis Fever, POA now with   Leukocytosis with Bandemia - Hypotensive s/p 3 L NS bolus, now on IVF 60 cc/hour (78/46), RESOLVED   - source likely  UTI  ? prostatitis with recent  prostate surgery   - Admitted to ICU for septic shock on pressors, now downgraded to GMF with remote tele   - White count 25.42 --> 34.49 --> 24.16 likely 2/2 to infection &  solu cortef.  - Urine cultures growing E Coli pending sensitivities; Blood cx prelim NGTD  - tapered off levophed, now only on solu cortef taper; BP tolerating well   - COVID antibody positive (80.50)  - CXR (admission): No active parenchymal disease in the chest.

## 2020-10-13 NOTE — PROGRESS NOTE ADULT - ATTENDING COMMENTS
Infectious Diseases will continue to follow.   Please call with any questions.   Mary Lopez M.D.  [facility], Division of Infectious Diseases [phone]  For over the weekend and after hours, please call [phone]

## 2020-10-13 NOTE — PROGRESS NOTE ADULT - SUBJECTIVE AND OBJECTIVE BOX
[facility], Division of Infectious Diseases  DONALD Ragsdale Y. Patel, S. Shah  [phone]    Name: BASILIO LANDRY  Age: 75y  Gender: Male  MRN: [id number]  Note Date: 10-13-20    Interval History:  Patient seen and examined at bedside.   Txferred out of the ICU to [facility]  Afebrile overnight.   Notes reviewed    Antibiotics:  meropenem  IVPB 1000 milliGRAM(s) IV Intermittent every 8 hours    Medications:  acetaminophen   Tablet .. 650 milliGRAM(s) Oral every 6 hours PRN  albuterol/ipratropium for Nebulization 3 milliLiter(s) Nebulizer four times a day  apixaban 5 milliGRAM(s) Oral every 12 hours  atorvastatin 40 milliGRAM(s) Oral at bedtime  buDESOnide    Inhalation Suspension 0.5 milliGRAM(s) Inhalation every 12 hours  budesonide 160 MICROgram(s)/formoterol 4.5 MICROgram(s) Inhaler 2 Puff(s) Inhalation two times a day  cholecalciferol 1000 Unit(s) Oral at bedtime  cyanocobalamin 1000 MICROGram(s) Oral at bedtime  hydrocortisone sodium succinate Injectable 25 milliGRAM(s) IV Push three times a day  meropenem  IVPB      meropenem  IVPB 1000 milliGRAM(s) IV Intermittent every 8 hours  pantoprazole    Tablet 40 milliGRAM(s) Oral before breakfast  potassium phosphate IVPB 30 milliMole(s) IV Intermittent once  tiotropium 18 MICROgram(s) Capsule 1 Capsule(s) Inhalation daily  zaleplon 5 milliGRAM(s) Oral at bedtime PRN      Review of Systems:  A 10-point review of systems was obtained.     Pertinent positives and negatives--  Constitutional: No fevers. No Chills. No Rigors.   Cardiovascular: No chest pain. No palpitations.  Respiratory: No shortness of breath. No cough.  Gastrointestinal: No nausea or vomiting. No diarrhea or constipation.   Psychiatric: Pleasant. Appropriate affect.    Review of systems otherwise negative except as previously noted.    Allergies: No Known Allergies    For details regarding the patient's past medical history, social history, family history, and other miscellaneous elements, please refer the initial infectious diseases consultation and/or the admitting history and physical examination for this admission.    Objective:  Vitals:   T(C): 37.3 (10-13-20 @ 10:22), Max: 37.3 (10-13-20 @ 10:22)  HR: 67 (10-13-20 @ 10:22) (60 - 89)  BP: 116/66 (10-13-20 @ 10:22) (97/56 - 138/67)  RR: 20 (10-13-20 @ 10:22) (20 - 41)  SpO2: 92% (10-13-20 @ 10:22) (89% - 98%)    Physical Examination:  General: no acute distress  HEENT: NC/AT, EOMI, anicteric, no oral lesions  Neck: supple, no palpable LAD  Cardio: S1, S2 heard, RRR, no murmurs  Resp: breath sounds heard bilaterally, no rales, wheezes or rhonchi  Abd: soft, NT, ND, + bowel sounds  Neuro: AAOx3, no obvious focal deficits  Ext: no edema or cyanosis  Skin: warm, dry, no visible rash    Laboratory Studies:  CBC:                       11.1   26.16 )-----------( 119      ( 13 Oct 2020 05:22 )             33.2     CMP: 10-13    146<H>  |  115<H>  |  21  ----------------------------<  157<H>  4.0   |  26  |  0.87    Ca    8.1<L>      13 Oct 2020 05:22  Phos  1.8     10-13  Mg     2.1     10-13    TPro  5.2<L>  /  Alb  2.2<L>  /  TBili  0.3  /  DBili  x   /  AST  21  /  ALT  48  /  AlkPhos  83  10-13    LIVER FUNCTIONS - ( 13 Oct 2020 05:22 )  Alb: 2.2 g/dL / Pro: 5.2 g/dL / ALK PHOS: 83 U/L / ALT: 48 U/L / AST: 21 U/L / GGT: x             Microbiology:    Culture - Urine (collected 10-11-20 @ 04:07)  Source: .Urine Clean Catch (Midstream)  Preliminary Report (10-12-20 @ 21:33):    >100,000 CFU/ml Escherichia coli    <10,000 CFU/ml Normal Urogenital cathi present    Culture - Blood (collected 10-11-20 @ 00:28)  Source: .Blood Blood-Peripheral  Preliminary Report (10-12-20 @ 01:03):    No growth to date.    Culture - Blood (collected 10-11-20 @ 00:28)  Source: .Blood Blood-Peripheral  Preliminary Report (10-12-20 @ 01:03):    No growth to date.      Radiology:  < from: CT Abdomen and Pelvis w/ IV Cont (10.11.20 @ 00:51) >    EXAM:  CT ABDOMEN AND PELVIS IC                          EXAM:  CT ANGIO CHEST (W)AW IC                            PROCEDURE DATE:  10/11/2020          INTERPRETATION:  CT of the chest, abdomen and pelvis    Indication: Fever, hypoxia, shortness ofbreath    Technique: Axial images were obtained from the thoracic inlet through pubic symphysis with IV contrast.  95 cc of Omnipaque 350 was administered intravenously without complication and 5 cc was discarded.  Reformatted coronal and sagittal images were submitted.    Comparison: Chest CT of April 11, 2016    FINDINGS:    The visualized neck, axilla and subcutaneous tissues are unremarkable.    The tracheobronchial tree is patent centrally.  There is no mediastinal hematoma.  The great vessels are not enlarged.  There is no significant mediastinal or hilar adenopathy.    The heart is not enlarged.  There is no pericardial effusion.    Lungs: Redemonstration of emphysematous changes with postoperative changes in the right upper lobe.    Pleura: Unremarkable.  There is no free air or focal collection.  No free fluid.    Liver: Cystic lesions are unchanged compared to the previous study of April 11, 2016.  Spleen: Normal.  Gallbladder: Unremarkable.  Biliary tree: Unremarkable.  Adrenalglands: Normal.  Pancreas: Normal.  Kidneys: Left renal cyst.    Bowel:  There is no small bowel obstruction.  The appendix is unremarkable. The colon is under distended.    Bladder: Incompletely distended. Apparent wall thickening.  Pelvic organs: Prostate gland is enlarged, measuring 5.4 x 4.5 cm.    There is no significant adenopathy.  Vasculature: The aorta is not dilated. Mild to moderate atherosclerotic vascular calcification    Retroperitoneum: There is no mass.  Bones: Unremarkable.  Subcutaneous tissues: Fat-containing umbilical hernia    IMPRESSION:    No significant interval change in the chest compared to the previous study of April 11, 2016.  Redemonstration of emphysema and postoperative changes in the right upper lobe.    Although the urinary bladder is incompletely distended, there appears to be wall thickening. Correlate with urinalysis for the possibility of cystitis.        VA AMEZQUITA M.D, ATTENDING RADIOLOGIST  This document has been electronically signed. Oct 11 2020  3:06AM    < end of copied text >

## 2020-10-13 NOTE — PROGRESS NOTE ADULT - ATTENDING COMMENTS
76 y/o male with hx A.fib on apixaban, CAD, HTN, HLD, asthma/COPD, BPH, recent prostate procedure, with septic shock from UTI/pyelnonephritis, now resolved.    --septic shock resolved  wean stress dose steroids   Afib controlled, hold off on home BB, AC with eliquis  continue statin  --COPD/asthma, continue symbicort and spiriva, budesonide neb bid and standing duonebs (home regimen)  --tolerating diet  --normal renal function  --continue empiric alverto for UTI, pyelonephritis  --stable for floor  --discussed with pt

## 2020-10-13 NOTE — PROGRESS NOTE ADULT - PROBLEM SELECTOR PLAN 9
DVT ppx: Eliquis 5 mg BID   IMPROVE VTE Individual Risk Assessment          RISK                                                          Points  [  ] Previous VTE                                                3  [  ] Thrombophilia                                             2  [  ] Lower limb paralysis                                   2        (unable to hold up >15 seconds)    [  ] Current Cancer                                             2         (within 6 months)  [  ] Immobilization > 24 hrs                              1  [  ] ICU/CCU stay > 24 hours                             1  [ x ] Age > 60                                                         1    IMPROVE VTE Score: 1    DVT PPx: Continue Eliquis 5 mg PO BID DVT ppx: Eliquis 5 mg BID     IMPROVE VTE Individual Risk Assessment          RISK                                                          Points  [  ] Previous VTE                                                3  [  ] Thrombophilia                                             2  [  ] Lower limb paralysis                                   2        (unable to hold up >15 seconds)    [  ] Current Cancer                                             2         (within 6 months)  [  ] Immobilization > 24 hrs                              1  [  ] ICU/CCU stay > 24 hours                             1  [ x ] Age > 60                                                         1    IMPROVE VTE Score: 1    DVT PPx: Continue Eliquis 5 mg PO BID

## 2020-10-13 NOTE — PROGRESS NOTE ADULT - SUBJECTIVE AND OBJECTIVE BOX
Patient is a 75y old  Male who presents with a chief complaint of UTI (13 Oct 2020 07:03)      INTERVAL HPI:      75 year old male with PMHX atrial fibrillation (on Eliquis), asthma, COPD, former smoker, hx of benign lung cancer R lobe surgically removed at [facility]) CAD (s/p 5 stents ~2001), GERD, HLD, HTN presents after shakes and low O2 70s. Patient endorses sudden onset of shaking and shortness of breath earlier today. Patient states he used his nebulizer treatment, however felt no relief. He decided to come to the ED for further evaluation. Denies sick contacts or recent travel. Of note, patient with history of intubation 2019, during admission [facility] for acute hypercapnic respiratory failure. Patient states he was recently tested for COVID and results were negative. Denies fever, abdominal pain. Admits nausea, vomiting x1 episode in the ER.     ED course:  Vitals: T 103.8, , /71, RR 18, SpO2 96% on RA  Labs: Neutrophil 90.1%, Lymphocyte 6.3%, Monocyte 1.0%, PT/INR 13.7/1.18, PTT 25.6, Lactate 2.2  UA: positive nitrite, moderate LE, >50 RBC, >50 WBC, many bacteria  CT abd/pelvis: Although the urinary bladder is incompletely distended, there appears to be wall thickening. Correlate with urinalysis for the possibility of cystitis.  CTA chest: No significant interval change in the chest compared to the previous study of April 11, 2016. Redemonstration of emphysema and postoperative changes in the right upper lobe  CXR: official read pending  EKG: NSR vent rate 100 bpm  Given IV NS 1 L bolus x2, Tylenol 650 mg PO x1, Proventil 2 puffs x1, IV Solumedrol 125 mg IVP x1, IV Zithromax and IV Rocephin, continue IV Meropenem     10/11/2020: Patient seen and examined at bedside in ED. Admitted for UTI, fever. Pt had no complaints of pain. Pt checked on throughout the night.   On 2.5 L NC 92 % O2 sat well. BP 78/46 s/p 4 L NS bolus and maintenance NS @ 60 cc/hour.   - afebrile, WBC 8 --> 15.42 overnight, lactate 2.2 --> 2.1 --> 3.1   - ICU PA consult noted and reviewed. Patient to be transferred to MICU ,  Now pt with septic shock 2/2 , UTIs/p IV Fluid resuscitation,  started on levophed   - Patient admits to  "laser surgery" for treatment of his BPH on 10/1  at [facility] and completed a 5 day course of outpatient antibiotic treatment. states he was told he likely has scars and is concerned for infection in prostate. has had pain with urination since then.    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 Is anxious and mildly irritable. " I have not slept in a few days." Melatonin does not help. Overall he "does not feel well." however does not have any specific complaints. Admits to constipation, had no BM since admission. States he is breathing better, had duonebs treatment this morning. admits to cough, unchanged from baseline (has asthma/COPD). no wheezing. Patient urinating adequately, notes he filled 250 cc at a time. Denies any fevers, chills, chest pain, palpitations, abdominal pain, n/v/d dysuria, hematuria. Leukocytosis.    - White count elevated 34.4 this morning, worsening leukocytosis   - ICU: on levophed; now on 2.891 mL/hr, BP in 110s/50s + stress steroids solu -cortef   - on 2 L NC   - Patient reports he had a Green Light Procedure for enlarged prostate by Dr. Kelly, Urology on 10/1    10/13/2020: Patient seen and examined at bedside. Patient sitting up in chair, eating breakfast. States he "is feeling good this morning." He was able to fall asleep after given zaleplon last night however, was woken up at 11 PM for a temperature check. He feels short of breath upon getting up from bed. Overall he states he is breathing well, no wheezing. He is also urinating adequately, no pain, burning or hematuria. Had 1 BM yesterday, formed, no blood, no diarrhea or constipation. Has a mild cough, nonproductive no wheezing. Denies fevers, chills, chest pain, palpitations, abdominal pain, n/v/d/c, dizziness.     - afebrile overnight  - urine cultures growing E coli; blood cultures NGTD (Prelim)   - off levophed, BP tolerating well.   - solu cortef 50 mg q 8 tapering down as tolerated  - will most likely be downgraded to telemetry later today pending clinical course       OVERNIGHT EVENTS: no acute overnight events.     Home Medications:  atorvastatin 40 mg oral tablet: 1 tab(s) orally once a day (11 Oct 2020 00:18)  budesonide 0.5 mg/2 mL inhalation suspension: 2 milliliter(s) inhaled 2 times a day (11 Oct 2020 00:18)  Bystolic 5 mg oral tablet: 1 tab(s) orally once a day (11 Oct 2020 00:18)  Eliquis 5 mg oral tablet: 1 tab(s) orally 2 times a day (11 Oct 2020 00:18)  ipratropium-albuterol 0.5 mg-2.5 mg/3 mLinhalation solution: 3 milliliter(s) inhaled 4 times a day, As Needed (11 Oct 2020 00:18)  pantoprazole 40 mg oral delayed release tablet: 1 tab(s) orally once a day (11 Oct 2020 00:18)  predniSONE 5 mg oral tablet: 1 tab(s) orally once a day, As Needed (11 Oct 2020 00:18)  Proventil 90 mcg/inh inhalation aerosol: 2 puff(s) inhaled 2 times a day, As Needed (11 Oct 2020 00:18)  Spiriva HandiHaler 18 mcg inhalation capsule: 1 cap(s) inhaled once a day (11 Oct 2020 00:18)  Symbicort 160 mcg-4.5 mcg/inh inhalation aerosol: 2 puff(s) inhaled 2 times a day (11 Oct 2020 00:18)  Vitamin B12 500 mcg oral tablet: 1 tab(s) orally once a day (11 Oct 2020 00:18)  Vitamin D3 1000 intl units (25 mcg) oral tablet: 1 tab(s) orally once a day (11 Oct 2020 00:18)      MEDICATIONS  (STANDING):  albuterol/ipratropium for Nebulization 3 milliLiter(s) Nebulizer four times a day  apixaban 5 milliGRAM(s) Oral every 12 hours  atorvastatin 40 milliGRAM(s) Oral at bedtime  buDESOnide    Inhalation Suspension 0.5 milliGRAM(s) Inhalation every 12 hours  budesonide 160 MICROgram(s)/formoterol 4.5 MICROgram(s) Inhaler 2 Puff(s) Inhalation two times a day  cholecalciferol 1000 Unit(s) Oral at bedtime  cyanocobalamin 1000 MICROGram(s) Oral at bedtime  hydrocortisone sodium succinate Injectable 50 milliGRAM(s) IV Push three times a day  meropenem  IVPB      meropenem  IVPB 1000 milliGRAM(s) IV Intermittent every 8 hours  pantoprazole    Tablet 40 milliGRAM(s) Oral before breakfast  tiotropium 18 MICROgram(s) Capsule 1 Capsule(s) Inhalation daily    MEDICATIONS  (PRN):  acetaminophen   Tablet .. 650 milliGRAM(s) Oral every 6 hours PRN Temp greater or equal to 38C (100.4F), Mild Pain (1 - 3)  zaleplon 5 milliGRAM(s) Oral at bedtime PRN Insomnia      No Known Allergies      Social History:  Denies use of tobacco, EtOH, recreational drug use  Ambulates: independent  ADLs: independent (10 Oct 2020 23:41)      REVIEW OF SYSTEMS:  CONSTITUTIONAL: No fever, No chills, No fatigue, No myalgia, No Body ache, No Weakness  EYES: No eye pain,  No visual disturbances, No discharge, No Redness  ENMT: No ear pain, No nose bleed, No vertigo; No sinus pain, No throat pain, No Congestion  NECK: No pain, No stiffness  RESPIRATORY: ADMITs to cough, ADMITs to shortness of breath. No wheezing, No hemoptysis,   CARDIOVASCULAR: No chest pain, No palpitations  GASTROINTESTINAL: No abdominal pain, No epigastric pain. No nausea, No vomiting, No diarrhea, No constipation; [  ] BM  GENITOURINARY: No dysuria, No frequency, No urgency, No hematuria, No incontinence  NEUROLOGICAL: No headaches, No dizziness, No numbness, No tingling, No tremors, No weakness  EXTREMITIES: No Swelling, No Pain, No Edema  SKIN: [ x ] No itching, burning, rashes, or lesions   MUSCULOSKELETAL: No joint pain, No joint swelling; No muscle pain, No back pain, No extremity pain  PSYCHIATRIC: No depression, No anxiety, No mood swings, No difficulty sleeping at night  PAIN SCALE: [ x ] None  [  ] Other-  ROS Unable to obtain due to: [  ] Dementia  [  ] Lethargy  [  ] Sedated  [  ] Non verbal  REST OF REVIEW OF SYSTEMS: [ x ] Normal     Vital Signs Last 24 Hrs  T(C): 36.9 (13 Oct 2020 07:36), Max: 37.2 (12 Oct 2020 19:40)  T(F): 98.5 (13 Oct 2020 07:36), Max: 98.9 (12 Oct 2020 19:40)  HR: 66 (13 Oct 2020 07:44) (60 - 89)  BP: 138/67 (13 Oct 2020 07:00) (81/51 - 138/67)  BP(mean): 96 (13 Oct 2020 07:00) (62 - 119)  RR: 25 (13 Oct 2020 07:00) (21 - 42)  SpO2: 98% (13 Oct 2020 07:44) (89% - 98%)    CAPILLARY BLOOD GLUCOSE          I&O's Summary    12 Oct 2020 07:01  -  13 Oct 2020 07:00  --------------------------------------------------------  IN: 1924.2 mL / OUT: 900 mL / NET: 1024.2 mL      PHYSICAL EXAM:  GENERAL:  [ x ] NAD, [x  ] Well appearing, [  ] Agitated, [  ] Drowsy, [  ] Lethargy, [  ] Confused   HEAD:  [ x ] Normal, [  ] Other  EYES:  [ x ] EOMI, [x  ] PERRLA, [ x ] Conjunctiva and sclera clear normal, [  ] Other, [  ] Pallor, [  ] Discharge  ENMT:  [ x ] Normal, [ x ] Moist mucous membranes, [ x ] Good dentition, [ x ] No thrush  NECK:  [ x ] Supple, [x  ] No JVD, [ x ] Normal thyroid, [  ] Lymphadenopathy, [  ] Other  CHEST/LUNG:  [ x ] Clear to auscultation bilaterally, [  ] Breath Sounds equal B/L / decreased, [  ] Poor effort, [  ] No rales, [  ] No rhonchi, [  ] No wheezing  HEART:  [ x ] Regular rate and rhythm, [  ] Tachycardia, [  ] Bradycardia, [  ] Irregular, [ x ] No murmurs, No rubs, No gallops, [  ] PPM in place (Mfr:  )  ABDOMEN:  [ x ] Soft, [ x ] Nontender, [ x ] Nondistended, [ x ] No mass, [ x ] Bowel sounds present, [  ] Obese  NERVOUS SYSTEM:  [ x] Alert & Oriented x3, [  ] Nonfocal, [  ] Confusion, [  ] Encephalopathic, [  ] Sedated, [  ] Unable to assess, [  ] Dementia, [  ] Other-  EXTREMITIES:  [ x ] 2+ Peripheral Pulses, No clubbing, No cyanosis,  [  ] Edema B/L lower EXT, [  ] PVD stasis skin changes B/L lower EXT, [  ] Wound  LYMPH:  No lymphadenopathy noted  SKIN:  [ x ] No rashes or lesions, [  ] Pressure ulcers, [  ] Ecchymosis, [  ] Skin tears, [  ] Other    DIET: Diet, DASH/TLC:   Sodium & Cholesterol Restricted (10-10-20 @ 23:52)      LABS:                        11.1   26.16 )-----------( 119      ( 13 Oct 2020 05:22 )             33.2     13 Oct 2020 05:22    146    |  115    |  21     ----------------------------<  157    4.0     |  26     |  0.87     Ca    8.1        13 Oct 2020 05:22  Phos  1.8       13 Oct 2020 05:22  Mg     2.1       13 Oct 2020 05:22    TPro  5.2    /  Alb  2.2    /  TBili  0.3    /  DBili  x      /  AST  21     /  ALT  48     /  AlkPhos  83     13 Oct 2020 05:22    PT/INR - ( 13 Oct 2020 05:22 )   PT: 17.7 sec;   INR: 1.54 ratio             Culture Results:   >100,000 CFU/ml Escherichia coli  <10,000 CFU/ml Normal Urogenital cathi present (10-11 @ 04:07)  Culture Results:   No growth to date. (10-11 @ 00:28)  Culture Results:   No growth to date. (10-11 @ 00:28)            Culture - Urine (collected 11 Oct 2020 04:07)  Source: .Urine Clean Catch (Midstream)  Preliminary Report (12 Oct 2020 21:33):    >100,000 CFU/ml Escherichia coli    <10,000 CFU/ml Normal Urogenital cathi present    Culture - Blood (collected 11 Oct 2020 00:28)  Source: .Blood Blood-Peripheral  Preliminary Report (12 Oct 2020 01:03):    No growth to date.    Culture - Blood (collected 11 Oct 2020 00:28)  Source: .Blood Blood-Peripheral  Preliminary Report (12 Oct 2020 01:03):    No growth to date.       Anemia Panel:      Thyroid Panel:                RADIOLOGY & ADDITIONAL TESTS:      HEALTH ISSUES - PROBLEM Dx:  CAD (coronary artery disease)  CAD (coronary artery disease)    Atrial fibrillation  Atrial fibrillation    Need for prophylactic measure  Need for prophylactic measure    HLD (hyperlipidemia)  HLD (hyperlipidemia)    HTN (hypertension)  HTN (hypertension)    GERD (gastroesophageal reflux disease)  GERD (gastroesophageal reflux disease)    Urinary tract infection without hematuria, site unspecified  Urinary tract infection without hematuria, site unspecified    Prostatitis  Prostatitis    Septic shock  Septic shock    COPD (chronic obstructive pulmonary disease)  COPD (chronic obstructive pulmonary disease)          Consultant(s) Notes Reviewed:  [x ] YES     Care Discussed with [ x ] Consultants, [ x ] Patient, [  ] Family, [  ] HCP, [  ] , [  ] Social Service, [  ] RN, [  ] Physical Therapy, [  ] Palliative Care Team  DVT PPX: [  ] Lovenox, [  ] SC Heparin, [  ] Coumadin, [  ] Xarelto, [ x ] Eliquis, [  ] Pradaxa, [  ] IV Heparin drip, [  ] SCD, [  ] Ambulation, [  ] Contraindicated 2/2 GI Bleed, [  ] Contraindicated 2/2  Bleed, [  ] Contraindicated 2/2 Brain Bleed  Advanced Directive: [x ] None, [  ] DNR/DNI Patient is a 75y old  Male who presents with a chief complaint of UTI (13 Oct 2020 07:03)      INTERVAL HPI:      75 year old male with PMHX atrial fibrillation (on Eliquis), asthma, COPD, former smoker, hx of benign lung cancer R lobe surgically removed at [facility]) CAD (s/p 5 stents ~2001), GERD, HLD, HTN presents after shakes and low O2 70s. Patient endorses sudden onset of shaking and shortness of breath earlier today. Patient states he used his nebulizer treatment, however felt no relief. He decided to come to the ED for further evaluation. Denies sick contacts or recent travel. Of note, patient with history of intubation 2019, during admission to [facility] for acute hypercapnic respiratory failure. Patient states he was recently tested for COVID and results were negative. Denies fever, abdominal pain. Admits nausea, vomiting x1 episode in the ER.     ED course:  Vitals: T 103.8, , /71, RR 18, SpO2 96% on RA  Labs: Neutrophil 90.1%, Lymphocyte 6.3%, Monocyte 1.0%, PT/INR 13.7/1.18, PTT 25.6, Lactate 2.2  UA: positive nitrite, moderate LE, >50 RBC, >50 WBC, many bacteria  CT abd/pelvis: Although the urinary bladder is incompletely distended, there appears to be wall thickening. Correlate with urinalysis for the possibility of cystitis.  CTA chest: No significant interval change in the chest compared to the previous study of April 11, 2016. Redemonstration of emphysema and postoperative changes in the right upper lobe  CXR: official read pending  EKG: NSR vent rate 100 bpm  Given IV NS 1 L bolus x2, Tylenol 650 mg PO x1, Proventil 2 puffs x1, IV Solumedrol 125 mg IVP x1, IV Zithromax and IV Rocephin, continue IV Meropenem     10/11/2020: Patient seen and examined at bedside in ED. Admitted for UTI, fever. Pt had no complaints of pain. Pt checked on throughout the night.   On 2.5 L NC 92 % O2 sat well. BP 78/46 s/p 4 L NS bolus and maintenance NS @ 60 cc/hour.   - afebrile, WBC 8 --> 15.42 overnight, lactate 2.2 --> 2.1 --> 3.1   - ICU PA consult noted and reviewed. Patient to be transferred to MICU ,  Now pt with septic shock 2/2 , UTIs/p IV Fluid resuscitation,  started on levophed   - Patient admits to  "laser surgery" for treatment of his BPH on 10/1  at [facility] and completed a 5 day course of outpatient antibiotic treatment. states he was told he likely has scars and is concerned for infection in prostate. has had pain with urination since then.    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 Is anxious and mildly irritable. " I have not slept in a few days." Melatonin does not help. Overall he "does not feel well." however does not have any specific complaints. Admits to constipation, had no BM since admission. States he is breathing better, had duonebs treatment this morning. admits to cough, unchanged from baseline (has asthma/COPD). no wheezing. Patient urinating adequately, notes he filled 250 cc at a time. Denies any fevers, chills, chest pain, palpitations, abdominal pain, n/v/d dysuria, hematuria. Leukocytosis.    - White count elevated 34.4 this morning, worsening leukocytosis   - ICU: on levophed; now on 2.891 mL/hr, BP in 110s/50s + stress steroids solu -cortef   - on 2 L NC   - Patient reports he had a Green Light Procedure for enlarged prostate by Dr. Kelly, Urology on 10/1    10/13/2020: Patient seen and examined at bedside. Patient sitting up in chair, eating breakfast. States he "is feeling good this morning." He was able to fall asleep after given zaleplon last night however, was woken up at 11 PM for a temperature check. He feels short of breath upon getting up from bed. Overall he states he is breathing well, no wheezing. He is also urinating adequately, no pain, burning or hematuria. Had 1 BM yesterday, formed, no blood, no diarrhea or constipation. Has a mild cough, nonproductive no wheezing. Denies fevers, chills, chest pain, palpitations, abdominal pain, n/v/d/c, dizziness.     - afebrile overnight  - urine cultures growing E coli; blood cultures NGTD (Prelim)   - off levophed, BP tolerating well.   - solu cortef 50 mg q 8 tapering down as tolerated  - downgraded to GMF today to complete steroid taper and continue with meropenem      OVERNIGHT EVENTS: no acute overnight events.     Home Medications:  atorvastatin 40 mg oral tablet: 1 tab(s) orally once a day (11 Oct 2020 00:18)  budesonide 0.5 mg/2 mL inhalation suspension: 2 milliliter(s) inhaled 2 times a day (11 Oct 2020 00:18)  Bystolic 5 mg oral tablet: 1 tab(s) orally once a day (11 Oct 2020 00:18)  Eliquis 5 mg oral tablet: 1 tab(s) orally 2 times a day (11 Oct 2020 00:18)  ipratropium-albuterol 0.5 mg-2.5 mg/3 mLinhalation solution: 3 milliliter(s) inhaled 4 times a day, As Needed (11 Oct 2020 00:18)  pantoprazole 40 mg oral delayed release tablet: 1 tab(s) orally once a day (11 Oct 2020 00:18)  predniSONE 5 mg oral tablet: 1 tab(s) orally once a day, As Needed (11 Oct 2020 00:18)  Proventil 90 mcg/inh inhalation aerosol: 2 puff(s) inhaled 2 times a day, As Needed (11 Oct 2020 00:18)  Spiriva HandiHaler 18 mcg inhalation capsule: 1 cap(s) inhaled once a day (11 Oct 2020 00:18)  Symbicort 160 mcg-4.5 mcg/inh inhalation aerosol: 2 puff(s) inhaled 2 times a day (11 Oct 2020 00:18)  Vitamin B12 500 mcg oral tablet: 1 tab(s) orally once a day (11 Oct 2020 00:18)  Vitamin D3 1000 intl units (25 mcg) oral tablet: 1 tab(s) orally once a day (11 Oct 2020 00:18)      MEDICATIONS  (STANDING):  albuterol/ipratropium for Nebulization 3 milliLiter(s) Nebulizer four times a day  apixaban 5 milliGRAM(s) Oral every 12 hours  atorvastatin 40 milliGRAM(s) Oral at bedtime  buDESOnide    Inhalation Suspension 0.5 milliGRAM(s) Inhalation every 12 hours  budesonide 160 MICROgram(s)/formoterol 4.5 MICROgram(s) Inhaler 2 Puff(s) Inhalation two times a day  cholecalciferol 1000 Unit(s) Oral at bedtime  cyanocobalamin 1000 MICROGram(s) Oral at bedtime  hydrocortisone sodium succinate Injectable 50 milliGRAM(s) IV Push three times a day  meropenem  IVPB      meropenem  IVPB 1000 milliGRAM(s) IV Intermittent every 8 hours  pantoprazole    Tablet 40 milliGRAM(s) Oral before breakfast  tiotropium 18 MICROgram(s) Capsule 1 Capsule(s) Inhalation daily    MEDICATIONS  (PRN):  acetaminophen   Tablet .. 650 milliGRAM(s) Oral every 6 hours PRN Temp greater or equal to 38C (100.4F), Mild Pain (1 - 3)  zaleplon 5 milliGRAM(s) Oral at bedtime PRN Insomnia      No Known Allergies      Social History:  Denies use of tobacco, EtOH, recreational drug use  Ambulates: independent  ADLs: independent (10 Oct 2020 23:41)      REVIEW OF SYSTEMS:  CONSTITUTIONAL: No fever, No chills, No fatigue, No myalgia, No Body ache, No Weakness  EYES: No eye pain,  No visual disturbances, No discharge, No Redness  ENMT: No ear pain, No nose bleed, No vertigo; No sinus pain, No throat pain, No Congestion  NECK: No pain, No stiffness  RESPIRATORY: ADMITs to cough, ADMITs to shortness of breath. No wheezing, No hemoptysis,   CARDIOVASCULAR: No chest pain, No palpitations  GASTROINTESTINAL: No abdominal pain, No epigastric pain. No nausea, No vomiting, No diarrhea, No constipation; [  ] BM  GENITOURINARY: No dysuria, No frequency, No urgency, No hematuria, No incontinence  NEUROLOGICAL: No headaches, No dizziness, No numbness, No tingling, No tremors, No weakness  EXTREMITIES: No Swelling, No Pain, No Edema  SKIN: [ x ] No itching, burning, rashes, or lesions   MUSCULOSKELETAL: No joint pain, No joint swelling; No muscle pain, No back pain, No extremity pain  PSYCHIATRIC: No depression, No anxiety, No mood swings, No difficulty sleeping at night  PAIN SCALE: [ x ] None  [  ] Other-  ROS Unable to obtain due to: [  ] Dementia  [  ] Lethargy  [  ] Sedated  [  ] Non verbal  REST OF REVIEW OF SYSTEMS: [ x ] Normal     Vital Signs Last 24 Hrs  T(C): 36.9 (13 Oct 2020 07:36), Max: 37.2 (12 Oct 2020 19:40)  T(F): 98.5 (13 Oct 2020 07:36), Max: 98.9 (12 Oct 2020 19:40)  HR: 66 (13 Oct 2020 07:44) (60 - 89)  BP: 138/67 (13 Oct 2020 07:00) (81/51 - 138/67)  BP(mean): 96 (13 Oct 2020 07:00) (62 - 119)  RR: 25 (13 Oct 2020 07:00) (21 - 42)  SpO2: 98% (13 Oct 2020 07:44) (89% - 98%)    CAPILLARY BLOOD GLUCOSE          I&O's Summary    12 Oct 2020 07:01  -  13 Oct 2020 07:00  --------------------------------------------------------  IN: 1924.2 mL / OUT: 900 mL / NET: 1024.2 mL      PHYSICAL EXAM:  GENERAL:  [ x ] NAD, [x  ] Well appearing, [  ] Agitated, [  ] Drowsy, [  ] Lethargy, [  ] Confused   HEAD:  [ x ] Normal, [  ] Other  EYES:  [ x ] EOMI, [x  ] PERRLA, [ x ] Conjunctiva and sclera clear normal, [  ] Other, [  ] Pallor, [  ] Discharge  ENMT:  [ x ] Normal, [ x ] Moist mucous membranes, [ x ] Good dentition, [ x ] No thrush  NECK:  [ x ] Supple, [x  ] No JVD, [ x ] Normal thyroid, [  ] Lymphadenopathy, [  ] Other  CHEST/LUNG:  [ x ] Clear to auscultation bilaterally, [  ] Breath Sounds equal B/L / decreased, [  ] Poor effort, [  ] No rales, [  ] No rhonchi, [  ] No wheezing  HEART:  [ x ] Regular rate and rhythm, [  ] Tachycardia, [  ] Bradycardia, [  ] Irregular, [ x ] No murmurs, No rubs, No gallops, [  ] PPM in place (Mfr:  )  ABDOMEN:  [ x ] Soft, [ x ] Nontender, [ x ] Nondistended, [ x ] No mass, [ x ] Bowel sounds present, [  ] Obese  NERVOUS SYSTEM:  [ x] Alert & Oriented x3, [  ] Nonfocal, [  ] Confusion, [  ] Encephalopathic, [  ] Sedated, [  ] Unable to assess, [  ] Dementia, [  ] Other-  EXTREMITIES:  [ x ] 2+ Peripheral Pulses, No clubbing, No cyanosis,  [  ] Edema B/L lower EXT, [  ] PVD stasis skin changes B/L lower EXT, [  ] Wound  LYMPH:  No lymphadenopathy noted  SKIN:  [ x ] No rashes or lesions, [  ] Pressure ulcers, [  ] Ecchymosis, [  ] Skin tears, [  ] Other    DIET: Diet, DASH/TLC:   Sodium & Cholesterol Restricted (10-10-20 @ 23:52)      LABS:                        11.1   26.16 )-----------( 119      ( 13 Oct 2020 05:22 )             33.2     13 Oct 2020 05:22    146    |  115    |  21     ----------------------------<  157    4.0     |  26     |  0.87     Ca    8.1        13 Oct 2020 05:22  Phos  1.8       13 Oct 2020 05:22  Mg     2.1       13 Oct 2020 05:22    TPro  5.2    /  Alb  2.2    /  TBili  0.3    /  DBili  x      /  AST  21     /  ALT  48     /  AlkPhos  83     13 Oct 2020 05:22    PT/INR - ( 13 Oct 2020 05:22 )   PT: 17.7 sec;   INR: 1.54 ratio             Culture Results:   >100,000 CFU/ml Escherichia coli  <10,000 CFU/ml Normal Urogenital cathi present (10-11 @ 04:07)  Culture Results:   No growth to date. (10-11 @ 00:28)  Culture Results:   No growth to date. (10-11 @ 00:28)            Culture - Urine (collected 11 Oct 2020 04:07)  Source: .Urine Clean Catch (Midstream)  Preliminary Report (12 Oct 2020 21:33):    >100,000 CFU/ml Escherichia coli    <10,000 CFU/ml Normal Urogenital cathi present    Culture - Blood (collected 11 Oct 2020 00:28)  Source: .Blood Blood-Peripheral  Preliminary Report (12 Oct 2020 01:03):    No growth to date.    Culture - Blood (collected 11 Oct 2020 00:28)  Source: .Blood Blood-Peripheral  Preliminary Report (12 Oct 2020 01:03):    No growth to date.       Anemia Panel:      Thyroid Panel:                RADIOLOGY & ADDITIONAL TESTS:      HEALTH ISSUES - PROBLEM Dx:  CAD (coronary artery disease)  CAD (coronary artery disease)    Atrial fibrillation  Atrial fibrillation    Need for prophylactic measure  Need for prophylactic measure    HLD (hyperlipidemia)  HLD (hyperlipidemia)    HTN (hypertension)  HTN (hypertension)    GERD (gastroesophageal reflux disease)  GERD (gastroesophageal reflux disease)    Urinary tract infection without hematuria, site unspecified  Urinary tract infection without hematuria, site unspecified    Prostatitis  Prostatitis    Septic shock  Septic shock    COPD (chronic obstructive pulmonary disease)  COPD (chronic obstructive pulmonary disease)          Consultant(s) Notes Reviewed:  [x ] YES     Care Discussed with [ x ] Consultants, [ x ] Patient, [  ] Family, [  ] HCP, [  ] , [  ] Social Service, [  ] RN, [  ] Physical Therapy, [  ] Palliative Care Team  DVT PPX: [  ] Lovenox, [  ] SC Heparin, [  ] Coumadin, [  ] Xarelto, [ x ] Eliquis, [  ] Pradaxa, [  ] IV Heparin drip, [  ] SCD, [  ] Ambulation, [  ] Contraindicated 2/2 GI Bleed, [  ] Contraindicated 2/2  Bleed, [  ] Contraindicated 2/2 Brain Bleed  Advanced Directive: [x ] None, [  ] DNR/DNI Patient is a 75y old  Male who presents with a chief complaint of UTI (13 Oct 2020 07:03)      INTERVAL HPI:      75 year old male with PMHX atrial fibrillation (on Eliquis), asthma, COPD, former smoker, hx of benign lung cancer R lobe surgically removed at [facility]) CAD (s/p 5 stents ~2001), GERD, HLD, HTN presents after shakes and low O2 70s. Patient endorses sudden onset of shaking and shortness of breath earlier today. Patient states he used his nebulizer treatment, however felt no relief. He decided to come to the ED for further evaluation. Denies sick contacts or recent travel. Of note, patient with history of intubation 2019, during admission to [facility] for acute hypercapnic respiratory failure. Patient states he was recently tested for COVID and results were negative. Denies fever, abdominal pain. Admits nausea, vomiting x1 episode in the ER.     ED course:  Vitals: T 103.8, , /71, RR 18, SpO2 96% on RA  Labs: Neutrophil 90.1%, Lymphocyte 6.3%, Monocyte 1.0%, PT/INR 13.7/1.18, PTT 25.6, Lactate 2.2  UA: positive nitrite, moderate LE, >50 RBC, >50 WBC, many bacteria  CT abd/pelvis: Although the urinary bladder is incompletely distended, there appears to be wall thickening. Correlate with urinalysis for the possibility of cystitis.  CTA chest: No significant interval change in the chest compared to the previous study of April 11, 2016. Redemonstration of emphysema and postoperative changes in the right upper lobe  CXR: official read pending  EKG: NSR vent rate 100 bpm  Given IV NS 1 L bolus x2, Tylenol 650 mg PO x1, Proventil 2 puffs x1, IV Solumedrol 125 mg IVP x1, IV Zithromax and IV Rocephin, continue IV Meropenem     10/11/2020: Patient seen and examined at bedside in ED. Admitted for UTI, fever. Pt had no complaints of pain. Pt checked on throughout the night.   On 2.5 L NC 92 % O2 sat well. BP 78/46 s/p 4 L NS bolus and maintenance NS @ 60 cc/hour.   - afebrile, WBC 8 --> 15.42 overnight, lactate 2.2 --> 2.1 --> 3.1   - ICU PA consult noted and reviewed. Patient to be transferred to MICU ,  Now pt with septic shock 2/2 , UTIs/p IV Fluid resuscitation,  started on levophed   - Patient admits to  "laser surgery" for treatment of his BPH on 10/1  at [facility] and completed a 5 day course of outpatient antibiotic treatment. states he was told he likely has scars and is concerned for infection in prostate. has had pain with urination since then.    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 Is anxious and mildly irritable. " I have not slept in a few days." Melatonin does not help. Overall he "does not feel well." however does not have any specific complaints. Admits to constipation, had no BM since admission. States he is breathing better, had duonebs treatment this morning. admits to cough, unchanged from baseline (has asthma/COPD). no wheezing. Patient urinating adequately, notes he filled 250 cc at a time. Denies any fevers, chills, chest pain, palpitations, abdominal pain, n/v/d dysuria, hematuria. Leukocytosis.    - White count elevated 34.4 this morning, worsening leukocytosis   - ICU: on levophed; now on 2.891 mL/hr, BP in 110s/50s + stress steroids solu -cortef   - on 2 L NC   - Patient reports he had a Green Light Procedure for enlarged prostate by Dr. Kelly, Urology on 10/1    10/13/2020: Patient seen and examined at bedside. Patient sitting up in chair, eating breakfast. States he "is feeling good this morning." He was able to fall asleep after given zaleplon last night however, was woken up at 11 PM for a temperature check. He feels short of breath upon getting up from bed. Overall he states he is breathing well, no wheezing. He is also urinating adequately, no pain, burning or hematuria. Had 1 BM yesterday, formed, no blood, no diarrhea or constipation. Has a mild cough, nonproductive no wheezing. Denies fevers, chills, chest pain, palpitations, abdominal pain, n/v/d/c, dizziness.     - afebrile overnight  - urine cultures growing E coli; blood cultures NGTD (Prelim)   - off levophed, BP tolerating well.   - solu cortef 25 mg q 8 tapering down as tolerated  - downgraded to GMF today to complete steroid taper and continue with meropenem      OVERNIGHT EVENTS: no acute overnight events.     Home Medications:  atorvastatin 40 mg oral tablet: 1 tab(s) orally once a day (11 Oct 2020 00:18)  budesonide 0.5 mg/2 mL inhalation suspension: 2 milliliter(s) inhaled 2 times a day (11 Oct 2020 00:18)  Bystolic 5 mg oral tablet: 1 tab(s) orally once a day (11 Oct 2020 00:18)  Eliquis 5 mg oral tablet: 1 tab(s) orally 2 times a day (11 Oct 2020 00:18)  ipratropium-albuterol 0.5 mg-2.5 mg/3 mLinhalation solution: 3 milliliter(s) inhaled 4 times a day, As Needed (11 Oct 2020 00:18)  pantoprazole 40 mg oral delayed release tablet: 1 tab(s) orally once a day (11 Oct 2020 00:18)  predniSONE 5 mg oral tablet: 1 tab(s) orally once a day, As Needed (11 Oct 2020 00:18)  Proventil 90 mcg/inh inhalation aerosol: 2 puff(s) inhaled 2 times a day, As Needed (11 Oct 2020 00:18)  Spiriva HandiHaler 18 mcg inhalation capsule: 1 cap(s) inhaled once a day (11 Oct 2020 00:18)  Symbicort 160 mcg-4.5 mcg/inh inhalation aerosol: 2 puff(s) inhaled 2 times a day (11 Oct 2020 00:18)  Vitamin B12 500 mcg oral tablet: 1 tab(s) orally once a day (11 Oct 2020 00:18)  Vitamin D3 1000 intl units (25 mcg) oral tablet: 1 tab(s) orally once a day (11 Oct 2020 00:18)      MEDICATIONS  (STANDING):  albuterol/ipratropium for Nebulization 3 milliLiter(s) Nebulizer four times a day  apixaban 5 milliGRAM(s) Oral every 12 hours  atorvastatin 40 milliGRAM(s) Oral at bedtime  buDESOnide    Inhalation Suspension 0.5 milliGRAM(s) Inhalation every 12 hours  budesonide 160 MICROgram(s)/formoterol 4.5 MICROgram(s) Inhaler 2 Puff(s) Inhalation two times a day  cholecalciferol 1000 Unit(s) Oral at bedtime  cyanocobalamin 1000 MICROGram(s) Oral at bedtime  hydrocortisone sodium succinate Injectable 50 milliGRAM(s) IV Push three times a day  meropenem  IVPB      meropenem  IVPB 1000 milliGRAM(s) IV Intermittent every 8 hours  pantoprazole    Tablet 40 milliGRAM(s) Oral before breakfast  tiotropium 18 MICROgram(s) Capsule 1 Capsule(s) Inhalation daily    MEDICATIONS  (PRN):  acetaminophen   Tablet .. 650 milliGRAM(s) Oral every 6 hours PRN Temp greater or equal to 38C (100.4F), Mild Pain (1 - 3)  zaleplon 5 milliGRAM(s) Oral at bedtime PRN Insomnia      No Known Allergies      Social History:  Denies use of tobacco, EtOH, recreational drug use  Ambulates: independent  ADLs: independent (10 Oct 2020 23:41)      REVIEW OF SYSTEMS:  CONSTITUTIONAL: No fever, No chills, No fatigue, No myalgia, No Body ache, No Weakness  EYES: No eye pain,  No visual disturbances, No discharge, No Redness  ENMT: No ear pain, No nose bleed, No vertigo; No sinus pain, No throat pain, No Congestion  NECK: No pain, No stiffness  RESPIRATORY: ADMITs to cough, ADMITs to shortness of breath. No wheezing, No hemoptysis,   CARDIOVASCULAR: No chest pain, No palpitations  GASTROINTESTINAL: No abdominal pain, No epigastric pain. No nausea, No vomiting, No diarrhea, No constipation; [  ] BM  GENITOURINARY: No dysuria, No frequency, No urgency, No hematuria, No incontinence  NEUROLOGICAL: No headaches, No dizziness, No numbness, No tingling, No tremors, No weakness  EXTREMITIES: No Swelling, No Pain, No Edema  SKIN: [ x ] No itching, burning, rashes, or lesions   MUSCULOSKELETAL: No joint pain, No joint swelling; No muscle pain, No back pain, No extremity pain  PSYCHIATRIC: No depression, No anxiety, No mood swings, No difficulty sleeping at night  PAIN SCALE: [ x ] None  [  ] Other-  ROS Unable to obtain due to: [  ] Dementia  [  ] Lethargy  [  ] Sedated  [  ] Non verbal  REST OF REVIEW OF SYSTEMS: [ x ] Normal     Vital Signs Last 24 Hrs  T(C): 36.9 (13 Oct 2020 07:36), Max: 37.2 (12 Oct 2020 19:40)  T(F): 98.5 (13 Oct 2020 07:36), Max: 98.9 (12 Oct 2020 19:40)  HR: 66 (13 Oct 2020 07:44) (60 - 89)  BP: 138/67 (13 Oct 2020 07:00) (81/51 - 138/67)  BP(mean): 96 (13 Oct 2020 07:00) (62 - 119)  RR: 25 (13 Oct 2020 07:00) (21 - 42)  SpO2: 98% (13 Oct 2020 07:44) (89% - 98%)    CAPILLARY BLOOD GLUCOSE          I&O's Summary    12 Oct 2020 07:01  -  13 Oct 2020 07:00  --------------------------------------------------------  IN: 1924.2 mL / OUT: 900 mL / NET: 1024.2 mL      PHYSICAL EXAM:  GENERAL:  [ x ] NAD, [x  ] Well appearing, [  ] Agitated, [  ] Drowsy, [  ] Lethargy, [  ] Confused   HEAD:  [ x ] Normal, [  ] Other  EYES:  [ x ] EOMI, [x  ] PERRLA, [ x ] Conjunctiva and sclera clear normal, [  ] Other, [  ] Pallor, [  ] Discharge  ENMT:  [ x ] Normal, [ x ] Moist mucous membranes, [ x ] Good dentition, [ x ] No thrush  NECK:  [ x ] Supple, [x  ] No JVD, [ x ] Normal thyroid, [  ] Lymphadenopathy, [  ] Other  CHEST/LUNG:  [ x ] Clear to auscultation bilaterally, [  ] Breath Sounds equal B/L / decreased, [  ] Poor effort, [  ] No rales, [  ] No rhonchi, [  ] No wheezing  HEART:  [ x ] Regular rate and rhythm, [  ] Tachycardia, [  ] Bradycardia, [  ] Irregular, [ x ] No murmurs, No rubs, No gallops, [  ] PPM in place (Mfr:  )  ABDOMEN:  [ x ] Soft, [ x ] Nontender, [ x ] Nondistended, [ x ] No mass, [ x ] Bowel sounds present, [  ] Obese  NERVOUS SYSTEM:  [ x] Alert & Oriented x3, [  ] Nonfocal, [  ] Confusion, [  ] Encephalopathic, [  ] Sedated, [  ] Unable to assess, [  ] Dementia, [  ] Other-  EXTREMITIES:  [ x ] 2+ Peripheral Pulses, No clubbing, No cyanosis,  [  ] Edema B/L lower EXT, [  ] PVD stasis skin changes B/L lower EXT, [  ] Wound  LYMPH:  No lymphadenopathy noted  SKIN:  [ x ] No rashes or lesions, [  ] Pressure ulcers, [  ] Ecchymosis, [  ] Skin tears, [  ] Other    DIET: Diet, DASH/TLC:   Sodium & Cholesterol Restricted (10-10-20 @ 23:52)      LABS:                        11.1   26.16 )-----------( 119      ( 13 Oct 2020 05:22 )             33.2     13 Oct 2020 05:22    146    |  115    |  21     ----------------------------<  157    4.0     |  26     |  0.87     Ca    8.1        13 Oct 2020 05:22  Phos  1.8       13 Oct 2020 05:22  Mg     2.1       13 Oct 2020 05:22    TPro  5.2    /  Alb  2.2    /  TBili  0.3    /  DBili  x      /  AST  21     /  ALT  48     /  AlkPhos  83     13 Oct 2020 05:22    PT/INR - ( 13 Oct 2020 05:22 )   PT: 17.7 sec;   INR: 1.54 ratio             Culture Results:   >100,000 CFU/ml Escherichia coli  <10,000 CFU/ml Normal Urogenital cathi present (10-11 @ 04:07)  Culture Results:   No growth to date. (10-11 @ 00:28)  Culture Results:   No growth to date. (10-11 @ 00:28)            Culture - Urine (collected 11 Oct 2020 04:07)  Source: .Urine Clean Catch (Midstream)  Preliminary Report (12 Oct 2020 21:33):    >100,000 CFU/ml Escherichia coli    <10,000 CFU/ml Normal Urogenital cathi present    Culture - Blood (collected 11 Oct 2020 00:28)  Source: .Blood Blood-Peripheral  Preliminary Report (12 Oct 2020 01:03):    No growth to date.    Culture - Blood (collected 11 Oct 2020 00:28)  Source: .Blood Blood-Peripheral  Preliminary Report (12 Oct 2020 01:03):    No growth to date.       Anemia Panel:      Thyroid Panel:                RADIOLOGY & ADDITIONAL TESTS:      HEALTH ISSUES - PROBLEM Dx:  CAD (coronary artery disease)  CAD (coronary artery disease)    Atrial fibrillation  Atrial fibrillation    Need for prophylactic measure  Need for prophylactic measure    HLD (hyperlipidemia)  HLD (hyperlipidemia)    HTN (hypertension)  HTN (hypertension)    GERD (gastroesophageal reflux disease)  GERD (gastroesophageal reflux disease)    Urinary tract infection without hematuria, site unspecified  Urinary tract infection without hematuria, site unspecified    Prostatitis  Prostatitis    Septic shock  Septic shock    COPD (chronic obstructive pulmonary disease)  COPD (chronic obstructive pulmonary disease)          Consultant(s) Notes Reviewed:  [x ] YES     Care Discussed with [ x ] Consultants, [ x ] Patient, [  ] Family, [  ] HCP, [  ] , [  ] Social Service, [  ] RN, [  ] Physical Therapy, [  ] Palliative Care Team  DVT PPX: [  ] Lovenox, [  ] SC Heparin, [  ] Coumadin, [  ] Xarelto, [ x ] Eliquis, [  ] Pradaxa, [  ] IV Heparin drip, [  ] SCD, [  ] Ambulation, [  ] Contraindicated 2/2 GI Bleed, [  ] Contraindicated 2/2  Bleed, [  ] Contraindicated 2/2 Brain Bleed  Advanced Directive: [x ] None, [  ] DNR/DNI Patient is a 75y old  Male who presents with a chief complaint of UTI (13 Oct 2020 07:03)      INTERVAL HPI:      75 year old male with PMHX atrial fibrillation (on Eliquis), asthma, COPD, former smoker, hx of benign lung cancer R lobe surgically removed at [facility]) CAD (s/p 5 stents ~2001), GERD, HLD, HTN presents after shakes and low O2 70s. Patient endorses sudden onset of shaking and shortness of breath earlier today. Patient states he used his nebulizer treatment, however felt no relief. He decided to come to the ED for further evaluation. Denies sick contacts or recent travel. Of note, patient with history of intubation 2019, during admission to [facility] for acute hypercapnic respiratory failure. Patient states he was recently tested for COVID and results were negative. Denies fever, abdominal pain. Admits nausea, vomiting x1 episode in the ER.     ED course:  Vitals: T 103.8, , /71, RR 18, SpO2 96% on RA  Labs: Neutrophil 90.1%, Lymphocyte 6.3%, Monocyte 1.0%, PT/INR 13.7/1.18, PTT 25.6, Lactate 2.2  UA: positive nitrite, moderate LE, >50 RBC, >50 WBC, many bacteria  CT abd/pelvis: Although the urinary bladder is incompletely distended, there appears to be wall thickening. Correlate with urinalysis for the possibility of cystitis.  CTA chest: No significant interval change in the chest compared to the previous study of April 11, 2016. Redemonstration of emphysema and postoperative changes in the right upper lobe  CXR: official read pending  EKG: NSR vent rate 100 bpm  Given IV NS 1 L bolus x2, Tylenol 650 mg PO x1, Proventil 2 puffs x1, IV Solumedrol 125 mg IVP x1, IV Zithromax and IV Rocephin, continue IV Meropenem     10/11/2020: Patient seen and examined at bedside in ED. Admitted for UTI, fever. Pt had no complaints of pain. Pt checked on throughout the night.   On 2.5 L NC 92 % O2 sat well. BP 78/46 s/p 4 L NS bolus and maintenance NS @ 60 cc/hour.   - afebrile, WBC 8 --> 15.42 overnight, lactate 2.2 --> 2.1 --> 3.1   - ICU PA consult noted and reviewed. Patient to be transferred to MICU ,  Now pt with septic shock 2/2 , UTIs/p IV Fluid resuscitation,  started on levophed   - Patient admits to  "laser surgery" for treatment of his BPH on 10/1  at [facility] and completed a 5 day course of outpatient antibiotic treatment. states he was told he likely has scars and is concerned for infection in prostate. has had pain with urination since then.    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 Is anxious and mildly irritable. " I have not slept in a few days." Melatonin does not help. Overall he "does not feel well." however does not have any specific complaints. Admits to constipation, had no BM since admission. States he is breathing better, had duonebs treatment this morning. admits to cough, unchanged from baseline (has asthma/COPD). no wheezing. Patient urinating adequately, notes he filled 250 cc at a time. Denies any fevers, chills, chest pain, palpitations, abdominal pain, n/v/d dysuria, hematuria. Leukocytosis.    - White count elevated 34.4 this morning, worsening leukocytosis   - ICU: on levophed; now on 2.891 mL/hr, BP in 110s/50s + stress steroids solu -cortef   - on 2 L NC   - Patient reports he had a Green Light Procedure for enlarged prostate by Dr. Kelly, Urology on 10/1    10/13/2020: Patient seen and examined at bedside. Patient sitting up in chair, eating breakfast. States he "is feeling good this morning." He was able to fall asleep after given zaleplon last night however, was woken up at 11 PM for a temperature check. He feels short of breath upon getting up from bed. Overall he states he is breathing well, no wheezing. He is also urinating adequately, no pain, burning or hematuria. Had 1 BM yesterday, formed, no blood, no diarrhea or constipation. Has a mild cough, nonproductive no wheezing. Denies fevers, chills, chest pain, palpitations, abdominal pain, n/v/d/c, dizziness.     - afebrile overnight  - urine cultures growing E coli; blood cultures NGTD (Prelim)   - off levophed, BP tolerating well.   - solu cortef 25 mg q 8 tapering down as tolerated  - downgraded to GMF today to complete steroid taper and continue with meropenem      OVERNIGHT EVENTS: no acute overnight events.     Home Medications:  atorvastatin 40 mg oral tablet: 1 tab(s) orally once a day (11 Oct 2020 00:18)  budesonide 0.5 mg/2 mL inhalation suspension: 2 milliliter(s) inhaled 2 times a day (11 Oct 2020 00:18)  Bystolic 5 mg oral tablet: 1 tab(s) orally once a day (11 Oct 2020 00:18)  Eliquis 5 mg oral tablet: 1 tab(s) orally 2 times a day (11 Oct 2020 00:18)  ipratropium-albuterol 0.5 mg-2.5 mg/3 mLinhalation solution: 3 milliliter(s) inhaled 4 times a day, As Needed (11 Oct 2020 00:18)  pantoprazole 40 mg oral delayed release tablet: 1 tab(s) orally once a day (11 Oct 2020 00:18)  predniSONE 5 mg oral tablet: 1 tab(s) orally once a day, As Needed (11 Oct 2020 00:18)  Proventil 90 mcg/inh inhalation aerosol: 2 puff(s) inhaled 2 times a day, As Needed (11 Oct 2020 00:18)  Spiriva HandiHaler 18 mcg inhalation capsule: 1 cap(s) inhaled once a day (11 Oct 2020 00:18)  Symbicort 160 mcg-4.5 mcg/inh inhalation aerosol: 2 puff(s) inhaled 2 times a day (11 Oct 2020 00:18)  Vitamin B12 500 mcg oral tablet: 1 tab(s) orally once a day (11 Oct 2020 00:18)  Vitamin D3 1000 intl units (25 mcg) oral tablet: 1 tab(s) orally once a day (11 Oct 2020 00:18)      MEDICATIONS  (STANDING):  albuterol/ipratropium for Nebulization 3 milliLiter(s) Nebulizer four times a day  apixaban 5 milliGRAM(s) Oral every 12 hours  atorvastatin 40 milliGRAM(s) Oral at bedtime  buDESOnide    Inhalation Suspension 0.5 milliGRAM(s) Inhalation every 12 hours  budesonide 160 MICROgram(s)/formoterol 4.5 MICROgram(s) Inhaler 2 Puff(s) Inhalation two times a day  cholecalciferol 1000 Unit(s) Oral at bedtime  cyanocobalamin 1000 MICROGram(s) Oral at bedtime  hydrocortisone sodium succinate Injectable 50 milliGRAM(s) IV Push three times a day  meropenem  IVPB      meropenem  IVPB 1000 milliGRAM(s) IV Intermittent every 8 hours  pantoprazole    Tablet 40 milliGRAM(s) Oral before breakfast  tiotropium 18 MICROgram(s) Capsule 1 Capsule(s) Inhalation daily    MEDICATIONS  (PRN):  acetaminophen   Tablet .. 650 milliGRAM(s) Oral every 6 hours PRN Temp greater or equal to 38C (100.4F), Mild Pain (1 - 3)  zaleplon 5 milliGRAM(s) Oral at bedtime PRN Insomnia      No Known Allergies      Social History:  Denies use of tobacco, EtOH, recreational drug use  Ambulates: independent  ADLs: independent (10 Oct 2020 23:41)      REVIEW OF SYSTEMS:  CONSTITUTIONAL: No fever, No chills, No fatigue, No myalgia, No Body ache, No Weakness  EYES: No eye pain,  No visual disturbances, No discharge, No Redness  ENMT: No ear pain, No nose bleed, No vertigo; No sinus pain, No throat pain, No Congestion  NECK: No pain, No stiffness  RESPIRATORY: ADMITs to cough, ADMITs to shortness of breath. No wheezing, No hemoptysis,   CARDIOVASCULAR: No chest pain, No palpitations  GASTROINTESTINAL: No abdominal pain, No epigastric pain. No nausea, No vomiting, No diarrhea, No constipation; [ x ] BM  GENITOURINARY: No dysuria, No frequency, No urgency, No hematuria, No incontinence  NEUROLOGICAL: No headaches, No dizziness, No numbness, No tingling, No tremors, No weakness  EXTREMITIES: No Swelling, No Pain, No Edema  SKIN: [ x ] No itching, burning, rashes, or lesions   MUSCULOSKELETAL: No joint pain, No joint swelling; No muscle pain, No back pain, No extremity pain  PSYCHIATRIC: No depression, No anxiety, No mood swings, No difficulty sleeping at night  PAIN SCALE: [ x ] None  [  ] Other-  ROS Unable to obtain due to: [  ] Dementia  [  ] Lethargy  [  ] Sedated  [  ] Non verbal  REST OF REVIEW OF SYSTEMS: [ x ] Normal     Vital Signs Last 24 Hrs  T(C): 36.9 (13 Oct 2020 07:36), Max: 37.2 (12 Oct 2020 19:40)  T(F): 98.5 (13 Oct 2020 07:36), Max: 98.9 (12 Oct 2020 19:40)  HR: 66 (13 Oct 2020 07:44) (60 - 89)  BP: 138/67 (13 Oct 2020 07:00) (81/51 - 138/67)  BP(mean): 96 (13 Oct 2020 07:00) (62 - 119)  RR: 25 (13 Oct 2020 07:00) (21 - 42)  SpO2: 98% (13 Oct 2020 07:44) (89% - 98%)    CAPILLARY BLOOD GLUCOSE          I&O's Summary    12 Oct 2020 07:01  -  13 Oct 2020 07:00  --------------------------------------------------------  IN: 1924.2 mL / OUT: 900 mL / NET: 1024.2 mL      PHYSICAL EXAM:  GENERAL:  [ x ] NAD, [x  ] Well appearing, [  ] Agitated, [  ] Drowsy, [  ] Lethargy, [  ] Confused   HEAD:  [ x ] Normal, [  ] Other  EYES:  [ x ] EOMI, [x  ] PERRLA, [ x ] Conjunctiva and sclera clear normal, [  ] Other, [  ] Pallor, [  ] Discharge  ENMT:  [ x ] Normal, [ x ] Moist mucous membranes, [ x ] Good dentition, [ x ] No thrush  NECK:  [ x ] Supple, [x  ] No JVD, [ x ] Normal thyroid, [  ] Lymphadenopathy, [  ] Other  CHEST/LUNG:  [ x ] Clear to auscultation bilaterally, [  ] Breath Sounds equal B/L / decreased, [  ] Poor effort, [  ] No rales, [  ] No rhonchi, [  ] No wheezing  HEART:  [ x ] Regular rate and rhythm, [  ] Tachycardia, [  ] Bradycardia, [  ] Irregular, [ x ] No murmurs, No rubs, No gallops, [  ] PPM in place (Mfr:  )  ABDOMEN:  [ x ] Soft, [ x ] Nontender, [ x ] Nondistended, [ x ] No mass, [ x ] Bowel sounds present, [  ] Obese  NERVOUS SYSTEM:  [ x] Alert & Oriented x3, [  ] Nonfocal, [  ] Confusion, [  ] Encephalopathic, [  ] Sedated, [  ] Unable to assess, [  ] Dementia, [  ] Other-  EXTREMITIES:  [ x ] 2+ Peripheral Pulses, No clubbing, No cyanosis,  [  ] Edema B/L lower EXT, [  ] PVD stasis skin changes B/L lower EXT, [  ] Wound  LYMPH:  No lymphadenopathy noted  SKIN:  [ x ] No rashes or lesions, [  ] Pressure ulcers, [  ] Ecchymosis, [  ] Skin tears, [  ] Other    DIET: Diet, DASH/TLC:   Sodium & Cholesterol Restricted (10-10-20 @ 23:52)      LABS:                        11.1   26.16 )-----------( 119      ( 13 Oct 2020 05:22 )             33.2     13 Oct 2020 05:22    146    |  115    |  21     ----------------------------<  157    4.0     |  26     |  0.87     Ca    8.1        13 Oct 2020 05:22  Phos  1.8       13 Oct 2020 05:22  Mg     2.1       13 Oct 2020 05:22    TPro  5.2    /  Alb  2.2    /  TBili  0.3    /  DBili  x      /  AST  21     /  ALT  48     /  [employer]  83     13 Oct 2020 05:22    PT/INR - ( 13 Oct 2020 05:22 )   PT: 17.7 sec;   INR: 1.54 ratio             Culture Results:   >100,000 CFU/ml Escherichia coli  <10,000 CFU/ml Normal Urogenital cathi present (10-11 @ 04:07)  Culture Results:   No growth to date. (10-11 @ 00:28)  Culture Results:   No growth to date. (10-11 @ 00:28)            Culture - Urine (collected 11 Oct 2020 04:07)  Source: .Urine Clean Catch (Midstream)  Preliminary Report (12 Oct 2020 21:33):    >100,000 CFU/ml Escherichia coli    <10,000 CFU/ml Normal Urogenital cathi present    Culture - Blood (collected 11 Oct 2020 00:28)  Source: .Blood Blood-Peripheral  Preliminary Report (12 Oct 2020 01:03):    No growth to date.    Culture - Blood (collected 11 Oct 2020 00:28)  Source: .Blood Blood-Peripheral  Preliminary Report (12 Oct 2020 01:03):    No growth to date.       Anemia Panel:      Thyroid Panel:                RADIOLOGY & ADDITIONAL TESTS:      HEALTH ISSUES - PROBLEM Dx:  CAD (coronary artery disease)  CAD (coronary artery disease)    Atrial fibrillation  Atrial fibrillation    Need for prophylactic measure  Need for prophylactic measure    HLD (hyperlipidemia)  HLD (hyperlipidemia)    HTN (hypertension)  HTN (hypertension)    GERD (gastroesophageal reflux disease)  GERD (gastroesophageal reflux disease)    Urinary tract infection without hematuria, site unspecified  Urinary tract infection without hematuria, site unspecified    Prostatitis  Prostatitis    Septic shock  Septic shock    COPD (chronic obstructive pulmonary disease)  COPD (chronic obstructive pulmonary disease)          Consultant(s) Notes Reviewed:  [x ] YES     Care Discussed with [ x ] Consultants, [ x ] Patient, [  ] Family, [  ] HCP, [  ] , [  ] Social Service, [  ] RN, [  ] Physical Therapy, [  ] Palliative Care Team  DVT PPX: [  ] Lovenox, [  ] SC Heparin, [  ] Coumadin, [  ] Xarelto, [ x ] Eliquis, [  ] Pradaxa, [  ] IV Heparin drip, [  ] SCD, [  ] Ambulation, [  ] Contraindicated 2/2 GI Bleed, [  ] Contraindicated 2/2  Bleed, [  ] Contraindicated 2/2 Brain Bleed  Advanced Directive: [x ] None, [  ] DNR/DNI Patient is a 75y old  Male who presents with a chief complaint of UTI (13 Oct 2020 07:03)      INTERVAL HPI:      75 year old male with PMHX atrial fibrillation (on Eliquis), asthma, COPD, former smoker, hx of benign lung cancer R lobe surgically removed at [facility]) CAD (s/p 5 stents ~2001), GERD, HLD, HTN presents after shakes and low O2 70s. Patient endorses sudden onset of shaking and shortness of breath earlier today. Patient states he used his nebulizer treatment, however felt no relief. He decided to come to the ED for further evaluation. Denies sick contacts or recent travel. Of note, patient with history of intubation 2019, during admission to [facility] for acute hypercapnic respiratory failure. Patient states he was recently tested for COVID and results were negative. Denies fever, abdominal pain. Admits nausea, vomiting x1 episode in the ER.     ED course:  Vitals: T 103.8, , /71, RR 18, SpO2 96% on RA  Labs: Neutrophil 90.1%, Lymphocyte 6.3%, Monocyte 1.0%, PT/INR 13.7/1.18, PTT 25.6, Lactate 2.2  UA: positive nitrite, moderate LE, >50 RBC, >50 WBC, many bacteria  CT abd/pelvis: Although the urinary bladder is incompletely distended, there appears to be wall thickening. Correlate with urinalysis for the possibility of cystitis.  CTA chest: No significant interval change in the chest compared to the previous study of April 11, 2016. Redemonstration of emphysema and postoperative changes in the right upper lobe  CXR: official read pending  EKG: NSR vent rate 100 bpm  Given IV NS 1 L bolus x2, Tylenol 650 mg PO x1, Proventil 2 puffs x1, IV Solumedrol 125 mg IVP x1, IV Zithromax and IV Rocephin, continue IV Meropenem     10/11/2020: Patient seen and examined at bedside in ED. Admitted for UTI, fever. Pt had no complaints of pain. Pt checked on throughout the night.   On 2.5 L NC 92 % O2 sat well. BP 78/46 s/p 4 L NS bolus and maintenance NS @ 60 cc/hour.   - afebrile, WBC 8 --> 15.42 overnight, lactate 2.2 --> 2.1 --> 3.1   - ICU PA consult noted and reviewed. Patient to be transferred to MICU ,  Now pt with septic shock 2/2 , UTIs/p IV Fluid resuscitation,  started on levophed   - Patient admits to  "laser surgery" for treatment of his BPH on 10/1  at [facility] and completed a 5 day course of outpatient antibiotic treatment. states he was told he likely has scars and is concerned for infection in prostate. has had pain with urination since then.    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 Is anxious and mildly irritable. " I have not slept in a few days." Melatonin does not help. Overall he "does not feel well." however does not have any specific complaints. Admits to constipation, had no BM since admission. States he is breathing better, had duonebs treatment this morning. admits to cough, unchanged from baseline (has asthma/COPD). no wheezing. Patient urinating adequately, notes he filled 250 cc at a time. Denies any fevers, chills, chest pain, palpitations, abdominal pain, n/v/d dysuria, hematuria. Leukocytosis.    - White count elevated 34.4 this morning, worsening leukocytosis   - ICU: on levophed; now on 2.891 mL/hr, BP in 110s/50s + stress steroids solu -cortef   - on 2 L NC   - Patient reports he had a [facility] Procedure for enlarged prostate by Dr. Kelly, Urology on 10/1    10/13/2020: Patient seen and examined at bedside. Patient sitting up in chair, eating breakfast. States he "is feeling good this morning." He was able to fall asleep after given zaleplon last night however, was woken up at 11 PM for a temperature check. He feels short of breath upon getting up from bed. Overall he states he is breathing well, no wheezing. He is also urinating adequately, no pain, burning or hematuria. Had 1 BM yesterday, formed, no blood, no diarrhea or constipation. Has a mild cough, nonproductive no wheezing. Denies fevers, chills, chest pain, palpitations, abdominal pain, n/v/d/c, dizziness.     - afebrile overnight  - urine cultures growing E coli; blood cultures NGTD (Prelim)   - off levophed, BP tolerating well.   - solu cortef 25 mg q 8 tapering down as tolerated  - downgraded to GMF today to complete steroid taper and continue with meropenem      OVERNIGHT EVENTS: no acute overnight events.     Home Medications:  atorvastatin 40 mg oral tablet: 1 tab(s) orally once a day (11 Oct 2020 00:18)  budesonide 0.5 mg/2 mL inhalation suspension: 2 milliliter(s) inhaled 2 times a day (11 Oct 2020 00:18)  Bystolic 5 mg oral tablet: 1 tab(s) orally once a day (11 Oct 2020 00:18)  Eliquis 5 mg oral tablet: 1 tab(s) orally 2 times a day (11 Oct 2020 00:18)  ipratropium-albuterol 0.5 mg-2.5 mg/3 mLinhalation solution: 3 milliliter(s) inhaled 4 times a day, As Needed (11 Oct 2020 00:18)  pantoprazole 40 mg oral delayed release tablet: 1 tab(s) orally once a day (11 Oct 2020 00:18)  predniSONE 5 mg oral tablet: 1 tab(s) orally once a day, As Needed (11 Oct 2020 00:18)  Proventil 90 mcg/inh inhalation aerosol: 2 puff(s) inhaled 2 times a day, As Needed (11 Oct 2020 00:18)  Spiriva HandiHaler 18 mcg inhalation capsule: 1 cap(s) inhaled once a day (11 Oct 2020 00:18)  Symbicort 160 mcg-4.5 mcg/inh inhalation aerosol: 2 puff(s) inhaled 2 times a day (11 Oct 2020 00:18)  Vitamin B12 500 mcg oral tablet: 1 tab(s) orally once a day (11 Oct 2020 00:18)  Vitamin D3 1000 intl units (25 mcg) oral tablet: 1 tab(s) orally once a day (11 Oct 2020 00:18)      MEDICATIONS  (STANDING):  albuterol/ipratropium for Nebulization 3 milliLiter(s) Nebulizer four times a day  apixaban 5 milliGRAM(s) Oral every 12 hours  atorvastatin 40 milliGRAM(s) Oral at bedtime  buDESOnide    Inhalation Suspension 0.5 milliGRAM(s) Inhalation every 12 hours  budesonide 160 MICROgram(s)/formoterol 4.5 MICROgram(s) Inhaler 2 Puff(s) Inhalation two times a day  cholecalciferol 1000 Unit(s) Oral at bedtime  cyanocobalamin 1000 MICROGram(s) Oral at bedtime  hydrocortisone sodium succinate Injectable 50 milliGRAM(s) IV Push three times a day  meropenem  IVPB      meropenem  IVPB 1000 milliGRAM(s) IV Intermittent every 8 hours  pantoprazole    Tablet 40 milliGRAM(s) Oral before breakfast  tiotropium 18 MICROgram(s) Capsule 1 Capsule(s) Inhalation daily    MEDICATIONS  (PRN):  acetaminophen   Tablet .. 650 milliGRAM(s) Oral every 6 hours PRN Temp greater or equal to 38C (100.4F), Mild Pain (1 - 3)  zaleplon 5 milliGRAM(s) Oral at bedtime PRN Insomnia      No Known Allergies      Social History:  Denies use of tobacco, EtOH, recreational drug use  Ambulates: independent  ADLs: independent (10 Oct 2020 23:41)      REVIEW OF SYSTEMS:  CONSTITUTIONAL: No fever, No chills, No fatigue, No myalgia, No Body ache, No Weakness  EYES: No eye pain,  No visual disturbances, No discharge, No Redness  ENMT: No ear pain, No nose bleed, No vertigo; No sinus pain, No throat pain, No Congestion  NECK: No pain, No stiffness  RESPIRATORY: ADMITs to cough, ADMITs to shortness of breath. No wheezing, No hemoptysis,   CARDIOVASCULAR: No chest pain, No palpitations  GASTROINTESTINAL: No abdominal pain, No epigastric pain. No nausea, No vomiting, No diarrhea, No constipation; [ x ] BM  GENITOURINARY: No dysuria, No frequency, No urgency, No hematuria, No incontinence  NEUROLOGICAL: No headaches, No dizziness, No numbness, No tingling, No tremors, No weakness  EXTREMITIES: No Swelling, No Pain, No Edema  SKIN: [ x ] No itching, burning, rashes, or lesions   MUSCULOSKELETAL: No joint pain, No joint swelling; No muscle pain, No back pain, No extremity pain  PSYCHIATRIC: No depression, No anxiety, No mood swings, No difficulty sleeping at night  PAIN SCALE: [ x ] None  [  ] Other-  ROS Unable to obtain due to: [  ] Dementia  [  ] Lethargy  [  ] Sedated  [  ] Non verbal  REST OF REVIEW OF SYSTEMS: [ x ] Normal     Vital Signs Last 24 Hrs  T(C): 36.9 (13 Oct 2020 07:36), Max: 37.2 (12 Oct 2020 19:40)  T(F): 98.5 (13 Oct 2020 07:36), Max: 98.9 (12 Oct 2020 19:40)  HR: 66 (13 Oct 2020 07:44) (60 - 89)  BP: 138/67 (13 Oct 2020 07:00) (81/51 - 138/67)  BP(mean): 96 (13 Oct 2020 07:00) (62 - 119)  RR: 25 (13 Oct 2020 07:00) (21 - 42)  SpO2: 98% (13 Oct 2020 07:44) (89% - 98%)    CAPILLARY BLOOD GLUCOSE          I&O's Summary    12 Oct 2020 07:01  -  13 Oct 2020 07:00  --------------------------------------------------------  IN: 1924.2 mL / OUT: 900 mL / NET: 1024.2 mL      PHYSICAL EXAM:  GENERAL:  [ x ] NAD, [x  ] Well appearing, [  ] Agitated, [  ] Drowsy, [  ] Lethargy, [  ] Confused   HEAD:  [ x ] Normal, [  ] Other  EYES:  [ x ] EOMI, [x  ] PERRLA, [ x ] Conjunctiva and sclera clear normal, [  ] Other, [  ] Pallor, [  ] Discharge  ENMT:  [ x ] Normal, [ x ] Moist mucous membranes, [ x ] Good dentition, [ x ] No thrush  NECK:  [ x ] Supple, [x  ] No JVD, [ x ] Normal thyroid, [  ] Lymphadenopathy, [  ] Other  CHEST/LUNG:  [ x ] Clear to auscultation bilaterally, [  ] Breath Sounds equal B/L / decreased, [  ] Poor effort, [ x ] No rales, [ x ] No rhonchi, [ x ] No wheezing  HEART:  [ x ] Regular rate and rhythm, [  ] Tachycardia, [  ] Bradycardia, [  ] Irregular, [ x ] No murmurs, No rubs, No gallops, [  ] PPM in place (Mfr:  )  ABDOMEN:  [ x ] Soft, [ x ] Nontender, [ x ] Nondistended, [ x ] No mass, [ x ] Bowel sounds present, [ x ] Obese  NERVOUS SYSTEM:  [ x] Alert & Oriented x3, [ x ] Nonfocal, [  ] Confusion, [  ] Encephalopathic, [  ] Sedated, [  ] Unable to assess, [  ] Dementia, [  ] Other-  EXTREMITIES:  [ x ] 2+ Peripheral Pulses, No clubbing, No cyanosis,  [  ] Edema B/L lower EXT, [  ] PVD stasis skin changes B/L lower EXT, [  ] Wound  LYMPH:  No lymphadenopathy noted  SKIN:  [ x ] No rashes or lesions, [  ] Pressure ulcers, [  ] Ecchymosis, [  ] Skin tears, [  ] Other    DIET: Diet, DASH/TLC:   Sodium & Cholesterol Restricted (10-10-20 @ 23:52)      LABS:                        11.1   26.16 )-----------( 119      ( 13 Oct 2020 05:22 )             33.2     13 Oct 2020 05:22    146    |  115    |  21     ----------------------------<  157    4.0     |  26     |  0.87     Ca    8.1        13 Oct 2020 05:22  Phos  1.8       13 Oct 2020 05:22  Mg     2.1       13 Oct 2020 05:22    TPro  5.2    /  Alb  2.2    /  TBili  0.3    /  DBili  x      /  AST  21     /  ALT  48     /  AlkPhos  83     13 Oct 2020 05:22    PT/INR - ( 13 Oct 2020 05:22 )   PT: 17.7 sec;   INR: 1.54 ratio        Culture Results:   >100,000 CFU/ml Escherichia coli  <10,000 CFU/ml Normal Urogenital cathi present (10-11 @ 04:07)  Culture Results:   No growth to date. (10-11 @ 00:28)  Culture Results:   No growth to date. (10-11 @ 00:28)    Culture - Urine (collected 11 Oct 2020 04:07)  Source: .Urine Clean Catch (Midstream)  Preliminary Report (12 Oct 2020 21:33):    >100,000 CFU/ml Escherichia coli    <10,000 CFU/ml Normal Urogenital cathi present    Culture - Blood (collected 11 Oct 2020 00:28)  Source: .Blood Blood-Peripheral  Preliminary Report (12 Oct 2020 01:03):    No growth to date.    Culture - Blood (collected 11 Oct 2020 00:28)  Source: .Blood Blood-Peripheral  Preliminary Report (12 Oct 2020 01:03):    No growth to date.       Anemia Panel:      Thyroid Panel:    RADIOLOGY & ADDITIONAL TESTS: none      HEALTH ISSUES - PROBLEM Dx:  CAD (coronary artery disease)  CAD (coronary artery disease)    Atrial fibrillation  Atrial fibrillation    Need for prophylactic measure  Need for prophylactic measure    HLD (hyperlipidemia)  HLD (hyperlipidemia)    HTN (hypertension)  HTN (hypertension)    GERD (gastroesophageal reflux disease)  GERD (gastroesophageal reflux disease)    Urinary tract infection without hematuria, site unspecified  Urinary tract infection without hematuria, site unspecified    Prostatitis  Prostatitis    Septic shock  Septic shock    COPD (chronic obstructive pulmonary disease)  COPD (chronic obstructive pulmonary disease)      Consultant(s) Notes Reviewed:  [x ] YES     Care Discussed with [ x ] Consultants, [ x ] Patient, [  ] Family, [  ] HCP, [  ] , [  ] Social Service, [ x ] RN, [  ] Physical Therapy, [  ] Palliative Care Team  DVT PPX: [  ] Lovenox, [  ] SC Heparin, [  ] Coumadin, [  ] Xarelto, [ x ] Eliquis, [  ] Pradaxa, [  ] IV Heparin drip, [  ] SCD, [  ] Ambulation, [  ] Contraindicated 2/2 GI Bleed, [  ] Contraindicated 2/2  Bleed, [  ] Contraindicated 2/2 Brain Bleed  Advanced Directive: [x ] None, [  ] DNR/DNI

## 2020-10-13 NOTE — PROGRESS NOTE ADULT - PROBLEM SELECTOR PLAN 3
Chronic  - EKG: NSR  - Continue home Eliquis 5 mg PO q12h Chronic A Fib  - EKG: NSR  - Continue home Eliquis 5 mg PO q12h

## 2020-10-13 NOTE — PROGRESS NOTE ADULT - ASSESSMENT
75 year old male with PMHx atrial fibrillation (on Eliquis), asthma, COPD, former smoker, hx of benign lung cancer R lobe surgically removed at [facility]) CAD (s/p 5 stents ~2001), GERD, HLD, HTN presents after shakes and low O2 70s transferred to  septic shock 2/2 to prostatitis

## 2020-10-13 NOTE — PROGRESS NOTE ADULT - PROBLEM SELECTOR PLAN 6
History of 5 coronary artery stents ~2001  - Continue home Lipitor 40 mg PO qhs  - c/w B12 and vit D3 History of 5 coronary artery stents ~2001  - Continue home Lipitor 40 mg PO qhs  - c/w B12 and vit D3  - on remote tele, continue to monitor

## 2020-10-13 NOTE — PROGRESS NOTE ADULT - ASSESSMENT
75 year old male with a significant PMH of atrial fibrillation (on Eliquis), asthma, COPD, former smoker, hx of benign lung cancer R lobe surgically removed at [facility]) CAD (s/p 5 stents ~2001), GERD, HLD, HTN presented with hypoxia and rigors and admitted for Urosepsis.  MICU consulted for hypotension despite attempts at fluid resuscitation.  Admit to MICU for Septic shock 2/2 UTI.      Neuro- AAO x 3    Cardio-  Septic Shock 2/2 UTI likely 2/2 recent instrumentation from laser ablation of his prostate  - SBP in low 100's, will d/c levophed at this time. Start solu-cortef 50 IVP q8. Will continue hemodynamic monitoring  - Lactate downtrended 1.6<--- 3.1 <--- 2.1  - Afib-On eliquis. Rate controlled without medications. Continue to monitor.     Pulmonary  - hx of COPD, Asthma  -  sating 95% on room air  - continue duoneb, Symbicort, Pulmicort, spiriva  - Will escalate supplemental oxygen delivery as needed  GI  - Dash diet, tolerating PO intake  - continue PPI    Renal  -  Renal indices stable  - Cr 0.90 today  - continue to monitor    ID  Septic Shock 2/2 UTI likely 2/2 recent instrumentation from laser ablation of his prostate  - Lactate 1.6 today  - CT scan with emphysematous lung;  bladder wall thickening with possible cystitis  - Urinalysis positive Nitrates and moderate leukocytes  - Continue Meropenem given history of UTI/Bacteremia last year  - Blood and urine cultures send and pending; COVID PCR and RVP negative  - WBC 34.49 up from 15.42  - remains afebrile  - Monitor fever and WBC      Heme  Thrombocytopenia  - PLTs 125 down from 144  - no signs or symptoms of bleeding  - will continue to monitor  - Continue eliquis for AFIB     75 year old male with a significant PMH of atrial fibrillation (on Eliquis), asthma, COPD, former smoker, hx of benign lung cancer R lobe surgically removed at [facility]) CAD (s/p 5 stents ~2001), GERD, HLD, HTN presented with hypoxia and rigors and admitted for Urosepsis. Transferred to ICU for Septic shock 2/2 UTI.      Neuro- AAO x 3    Cardio-  Septic Shock 2/2 UTI likely 2/2 recent instrumentation from laser ablation of his prostate  - -130's, decrease solu-cortef to 25mg IV TID. Will continue hemodynamic monitoring  - Afib-On eliquis. Rate controlled without medications. Continue to monitor.   - continue to hold home bystolic. Consider restarting once bp improves    Pulmonary  - hx of COPD, Asthma  -  sating 95% on room air  - continue duoneb, Symbicort, Pulmicort, spiriva  - Will escalate supplemental oxygen delivery as needed  GI  - Dash diet, tolerating PO intake  - continue PPI    Renal  -  YUE resolved  - Cr 0.90 today  - continue to monitor    ID  Septic Shock 2/2 UTI likely 2/2 recent instrumentation from laser ablation of his prostate  - shock resolved  - urine culture grew Ecoli, f/u sensitivities   - Continue Meropenem given history of ESBL bacteremia last year  - Blood cultures NGTD  - leukocytosis downtrending 26.16<---34.49  - remains afebrile, continue to monitor for fever and WBC      Heme  Thrombocytopenia, worsening  - PLTs 119 <---125  - no signs or symptoms of bleeding  - will continue to monitor  - Continue eliquis for AFIB    -Dispo  Pt's septic shock resolved at this time. He remains medically and hemodynamically stable for downgrade to medical floor.

## 2020-10-13 NOTE — PROGRESS NOTE ADULT - SUBJECTIVE AND OBJECTIVE BOX
[facility] Cardiology Progress Note [phone] (Dr. Degroot, Waqar, Joe, Nga)    CHIEF COMPLAINT: Patient is a 75y old  Male who presents with a chief complaint of UTI (13 Oct 2020 05:55)      Follow Up Today: The patient denies any chest discomfort or shortness of breath.    HPI:  75 year old male with PMHX atrial fibrillation (on Eliquis), asthma, COPD, former smoker, hx of benign lung cancer R lobe surgically removed at [facility]) CAD (s/p 5 stents ~2001), GERD, HLD, HTN presents after shakes and low O2 70s. Patient endorses sudden onset of shaking and shortness of breath earlier today. Patient states he used his nebulizer treatment, however felt no relief. He decided to come to the ED for further evaluation. Denies sick contacts or recent travel. Of note, patient with history of intubation 2019, [facility] [place] [facility] acute hypercapnic respiratory failure. Patient states he was recently tested for COVID and results were negative. Denies fever, abdominal pain. Admits nausea, vomiting x1 episode in the ER.     ED course:  Vitals: T 103.8, , /71, RR 18, SpO2 96% on RA  Labs: Neutrophil 90.1%, Lymphocyte 6.3%, Monocyte 1.0%, PT/INR 13.7/1.18, PTT 25.6, Lactate 2.2  UA: positive nitrite, moderate LE, >50 RBC, >50 WBC, many bacteria  CT abd/pelvis: Although the urinary bladder is incompletely distended, there appears to be wall thickening. Correlate with urinalysis for the possibility of cystitis.  CTA chest: No significant interval change in the chest compared to the previous study of April 11, 2016. Redemonstration of emphysema and postoperative changes in the right upper lobe  CXR: official read pending  EKG: NSR vent rate 100 bpm  Given IV NS 1 L bolus x2, Tylenol 650 mg PO x1, Proventil 2 puffs x1, IV Solumedrol 125 mg IVP x1, IV Zithromax and IV Rocephin, continue IV Meropenem  with H/O Previous ESBL E Coli sepsis/prostatitis in 2019   (10 Oct 2020 23:41)      PAST MEDICAL & SURGICAL HISTORY:  GI bleed  while on Antiplatelets    Nephrolithiasis  s/p Lithotripsy    GERD (gastroesophageal reflux disease)    HLD (hyperlipidemia)    HTN (hypertension)    Stented coronary artery    Asthma    Chronic obstructive pulmonary disease  Asthma    S/P primary angioplasty with coronary stent    Lung tumor  Rt Lung tumor resection,benign        MEDICATIONS  (STANDING):  albuterol/ipratropium for Nebulization 3 milliLiter(s) Nebulizer four times a day  apixaban 5 milliGRAM(s) Oral every 12 hours  atorvastatin 40 milliGRAM(s) Oral at bedtime  buDESOnide    Inhalation Suspension 0.5 milliGRAM(s) Inhalation every 12 hours  budesonide 160 MICROgram(s)/formoterol 4.5 MICROgram(s) Inhaler 2 Puff(s) Inhalation two times a day  cholecalciferol 1000 Unit(s) Oral at bedtime  cyanocobalamin 1000 MICROGram(s) Oral at bedtime  hydrocortisone sodium succinate Injectable 50 milliGRAM(s) IV Push three times a day  meropenem  IVPB      meropenem  IVPB 1000 milliGRAM(s) IV Intermittent every 8 hours  pantoprazole    Tablet 40 milliGRAM(s) Oral before breakfast  tiotropium 18 MICROgram(s) Capsule 1 Capsule(s) Inhalation daily    MEDICATIONS  (PRN):  acetaminophen   Tablet .. 650 milliGRAM(s) Oral every 6 hours PRN Temp greater or equal to 38C (100.4F), Mild Pain (1 - 3)  zaleplon 5 milliGRAM(s) Oral at bedtime PRN Insomnia      Allergies    No Known Allergies    Intolerances        REVIEW OF SYSTEMS:    All other review of systems is negative unless indicated above    Vital Signs Last 24 Hrs  T(C): 36.7 (12 Oct 2020 23:34), Max: 37.2 (12 Oct 2020 19:40)  T(F): 98 (12 Oct 2020 23:34), Max: 98.9 (12 Oct 2020 19:40)  HR: 61 (13 Oct 2020 06:00) (60 - 89)  BP: 137/108 (13 Oct 2020 06:00) (81/51 - 137/108)  BP(mean): 119 (13 Oct 2020 06:00) (62 - 119)  RR: 26 (13 Oct 2020 06:00) (21 - 42)  SpO2: 94% (13 Oct 2020 06:00) (89% - 97%)    I&O's Summary    12 Oct 2020 07:01  -  13 Oct 2020 07:00  --------------------------------------------------------  IN: 1924.2 mL / OUT: 900 mL / NET: 1024.2 mL        PHYSICAL EXAM:    Constitutional: NAD, awake and alert, well-developed  Eyes:  EOMI,  Pupils round, No oral cyanosis.  HEENT: No exudate or erythema  Pulmonary: Non-labored, breath sounds are clear bilaterally, No wheezing, rales or rhonchi  Cardiovascular: Regular, S1 and S2, No murmurs, rubs, gallops oir clicks  Gastrointestinal: Bowel Sounds present, soft, nontender.   Ext: No significant LE edema with good pulses x 4  Neurological: Alert, no gross focal motor deficits  Skin: No rashes.  Psych:  Mood & affect appropriate    LABS: All Labs Reviewed:                        11.1   26.16 )-----------( 119      ( 13 Oct 2020 05:22 )             33.2                         11.6   34.49 )-----------( 125      ( 12 Oct 2020 05:32 )             33.9                         12.3   15.42 )-----------( 144      ( 11 Oct 2020 05:54 )             35.5     13 Oct 2020 05:22    146    |  115    |  21     ----------------------------<  157    4.0     |  26     |  0.87   12 Oct 2020 05:32    147    |  115    |  17     ----------------------------<  146    4.2     |  27     |  0.90   11 Oct 2020 05:54    145    |  111    |  16     ----------------------------<  130    4.0     |  26     |  1.30     Ca    8.1        13 Oct 2020 05:22  Ca    7.7        12 Oct 2020 05:32  Ca    7.7        11 Oct 2020 05:54  Phos  1.8       13 Oct 2020 05:22  Mg     2.1       13 Oct 2020 05:22    TPro  5.2    /  Alb  2.2    /  TBili  0.3    /  DBili  x      /  AST  21     /  ALT  48     /  AlkPhos  83     13 Oct 2020 05:22  TPro  6.4    /  Alb  3.4    /  TBili  0.3    /  DBili  x      /  AST  24     /  ALT  38     /  AlkPhos  94     10 Oct 2020 22:23    PT/INR - ( 13 Oct 2020 05:22 )   PT: 17.7 sec;   INR: 1.54 ratio               Blood Culture: Organism --  Gram Stain Blood -- Gram Stain --  Specimen Source .Urine Clean Catch (Midstream)  Culture-Blood --    Organism --  Gram Stain Blood -- Gram Stain --  Specimen Source .Blood Blood-Peripheral  Culture-Blood --            RADIOLOGY/EKG:    Attending Attestation:   20 minutes spent on total encounter; more than 50% of the visit was spent counseling and/or coordinating care by the attending physician.     ASSESSMENT AND PLAN [facility] Progress Note [phone] (Dr. Degroot, Waqar, Joe, Nga)    CHIEF COMPLAINT: Patient is a 75y old  Male who presents with a chief complaint of UTI (13 Oct 2020 05:55)      Follow Up Today: The patient denies any chest discomfort or shortness of breath. Feeling better    HPI:  75 year old male with PMHX atrial fibrillation (on Eliquis), asthma, COPD, former smoker, hx of benign lung cancer R lobe surgically removed at [facility]) CAD (s/p 5 stents ~2001), GERD, HLD, HTN presents after shakes and low O2 70s. Patient endorses sudden onset of shaking and shortness of breath earlier today. Patient states he used his nebulizer treatment, however felt no relief. He decided to come to the ED for further evaluation. Denies sick contacts or recent travel. Of note, patient with history of intubation 2019, during [facility] [place] [facility] acute hypercapnic respiratory failure. Patient states he was recently tested for COVID and results were negative. Denies fever, abdominal pain. Admits nausea, vomiting x1 episode in the ER.     ED course:  Vitals: T 103.8, , /71, RR 18, SpO2 96% on RA  Labs: Neutrophil 90.1%, Lymphocyte 6.3%, Monocyte 1.0%, PT/INR 13.7/1.18, PTT 25.6, Lactate 2.2  UA: positive nitrite, moderate LE, >50 RBC, >50 WBC, many bacteria  CT abd/pelvis: Although the urinary bladder is incompletely distended, there appears to be wall thickening. Correlate with urinalysis for the possibility of cystitis.  CTA chest: No significant interval change in the chest compared to the previous study of April 11, 2016. Redemonstration of emphysema and postoperative changes in the right upper lobe  CXR: official read pending  EKG: NSR vent rate 100 bpm  Given IV NS 1 L bolus x2, Tylenol 650 mg PO x1, Proventil 2 puffs x1, IV Solumedrol 125 mg IVP x1, IV Zithromax and IV Rocephin, continue IV Meropenem  with H/O Previous ESBL E Coli sepsis/prostatitis in 2019   (10 Oct 2020 23:41)      PAST MEDICAL & SURGICAL HISTORY:  GI bleed  while on Antiplatelets    Nephrolithiasis  s/p Lithotripsy    GERD (gastroesophageal reflux disease)    HLD (hyperlipidemia)    HTN (hypertension)    Stented coronary artery    Asthma    Chronic obstructive pulmonary disease  Asthma    S/P primary angioplasty with coronary stent    Lung tumor  Rt Lung tumor resection,benign        MEDICATIONS  (STANDING):  albuterol/ipratropium for Nebulization 3 milliLiter(s) Nebulizer four times a day  apixaban 5 milliGRAM(s) Oral every 12 hours  atorvastatin 40 milliGRAM(s) Oral at bedtime  buDESOnide    Inhalation Suspension 0.5 milliGRAM(s) Inhalation every 12 hours  budesonide 160 MICROgram(s)/formoterol 4.5 MICROgram(s) Inhaler 2 Puff(s) Inhalation two times a day  cholecalciferol 1000 Unit(s) Oral at bedtime  cyanocobalamin 1000 MICROGram(s) Oral at bedtime  hydrocortisone sodium succinate Injectable 50 milliGRAM(s) IV Push three times a day  meropenem  IVPB      meropenem  IVPB 1000 milliGRAM(s) IV Intermittent every 8 hours  pantoprazole    Tablet 40 milliGRAM(s) Oral before breakfast  tiotropium 18 MICROgram(s) Capsule 1 Capsule(s) Inhalation daily    MEDICATIONS  (PRN):  acetaminophen   Tablet .. 650 milliGRAM(s) Oral every 6 hours PRN Temp greater or equal to 38C (100.4F), Mild Pain (1 - 3)  zaleplon 5 milliGRAM(s) Oral at bedtime PRN Insomnia      Allergies    No Known Allergies    Intolerances        REVIEW OF SYSTEMS:    All other review of systems is negative unless indicated above    Vital Signs Last 24 Hrs  T(C): 36.7 (12 Oct 2020 23:34), Max: 37.2 (12 Oct 2020 19:40)  T(F): 98 (12 Oct 2020 23:34), Max: 98.9 (12 Oct 2020 19:40)  HR: 61 (13 Oct 2020 06:00) (60 - 89)  BP: 137/108 (13 Oct 2020 06:00) (81/51 - 137/108)  BP(mean): 119 (13 Oct 2020 06:00) (62 - 119)  RR: 26 (13 Oct 2020 06:00) (21 - 42)  SpO2: 94% (13 Oct 2020 06:00) (89% - 97%)    I&O's Summary    12 Oct 2020 07:01  -  13 Oct 2020 07:00  --------------------------------------------------------  IN: 1924.2 mL / OUT: 900 mL / NET: 1024.2 mL        PHYSICAL EXAM:    Constitutional: NAD, awake and alert, well-developed  Eyes:  EOMI,  Pupils round, No oral cyanosis.  HEENT: No exudate or erythema  Pulmonary: Non-labored, breath sounds are clear bilaterally, No wheezing, rales or rhonchi  Cardiovascular: Regular, S1 and S2, No murmurs, rubs, gallops oir clicks  Gastrointestinal: Bowel Sounds present, soft, nontender.   Ext: No significant LE edema with good pulses x 4  Neurological: Alert, no gross focal motor deficits  Skin: No rashes.  Psych:  Mood & affect appropriate    LABS: All Labs Reviewed:                        11.1   26.16 )-----------( 119      ( 13 Oct 2020 05:22 )             33.2                         11.6   34.49 )-----------( 125      ( 12 Oct 2020 05:32 )             33.9                         12.3   15.42 )-----------( 144      ( 11 Oct 2020 05:54 )             35.5     13 Oct 2020 05:22    146    |  115    |  21     ----------------------------<  157    4.0     |  26     |  0.87   12 Oct 2020 05:32    147    |  115    |  17     ----------------------------<  146    4.2     |  27     |  0.90   11 Oct 2020 05:54    145    |  111    |  16     ----------------------------<  130    4.0     |  26     |  1.30     Ca    8.1        13 Oct 2020 05:22  Ca    7.7        12 Oct 2020 05:32  Ca    7.7        11 Oct 2020 05:54  Phos  1.8       13 Oct 2020 05:22  Mg     2.1       13 Oct 2020 05:22    TPro  5.2    /  Alb  2.2    /  TBili  0.3    /  DBili  x      /  AST  21     /  ALT  48     /  AlkPhos  83     13 Oct 2020 05:22  TPro  6.4    /  Alb  3.4    /  TBili  0.3    /  DBili  x      /  AST  24     /  ALT  38     /  AlkPhos  94     10 Oct 2020 22:23    PT/INR - ( 13 Oct 2020 05:22 )   PT: 17.7 sec;   INR: 1.54 ratio               Blood Culture: Organism --  Gram Stain Blood -- Gram Stain --  Specimen Source .Urine Clean Catch (Midstream)  Culture-Blood --    Organism --  Gram Stain Blood -- Gram Stain --  Specimen Source .Blood Blood-Peripheral  Culture-Blood --            RADIOLOGY/EKG:    Attending Attestation:   20 minutes spent on total encounter; more than 50% of the visit was spent counseling and/or coordinating care by the attending physician.     ASSESSMENT AND PLAN

## 2020-10-13 NOTE — PROGRESS NOTE ADULT - ASSESSMENT
Pt is a 75M w/ PMHx of CAD, Afib on AC, HTN, HLD, COPD/asthma, BPH p/w rigors, found to have AHRF w/ O2 saturation in 70s; noted to have recent "laser surgery" for treatment of his BPH on 10/1 s/p short Abx course. Pt p/w severe sepsis/septic shock requiring ICU stay likely in the setting of sepsis 2/2  source. Found to have lactic acidosis and worsening leukocytosis. Pt w/ previous hx of ESBL E.coli (2019), currently on meropenem.     Severe Sepsis/Septic Shock 2/2 E.coli/ Source in the setting of recent instrumentation  Hypotension, resolved w/o pressors  AHRF, resolved  Lactic Acidosis, resolved  Leukocytosis, resolving  -BCx negative  -UCx E.coli, sensitivies pending  -fevers resolved, leukocytosis now downtrending  -lactic acidosis resolved  -c/w meropenem for now, will use UCx susceptibilities to guide cx   Pt is a 75M w/ PMHx of CAD, Afib on AC, HTN, HLD, COPD/asthma, BPH p/w rigors, found to have AHRF w/ O2 saturation in 70s; noted to have recent "laser surgery" for treatment of his BPH on 10/1 s/p short Abx course. Pt p/w severe sepsis/septic shock requiring ICU stay likely in the setting of sepsis 2/2  source. Found to have lactic acidosis and worsening leukocytosis. Pt w/ previous hx of ESBL E.coli (2019), currently on meropenem.     Severe Sepsis/Septic Shock 2/2 E.coli Acute Prostatitis in the setting of recent instrumentation  Hypotension, resolved w/o pressors  AHRF, resolved  Lactic Acidosis, resolved  Leukocytosis, resolving  -BCx negative  -UCx E.coli, sensitivities pending  -fevers resolved, leukocytosis now downtrending  -lactic acidosis resolved  -c/w meropenem for now, will use UCx susceptibilities to guide cx

## 2020-10-13 NOTE — PHYSICAL THERAPY INITIAL EVALUATION ADULT - PERTINENT HX OF CURRENT PROBLEM, REHAB EVAL
75 year old male with PMHX atrial fibrillation (on Eliquis), asthma, COPD, former smoker, hx of benign lung cancer R lobe surgically removed at [facility]) CAD (s/p 5 stents ~2001), GERD, HLD, HTN presents after shakes and low O2 70s.

## 2020-10-13 NOTE — PROGRESS NOTE ADULT - ASSESSMENT
74M with known HTN, PAF on Eliquis, HLD CAD s/p JESSE to the LAD, LCx and RCA in 2006, COPD and reactive airway disease, recent iron deficiency anemia undergoing GI evaluation presents with UTI, fevers, chills, hypotension requiring Levophed now on Abx with improving BP and feeling better    PLan  Wean Levophed  Continue with abx per ID  Hold off on BP meds, Bystolic for now  DVT prophylaxis  WIll follow 74M with known HTN, PAF on Eliquis, HLD CAD s/p JESSE to the LAD, LCx and RCA in 2006, COPD and reactive airway disease, recent iron deficiency anemia undergoing GI evaluation presents with UTI, fevers, chills, hypotension requiring Levophed now on Abx with improving BP and feeling better    PLan  Levophed off.  Because he was on Prednisone 5mg daily, Solucortef was started and patient shows good clinical improvement.  Continue with abx per ID  Hold off on BP meds, Bystolic for now  DVT prophylaxis  WIll follow

## 2020-10-13 NOTE — PROGRESS NOTE ADULT - ATTENDING COMMENTS
Pt seen, examined, case & care plan d/w pt, resident at detail. Pt seen, examined, case & care plan d/w pt, resident at detail.  D/W Pharmacy, about meds.

## 2020-10-14 LAB
ANION GAP SERPL CALC-SCNC: 5 MMOL/L — SIGNIFICANT CHANGE UP (ref 5–17)
BASOPHILS # BLD AUTO: 0.02 K/UL — SIGNIFICANT CHANGE UP (ref 0–0.2)
BASOPHILS NFR BLD AUTO: 0.1 % — SIGNIFICANT CHANGE UP (ref 0–2)
BUN SERPL-MCNC: 18 MG/DL — SIGNIFICANT CHANGE UP (ref 7–23)
CALCIUM SERPL-MCNC: 8.4 MG/DL — LOW (ref 8.5–10.1)
CHLORIDE SERPL-SCNC: 114 MMOL/L — HIGH (ref 96–108)
CO2 SERPL-SCNC: 29 MMOL/L — SIGNIFICANT CHANGE UP (ref 22–31)
CREAT SERPL-MCNC: 0.74 MG/DL — SIGNIFICANT CHANGE UP (ref 0.5–1.3)
EOSINOPHIL # BLD AUTO: 0.02 K/UL — SIGNIFICANT CHANGE UP (ref 0–0.5)
EOSINOPHIL NFR BLD AUTO: 0.1 % — SIGNIFICANT CHANGE UP (ref 0–6)
GLUCOSE SERPL-MCNC: 109 MG/DL — HIGH (ref 70–99)
HCT VFR BLD CALC: 35.7 % — LOW (ref 39–50)
HGB BLD-MCNC: 11.9 G/DL — LOW (ref 13–17)
IMM GRANULOCYTES NFR BLD AUTO: 1.1 % — SIGNIFICANT CHANGE UP (ref 0–1.5)
LYMPHOCYTES # BLD AUTO: 0.9 K/UL — LOW (ref 1–3.3)
LYMPHOCYTES # BLD AUTO: 5 % — LOW (ref 13–44)
MAGNESIUM SERPL-MCNC: 2.3 MG/DL — SIGNIFICANT CHANGE UP (ref 1.6–2.6)
MCHC RBC-ENTMCNC: 31.6 PG — SIGNIFICANT CHANGE UP (ref 27–34)
MCHC RBC-ENTMCNC: 33.3 GM/DL — SIGNIFICANT CHANGE UP (ref 32–36)
MCV RBC AUTO: 94.7 FL — SIGNIFICANT CHANGE UP (ref 80–100)
MONOCYTES # BLD AUTO: 0.46 K/UL — SIGNIFICANT CHANGE UP (ref 0–0.9)
MONOCYTES NFR BLD AUTO: 2.6 % — SIGNIFICANT CHANGE UP (ref 2–14)
NEUTROPHILS # BLD AUTO: 16.43 K/UL — HIGH (ref 1.8–7.4)
NEUTROPHILS NFR BLD AUTO: 91.1 % — HIGH (ref 43–77)
NRBC # BLD: 0 /100 WBCS — SIGNIFICANT CHANGE UP (ref 0–0)
PHOSPHATE SERPL-MCNC: 2.9 MG/DL — SIGNIFICANT CHANGE UP (ref 2.5–4.5)
PLATELET # BLD AUTO: 140 K/UL — LOW (ref 150–400)
POTASSIUM SERPL-MCNC: 4.2 MMOL/L — SIGNIFICANT CHANGE UP (ref 3.5–5.3)
POTASSIUM SERPL-SCNC: 4.2 MMOL/L — SIGNIFICANT CHANGE UP (ref 3.5–5.3)
RBC # BLD: 3.77 M/UL — LOW (ref 4.2–5.8)
RBC # FLD: 14.4 % — SIGNIFICANT CHANGE UP (ref 10.3–14.5)
SODIUM SERPL-SCNC: 148 MMOL/L — HIGH (ref 135–145)
WBC # BLD: 18.03 K/UL — HIGH (ref 3.8–10.5)
WBC # FLD AUTO: 18.03 K/UL — HIGH (ref 3.8–10.5)

## 2020-10-14 PROCEDURE — 36410 VNPNXR 3YR/> PHY/QHP DX/THER: CPT

## 2020-10-14 PROCEDURE — 76937 US GUIDE VASCULAR ACCESS: CPT | Mod: 26

## 2020-10-14 RX ORDER — HYDROCORTISONE 20 MG
25 TABLET ORAL
Refills: 0 | Status: DISCONTINUED | OUTPATIENT
Start: 2020-10-14 | End: 2020-10-15

## 2020-10-14 RX ORDER — ERTAPENEM SODIUM 1 G/1
INJECTION, POWDER, LYOPHILIZED, FOR SOLUTION INTRAMUSCULAR; INTRAVENOUS
Refills: 0 | Status: DISCONTINUED | OUTPATIENT
Start: 2020-10-14 | End: 2020-10-15

## 2020-10-14 RX ORDER — ERTAPENEM SODIUM 1 G/1
1 INJECTION, POWDER, LYOPHILIZED, FOR SOLUTION INTRAMUSCULAR; INTRAVENOUS
Qty: 24 | Refills: 0
Start: 2020-10-14 | End: 2020-11-06

## 2020-10-14 RX ORDER — NEBIVOLOL HYDROCHLORIDE 5 MG/1
5 TABLET ORAL DAILY
Refills: 0 | Status: DISCONTINUED | OUTPATIENT
Start: 2020-10-14 | End: 2020-10-15

## 2020-10-14 RX ORDER — ERTAPENEM SODIUM 1 G/1
1000 INJECTION, POWDER, LYOPHILIZED, FOR SOLUTION INTRAMUSCULAR; INTRAVENOUS EVERY 24 HOURS
Refills: 0 | Status: DISCONTINUED | OUTPATIENT
Start: 2020-10-15 | End: 2020-10-15

## 2020-10-14 RX ORDER — ERTAPENEM SODIUM 1 G/1
1000 INJECTION, POWDER, LYOPHILIZED, FOR SOLUTION INTRAMUSCULAR; INTRAVENOUS ONCE
Refills: 0 | Status: COMPLETED | OUTPATIENT
Start: 2020-10-14 | End: 2020-10-14

## 2020-10-14 RX ADMIN — Medication 3 MILLILITER(S): at 11:27

## 2020-10-14 RX ADMIN — APIXABAN 5 MILLIGRAM(S): 2.5 TABLET, FILM COATED ORAL at 05:34

## 2020-10-14 RX ADMIN — Medication 25 MILLIGRAM(S): at 05:35

## 2020-10-14 RX ADMIN — ERTAPENEM SODIUM 120 MILLIGRAM(S): 1 INJECTION, POWDER, LYOPHILIZED, FOR SOLUTION INTRAMUSCULAR; INTRAVENOUS at 13:35

## 2020-10-14 RX ADMIN — NEBIVOLOL HYDROCHLORIDE 5 MILLIGRAM(S): 5 TABLET ORAL at 18:07

## 2020-10-14 RX ADMIN — ATORVASTATIN CALCIUM 40 MILLIGRAM(S): 80 TABLET, FILM COATED ORAL at 21:06

## 2020-10-14 RX ADMIN — MEROPENEM 100 MILLIGRAM(S): 1 INJECTION INTRAVENOUS at 05:34

## 2020-10-14 RX ADMIN — TIOTROPIUM BROMIDE 1 CAPSULE(S): 18 CAPSULE ORAL; RESPIRATORY (INHALATION) at 05:34

## 2020-10-14 RX ADMIN — Medication 25 MILLIGRAM(S): at 18:06

## 2020-10-14 RX ADMIN — BUDESONIDE AND FORMOTEROL FUMARATE DIHYDRATE 2 PUFF(S): 160; 4.5 AEROSOL RESPIRATORY (INHALATION) at 05:34

## 2020-10-14 RX ADMIN — Medication 1000 UNIT(S): at 21:06

## 2020-10-14 RX ADMIN — PREGABALIN 1000 MICROGRAM(S): 225 CAPSULE ORAL at 21:06

## 2020-10-14 RX ADMIN — Medication 3 MILLILITER(S): at 19:54

## 2020-10-14 RX ADMIN — PANTOPRAZOLE SODIUM 40 MILLIGRAM(S): 20 TABLET, DELAYED RELEASE ORAL at 05:34

## 2020-10-14 RX ADMIN — BUDESONIDE AND FORMOTEROL FUMARATE DIHYDRATE 2 PUFF(S): 160; 4.5 AEROSOL RESPIRATORY (INHALATION) at 21:08

## 2020-10-14 RX ADMIN — Medication 0.5 MILLIGRAM(S): at 08:01

## 2020-10-14 RX ADMIN — APIXABAN 5 MILLIGRAM(S): 2.5 TABLET, FILM COATED ORAL at 18:06

## 2020-10-14 RX ADMIN — Medication 3 MILLILITER(S): at 08:01

## 2020-10-14 RX ADMIN — Medication 0.5 MILLIGRAM(S): at 19:55

## 2020-10-14 NOTE — PROGRESS NOTE ADULT - ATTENDING COMMENTS
Pt seen, examined, case & care plan d/w pt, residents at detail.  D/W DR ROSALINA Lopez & DR Marc at detail. Home IV Abx arranged by Case management.  AM Labs .  D/W Cardiology  Dr Zavaleta -dariana for d/c , restart Bystolic daily , D/W Pharmacy   D/C Plan tomorrow

## 2020-10-14 NOTE — PROGRESS NOTE ADULT - PROBLEM SELECTOR PLAN 4
Chronic  - continue to hold home Bystolic 5 mg PO QD and antihypertensives  - now normotensive on solu cortef taper  - will consider restarting bystolic is hypertensive   - off pressors, will continue to hold bystolic pending clinical course  - continue to monitor routine hemodynamics  - Cardiology: Dr. Degroot following; rec continue to hold bystolic in the setting of hypotension Chronic  - continue to hold home Bystolic 5 mg PO QD and antihypertensives  - now normotensive on solu cortef taper  - will consider restarting Bystolic is hypertensive   - off pressors, will continue to hold Bystolic pending clinical course  - continue to monitor routine hemodynamics  - Cardiology: Dr. Degroot/ Dr Zvaaleta- following; rec start Bystolic as BP is stable

## 2020-10-14 NOTE — PROCEDURE NOTE - NSPROCDETAILS_GEN_ALL_CORE
supine position/sterile dressing applied/location identified, draped/prepped, sterile technique used/sterile technique, catheter placed/ultrasound guidance

## 2020-10-14 NOTE — PROGRESS NOTE ADULT - ASSESSMENT
74M with known HTN, PAF on Eliquis, HLD CAD s/p JESSE to the LAD, LCx and RCA in 2006, COPD and reactive airway disease, recent iron deficiency anemia undergoing GI evaluation presents with UTI, fevers, chills, hypotension requiring Levophed now on Abx with improving BP and feeling better    PLan  Levophed off.  Because he was on Prednisone 5mg daily, Solucortef was started and patient shows good clinical improvement.  Continue with abx per ID  Home cardiac meds were Eliquis 5 mg,  lasix 20 mg QD, Bystolic 5 mg,  lipitor 40 mg.  will restart Bystolic at 2.5 mg.  continue to hold lasix for now.   DVT prophylaxis  WIll follow   74M with known HTN, PAF on Eliquis, HLD CAD s/p JESSE to the LAD, LCx and RCA in 2006, COPD and reactive airway disease, recent iron deficiency anemia undergoing GI evaluation presents with UTI, fevers, chills, hypotension requiring Levophed now on Abx with improving BP and feeling better    PLan  Levophed off.  Because he was on Prednisone 5mg daily, Solucortef was started and patient shows good clinical improvement.  Continue with abx per ID  Home cardiac meds were Eliquis 5 mg,  lasix 20 mg QD, Bystolic 5 mg,  lipitor 40 mg.  will restart Bystolic on discharge as not on formulary here.  continue to hold lasix for now.   DVT prophylaxis  WIll follow

## 2020-10-14 NOTE — PROGRESS NOTE ADULT - PROBLEM SELECTOR PLAN 6
History of 5 coronary artery stents ~2001  - Continue home Lipitor 40 mg PO qhs  - c/w B12 and vit D3  - on remote tele, continue to monitor

## 2020-10-14 NOTE — PROGRESS NOTE ADULT - ATTENDING COMMENTS
Infectious Diseases will continue to follow.   Please call with any questions.   Mary Lopez M.D.  [facility], Division of Infectious Diseases [phone]  For over the weekend and after hours, please call [phone]

## 2020-10-14 NOTE — PROGRESS NOTE ADULT - SUBJECTIVE AND OBJECTIVE BOX
Patient is a 75y Male with a known history of :  Prostatitis [N41.9]    Septic shock [A41.9]    Septic shock due to urinary tract infection [A41.9]    CAD (coronary artery disease) [I25.10]    COPD (chronic obstructive pulmonary disease) [J44.9]    Atrial fibrillation [I48.91]    Need for prophylactic measure [Z29.9]    HLD (hyperlipidemia) [E78.5]    HTN (hypertension) [I10]    GERD (gastroesophageal reflux disease) [K21.9]    Urinary tract infection without hematuria, site unspecified [N39.0]      HPI:  75 year old male with PMHX atrial fibrillation (on Eliquis), asthma, COPD, former smoker, hx of benign lung cancer R lobe surgically removed at [facility]) CAD (s/p 5 stents ~2001), GERD, HLD, HTN presents after shakes and low O2 70s. Patient endorses sudden onset of shaking and shortness of breath earlier today. Patient states he used his nebulizer treatment, however felt no relief. He decided to come to the ED for further evaluation. Denies sick contacts or recent travel. Of note, patient with history of intubation 2019, during admission to [facility]"" for acute hypercapnic respiratory failure. Patient states he was recently tested for COVID and results were negative. Denies fever, abdominal pain. Admits nausea, vomiting x1 episode in the ER.     ED course:  Vitals: T 103.8, , /71, RR 18, SpO2 96% on RA  Labs: Neutrophil 90.1%, Lymphocyte 6.3%, Monocyte 1.0%, PT/INR 13.7/1.18, PTT 25.6, Lactate 2.2  UA: positive nitrite, moderate LE, >50 RBC, >50 WBC, many bacteria  CT abd/pelvis: Although the urinary bladder is incompletely distended, there appears to be wall thickening. Correlate with urinalysis for the possibility of cystitis.  CTA chest: No significant interval change in the chest compared to the previous study of April 11, 2016. Redemonstration of emphysema and postoperative changes in the right upper lobe  CXR: official read pending  EKG: NSR vent rate 100 bpm  Given IV NS 1 L bolus x2, Tylenol 650 mg PO x1, Proventil 2 puffs x1, IV Solumedrol 125 mg IVP x1, IV Zithromax and IV Rocephin, continue IV Meropenem  with H/O Previous ESBL E Coli sepsis/prostatitis in 2019   (10 Oct 2020 23:41)    follow up today pt is feeling well.  ambulated with no problem.  no CP sob     REVIEW OF SYSTEMS:    All other review of systems is negative unless indicated above    MEDICATIONS  (STANDING):  albuterol/ipratropium for Nebulization 3 milliLiter(s) Nebulizer four times a day  apixaban 5 milliGRAM(s) Oral every 12 hours  atorvastatin 40 milliGRAM(s) Oral at bedtime  buDESOnide    Inhalation Suspension 0.5 milliGRAM(s) Inhalation every 12 hours  budesonide 160 MICROgram(s)/formoterol 4.5 MICROgram(s) Inhaler 2 Puff(s) Inhalation two times a day  cholecalciferol 1000 Unit(s) Oral at bedtime  cyanocobalamin 1000 MICROGram(s) Oral at bedtime  ertapenem  IVPB      hydrocortisone sodium succinate Injectable 25 milliGRAM(s) IV Push two times a day  pantoprazole    Tablet 40 milliGRAM(s) Oral before breakfast  tiotropium 18 MICROgram(s) Capsule 1 Capsule(s) Inhalation daily    MEDICATIONS  (PRN):  acetaminophen   Tablet .. 650 milliGRAM(s) Oral every 6 hours PRN Temp greater or equal to 38C (100.4F), Mild Pain (1 - 3)  zaleplon 5 milliGRAM(s) Oral at bedtime PRN Insomnia      ALLERGIES: No Known Allergies      FAMILY HISTORY:  Family history of stroke    Family history of heart disease        PHYSICAL EXAMINATION:  -----------------------------  T(C): 36.4 (10-14-20 @ 04:59), Max: 36.8 (10-13-20 @ 12:35)  HR: 60 (10-14-20 @ 04:59) (60 - 80)  BP: 130/70 (10-14-20 @ 04:59) (114/65 - 130/70)  RR: 18 (10-14-20 @ 04:59) (18 - 20)  SpO2: 93% (10-14-20 @ 04:59) (93% - 97%)  Wt(kg): --    10-13 @ 07:01  -  10-14 @ 07:00  --------------------------------------------------------  IN:    Oral Fluid: 600 mL  Total IN: 600 mL    OUT:    Voided (mL): 700 mL  Total OUT: 700 mL    Total NET: -100 mL          PHYSICAL EXAM:    Constitutional: NAD, awake and alert, well-developed  Eyes:  EOMI,  Pupils round, No oral cyanosis.  HEENT: No exudate or erythema  Pulmonary: Non-labored, breath sounds are clear bilaterally, No wheezing, rales or rhonchi  Cardiovascular: Regular, S1 and S2, No murmurs, rubs, gallops oir clicks  Gastrointestinal: Bowel Sounds present, soft, nontender.   Ext: No significant LE edema with good pulses x 4  Neurological: Alert, no gross focal motor deficits  Skin: No rashes.  Psych:  Mood & affect appropriate    LABS: All Labs Reviewed:        LABS:   --------  10-14    148<H>  |  114<H>  |  18  ----------------------------<  109<H>  4.2   |  29  |  0.74    Ca    8.4<L>      14 Oct 2020 06:54  Phos  2.9     10-14  Mg     2.3     10-14    TPro  5.2<L>  /  Alb  2.2<L>  /  TBili  0.3  /  DBili  x   /  AST  21  /  ALT  48  /  AlkPhos  83  10-13                         11.9   18.03 )-----------( 140      ( 14 Oct 2020 06:54 )             35.7     PT/INR - ( 13 Oct 2020 05:22 )   PT: 17.7 sec;   INR: 1.54 ratio

## 2020-10-14 NOTE — PROGRESS NOTE ADULT - PROBLEM SELECTOR PLAN 5
Chronic, history of COPD in setting of asthma  -- Currently, saturations above 96% on RA    - Albuterol/Ipratropium treatments q6 PRN for wheezing  - budesonide (pulmicort) BID   - symbicort BID   - spiriva 1 capsule daily   - CTA chest: No significant interval change in the chest compared to the previous study of April 11, 2016. Redemonstration of emphysema and postoperative changes in the right upper   - zaleplon prn for insomnia  - on oral prednisone at home, continue with stress steroids taper Chronic, history of COPD in setting of asthma  -- Currently, saturations above 96% on RA    - Albuterol/Ipratropium treatments q6 PRN for wheezing  - budesonide (pulmicort) BID   - Symbicort BID   - Spiriva 1 capsule daily   - CTA chest: No significant interval change in the chest compared to the previous study of April 11, 2016. Redemonstration of emphysema and postoperative changes in the right upper   - zaleplon prn for insomnia  - on oral prednisone at home, continue with stress steroids taper

## 2020-10-14 NOTE — PROGRESS NOTE ADULT - SUBJECTIVE AND OBJECTIVE BOX
Patient is a 75y old  Male who presents with a chief complaint of UTI (13 Oct 2020 10:49)      INTERVAL HPI: 75 year old male with PMHX atrial fibrillation (on Eliquis), asthma, COPD, former smoker, hx of benign lung cancer R lobe surgically removed at [facility]) CAD (s/p 5 stents ~2001), GERD, HLD, HTN presents after shakes and low O2 70s. Patient endorses sudden onset of shaking and shortness of breath earlier today. Patient states he used his nebulizer treatment, however felt no relief. He decided to come to the ED for further evaluation. Denies sick contacts or recent travel. Of note, patient with history of intubation 2019, during admission to [facility] for acute hypercapnic respiratory failure. Patient states he was recently tested for COVID and results were negative. Denies fever, abdominal pain. Admits nausea, vomiting x1 episode in the ER.     ED course:  Vitals: T 103.8, , /71, RR 18, SpO2 96% on RA  Labs: Neutrophil 90.1%, Lymphocyte 6.3%, Monocyte 1.0%, PT/INR 13.7/1.18, PTT 25.6, Lactate 2.2  UA: positive nitrite, moderate LE, >50 RBC, >50 WBC, many bacteria  CT abd/pelvis: Although the urinary bladder is incompletely distended, there appears to be wall thickening. Correlate with urinalysis for the possibility of cystitis.  CTA chest: No significant interval change in the chest compared to the previous study of April 11, 2016. Redemonstration of emphysema and postoperative changes in the right upper lobe  CXR: official read pending  EKG: NSR vent rate 100 bpm  Given IV NS 1 L bolus x2, Tylenol 650 mg PO x1, Proventil 2 puffs x1, IV Solumedrol 125 mg IVP x1, IV Zithromax and IV Rocephin, continue IV Meropenem     10/11/2020: Patient seen and examined at bedside in ED. Admitted for UTI, fever. Pt had no complaints of pain. Pt checked on throughout the night.   On 2.5 L NC 92 % O2 sat well. BP 78/46 s/p 4 L NS bolus and maintenance NS @ 60 cc/hour.   - afebrile, WBC 8 --> 15.42 overnight, lactate 2.2 --> 2.1 --> 3.1   - ICU PA consult noted and reviewed. Patient to be transferred to MICU ,  Now pt with septic shock 2/2 , UTIs/p IV Fluid resuscitation,  started on levophed   - Patient admits to  "laser surgery" for treatment of his BPH on 10/1  at [facility] and completed a 5 day course of outpatient antibiotic treatment. states he was told he likely has scars and is concerned for infection in prostate. has had pain with urination since then.    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 Is anxious and mildly irritable. " I have not slept in a few days." Melatonin does not help. Overall he "does not feel well." however does not have any specific complaints. Admits to constipation, had no BM since admission. States he is breathing better, had duonebs treatment this morning. admits to cough, unchanged from baseline (has asthma/COPD). no wheezing. Patient urinating adequately, notes he filled 250 cc at a time. Denies any fevers, chills, chest pain, palpitations, abdominal pain, n/v/d dysuria, hematuria. Leukocytosis.    - White count elevated 34.4 this morning, worsening leukocytosis   - ICU: on levophed; now on 2.891 mL/hr, BP in 110s/50s + stress steroids solu -cortef   - on 2 L NC   - Patient reports he had a Green Light Procedure for enlarged prostate by Dr. Kelly, Urology on 10/1    10/13/2020: Patient seen and examined at bedside. Patient sitting up in chair, eating breakfast. States he "is feeling good this morning." He was able to fall asleep after given zaleplon last night however, was woken up at 11 PM for a temperature check. He feels short of breath upon getting up from bed. Overall he states he is breathing well, no wheezing. He is also urinating adequately, no pain, burning or hematuria. Had 1 BM yesterday, formed, no blood, no diarrhea or constipation. Has a mild cough, nonproductive no wheezing. Denies fevers, chills, chest pain, palpitations, abdominal pain, n/v/d/c, dizziness.     - afebrile overnight  - urine cultures growing E coli; blood cultures NGTD (Prelim)   - off levophed, BP tolerating well.   - solu cortef 25 mg q 8 tapering down as tolerated  - downgraded to GMF today to complete steroid taper and continue with meropenem    10/14/2020: Patient seen and examined at bedside. Patient sitting up in bed. States "I am feeling well." Reports he is frustrated that he is not receiving his inhalers as prescribed. He states he is breathing better and not short of breath or dizzy upon standing this morning. denies CP, palpitations, sob, n/v/d/c, abdominal pain, dysuria, hematuria, cough, wheezing     - afebrile overnight  - normotensive overnight   - on solu cortef taper     OVERNIGHT EVENTS:    Home Medications:  atorvastatin 40 mg oral tablet: 1 tab(s) orally once a day (11 Oct 2020 00:18)  budesonide 0.5 mg/2 mL inhalation suspension: 2 milliliter(s) inhaled 2 times a day (11 Oct 2020 00:18)  Bystolic 5 mg oral tablet: 1 tab(s) orally once a day (11 Oct 2020 00:18)  Eliquis 5 mg oral tablet: 1 tab(s) orally 2 times a day (11 Oct 2020 00:18)  ipratropium-albuterol 0.5 mg-2.5 mg/3 mLinhalation solution: 3 milliliter(s) inhaled 4 times a day, As Needed (11 Oct 2020 00:18)  pantoprazole 40 mg oral delayed release tablet: 1 tab(s) orally once a day (11 Oct 2020 00:18)  predniSONE 5 mg oral tablet: 1 tab(s) orally once a day, As Needed (11 Oct 2020 00:18)  Proventil 90 mcg/inh inhalation aerosol: 2 puff(s) inhaled 2 times a day, As Needed (11 Oct 2020 00:18)  Spiriva HandiHaler 18 mcg inhalation capsule: 1 cap(s) inhaled once a day (11 Oct 2020 00:18)  Symbicort 160 mcg-4.5 mcg/inh inhalation aerosol: 2 puff(s) inhaled 2 times a day (11 Oct 2020 00:18)  Vitamin B12 500 mcg oral tablet: 1 tab(s) orally once a day (11 Oct 2020 00:18)  Vitamin D3 1000 intl units (25 mcg) oral tablet: 1 tab(s) orally once a day (11 Oct 2020 00:18)      MEDICATIONS  (STANDING):  albuterol/ipratropium for Nebulization 3 milliLiter(s) Nebulizer four times a day  apixaban 5 milliGRAM(s) Oral every 12 hours  atorvastatin 40 milliGRAM(s) Oral at bedtime  buDESOnide    Inhalation Suspension 0.5 milliGRAM(s) Inhalation every 12 hours  budesonide 160 MICROgram(s)/formoterol 4.5 MICROgram(s) Inhaler 2 Puff(s) Inhalation two times a day  cholecalciferol 1000 Unit(s) Oral at bedtime  cyanocobalamin 1000 MICROGram(s) Oral at bedtime  hydrocortisone sodium succinate Injectable 25 milliGRAM(s) IV Push two times a day  meropenem  IVPB      meropenem  IVPB 1000 milliGRAM(s) IV Intermittent every 8 hours  pantoprazole    Tablet 40 milliGRAM(s) Oral before breakfast  tiotropium 18 MICROgram(s) Capsule 1 Capsule(s) Inhalation daily    MEDICATIONS  (PRN):  acetaminophen   Tablet .. 650 milliGRAM(s) Oral every 6 hours PRN Temp greater or equal to 38C (100.4F), Mild Pain (1 - 3)  zaleplon 5 milliGRAM(s) Oral at bedtime PRN Insomnia      No Known Allergies      Social History:  Denies use of tobacco, EtOH, recreational drug use  Ambulates: independent  ADLs: independent (10 Oct 2020 23:41)      REVIEW OF SYSTEMS:  CONSTITUTIONAL: No fever, No chills, No fatigue, No myalgia, No Body ache, No Weakness  EYES: No eye pain,  No visual disturbances, No discharge, No Redness  ENMT: No ear pain, No nose bleed, No vertigo; No sinus pain, No throat pain, No Congestion  NECK: No pain, No stiffness  RESPIRATORY: No cough, No wheezing, No hemoptysis, No shortness of breath  CARDIOVASCULAR: No chest pain, No palpitations  GASTROINTESTINAL: No abdominal pain, No epigastric pain. No nausea, No vomiting, No diarrhea, No constipation; [ x ] BM  GENITOURINARY: No dysuria, No frequency, No urgency, No hematuria, No incontinence  NEUROLOGICAL: No headaches, No dizziness, No numbness, No tingling, No tremors, No weakness  EXTREMITIES: No Swelling, No Pain, No Edema  SKIN: [ x ] No itching, burning, rashes, or lesions   MUSCULOSKELETAL: No joint pain, No joint swelling; No muscle pain, No back pain, No extremity pain  PSYCHIATRIC: No depression, No anxiety, No mood swings, No difficulty sleeping at night  PAIN SCALE: [ x ] None  [  ] Other-  ROS Unable to obtain due to: [  ] Dementia  [  ] Lethargy  [  ] Sedated  [  ] Non verbal  REST OF REVIEW OF SYSTEMS: [ x ] Normal     Vital Signs Last 24 Hrs  T(C): 36.4 (14 Oct 2020 04:59), Max: 37.3 (13 Oct 2020 10:22)  T(F): 97.6 (14 Oct 2020 04:59), Max: 99.2 (13 Oct 2020 10:22)  HR: 60 (14 Oct 2020 04:59) (60 - 80)  BP: 130/70 (14 Oct 2020 04:59) (108/55 - 130/70)  BP(mean): 76 (13 Oct 2020 10:00) (76 - 76)  RR: 18 (14 Oct 2020 04:59) (18 - 28)  SpO2: 93% (14 Oct 2020 04:59) (92% - 97%)    CAPILLARY BLOOD GLUCOSE          I&O's Summary    13 Oct 2020 07:01  -  14 Oct 2020 07:00  --------------------------------------------------------  IN: 600 mL / OUT: 700 mL / NET: -100 mL      PHYSICAL EXAM:  GENERAL:  [x  ] NAD, [x  ] Well appearing, [  ] Agitated, [  ] Drowsy, [  ] Lethargy, [  ] Confused   HEAD:  [ x ] Normal, [  ] Other  EYES:  [ x ] EOMI, [  ] PERRLA, [ x ] Conjunctiva and sclera clear normal, [  ] Other, [  ] Pallor, [  ] Discharge  ENMT:  [ x ] Normal, [ x ] Moist mucous membranes, [x  ] Good dentition, [x  ] No thrush  NECK:  [x  ] Supple, [x  ] No JVD, [ x ] Normal thyroid, [  ] Lymphadenopathy, [  ] Other  CHEST/LUNG:  [ x ] Clear to auscultation bilaterally, [  ] Breath Sounds equal B/L / decreased, [  ] Poor effort, [ x ] No rales, [ x ] No rhonchi, [  ] No wheezing  HEART:  [ x ] Regular rate and rhythm, [  ] Tachycardia, [  ] Bradycardia, [  ] Irregular, [ x ] No murmurs, No rubs, No gallops, [  ] PPM in place (Mfr:  )  ABDOMEN:  [ x ] Soft, [ x ] Nontender, [ x ] Nondistended, [ x ] No mass, [ x ] Bowel sounds present, [  ] Obese  NERVOUS SYSTEM:  [ x ] Alert & Oriented x3, [  ] Nonfocal, [  ] Confusion, [  ] Encephalopathic, [  ] Sedated, [  ] Unable to assess, [  ] Dementia, [  ] Other-  EXTREMITIES:  [x  ] 2+ Peripheral Pulses, No clubbing, No cyanosis,  [  ] Edema B/L lower EXT, [  ] PVD stasis skin changes B/L lower EXT, [  ] Wound  LYMPH:  No lymphadenopathy noted  SKIN:  [ x ] No rashes or lesions, [  ] Pressure ulcers, [  ] Ecchymosis, [  ] Skin tears, [  ] Other    DIET: Diet, DASH/TLC:   Sodium & Cholesterol Restricted (10-10-20 @ 23:52)      LABS:                        11.9   x     )-----------( x        ( 14 Oct 2020 06:54 )             35.7     14 Oct 2020 06:54    148    |  114    |  18     ----------------------------<  109    4.2     |  29     |  0.74     Ca    8.4        14 Oct 2020 06:54  Phos  2.9       14 Oct 2020 06:54  Mg     2.3       14 Oct 2020 06:54      PT/INR - ( 13 Oct 2020 05:22 )   PT: 17.7 sec;   INR: 1.54 ratio             Culture Results:   >100,000 CFU/ml Escherichia coli ESBL  <10,000 CFU/ml Normal Urogenital cathi present (10-11 @ 04:07)  Culture Results:   No growth to date. (10-11 @ 00:28)  Culture Results:   No growth to date. (10-11 @ 00:28)            Culture - Urine (collected 11 Oct 2020 04:07)  Source: .Urine Clean Catch (Midstream)  Final Report (13 Oct 2020 19:15):    >100,000 CFU/ml Escherichia coli ESBL    <10,000 CFU/ml Normal Urogenital cathi present  Organism: Escherichia coli ESBL (13 Oct 2020 19:15)  Organism: Escherichia coli ESBL (13 Oct 2020 19:15)    Culture - Blood (collected 11 Oct 2020 00:28)  Source: .Blood Blood-Peripheral  Preliminary Report (12 Oct 2020 01:03):    No growth to date.    Culture - Blood (collected 11 Oct 2020 00:28)  Source: .Blood Blood-Peripheral  Preliminary Report (12 Oct 2020 01:03):    No growth to date.       Anemia Panel:      Thyroid Panel:                RADIOLOGY & ADDITIONAL TESTS:      HEALTH ISSUES - PROBLEM Dx:  CAD (coronary artery disease)  CAD (coronary artery disease)    Atrial fibrillation  Atrial fibrillation    Need for prophylactic measure  Need for prophylactic measure    HLD (hyperlipidemia)  HLD (hyperlipidemia)    HTN (hypertension)  HTN (hypertension)    GERD (gastroesophageal reflux disease)  GERD (gastroesophageal reflux disease)    Urinary tract infection without hematuria, site unspecified  Urinary tract infection without hematuria, site unspecified    Prostatitis  Prostatitis    Septic shock  Septic shock    COPD (chronic obstructive pulmonary disease)  COPD (chronic obstructive pulmonary disease)          Consultant(s) Notes Reviewed:  [ x ] YES     Care Discussed with [ x ] Consultants, [ x ] Patient, [  ] Family, [  ] HCP, [  ] , [  ] Social Service, [  ] RN, [  ] Physical Therapy, [  ] Palliative Care Team  DVT PPX: [  ] Lovenox, [  ] SC Heparin, [  ] Coumadin, [  ] Xarelto, [ x ] Eliquis, [  ] Pradaxa, [  ] IV Heparin drip, [  ] SCD, [  ] Ambulation, [  ] Contraindicated 2/2 GI Bleed, [  ] Contraindicated 2/2  Bleed, [  ] Contraindicated 2/2 Brain Bleed  Advanced Directive: [x  ] None, [  ] DNR/DNI Patient is a 75y old  Male who presents with a chief complaint of UTI (13 Oct 2020 10:49)      INTERVAL HPI: 75 year old male with PMHX atrial fibrillation (on Eliquis), asthma, COPD, former smoker, hx of benign lung cancer R lobe surgically removed at [facility]) CAD (s/p 5 stents ~2001), GERD, HLD, HTN presents after shakes and low O2 70s. Patient endorses sudden onset of shaking and shortness of breath earlier today. Patient states he used his nebulizer treatment, however felt no relief. He decided to come to the ED for further evaluation. Denies sick contacts or recent travel. Of note, patient with history of intubation 2019, during admission [facility] for acute hypercapnic respiratory failure. Patient states he was recently tested for COVID and results were negative. Denies fever, abdominal pain. Admits nausea, vomiting x1 episode in the ER.     ED course:  Vitals: T 103.8, , /71, RR 18, SpO2 96% on RA  Labs: Neutrophil 90.1%, Lymphocyte 6.3%, Monocyte 1.0%, PT/INR 13.7/1.18, PTT 25.6, Lactate 2.2  UA: positive nitrite, moderate LE, >50 RBC, >50 WBC, many bacteria  CT abd/pelvis: Although the urinary bladder is incompletely distended, there appears to be wall thickening. Correlate with urinalysis for the possibility of cystitis.  CTA chest: No significant interval change in the chest compared to the previous study of April 11, 2016. Redemonstration of emphysema and postoperative changes in the right upper lobe  CXR: official read pending  EKG: NSR vent rate 100 bpm  Given IV NS 1 L bolus x2, Tylenol 650 mg PO x1, Proventil 2 puffs x1, IV Solumedrol 125 mg IVP x1, IV Zithromax and IV Rocephin, continue IV Meropenem     10/11/2020: Patient seen and examined at bedside in ED. Admitted for UTI, fever. Pt had no complaints of pain. Pt checked on throughout the night.   On 2.5 L NC 92 % O2 sat well. BP 78/46 s/p 4 L NS bolus and maintenance NS @ 60 cc/hour.   - afebrile, WBC 8 --> 15.42 overnight, lactate 2.2 --> 2.1 --> 3.1   - ICU PA consult noted and reviewed. Patient to be transferred to MICU ,  Now pt with septic shock 2/2 , UTIs/p IV Fluid resuscitation,  started on levophed   - Patient admits to  "laser surgery" for treatment of his BPH on 10/1  at [facility] and completed a 5 day course of outpatient antibiotic treatment. states he was told he likely has scars and is concerned for infection in prostate. has had pain with urination since then.    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 Is anxious and mildly irritable. " I have not slept in a few days." Melatonin does not help. Overall he "does not feel well." however does not have any specific complaints. Admits to constipation, had no BM since admission. States he is breathing better, had duonebs treatment this morning. admits to cough, unchanged from baseline (has asthma/COPD). no wheezing. Patient urinating adequately, notes he filled 250 cc at a time. Denies any fevers, chills, chest pain, palpitations, abdominal pain, n/v/d dysuria, hematuria. Leukocytosis.    - White count elevated 34.4 this morning, worsening leukocytosis   - ICU: on levophed; now on 2.891 mL/hr, BP in 110s/50s + stress steroids solu -cortef   - on 2 L NC   - Patient reports he had a Green Light Procedure for enlarged prostate by Dr. Kelly, Urology on 10/1    10/13/2020: Patient seen and examined at bedside. Patient sitting up in chair, eating breakfast. States he "is feeling good this morning." He was able to fall asleep after given zaleplon last night however, was woken up at 11 PM for a temperature check. He feels short of breath upon getting up from bed. Overall he states he is breathing well, no wheezing. He is also urinating adequately, no pain, burning or hematuria. Had 1 BM yesterday, formed, no blood, no diarrhea or constipation. Has a mild cough, nonproductive no wheezing. Denies fevers, chills, chest pain, palpitations, abdominal pain, n/v/d/c, dizziness.     - afebrile overnight  - urine cultures growing E coli; blood cultures NGTD (Prelim)   - off levophed, BP tolerating well.   - solu cortef 25 mg q 8 tapering down as tolerated  - downgraded to GMF today to complete steroid taper and continue with meropenem    10/14/2020: Patient seen and examined at bedside. Patient sitting up in bed. States "I am feeling well." Reports he is frustrated that he is not receiving his inhalers as prescribed. Counseled patient regarding medications that he is receiving  them as prescribed.  He states he is breathing better and not short of breath or dizzy upon standing this morning. Denies CP, palpitations, sob, n/v/d/c, abdominal pain, dysuria, hematuria, cough, wheezing     - afebrile overnight  - normotensive overnight   - on solu cortef taper     OVERNIGHT EVENTS:    Home Medications:  atorvastatin 40 mg oral tablet: 1 tab(s) orally once a day (11 Oct 2020 00:18)  budesonide 0.5 mg/2 mL inhalation suspension: 2 milliliter(s) inhaled 2 times a day (11 Oct 2020 00:18)  Bystolic 5 mg oral tablet: 1 tab(s) orally once a day (11 Oct 2020 00:18)  Eliquis 5 mg oral tablet: 1 tab(s) orally 2 times a day (11 Oct 2020 00:18)  ipratropium-albuterol 0.5 mg-2.5 mg/3 mLinhalation solution: 3 milliliter(s) inhaled 4 times a day, As Needed (11 Oct 2020 00:18)  pantoprazole 40 mg oral delayed release tablet: 1 tab(s) orally once a day (11 Oct 2020 00:18)  predniSONE 5 mg oral tablet: 1 tab(s) orally once a day, As Needed (11 Oct 2020 00:18)  Proventil 90 mcg/inh inhalation aerosol: 2 puff(s) inhaled 2 times a day, As Needed (11 Oct 2020 00:18)  Spiriva HandiHaler 18 mcg inhalation capsule: 1 cap(s) inhaled once a day (11 Oct 2020 00:18)  Symbicort 160 mcg-4.5 mcg/inh inhalation aerosol: 2 puff(s) inhaled 2 times a day (11 Oct 2020 00:18)  Vitamin B12 500 mcg oral tablet: 1 tab(s) orally once a day (11 Oct 2020 00:18)  Vitamin D3 1000 intl units (25 mcg) oral tablet: 1 tab(s) orally once a day (11 Oct 2020 00:18)      MEDICATIONS  (STANDING):  albuterol/ipratropium for Nebulization 3 milliLiter(s) Nebulizer four times a day  apixaban 5 milliGRAM(s) Oral every 12 hours  atorvastatin 40 milliGRAM(s) Oral at bedtime  buDESOnide    Inhalation Suspension 0.5 milliGRAM(s) Inhalation every 12 hours  budesonide 160 MICROgram(s)/formoterol 4.5 MICROgram(s) Inhaler 2 Puff(s) Inhalation two times a day  cholecalciferol 1000 Unit(s) Oral at bedtime  cyanocobalamin 1000 MICROGram(s) Oral at bedtime  hydrocortisone sodium succinate Injectable 25 milliGRAM(s) IV Push two times a day  meropenem  IVPB      meropenem  IVPB 1000 milliGRAM(s) IV Intermittent every 8 hours  pantoprazole    Tablet 40 milliGRAM(s) Oral before breakfast  tiotropium 18 MICROgram(s) Capsule 1 Capsule(s) Inhalation daily    MEDICATIONS  (PRN):  acetaminophen   Tablet .. 650 milliGRAM(s) Oral every 6 hours PRN Temp greater or equal to 38C (100.4F), Mild Pain (1 - 3)  zaleplon 5 milliGRAM(s) Oral at bedtime PRN Insomnia      No Known Allergies      Social History:  Denies use of tobacco, EtOH, recreational drug use  Ambulates: independent  ADLs: independent (10 Oct 2020 23:41)      REVIEW OF SYSTEMS:  CONSTITUTIONAL: No fever, No chills, No fatigue, No myalgia, No Body ache, No Weakness  EYES: No eye pain,  No visual disturbances, No discharge, No Redness  ENMT: No ear pain, No nose bleed, No vertigo; No sinus pain, No throat pain, No Congestion  NECK: No pain, No stiffness  RESPIRATORY: No cough, No wheezing, No hemoptysis, No shortness of breath  CARDIOVASCULAR: No chest pain, No palpitations  GASTROINTESTINAL: No abdominal pain, No epigastric pain. No nausea, No vomiting, No diarrhea, No constipation; [ x ] BM  GENITOURINARY: No dysuria, No frequency, No urgency, No hematuria, No incontinence  NEUROLOGICAL: No headaches, No dizziness, No numbness, No tingling, No tremors, No weakness  EXTREMITIES: No Swelling, No Pain, No Edema  SKIN: [ x ] No itching, burning, rashes, or lesions   MUSCULOSKELETAL: No joint pain, No joint swelling; No muscle pain, No back pain, No extremity pain  PSYCHIATRIC: No depression, No anxiety, No mood swings, No difficulty sleeping at night  PAIN SCALE: [ x ] None  [  ] Other-  ROS Unable to obtain due to: [  ] Dementia  [  ] Lethargy  [  ] Sedated  [  ] Non verbal  REST OF REVIEW OF SYSTEMS: [ x ] Normal     Vital Signs Last 24 Hrs  T(C): 36.4 (14 Oct 2020 04:59), Max: 37.3 (13 Oct 2020 10:22)  T(F): 97.6 (14 Oct 2020 04:59), Max: 99.2 (13 Oct 2020 10:22)  HR: 60 (14 Oct 2020 04:59) (60 - 80)  BP: 130/70 (14 Oct 2020 04:59) (108/55 - 130/70)  BP(mean): 76 (13 Oct 2020 10:00) (76 - 76)  RR: 18 (14 Oct 2020 04:59) (18 - 28)  SpO2: 93% (14 Oct 2020 04:59) (92% - 97%)    CAPILLARY BLOOD GLUCOSE          I&O's Summary    13 Oct 2020 07:01  -  14 Oct 2020 07:00  --------------------------------------------------------  IN: 600 mL / OUT: 700 mL / NET: -100 mL      PHYSICAL EXAM:  GENERAL:  [x  ] NAD, [x  ] Well appearing, [  ] Agitated, [  ] Drowsy, [  ] Lethargy, [  ] Confused   HEAD:  [ x ] Normal, [  ] Other  EYES:  [ x ] EOMI, [  ] PERRLA, [ x ] Conjunctiva and sclera clear normal, [  ] Other, [  ] Pallor, [  ] Discharge  ENMT:  [ x ] Normal, [ x ] Moist mucous membranes, [x  ] Good dentition, [x  ] No thrush  NECK:  [x  ] Supple, [x  ] No JVD, [ x ] Normal thyroid, [  ] Lymphadenopathy, [  ] Other  CHEST/LUNG:  [ x ] Clear to auscultation bilaterally, [  ] Breath Sounds equal B/L / decreased, [  ] Poor effort, [ x ] No rales, [ x ] No rhonchi, [  ] No wheezing  HEART:  [ x ] Regular rate and rhythm, [  ] Tachycardia, [  ] Bradycardia, [  ] Irregular, [ x ] No murmurs, No rubs, No gallops, [  ] PPM in place (Mfr:  )  ABDOMEN:  [ x ] Soft, [ x ] Nontender, [ x ] Nondistended, [ x ] No mass, [ x ] Bowel sounds present, [  ] Obese  NERVOUS SYSTEM:  [ x ] Alert & Oriented x3, [  ] Nonfocal, [  ] Confusion, [  ] Encephalopathic, [  ] Sedated, [  ] Unable to assess, [  ] Dementia, [  ] Other-  EXTREMITIES:  [x  ] 2+ Peripheral Pulses, No clubbing, No cyanosis,  [  ] Edema B/L lower EXT, [  ] PVD stasis skin changes B/L lower EXT, [  ] Wound  LYMPH:  No lymphadenopathy noted  SKIN:  [ x ] No rashes or lesions, [  ] Pressure ulcers, [  ] Ecchymosis, [  ] Skin tears, [  ] Other    DIET: Diet, DASH/TLC:   Sodium & Cholesterol Restricted (10-10-20 @ 23:52)      LABS:                        11.9   x     )-----------( x        ( 14 Oct 2020 06:54 )             35.7     14 Oct 2020 06:54    148    |  114    |  18     ----------------------------<  109    4.2     |  29     |  0.74     Ca    8.4        14 Oct 2020 06:54  Phos  2.9       14 Oct 2020 06:54  Mg     2.3       14 Oct 2020 06:54      PT/INR - ( 13 Oct 2020 05:22 )   PT: 17.7 sec;   INR: 1.54 ratio             Culture Results:   >100,000 CFU/ml Escherichia coli ESBL  <10,000 CFU/ml Normal Urogenital cathi present (10-11 @ 04:07)  Culture Results:   No growth to date. (10-11 @ 00:28)  Culture Results:   No growth to date. (10-11 @ 00:28)            Culture - Urine (collected 11 Oct 2020 04:07)  Source: .Urine Clean Catch (Midstream)  Final Report (13 Oct 2020 19:15):    >100,000 CFU/ml Escherichia coli ESBL    <10,000 CFU/ml Normal Urogenital cathi present  Organism: Escherichia coli ESBL (13 Oct 2020 19:15)  Organism: Escherichia coli ESBL (13 Oct 2020 19:15)    Culture - Blood (collected 11 Oct 2020 00:28)  Source: .Blood Blood-Peripheral  Preliminary Report (12 Oct 2020 01:03):    No growth to date.    Culture - Blood (collected 11 Oct 2020 00:28)  Source: .Blood Blood-Peripheral  Preliminary Report (12 Oct 2020 01:03):    No growth to date.       Anemia Panel:      Thyroid Panel:                RADIOLOGY & ADDITIONAL TESTS:      HEALTH ISSUES - PROBLEM Dx:  CAD (coronary artery disease)  CAD (coronary artery disease)    Atrial fibrillation  Atrial fibrillation    Need for prophylactic measure  Need for prophylactic measure    HLD (hyperlipidemia)  HLD (hyperlipidemia)    HTN (hypertension)  HTN (hypertension)    GERD (gastroesophageal reflux disease)  GERD (gastroesophageal reflux disease)    Urinary tract infection without hematuria, site unspecified  Urinary tract infection without hematuria, site unspecified    Prostatitis  Prostatitis    Septic shock  Septic shock    COPD (chronic obstructive pulmonary disease)  COPD (chronic obstructive pulmonary disease)          Consultant(s) Notes Reviewed:  [ x ] YES     Care Discussed with [ x ] Consultants, [ x ] Patient, [  ] Family, [  ] HCP, [  ] , [  ] Social Service, [  ] RN, [  ] Physical Therapy, [  ] Palliative Care Team  DVT PPX: [  ] Lovenox, [  ] SC Heparin, [  ] Coumadin, [  ] Xarelto, [ x ] Eliquis, [  ] Pradaxa, [  ] IV Heparin drip, [  ] SCD, [  ] Ambulation, [  ] Contraindicated 2/2 GI Bleed, [  ] Contraindicated 2/2  Bleed, [  ] Contraindicated 2/2 Brain Bleed  Advanced Directive: [x  ] None, [  ] DNR/DNI Patient is a 75y old  Male who presents with a chief complaint of UTI (13 Oct 2020 10:49)      INTERVAL HPI: 75 year old male with PMHX atrial fibrillation (on Eliquis), asthma, COPD, former smoker, hx of benign lung cancer R lobe surgically removed at [facility]) CAD (s/p 5 stents ~2001), GERD, HLD, HTN presents after shakes and low O2 70s. Patient endorses sudden onset of shaking and shortness of breath earlier today. Patient states he used his nebulizer treatment, however felt no relief. He decided to come to the ED for further evaluation. Denies sick contacts or recent travel. Of note, patient with history of intubation 2019, during admission to [facility] for acute hypercapnic respiratory failure. Patient states he was recently tested for COVID and results were negative. Denies fever, abdominal pain. Admits nausea, vomiting x1 episode in the ER.     ED course:  Vitals: T 103.8, , /71, RR 18, SpO2 96% on RA  Labs: Neutrophil 90.1%, Lymphocyte 6.3%, Monocyte 1.0%, PT/INR 13.7/1.18, PTT 25.6, Lactate 2.2  UA: positive nitrite, moderate LE, >50 RBC, >50 WBC, many bacteria  CT abd/pelvis: Although the urinary bladder is incompletely distended, there appears to be wall thickening. Correlate with urinalysis for the possibility of cystitis.  CTA chest: No significant interval change in the chest compared to the previous study of April 11, 2016. Redemonstration of emphysema and postoperative changes in the right upper lobe  CXR: official read pending  EKG: NSR vent rate 100 bpm  Given IV NS 1 L bolus x2, Tylenol 650 mg PO x1, Proventil 2 puffs x1, IV Solumedrol 125 mg IVP x1, IV Zithromax and IV Rocephin, continue IV Meropenem     10/11/2020: Patient seen and examined at bedside in ED. Admitted for UTI, fever. Pt had no complaints of pain. Pt checked on throughout the night.   On 2.5 L NC 92 % O2 sat well. BP 78/46 s/p 4 L NS bolus and maintenance NS @ 60 cc/hour.   - afebrile, WBC 8 --> 15.42 overnight, lactate 2.2 --> 2.1 --> 3.1   - ICU PA consult noted and reviewed. Patient to be transferred to MICU ,  Now pt with septic shock 2/2 , UTIs/p IV Fluid resuscitation,  started on levophed   - Patient admits to  "laser surgery" for treatment of his BPH on 10/1  at [facility] and completed a 5 day course of outpatient antibiotic treatment. states he was told he likely has scars and is concerned for infection in prostate. has had pain with urination since then.    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 Is anxious and mildly irritable. " I have not slept in a few days." Melatonin does not help. Overall he "does not feel well." however does not have any specific complaints. Admits to constipation, had no BM since admission. States he is breathing better, had duonebs treatment this morning. admits to cough, unchanged from baseline (has asthma/COPD). no wheezing. Patient urinating adequately, notes he filled 250 cc at a time. Denies any fevers, chills, chest pain, palpitations, abdominal pain, n/v/d dysuria, hematuria. Leukocytosis.    - White count elevated 34.4 this morning, worsening leukocytosis   - ICU: on levophed; now on 2.891 mL/hr, BP in 110s/50s + stress steroids solu -cortef   - on 2 L NC   - Patient reports he had a [place] Procedure for enlarged prostate by Dr. Kelly, Urology on 10/1    10/13/2020: Patient seen and examined at bedside. Patient sitting up in chair, eating breakfast. States he "is feeling good this morning." He was able to fall asleep after given zaleplon last night however, was woken up at 11 PM for a temperature check. He feels short of breath upon getting up from bed. Overall he states he is breathing well, no wheezing. He is also urinating adequately, no pain, burning or hematuria. Had 1 BM yesterday, formed, no blood, no diarrhea or constipation. Has a mild cough, nonproductive no wheezing. Denies fevers, chills, chest pain, palpitations, abdominal pain, n/v/d/c, dizziness. Leukocytosis. improving , Taper IV Steroids     - afebrile overnight  - urine cultures growing E coli; blood cultures NGTD (Prelim)   - off levophed, BP tolerating well.   - solu cortef 25 mg q 8 tapering down as tolerated  - downgraded to F today to complete steroid taper and continue with meropenem    10/14/2020: Patient seen and examined at bedside. Patient sitting up in bed. States "I am feeling well." Reports he is frustrated that he is not receiving his inhalers as prescribed. Counseled patient regarding medications that he is receiving  them as prescribed.  He states he is breathing better and not short of breath or dizzy upon standing this morning. Denies CP, palpitations, sob, n/v/d/c, abdominal pain, dysuria, hematuria, cough, wheezing  Leukocytosis. improving , Taper IV Steroids , MID Line Placement today , D/C Plan     - afebrile overnight  - normotensive overnight   - on solu cortef taper 25 mg IV Q 12 today & Once daily in AM     OVERNIGHT EVENTS:    Home Medications:  atorvastatin 40 mg oral tablet: 1 tab(s) orally once a day (11 Oct 2020 00:18)  budesonide 0.5 mg/2 mL inhalation suspension: 2 milliliter(s) inhaled 2 times a day (11 Oct 2020 00:18)  Bystolic 5 mg oral tablet: 1 tab(s) orally once a day (11 Oct 2020 00:18)  Eliquis 5 mg oral tablet: 1 tab(s) orally 2 times a day (11 Oct 2020 00:18)  ipratropium-albuterol 0.5 mg-2.5 mg/3 mLinhalation solution: 3 milliliter(s) inhaled 4 times a day, As Needed (11 Oct 2020 00:18)  pantoprazole 40 mg oral delayed release tablet: 1 tab(s) orally once a day (11 Oct 2020 00:18)  predniSONE 5 mg oral tablet: 1 tab(s) orally once a day, As Needed (11 Oct 2020 00:18)  Proventil 90 mcg/inh inhalation aerosol: 2 puff(s) inhaled 2 times a day, As Needed (11 Oct 2020 00:18)  Spiriva HandiHaler 18 mcg inhalation capsule: 1 cap(s) inhaled once a day (11 Oct 2020 00:18)  Symbicort 160 mcg-4.5 mcg/inh inhalation aerosol: 2 puff(s) inhaled 2 times a day (11 Oct 2020 00:18)  Vitamin B12 500 mcg oral tablet: 1 tab(s) orally once a day (11 Oct 2020 00:18)  Vitamin D3 1000 intl units (25 mcg) oral tablet: 1 tab(s) orally once a day (11 Oct 2020 00:18)      MEDICATIONS  (STANDING):  albuterol/ipratropium for Nebulization 3 milliLiter(s) Nebulizer four times a day  apixaban 5 milliGRAM(s) Oral every 12 hours  atorvastatin 40 milliGRAM(s) Oral at bedtime  buDESOnide    Inhalation Suspension 0.5 milliGRAM(s) Inhalation every 12 hours  budesonide 160 MICROgram(s)/formoterol 4.5 MICROgram(s) Inhaler 2 Puff(s) Inhalation two times a day  cholecalciferol 1000 Unit(s) Oral at bedtime  cyanocobalamin 1000 MICROGram(s) Oral at bedtime  hydrocortisone sodium succinate Injectable 25 milliGRAM(s) IV Push two times a day  meropenem  IVPB      meropenem  IVPB 1000 milliGRAM(s) IV Intermittent every 8 hours  pantoprazole    Tablet 40 milliGRAM(s) Oral before breakfast  tiotropium 18 MICROgram(s) Capsule 1 Capsule(s) Inhalation daily    MEDICATIONS  (PRN):  acetaminophen   Tablet .. 650 milliGRAM(s) Oral every 6 hours PRN Temp greater or equal to 38C (100.4F), Mild Pain (1 - 3)  zaleplon 5 milliGRAM(s) Oral at bedtime PRN Insomnia      No Known Allergies      Social History:  Denies use of tobacco, EtOH, recreational drug use  Ambulates: independent  ADLs: independent (10 Oct 2020 23:41)      REVIEW OF SYSTEMS:  CONSTITUTIONAL: No fever, No chills, No fatigue, No myalgia, No Body ache, No Weakness  EYES: No eye pain,  No visual disturbances, No discharge, No Redness  ENMT: No ear pain, No nose bleed, No vertigo; No sinus pain, No throat pain, No Congestion  NECK: No pain, No stiffness  RESPIRATORY: No cough, No wheezing, No hemoptysis, No shortness of breath  CARDIOVASCULAR: No chest pain, No palpitations  GASTROINTESTINAL: No abdominal pain, No epigastric pain. No nausea, No vomiting, No diarrhea, No constipation; [ x ] BM  GENITOURINARY: No dysuria, No frequency, No urgency, No hematuria, No incontinence  NEUROLOGICAL: No headaches, No dizziness, No numbness, No tingling, No tremors, No weakness  EXTREMITIES: No Swelling, No Pain, No Edema  SKIN: [ x ] No itching, burning, rashes, or lesions   MUSCULOSKELETAL: No joint pain, No joint swelling; No muscle pain, No back pain, No extremity pain  PSYCHIATRIC: No depression, No anxiety, No mood swings, No difficulty sleeping at night  PAIN SCALE: [ x ] None  [  ] Other-  ROS Unable to obtain due to: [  ] Dementia  [  ] Lethargy  [  ] Sedated  [  ] Non verbal  REST OF REVIEW OF SYSTEMS: [ x ] Normal     Vital Signs Last 24 Hrs  T(C): 36.4 (14 Oct 2020 04:59), Max: 37.3 (13 Oct 2020 10:22)  T(F): 97.6 (14 Oct 2020 04:59), Max: 99.2 (13 Oct 2020 10:22)  HR: 60 (14 Oct 2020 04:59) (60 - 80)  BP: 130/70 (14 Oct 2020 04:59) (108/55 - 130/70)  BP(mean): 76 (13 Oct 2020 10:00) (76 - 76)  RR: 18 (14 Oct 2020 04:59) (18 - 28)  SpO2: 93% (14 Oct 2020 04:59) (92% - 97%)    CAPILLARY BLOOD GLUCOSE          I&O's Summary    13 Oct 2020 07:01  -  14 Oct 2020 07:00  --------------------------------------------------------  IN: 600 mL / OUT: 700 mL / NET: -100 mL      PHYSICAL EXAM: Rt U Ext MID Line   GENERAL:  [x  ] NAD, [x  ] Well appearing, [  ] Agitated, [  ] Drowsy, [  ] Lethargy, [  ] Confused   HEAD:  [ x ] Normal, [  ] Other  EYES:  [ x ] EOMI, [  ] PERRLA, [ x ] Conjunctiva and sclera clear normal, [  ] Other, [  ] Pallor, [  ] Discharge  ENMT:  [ x ] Normal, [ x ] Moist mucous membranes, [x  ] Good dentition, [x  ] No thrush  NECK:  [x  ] Supple, [x  ] No JVD, [ x ] Normal thyroid, [  ] Lymphadenopathy, [  ] Other  CHEST/LUNG:  [ x ] Clear to auscultation bilaterally, [  ] Breath Sounds equal B/L / decreased, [  ] Poor effort, [ x ] No rales, [ x ] No rhonchi, [  ] No wheezing  HEART:  [ x ] Regular rate and rhythm, [  ] Tachycardia, [  ] Bradycardia, [  ] Irregular, [ x ] No murmurs, No rubs, No gallops, [  ] PPM in place (Mfr:  )  ABDOMEN:  [ x ] Soft, [ x ] Nontender, [ x ] Nondistended, [ x ] No mass, [ x ] Bowel sounds present, [  ] Obese  NERVOUS SYSTEM:  [ x ] Alert & Oriented x3, [  ] Nonfocal, [  ] Confusion, [  ] Encephalopathic, [  ] Sedated, [  ] Unable to assess, [  ] Dementia, [  ] Other-  EXTREMITIES:  [x  ] 2+ Peripheral Pulses, No clubbing, No cyanosis,  [  ] Edema B/L lower EXT, [  ] PVD stasis skin changes B/L lower EXT, [  ] Wound  LYMPH:  No lymphadenopathy noted  SKIN:  [ x ] No rashes or lesions, [  ] Pressure ulcers, [  ] Ecchymosis, [  ] Skin tears, [  ] Other    DIET: Diet, DASH/TLC:   Sodium & Cholesterol Restricted (10-10-20 @ 23:52)      LABS:                        11.9   18.03 )-----------( 140      ( 14 Oct 2020 06:54 )             35.7                           11.9   x     )-----------( x        ( 14 Oct 2020 06:54 )             35.7     14 Oct 2020 06:54    148    |  114    |  18     ----------------------------<  109    4.2     |  29     |  0.74     Ca    8.4        14 Oct 2020 06:54  Phos  2.9       14 Oct 2020 06:54  Mg     2.3       14 Oct 2020 06:54      PT/INR - ( 13 Oct 2020 05:22 )   PT: 17.7 sec;   INR: 1.54 ratio             Culture Results:   >100,000 CFU/ml Escherichia coli ESBL  <10,000 CFU/ml Normal Urogenital cathi present (10-11 @ 04:07)  Culture Results:   No growth to date. (10-11 @ 00:28)  Culture Results:   No growth to date. (10-11 @ 00:28)            Culture - Urine (collected 11 Oct 2020 04:07)  Source: .Urine Clean Catch (Midstream)  Final Report (13 Oct 2020 19:15):    >100,000 CFU/ml Escherichia coli ESBL    <10,000 CFU/ml Normal Urogenital cathi present  Organism: Escherichia coli ESBL (13 Oct 2020 19:15)  Organism: Escherichia coli ESBL (13 Oct 2020 19:15)    Culture - Blood (collected 11 Oct 2020 00:28)  Source: .Blood Blood-Peripheral  Preliminary Report (12 Oct 2020 01:03):    No growth to date.    Culture - Blood (collected 11 Oct 2020 00:28)  Source: .Blood Blood-Peripheral  Preliminary Report (12 Oct 2020 01:03):    No growth to date.       Anemia Panel:      Thyroid Panel:                RADIOLOGY & ADDITIONAL TESTS:      HEALTH ISSUES - PROBLEM Dx:  CAD (coronary artery disease)  CAD (coronary artery disease)    Atrial fibrillation  Atrial fibrillation    Need for prophylactic measure  Need for prophylactic measure    HLD (hyperlipidemia)  HLD (hyperlipidemia)    HTN (hypertension)  HTN (hypertension)    GERD (gastroesophageal reflux disease)  GERD (gastroesophageal reflux disease)    Urinary tract infection without hematuria, site unspecified  Urinary tract infection without hematuria, site unspecified    Prostatitis  Prostatitis    Septic shock  Septic shock    COPD (chronic obstructive pulmonary disease)  COPD (chronic obstructive pulmonary disease)          Consultant(s) Notes Reviewed:  [ x ] YES     Care Discussed with [ x ] Consultants, [ x ] Patient, [  ] Family, [  ] HCP, [  ] , [  ] Social Service, [  ] RN, [  ] Physical Therapy, [  ] Palliative Care Team  DVT PPX: [  ] Lovenox, [  ] SC Heparin, [  ] Coumadin, [  ] Xarelto, [ x ] Eliquis, [  ] Pradaxa, [  ] IV Heparin drip, [  ] SCD, [  ] Ambulation, [  ] Contraindicated 2/2 GI Bleed, [  ] Contraindicated 2/2  Bleed, [  ] Contraindicated 2/2 Brain Bleed  Advanced Directive: [x  ] None, [  ] DNR/DNI

## 2020-10-14 NOTE — PROGRESS NOTE ADULT - ASSESSMENT
Pt is a 75M w/ PMHx of CAD, Afib on AC, HTN, HLD, COPD/asthma, BPH p/w rigors, found to have AHRF w/ O2 saturation in 70s; noted to have recent "laser surgery" for treatment of his BPH on 10/1 s/p short Abx course. Pt p/w severe sepsis/septic shock requiring ICU stay likely in the setting of sepsis 2/2  source. Found to have lactic acidosis and worsening leukocytosis. Pt w/ previous hx of ESBL E.coli (2019), currently on meropenem.     Severe Sepsis/Septic Shock 2/2 E.coli Acute Prostatitis in the setting of recent instrumentation  -BCx negative  -UCx ESBL E.coli  -d/c meropenem  -start ertapenem 1gm q24h  Patient will need midline  Will given total 28 day course of ertapenem 1gm IV q24h until 11/7/2020 for prostatitis    Will need to fax weekly labs BMP, LFTs and CBC to   Dr. Mary Lopez  [facility], Division of Infectious Diseases   Fax: [phone]   Pt is a 75M w/ PMHx of CAD, Afib on AC, HTN, HLD, COPD/asthma, BPH p/w rigors, found to have AHRF w/ O2 saturation in 70s; noted to have recent "laser surgery" for treatment of his BPH on 10/1 s/p short Abx course. Pt p/w severe sepsis/septic shock requiring ICU stay likely in the setting of sepsis 2/2  source. Found to have lactic acidosis and worsening leukocytosis. Pt w/ previous hx of ESBL E.coli (2019), currently on meropenem.     Severe Sepsis/Septic Shock 2/2 E.coli Acute Prostatitis in the setting of recent instrumentation  Leukocytosis  -BCx negative  -UCx ESBL E.coli  -leukocytosis downtrending--> slight elevations likely in setting of concurrent steroid use  Pt clinically appears well and is being appropriately treated for identified infection  -d/c meropenem  -start ertapenem 1gm q24h  Patient will need midline placement  Will given total 28 day course of ertapenem 1gm IV q24h until 11/7/2020 for prostatitis    Will need to fax weekly labs BMP, LFTs and CBC to   Dr. Mary Lopez  [facility], Division of Infectious Diseases   Fax: [phone]  Please fax first set of labs on initial day of home therapy, followed by weekly.

## 2020-10-14 NOTE — PROGRESS NOTE ADULT - PROBLEM SELECTOR PLAN 1
sepsis Fever, POA now with   Leukocytosis with Bandemia - Hypotensive s/p 3 L NS bolus, now on IVF 60 cc/hour (78/46), RESOLVED   - source likely  UTI  ? prostatitis with recent  prostate surgery   - Admitted to ICU for septic shock on pressors, now downgraded to GMF with remote tele   - White count 34 ---> 26--> _____ likely 2/2 to infection &  solu cortef.  - Urine cultures growing E Coli pending sensitivities; Blood cx prelim NGTD  - tapering off levophed, now only on solu cortef taper; BP tolerating well   - COVID antibody positive (80.50)  - CXR (admission): No active parenchymal disease in the chest. sepsis Fever, POA now with   Leukocytosis with Bandemia - Hypotensive s/p 3 L NS bolus, now on IVF 60 cc/hour (78/46), RESOLVED   - source likely  UTI  ? prostatitis with recent  prostate surgery   - Admitted to ICU for septic shock on pressors, now downgraded to GMF with remote tele   - White count 34 ---> 26--> 18 likely 2/2 to infection &  solu cortef.  - Urine cultures growing E Coli pending sensitivities; Blood cx prelim NGTD  - tapering off levophed, now only on solu cortef taper; BP tolerating well   - COVID antibody positive (80.50)  - CXR (admission): No active parenchymal disease in the chest. sepsis Fever, POA now with   Leukocytosis with Bandemia - Hypotensive s/p 3 L NS bolus, now on IVF 60 cc/hour (78/46), RESOLVED   - source likely  UTI  ? prostatitis with recent  prostate surgery   - Admitted to ICU for septic shock on pressors, now downgraded to GMF with remote tele   - White count 34 ---> 26--> 18 likely 2/2 to infection &  solu cortef.  - Urine cultures growing ESBL; Blood cx prelim NGTD  - was on meropenem  - change to ertapenem as per ID and pt will require midline   - tapering off levophed, now only on solu cortef taper; BP tolerating well   - COVID antibody positive (80.50)  - CXR (admission): No active parenchymal disease in the chest. sepsis Fever, POA now with   Leukocytosis with Bandemia - Hypotensive s/p 3 L NS bolus, now on IVF 60 cc/hour (78/46), RESOLVED   - source likely  UTI/ prostatitis with recent  prostate surgery   - Admitted to ICU for septic shock s/p pressors-RESOLVED ,   -now downgraded to GMF with remote tele   - White count 34 ---> 26--> 18 likely 2/2 to infection &  solu cortef.  - Urine cultures growing ESBL; Blood cx prelim NGTD  - was on meropenem  - change to ertapenem as per ID and pt will require midline   - tapering off levophed, now only on solu cortef taper;25 mg IV q 12 hrs today & Daily in AM  BP tolerating well   - COVID antibody positive (80.50)  - CXR (admission): No active parenchymal disease in the chest.

## 2020-10-14 NOTE — PROGRESS NOTE ADULT - SUBJECTIVE AND OBJECTIVE BOX
[facility], Division of Infectious Diseases  DONALD Ragsdale Y. Patel, S. Shah  [phone]    Name: BASILIO LANDRY  Age: 75y  Gender: Male  MRN: [id number]  Note Date: 10-14-20    Interval History:  Patient seen and examined at bedside. No acute overnight events.   Anxious to leave  Notes reviewed    Antibiotics:  meropenem  IVPB      meropenem  IVPB 1000 milliGRAM(s) IV Intermittent every 8 hours      Medications:  acetaminophen   Tablet .. 650 milliGRAM(s) Oral every 6 hours PRN  albuterol/ipratropium for Nebulization 3 milliLiter(s) Nebulizer four times a day  apixaban 5 milliGRAM(s) Oral every 12 hours  atorvastatin 40 milliGRAM(s) Oral at bedtime  buDESOnide    Inhalation Suspension 0.5 milliGRAM(s) Inhalation every 12 hours  budesonide 160 MICROgram(s)/formoterol 4.5 MICROgram(s) Inhaler 2 Puff(s) Inhalation two times a day  cholecalciferol 1000 Unit(s) Oral at bedtime  cyanocobalamin 1000 MICROGram(s) Oral at bedtime  hydrocortisone sodium succinate Injectable 25 milliGRAM(s) IV Push two times a day  meropenem  IVPB      meropenem  IVPB 1000 milliGRAM(s) IV Intermittent every 8 hours  pantoprazole    Tablet 40 milliGRAM(s) Oral before breakfast  tiotropium 18 MICROgram(s) Capsule 1 Capsule(s) Inhalation daily  zaleplon 5 milliGRAM(s) Oral at bedtime PRN      Review of Systems:  A 10-point review of systems was obtained.     Pertinent positives and negatives--  Constitutional: No fevers. No Chills. No Rigors.   Cardiovascular: No chest pain. No palpitations.  Respiratory: No shortness of breath. No cough.  Gastrointestinal: No nausea or vomiting. No diarrhea or constipation.   Psychiatric: Pleasant. Appropriate affect.    Review of systems otherwise negative except as previously noted.    Allergies: No Known Allergies    For details regarding the patient's past medical history, social history, family history, and other miscellaneous elements, please refer the initial infectious diseases consultation and/or the admitting history and physical examination for this admission.    Objective:  Vitals:   T(C): 36.4 (10-14-20 @ 04:59), Max: 37.3 (10-13-20 @ 10:22)  HR: 60 (10-14-20 @ 04:59) (60 - 80)  BP: 130/70 (10-14-20 @ 04:59) (114/65 - 130/70)  RR: 18 (10-14-20 @ 04:59) (18 - 20)  SpO2: 93% (10-14-20 @ 04:59) (92% - 97%)    Physical Examination:  General: no acute distress  HEENT: NC/AT, EOMI, anicteric, no oral lesions  Neck: supple, no palpable LAD  Cardio: S1, S2 heard, RRR, no murmurs  Resp: breath sounds heard bilaterally, no rales, wheezes or rhonchi  Abd: soft, NT, ND, + bowel sounds  Neuro: AAOx3, no obvious focal deficits  Ext: no edema or cyanosis  Skin: warm, dry, no visible rash    Laboratory Studies:  CBC:                       11.9   x     )-----------( x        ( 14 Oct 2020 06:54 )             35.7     CMP: 10-14    148<H>  |  114<H>  |  18  ----------------------------<  109<H>  4.2   |  29  |  0.74    Ca    8.4<L>      14 Oct 2020 06:54  Phos  2.9     10-14  Mg     2.3     10-14    TPro  5.2<L>  /  Alb  2.2<L>  /  TBili  0.3  /  DBili  x   /  AST  21  /  ALT  48  /  AlkPhos  83  10-13    LIVER FUNCTIONS - ( 13 Oct 2020 05:22 )  Alb: 2.2 g/dL / Pro: 5.2 g/dL / ALK PHOS: 83 U/L / ALT: 48 U/L / AST: 21 U/L / GGT: x             Microbiology:    Culture - Urine (collected 10-11-20 @ 04:07)  Source: .Urine Clean Catch (Midstream)  Final Report (10-13-20 @ 19:15):    >100,000 CFU/ml Escherichia coli ESBL    <10,000 CFU/ml Normal Urogenital cathi present  Organism: Escherichia coli ESBL (10-13-20 @ 19:15)  Organism: Escherichia coli ESBL (10-13-20 @ 19:15)      -  Amikacin: S <=16      -  Amoxicillin/Clavulanic Acid: S <=8/4      -  Ampicillin: R >16 These ampicillin results predict results for amoxicillin      -  Ampicillin/Sulbactam: S 8/4 Enterobacter, Citrobacter, and Serratia may develop resistance during prolonged therapy (3-4 days)      -  Aztreonam: R 16      -  Cefazolin: R >16 ([email or web address]) For uncomplicated UTI with K. pneumoniae, E. coli, or P. mirablis: EFRAÍN <=16 is sensitive and EFRAÍN >=32 is resistant. This also predicts results for oral agents cefaclor, cefdinir, cefpodoxime, cefprozil, cefuroxime axetil, cephalexin and locarbef for uncomplicated UTI. Note that some isolates may be susceptible to these agents while testing resistant to cefazolin.      -  Cefepime: R >16      -  Cefoxitin: S <=8      -  Ceftriaxone: R >32 Enterobacter, Citrobacter, and Serratia may develop resistance during prolonged therapy      -  Ciprofloxacin: R >2      -  Ertapenem: S <=0.5      -  Gentamicin: S <=2      -  Imipenem: S <=1      -  Levofloxacin: R >4      -  Meropenem: S <=1      -  Nitrofurantoin: S <=32 Should not be used to treat pyelonephritis      -  Piperacillin/Tazobactam: S <=8      -  Tigecycline: S <=2      -  Tobramycin: S <=2      -  Trimethoprim/Sulfamethoxazole: R >2/38      Method Type: EFRAÍN    Culture - Blood (collected 10-11-20 @ 00:28)  Source: .Blood Blood-Peripheral  Preliminary Report (10-12-20 @ 01:03):    No growth to date.    Culture - Blood (collected 10-11-20 @ 00:28)  Source: .Blood Blood-Peripheral  Preliminary Report (10-12-20 @ 01:03):    No growth to date.      Radiology: reviewed       [facility], Division of Infectious Diseases  DONALD Ragsdale Y. Patel, S. Shah  [phone]    Name: BASILIO LANDRY  Age: 75y  Gender: Male  MRN: [id number]  Note Date: 10-14-20    Interval History:  Patient seen and examined at bedside. No acute overnight events.   Anxious to leave. Explained to him that all things need to be sorted out before leaving as he was just critically ill  Leukocytosis downtrending 18 today  Notes reviewed    Antibiotics:  meropenem  IVPB      meropenem  IVPB 1000 milliGRAM(s) IV Intermittent every 8 hours      Medications:  acetaminophen   Tablet .. 650 milliGRAM(s) Oral every 6 hours PRN  albuterol/ipratropium for Nebulization 3 milliLiter(s) Nebulizer four times a day  apixaban 5 milliGRAM(s) Oral every 12 hours  atorvastatin 40 milliGRAM(s) Oral at bedtime  buDESOnide    Inhalation Suspension 0.5 milliGRAM(s) Inhalation every 12 hours  budesonide 160 MICROgram(s)/formoterol 4.5 MICROgram(s) Inhaler 2 Puff(s) Inhalation two times a day  cholecalciferol 1000 Unit(s) Oral at bedtime  cyanocobalamin 1000 MICROGram(s) Oral at bedtime  hydrocortisone sodium succinate Injectable 25 milliGRAM(s) IV Push two times a day  meropenem  IVPB      meropenem  IVPB 1000 milliGRAM(s) IV Intermittent every 8 hours  pantoprazole    Tablet 40 milliGRAM(s) Oral before breakfast  tiotropium 18 MICROgram(s) Capsule 1 Capsule(s) Inhalation daily  zaleplon 5 milliGRAM(s) Oral at bedtime PRN      Review of Systems:  A 10-point review of systems was obtained.     Pertinent positives and negatives--  Constitutional: No fevers. No Chills. No Rigors.   Cardiovascular: No chest pain. No palpitations.  Respiratory: No shortness of breath. No cough.  Gastrointestinal: No nausea or vomiting. No diarrhea or constipation.   Psychiatric: Pleasant. Appropriate affect.    Review of systems otherwise negative except as previously noted.    Allergies: No Known Allergies    For details regarding the patient's past medical history, social history, family history, and other miscellaneous elements, please refer the initial infectious diseases consultation and/or the admitting history and physical examination for this admission.    Objective:  Vitals:   T(C): 36.4 (10-14-20 @ 04:59), Max: 37.3 (10-13-20 @ 10:22)  HR: 60 (10-14-20 @ 04:59) (60 - 80)  BP: 130/70 (10-14-20 @ 04:59) (114/65 - 130/70)  RR: 18 (10-14-20 @ 04:59) (18 - 20)  SpO2: 93% (10-14-20 @ 04:59) (92% - 97%)    Physical Examination:  General: no acute distress  HEENT: NC/AT, EOMI, anicteric, no oral lesions  Neck: supple, no palpable LAD  Cardio: S1, S2 heard, RRR, no murmurs  Resp: breath sounds heard bilaterally, no rales, wheezes or rhonchi  Abd: soft, NT, ND, + bowel sounds  Neuro: AAOx3, no obvious focal deficits  Ext: no edema or cyanosis  Skin: warm, dry, no visible rash    Laboratory Studies:  CBC:                       11.9   18    )-----------( x        ( 14 Oct 2020 06:54 )             35.7     CMP: 10-14    148<H>  |  114<H>  |  18  ----------------------------<  109<H>  4.2   |  29  |  0.74    Ca    8.4<L>      14 Oct 2020 06:54  Phos  2.9     10-14  Mg     2.3     10-14    TPro  5.2<L>  /  Alb  2.2<L>  /  TBili  0.3  /  DBili  x   /  AST  21  /  ALT  48  /  AlkPhos  83  10-13    LIVER FUNCTIONS - ( 13 Oct 2020 05:22 )  Alb: 2.2 g/dL / Pro: 5.2 g/dL / ALK PHOS: 83 U/L / ALT: 48 U/L / AST: 21 U/L / GGT: x             Microbiology:    Culture - Urine (collected 10-11-20 @ 04:07)  Source: .Urine Clean Catch (Midstream)  Final Report (10-13-20 @ 19:15):    >100,000 CFU/ml Escherichia coli ESBL    <10,000 CFU/ml Normal Urogenital cathi present  Organism: Escherichia coli ESBL (10-13-20 @ 19:15)  Organism: Escherichia coli ESBL (10-13-20 @ 19:15)      -  Amikacin: S <=16      -  Amoxicillin/Clavulanic Acid: S <=8/4      -  Ampicillin: R >16 These ampicillin results predict results for amoxicillin      -  Ampicillin/Sulbactam: S 8/4 Enterobacter, Citrobacter, and Serratia may develop resistance during prolonged therapy (3-4 days)      -  Aztreonam: R 16      -  Cefazolin: R >16 ([email or web address]) For uncomplicated UTI with K. pneumoniae, E. coli, or P. mirablis: EFRAÍN <=16 is sensitive and EFRAÍN >=32 is resistant. This also predicts results for oral agents cefaclor, cefdinir, cefpodoxime, cefprozil, cefuroxime axetil, cephalexin and locarbef for uncomplicated UTI. Note that some isolates may be susceptible to these agents while testing resistant to cefazolin.      -  Cefepime: R >16      -  Cefoxitin: S <=8      -  Ceftriaxone: R >32 Enterobacter, Citrobacter, and Serratia may develop resistance during prolonged therapy      -  Ciprofloxacin: R >2      -  Ertapenem: S <=0.5      -  Gentamicin: S <=2      -  Imipenem: S <=1      -  Levofloxacin: R >4      -  Meropenem: S <=1      -  Nitrofurantoin: S <=32 Should not be used to treat pyelonephritis      -  Piperacillin/Tazobactam: S <=8      -  Tigecycline: S <=2      -  Tobramycin: S <=2      -  Trimethoprim/Sulfamethoxazole: R >2/38      Method Type: EFRAÍN    Culture - Blood (collected 10-11-20 @ 00:28)  Source: .Blood Blood-Peripheral  Preliminary Report (10-12-20 @ 01:03):    No growth to date.    Culture - Blood (collected 10-11-20 @ 00:28)  Source: .Blood Blood-Peripheral  Preliminary Report (10-12-20 @ 01:03):    No growth to date.      Radiology: reviewed

## 2020-10-14 NOTE — PROGRESS NOTE ADULT - ASSESSMENT
75 year old male with PMHx atrial fibrillation (on Eliquis), asthma, COPD, former smoker, hx of benign lung cancer R lobe surgically removed at [facility]) CAD (s/p 5 stents ~2001), GERD, HLD, HTN presents after shakes and low O2 70s transferred to  septic shock 2/2 to prostatitis

## 2020-10-14 NOTE — PROGRESS NOTE ADULT - PROBLEM SELECTOR PLAN 2
recent prostate laser procedure 10/1. 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  - likely source of infection, on meropenem   - blood culture prelim NGTD; urine cultures growing e coli; sensitive to cefoxitin, macrobid and zosyn, consider deescalating antibiotics will fu with ID recs    - ID: Dr. Marc following recent prostate laser procedure 10/1. 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  - likely source of infection, was on meropenem  - switch to ertapenem, pt will require midline   - blood culture prelim NGTD; urine cultures growing ESBL   - ID: Dr. Marc following recent prostate laser procedure 10/1. 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  - likely source of infection, was on meropenem  - switch to ertapenem, pt will require midline   - blood culture prelim NGTD; urine cultures growing ESBL   - ID: Dr. Marc following for 28 days at home

## 2020-10-14 NOTE — PROVIDER CONTACT NOTE (CRITICAL VALUE NOTIFICATION) - ACTION/TREATMENT ORDERED:
Dr. Juarez made aware and told he spoke with ICU PA and will see the patient. No other order made.
treatment in progress
Resident Doctor Larry made aware. No new order made.
Continue on current antibiotics

## 2020-10-15 ENCOUNTER — TRANSCRIPTION ENCOUNTER (OUTPATIENT)
Age: 75
End: 2020-10-15

## 2020-10-15 VITALS — OXYGEN SATURATION: 95 %

## 2020-10-15 LAB
ANION GAP SERPL CALC-SCNC: 4 MMOL/L — LOW (ref 5–17)
BASOPHILS # BLD AUTO: 0.01 K/UL — SIGNIFICANT CHANGE UP (ref 0–0.2)
BASOPHILS NFR BLD AUTO: 0.1 % — SIGNIFICANT CHANGE UP (ref 0–2)
BUN SERPL-MCNC: 18 MG/DL — SIGNIFICANT CHANGE UP (ref 7–23)
CALCIUM SERPL-MCNC: 8.3 MG/DL — LOW (ref 8.5–10.1)
CHLORIDE SERPL-SCNC: 111 MMOL/L — HIGH (ref 96–108)
CO2 SERPL-SCNC: 32 MMOL/L — HIGH (ref 22–31)
CREAT SERPL-MCNC: 0.8 MG/DL — SIGNIFICANT CHANGE UP (ref 0.5–1.3)
EOSINOPHIL # BLD AUTO: 0.16 K/UL — SIGNIFICANT CHANGE UP (ref 0–0.5)
EOSINOPHIL NFR BLD AUTO: 1.7 % — SIGNIFICANT CHANGE UP (ref 0–6)
GLUCOSE SERPL-MCNC: 78 MG/DL — SIGNIFICANT CHANGE UP (ref 70–99)
HCT VFR BLD CALC: 35.4 % — LOW (ref 39–50)
HGB BLD-MCNC: 11.8 G/DL — LOW (ref 13–17)
IMM GRANULOCYTES NFR BLD AUTO: 1.3 % — SIGNIFICANT CHANGE UP (ref 0–1.5)
LYMPHOCYTES # BLD AUTO: 1.2 K/UL — SIGNIFICANT CHANGE UP (ref 1–3.3)
LYMPHOCYTES # BLD AUTO: 12.9 % — LOW (ref 13–44)
MCHC RBC-ENTMCNC: 31.6 PG — SIGNIFICANT CHANGE UP (ref 27–34)
MCHC RBC-ENTMCNC: 33.3 GM/DL — SIGNIFICANT CHANGE UP (ref 32–36)
MCV RBC AUTO: 94.7 FL — SIGNIFICANT CHANGE UP (ref 80–100)
MONOCYTES # BLD AUTO: 0.49 K/UL — SIGNIFICANT CHANGE UP (ref 0–0.9)
MONOCYTES NFR BLD AUTO: 5.3 % — SIGNIFICANT CHANGE UP (ref 2–14)
NEUTROPHILS # BLD AUTO: 7.29 K/UL — SIGNIFICANT CHANGE UP (ref 1.8–7.4)
NEUTROPHILS NFR BLD AUTO: 78.7 % — HIGH (ref 43–77)
NRBC # BLD: 0 /100 WBCS — SIGNIFICANT CHANGE UP (ref 0–0)
PLATELET # BLD AUTO: 149 K/UL — LOW (ref 150–400)
POTASSIUM SERPL-MCNC: 4 MMOL/L — SIGNIFICANT CHANGE UP (ref 3.5–5.3)
POTASSIUM SERPL-SCNC: 4 MMOL/L — SIGNIFICANT CHANGE UP (ref 3.5–5.3)
RBC # BLD: 3.74 M/UL — LOW (ref 4.2–5.8)
RBC # FLD: 14.2 % — SIGNIFICANT CHANGE UP (ref 10.3–14.5)
SODIUM SERPL-SCNC: 147 MMOL/L — HIGH (ref 135–145)
WBC # BLD: 9.27 K/UL — SIGNIFICANT CHANGE UP (ref 3.8–10.5)
WBC # FLD AUTO: 9.27 K/UL — SIGNIFICANT CHANGE UP (ref 3.8–10.5)

## 2020-10-15 RX ADMIN — ERTAPENEM SODIUM 120 MILLIGRAM(S): 1 INJECTION, POWDER, LYOPHILIZED, FOR SOLUTION INTRAMUSCULAR; INTRAVENOUS at 11:23

## 2020-10-15 RX ADMIN — PANTOPRAZOLE SODIUM 40 MILLIGRAM(S): 20 TABLET, DELAYED RELEASE ORAL at 06:33

## 2020-10-15 RX ADMIN — Medication 3 MILLILITER(S): at 06:40

## 2020-10-15 RX ADMIN — Medication 25 MILLIGRAM(S): at 06:33

## 2020-10-15 RX ADMIN — TIOTROPIUM BROMIDE 1 CAPSULE(S): 18 CAPSULE ORAL; RESPIRATORY (INHALATION) at 06:32

## 2020-10-15 RX ADMIN — APIXABAN 5 MILLIGRAM(S): 2.5 TABLET, FILM COATED ORAL at 06:33

## 2020-10-15 RX ADMIN — Medication 3 MILLILITER(S): at 11:31

## 2020-10-15 RX ADMIN — BUDESONIDE AND FORMOTEROL FUMARATE DIHYDRATE 2 PUFF(S): 160; 4.5 AEROSOL RESPIRATORY (INHALATION) at 06:33

## 2020-10-15 RX ADMIN — Medication 0.5 MILLIGRAM(S): at 06:40

## 2020-10-15 NOTE — PROGRESS NOTE ADULT - SUBJECTIVE AND OBJECTIVE BOX
Patient is a 75y old  Male who presents with a chief complaint of UTI (15 Oct 2020 10:35)      INTERVAL HPI:  75 year old male with PMHX atrial fibrillation (on Eliquis), asthma, COPD, former smoker, hx of benign lung cancer R lobe surgically removed at [facility]) CAD (s/p 5 stents ~2001), GERD, HLD, HTN presents after shakes and low O2 70s. Patient endorses sudden onset of shaking and shortness of breath earlier today. Patient states he used his nebulizer treatment, however felt no relief. He decided to come to the ED for further evaluation. Denies sick contacts or recent travel. Of note, patient with history of intubation 2019, during admission to [facility] for acute hypercapnic respiratory failure. Patient states he was recently tested for COVID and results were negative. Denies fever, abdominal pain. Admits nausea, vomiting x1 episode in the ER.     ED course:  Vitals: T 103.8, , /71, RR 18, SpO2 96% on RA  Labs: Neutrophil 90.1%, Lymphocyte 6.3%, Monocyte 1.0%, PT/INR 13.7/1.18, PTT 25.6, Lactate 2.2  UA: positive nitrite, moderate LE, >50 RBC, >50 WBC, many bacteria  CT abd/pelvis: Although the urinary bladder is incompletely distended, there appears to be wall thickening. Correlate with urinalysis for the possibility of cystitis.  CTA chest: No significant interval change in the chest compared to the previous study of April 11, 2016. Redemonstration of emphysema and postoperative changes in the right upper lobe  CXR: official read pending  EKG: NSR vent rate 100 bpm  Given IV NS 1 L bolus x2, Tylenol 650 mg PO x1, Proventil 2 puffs x1, IV Solumedrol 125 mg IVP x1, IV Zithromax and IV Rocephin, continue IV Meropenem     10/11/2020: Patient seen and examined at bedside in ED. Admitted for UTI, fever. Pt had no complaints of pain. Pt checked on throughout the night.   On 2.5 L NC 92 % O2 sat well. BP 78/46 s/p 4 L NS bolus and maintenance NS @ 60 cc/hour.   - afebrile, WBC 8 --> 15.42 overnight, lactate 2.2 --> 2.1 --> 3.1   - ICU PA consult noted and reviewed. Patient to be transferred to MICU ,  Now pt with septic shock 2/2 , UTIs/p IV Fluid resuscitation,  started on levophed   - Patient admits to  "laser surgery" for treatment of his BPH on 10/1  at [facility] and completed a 5 day course of outpatient antibiotic treatment. states he was told he likely has scars and is concerned for infection in prostate. has had pain with urination since then.    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 Is anxious and mildly irritable. " I have not slept in a few days." Melatonin does not help. Overall he "does not feel well." however does not have any specific complaints. Admits to constipation, had no BM since admission. States he is breathing better, had duonebs treatment this morning. admits to cough, unchanged from baseline (has asthma/COPD). no wheezing. Patient urinating adequately, notes he filled 250 cc at a time. Denies any fevers, chills, chest pain, palpitations, abdominal pain, n/v/d dysuria, hematuria. Leukocytosis.    - White count elevated 34.4 this morning, worsening leukocytosis   - ICU: on levophed; now on 2.891 mL/hr, BP in 110s/50s + stress steroids solu -cortef   - on 2 L NC   - Patient reports he had a Green Light Procedure for enlarged prostate by Dr. Kelly, Urology on 10/1    10/13/2020: Patient seen and examined at bedside. Patient sitting up in chair, eating breakfast. States he "is feeling good this morning." He was able to fall asleep after given zaleplon last night however, was woken up at 11 PM for a temperature check. He feels short of breath upon getting up from bed. Overall he states he is breathing well, no wheezing. He is also urinating adequately, no pain, burning or hematuria. Had 1 BM yesterday, formed, no blood, no diarrhea or constipation. Has a mild cough, nonproductive no wheezing. Denies fevers, chills, chest pain, palpitations, abdominal pain, n/v/d/c, dizziness. Leukocytosis. improving , Taper IV Steroids     - afebrile overnight  - urine cultures growing E coli; blood cultures NGTD (Prelim)   - off levophed, BP tolerating well.   - solu cortef 25 mg q 8 tapering down as tolerated  - downgraded to F today to complete steroid taper and continue with meropenem    10/14/2020: Patient seen and examined at bedside. Patient sitting up in bed. States "I am feeling well." Reports he is frustrated that he is not receiving his inhalers as prescribed. Counseled patient regarding medications that he is receiving  them as prescribed.  He states he is breathing better and not short of breath or dizzy upon standing this morning. Denies CP, palpitations, sob, n/v/d/c, abdominal pain, dysuria, hematuria, cough, wheezing  Leukocytosis. improving , Taper IV Steroids , MID Line Placement today , D/C Plan     - afebrile overnight  - normotensive overnight   - on solu cortef taper 25 mg IV Q 12 today & Once daily in AM     10/15/2020: Patient seen and examined at bedside. No acute complaints. States "he wants to go home today." He states he is breathing fine and denies any acute complaints, on RA. Denies CP, palpitations, sob, n/v/d/c, abdominal pain, dysuria, hematuria, cough, wheezing. Leukocytosis has resolved (9.27). DC solu-cortef, patient will go home to complete PO prednisone 20 mg daily to continue his home medications. MIDline in place, home care in place. DC plan for today.     - afebrile and hemodynamically stable   - dc solu cortef     OVERNIGHT EVENTS: No acute overnight events. Patient states he is feeling well.      Home Medications:  atorvastatin 40 mg oral tablet: 1 tab(s) orally once a day (11 Oct 2020 00:18)  budesonide 0.5 mg/2 mL inhalation suspension: 2 milliliter(s) inhaled 2 times a day (11 Oct 2020 00:18)  Bystolic 5 mg oral tablet: 1 tab(s) orally once a day (11 Oct 2020 00:18)  Eliquis 5 mg oral tablet: 1 tab(s) orally 2 times a day (11 Oct 2020 00:18)  ipratropium-albuterol 0.5 mg-2.5 mg/3 mLinhalation solution: 3 milliliter(s) inhaled 4 times a day, As Needed (11 Oct 2020 00:18)  pantoprazole 40 mg oral delayed release tablet: 1 tab(s) orally once a day (11 Oct 2020 00:18)  predniSONE 5 mg oral tablet: 1 tab(s) orally once a day, As Needed (11 Oct 2020 00:18)  Proventil 90 mcg/inh inhalation aerosol: 2 puff(s) inhaled 2 times a day, As Needed (11 Oct 2020 00:18)  Spiriva HandiHaler 18 mcg inhalation capsule: 1 cap(s) inhaled once a day (11 Oct 2020 00:18)  Symbicort 160 mcg-4.5 mcg/inh inhalation aerosol: 2 puff(s) inhaled 2 times a day (11 Oct 2020 00:18)  Vitamin B12 500 mcg oral tablet: 1 tab(s) orally once a day (11 Oct 2020 00:18)  Vitamin D3 1000 intl units (25 mcg) oral tablet: 1 tab(s) orally once a day (11 Oct 2020 00:18)      MEDICATIONS  (STANDING):  albuterol/ipratropium for Nebulization 3 milliLiter(s) Nebulizer four times a day  apixaban 5 milliGRAM(s) Oral every 12 hours  atorvastatin 40 milliGRAM(s) Oral at bedtime  buDESOnide    Inhalation Suspension 0.5 milliGRAM(s) Inhalation every 12 hours  budesonide 160 MICROgram(s)/formoterol 4.5 MICROgram(s) Inhaler 2 Puff(s) Inhalation two times a day  cholecalciferol 1000 Unit(s) Oral at bedtime  cyanocobalamin 1000 MICROGram(s) Oral at bedtime  ertapenem  IVPB      ertapenem  IVPB 1000 milliGRAM(s) IV Intermittent every 24 hours  hydrocortisone sodium succinate Injectable 25 milliGRAM(s) IV Push two times a day  nebivolol 5 milliGRAM(s) Oral daily  pantoprazole    Tablet 40 milliGRAM(s) Oral before breakfast  tiotropium 18 MICROgram(s) Capsule 1 Capsule(s) Inhalation daily    MEDICATIONS  (PRN):  acetaminophen   Tablet .. 650 milliGRAM(s) Oral every 6 hours PRN Temp greater or equal to 38C (100.4F), Mild Pain (1 - 3)  zaleplon 5 milliGRAM(s) Oral at bedtime PRN Insomnia      No Known Allergies      Social History:  Denies use of tobacco, EtOH, recreational drug use  Ambulates: independent  ADLs: independent (10 Oct 2020 23:41)      REVIEW OF SYSTEMS:  CONSTITUTIONAL: No fever, No chills, No fatigue, No myalgia, No Body ache, No Weakness  EYES:  No visual disturbances, No discharge, No Redness  ENMT: No ear pain, No vertigo; No sinus pain, No throat pain, No Congestion  NECK: No pain, No stiffness  RESPIRATORY: No cough, No wheezing, No hemoptysis, No shortness of breath  CARDIOVASCULAR: No chest pain, No palpitations  GASTROINTESTINAL: No abdominal pain, No epigastric pain. No nausea, No vomiting, No diarrhea, No constipation; [x ] BM  GENITOURINARY: No dysuria, No frequency, No urgency, No hematuria, No incontinence  NEUROLOGICAL: No headaches, No dizziness, No numbness, No tingling, No tremors, No weakness  EXTREMITIES: No Swelling, No Pain, No Edema  SKIN: [ x ] No itching, burning, rashes, or lesions   MUSCULOSKELETAL: No joint pain, No joint swelling; No muscle pain, No back pain, No extremity pain  PSYCHIATRIC: No depression, No anxiety, No mood swings, No difficulty sleeping at night  PAIN SCALE: [  ] None  [  ] Other-  ROS Unable to obtain due to: [  ] Dementia  [  ] Lethargy  [  ] Sedated  [  ] Non verbal  REST OF REVIEW OF SYSTEMS: [  ] Normal     Vital Signs Last 24 Hrs  T(C): 36.7 (15 Oct 2020 04:34), Max: 36.8 (14 Oct 2020 21:50)  T(F): 98 (15 Oct 2020 04:34), Max: 98.3 (14 Oct 2020 21:50)  HR: 62 (15 Oct 2020 11:31) (56 - 78)  BP: 119/57 (15 Oct 2020 04:34) (115/5 - 130/65)  BP(mean): --  RR: 18 (15 Oct 2020 04:34) (18 - 18)  SpO2: 95% (15 Oct 2020 11:31) (93% - 96%)    CAPILLARY BLOOD GLUCOSE          I&O's Summary    PHYSICAL EXAM:  GENERAL:  [ x ] NAD, [x  ] Well appearing, [ x ] Agitated, [  ] Drowsy, [  ] Lethargy, [  ] Confused   HEAD:  [ x ] Normal, [  ] Other  EYES:  [ x ] EOMI, [ x ] PERRLA, [ x ] Conjunctiva and sclera clear normal, [  ] Other, [  ] Pallor, [  ] Discharge  ENMT:  [ x ] Normal, [x  ] Moist mucous membranes, [x  ] Good dentition, [x  ] No thrush  NECK:  [ x ] Supple, [ x ] No JVD, [  ] Normal thyroid, [  ] Lymphadenopathy, [  ] Other  CHEST/LUNG:  [  ] Clear to auscultation bilaterally, [  ] Breath Sounds equal B/L / decreased, [  ] Poor effort, [ x ] No rales, [ x ] No rhonchi, [ x ] wheezing middle lobes bilaterally   no labored respirations.   HEART:  [ x ] Regular rate and rhythm, [  ] Tachycardia, [  ] Bradycardia, [  ] Irregular, [x  ] No murmurs, No rubs, No gallops, [  ] PPM in place (Mfr:  )  ABDOMEN:  [ x ] Soft, [ x ] Nontender, [ x ] Nondistended, [ x ] No mass, [ x ] Bowel sounds present, [  ] Obese  NERVOUS SYSTEM:  [ x ] Alert & Oriented x3, [  ] Nonfocal, [  ] Confusion, [  ] Encephalopathic, [  ] Sedated, [  ] Unable to assess, [  ] Dementia, [  ] Other-  EXTREMITIES:  [ x ] 2+ Peripheral Pulses, No clubbing, No cyanosis,  [  ] Edema B/L lower EXT, [  ] PVD stasis skin changes B/L lower EXT, [  ] Wound  LYMPH:  No lymphadenopathy noted  SKIN:  [ x ] No rashes or lesions, [  ] Pressure ulcers, [  ] Ecchymosis, [  ] Skin tears, [  ] Other    DIET: Diet, DASH/TLC:   Sodium & Cholesterol Restricted (10-10-20 @ 23:52)      LABS:                        11.8   9.27  )-----------( 149      ( 15 Oct 2020 07:22 )             35.4     15 Oct 2020 07:22    147    |  111    |  18     ----------------------------<  78     4.0     |  32     |  0.80     Ca    8.3        15 Oct 2020 07:22          Culture Results:   >100,000 CFU/ml Escherichia coli ESBL  <10,000 CFU/ml Normal Urogenital cathi present (10-11 @ 04:07)  Culture Results:   No growth to date. (10-11 @ 00:28)  Culture Results:   No growth to date. (10-11 @ 00:28)            Culture - Urine (collected 11 Oct 2020 04:07)  Source: .Urine Clean Catch (Midstream)  Final Report (13 Oct 2020 19:15):    >100,000 CFU/ml Escherichia coli ESBL    <10,000 CFU/ml Normal Urogenital cathi present  Organism: Escherichia coli ESBL (13 Oct 2020 19:15)  Organism: Escherichia coli ESBL (13 Oct 2020 19:15)    Culture - Blood (collected 11 Oct 2020 00:28)  Source: .Blood Blood-Peripheral  Preliminary Report (12 Oct 2020 01:03):    No growth to date.    Culture - Blood (collected 11 Oct 2020 00:28)  Source: .Blood Blood-Peripheral  Preliminary Report (12 Oct 2020 01:03):    No growth to date.       Anemia Panel:      Thyroid Panel:                RADIOLOGY & ADDITIONAL TESTS:      HEALTH ISSUES - PROBLEM Dx:  Need for prophylactic measure  Need for prophylactic measure    Urinary tract infection without hematuria, site unspecified  Urinary tract infection without hematuria, site unspecified    Prostatitis  Prostatitis    Septic shock  Septic shock    COPD (chronic obstructive pulmonary disease)  COPD (chronic obstructive pulmonary disease)    CAD (coronary artery disease)  CAD (coronary artery disease)    Atrial fibrillation  Atrial fibrillation    HTN (hypertension)  HTN (hypertension)    HLD (hyperlipidemia)  HLD (hyperlipidemia)    GERD (gastroesophageal reflux disease)  GERD (gastroesophageal reflux disease)          Consultant(s) Notes Reviewed:  [  ] YES     Care Discussed with [ x ] Consultants, [  ] Patient, [  ] Family, [  ] HCP, [  ] , [  ] Social Service, [  ] RN, [  ] Physical Therapy, [  ] Palliative Care Team  DVT PPX: [  ] Lovenox, [  ] SC Heparin, [  ] Coumadin, [  ] Xarelto, [ x ] Eliquis, [  ] Pradaxa, [  ] IV Heparin drip, [  ] SCD, [  ] Ambulation, [  ] Contraindicated 2/2 GI Bleed, [  ] Contraindicated 2/2  Bleed, [  ] Contraindicated 2/2 Brain Bleed  Advanced Directive: [ x ] None, [  ] DNR/DNI Patient is a 75y old  Male who presents with a chief complaint of UTI (15 Oct 2020 10:35)      INTERVAL HPI:  75 year old male with PMHX atrial fibrillation (on Eliquis), asthma, COPD, former smoker, hx of benign lung cancer R lobe surgically removed at [facility]) CAD (s/p 5 stents ~2001), GERD, HLD, HTN presents after shakes and low O2 70s. Patient endorses sudden onset of shaking and shortness of breath earlier today. Patient states he used his nebulizer treatment, however felt no relief. He decided to come to the ED for further evaluation. Denies sick contacts or recent travel. Of note, patient with history of intubation 2019, during admission to [facility] for acute hypercapnic respiratory failure. Patient states he was recently tested for COVID and results were negative. Denies fever, abdominal pain. Admits nausea, vomiting x1 episode in the ER.     ED course:  Vitals: T 103.8, , /71, RR 18, SpO2 96% on RA  Labs: Neutrophil 90.1%, Lymphocyte 6.3%, Monocyte 1.0%, PT/INR 13.7/1.18, PTT 25.6, Lactate 2.2  UA: positive nitrite, moderate LE, >50 RBC, >50 WBC, many bacteria  CT abd/pelvis: Although the urinary bladder is incompletely distended, there appears to be wall thickening. Correlate with urinalysis for the possibility of cystitis.  CTA chest: No significant interval change in the chest compared to the previous study of April 11, 2016. Redemonstration of emphysema and postoperative changes in the right upper lobe  CXR: official read pending  EKG: NSR vent rate 100 bpm  Given IV NS 1 L bolus x2, Tylenol 650 mg PO x1, Proventil 2 puffs x1, IV Solumedrol 125 mg IVP x1, IV Zithromax and IV Rocephin, continue IV Meropenem     10/11/2020: Patient seen and examined at bedside in ED. Admitted for UTI, fever. Pt had no complaints of pain. Pt checked on throughout the night.   On 2.5 L NC 92 % O2 sat well. BP 78/46 s/p 4 L NS bolus and maintenance NS @ 60 cc/hour.   - afebrile, WBC 8 --> 15.42 overnight, lactate 2.2 --> 2.1 --> 3.1   - ICU PA consult noted and reviewed. Patient to be transferred to MICU ,  Now pt with septic shock 2/2 , UTIs/p IV Fluid resuscitation,  started on levophed   - Patient admits to  "laser surgery" for treatment of his BPH on 10/1  at [facility] and completed a 5 day course of outpatient antibiotic treatment. states he was told he likely has scars and is concerned for infection in prostate. has had pain with urination since then.    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 Is anxious and mildly irritable. " I have not slept in a few days." Melatonin does not help. Overall he "does not feel well." however does not have any specific complaints. Admits to constipation, had no BM since admission. States he is breathing better, had duonebs treatment this morning. admits to cough, unchanged from baseline (has asthma/COPD). no wheezing. Patient urinating adequately, notes he filled 250 cc at a time. Denies any fevers, chills, chest pain, palpitations, abdominal pain, n/v/d dysuria, hematuria. Leukocytosis.    - White count elevated 34.4 this morning, worsening leukocytosis   - ICU: on levophed; now on 2.891 mL/hr, BP in 110s/50s + stress steroids solu -cortef   - on 2 L NC   - Patient reports he had a Green Light Procedure for enlarged prostate by Dr. Kelly, Urology on 10/1    10/13/2020: Patient seen and examined at bedside. Patient sitting up in chair, eating breakfast. States he "is feeling good this morning." He was able to fall asleep after given zaleplon last night however, was woken up at 11 PM for a temperature check. He feels short of breath upon getting up from bed. Overall he states he is breathing well, no wheezing. He is also urinating adequately, no pain, burning or hematuria. Had 1 BM yesterday, formed, no blood, no diarrhea or constipation. Has a mild cough, nonproductive no wheezing. Denies fevers, chills, chest pain, palpitations, abdominal pain, n/v/d/c, dizziness. Leukocytosis. improving , Taper IV Steroids     - afebrile overnight  - urine cultures growing E coli; blood cultures NGTD (Prelim)   - off levophed, BP tolerating well.   - solu cortef 25 mg q 8 tapering down as tolerated  - downgraded to F today to complete steroid taper and continue with meropenem    10/14/2020: Patient seen and examined at bedside. Patient sitting up in bed. States "I am feeling well." Reports he is frustrated that he is not receiving his inhalers as prescribed. Counseled patient regarding medications that he is receiving  them as prescribed.  He states he is breathing better and not short of breath or dizzy upon standing this morning. Denies CP, palpitations, sob, n/v/d/c, abdominal pain, dysuria, hematuria, cough, wheezing  Leukocytosis. improving , Taper IV Steroids , MID Line Placement today , D/C Plan     - afebrile overnight  - normotensive overnight   - on solu cortef taper 25 mg IV Q 12 today & Once daily in AM     10/15/2020: Patient seen and examined at bedside. No acute complaints. States "he wants to go home today." He states he is breathing fine and denies any acute complaints, on RA. Denies CP, palpitations, sob, n/v/d/c, abdominal pain, dysuria, hematuria, cough, wheezing. Leukocytosis has resolved (9.27). DC solu-cortef, patient will go home to complete PO prednisone 20 mg daily to continue his home medications. MIDline in place, home care in place. DC plan for today.     - afebrile and hemodynamically stable   - dc solu cortef     OVERNIGHT EVENTS: No acute overnight events. Patient states he is feeling well.      Home Medications:  atorvastatin 40 mg oral tablet: 1 tab(s) orally once a day (11 Oct 2020 00:18)  budesonide 0.5 mg/2 mL inhalation suspension: 2 milliliter(s) inhaled 2 times a day (11 Oct 2020 00:18)  Bystolic 5 mg oral tablet: 1 tab(s) orally once a day (11 Oct 2020 00:18)  Eliquis 5 mg oral tablet: 1 tab(s) orally 2 times a day (11 Oct 2020 00:18)  ipratropium-albuterol 0.5 mg-2.5 mg/3 mLinhalation solution: 3 milliliter(s) inhaled 4 times a day, As Needed (11 Oct 2020 00:18)  pantoprazole 40 mg oral delayed release tablet: 1 tab(s) orally once a day (11 Oct 2020 00:18)  predniSONE 5 mg oral tablet: 1 tab(s) orally once a day, As Needed (11 Oct 2020 00:18)  Proventil 90 mcg/inh inhalation aerosol: 2 puff(s) inhaled 2 times a day, As Needed (11 Oct 2020 00:18)  Spiriva HandiHaler 18 mcg inhalation capsule: 1 cap(s) inhaled once a day (11 Oct 2020 00:18)  Symbicort 160 mcg-4.5 mcg/inh inhalation aerosol: 2 puff(s) inhaled 2 times a day (11 Oct 2020 00:18)  Vitamin B12 500 mcg oral tablet: 1 tab(s) orally once a day (11 Oct 2020 00:18)  Vitamin D3 1000 intl units (25 mcg) oral tablet: 1 tab(s) orally once a day (11 Oct 2020 00:18)      MEDICATIONS  (STANDING):  albuterol/ipratropium for Nebulization 3 milliLiter(s) Nebulizer four times a day  apixaban 5 milliGRAM(s) Oral every 12 hours  atorvastatin 40 milliGRAM(s) Oral at bedtime  buDESOnide    Inhalation Suspension 0.5 milliGRAM(s) Inhalation every 12 hours  budesonide 160 MICROgram(s)/formoterol 4.5 MICROgram(s) Inhaler 2 Puff(s) Inhalation two times a day  cholecalciferol 1000 Unit(s) Oral at bedtime  cyanocobalamin 1000 MICROGram(s) Oral at bedtime  ertapenem  IVPB      ertapenem  IVPB 1000 milliGRAM(s) IV Intermittent every 24 hours  hydrocortisone sodium succinate Injectable 25 milliGRAM(s) IV Push two times a day  nebivolol 5 milliGRAM(s) Oral daily  pantoprazole    Tablet 40 milliGRAM(s) Oral before breakfast  tiotropium 18 MICROgram(s) Capsule 1 Capsule(s) Inhalation daily    MEDICATIONS  (PRN):  acetaminophen   Tablet .. 650 milliGRAM(s) Oral every 6 hours PRN Temp greater or equal to 38C (100.4F), Mild Pain (1 - 3)  zaleplon 5 milliGRAM(s) Oral at bedtime PRN Insomnia      No Known Allergies      Social History:  Denies use of tobacco, EtOH, recreational drug use  Ambulates: independent  ADLs: independent (10 Oct 2020 23:41)      REVIEW OF SYSTEMS: i am better,no complaints   CONSTITUTIONAL: No fever, No chills, No fatigue, No myalgia, No Body ache, No Weakness  EYES:  No visual disturbances, No discharge, No Redness  ENMT: No ear pain, No vertigo; No sinus pain, No throat pain, No Congestion  NECK: No pain, No stiffness  RESPIRATORY: No cough, No wheezing, No hemoptysis, No shortness of breath  CARDIOVASCULAR: No chest pain, No palpitations  GASTROINTESTINAL: No abdominal pain, No epigastric pain. No nausea, No vomiting, No diarrhea, No constipation; [x ] BM  GENITOURINARY: No dysuria, No frequency, No urgency, No hematuria, No incontinence  NEUROLOGICAL: No headaches, No dizziness, No numbness, No tingling, No tremors, No weakness  EXTREMITIES: No Swelling, No Pain, No Edema  SKIN: [ x ] No itching, burning, rashes, or lesions   MUSCULOSKELETAL: No joint pain, No joint swelling; No muscle pain, No back pain, No extremity pain  PSYCHIATRIC: No depression, No anxiety, No mood swings, No difficulty sleeping at night  PAIN SCALE: [ x ] None  [  ] Other-  ROS Unable to obtain due to: [  ] Dementia  [  ] Lethargy  [  ] Sedated  [  ] Non verbal  REST OF REVIEW OF SYSTEMS: [ x ] Normal     Vital Signs Last 24 Hrs  T(C): 36.7 (15 Oct 2020 04:34), Max: 36.8 (14 Oct 2020 21:50)  T(F): 98 (15 Oct 2020 04:34), Max: 98.3 (14 Oct 2020 21:50)  HR: 62 (15 Oct 2020 11:31) (56 - 78)  BP: 119/57 (15 Oct 2020 04:34) (115/5 - 130/65)  BP(mean): --  RR: 18 (15 Oct 2020 04:34) (18 - 18)  SpO2: 95% (15 Oct 2020 11:31) (93% - 96%)    CAPILLARY BLOOD GLUCOSE          I&O's Summary    PHYSICAL EXAM: rt u ext mid line  GENERAL:  [ x ] NAD, [x  ] Well appearing, [ x ] Agitated, [  ] Drowsy, [  ] Lethargy, [  ] Confused   HEAD:  [ x ] Normal, [  ] Other  EYES:  [ x ] EOMI, [ x ] PERRLA, [ x ] Conjunctiva and sclera clear normal, [  ] Other, [  ] Pallor, [  ] Discharge  ENMT:  [ x ] Normal, [x  ] Moist mucous membranes, [x  ] Good dentition, [x  ] No thrush  NECK:  [ x ] Supple, [ x ] No JVD, [  ] Normal thyroid, [  ] Lymphadenopathy, [  ] Other  CHEST/LUNG:  [  ] Clear to auscultation bilaterally, [  ] Breath Sounds equal B/L / decreased, [  ] Poor effort, [ x ] No rales, [ x ] No rhonchi, [ x ] wheezing middle lobes bilaterally   no labored respirations.   HEART:  [ x ] Regular rate and rhythm, [  ] Tachycardia, [  ] Bradycardia, [  ] Irregular, [x  ] No murmurs, No rubs, No gallops, [  ] PPM in place (Mfr:  )  ABDOMEN:  [ x ] Soft, [ x ] Nontender, [ x ] Nondistended, [ x ] No mass, [ x ] Bowel sounds present, [  ] Obese  NERVOUS SYSTEM:  [ x ] Alert & Oriented x3, [  ] Nonfocal, [  ] Confusion, [  ] Encephalopathic, [  ] Sedated, [  ] Unable to assess, [  ] Dementia, [  ] Other-  EXTREMITIES:  [ x ] 2+ Peripheral Pulses, No clubbing, No cyanosis,  [  ] Edema B/L lower EXT, [  ] PVD stasis skin changes B/L lower EXT, [  ] Wound  LYMPH:  No lymphadenopathy noted  SKIN:  [ x ] No rashes or lesions, [  ] Pressure ulcers, [  ] Ecchymosis, [  ] Skin tears, [  ] Other    DIET: Diet, DASH/TLC:   Sodium & Cholesterol Restricted (10-10-20 @ 23:52)      LABS:                        11.8   9.27  )-----------( 149      ( 15 Oct 2020 07:22 )             35.4     15 Oct 2020 07:22    147    |  111    |  18     ----------------------------<  78     4.0     |  32     |  0.80     Ca    8.3        15 Oct 2020 07:22    Culture Results:   >100,000 CFU/ml Escherichia coli ESBL  <10,000 CFU/ml Normal Urogenital cathi present (10-11 @ 04:07)  Culture Results:   No growth to date. (10-11 @ 00:28)  Culture Results:   No growth to date. (10-11 @ 00:28)      Culture - Urine (collected 11 Oct 2020 04:07)  Source: .Urine Clean Catch (Midstream)  Final Report (13 Oct 2020 19:15):    >100,000 CFU/ml Escherichia coli ESBL    <10,000 CFU/ml Normal Urogenital cathi present  Organism: Escherichia coli ESBL (13 Oct 2020 19:15)  Organism: Escherichia coli ESBL (13 Oct 2020 19:15)    Culture - Blood (collected 11 Oct 2020 00:28)  Source: .Blood Blood-Peripheral  Preliminary Report (12 Oct 2020 01:03):    No growth to date.    Culture - Blood (collected 11 Oct 2020 00:28)  Source: .Blood Blood-Peripheral  Preliminary Report (12 Oct 2020 01:03):    No growth to date.    RADIOLOGY & ADDITIONAL TESTS: none      HEALTH ISSUES - PROBLEM Dx:  Need for prophylactic measure  Need for prophylactic measure    Urinary tract infection without hematuria, site unspecified  Urinary tract infection without hematuria, site unspecified    Prostatitis  Prostatitis    Septic shock  Septic shock    COPD (chronic obstructive pulmonary disease)  COPD (chronic obstructive pulmonary disease)    CAD (coronary artery disease)  CAD (coronary artery disease)    Atrial fibrillation  Atrial fibrillation    HTN (hypertension)  HTN (hypertension)    HLD (hyperlipidemia)  HLD (hyperlipidemia)    GERD (gastroesophageal reflux disease)  GERD (gastroesophageal reflux disease)          Consultant(s) Notes Reviewed:  [ x ] YES     Care Discussed with [ x ] Consultants, [x  ] Patient, [  ] Family, [  ] HCP, [ x ] , [  ] Social Service, [ x ] RN, [  ] Physical Therapy, [  ] Palliative Care Team  DVT PPX: [  ] Lovenox, [  ] SC Heparin, [  ] Coumadin, [  ] Xarelto, [ x ] Eliquis, [  ] Pradaxa, [  ] IV Heparin drip, [  ] SCD, [  ] Ambulation, [  ] Contraindicated 2/2 GI Bleed, [  ] Contraindicated 2/2  Bleed, [  ] Contraindicated 2/2 Brain Bleed  Advanced Directive: [ x ] None, [  ] DNR/DNI

## 2020-10-15 NOTE — PROGRESS NOTE ADULT - PROBLEM SELECTOR PLAN 1
sepsis Fever, POA now with   Leukocytosis with Bandemia - Hypotensive s/p 3 L NS bolus, now on IVF 60 cc/hour (78/46), RESOLVED   - source likely  UTI/ prostatitis with recent  prostate surgery   - Admitted to ICU for septic shock s/p pressors-RESOLVED ,   -now downgraded to GMF with remote tele   - White count 34 ---> 26--> 18--> 9 likely 2/2 to infection &  solu cortef.  - Urine cultures growing ESBL; Blood cx prelim NGTD  - was on meropenem  - change to ertapenem as per ID and pt will require midline   - off pressors, dc solu cortef,    - COVID antibody positive (80.50)  - CXR (admission): No active parenchymal disease in the chest. sepsis Fever, POA now with   Leukocytosis with Bandemia - Hypotensive s/p 3 L NS bolus, now on IVF 60 cc/hour (78/46), RESOLVED Now  - source likely  UTI/ prostatitis with recent  prostate surgery   - Admitted to ICU for septic shock s/p pressors-RESOLVED ,   -now downgraded to GMF with remote tele   - White count 34 ---> 26--> 18--> 9 likely 2/2 to infection &  solu cortef.  - Urine cultures growing ESBL; Blood cx prelim NGTD  - was on meropenem  - change to ertapenem 1 gm daily as per ID s/p midline placed  - off pressors, dc solu cortef,  change to po prednisone.  - COVID antibody positive (80.50)  - CXR (admission): No active parenchymal disease in the chest.

## 2020-10-15 NOTE — PROGRESS NOTE ADULT - PROBLEM SELECTOR PLAN 2
recent prostate laser procedure 10/1. 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  - likely source of infection, was on meropenem  - switch to ertapenem, midline in place    - blood culture prelim NGTD; urine cultures growing ESBL   - ID: Dr. Marc following for 28 days at home with midline

## 2020-10-15 NOTE — PROGRESS NOTE ADULT - SUBJECTIVE AND OBJECTIVE BOX
[facility], Division of Infectious Diseases  DONALD Ragsdale Y. Patel, S. Shah  [phone]    Name: BASILIO LANDRY  Age: 75y  Gender: Male  MRN: [id number]  Note Date: 10-15-20    Interval History:  Patient seen and examined at bedside. No acute overnight events.   Anxious to head home  Afebrile  PICC placement yesterday  Leukocytosis now resolved  Notes reviewed    Antibiotics:  ertapenem  IVPB 1000 milliGRAM(s) IV Intermittent every 24 hours      Medications:  acetaminophen   Tablet .. 650 milliGRAM(s) Oral every 6 hours PRN  albuterol/ipratropium for Nebulization 3 milliLiter(s) Nebulizer four times a day  apixaban 5 milliGRAM(s) Oral every 12 hours  atorvastatin 40 milliGRAM(s) Oral at bedtime  buDESOnide    Inhalation Suspension 0.5 milliGRAM(s) Inhalation every 12 hours  budesonide 160 MICROgram(s)/formoterol 4.5 MICROgram(s) Inhaler 2 Puff(s) Inhalation two times a day  cholecalciferol 1000 Unit(s) Oral at bedtime  cyanocobalamin 1000 MICROGram(s) Oral at bedtime  ertapenem  IVPB      ertapenem  IVPB 1000 milliGRAM(s) IV Intermittent every 24 hours  hydrocortisone sodium succinate Injectable 25 milliGRAM(s) IV Push two times a day  nebivolol 5 milliGRAM(s) Oral daily  pantoprazole    Tablet 40 milliGRAM(s) Oral before breakfast  tiotropium 18 MICROgram(s) Capsule 1 Capsule(s) Inhalation daily  zaleplon 5 milliGRAM(s) Oral at bedtime PRN      Review of Systems:  A 10-point review of systems was obtained.     Pertinent positives and negatives--  Constitutional: No fevers. No Chills. No Rigors.   Cardiovascular: No chest pain. No palpitations.  Respiratory: No shortness of breath. No cough.  Gastrointestinal: No nausea or vomiting. No diarrhea or constipation.   Psychiatric: Pleasant. Appropriate affect.    Review of systems otherwise negative except as previously noted.    Allergies: No Known Allergies    For details regarding the patient's past medical history, social history, family history, and other miscellaneous elements, please refer the initial infectious diseases consultation and/or the admitting history and physical examination for this admission.    Objective:  Vitals:   T(C): 36.7 (10-15-20 @ 04:34), Max: 36.8 (10-14-20 @ 21:50)  HR: 56 (10-15-20 @ 06:41) (56 - 78)  BP: 119/57 (10-15-20 @ 04:34) (115/5 - 130/65)  RR: 18 (10-15-20 @ 04:34) (18 - 18)  SpO2: 95% (10-15-20 @ 06:41) (93% - 96%)    Physical Examination:  General: no acute distress  HEENT: NC/AT, EOMI, anicteric, no oral lesions  Neck: supple, no palpable LAD  Cardio: S1, S2 heard, RRR, no murmurs  Resp: breath sounds heard bilaterally, no rales, wheezes or rhonchi  Abd: soft, NT, ND, + bowel sounds  Neuro: AAOx3, no obvious focal deficits  Ext: no edema or cyanosis  Skin: warm, dry, no visible rash  Lines: PICC    Laboratory Studies:  CBC:                       11.8   9.27  )-----------( 149      ( 15 Oct 2020 07:22 )             35.4     CMP: 10-15    147<H>  |  111<H>  |  18  ----------------------------<  78  4.0   |  32<H>  |  0.80    Ca    8.3<L>      15 Oct 2020 07:22  Phos  2.9     10-14  Mg     2.3     10-14          Microbiology:    Culture - Urine (collected 10-11-20 @ 04:07)  Source: .Urine Clean Catch (Midstream)  Final Report (10-13-20 @ 19:15):    >100,000 CFU/ml Escherichia coli ESBL    <10,000 CFU/ml Normal Urogenital cathi present  Organism: Escherichia coli ESBL (10-13-20 @ 19:15)  Organism: Escherichia coli ESBL (10-13-20 @ 19:15)      -  Amikacin: S <=16      -  Amoxicillin/Clavulanic Acid: S <=8/4      -  Ampicillin: R >16 These ampicillin results predict results for amoxicillin      -  Ampicillin/Sulbactam: S 8/4 Enterobacter, Citrobacter, and Serratia may develop resistance during prolonged therapy (3-4 days)      -  Aztreonam: R 16      -  Cefazolin: R >16 (MIC_CL_COM_ENTERIC_CEFAZU) For uncomplicated UTI with K. pneumoniae, E. coli, or P. mirablis: EFRAÍN <=16 is sensitive and EFRAÍN >=32 is resistant. This also predicts results for oral agents cefaclor, cefdinir, cefpodoxime, cefprozil, cefuroxime axetil, cephalexin and locarbef for uncomplicated UTI. Note that some isolates may be susceptible to these agents while testing resistant to cefazolin.      -  Cefepime: R >16      -  Cefoxitin: S <=8      -  Ceftriaxone: R >32 Enterobacter, Citrobacter, and Serratia may develop resistance during prolonged therapy      -  Ciprofloxacin: R >2      -  Ertapenem: S <=0.5      -  Gentamicin: S <=2      -  Imipenem: S <=1      -  Levofloxacin: R >4      -  Meropenem: S <=1      -  Nitrofurantoin: S <=32 Should not be used to treat pyelonephritis      -  Piperacillin/Tazobactam: S <=8      -  Tigecycline: S <=2      -  Tobramycin: S <=2      -  Trimethoprim/Sulfamethoxazole: R >2/38      Method Type: EFRAÍN    Culture - Blood (collected 10-11-20 @ 00:28)  Source: .Blood Blood-Peripheral  Preliminary Report (10-12-20 @ 01:03):    No growth to date.    Culture - Blood (collected 10-11-20 @ 00:28)  Source: .Blood Blood-Peripheral  Preliminary Report (10-12-20 @ 01:03):    No growth to date.      Radiology:  reviewed       [facility], Division of Infectious Diseases  DONALD Ragsdale Y. Patel, S. Shah  [phone]    Name: BASILIO LANDRY  Age: 75y  Gender: Male  MRN: [id number]  Note Date: 10-15-20    Interval History:  Patient seen and examined at bedside. No acute overnight events.   Anxious to head home  Afebrile  Midline placement yesterday  Leukocytosis now resolved  Notes reviewed    Antibiotics:  ertapenem  IVPB 1000 milliGRAM(s) IV Intermittent every 24 hours      Medications:  acetaminophen   Tablet .. 650 milliGRAM(s) Oral every 6 hours PRN  albuterol/ipratropium for Nebulization 3 milliLiter(s) Nebulizer four times a day  apixaban 5 milliGRAM(s) Oral every 12 hours  atorvastatin 40 milliGRAM(s) Oral at bedtime  buDESOnide    Inhalation Suspension 0.5 milliGRAM(s) Inhalation every 12 hours  budesonide 160 MICROgram(s)/formoterol 4.5 MICROgram(s) Inhaler 2 Puff(s) Inhalation two times a day  cholecalciferol 1000 Unit(s) Oral at bedtime  cyanocobalamin 1000 MICROGram(s) Oral at bedtime  ertapenem  IVPB      ertapenem  IVPB 1000 milliGRAM(s) IV Intermittent every 24 hours  hydrocortisone sodium succinate Injectable 25 milliGRAM(s) IV Push two times a day  nebivolol 5 milliGRAM(s) Oral daily  pantoprazole    Tablet 40 milliGRAM(s) Oral before breakfast  tiotropium 18 MICROgram(s) Capsule 1 Capsule(s) Inhalation daily  zaleplon 5 milliGRAM(s) Oral at bedtime PRN      Review of Systems:  A 10-point review of systems was obtained.     Pertinent positives and negatives--  Constitutional: No fevers. No Chills. No Rigors.   Cardiovascular: No chest pain. No palpitations.  Respiratory: No shortness of breath. No cough.  Gastrointestinal: No nausea or vomiting. No diarrhea or constipation.   Psychiatric: Pleasant. Appropriate affect.    Review of systems otherwise negative except as previously noted.    Allergies: No Known Allergies    For details regarding the patient's past medical history, social history, family history, and other miscellaneous elements, please refer the initial infectious diseases consultation and/or the admitting history and physical examination for this admission.    Objective:  Vitals:   T(C): 36.7 (10-15-20 @ 04:34), Max: 36.8 (10-14-20 @ 21:50)  HR: 56 (10-15-20 @ 06:41) (56 - 78)  BP: 119/57 (10-15-20 @ 04:34) (115/5 - 130/65)  RR: 18 (10-15-20 @ 04:34) (18 - 18)  SpO2: 95% (10-15-20 @ 06:41) (93% - 96%)    Physical Examination:  General: no acute distress  HEENT: NC/AT, EOMI, anicteric, no oral lesions  Neck: supple, no palpable LAD  Cardio: S1, S2 heard, RRR, no murmurs  Resp: breath sounds heard bilaterally, no rales, wheezes or rhonchi  Abd: soft, NT, ND, + bowel sounds  Neuro: AAOx3, no obvious focal deficits  Ext: no edema or cyanosis  Skin: warm, dry, no visible rash  Lines: R midline    Laboratory Studies:  CBC:                       11.8   9.27  )-----------( 149      ( 15 Oct 2020 07:22 )             35.4     CMP: 10-15    147<H>  |  111<H>  |  18  ----------------------------<  78  4.0   |  32<H>  |  0.80    Ca    8.3<L>      15 Oct 2020 07:22  Phos  2.9     10-14  Mg     2.3     10-14          Microbiology:    Culture - Urine (collected 10-11-20 @ 04:07)  Source: .Urine Clean Catch (Midstream)  Final Report (10-13-20 @ 19:15):    >100,000 CFU/ml Escherichia coli ESBL    <10,000 CFU/ml Normal Urogenital cathi present  Organism: Escherichia coli ESBL (10-13-20 @ 19:15)  Organism: Escherichia coli ESBL (10-13-20 @ 19:15)      -  Amikacin: S <=16      -  Amoxicillin/Clavulanic Acid: S <=8/4      -  Ampicillin: R >16 These ampicillin results predict results for amoxicillin      -  Ampicillin/Sulbactam: S 8/4 Enterobacter, Citrobacter, and Serratia may develop resistance during prolonged therapy (3-4 days)      -  Aztreonam: R 16      -  Cefazolin: R >16 (MIC_CL_COM_ENTERIC_CEFAZU) For uncomplicated UTI with K. pneumoniae, E. coli, or P. mirablis: EFRAÍN <=16 is sensitive and EFRAÍN >=32 is resistant. This also predicts results for oral agents cefaclor, cefdinir, cefpodoxime, cefprozil, cefuroxime axetil, cephalexin and locarbef for uncomplicated UTI. Note that some isolates may be susceptible to these agents while testing resistant to cefazolin.      -  Cefepime: R >16      -  Cefoxitin: S <=8      -  Ceftriaxone: R >32 Enterobacter, Citrobacter, and Serratia may develop resistance during prolonged therapy      -  Ciprofloxacin: R >2      -  Ertapenem: S <=0.5      -  Gentamicin: S <=2      -  Imipenem: S <=1      -  Levofloxacin: R >4      -  Meropenem: S <=1      -  Nitrofurantoin: S <=32 Should not be used to treat pyelonephritis      -  Piperacillin/Tazobactam: S <=8      -  Tigecycline: S <=2      -  Tobramycin: S <=2      -  Trimethoprim/Sulfamethoxazole: R >2/38      Method Type: EFRAÍN    Culture - Blood (collected 10-11-20 @ 00:28)  Source: .Blood Blood-Peripheral  Preliminary Report (10-12-20 @ 01:03):    No growth to date.    Culture - Blood (collected 10-11-20 @ 00:28)  Source: .Blood Blood-Peripheral  Preliminary Report (10-12-20 @ 01:03):    No growth to date.      Radiology:  reviewed

## 2020-10-15 NOTE — PROGRESS NOTE ADULT - ASSESSMENT
75 year old male with PMHx atrial fibrillation (on Eliquis), asthma, COPD, former smoker, hx of benign lung cancer R lobe surgically removed at [facility]) CAD (s/p 5 stents ~2001), GERD, HLD, HTN presents after shakes and low O2 70s transferred to  septic shock 2/2 to prostatitis

## 2020-10-15 NOTE — PROGRESS NOTE ADULT - ATTENDING COMMENTS
pt seen, examined,  case & care plan d/w pt, residents at detail.  D/W pt & case management at detail.  D/C Home today.  Total d/c care time is 45 minutes.

## 2020-10-15 NOTE — PROGRESS NOTE ADULT - PROBLEM SELECTOR PLAN 4
Chronic  - continue to hold home Bystolic 5 mg PO QD and antihypertensives  - now normotensive dc'ed solu cortef taper to complete taper of PO steroids 20 mg PO daily starting tomorrow upon discharge   - continue to monitor routine hemodynamics  - Cardiology: Dr. Degroot/ Dr Zavaleta- following; rec start Bystolic as BP is stable  - pt brought bystolic from home, normotensive Chronic  - continue to hold home Bystolic 5 mg PO QD and antihypertensives  - now normotensive dc'ed solu cortef taper to complete taper of PO steroids 20 mg PO daily x 2 days starting tomorrow upon discharge followed by home dose steroids  - continue to monitor routine hemodynamics  - Cardiology: Dr. Degroot/ Dr Zavaleta- following; rec start Bystolic as BP is stable  - pt brought Bystolic from home, normotensive

## 2020-10-15 NOTE — PROGRESS NOTE ADULT - ATTENDING COMMENTS
Infectious Diseases will continue to follow.   Please call with any questions.   Mary Lopez M.D.  [facility], Division of Infectious Diseases [phone]  For over the weekend and after hours, please call [phone]

## 2020-10-15 NOTE — PROGRESS NOTE ADULT - ASSESSMENT
Pt is a 75M w/ PMHx of CAD, Afib on AC, HTN, HLD, COPD/asthma, BPH p/w rigors, found to have AHRF w/ O2 saturation in 70s; noted to have recent "laser surgery" for treatment of his BPH on 10/1 s/p short Abx course. Pt p/w severe sepsis/septic shock requiring ICU stay likely in the setting of sepsis 2/2  source. Found to have lactic acidosis and worsening leukocytosis. Pt w/ previous hx of ESBL E.coli (2019), currently on meropenem.     Severe Sepsis/Septic Shock 2/2 E.coli Acute Prostatitis in the setting of recent instrumentation  Leukocytosis-resolved  -BCx negative  -UCx ESBL E.coli  -leukocytosis RESOLVED    Will given total 28 day course of ertapenem 1gm IV q24h until 11/7/2020 for prostatitis  Will need to fax weekly labs BMP, LFTs and CBC to   Dr. Mary Lopez  [facility], Division of Infectious Diseases   Fax: [phone]  Please fax first set of labs on initial day of home therapy, followed by weekly.

## 2020-10-15 NOTE — DISCHARGE NOTE NURSING/CASE MANAGEMENT/SOCIAL WORK - PATIENT PORTAL LINK FT
You can access the FollowMyHealth Patient Portal offered by [facility] by registering at the following website: [email or web address]. By joining [facility]’s FollowMyHealth portal, you will also be able to view your health information using other applications (apps) compatible with our system.

## 2020-10-15 NOTE — PROGRESS NOTE ADULT - SUBJECTIVE AND OBJECTIVE BOX
[facility]    CHIEF COMPLAINT: Patient is a 75y old  Male who presents with a chief complaint of UTI (14 Oct 2020 10:34)      Follow Up: [ ] Chest Pain      [ ] Dyspnea     [ ] Palpitations    [ ] Atrial Fibrillation     [ ] Ventricular Dysrhythmia    [ ] Abnormal EKG                      [ ] Abnormal Cardiac Enzymes     [ ] Valvular Disease    HPI:  75 year old male with PMHX atrial fibrillation (on Eliquis), asthma, COPD, former smoker, hx of benign lung cancer R lobe surgically removed at [facility]) CAD (s/p 5 stents ~2001), GERD, HLD, HTN presents after shakes and low O2 70s. Patient endorses sudden onset of shaking and shortness of breath earlier today. Patient states he used his nebulizer treatment, however felt no relief. He decided to come to the ED for further evaluation. Denies sick contacts or recent travel. Of note, patient with history of intubation 2019, during admission to [facility] for acute hypercapnic respiratory failure. Patient states he was recently tested for COVID and results were negative. Denies fever, abdominal pain. Admits nausea, vomiting x1 episode in the ER.     ED course:  Vitals: T 103.8, , /71, RR 18, SpO2 96% on RA  Labs: Neutrophil 90.1%, Lymphocyte 6.3%, Monocyte 1.0%, PT/INR 13.7/1.18, PTT 25.6, Lactate 2.2  UA: positive nitrite, moderate LE, >50 RBC, >50 WBC, many bacteria  CT abd/pelvis: Although the urinary bladder is incompletely distended, there appears to be wall thickening. Correlate with urinalysis for the possibility of cystitis.  CTA chest: No significant interval change in the chest compared to the previous study of April 11, 2016. Redemonstration of emphysema and postoperative changes in the right upper lobe  CXR: official read pending  EKG: NSR vent rate 100 bpm  Given IV NS 1 L bolus x2, Tylenol 650 mg PO x1, Proventil 2 puffs x1, IV Solumedrol 125 mg IVP x1, IV Zithromax and IV Rocephin, continue IV Meropenem  with H/O Previous ESBL E Coli sepsis/prostatitis in 2019   (10 Oct 2020 23:41)    PAST MEDICAL & SURGICAL HISTORY:  GI bleed  while on Antiplatelets    Nephrolithiasis  s/p Lithotripsy    GERD (gastroesophageal reflux disease)    HLD (hyperlipidemia)    HTN (hypertension)    Stented coronary artery    Asthma    Chronic obstructive pulmonary disease  Asthma    S/P primary angioplasty with coronary stent    Lung tumor  Rt Lung tumor resection,benign      MEDICATIONS  (STANDING):  albuterol/ipratropium for Nebulization 3 milliLiter(s) Nebulizer four times a day  apixaban 5 milliGRAM(s) Oral every 12 hours  atorvastatin 40 milliGRAM(s) Oral at bedtime  buDESOnide    Inhalation Suspension 0.5 milliGRAM(s) Inhalation every 12 hours  budesonide 160 MICROgram(s)/formoterol 4.5 MICROgram(s) Inhaler 2 Puff(s) Inhalation two times a day  cholecalciferol 1000 Unit(s) Oral at bedtime  cyanocobalamin 1000 MICROGram(s) Oral at bedtime  ertapenem  IVPB      ertapenem  IVPB 1000 milliGRAM(s) IV Intermittent every 24 hours  hydrocortisone sodium succinate Injectable 25 milliGRAM(s) IV Push two times a day  nebivolol 5 milliGRAM(s) Oral daily  pantoprazole    Tablet 40 milliGRAM(s) Oral before breakfast  tiotropium 18 MICROgram(s) Capsule 1 Capsule(s) Inhalation daily    MEDICATIONS  (PRN):  acetaminophen   Tablet .. 650 milliGRAM(s) Oral every 6 hours PRN Temp greater or equal to 38C (100.4F), Mild Pain (1 - 3)  zaleplon 5 milliGRAM(s) Oral at bedtime PRN Insomnia    Allergies    No Known Allergies    Intolerances        REVIEW OF SYSTEMS:    CONSTITUTIONAL: No weakness, fevers or chills.   EYES/ENT: No visual changes;    NECK: No pain or stiffness  RESPIRATORY: No cough, wheezing, No shortness of breath  CARDIOVASCULAR: No chest pain or palpitations  GASTROINTESTINAL: No abdominal pain, or hematochezia.  GENITOURINARY: No dysuria orhematuria  NEUROLOGICAL: No numbness or weakness  SKIN: No itching, burning, rashes  All other review of systems is negative unless indicated above    Vital Signs Last 24 Hrs  T(C): 36.7 (15 Oct 2020 04:34), Max: 36.8 (14 Oct 2020 21:50)  T(F): 98 (15 Oct 2020 04:34), Max: 98.3 (14 Oct 2020 21:50)  HR: 56 (15 Oct 2020 06:41) (56 - 78)  BP: 119/57 (15 Oct 2020 04:34) (115/5 - 130/65)  BP(mean): --  RR: 18 (15 Oct 2020 04:34) (18 - 18)  SpO2: 95% (15 Oct 2020 06:41) (93% - 96%)  I&O's Summary      PHYSICAL EXAM:  Constitutional: NAD  Neurological: Alert, no focal deficits  HEENT: no JVD, EOMI  Cardiovascular: Regular, S1 and S2, no murmur  Pulmonary: breath sounds bilaterally, faint wheeze  Gastrointestinal: Bowel Sounds present, soft, nontender  EXT:  no peripheral edema  Skin: No rashes.  Psych:  Mood calm  LABS: All Labs Reviewed:                          11.8   9.27  )-----------( 149      ( 15 Oct 2020 07:22 )             35.4     10-15    147<H>  |  111<H>  |  18  ----------------------------<  78  4.0   |  32<H>  |  0.80    Ca    8.3<L>      15 Oct 2020 07:22  Phos  2.9     10-14  Mg     2.3     10-14    · Assessment	  74M with known HTN, PAF on Eliquis, HLD CAD s/p JESSE to the LAD, LCx and RCA in 2006, COPD and reactive airway disease, recent iron deficiency anemia undergoing GI evaluation presents with UTI, fevers, chills, hypotension requiring Levophed now on Abx with improving BP and feeling better    pt denies cp or sob today    PLan  Levophed off.  Because he was on Prednisone 5mg daily, Solucortef was started and patient shows good clinical improvement.  Continue with abx per ID  Home cardiac meds were Eliquis 5 mg,  lasix 20 mg QD, Bystolic 5 mg,  lipitor 40 mg.  will restart Bystolic on discharge as not on formulary here.  continue to hold lasix for now.   DVT prophylaxis     follow up Dr Sylvester outpt

## 2020-10-15 NOTE — PROGRESS NOTE ADULT - PROBLEM SELECTOR PLAN 5
Chronic, history of COPD in setting of asthma  -- Currently, saturations above 96% on RA    - Albuterol/Ipratropium treatments q6 PRN for wheezing  - budesonide (pulmicort) BID   - Symbicort BID   - Spiriva 1 capsule daily   - CTA chest: No significant interval change in the chest compared to the previous study of April 11, 2016. Redemonstration of emphysema and postoperative changes in the right upper   - zaleplon prn for insomnia  - on oral prednisone at home, off stress steroid taper

## 2020-10-21 PROCEDURE — 96365 THER/PROPH/DIAG IV INF INIT: CPT

## 2020-10-21 PROCEDURE — 76937 US GUIDE VASCULAR ACCESS: CPT

## 2020-10-21 PROCEDURE — U0003: CPT

## 2020-10-21 PROCEDURE — 84100 ASSAY OF PHOSPHORUS: CPT

## 2020-10-21 PROCEDURE — 80048 BASIC METABOLIC PNL TOTAL CA: CPT

## 2020-10-21 PROCEDURE — 74177 CT ABD & PELVIS W/CONTRAST: CPT

## 2020-10-21 PROCEDURE — 85730 THROMBOPLASTIN TIME PARTIAL: CPT

## 2020-10-21 PROCEDURE — 93005 ELECTROCARDIOGRAM TRACING: CPT

## 2020-10-21 PROCEDURE — 87086 URINE CULTURE/COLONY COUNT: CPT

## 2020-10-21 PROCEDURE — 36415 COLL VENOUS BLD VENIPUNCTURE: CPT

## 2020-10-21 PROCEDURE — 83605 ASSAY OF LACTIC ACID: CPT

## 2020-10-21 PROCEDURE — 99285 EMERGENCY DEPT VISIT HI MDM: CPT

## 2020-10-21 PROCEDURE — 87186 SC STD MICRODIL/AGAR DIL: CPT

## 2020-10-21 PROCEDURE — C1751: CPT

## 2020-10-21 PROCEDURE — 80053 COMPREHEN METABOLIC PANEL: CPT

## 2020-10-21 PROCEDURE — 71275 CT ANGIOGRAPHY CHEST: CPT

## 2020-10-21 PROCEDURE — 71045 X-RAY EXAM CHEST 1 VIEW: CPT

## 2020-10-21 PROCEDURE — 87040 BLOOD CULTURE FOR BACTERIA: CPT

## 2020-10-21 PROCEDURE — 0225U NFCT DS DNA&RNA 21 SARSCOV2: CPT

## 2020-10-21 PROCEDURE — 83735 ASSAY OF MAGNESIUM: CPT

## 2020-10-21 PROCEDURE — 81001 URINALYSIS AUTO W/SCOPE: CPT

## 2020-10-21 PROCEDURE — 94760 N-INVAS EAR/PLS OXIMETRY 1: CPT

## 2020-10-21 PROCEDURE — 96367 TX/PROPH/DG ADDL SEQ IV INF: CPT

## 2020-10-21 PROCEDURE — 94640 AIRWAY INHALATION TREATMENT: CPT

## 2020-10-21 PROCEDURE — 85610 PROTHROMBIN TIME: CPT

## 2020-10-21 PROCEDURE — 96375 TX/PRO/DX INJ NEW DRUG ADDON: CPT

## 2020-10-21 PROCEDURE — 86769 SARS-COV-2 COVID-19 ANTIBODY: CPT

## 2020-10-21 PROCEDURE — 97161 PT EVAL LOW COMPLEX 20 MIN: CPT

## 2020-10-21 PROCEDURE — 85025 COMPLETE CBC W/AUTO DIFF WBC: CPT

## 2020-12-01 NOTE — DISCHARGE NOTE PROVIDER - REASON FOR ADMISSION
UTI
General Sunscreen Counseling: I recommended a broad spectrum sunscreen with a SPF of 30 or higher.  I explained that SPF 30 sunscreens block approximately 97 percent of the sun's harmful rays.  Sunscreens should be applied at least 15 minutes prior to expected sun exposure and then every 2 hours after that as long as sun exposure continues. If swimming or exercising sunscreen should be reapplied every 45 minutes to an hour after getting wet or sweating.  One ounce, or the equivalent of a shot glass full of sunscreen, is adequate to protect the skin not covered by a bathing suit. I also recommended a lip balm with a sunscreen as well. Sun protective clothing can be used in lieu of sunscreen but must be worn the entire time you are exposed to the sun's rays.
Detail Level: Generalized

## 2020-12-04 ENCOUNTER — APPOINTMENT (OUTPATIENT)
Dept: INTERNAL MEDICINE | Facility: CLINIC | Age: 75
End: 2020-12-04
Payer: MEDICARE

## 2020-12-04 VITALS
SYSTOLIC BLOOD PRESSURE: 134 MMHG | TEMPERATURE: 98.5 F | DIASTOLIC BLOOD PRESSURE: 67 MMHG | WEIGHT: 176.56 LBS | OXYGEN SATURATION: 94 % | HEART RATE: 67 BPM | RESPIRATION RATE: 12 BRPM | BODY MASS INDEX: 27.71 KG/M2 | HEIGHT: 67 IN

## 2020-12-04 DIAGNOSIS — Z86.79 PERSONAL HISTORY OF OTHER DISEASES OF THE CIRCULATORY SYSTEM: ICD-10-CM

## 2020-12-04 DIAGNOSIS — I48.0 PAROXYSMAL ATRIAL FIBRILLATION: ICD-10-CM

## 2020-12-04 DIAGNOSIS — Z87.891 PERSONAL HISTORY OF NICOTINE DEPENDENCE: ICD-10-CM

## 2020-12-04 DIAGNOSIS — R09.82 POSTNASAL DRIP: ICD-10-CM

## 2020-12-04 DIAGNOSIS — Z87.09 PERSONAL HISTORY OF OTHER DISEASES OF THE RESPIRATORY SYSTEM: ICD-10-CM

## 2020-12-04 DIAGNOSIS — R09.81 NASAL CONGESTION: ICD-10-CM

## 2020-12-04 DIAGNOSIS — I10 ESSENTIAL (PRIMARY) HYPERTENSION: ICD-10-CM

## 2020-12-04 DIAGNOSIS — Z87.438 PERSONAL HISTORY OF OTHER DISEASES OF MALE GENITAL ORGANS: ICD-10-CM

## 2020-12-04 PROCEDURE — 99204 OFFICE O/P NEW MOD 45 MIN: CPT

## 2020-12-04 PROCEDURE — 99214 OFFICE O/P EST MOD 30 MIN: CPT

## 2020-12-04 RX ORDER — BUDESONIDE AND FORMOTEROL FUMARATE DIHYDRATE 160; 4.5 UG/1; UG/1
160-4.5 AEROSOL RESPIRATORY (INHALATION) TWICE DAILY
Qty: 3 | Refills: 0 | Status: ACTIVE | COMMUNITY
Start: 2020-12-04

## 2020-12-04 RX ORDER — CYANOCOBALAMIN (VITAMIN B-12) 1000 MCG
100 TABLET, EXTENDED RELEASE ORAL DAILY
Refills: 0 | Status: ACTIVE | COMMUNITY
Start: 2020-12-04

## 2020-12-04 RX ORDER — TIOTROPIUM BROMIDE 18 UG/1
18 CAPSULE ORAL; RESPIRATORY (INHALATION) DAILY
Refills: 0 | Status: ACTIVE | COMMUNITY
Start: 2020-12-04

## 2020-12-04 RX ORDER — NEBIVOLOL HYDROCHLORIDE 5 MG/1
5 TABLET ORAL DAILY
Qty: 30 | Refills: 2 | Status: ACTIVE | COMMUNITY
Start: 2020-12-04

## 2020-12-04 RX ORDER — IPRATROPIUM BROMIDE AND ALBUTEROL SULFATE 2.5; .5 MG/3ML; MG/3ML
0.5-2.5 (3) SOLUTION RESPIRATORY (INHALATION)
Refills: 0 | Status: ACTIVE | COMMUNITY
Start: 2020-12-04

## 2020-12-04 RX ORDER — BUDESONIDE 0.5 MG/2ML
0.5 INHALANT ORAL
Refills: 0 | Status: ACTIVE | COMMUNITY
Start: 2020-12-04

## 2020-12-04 RX ORDER — ALBUTEROL SULFATE 90 UG/1
108 (90 BASE) AEROSOL, METERED RESPIRATORY (INHALATION)
Refills: 0 | Status: ACTIVE | COMMUNITY
Start: 2020-12-04

## 2020-12-04 RX ORDER — GLUCOSA SU 2KCL/CHONDROITIN SU 500-400 MG
500 CAPSULE ORAL DAILY
Refills: 0 | Status: ACTIVE | COMMUNITY
Start: 2020-12-04

## 2020-12-04 RX ORDER — MULTIVIT-MIN/FOLIC/VIT K/LYCOP 400-300MCG
25 MCG TABLET ORAL DAILY
Refills: 0 | Status: ACTIVE | COMMUNITY
Start: 2020-12-04

## 2020-12-04 NOTE — HISTORY OF PRESENT ILLNESS
[FreeTextEntry1] : C/O NASAL CONGESTION AND POST NASAL DRIP [de-identified] : PATIENT W/ H/O COPD , CAD, HTN , HYPERLIPIDEMIA , H/O BPH C/O NASAL CONGESTION AND POST NASAL DRIP X SEVERAL DAYS , NO FEVER , CHILLS , WORSENING COUGH , PURULENT SPUTM.. HE IS PRESENTLY ON BACTRIM DS BID X 30 DAYS FOR PROSTATITIS

## 2020-12-04 NOTE — REVIEW OF SYSTEMS
[Shortness Of Breath] : shortness of breath [Wheezing] : wheezing [Cough] : cough [Dyspnea on Exertion] : dyspnea on exertion [Negative] : Gastrointestinal [Chest Pain] : no chest pain [Orthopena] : no orthopnea

## 2020-12-04 NOTE — PHYSICAL EXAM
[No Acute Distress] : no acute distress [Normal Outer Ear/Nose] : the outer ears and nose were normal in appearance [Normal Oropharynx] : the oropharynx was normal [No Lymphadenopathy] : no lymphadenopathy [Thyroid Normal, No Nodules] : the thyroid was normal and there were no nodules present [No Carotid Bruits] : no carotid bruits [No Edema] : there was no peripheral edema [Normal] : soft, non-tender, non-distended, no masses palpated, no HSM and normal bowel sounds [de-identified] : + SLIGHT NASAL MUCOSAL ERYTHEMA , NO NASAL DISCHARGE  [de-identified] : SCASTTERED WHEEZES AND A FEW RHONCHI , DECREASED BS BILATERALLY , NO DULLNESS , BARREL CHESTED

## 2021-02-03 ENCOUNTER — APPOINTMENT (OUTPATIENT)
Dept: INTERNAL MEDICINE | Facility: CLINIC | Age: 76
End: 2021-02-03
Payer: MEDICARE

## 2021-02-03 VITALS
DIASTOLIC BLOOD PRESSURE: 68 MMHG | BODY MASS INDEX: 27 KG/M2 | TEMPERATURE: 98 F | OXYGEN SATURATION: 94 % | SYSTOLIC BLOOD PRESSURE: 123 MMHG | HEIGHT: 67 IN | WEIGHT: 172 LBS | HEART RATE: 62 BPM | RESPIRATION RATE: 12 BRPM

## 2021-02-03 DIAGNOSIS — H93.19 TINNITUS, UNSPECIFIED EAR: ICD-10-CM

## 2021-02-03 PROCEDURE — 99213 OFFICE O/P EST LOW 20 MIN: CPT

## 2021-07-16 NOTE — DIETITIAN INITIAL EVALUATION ADULT. - PROBLEM SELECTOR PROBLEM 2
7/16/2021        Rhett Kennedy  [address] [place] [address]        Dear Rhett Kennedy    Your test results from your last visit have returned.  Your TSH (thyroid) level is normal.      If you have any further questions, please feel free to call.     TSH (mcUnits/mL)   Date Value   07/15/2021 2.710       Sincerely,      Rivera Feliciano DO  [facility]  [address] [place]  [address] [phone]     Hypotension

## 2021-10-05 NOTE — DIETITIAN INITIAL EVALUATION ADULT. - WEIGHT IN LBS
170
pt alert and oriented x4 comes in c/o decreased sensation with numbness to left calf radiating to shin starting Saturday now with  R leg numbness tingling and R arm " feeling weird". pt denies new vaccines, bug bites, or new medicines. pt kneels on bilateral knees for increased length of time at work. MAEE with equal strength, decreased sensation to L calf noted. pt educated on plan of care, pt able to successfully teach back plan of care to RN, RN will continue to reeducate pt during hospital stay.

## 2022-05-23 ENCOUNTER — APPOINTMENT (OUTPATIENT)
Dept: INTERNAL MEDICINE | Facility: CLINIC | Age: 77
End: 2022-05-23
Payer: MEDICARE

## 2022-05-23 VITALS
BODY MASS INDEX: 26.16 KG/M2 | SYSTOLIC BLOOD PRESSURE: 116 MMHG | OXYGEN SATURATION: 94 % | DIASTOLIC BLOOD PRESSURE: 68 MMHG | HEART RATE: 69 BPM | WEIGHT: 167 LBS

## 2022-05-23 DIAGNOSIS — J32.9 CHRONIC SINUSITIS, UNSPECIFIED: ICD-10-CM

## 2022-05-23 PROCEDURE — 99213 OFFICE O/P EST LOW 20 MIN: CPT

## 2022-05-24 LAB — SARS-COV-2 N GENE NPH QL NAA+PROBE: NOT DETECTED

## 2022-06-30 ENCOUNTER — APPOINTMENT (OUTPATIENT)
Dept: INTERNAL MEDICINE | Facility: CLINIC | Age: 77
End: 2022-06-30

## 2022-06-30 VITALS
HEART RATE: 75 BPM | WEIGHT: 168 LBS | BODY MASS INDEX: 26.37 KG/M2 | HEIGHT: 67 IN | RESPIRATION RATE: 12 BRPM | OXYGEN SATURATION: 95 % | TEMPERATURE: 98.4 F | SYSTOLIC BLOOD PRESSURE: 129 MMHG | DIASTOLIC BLOOD PRESSURE: 66 MMHG

## 2022-06-30 DIAGNOSIS — K76.89 OTHER SPECIFIED DISEASES OF LIVER: ICD-10-CM

## 2022-06-30 DIAGNOSIS — R60.0 LOCALIZED EDEMA: ICD-10-CM

## 2022-06-30 PROCEDURE — 99214 OFFICE O/P EST MOD 30 MIN: CPT

## 2022-06-30 NOTE — PHYSICAL EXAM
[No Acute Distress] : no acute distress [Normal Sclera/Conjunctiva] : normal sclera/conjunctiva [No Lymphadenopathy] : no lymphadenopathy [No Carotid Bruits] : no carotid bruits [Normal] : soft, non-tender, non-distended, no masses palpated, no HSM and normal bowel sounds [Normal Supraclavicular Nodes] : no supraclavicular lymphadenopathy [Normal Posterior Cervical Nodes] : no posterior cervical lymphadenopathy [Normal Anterior Cervical Nodes] : no anterior cervical lymphadenopathy [Normal Inguinal Nodes] : no inguinal lymphadenopathy [No CVA Tenderness] : no CVA  tenderness [de-identified] : Trace lower extremity edema, no cord, get of home

## 2022-06-30 NOTE — HISTORY OF PRESENT ILLNESS
[FreeTextEntry1] : Complaint of liver cysts [de-identified] : Patient was recently evaluated by his urologist with a CAT scan of the abdomen and pelvis which revealed multiple\par Low-attenuation areas seen with liver cysts compared to a previous CT scan of the abdomen 18 months prior.\par Patient denies any GI symptoms such as nausea vomiting or early satiety.\par He denies any rectal bleeding or melenic stool.\par He does complain of some lower extremity edema that has noted over the last month.  Patient states that he is being monitored by his cardiologist.\par \par \par \par \par \par \par \par \par \par \par \par \par \par \par \par \par \par \par \par \par He does complain\par \par \par \par \par \par \par \par \par \par \par \par \par \par \par \par \par \par \par \par \par \par \par \par \par \par \par \par \par \par \par \par \par \par \par \par \par \par \par \par \par \par \par \par \par \par \par \par \par \par \par \par \par \par \par \par \par \par \par \par \par \par \par \par \par \par \par \par \par \par \par \par \par \par \par \par \par \par \par \par \par \par \par \par \par \par \par

## 2022-07-03 ENCOUNTER — TRANSCRIPTION ENCOUNTER (OUTPATIENT)
Age: 77
End: 2022-07-03

## 2022-07-18 ENCOUNTER — APPOINTMENT (OUTPATIENT)
Dept: SURGERY | Facility: CLINIC | Age: 77
End: 2022-07-18

## 2022-07-18 VITALS
TEMPERATURE: 98.5 F | OXYGEN SATURATION: 94 % | SYSTOLIC BLOOD PRESSURE: 131 MMHG | HEIGHT: 67 IN | BODY MASS INDEX: 26.37 KG/M2 | DIASTOLIC BLOOD PRESSURE: 73 MMHG | WEIGHT: 168 LBS | HEART RATE: 62 BPM

## 2022-07-18 DIAGNOSIS — Z82.49 FAMILY HISTORY OF ISCHEMIC HEART DISEASE AND OTHER DISEASES OF THE CIRCULATORY SYSTEM: ICD-10-CM

## 2022-07-18 DIAGNOSIS — Z80.1 FAMILY HISTORY OF MALIGNANT NEOPLASM OF TRACHEA, BRONCHUS AND LUNG: ICD-10-CM

## 2022-07-18 DIAGNOSIS — Z82.5 FAMILY HISTORY OF ASTHMA AND OTHER CHRONIC LOWER RESPIRATORY DISEASES: ICD-10-CM

## 2022-07-18 DIAGNOSIS — Z86.39 PERSONAL HISTORY OF OTHER ENDOCRINE, NUTRITIONAL AND METABOLIC DISEASE: ICD-10-CM

## 2022-07-18 DIAGNOSIS — Z86.79 PERSONAL HISTORY OF OTHER DISEASES OF THE CIRCULATORY SYSTEM: ICD-10-CM

## 2022-07-18 DIAGNOSIS — Z82.3 FAMILY HISTORY OF STROKE: ICD-10-CM

## 2022-07-18 DIAGNOSIS — J45.909 UNSPECIFIED ASTHMA, UNCOMPLICATED: ICD-10-CM

## 2022-07-18 PROCEDURE — 99204 OFFICE O/P NEW MOD 45 MIN: CPT

## 2022-07-18 NOTE — PHYSICAL EXAM
[JVD] : no jugular venous distention  [Normal Thyroid] : the thyroid was normal [Carotid Bruits] : no carotid bruits [Normal Breath Sounds] : Normal breath sounds [Wheezing] : wheezing was heard [Normal Heart Sounds] : normal heart sounds [No Rash or Lesion] : No rash or lesion [Alert] : alert [Oriented to Person] : oriented to person [Oriented to Place] : oriented to place [Calm] : calm [Oriented to Time] : oriented to time [de-identified] : well developed white male in no acute distress [de-identified] : noninjected and nonicteric [de-identified] : without adenopathy [de-identified] : normal bowel sounds, without distension or tenderness.\par Positive for an incarcerated umbilical hernia. [de-identified] : normal testicles, without hernia [de-identified] : without calf pain or swelling

## 2022-07-18 NOTE — REVIEW OF SYSTEMS
[Heart Rate Is Slow] : the heart rate was not slow [Heart Rate Is Fast] : the heart rate was not fast [Chest Pain] : no chest pain [Palpitations] : palpitations [Leg Claudication] : no intermittent leg claudication [Lower Ext Edema] : no extremity edema [Shortness Of Breath] : shortness of breath [Wheezing] : wheezing [Cough] : cough [SOB on Exertion] : shortness of breath during exertion [Orthopnea] : no orthopnea [PND] : no PND [Negative] : Heme/Lymph [FreeTextEntry8] : recurrent UTIs and sepsis, Renal colic [FreeTextEntry9] : with back pain

## 2022-07-18 NOTE — ASSESSMENT
[FreeTextEntry1] : I have discussed with pt the signs and symptoms of strangulation and the importance of calling immediately should they develop.\par I have also discussed that with his chronic UTIs and small hernia I would recommend not using a mesh to repair his hernia.\par I have discussed all of the risks, benefits and options with pt. Pt is going to schedule an open repair of his incarcerated umbilical hernia after he is seen by his cardiologist.\par I have also discussed the he must come off his blood thinner prior to surgery/.

## 2022-07-18 NOTE — HISTORY OF PRESENT ILLNESS
[de-identified] : Incarcerated umbilical hernia [de-identified] : 76 year old white male who presents c/o an incarcerated umbilical hernia. Pt states that he noticed it one year ago. He also states that it does occasionally cause him pain. He denies any nausea or vomiting or changes in his bowel habits. He denies urinary symptoms.

## 2022-07-18 NOTE — REASON FOR VISIT
The orogastric tube (see size above) was inserted via the anatomic location.
[Initial Evaluation] : an initial evaluation

## 2022-07-19 ENCOUNTER — APPOINTMENT (OUTPATIENT)
Dept: INTERNAL MEDICINE | Facility: CLINIC | Age: 77
End: 2022-07-19

## 2022-07-19 VITALS
WEIGHT: 164 LBS | BODY MASS INDEX: 25.69 KG/M2 | HEART RATE: 62 BPM | DIASTOLIC BLOOD PRESSURE: 60 MMHG | OXYGEN SATURATION: 94 % | SYSTOLIC BLOOD PRESSURE: 123 MMHG

## 2022-07-19 VITALS — DIASTOLIC BLOOD PRESSURE: 60 MMHG | SYSTOLIC BLOOD PRESSURE: 110 MMHG

## 2022-07-19 DIAGNOSIS — Z00.00 ENCOUNTER FOR GENERAL ADULT MEDICAL EXAMINATION W/OUT ABNORMAL FINDINGS: ICD-10-CM

## 2022-07-19 PROCEDURE — G0439: CPT

## 2022-07-19 PROCEDURE — G0444 DEPRESSION SCREEN ANNUAL: CPT | Mod: 59

## 2022-07-19 PROCEDURE — 93000 ELECTROCARDIOGRAM COMPLETE: CPT | Mod: 59

## 2022-07-20 ENCOUNTER — NON-APPOINTMENT (OUTPATIENT)
Age: 77
End: 2022-07-20

## 2022-07-20 LAB — CK SERPL-CCNC: 486 U/L

## 2022-08-03 ENCOUNTER — APPOINTMENT (OUTPATIENT)
Dept: INTERNAL MEDICINE | Facility: CLINIC | Age: 77
End: 2022-08-03

## 2022-08-03 VITALS
WEIGHT: 155 LBS | BODY MASS INDEX: 24.28 KG/M2 | DIASTOLIC BLOOD PRESSURE: 85 MMHG | HEART RATE: 63 BPM | SYSTOLIC BLOOD PRESSURE: 133 MMHG | OXYGEN SATURATION: 93 %

## 2022-08-03 VITALS — SYSTOLIC BLOOD PRESSURE: 120 MMHG | DIASTOLIC BLOOD PRESSURE: 80 MMHG

## 2022-08-03 DIAGNOSIS — M79.10 MYALGIA, UNSPECIFIED SITE: ICD-10-CM

## 2022-08-03 PROCEDURE — 99213 OFFICE O/P EST LOW 20 MIN: CPT | Mod: 25

## 2022-08-03 PROCEDURE — 36415 COLL VENOUS BLD VENIPUNCTURE: CPT

## 2022-08-04 LAB — CK SERPL-CCNC: 174 U/L

## 2022-08-04 RX ORDER — ROSUVASTATIN CALCIUM 5 MG/1
5 TABLET, FILM COATED ORAL
Qty: 90 | Refills: 2 | Status: ACTIVE | COMMUNITY
Start: 2022-08-04 | End: 1900-01-01

## 2022-08-04 RX ORDER — ATORVASTATIN CALCIUM 40 MG/1
40 TABLET, FILM COATED ORAL
Qty: 30 | Refills: 5 | Status: DISCONTINUED | COMMUNITY
Start: 2020-12-04 | End: 2022-08-04

## 2022-09-19 ENCOUNTER — APPOINTMENT (OUTPATIENT)
Dept: SURGERY | Facility: CLINIC | Age: 77
End: 2022-09-19

## 2022-09-19 VITALS — TEMPERATURE: 98.6 F

## 2022-09-19 PROCEDURE — 99214 OFFICE O/P EST MOD 30 MIN: CPT

## 2022-09-19 NOTE — ASSESSMENT
[FreeTextEntry1] : I have discussed the signs of strangulation with pt and the importance of calling immediately if they should develop.\par I contacted pts cardiologist-Dr Jara, who said it would be okay to come off the Eliquist for the surgery. Pt to stop it 2 days prior to surgery.\par I have discussed the risks, benefits and options and pt wants to schedule an open repair of his incarcerated umbilical hernia/.

## 2022-09-19 NOTE — HISTORY OF PRESENT ILLNESS
[de-identified] : Incarcerated umbilical hernia [de-identified] : pt is here to discuss the sugical repair of his incarcerated umbilical hernia. He denies any pain in the area. He has not had any nausea or vomiting or changes in his bowel habits.

## 2022-09-19 NOTE — PHYSICAL EXAM
[JVD] : no jugular venous distention  [Normal Thyroid] : the thyroid was normal [Carotid Bruits] : no carotid bruits [Normal Breath Sounds] : Normal breath sounds [Normal Heart Sounds] : normal heart sounds [Calm] : calm [de-identified] : well developed male in no distress [de-identified] : without adenopathy [de-identified] : normal bowel sounds, without distension or tenderness.\par With an incarcerated umbilical hernia [de-identified] : normal testicles, without hernia. [de-identified] : without calf pain or swelling

## 2022-10-05 ENCOUNTER — OUTPATIENT (OUTPATIENT)
Dept: OUTPATIENT SERVICES | Facility: HOSPITAL | Age: 77
LOS: 1 days | End: 2022-10-05
Payer: MEDICARE

## 2022-10-05 VITALS
HEART RATE: 72 BPM | HEIGHT: 67 IN | WEIGHT: 164.02 LBS | TEMPERATURE: 98 F | SYSTOLIC BLOOD PRESSURE: 131 MMHG | DIASTOLIC BLOOD PRESSURE: 79 MMHG | RESPIRATION RATE: 16 BRPM

## 2022-10-05 DIAGNOSIS — Z95.5 PRESENCE OF CORONARY ANGIOPLASTY IMPLANT AND GRAFT: Chronic | ICD-10-CM

## 2022-10-05 DIAGNOSIS — K42.0 UMBILICAL HERNIA WITH OBSTRUCTION, WITHOUT GANGRENE: ICD-10-CM

## 2022-10-05 DIAGNOSIS — J44.9 CHRONIC OBSTRUCTIVE PULMONARY DISEASE, UNSPECIFIED: ICD-10-CM

## 2022-10-05 DIAGNOSIS — D49.1 NEOPLASM OF UNSPECIFIED BEHAVIOR OF RESPIRATORY SYSTEM: Chronic | ICD-10-CM

## 2022-10-05 DIAGNOSIS — Z90.79 ACQUIRED ABSENCE OF OTHER GENITAL ORGAN(S): Chronic | ICD-10-CM

## 2022-10-05 DIAGNOSIS — I25.10 ATHEROSCLEROTIC HEART DISEASE OF NATIVE CORONARY ARTERY WITHOUT ANGINA PECTORIS: ICD-10-CM

## 2022-10-05 DIAGNOSIS — Z01.818 ENCOUNTER FOR OTHER PREPROCEDURAL EXAMINATION: ICD-10-CM

## 2022-10-05 LAB
ANION GAP SERPL CALC-SCNC: 0 MMOL/L — LOW (ref 5–17)
APPEARANCE UR: ABNORMAL
BILIRUB UR-MCNC: NEGATIVE — SIGNIFICANT CHANGE UP
BUN SERPL-MCNC: 15 MG/DL — SIGNIFICANT CHANGE UP (ref 7–23)
CALCIUM SERPL-MCNC: 9.1 MG/DL — SIGNIFICANT CHANGE UP (ref 8.5–10.1)
CHLORIDE SERPL-SCNC: 110 MMOL/L — HIGH (ref 96–108)
CO2 SERPL-SCNC: 34 MMOL/L — HIGH (ref 22–31)
COLOR SPEC: SIGNIFICANT CHANGE UP
CREAT SERPL-MCNC: 1 MG/DL — SIGNIFICANT CHANGE UP (ref 0.5–1.3)
DIFF PNL FLD: ABNORMAL
EGFR: 78 ML/MIN/1.73M2 — SIGNIFICANT CHANGE UP
GLUCOSE SERPL-MCNC: 107 MG/DL — HIGH (ref 70–99)
GLUCOSE UR QL: NEGATIVE — SIGNIFICANT CHANGE UP
HCT VFR BLD CALC: 45.3 % — SIGNIFICANT CHANGE UP (ref 39–50)
HGB BLD-MCNC: 15.1 G/DL — SIGNIFICANT CHANGE UP (ref 13–17)
KETONES UR-MCNC: NEGATIVE — SIGNIFICANT CHANGE UP
LEUKOCYTE ESTERASE UR-ACNC: ABNORMAL
MCHC RBC-ENTMCNC: 31.7 PG — SIGNIFICANT CHANGE UP (ref 27–34)
MCHC RBC-ENTMCNC: 33.3 GM/DL — SIGNIFICANT CHANGE UP (ref 32–36)
MCV RBC AUTO: 95.2 FL — SIGNIFICANT CHANGE UP (ref 80–100)
NITRITE UR-MCNC: NEGATIVE — SIGNIFICANT CHANGE UP
NRBC # BLD: 0 /100 WBCS — SIGNIFICANT CHANGE UP (ref 0–0)
PH UR: 7 — SIGNIFICANT CHANGE UP (ref 5–8)
PLATELET # BLD AUTO: 204 K/UL — SIGNIFICANT CHANGE UP (ref 150–400)
POTASSIUM SERPL-MCNC: 4.4 MMOL/L — SIGNIFICANT CHANGE UP (ref 3.5–5.3)
POTASSIUM SERPL-SCNC: 4.4 MMOL/L — SIGNIFICANT CHANGE UP (ref 3.5–5.3)
PROT UR-MCNC: NEGATIVE — SIGNIFICANT CHANGE UP
RBC # BLD: 4.76 M/UL — SIGNIFICANT CHANGE UP (ref 4.2–5.8)
RBC # FLD: 14.7 % — HIGH (ref 10.3–14.5)
SODIUM SERPL-SCNC: 144 MMOL/L — SIGNIFICANT CHANGE UP (ref 135–145)
SP GR SPEC: 1.01 — SIGNIFICANT CHANGE UP (ref 1.01–1.02)
UROBILINOGEN FLD QL: NEGATIVE — SIGNIFICANT CHANGE UP
WBC # BLD: 13.02 K/UL — HIGH (ref 3.8–10.5)
WBC # FLD AUTO: 13.02 K/UL — HIGH (ref 3.8–10.5)

## 2022-10-05 PROCEDURE — 87086 URINE CULTURE/COLONY COUNT: CPT

## 2022-10-05 PROCEDURE — 87077 CULTURE AEROBIC IDENTIFY: CPT

## 2022-10-05 PROCEDURE — 87186 SC STD MICRODIL/AGAR DIL: CPT

## 2022-10-05 PROCEDURE — G0463: CPT

## 2022-10-05 PROCEDURE — 81001 URINALYSIS AUTO W/SCOPE: CPT

## 2022-10-05 PROCEDURE — 36415 COLL VENOUS BLD VENIPUNCTURE: CPT

## 2022-10-05 PROCEDURE — 85027 COMPLETE CBC AUTOMATED: CPT

## 2022-10-05 PROCEDURE — 80048 BASIC METABOLIC PNL TOTAL CA: CPT

## 2022-10-05 RX ORDER — IPRATROPIUM/ALBUTEROL SULFATE 18-103MCG
3 AEROSOL WITH ADAPTER (GRAM) INHALATION
Qty: 0 | Refills: 0 | DISCHARGE

## 2022-10-05 RX ORDER — ATORVASTATIN CALCIUM 80 MG/1
1 TABLET, FILM COATED ORAL
Qty: 0 | Refills: 0 | DISCHARGE

## 2022-10-05 NOTE — H&P PST ADULT - FALL HARM RISK - UNIVERSAL INTERVENTIONS
Bed in lowest position, wheels locked, appropriate side rails in place/Call bell, personal items and telephone in reach/Non-slip footwear when patient is out of bed/[place] to call system/Physically safe environment - no spills, clutter or unnecessary equipment/Purposeful Proactive Rounding/Room/bathroom lighting operational, light cord in reach

## 2022-10-05 NOTE — H&P PST ADULT - NSICDXPASTSURGICALHX_GEN_ALL_CORE_FT
PAST SURGICAL HISTORY:  Lung tumor Rt Lung tumor resection,benign    S/P primary angioplasty with coronary stent     S/P TURP

## 2022-10-05 NOTE — H&P PST ADULT - HISTORY OF PRESENT ILLNESS
Scheduled for incarcerated umbilical  hernia repair 78 y/o maler, PMH of HTN, COPD / Asthma , GERD, Afib/ 5 stents last one  in 2016, with c/o of umbilical bulging for almost a year. Was seen by Dr Angel, Scheduled for incarcerated umbilical  hernia repair on 10/24/22. Pre op testing today.

## 2022-10-05 NOTE — H&P PST ADULT - BP NONINVASIVE SYSTOLIC (MM HG)
131 Gabapentin Pregnancy And Lactation Text: This medication is Pregnancy Category C and isn't considered safe during pregnancy. It is excreted in breast milk.

## 2022-10-05 NOTE — H&P PST ADULT - GASTROINTESTINAL COMMENTS
anemia 5 yrs ago, had 1 transfusion , no diagnosis of ulcer, see HPI, umbilical hernia umbilical hernia

## 2022-10-05 NOTE — H&P PST ADULT - NSICDXPASTMEDICALHX_GEN_ALL_CORE_FT
PAST MEDICAL HISTORY:  Asthma     Chronic obstructive pulmonary disease Asthma/copd    GERD (gastroesophageal reflux disease)     GI bleed while on Antiplatelets    HLD (hyperlipidemia)     HTN (hypertension)     Nephrolithiasis s/p Lithotripsy    Stented coronary artery      PAST MEDICAL HISTORY:  Asthma     Chronic obstructive pulmonary disease Asthma/copd    GERD (gastroesophageal reflux disease)     GI bleed while on Antiplatelets    HLD (hyperlipidemia)     HTN (hypertension)     Nephrolithiasis s/p Lithotripsy    Stented coronary artery 2000, 2006 and 2016 total 5 stents JESSE

## 2022-10-05 NOTE — H&P PST ADULT - PROBLEM SELECTOR PLAN 1
Scheduled for incarcerated umbilical hernia repair on 10/24/22.  Pre op instructions:  Hold OTC supplements. Medications reviewed for the week and morning of surgery.  NPO after 11pm to the morning of surgery.   Vaccinated for covid: Pfizer   Shower 2 days before and the morning of surgery with antibacterial soap as instructed.  Covid testing 3 days prior to procedure, information given.  Patient verbalized understanding.

## 2022-10-07 LAB
-  AMIKACIN: SIGNIFICANT CHANGE UP
-  AMOXICILLIN/CLAVULANIC ACID: SIGNIFICANT CHANGE UP
-  AMPICILLIN/SULBACTAM: SIGNIFICANT CHANGE UP
-  AMPICILLIN: SIGNIFICANT CHANGE UP
-  AZTREONAM: SIGNIFICANT CHANGE UP
-  CEFAZOLIN: SIGNIFICANT CHANGE UP
-  CEFEPIME: SIGNIFICANT CHANGE UP
-  CEFTRIAXONE: SIGNIFICANT CHANGE UP
-  CIPROFLOXACIN: SIGNIFICANT CHANGE UP
-  ERTAPENEM: SIGNIFICANT CHANGE UP
-  GENTAMICIN: SIGNIFICANT CHANGE UP
-  IMIPENEM: SIGNIFICANT CHANGE UP
-  LEVOFLOXACIN: SIGNIFICANT CHANGE UP
-  MEROPENEM: SIGNIFICANT CHANGE UP
-  NITROFURANTOIN: SIGNIFICANT CHANGE UP
-  PIPERACILLIN/TAZOBACTAM: SIGNIFICANT CHANGE UP
-  TIGECYCLINE: SIGNIFICANT CHANGE UP
-  TOBRAMYCIN: SIGNIFICANT CHANGE UP
-  TRIMETHOPRIM/SULFAMETHOXAZOLE: SIGNIFICANT CHANGE UP
CULTURE RESULTS: SIGNIFICANT CHANGE UP
METHOD TYPE: SIGNIFICANT CHANGE UP
ORGANISM # SPEC MICROSCOPIC CNT: SIGNIFICANT CHANGE UP
ORGANISM # SPEC MICROSCOPIC CNT: SIGNIFICANT CHANGE UP
SPECIMEN SOURCE: SIGNIFICANT CHANGE UP

## 2022-10-07 NOTE — PROCEDURE NOTE - TRACHEAL INTUBATION: NUMBER OF VISUALIZATIONS
LM letting patient that she was scheduled incorrectly with th wrong provider. Asked patient to call [phone] and I'd get her scheduled correctly. Appt was canceled.   1

## 2022-10-10 DIAGNOSIS — N30.00 ACUTE CYSTITIS W/OUT HEMATURIA: ICD-10-CM

## 2022-10-11 ENCOUNTER — APPOINTMENT (OUTPATIENT)
Dept: INTERNAL MEDICINE | Facility: CLINIC | Age: 77
End: 2022-10-11

## 2022-10-11 VITALS
BODY MASS INDEX: 25.9 KG/M2 | DIASTOLIC BLOOD PRESSURE: 70 MMHG | SYSTOLIC BLOOD PRESSURE: 121 MMHG | HEIGHT: 67 IN | TEMPERATURE: 98 F | WEIGHT: 165 LBS | HEART RATE: 68 BPM | OXYGEN SATURATION: 93 %

## 2022-10-11 DIAGNOSIS — D72.829 ELEVATED WHITE BLOOD CELL COUNT, UNSPECIFIED: ICD-10-CM

## 2022-10-11 DIAGNOSIS — N39.0 URINARY TRACT INFECTION, SITE NOT SPECIFIED: ICD-10-CM

## 2022-10-11 PROCEDURE — 99214 OFFICE O/P EST MOD 30 MIN: CPT

## 2022-10-11 NOTE — ASSESSMENT
[Patient Optimized for Surgery] : Patient optimized for surgery [ECG] : ECG [As per surgery] : as per surgery [FreeTextEntry4] : Patient in most optimal medical condition for proposed procedure pending cardiology clearance\par Further recommendations as per his cardiologist\par Patient was advised to take his prednisone and his Bystolic medication on the morning of the procedure\par Patient states EKG will be performed by cardiology

## 2022-10-11 NOTE — PHYSICAL EXAM
[No Acute Distress] : no acute distress [PERRL] : pupils equal round and reactive to light [EOMI] : extraocular movements intact [Normal Oropharynx] : the oropharynx was normal [No Lymphadenopathy] : no lymphadenopathy [Thyroid Normal, No Nodules] : the thyroid was normal and there were no nodules present [No Carotid Bruits] : no carotid bruits [No Abdominal Bruit] : a ~M bruit was not heard ~T in the abdomen [No Edema] : there was no peripheral edema [Normal] : soft, non-tender, non-distended, no masses palpated, no HSM and normal bowel sounds [Normal Supraclavicular Nodes] : no supraclavicular lymphadenopathy [Normal Posterior Cervical Nodes] : no posterior cervical lymphadenopathy [Normal Anterior Cervical Nodes] : no anterior cervical lymphadenopathy [No CVA Tenderness] : no CVA  tenderness [No Rash] : no rash [Coordination Grossly Intact] : coordination grossly intact [No Focal Deficits] : no focal deficits [Normal Affect] : the affect was normal [Normal Insight/Judgement] : insight and judgment were intact

## 2022-10-11 NOTE — HISTORY OF PRESENT ILLNESS
[Aortic Stenosis] : no aortic stenosis [Atrial Fibrillation] : no atrial fibrillation [Coronary Artery Disease] : coronary artery disease [Recent Myocardial Infarction] : no recent myocardial infarction [Implantable Device/Pacemaker] : implantable device/pacemaker [COPD] : COPD [Sleep Apnea] : no sleep apnea [Smoker] : not a smoker [No Adverse Anesthesia Reaction] : no adverse anesthesia reaction in self or family member [Chronic Anticoagulation] : chronic anticoagulation [Chronic Kidney Disease] : no chronic kidney disease [Diabetes] : no diabetes [(Patient denies any chest pain, claudication, dyspnea on exertion, orthopnea, palpitations or syncope)] : Patient denies any chest pain, claudication, dyspnea on exertion, orthopnea, palpitations or syncope [Moderate (4-6 METs)] : Moderate (4-6 METs) [FreeTextEntry1] : Elected umbilical hernia repair [FreeTextEntry2] : 10/24/2022 [FreeTextEntry3] : DR. SHANIQUA GRIFFITHS [FreeTextEntry4] : Patient with history of coronary artery disease, chronic obstructive pulmonary disease, hypertension, and hyperlipidemia returns to office for preoperative evaluation.  Is noted to have an abnormal urine analysis and a positive urine culture for E. coli with some intermittent symptoms and was initiated on Augmentin 875 twice daily by his surgeon.  At present he denies any urinary symptoms, hematuria fever or chills.\par Patient also has a history of paroxysmal atrial fibrillation in the past.\par Patient has an implantable loop recorder and is an ex-smoker [FreeTextEntry7] : ECHO 2021: Left atrial dilatation, no left ventricular hypertrophy ventricular ejection fraction 65%, normal left ventricular diastolic function, positive aortic valve calcification with mild aortic stent as well as mild pulmonic stenosis

## 2022-10-11 NOTE — REVIEW OF SYSTEMS
[Chest Pain] : no chest pain [Palpitations] : no palpitations [Claudication] : no  leg claudication [Lower Ext Edema] : no lower extremity edema [Orthopena] : no orthopnea [Paroxysmal Nocturnal Dyspnea] : no paroxysmal nocturnal dyspnea [Wheezing] : no wheezing [Cough] : no cough [Negative] : Genitourinary

## 2022-10-18 ENCOUNTER — LABORATORY RESULT (OUTPATIENT)
Age: 77
End: 2022-10-18

## 2022-10-21 ENCOUNTER — OUTPATIENT (OUTPATIENT)
Dept: OUTPATIENT SERVICES | Facility: HOSPITAL | Age: 77
LOS: 1 days | End: 2022-10-21
Payer: MEDICARE

## 2022-10-21 DIAGNOSIS — Z20.828 CONTACT WITH AND (SUSPECTED) EXPOSURE TO OTHER VIRAL COMMUNICABLE DISEASES: ICD-10-CM

## 2022-10-21 DIAGNOSIS — Z95.5 PRESENCE OF CORONARY ANGIOPLASTY IMPLANT AND GRAFT: Chronic | ICD-10-CM

## 2022-10-21 DIAGNOSIS — Z90.79 ACQUIRED ABSENCE OF OTHER GENITAL ORGAN(S): Chronic | ICD-10-CM

## 2022-10-21 DIAGNOSIS — D49.1 NEOPLASM OF UNSPECIFIED BEHAVIOR OF RESPIRATORY SYSTEM: Chronic | ICD-10-CM

## 2022-10-21 LAB — SARS-COV-2 RNA SPEC QL NAA+PROBE: SIGNIFICANT CHANGE UP

## 2022-10-21 PROCEDURE — U0003: CPT

## 2022-10-21 PROCEDURE — U0005: CPT

## 2022-10-24 ENCOUNTER — APPOINTMENT (OUTPATIENT)
Dept: INTERNAL MEDICINE | Facility: CLINIC | Age: 77
End: 2022-10-24

## 2022-10-24 ENCOUNTER — APPOINTMENT (OUTPATIENT)
Dept: SURGERY | Facility: HOSPITAL | Age: 77
End: 2022-10-24

## 2022-11-04 ENCOUNTER — OUTPATIENT (OUTPATIENT)
Dept: OUTPATIENT SERVICES | Facility: HOSPITAL | Age: 77
LOS: 1 days | End: 2022-11-04
Payer: MEDICARE

## 2022-11-04 DIAGNOSIS — Z20.828 CONTACT WITH AND (SUSPECTED) EXPOSURE TO OTHER VIRAL COMMUNICABLE DISEASES: ICD-10-CM

## 2022-11-04 DIAGNOSIS — D49.1 NEOPLASM OF UNSPECIFIED BEHAVIOR OF RESPIRATORY SYSTEM: Chronic | ICD-10-CM

## 2022-11-04 DIAGNOSIS — Z95.5 PRESENCE OF CORONARY ANGIOPLASTY IMPLANT AND GRAFT: Chronic | ICD-10-CM

## 2022-11-04 DIAGNOSIS — Z90.79 ACQUIRED ABSENCE OF OTHER GENITAL ORGAN(S): Chronic | ICD-10-CM

## 2022-11-04 LAB — SARS-COV-2 RNA SPEC QL NAA+PROBE: DETECTED

## 2022-11-04 PROCEDURE — U0005: CPT

## 2022-11-04 PROCEDURE — U0003: CPT

## 2022-11-07 ENCOUNTER — APPOINTMENT (OUTPATIENT)
Dept: SURGERY | Facility: HOSPITAL | Age: 77
End: 2022-11-07

## 2022-11-28 ENCOUNTER — OUTPATIENT (OUTPATIENT)
Dept: OUTPATIENT SERVICES | Facility: HOSPITAL | Age: 77
LOS: 1 days | End: 2022-11-28
Payer: MEDICARE

## 2022-11-28 VITALS
SYSTOLIC BLOOD PRESSURE: 164 MMHG | TEMPERATURE: 97 F | OXYGEN SATURATION: 96 % | HEIGHT: 67 IN | DIASTOLIC BLOOD PRESSURE: 78 MMHG | WEIGHT: 164.02 LBS | RESPIRATION RATE: 14 BRPM | HEART RATE: 60 BPM

## 2022-11-28 DIAGNOSIS — Z90.79 ACQUIRED ABSENCE OF OTHER GENITAL ORGAN(S): Chronic | ICD-10-CM

## 2022-11-28 DIAGNOSIS — K42.0 UMBILICAL HERNIA WITH OBSTRUCTION, WITHOUT GANGRENE: ICD-10-CM

## 2022-11-28 DIAGNOSIS — D49.1 NEOPLASM OF UNSPECIFIED BEHAVIOR OF RESPIRATORY SYSTEM: Chronic | ICD-10-CM

## 2022-11-28 DIAGNOSIS — Z01.818 ENCOUNTER FOR OTHER PREPROCEDURAL EXAMINATION: ICD-10-CM

## 2022-11-28 DIAGNOSIS — Z95.5 PRESENCE OF CORONARY ANGIOPLASTY IMPLANT AND GRAFT: Chronic | ICD-10-CM

## 2022-11-28 LAB
ALBUMIN SERPL ELPH-MCNC: 3.8 G/DL — SIGNIFICANT CHANGE UP (ref 3.3–5)
ALP SERPL-CCNC: 74 U/L — SIGNIFICANT CHANGE UP (ref 40–120)
ALT FLD-CCNC: 38 U/L — SIGNIFICANT CHANGE UP (ref 12–78)
ANION GAP SERPL CALC-SCNC: 6 MMOL/L — SIGNIFICANT CHANGE UP (ref 5–17)
APPEARANCE UR: CLEAR — SIGNIFICANT CHANGE UP
AST SERPL-CCNC: 25 U/L — SIGNIFICANT CHANGE UP (ref 15–37)
BILIRUB SERPL-MCNC: 0.5 MG/DL — SIGNIFICANT CHANGE UP (ref 0.2–1.2)
BILIRUB UR-MCNC: NEGATIVE — SIGNIFICANT CHANGE UP
BUN SERPL-MCNC: 12 MG/DL — SIGNIFICANT CHANGE UP (ref 7–23)
CALCIUM SERPL-MCNC: 9.2 MG/DL — SIGNIFICANT CHANGE UP (ref 8.5–10.1)
CHLORIDE SERPL-SCNC: 109 MMOL/L — HIGH (ref 96–108)
CO2 SERPL-SCNC: 32 MMOL/L — HIGH (ref 22–31)
COLOR SPEC: YELLOW — SIGNIFICANT CHANGE UP
CREAT SERPL-MCNC: 0.89 MG/DL — SIGNIFICANT CHANGE UP (ref 0.5–1.3)
DIFF PNL FLD: NEGATIVE — SIGNIFICANT CHANGE UP
EGFR: 89 ML/MIN/1.73M2 — SIGNIFICANT CHANGE UP
GLUCOSE SERPL-MCNC: 75 MG/DL — SIGNIFICANT CHANGE UP (ref 70–99)
GLUCOSE UR QL: NEGATIVE — SIGNIFICANT CHANGE UP
HCT VFR BLD CALC: 49 % — SIGNIFICANT CHANGE UP (ref 39–50)
HGB BLD-MCNC: 16.1 G/DL — SIGNIFICANT CHANGE UP (ref 13–17)
KETONES UR-MCNC: NEGATIVE — SIGNIFICANT CHANGE UP
LEUKOCYTE ESTERASE UR-ACNC: ABNORMAL
MCHC RBC-ENTMCNC: 31.8 PG — SIGNIFICANT CHANGE UP (ref 27–34)
MCHC RBC-ENTMCNC: 32.9 GM/DL — SIGNIFICANT CHANGE UP (ref 32–36)
MCV RBC AUTO: 96.8 FL — SIGNIFICANT CHANGE UP (ref 80–100)
NITRITE UR-MCNC: NEGATIVE — SIGNIFICANT CHANGE UP
NRBC # BLD: 0 /100 WBCS — SIGNIFICANT CHANGE UP (ref 0–0)
PH UR: 6.5 — SIGNIFICANT CHANGE UP (ref 5–8)
PLATELET # BLD AUTO: 194 K/UL — SIGNIFICANT CHANGE UP (ref 150–400)
POTASSIUM SERPL-MCNC: 4.5 MMOL/L — SIGNIFICANT CHANGE UP (ref 3.5–5.3)
POTASSIUM SERPL-SCNC: 4.5 MMOL/L — SIGNIFICANT CHANGE UP (ref 3.5–5.3)
PROT SERPL-MCNC: 7 G/DL — SIGNIFICANT CHANGE UP (ref 6–8.3)
PROT UR-MCNC: NEGATIVE — SIGNIFICANT CHANGE UP
RBC # BLD: 5.06 M/UL — SIGNIFICANT CHANGE UP (ref 4.2–5.8)
RBC # FLD: 14.5 % — SIGNIFICANT CHANGE UP (ref 10.3–14.5)
SODIUM SERPL-SCNC: 147 MMOL/L — HIGH (ref 135–145)
SP GR SPEC: 1.01 — SIGNIFICANT CHANGE UP (ref 1.01–1.02)
UROBILINOGEN FLD QL: NEGATIVE — SIGNIFICANT CHANGE UP
WBC # BLD: 11.74 K/UL — HIGH (ref 3.8–10.5)
WBC # FLD AUTO: 11.74 K/UL — HIGH (ref 3.8–10.5)

## 2022-11-28 PROCEDURE — 36415 COLL VENOUS BLD VENIPUNCTURE: CPT

## 2022-11-28 PROCEDURE — 87086 URINE CULTURE/COLONY COUNT: CPT

## 2022-11-28 PROCEDURE — 81001 URINALYSIS AUTO W/SCOPE: CPT

## 2022-11-28 PROCEDURE — G0463: CPT

## 2022-11-28 PROCEDURE — 85027 COMPLETE CBC AUTOMATED: CPT

## 2022-11-28 PROCEDURE — 80053 COMPREHEN METABOLIC PANEL: CPT

## 2022-11-28 RX ORDER — APIXABAN 2.5 MG/1
1 TABLET, FILM COATED ORAL
Qty: 0 | Refills: 0 | DISCHARGE

## 2022-11-28 RX ORDER — CHOLECALCIFEROL (VITAMIN D3) 125 MCG
1 CAPSULE ORAL
Qty: 0 | Refills: 0 | DISCHARGE

## 2022-11-28 RX ORDER — PYRIDOXINE HCL (VITAMIN B6) 100 MG
1 TABLET ORAL
Qty: 0 | Refills: 0 | DISCHARGE

## 2022-11-28 RX ORDER — IPRATROPIUM/ALBUTEROL SULFATE 18-103MCG
0 AEROSOL WITH ADAPTER (GRAM) INHALATION
Qty: 0 | Refills: 0 | DISCHARGE

## 2022-11-28 RX ORDER — PREGABALIN 225 MG/1
1 CAPSULE ORAL
Qty: 0 | Refills: 0 | DISCHARGE

## 2022-11-28 NOTE — H&P PST ADULT - FUNCTIONAL ASSESSMENT - DAILY ACTIVITY 6.
4 = No assist / stand by assistance Odomzo Counseling- I discussed with the patient the risks of Odomzo including but not limited to nausea, vomiting, diarrhea, constipation, weight loss, changes in the sense of taste, decreased appetite, muscle spasms, and hair loss.  The patient verbalized understanding of the proper use and possible adverse effects of Odomzo.  All of the patient's questions and concerns were addressed.

## 2022-11-28 NOTE — H&P PST ADULT - FALL HARM RISK - UNIVERSAL INTERVENTIONS
Bed in lowest position, wheels locked, appropriate side rails in place/Call bell, personal items and telephone in reach/Instruct patient to call for assistance before getting out of bed or chair/Non-slip footwear when patient is out of bed/[place] to call system/Physically safe environment - no spills, clutter or unnecessary equipment/Purposeful Proactive Rounding/Room/bathroom lighting operational, light cord in reach

## 2022-11-28 NOTE — H&P PST ADULT - NSICDXPASTMEDICALHX_GEN_ALL_CORE_FT
PAST MEDICAL HISTORY:  2019 novel coronavirus disease (COVID-19) DX 11/4/2022    Asthma     Chronic obstructive pulmonary disease Asthma/copd    GERD (gastroesophageal reflux disease)     GI bleed while on Antiplatelets    HLD (hyperlipidemia)     HTN (hypertension)     Nephrolithiasis s/p Lithotripsy    Stented coronary artery 2000, 2006 and 2016 total 5 stents JESSE    Umbilical hernia with obstruction, without gangrene

## 2022-11-28 NOTE — H&P PST ADULT - NSANTHBMIRD_ENT_A_CORE
Group Therapy Note    Date: 10/21/2021  Start Time: 1000  End Time:  1100  Number of Participants: 4    Type of Group: Cognitive skills group      Notes: Pt is present for group. Pt participated in a discussion related to personal relationships and the nature of change. Pt reflected on his marriage to his spouse and his relationship with his son. Status After Intervention:  Improved    Participation Level:  Active Listener and Interactive    Participation Quality: Appropriate, Attentive, Sharing and Supportive      Speech:  normal      Thought Process/Content: Logical  Linear      Affective Functioning: Congruent      Mood: Euthymic    Level of consciousness:  Alert, Oriented x4 and Attentive      Response to Learning: Able to verbalize current knowledge/experience, Able to verbalize/acknowledge new learning, Able to retain information, Capable of insight, Able to change behavior and Progressing to goal      Endings: None Reported    Modes of Intervention: Education, Support, Socialization, Exploration, Clarifying and Problem-solving      Discipline Responsible: /Counselor      Signature:  TING Wright No

## 2022-11-28 NOTE — H&P PST ADULT - LAST ECHOCARDIOGRAM
2021- Notes he is scheduled for an ECHO Dr Diaz on 12/7/2022 then will see Dr Diaz 2021- Notes he is scheduled for an ECHO Dr Diaz on 12/7/2022 then will see Dr Diaz (Prior ECHO 6/24/2021 LVEF 65% Copy on chart

## 2022-11-28 NOTE — H&P PST ADULT - HISTORY OF PRESENT ILLNESS
78 y/o male, PMH of HTN, COPD / Asthma , GERD, Afib/ 5 stents last one  in 2016, with c/o of umbilical bulging for almost a year. Was seen by Dr Angel, Scheduled for incarcerated umbilical  hernia repair on 10/24/22. Pre op testing today.    ADDENDUM 11/28/2022: Pt notes he was originally scheduled for surgery on 11/7/2022. Pt notes he was initially treated for positive urine culture then he tested positive for COVID on 11/4/2022 therefore procedure was postponed. Pt is now scheduled for procedure with Dr Angel on 12/16/2022. With hernia, pt  denies any changes in bowel or bladder habits. Following consult and follow up with Dr Angel pt is electing for scheduled procedure.

## 2022-11-28 NOTE — H&P PST ADULT - PROBLEM SELECTOR PLAN 1
PST labs; CBC,  CMP, U/A, Urine Culture (EKG on Chart from  7/20/2022). Pt scheduled for ECHO and appointment with Dr Diaz on 12/7/2022. Pt will schedule appointment with Dr Laurent for medical clearance. Pt instructed to stop any NSAIDS/herbal Supplements between now and procedure. May take Tylenol if needed for any pain between now and procedure. pt will STOP his Eliquis 2 days prior to procedure as per Dr Diaz and he will start Baby ASA secondary to his cardiac stents.  Morning of procedure he will take his Pantoprazole, Bystolic, Baby ASA with small sip of water morning of procedure. He will also use his Spiriva and Symbicort morning of procedure. Pre-op instructions as well as pre-op wash instructions discussed with pt who verbalizes understanding. All questions addressed with pt prior to leaving the PST department today. PST labs; CBC,  CMP, U/A, Urine Culture (EKG on Chart from  7/20/2022). Pt scheduled for ECHO and appointment with Dr Diaz on 12/7/2022. Pt will schedule appointment with Dr Laurent for medical clearance. Pt instructed to stop any NSAIDS/Herbal Supplements between now and procedure. May take Tylenol if needed for any pain between now and procedure. Pt will STOP his Eliquis 2 days prior to procedure as per Dr Diaz and he will start Baby ASA secondary to his cardiac stents.  Morning of procedure he will take his Pantoprazole, Bystolic, Baby ASA with small sip of water morning of procedure. He will also use his Spiriva and Symbicort morning of procedure. Pre-op instructions as well as pre-op wash instructions discussed with pt who verbalizes understanding. All questions addressed with pt prior to leaving the PST department today.

## 2022-11-28 NOTE — H&P PST ADULT - ACTIVITY
well tolerated - Denies any current exercise routine - pt notes he does walk the beach in the winter daily while down in [facility]

## 2022-11-29 LAB
CULTURE RESULTS: SIGNIFICANT CHANGE UP
SPECIMEN SOURCE: SIGNIFICANT CHANGE UP

## 2022-12-01 PROBLEM — U07.1 COVID-19: Chronic | Status: ACTIVE | Noted: 2022-11-28

## 2022-12-01 PROBLEM — Z86.79 PERSONAL HISTORY OF OTHER DISEASES OF THE CIRCULATORY SYSTEM: Chronic | Status: ACTIVE | Noted: 2022-11-28

## 2022-12-01 PROBLEM — K42.0 UMBILICAL HERNIA WITH OBSTRUCTION, WITHOUT GANGRENE: Chronic | Status: ACTIVE | Noted: 2022-11-28

## 2022-12-09 ENCOUNTER — APPOINTMENT (OUTPATIENT)
Dept: INTERNAL MEDICINE | Facility: CLINIC | Age: 77
End: 2022-12-09

## 2022-12-09 VITALS
DIASTOLIC BLOOD PRESSURE: 74 MMHG | HEART RATE: 72 BPM | WEIGHT: 164 LBS | SYSTOLIC BLOOD PRESSURE: 135 MMHG | BODY MASS INDEX: 25.74 KG/M2 | HEIGHT: 67 IN | OXYGEN SATURATION: 93 %

## 2022-12-09 PROCEDURE — 99214 OFFICE O/P EST MOD 30 MIN: CPT | Mod: 25

## 2022-12-16 ENCOUNTER — APPOINTMENT (OUTPATIENT)
Dept: SURGERY | Facility: HOSPITAL | Age: 77
End: 2022-12-16

## 2023-01-12 NOTE — CONSULT NOTE ADULT - ASSESSMENT
Guillermo Roblero   70 year old/ 1952      Last Recorded Vitals  Blood pressure 135/83, pulse 80, temperature 97.6 °F (36.4 °C), temperature source Oral, resp. rate 16, height 6' 2\" (1.88 m), weight 93 kg (205 lb), SpO2 97 %.  Body mass index is 26.32 kg/m².      HPI:  70-year-old male with history of hypertension, hyperlipidemia, myasthenia gravis, gout, and hypothyroidism presented to ED for evaluation of chest discomfort or shortness of breath.  2 days ago started patient started having nasal congestion, runny nose , and productive cough.  Patient also noted midsternal intermittent discomfort with belching.  Seen at [facility] urgent care and placed on Augmentin for sinusitis as well as recommend over-the-counter omeprazole for possible heartburn.  Patient state upper respiratory symptom has improved but is still having the chest discomfort especially today has some shortness of breath episode while putting away boxes.   Also noted chest tightness sensation if he leans forward.  Patient state his mid chest discomfort is not relieved with omeprazole.  Seen in urgent care referred to ED for additional work-up.  Denies any headache or dizziness.  Denies chest pain.  Denies abdominal pain or diarrhea.      REVIEW OF SYSTEMS:   Constitutional:  No fever   Skin:  No rash.    Eyes:  No recent vision problems    Respiratory: Exertional shortness of breath  Cardiac:  No palpatation   Gastrointestinal:  No abdominal pain   Musculoskeletal:  No joint swelling.    Neurologic:  No headache.    Hematologic:  No unusual bruising or bleeding.    Otherwise 10 point review of systems reviewed and otherwise negative.      PAST MEDICAL HISTORY:  Hypertension  Hyperlipidemia  Myasthenia gravis  Hypothyroidism  Gout       PAST SURGICAL HISTORY:  Lithotripsy  Colonoscopy  Arthroscopy of the knee      SOCIAL HISTORY:   The patient has no history of tobacco use.        PHYSICAL EXAMINATION:   VITAL SIGNS:  Reviewed.  GENERAL:  In no  apparent distress.    HEAD:  Normocephalic and atraumatic   NECK:  No adenopathy, supple.    CHEST:  Chest with clear breath sounds bilaterally.    CARDIAC:  Regular rate and rhythm.    VASCULAR:  No Edema.  Peripheral pulses normal and equal in all extremities.  ABDOMEN:  Soft, without detectable tenderness.  No sign of distention.   MUSCULOSKELETAL: Good range of motion of all major joints. Extremities without clubbing, cyanosis or edema.    NEUROLOGIC EXAM:  Alert and oriented x 3.  No focal sensory or strength deficits.   Speech normal.  Follows commands. GCS 15  PSYCHIATRIC:  Mood normal.  SKIN:  No rash or lesions.          Differential Diagnosis: Atypical chest pain, GERD, MI    ED COURSE:      Patient presented with 2 weeks of mid chest discomfort worse upon leaning forward and shortness of breath today.    The pt's diagnostic evaluation including laboratory data, EKG, and x-ray were unremarkable.   I do not believe this is a vascular catastrophe such as a dissection or an acute rupture of an AAA. This is a new symptom.  Patient still symptomatic in ED.  Case discussed with cardiologist Dr. Healy agree with admission.  Case discussed with Christiano for BEST admission    Old Records Reviewed: Echo 6/24/2017    LV size is normal. LV global systolic function is normal. The  estimated LV ejection fraction is 60-65%. Normal wall motion; no LV  regional wall motion abnormalities.    Case discussed with:Cardiologist Dr. Healy for admission          The patient was put on a cardiac monitor and an IV was established. An EKG was done    HEART Score for Major Cardiac Events:   History: Moderately suspicious   EKG Normal   AGE Equal or greater than 65   RISK FACTOR 1-2 risk factors   TROPONIN: Equal or less than normal limit   TOTAL SCORE 4        Case endorsed to Dr. Dey      Past Medical History:   Diagnosis Date   • Agatston CAC score [phone]   • Arthritis     knee   • Basal cell carcinoma of skin    • BPH  with obstruction/lower urinary tract symptoms    • Calculus of kidney with calculus of ureter    • High cholesterol    • History of continuous positive airway pressure (CPAP) therapy     pt had surgery and no longer using   • History of left knee replacement 10/09/2019   • Hx of cardiovascular stress test 09/03/2019    stress echo normal   • Myasthenia gravis (CMS/HCC) 2010    was Mestinon and now in remission   • Sinusitis, chronic    • Sleep apnea     no cpap after undergoing ENT surgery in 1995 which resulted in resolution of sleep apnea   • Thyroid condition       Past Surgical History:   Procedure Laterality Date   • Colonoscopy     • Kidney stone surgery  2021   • Knee arthroscopy w/ meniscal repair Right    • Nasal sinus surgery  1994   • Total knee arthroplasty Left     2019   • Transurethral elec-surg prostatectom      2008      Social History     Socioeconomic History   • Marital status: /Civil Union     Spouse name: Not on file   • Number of children: Not on file   • Years of education: Not on file   • Highest education level: Not on file   Occupational History   • Not on file   Tobacco Use   • Smoking status: Never   • Smokeless tobacco: Never   Vaping Use   • Vaping Use: never used   Substance and Sexual Activity   • Alcohol use: Yes     Alcohol/week: 2.0 standard drinks     Types: 2 Cans of beer per week     Comment: social   • Drug use: Never   • Sexual activity: Not on file   Other Topics Concern   • Not on file   Social History Narrative    Lives in [place]     43 years    Retired in 2005.    [employer] retired in 2013.     3 kids, 41 (Enmanuel in [place] 2 kids), 40 ([place], single), and 36 year old (Sherrie Daughter with 3 year old daughter).    4 grandkids.     Social Determinants of Health     Financial Resource Strain: Not on file   Food Insecurity: No Food Insecurity   • Social Determinants: Food Insecurity: Never   Transportation Needs: Not on file   Physical Activity: Not on file   Stress: Low  Risk    • Social Determinants: Stress: Not at all   Social Connections: Socially Integrated   • Social Determinants: Social Connections: 3 to 5 times a week   Intimate Partner Violence: Not At Risk   • Social Determinants: Intimate Partner Violence Past Fear: No   • Social Determinants: Intimate Partner Violence Current Fear: No      Family History   Problem Relation Age of Onset   • Dementia/Alzheimers Mother    • Myocardial Infarction Father           Labs   Results for orders placed or performed during the hospital encounter of 01/11/23   Prothrombin Time   Result Value    Prothrombin Time 10.5    INR 1.0     Comment: INR Therapeutic Range: 2.0 to 3.0 (2.5 to 3.5 recommended for recurrent thrombotic episodes and mechanical prosthetic heart valves.)   Partial Thromboplastin Time   Result Value    PTT 34 (H)   Comprehensive Metabolic Panel   Result Value    Fasting Status     Sodium 140    Potassium 3.9    Chloride 103    Carbon Dioxide 31    Anion Gap 10    Glucose 89    BUN 20    Creatinine 1.01    Glomerular Filtration Rate 81     Comment: eGFR results = or >60 mL/min/1.73m2 = Normal kidney function. Estimated GFR calculated using the CKD-EPI-R (2021) equation that does not include race in the creatinine calculation.    BUN/ Creatinine Ratio 20    Calcium 9.1    Bilirubin, Total 0.7    GOT/AST 35    GPT/ALT 34    Alkaline Phosphatase 80    Albumin 4.3    Protein, Total 7.8    Globulin 3.5    A/G Ratio 1.2   Magnesium   Result Value    Magnesium 2.3   TROPONIN I, HIGH SENSITIVITY   Result Value    Troponin I, High Sensitivity 10   CBC with Automated Differential (performable only)   Result Value    WBC 6.9    RBC 5.37    HGB 16.1    HCT 47.5    MCV 88.5    MCH 30.0    MCHC 33.9    RDW-CV 13.2    RDW-SD 42.6        NRBC 0    Neutrophil, Percent 60    Lymphocytes, Percent 27    Mono, Percent 9    Eosinophils, Percent 3    Basophils, Percent 1    Immature Granulocytes 0    Absolute Neutrophils 4.1    Absolute  73 YO M with PMHX asthma, COPD, former smoker, hx of benign lung cancer R lobe surgically [facility]) CAD (s/p 5 stents ~2001), GERD, HLD, HTN presents after shakes, vomiting, confusion following prostate biopsy 3 days prior to admission and now with E coli bacteremia.    Pt improving and ready to be downgraded per ICU staff then had bronchospasm event and is now intubated. Lymphocytes 1.9    Absolute Monocytes 0.6    Absolute Eosinophils  0.2    Absolute Basophils 0.0    Absolute Immmature Granulocytes 0.0   COVID/Flu/RSV panel   Result Value    Rapid SARS-COV-2 by PCR Not Detected    Influenza A by PCR Not Detected    Influenza B by PCR Not Detected    RSV BY PCR Not Detected    Isolation Guidelines      Comment: Do not use this test result as the sole decision-maker for discontinuation of isolation.   Clinical evaluation should be considered for other respiratory illness requiring transmission-based isolation.    -    No fever (<99.0 F/37.2 C) for at least 24 hours without the use of fever-reducing medications    AND  -    Respiratory symptoms have improved or resolved (e.g. cough, shortness of breath)     AND  -    COVID-19 negative test    See COVID-19 Deisolation Resource Guide    Procedural Comment      Comment: SARS-COV-2 nucleic acid has not been detected indicating the absence of COVID-19.    This test was performed using the [employer] Xpert Xpress SARS-CoV-2/Flu/RSV RT-PCR test that has been given Emergency Use Authorization (EUA) by the [place] Food and Drug Administration (FDA).  These tests are considered definitive and do not need to be confirmed by another method.          Imaging  XR CHEST PA OR AP 1 VIEW   Final Result               1. Chest pain, unspecified type             ED Course as of 01/11/23 2134 Wed Jan 11, 2023 2000 EKG Interpretation  Time: 18:50  Rate: 63 bpm.  Rhythm: Normal sinus rhythm.   Comment:  ms. QRS 84 ms.  ms. QTc 407 ms.   Possible left atrial enlargement  Abnormal EKG    [JH]      ED Course User Index  [JH] Amaury Pena PA-C            I spent face time with patient on review of the chart I agree with the findings of the TERESITA and disposition       Amaury Pena PA-C  01/11/23 2134       Jarocho Dey MD  01/12/23 1917

## 2023-01-26 NOTE — DISCHARGE NOTE NURSING/CASE MANAGEMENT/SOCIAL WORK - NURSING SECTION COMPLETE
Patient/Caregiver provided printed discharge information. ------------ATTENDING NOTE------------  pt c/o several days of nausea, small amts nbnb vomiting, decreased PO, nasal congestion, slight unproductive cough, mild crampy burning epigastric discomfort w/ vomiting, subjective fevers, c/w viral process, clear chest w/o distress, nml cardiac exam, equal distal pulses, soft benign abd w/o organomegaly, no rash, awaiting labs/imaging and close reassessments -->  - Aayush Box MD   --------------------------------------------- ------------ATTENDING NOTE------------  pt c/o several days of nausea, small amts nbnb vomiting, decreased PO, nasal congestion, slight unproductive cough, mild crampy burning epigastric discomfort w/ vomiting, subjective fevers, c/w viral process, clear chest w/o distress, nml cardiac exam, equal distal pulses, soft benign abd w/o organomegaly, no rash, awaiting labs/imaging and close reassessments --> labs w/ alarming electrolyte abnmls/HANNAH c/w vomiting/decreased PO, improving w/ IVF, correcting lytes, d/w medicine for planned admission for continued babcock, optimize medical mgmt, advance diet, outpt needs assessments -->  - Aayush Box MD   ---------------------------------------------

## 2023-02-10 NOTE — ED PROVIDER NOTE - PROGRESS NOTE DETAILS
Assistance was available
Dw Dr LAURI Lopez, will see pt to admit - agree with cef / zithro added to Zosyn due to CAP guidelines.

## 2023-08-14 NOTE — H&P PST ADULT - HEIGHT IN CM
170.18 Pinch Graft Text: The defect edges were debeveled with a #15 scalpel blade. Given the location of the defect, shape of the defect and the proximity to free margins a pinch graft was deemed most appropriate. Using a sterile surgical marker, the primary defect shape was transferred to the donor site. The area thus outlined was incised deep to adipose tissue with a #15 scalpel blade.  The harvested graft was then trimmed of adipose tissue until only dermis and epidermis was left. The skin margins of the secondary defect were undermined to an appropriate distance in all directions utilizing iris scissors.  The secondary defect was closed with interrupted buried subcutaneous sutures.  The skin edges were then re-apposed with running  sutures.  The skin graft was then placed in the primary defect and oriented appropriately.

## 2023-08-31 ENCOUNTER — APPOINTMENT (OUTPATIENT)
Dept: SURGERY | Facility: CLINIC | Age: 78
End: 2023-08-31
Payer: MEDICARE

## 2023-08-31 PROCEDURE — 99214 OFFICE O/P EST MOD 30 MIN: CPT | Mod: 57

## 2023-08-31 RX ORDER — CLOPIDOGREL 75 MG/1
75 TABLET, FILM COATED ORAL
Refills: 0 | Status: ACTIVE | COMMUNITY

## 2023-08-31 RX ORDER — ASPIRIN 325 MG/1
TABLET, FILM COATED ORAL
Refills: 0 | Status: ACTIVE | COMMUNITY

## 2023-08-31 RX ORDER — APIXABAN 5 MG/1
5 TABLET, FILM COATED ORAL
Qty: 60 | Refills: 2 | Status: DISCONTINUED | COMMUNITY
Start: 2020-12-04 | End: 2023-08-31

## 2023-08-31 NOTE — HISTORY OF PRESENT ILLNESS
[de-identified] : incarcerated umbilical hernia [de-identified] : The patient is here to discuss surgical repair of his incarcerated umbilical hernia.  Patient denies pain, fever, chills, nausea, vomiting or change in bowel habits. CT Ab/Pelvis 06/21/22: Small fat-containing umbilical hernia, no obstruction.  Multiple low-attenuation liver lesions, stable.  * Patient s/p CV stenting 02/2023, while in [place].  Patient states he no longer takes Eliquis.  Patient is presently on Plavix and ASA 81 mg as Anti-thrombotic agents, as per Dr. Diaz (CV).

## 2023-08-31 NOTE — PHYSICAL EXAM
[Normal Breath Sounds] : Normal breath sounds [Normal Heart Sounds] : normal heart sounds [Normal Rate and Rhythm] : normal rate and rhythm [Alert] : alert [Oriented to Person] : oriented to person [Oriented to Place] : oriented to place [Oriented to Time] : oriented to time [Calm] : calm [de-identified] : well-developed male in NAD [de-identified] : Normal Bowel Sounds, non-distended, non-tender, palpable small incarcerated umbilical hernia [de-identified] : no adenopathy [de-identified] : No palpable scrotal masses or inguinal hernia bilateral [de-identified] : No CVAT, no le swelling

## 2023-08-31 NOTE — PLAN
[FreeTextEntry1] : -Discuss risks, benefits and surgical options.  All patient questions answered. -Patient would like to schedule Open Repair of Incarcerated Umbilical Hernia. -Discuss with patient the need for CV clearance pre-op.  In addition, review importance of remaining on either Plavix or ASA lee-operatively, decision as per CV.   -Patient will see Dr. Diaz () [phone] for follow-up and for Surgical Clearance.

## 2023-09-06 ENCOUNTER — OUTPATIENT (OUTPATIENT)
Dept: OUTPATIENT SERVICES | Facility: HOSPITAL | Age: 78
LOS: 1 days | End: 2023-09-06
Payer: MEDICARE

## 2023-09-06 VITALS
HEIGHT: 66 IN | SYSTOLIC BLOOD PRESSURE: 130 MMHG | TEMPERATURE: 97 F | DIASTOLIC BLOOD PRESSURE: 72 MMHG | OXYGEN SATURATION: 97 % | WEIGHT: 166.01 LBS | HEART RATE: 62 BPM | RESPIRATION RATE: 16 BRPM

## 2023-09-06 DIAGNOSIS — Z01.818 ENCOUNTER FOR OTHER PREPROCEDURAL EXAMINATION: ICD-10-CM

## 2023-09-06 DIAGNOSIS — K42.0 UMBILICAL HERNIA WITH OBSTRUCTION, WITHOUT GANGRENE: ICD-10-CM

## 2023-09-06 DIAGNOSIS — Z90.79 ACQUIRED ABSENCE OF OTHER GENITAL ORGAN(S): Chronic | ICD-10-CM

## 2023-09-06 DIAGNOSIS — Z95.5 PRESENCE OF CORONARY ANGIOPLASTY IMPLANT AND GRAFT: Chronic | ICD-10-CM

## 2023-09-06 DIAGNOSIS — Z95.5 PRESENCE OF CORONARY ANGIOPLASTY IMPLANT AND GRAFT: ICD-10-CM

## 2023-09-06 DIAGNOSIS — D49.1 NEOPLASM OF UNSPECIFIED BEHAVIOR OF RESPIRATORY SYSTEM: Chronic | ICD-10-CM

## 2023-09-06 LAB
ALBUMIN SERPL ELPH-MCNC: 3.9 G/DL — SIGNIFICANT CHANGE UP (ref 3.3–5)
ALP SERPL-CCNC: 83 U/L — SIGNIFICANT CHANGE UP (ref 40–120)
ALT FLD-CCNC: 62 U/L — SIGNIFICANT CHANGE UP (ref 12–78)
ANION GAP SERPL CALC-SCNC: 3 MMOL/L — LOW (ref 5–17)
APPEARANCE UR: CLEAR — SIGNIFICANT CHANGE UP
AST SERPL-CCNC: 37 U/L — SIGNIFICANT CHANGE UP (ref 15–37)
BILIRUB SERPL-MCNC: 0.3 MG/DL — SIGNIFICANT CHANGE UP (ref 0.2–1.2)
BILIRUB UR-MCNC: NEGATIVE — SIGNIFICANT CHANGE UP
BUN SERPL-MCNC: 13 MG/DL — SIGNIFICANT CHANGE UP (ref 7–23)
CALCIUM SERPL-MCNC: 9.3 MG/DL — SIGNIFICANT CHANGE UP (ref 8.5–10.1)
CHLORIDE SERPL-SCNC: 106 MMOL/L — SIGNIFICANT CHANGE UP (ref 96–108)
CO2 SERPL-SCNC: 31 MMOL/L — SIGNIFICANT CHANGE UP (ref 22–31)
COLOR SPEC: YELLOW — SIGNIFICANT CHANGE UP
CREAT SERPL-MCNC: 0.9 MG/DL — SIGNIFICANT CHANGE UP (ref 0.5–1.3)
DIFF PNL FLD: NEGATIVE — SIGNIFICANT CHANGE UP
EGFR: 89 ML/MIN/1.73M2 — SIGNIFICANT CHANGE UP
GLUCOSE SERPL-MCNC: 116 MG/DL — HIGH (ref 70–99)
GLUCOSE UR QL: NEGATIVE MG/DL — SIGNIFICANT CHANGE UP
HCT VFR BLD CALC: 47.7 % — SIGNIFICANT CHANGE UP (ref 39–50)
HGB BLD-MCNC: 15.6 G/DL — SIGNIFICANT CHANGE UP (ref 13–17)
KETONES UR-MCNC: NEGATIVE MG/DL — SIGNIFICANT CHANGE UP
LEUKOCYTE ESTERASE UR-ACNC: NEGATIVE — SIGNIFICANT CHANGE UP
MCHC RBC-ENTMCNC: 31.6 PG — SIGNIFICANT CHANGE UP (ref 27–34)
MCHC RBC-ENTMCNC: 32.7 GM/DL — SIGNIFICANT CHANGE UP (ref 32–36)
MCV RBC AUTO: 96.6 FL — SIGNIFICANT CHANGE UP (ref 80–100)
NITRITE UR-MCNC: NEGATIVE — SIGNIFICANT CHANGE UP
NRBC # BLD: 0 /100 WBCS — SIGNIFICANT CHANGE UP (ref 0–0)
PH UR: 7.5 — SIGNIFICANT CHANGE UP (ref 5–8)
PLATELET # BLD AUTO: 177 K/UL — SIGNIFICANT CHANGE UP (ref 150–400)
POTASSIUM SERPL-MCNC: 5 MMOL/L — SIGNIFICANT CHANGE UP (ref 3.5–5.3)
POTASSIUM SERPL-SCNC: 5 MMOL/L — SIGNIFICANT CHANGE UP (ref 3.5–5.3)
PROT SERPL-MCNC: 7.1 G/DL — SIGNIFICANT CHANGE UP (ref 6–8.3)
PROT UR-MCNC: NEGATIVE MG/DL — SIGNIFICANT CHANGE UP
RBC # BLD: 4.94 M/UL — SIGNIFICANT CHANGE UP (ref 4.2–5.8)
RBC # FLD: 14.4 % — SIGNIFICANT CHANGE UP (ref 10.3–14.5)
SODIUM SERPL-SCNC: 140 MMOL/L — SIGNIFICANT CHANGE UP (ref 135–145)
SP GR SPEC: 1 — LOW (ref 1–1.03)
UROBILINOGEN FLD QL: 0.2 MG/DL — SIGNIFICANT CHANGE UP (ref 0.2–1)
WBC # BLD: 9.75 K/UL — SIGNIFICANT CHANGE UP (ref 3.8–10.5)
WBC # FLD AUTO: 9.75 K/UL — SIGNIFICANT CHANGE UP (ref 3.8–10.5)

## 2023-09-06 PROCEDURE — 87086 URINE CULTURE/COLONY COUNT: CPT

## 2023-09-06 PROCEDURE — 93005 ELECTROCARDIOGRAM TRACING: CPT

## 2023-09-06 PROCEDURE — 93010 ELECTROCARDIOGRAM REPORT: CPT

## 2023-09-06 PROCEDURE — 80053 COMPREHEN METABOLIC PANEL: CPT

## 2023-09-06 PROCEDURE — 81003 URINALYSIS AUTO W/O SCOPE: CPT

## 2023-09-06 PROCEDURE — G0463: CPT

## 2023-09-06 PROCEDURE — 85027 COMPLETE CBC AUTOMATED: CPT

## 2023-09-06 PROCEDURE — 36415 COLL VENOUS BLD VENIPUNCTURE: CPT

## 2023-09-06 RX ORDER — ALBUTEROL 90 UG/1
2 AEROSOL, METERED ORAL
Qty: 0 | Refills: 0 | DISCHARGE

## 2023-09-06 RX ORDER — BUDESONIDE AND FORMOTEROL FUMARATE DIHYDRATE 160; 4.5 UG/1; UG/1
2 AEROSOL RESPIRATORY (INHALATION)
Qty: 0 | Refills: 0 | DISCHARGE

## 2023-09-06 RX ORDER — PANTOPRAZOLE SODIUM 20 MG/1
1 TABLET, DELAYED RELEASE ORAL
Qty: 0 | Refills: 0 | DISCHARGE

## 2023-09-06 RX ORDER — CHOLECALCIFEROL (VITAMIN D3) 125 MCG
1 CAPSULE ORAL
Qty: 0 | Refills: 0 | DISCHARGE

## 2023-09-06 RX ORDER — BUDESONIDE, MICRONIZED 100 %
2 POWDER (GRAM) MISCELLANEOUS
Qty: 0 | Refills: 0 | DISCHARGE

## 2023-09-06 RX ORDER — PREGABALIN 225 MG/1
1 CAPSULE ORAL
Qty: 0 | Refills: 0 | DISCHARGE

## 2023-09-06 RX ORDER — TIOTROPIUM BROMIDE 18 UG/1
1 CAPSULE ORAL; RESPIRATORY (INHALATION)
Qty: 0 | Refills: 0 | DISCHARGE

## 2023-09-06 RX ORDER — APIXABAN 2.5 MG/1
1 TABLET, FILM COATED ORAL
Qty: 0 | Refills: 0 | DISCHARGE

## 2023-09-06 RX ORDER — PYRIDOXINE HCL (VITAMIN B6) 100 MG
1 TABLET ORAL
Qty: 0 | Refills: 0 | DISCHARGE

## 2023-09-06 RX ORDER — NEBIVOLOL HYDROCHLORIDE 5 MG/1
1 TABLET ORAL
Qty: 0 | Refills: 0 | DISCHARGE

## 2023-09-06 RX ORDER — IPRATROPIUM/ALBUTEROL SULFATE 18-103MCG
0 AEROSOL WITH ADAPTER (GRAM) INHALATION
Qty: 0 | Refills: 0 | DISCHARGE

## 2023-09-06 NOTE — H&P PST ADULT - PROBLEM SELECTOR PLAN 2
Labs - CBC, CMP and EKG  MC with Dr. Laurent   Pre op and Hibiclens instructions reviewed and given. Take routine am Bystolic, ASA, Esomeprazole, Prednisone abd Nitrofurantoin, am of surgery with sip of water. Use inhalers. Instructed to hold and/or avoid other NSAIDs and OTC supplements. Tylenol is ok. Verbalized understanding Labs - CBC, CMP and EKG  MC with Dr. Laurent   Pre op and Hibiclens instructions reviewed and given. Take routine am Bystolic, ASA, Esomeprazole, Prednisone and Nitrofurantoin, am of surgery with sip of water. Use inhalers. Instructed to hold and/or avoid other NSAIDs and OTC supplements. Tylenol is ok. Verbalized understanding

## 2023-09-06 NOTE — H&P PST ADULT - NSICDXPASTMEDICALHX_GEN_ALL_CORE_FT
PAST MEDICAL HISTORY:  2019 novel coronavirus disease (COVID-19) DX 11/4/2022    Asthma     Chronic obstructive pulmonary disease Asthma/copd    GERD (gastroesophageal reflux disease)     GI bleed while on Antiplatelets    HLD (hyperlipidemia)     HTN (hypertension)     Nephrolithiasis s/p Lithotripsy    Stented coronary artery 2000, 2006 and 2016 total 5 stents JESSE    Umbilical hernia with obstruction, without gangrene      PAST MEDICAL HISTORY:  2019 novel coronavirus disease (COVID-19) DX 11/4/2022    Asthma     Chronic obstructive pulmonary disease Asthma/copd    GERD (gastroesophageal reflux disease)     GI bleed while on Antiplatelets    History of atrial fibrillation     HLD (hyperlipidemia)     HTN (hypertension)     Implantable loop recorder present     Nephrolithiasis s/p Lithotripsy    Stented coronary artery 2000, 2006 and 2016 total 5 stents JESSE    Umbilical hernia with obstruction, without gangrene      PAST MEDICAL HISTORY:  2019 novel coronavirus disease (COVID-19) DX 11/4/2022    Asthma     Chronic obstructive pulmonary disease Asthma/copd    GERD (gastroesophageal reflux disease)     GI bleed while on Antiplatelets    History of atrial fibrillation     HLD (hyperlipidemia)     HTN (hypertension)     Implantable loop recorder present     Nephrolithiasis s/p Lithotripsy    Stented coronary artery 2000, 2006 , 2016 and April 2023, total of 6 JESSE stents    Umbilical hernia with obstruction, without gangrene

## 2023-09-06 NOTE — H&P PST ADULT - HISTORY OF PRESENT ILLNESS
76 y/o male, PMH of HTN, COPD / Asthma , GERD, Afib/ 5 stents last one  in 2016, with c/o of umbilical bulging for almost a year. Was seen by Dr Angel, Scheduled for incarcerated umbilical  hernia repair on 10/24/22. Pre op testing today.    ADDENDUM 11/28/2022: Pt notes he was originally scheduled for surgery on 11/7/2022. Pt notes he was initially treated for positive urine culture then he tested positive for COVID on 11/4/2022 therefore procedure was postponed. Pt is now scheduled for procedure with Dr Angel on 12/16/2022. With hernia, pt  denies any changes in bowel or bladder habits. Following consult and follow up with Dr Angel pt is electing for scheduled procedure.     s/p recent stent placement April 2023 78 y/o male, PMH of HTN, COPD / Asthma , GERD, Afib/ 5 stents last one  in 2016, with c/o of umbilical bulging for almost a year. Was seen by Dr Angel, Scheduled for incarcerated umbilical  hernia repair on 10/24/22. Pre op testing today.    ADDENDUM 11/28/2022: Pt notes he was originally scheduled for surgery on 11/7/2022. Pt notes he was initially treated for positive urine culture then he tested positive for COVID on 11/4/2022 therefore procedure was postponed. Pt is now scheduled for procedure with Dr Angel on 12/16/2022. With hernia, pt  denies any changes in bowel or bladder habits. Following consult and follow up with Dr Angel pt is electing for scheduled procedure.     ADD 9/6/2023 - History noted above, previously preop but procedure rescheduled twice due to UTI and testing positive for COVID in 2022. Umbilical hernia persists with discomfort, especially when he coughs. Denies N/V and bowel changes. Patient reports recent stent placement in April 2023, currently on ASA and Plavix. To be cleared by cardiology. Now rescheduled for 9/15/2023 76 y/o male, PMH of HTN, COPD / Asthma , GERD, Afib/ 5 stents last one  in 2016, with c/o of umbilical bulging for almost a year. Was seen by Dr Angel, Scheduled for incarcerated umbilical  hernia repair on 10/24/22. Pre op testing today.    ADDENDUM 11/28/2022: Pt notes he was originally scheduled for surgery on 11/7/2022. Pt notes he was initially treated for positive urine culture then he tested positive for COVID on 11/4/2022 therefore procedure was postponed. Pt is now scheduled for procedure with Dr Angel on 12/16/2022. With hernia, pt  denies any changes in bowel or bladder habits. Following consult and follow up with Dr Angel pt is electing for scheduled procedure.     ADD 9/6/2023 - History noted above, previously preop but procedure rescheduled twice due to UTI and testing positive for COVID in 2022. Umbilical hernia persists with discomfort, especially when he coughs. Denies N/V and bowel changes. Patient reports recent stent placement in April 2023, currently on ASA and Plavix. To be cleared by cardiology. Now rescheduled for 9/15/2023

## 2023-09-06 NOTE — H&P PST ADULT - ENMT COMMENTS
Spoke with pt mother notified her we are unable to get pt transferred here with out a physician putting a request in. Pt mother is very upset about the care she is getting while her daughter is in the hospital now. She is tunable to obtain medical records due to the hospital not wanting to release it to her   Denies dentures, broken and loose teeth MP I

## 2023-09-07 LAB
CULTURE RESULTS: NO GROWTH — SIGNIFICANT CHANGE UP
SPECIMEN SOURCE: SIGNIFICANT CHANGE UP

## 2023-09-08 ENCOUNTER — APPOINTMENT (OUTPATIENT)
Dept: INTERNAL MEDICINE | Facility: CLINIC | Age: 78
End: 2023-09-08
Payer: MEDICARE

## 2023-09-08 VITALS
TEMPERATURE: 98.2 F | DIASTOLIC BLOOD PRESSURE: 59 MMHG | WEIGHT: 168 LBS | OXYGEN SATURATION: 92 % | BODY MASS INDEX: 26.37 KG/M2 | SYSTOLIC BLOOD PRESSURE: 128 MMHG | HEART RATE: 67 BPM | HEIGHT: 67 IN

## 2023-09-08 DIAGNOSIS — Z92.29 PERSONAL HISTORY OF OTHER DRUG THERAPY: ICD-10-CM

## 2023-09-08 DIAGNOSIS — Z01.818 ENCOUNTER FOR OTHER PREPROCEDURAL EXAMINATION: ICD-10-CM

## 2023-09-08 DIAGNOSIS — N39.0 URINARY TRACT INFECTION, SITE NOT SPECIFIED: ICD-10-CM

## 2023-09-08 DIAGNOSIS — E78.5 HYPERLIPIDEMIA, UNSPECIFIED: ICD-10-CM

## 2023-09-08 DIAGNOSIS — K42.0 UMBILICAL HERNIA WITH OBSTRUCTION, W/OUT GANGRENE: ICD-10-CM

## 2023-09-08 PROCEDURE — 99214 OFFICE O/P EST MOD 30 MIN: CPT

## 2023-09-08 RX ORDER — NITROFURANTOIN (MONOHYDRATE/MACROCRYSTALS) 25; 75 MG/1; MG/1
100 CAPSULE ORAL
Refills: 0 | Status: ACTIVE | COMMUNITY
Start: 2023-09-08

## 2023-09-08 RX ORDER — FLUTICASONE PROPIONATE 50 UG/1
50 SPRAY, METERED NASAL
Qty: 16 | Refills: 1 | Status: DISCONTINUED | COMMUNITY
Start: 2020-12-04 | End: 2023-09-08

## 2023-09-08 RX ORDER — COLD-HOT PACK
50 EACH MISCELLANEOUS
Refills: 0 | Status: ACTIVE | COMMUNITY
Start: 2023-09-08

## 2023-09-08 RX ORDER — AMOXICILLIN AND CLAVULANATE POTASSIUM 875; 125 MG/1; MG/1
875-125 TABLET, COATED ORAL
Qty: 14 | Refills: 0 | Status: DISCONTINUED | COMMUNITY
Start: 2022-10-10 | End: 2023-09-08

## 2023-09-08 RX ORDER — PREDNISONE 5 MG/1
5 TABLET ORAL
Refills: 0 | Status: ACTIVE | COMMUNITY
Start: 2023-09-08

## 2023-09-08 RX ORDER — ASCORBIC ACID 500 MG
500 TABLET ORAL
Refills: 0 | Status: ACTIVE | COMMUNITY
Start: 2023-09-08

## 2023-09-08 RX ORDER — SULFAMETHOXAZOLE AND TRIMETHOPRIM 800; 160 MG/1; MG/1
800-160 TABLET ORAL TWICE DAILY
Qty: 60 | Refills: 0 | Status: DISCONTINUED | COMMUNITY
End: 2023-09-08

## 2023-09-08 RX ORDER — ESOMEPRAZOLE MAGNESIUM 40 MG/1
40 CAPSULE, DELAYED RELEASE ORAL
Refills: 0 | Status: ACTIVE | COMMUNITY
Start: 2023-09-08

## 2023-09-08 NOTE — RESULTS/DATA
[] : results reviewed [de-identified] : WNL [de-identified] : Glucose 116 [de-identified] : NSR [de-identified] : UA, culture negative

## 2023-09-08 NOTE — ASSESSMENT
[Modify anti-platelet treatment prior to procedure] : Modify anti-platelet treatment prior to procedure [As per surgery] : as per surgery [FreeTextEntry4] : Mukul Presents today for preoperative risk assessment for planned hernia repair.  He has a history of CAD, COPD, hyperlipidemia, and chronic UTI.  He is moderate to high risk for this moderate risk procedure.  His cardiac preoperative risk assessment is attached.  He will hold Plavix for 4 days prior to procedure.  He will remain on aspirin.  He will resume his Plavix as soon as able postoperatively.  He will stop his vitamins 1 week prior to surgery.  He will remain on his other medications as prescribed including his inhalers and prednisone.  He was advised to take his inhalers leading up to surgery.  He is in the most optimal medical condition for proposed procedure. [FreeTextEntry6] : Continue ASA, Hold Plavix for 4 days prior to procedure

## 2023-09-08 NOTE — REVIEW OF SYSTEMS
[Fever] : no fever [Chills] : no chills [Night Sweats] : no night sweats [Chest Pain] : no chest pain [Palpitations] : no palpitations [Shortness Of Breath] : no shortness of breath [Wheezing] : no wheezing [Cough] : no cough [Abdominal Pain] : no abdominal pain [Nausea] : no nausea [Constipation] : no constipation [Diarrhea] : no diarrhea [Vomiting] : no vomiting [Dysuria] : no dysuria [Hematuria] : no hematuria [Joint Pain] : no joint pain [Back Pain] : no back pain [Headache] : no headache [Dizziness] : no dizziness

## 2023-09-08 NOTE — HISTORY OF PRESENT ILLNESS
[Coronary Artery Disease] : coronary artery disease [COPD] : COPD [Family Member] : no family member with adverse anesthesia reaction/sudden death [Self] : no previous adverse anesthesia reaction [Chronic Anticoagulation] : no chronic anticoagulation [Chronic Kidney Disease] : no chronic kidney disease [Diabetes] : no diabetes [(Patient denies any chest pain, claudication, dyspnea on exertion, orthopnea, palpitations or syncope)] : Patient denies any chest pain, claudication, dyspnea on exertion, orthopnea, palpitations or syncope [Anti-Platelet Agents: _____] : Anti-Platelet Agents: [unfilled] [Herbal Supplements: _____] : Herbal Supplements: [unfilled] [FreeTextEntry1] : Open repair of incarcerated umbilical hernia [FreeTextEntry2] : 09/15/2023 [FreeTextEntry3] : Jeanne [FreeTextEntry4] : Mukul Is here today for preoperative risk assessment for planned scheduled open repair of incarcerated medical hernia.  He has a history of CAD, COPD, hyperlipidemia, and chronic UTI.  He has been feeling well overall.  He has no chest pain, palpitation, or shortness of breath.  He has no dizziness or lightheadedness.  He is very active in his garden.  He recently saw cardiology and had cardiology clearance.  His pulmonary status is stable.  He takes Macrobid for UTI prophylaxis.  He has no urinary symptoms.  He has tolerated surgeries before in the past without any adverse effects anesthesia.  No personal or family history of VTE or bleeding complications per his report.

## 2023-09-08 NOTE — PHYSICAL EXAM
[No Acute Distress] : no acute distress [Well Nourished] : well nourished [Well Developed] : well developed [Well-Appearing] : well-appearing [Normal Sclera/Conjunctiva] : normal sclera/conjunctiva [No Lymphadenopathy] : no lymphadenopathy [Thyroid Normal, No Nodules] : the thyroid was normal and there were no nodules present [No Respiratory Distress] : no respiratory distress  [No Accessory Muscle Use] : no accessory muscle use [Clear to Auscultation] : lungs were clear to auscultation bilaterally [Normal Rate] : normal rate  [Regular Rhythm] : with a regular rhythm [Normal S1, S2] : normal S1 and S2 [No Murmur] : no murmur heard [No Carotid Bruits] : no carotid bruits [No Abdominal Bruit] : a ~M bruit was not heard ~T in the abdomen [No Edema] : there was no peripheral edema [Soft] : abdomen soft [Non Tender] : non-tender [Non-distended] : non-distended [Normal Bowel Sounds] : normal bowel sounds [Normal Supraclavicular Nodes] : no supraclavicular lymphadenopathy [Normal Axillary Nodes] : no axillary lymphadenopathy [Normal Posterior Cervical Nodes] : no posterior cervical lymphadenopathy [Normal Anterior Cervical Nodes] : no anterior cervical lymphadenopathy [Normal Gait] : normal gait [Alert and Oriented x3] : oriented to person, place, and time [de-identified] : +umbilical hernia

## 2023-09-14 ENCOUNTER — TRANSCRIPTION ENCOUNTER (OUTPATIENT)
Age: 78
End: 2023-09-14

## 2023-09-14 NOTE — ASU PATIENT PROFILE, ADULT - FALL HARM RISK - HARM RISK INTERVENTIONS

## 2023-09-14 NOTE — ASU PATIENT PROFILE, ADULT - NSICDXPASTMEDICALHX_GEN_ALL_CORE_FT
PAST MEDICAL HISTORY:  2019 novel coronavirus disease (COVID-19) DX 11/4/2022    Asthma     Chronic obstructive pulmonary disease Asthma/copd    GERD (gastroesophageal reflux disease)     GI bleed while on Antiplatelets    History of atrial fibrillation     HLD (hyperlipidemia)     HTN (hypertension)     Implantable loop recorder present     Nephrolithiasis s/p Lithotripsy    Stented coronary artery 2000, 2006 , 2016 and April 2023, total of 6 JESSE stents    Umbilical hernia with obstruction, without gangrene

## 2023-09-15 ENCOUNTER — TRANSCRIPTION ENCOUNTER (OUTPATIENT)
Age: 78
End: 2023-09-15

## 2023-09-15 ENCOUNTER — OUTPATIENT (OUTPATIENT)
Dept: OUTPATIENT SERVICES | Facility: HOSPITAL | Age: 78
LOS: 1 days | End: 2023-09-15
Payer: MEDICARE

## 2023-09-15 ENCOUNTER — RESULT REVIEW (OUTPATIENT)
Age: 78
End: 2023-09-15

## 2023-09-15 ENCOUNTER — APPOINTMENT (OUTPATIENT)
Dept: SURGERY | Facility: HOSPITAL | Age: 78
End: 2023-09-15

## 2023-09-15 VITALS
TEMPERATURE: 98 F | RESPIRATION RATE: 14 BRPM | OXYGEN SATURATION: 97 % | SYSTOLIC BLOOD PRESSURE: 158 MMHG | DIASTOLIC BLOOD PRESSURE: 81 MMHG | HEART RATE: 67 BPM

## 2023-09-15 VITALS
HEIGHT: 66 IN | OXYGEN SATURATION: 95 % | RESPIRATION RATE: 18 BRPM | SYSTOLIC BLOOD PRESSURE: 138 MMHG | TEMPERATURE: 98 F | WEIGHT: 166.01 LBS | DIASTOLIC BLOOD PRESSURE: 64 MMHG | HEART RATE: 72 BPM

## 2023-09-15 DIAGNOSIS — Z95.5 PRESENCE OF CORONARY ANGIOPLASTY IMPLANT AND GRAFT: Chronic | ICD-10-CM

## 2023-09-15 DIAGNOSIS — K42.0 UMBILICAL HERNIA WITH OBSTRUCTION, WITHOUT GANGRENE: ICD-10-CM

## 2023-09-15 DIAGNOSIS — Z90.79 ACQUIRED ABSENCE OF OTHER GENITAL ORGAN(S): Chronic | ICD-10-CM

## 2023-09-15 DIAGNOSIS — D49.1 NEOPLASM OF UNSPECIFIED BEHAVIOR OF RESPIRATORY SYSTEM: Chronic | ICD-10-CM

## 2023-09-15 DIAGNOSIS — Z01.818 ENCOUNTER FOR OTHER PREPROCEDURAL EXAMINATION: ICD-10-CM

## 2023-09-15 PROCEDURE — 49594 RPR AA HRN 1ST 3-10 NCR/STRN: CPT

## 2023-09-15 PROCEDURE — 88305 TISSUE EXAM BY PATHOLOGIST: CPT | Mod: 26

## 2023-09-15 PROCEDURE — 88305 TISSUE EXAM BY PATHOLOGIST: CPT

## 2023-09-15 RX ORDER — DOCUSATE SODIUM 100 MG
1 CAPSULE ORAL
Qty: 30 | Refills: 0
Start: 2023-09-15

## 2023-09-15 RX ORDER — HYDROMORPHONE HYDROCHLORIDE 2 MG/ML
0.5 INJECTION INTRAMUSCULAR; INTRAVENOUS; SUBCUTANEOUS ONCE
Refills: 0 | Status: DISCONTINUED | OUTPATIENT
Start: 2023-09-15 | End: 2023-09-15

## 2023-09-15 RX ORDER — SODIUM CHLORIDE 9 MG/ML
1000 INJECTION, SOLUTION INTRAVENOUS
Refills: 0 | Status: DISCONTINUED | OUTPATIENT
Start: 2023-09-15 | End: 2023-09-15

## 2023-09-15 RX ORDER — OXYCODONE AND ACETAMINOPHEN 5; 325 MG/1; MG/1
1 TABLET ORAL
Qty: 10 | Refills: 0
Start: 2023-09-15

## 2023-09-15 RX ORDER — SODIUM CHLORIDE 9 MG/ML
1000 INJECTION, SOLUTION INTRAVENOUS
Refills: 0 | Status: DISCONTINUED | OUTPATIENT
Start: 2023-09-15 | End: 2023-09-29

## 2023-09-15 RX ORDER — OXYCODONE HYDROCHLORIDE 5 MG/1
5 TABLET ORAL ONCE
Refills: 0 | Status: DISCONTINUED | OUTPATIENT
Start: 2023-09-15 | End: 2023-09-16

## 2023-09-15 RX ORDER — CEFAZOLIN SODIUM 1 G
2000 VIAL (EA) INJECTION ONCE
Refills: 0 | Status: COMPLETED | OUTPATIENT
Start: 2023-09-15 | End: 2023-09-15

## 2023-09-15 RX ORDER — ONDANSETRON 8 MG/1
4 TABLET, FILM COATED ORAL ONCE
Refills: 0 | Status: DISCONTINUED | OUTPATIENT
Start: 2023-09-15 | End: 2023-09-16

## 2023-09-15 RX ADMIN — HYDROMORPHONE HYDROCHLORIDE 0.5 MILLIGRAM(S): 2 INJECTION INTRAMUSCULAR; INTRAVENOUS; SUBCUTANEOUS at 15:02

## 2023-09-15 RX ADMIN — SODIUM CHLORIDE 75 MILLILITER(S): 9 INJECTION, SOLUTION INTRAVENOUS at 11:04

## 2023-09-15 RX ADMIN — HYDROMORPHONE HYDROCHLORIDE 0.5 MILLIGRAM(S): 2 INJECTION INTRAMUSCULAR; INTRAVENOUS; SUBCUTANEOUS at 14:52

## 2023-09-15 NOTE — ASU DISCHARGE PLAN (ADULT/PEDIATRIC) - ASU DC SPECIAL INSTRUCTIONSFT
You may remove dressings (clear plastic adhesive sheets and gauze) and shower in 48 hours.  Do not remove steri-strips.  Do not let water hit wounds directly, do not rub incisions.      No heavy lifting (nothing heavier than 5 lbs.), no strenuous physical activity, no exercise.      Do not drive for 24 hours following anesthesia.  Do not drive while taking narcotic pain medications.      You may perform your usual daily tasks as tolerated.      Take Percocet for severe pain as prescribed.  Take colace as prescribed to prevent constipation.    Follow-up with Dr. Angel in his office next week - call office for appointment if not already made.

## 2023-09-15 NOTE — BRIEF OPERATIVE NOTE - NSICDXBRIEFPROCEDURE_GEN_ALL_CORE_FT
PROCEDURES:  Repair, hernia, ventral, using component separation technique 15-Sep-2023 14:03:55 DID NOT  PERFORM component seperation technique Cesar Sweet

## 2023-09-15 NOTE — ASU DISCHARGE PLAN (ADULT/PEDIATRIC) - CARE PROVIDER_API CALL
Hugh Angel  Surgery  [address], [address]  [place] [address]  Phone: [phone]  Fax: [phone]  Follow Up Time:

## 2023-09-18 PROBLEM — Z95.818 PRESENCE OF OTHER CARDIAC IMPLANTS AND GRAFTS: Chronic | Status: ACTIVE | Noted: 2023-09-06

## 2023-09-20 LAB — SURGICAL PATHOLOGY STUDY: SIGNIFICANT CHANGE UP

## 2023-09-21 ENCOUNTER — APPOINTMENT (OUTPATIENT)
Dept: SURGERY | Facility: CLINIC | Age: 78
End: 2023-09-21
Payer: MEDICARE

## 2023-09-21 PROCEDURE — 99212 OFFICE O/P EST SF 10 MIN: CPT

## 2023-10-19 ENCOUNTER — APPOINTMENT (OUTPATIENT)
Dept: SURGERY | Facility: CLINIC | Age: 78
End: 2023-10-19
Payer: MEDICARE

## 2023-10-19 DIAGNOSIS — Z09 ENCOUNTER FOR FOLLOW-UP EXAMINATION AFTER COMPLETED TREATMENT FOR CONDITIONS OTHER THAN MALIGNANT NEOPLASM: ICD-10-CM

## 2023-10-19 PROCEDURE — 99212 OFFICE O/P EST SF 10 MIN: CPT

## 2023-11-07 NOTE — PHYSICAL THERAPY INITIAL EVALUATION ADULT - WEIGHT-BEARING RESTRICTIONS: GAIT, REHAB EVAL
----- Message from [facility] sent at 11/6/2023  2:15 PM EST -----  Subject: Message to Provider    QUESTIONS  Information for Provider? Pt needs an injection for cortisone in rt knee. She needs this scheduled before she leaves 11/09/2023.   ---------------------------------------------------------------------------  --------------  Haylie NÚÑEZ  [phone]; OK to leave message on voicemail  ---------------------------------------------------------------------------  --------------  SCRIPT ANSWERS  Relationship to Patient?  Self full weight-bearing

## 2024-02-14 NOTE — H&P ADULT - NEGATIVE MUSCULOSKELETAL SYMPTOMS
no arthritis/no arthralgia
1. Return to ED for worsening, progressive or any other concerning symptoms   2. Follow up with your primary care doctor in 2-3days   3. Follow up with [facility] [phone].    Chest Pain    Chest pain can be caused by many different conditions which may or may not be dangerous. Causes include heartburn, lung infections, heart attack, blood clot in lungs, skin infections, strain or damage to muscle, cartilage, or bones, etc. In addition to a history and physical examination, an electrocardiogram (ECG) or other lab tests may have been performed to determine the cause of your chest pain. Follow up with your primary care provider or with a cardiologist as instructed.     SEEK IMMEDIATE MEDICAL CARE IF YOU HAVE ANY OF THE FOLLOWING SYMPTOMS: worsening chest pain, coughing up blood, unexplained back/neck/jaw pain, severe abdominal pain, dizziness or lightheadedness, fainting, shortness of breath, sweaty or clammy skin, vomiting, or racing heart beat. These symptoms may represent a serious problem that is an emergency. Do not wait to see if the symptoms will go away. Get medical help right away. Call 911 and do not drive yourself to the hospital.

## 2024-04-17 NOTE — DIETITIAN INITIAL EVALUATION ADULT. - PROBLEM SELECTOR PLAN 3
-Pre Renal /Hemodynamic, Poor PO intake  -HOLD Losartan with YUE & Hypotension  -BMP in AM   -NS @ 100 ml /hr 58

## 2024-04-25 NOTE — PROVIDER CONTACT NOTE (CRITICAL VALUE NOTIFICATION) - BACKGROUND
Caller: Fernanda Molina    Relationship: Self    Best call back number: [phone]    Requested Prescriptions:   Requested Prescriptions     Pending Prescriptions Disp Refills    metFORMIN (GLUCOPHAGE) 1000 MG tablet          Pharmacy where request should be sent: [facility] [address] [phone] [facility] [phone] FX     Last office visit with prescribing clinician: 4/8/2024   Last telemedicine visit with prescribing clinician: Visit date not found   Next office visit with prescribing clinician: 9/9/2024     Additional details provided by patient: PATIENT HAS A 2 WEEK SUPPLY LEFT OF THIS MEDICATION.     Does the patient have less than a 3 day supply:  [] Yes  [x] No    Would you like a call back once the refill request has been completed: [] Yes [x] No    If the office needs to give you a call back, can they leave a voicemail: [] Yes [x] No    Caro Palma Rep   04/25/24 10:52 EDT          FEVER THIS ADMISSION AND STARTED ON  ABT

## 2024-06-07 ENCOUNTER — INPATIENT (INPATIENT)
Facility: HOSPITAL | Age: 79
LOS: 4 days | Discharge: HOME | DRG: 871 | End: 2024-06-12
Attending: INTERNAL MEDICINE | Admitting: INTERNAL MEDICINE
Payer: MEDICARE

## 2024-06-07 VITALS
HEIGHT: 67 IN | HEART RATE: 114 BPM | OXYGEN SATURATION: 90 % | WEIGHT: 160.06 LBS | RESPIRATION RATE: 18 BRPM | TEMPERATURE: 100 F | SYSTOLIC BLOOD PRESSURE: 83 MMHG | DIASTOLIC BLOOD PRESSURE: 46 MMHG

## 2024-06-07 DIAGNOSIS — I48.91 UNSPECIFIED ATRIAL FIBRILLATION: ICD-10-CM

## 2024-06-07 DIAGNOSIS — Z29.9 ENCOUNTER FOR PROPHYLACTIC MEASURES, UNSPECIFIED: ICD-10-CM

## 2024-06-07 DIAGNOSIS — I10 ESSENTIAL (PRIMARY) HYPERTENSION: ICD-10-CM

## 2024-06-07 DIAGNOSIS — Z90.79 ACQUIRED ABSENCE OF OTHER GENITAL ORGAN(S): Chronic | ICD-10-CM

## 2024-06-07 DIAGNOSIS — D49.1 NEOPLASM OF UNSPECIFIED BEHAVIOR OF RESPIRATORY SYSTEM: Chronic | ICD-10-CM

## 2024-06-07 DIAGNOSIS — R74.8 ABNORMAL LEVELS OF OTHER SERUM ENZYMES: ICD-10-CM

## 2024-06-07 DIAGNOSIS — J44.9 CHRONIC OBSTRUCTIVE PULMONARY DISEASE, UNSPECIFIED: ICD-10-CM

## 2024-06-07 DIAGNOSIS — A41.9 SEPSIS, UNSPECIFIED ORGANISM: ICD-10-CM

## 2024-06-07 DIAGNOSIS — I25.10 ATHEROSCLEROTIC HEART DISEASE OF NATIVE CORONARY ARTERY WITHOUT ANGINA PECTORIS: ICD-10-CM

## 2024-06-07 DIAGNOSIS — K21.9 GASTRO-ESOPHAGEAL REFLUX DISEASE WITHOUT ESOPHAGITIS: ICD-10-CM

## 2024-06-07 DIAGNOSIS — Z95.5 PRESENCE OF CORONARY ANGIOPLASTY IMPLANT AND GRAFT: Chronic | ICD-10-CM

## 2024-06-07 DIAGNOSIS — J44.89 OTHER SPECIFIED CHRONIC OBSTRUCTIVE PULMONARY DISEASE: ICD-10-CM

## 2024-06-07 DIAGNOSIS — R06.02 SHORTNESS OF BREATH: ICD-10-CM

## 2024-06-07 DIAGNOSIS — N39.0 URINARY TRACT INFECTION, SITE NOT SPECIFIED: ICD-10-CM

## 2024-06-07 DIAGNOSIS — E78.5 HYPERLIPIDEMIA, UNSPECIFIED: ICD-10-CM

## 2024-06-07 LAB
ALBUMIN SERPL ELPH-MCNC: 3.6 G/DL — SIGNIFICANT CHANGE UP (ref 3.3–5)
ALP SERPL-CCNC: 69 U/L — SIGNIFICANT CHANGE UP (ref 40–120)
ALT FLD-CCNC: 47 U/L — SIGNIFICANT CHANGE UP (ref 12–78)
ANION GAP SERPL CALC-SCNC: 4 MMOL/L — LOW (ref 5–17)
APPEARANCE UR: ABNORMAL
APTT BLD: 25.6 SEC — SIGNIFICANT CHANGE UP (ref 24.5–35.6)
AST SERPL-CCNC: 32 U/L — SIGNIFICANT CHANGE UP (ref 15–37)
BASOPHILS # BLD AUTO: 0.04 K/UL — SIGNIFICANT CHANGE UP (ref 0–0.2)
BASOPHILS NFR BLD AUTO: 0.2 % — SIGNIFICANT CHANGE UP (ref 0–2)
BILIRUB SERPL-MCNC: 0.8 MG/DL — SIGNIFICANT CHANGE UP (ref 0.2–1.2)
BILIRUB UR-MCNC: NEGATIVE — SIGNIFICANT CHANGE UP
BLOOD GAS COMMENTS, VENOUS: SIGNIFICANT CHANGE UP
BUN SERPL-MCNC: 22 MG/DL — SIGNIFICANT CHANGE UP (ref 7–23)
CALCIUM SERPL-MCNC: 9.1 MG/DL — SIGNIFICANT CHANGE UP (ref 8.5–10.1)
CHLORIDE SERPL-SCNC: 106 MMOL/L — SIGNIFICANT CHANGE UP (ref 96–108)
CK MB BLD-MCNC: 2.1 % — SIGNIFICANT CHANGE UP (ref 0–3.5)
CK MB CFR SERPL CALC: 3.8 NG/ML — HIGH (ref 0–3.6)
CK MB CFR SERPL CALC: 5 NG/ML — HIGH (ref 0–3.6)
CK SERPL-CCNC: 236 U/L — SIGNIFICANT CHANGE UP (ref 26–308)
CO2 SERPL-SCNC: 32 MMOL/L — HIGH (ref 22–31)
COLOR SPEC: SIGNIFICANT CHANGE UP
CREAT SERPL-MCNC: 1.1 MG/DL — SIGNIFICANT CHANGE UP (ref 0.5–1.3)
DIFF PNL FLD: ABNORMAL
EGFR: 69 ML/MIN/1.73M2 — SIGNIFICANT CHANGE UP
EOSINOPHIL # BLD AUTO: 0.33 K/UL — SIGNIFICANT CHANGE UP (ref 0–0.5)
EOSINOPHIL NFR BLD AUTO: 2 % — SIGNIFICANT CHANGE UP (ref 0–6)
GAS PNL BLDV: SIGNIFICANT CHANGE UP
GLUCOSE SERPL-MCNC: 102 MG/DL — HIGH (ref 70–99)
GLUCOSE UR QL: NEGATIVE MG/DL — SIGNIFICANT CHANGE UP
HCT VFR BLD CALC: 44.9 % — SIGNIFICANT CHANGE UP (ref 39–50)
HGB BLD-MCNC: 15.3 G/DL — SIGNIFICANT CHANGE UP (ref 13–17)
IMM GRANULOCYTES NFR BLD AUTO: 0.5 % — SIGNIFICANT CHANGE UP (ref 0–0.9)
INR BLD: 1.06 RATIO — SIGNIFICANT CHANGE UP (ref 0.85–1.18)
KETONES UR-MCNC: NEGATIVE MG/DL — SIGNIFICANT CHANGE UP
LACTATE SERPL-SCNC: 1.5 MMOL/L — SIGNIFICANT CHANGE UP (ref 0.7–2)
LEUKOCYTE ESTERASE UR-ACNC: ABNORMAL
LIDOCAIN IGE QN: 25 U/L — SIGNIFICANT CHANGE UP (ref 13–75)
LYMPHOCYTES # BLD AUTO: 0.62 K/UL — LOW (ref 1–3.3)
LYMPHOCYTES # BLD AUTO: 3.7 % — LOW (ref 13–44)
MAGNESIUM SERPL-MCNC: 1.8 MG/DL — SIGNIFICANT CHANGE UP (ref 1.6–2.6)
MCHC RBC-ENTMCNC: 32.8 PG — SIGNIFICANT CHANGE UP (ref 27–34)
MCHC RBC-ENTMCNC: 34.1 GM/DL — SIGNIFICANT CHANGE UP (ref 32–36)
MCV RBC AUTO: 96.4 FL — SIGNIFICANT CHANGE UP (ref 80–100)
MONOCYTES # BLD AUTO: 0.44 K/UL — SIGNIFICANT CHANGE UP (ref 0–0.9)
MONOCYTES NFR BLD AUTO: 2.6 % — SIGNIFICANT CHANGE UP (ref 2–14)
NEUTROPHILS # BLD AUTO: 15.1 K/UL — HIGH (ref 1.8–7.4)
NEUTROPHILS NFR BLD AUTO: 91 % — HIGH (ref 43–77)
NITRITE UR-MCNC: NEGATIVE — SIGNIFICANT CHANGE UP
NRBC # BLD: 0 /100 WBCS — SIGNIFICANT CHANGE UP (ref 0–0)
NT-PROBNP SERPL-SCNC: 49 PG/ML — SIGNIFICANT CHANGE UP (ref 0–450)
PH UR: 6 — SIGNIFICANT CHANGE UP (ref 5–8)
PLATELET # BLD AUTO: 161 K/UL — SIGNIFICANT CHANGE UP (ref 150–400)
POTASSIUM SERPL-MCNC: 3.8 MMOL/L — SIGNIFICANT CHANGE UP (ref 3.5–5.3)
POTASSIUM SERPL-SCNC: 3.8 MMOL/L — SIGNIFICANT CHANGE UP (ref 3.5–5.3)
PROT SERPL-MCNC: 6.7 G/DL — SIGNIFICANT CHANGE UP (ref 6–8.3)
PROT UR-MCNC: 300 MG/DL
PROTHROM AB SERPL-ACNC: 12.4 SEC — SIGNIFICANT CHANGE UP (ref 9.5–13)
RAPID RVP RESULT: SIGNIFICANT CHANGE UP
RBC # BLD: 4.66 M/UL — SIGNIFICANT CHANGE UP (ref 4.2–5.8)
RBC # FLD: 13.4 % — SIGNIFICANT CHANGE UP (ref 10.3–14.5)
SARS-COV-2 RNA SPEC QL NAA+PROBE: SIGNIFICANT CHANGE UP
SODIUM SERPL-SCNC: 142 MMOL/L — SIGNIFICANT CHANGE UP (ref 135–145)
SP GR SPEC: 1.02 — SIGNIFICANT CHANGE UP (ref 1–1.03)
TROPONIN I, HIGH SENSITIVITY RESULT: 10.2 NG/L — SIGNIFICANT CHANGE UP
TROPONIN I, HIGH SENSITIVITY RESULT: 11.4 NG/L — SIGNIFICANT CHANGE UP
TSH SERPL-MCNC: 1.06 UIU/ML — SIGNIFICANT CHANGE UP (ref 0.36–3.74)
UROBILINOGEN FLD QL: 1 MG/DL — SIGNIFICANT CHANGE UP (ref 0.2–1)
WBC # BLD: 16.61 K/UL — HIGH (ref 3.8–10.5)
WBC # FLD AUTO: 16.61 K/UL — HIGH (ref 3.8–10.5)

## 2024-06-07 PROCEDURE — 74177 CT ABD & PELVIS W/CONTRAST: CPT | Mod: 26,MC

## 2024-06-07 PROCEDURE — 71275 CT ANGIOGRAPHY CHEST: CPT | Mod: 26,MC

## 2024-06-07 PROCEDURE — 99285 EMERGENCY DEPT VISIT HI MDM: CPT

## 2024-06-07 PROCEDURE — 93010 ELECTROCARDIOGRAM REPORT: CPT

## 2024-06-07 PROCEDURE — 71045 X-RAY EXAM CHEST 1 VIEW: CPT | Mod: 26

## 2024-06-07 RX ORDER — PYRIDOXINE HCL (VITAMIN B6) 100 MG
100 TABLET ORAL AT BEDTIME
Refills: 0 | Status: DISCONTINUED | OUTPATIENT
Start: 2024-06-07 | End: 2024-06-12

## 2024-06-07 RX ORDER — TIOTROPIUM BROMIDE 18 UG/1
2 CAPSULE ORAL; RESPIRATORY (INHALATION) DAILY
Refills: 0 | Status: DISCONTINUED | OUTPATIENT
Start: 2024-06-07 | End: 2024-06-12

## 2024-06-07 RX ORDER — NEBIVOLOL HYDROCHLORIDE 5 MG/1
1 TABLET ORAL
Refills: 0 | DISCHARGE

## 2024-06-07 RX ORDER — BUDESONIDE AND FORMOTEROL FUMARATE DIHYDRATE 160; 4.5 UG/1; UG/1
2 AEROSOL RESPIRATORY (INHALATION)
Refills: 0 | Status: DISCONTINUED | OUTPATIENT
Start: 2024-06-07 | End: 2024-06-12

## 2024-06-07 RX ORDER — BUDESONIDE, MICRONIZED 100 %
0.5 POWDER (GRAM) MISCELLANEOUS
Refills: 0 | Status: DISCONTINUED | OUTPATIENT
Start: 2024-06-07 | End: 2024-06-08

## 2024-06-07 RX ORDER — IPRATROPIUM/ALBUTEROL SULFATE 18-103MCG
3 AEROSOL WITH ADAPTER (GRAM) INHALATION ONCE
Refills: 0 | Status: COMPLETED | OUTPATIENT
Start: 2024-06-07 | End: 2024-06-07

## 2024-06-07 RX ORDER — ASPIRIN/CALCIUM CARB/MAGNESIUM 324 MG
81 TABLET ORAL DAILY
Refills: 0 | Status: DISCONTINUED | OUTPATIENT
Start: 2024-06-07 | End: 2024-06-12

## 2024-06-07 RX ORDER — PANTOPRAZOLE SODIUM 20 MG/1
40 TABLET, DELAYED RELEASE ORAL
Refills: 0 | Status: DISCONTINUED | OUTPATIENT
Start: 2024-06-07 | End: 2024-06-12

## 2024-06-07 RX ORDER — CHOLECALCIFEROL (VITAMIN D3) 125 MCG
1000 CAPSULE ORAL AT BEDTIME
Refills: 0 | Status: DISCONTINUED | OUTPATIENT
Start: 2024-06-07 | End: 2024-06-12

## 2024-06-07 RX ORDER — ESOMEPRAZOLE MAGNESIUM 40 MG/1
1 CAPSULE, DELAYED RELEASE ORAL
Refills: 0 | DISCHARGE

## 2024-06-07 RX ORDER — PYRIDOXINE HCL (VITAMIN B6) 100 MG
1 TABLET ORAL
Refills: 0 | DISCHARGE

## 2024-06-07 RX ORDER — ROSUVASTATIN CALCIUM 5 MG/1
1 TABLET ORAL
Refills: 0 | DISCHARGE

## 2024-06-07 RX ORDER — PIPERACILLIN AND TAZOBACTAM 4; .5 G/20ML; G/20ML
3.38 INJECTION, POWDER, LYOPHILIZED, FOR SOLUTION INTRAVENOUS ONCE
Refills: 0 | Status: COMPLETED | OUTPATIENT
Start: 2024-06-07 | End: 2024-06-07

## 2024-06-07 RX ORDER — IPRATROPIUM/ALBUTEROL SULFATE 18-103MCG
3 AEROSOL WITH ADAPTER (GRAM) INHALATION
Refills: 0 | DISCHARGE

## 2024-06-07 RX ORDER — CLOPIDOGREL BISULFATE 75 MG/1
75 TABLET, FILM COATED ORAL DAILY
Refills: 0 | Status: DISCONTINUED | OUTPATIENT
Start: 2024-06-08 | End: 2024-06-12

## 2024-06-07 RX ORDER — ALBUTEROL 90 UG/1
2 AEROSOL, METERED ORAL EVERY 6 HOURS
Refills: 0 | Status: DISCONTINUED | OUTPATIENT
Start: 2024-06-07 | End: 2024-06-12

## 2024-06-07 RX ORDER — ASPIRIN/CALCIUM CARB/MAGNESIUM 324 MG
1 TABLET ORAL
Refills: 0 | DISCHARGE

## 2024-06-07 RX ORDER — BUDESONIDE AND FORMOTEROL FUMARATE DIHYDRATE 160; 4.5 UG/1; UG/1
2 AEROSOL RESPIRATORY (INHALATION)
Refills: 0 | DISCHARGE

## 2024-06-07 RX ORDER — CLOPIDOGREL BISULFATE 75 MG/1
1 TABLET, FILM COATED ORAL
Refills: 0 | DISCHARGE

## 2024-06-07 RX ORDER — ASCORBIC ACID 60 MG
1 TABLET,CHEWABLE ORAL
Refills: 0 | DISCHARGE

## 2024-06-07 RX ORDER — ACETAMINOPHEN 500 MG
650 TABLET ORAL EVERY 6 HOURS
Refills: 0 | Status: DISCONTINUED | OUTPATIENT
Start: 2024-06-07 | End: 2024-06-12

## 2024-06-07 RX ORDER — TIOTROPIUM BROMIDE 18 UG/1
1 CAPSULE ORAL; RESPIRATORY (INHALATION)
Refills: 0 | DISCHARGE

## 2024-06-07 RX ORDER — ENOXAPARIN SODIUM 100 MG/ML
40 INJECTION SUBCUTANEOUS EVERY 24 HOURS
Refills: 0 | Status: DISCONTINUED | OUTPATIENT
Start: 2024-06-07 | End: 2024-06-12

## 2024-06-07 RX ORDER — ZINC GLUCONATE 30 MG
0 TABLET ORAL
Refills: 0 | DISCHARGE

## 2024-06-07 RX ORDER — ONDANSETRON 8 MG/1
4 TABLET, FILM COATED ORAL EVERY 8 HOURS
Refills: 0 | Status: DISCONTINUED | OUTPATIENT
Start: 2024-06-07 | End: 2024-06-12

## 2024-06-07 RX ORDER — BUDESONIDE, MICRONIZED 100 %
2 POWDER (GRAM) MISCELLANEOUS
Refills: 0 | DISCHARGE

## 2024-06-07 RX ORDER — CHOLECALCIFEROL (VITAMIN D3) 125 MCG
1 CAPSULE ORAL
Refills: 0 | DISCHARGE

## 2024-06-07 RX ORDER — ATORVASTATIN CALCIUM 80 MG/1
80 TABLET, FILM COATED ORAL AT BEDTIME
Refills: 0 | Status: DISCONTINUED | OUTPATIENT
Start: 2024-06-07 | End: 2024-06-12

## 2024-06-07 RX ORDER — SODIUM CHLORIDE 9 MG/ML
1000 INJECTION INTRAMUSCULAR; INTRAVENOUS; SUBCUTANEOUS
Refills: 0 | Status: DISCONTINUED | OUTPATIENT
Start: 2024-06-07 | End: 2024-06-12

## 2024-06-07 RX ORDER — SODIUM CHLORIDE 9 MG/ML
1000 INJECTION INTRAMUSCULAR; INTRAVENOUS; SUBCUTANEOUS ONCE
Refills: 0 | Status: COMPLETED | OUTPATIENT
Start: 2024-06-07 | End: 2024-06-07

## 2024-06-07 RX ORDER — PREGABALIN 225 MG/1
1 CAPSULE ORAL
Refills: 0 | DISCHARGE

## 2024-06-07 RX ORDER — ACETAMINOPHEN 500 MG
1000 TABLET ORAL ONCE
Refills: 0 | Status: COMPLETED | OUTPATIENT
Start: 2024-06-07 | End: 2024-06-07

## 2024-06-07 RX ORDER — ALBUTEROL 90 UG/1
2 AEROSOL, METERED ORAL
Refills: 0 | DISCHARGE

## 2024-06-07 RX ORDER — PREGABALIN 225 MG/1
1000 CAPSULE ORAL DAILY
Refills: 0 | Status: DISCONTINUED | OUTPATIENT
Start: 2024-06-07 | End: 2024-06-12

## 2024-06-07 RX ORDER — ASCORBIC ACID 60 MG
500 TABLET,CHEWABLE ORAL DAILY
Refills: 0 | Status: DISCONTINUED | OUTPATIENT
Start: 2024-06-07 | End: 2024-06-12

## 2024-06-07 RX ORDER — IPRATROPIUM/ALBUTEROL SULFATE 18-103MCG
3 AEROSOL WITH ADAPTER (GRAM) INHALATION EVERY 6 HOURS
Refills: 0 | Status: DISCONTINUED | OUTPATIENT
Start: 2024-06-07 | End: 2024-06-12

## 2024-06-07 RX ORDER — LANOLIN ALCOHOL/MO/W.PET/CERES
3 CREAM (GRAM) TOPICAL AT BEDTIME
Refills: 0 | Status: DISCONTINUED | OUTPATIENT
Start: 2024-06-07 | End: 2024-06-12

## 2024-06-07 RX ORDER — VANCOMYCIN HCL 1 G
1000 VIAL (EA) INTRAVENOUS ONCE
Refills: 0 | Status: COMPLETED | OUTPATIENT
Start: 2024-06-07 | End: 2024-06-07

## 2024-06-07 RX ORDER — ERTAPENEM SODIUM 1 G/1
1000 INJECTION, POWDER, LYOPHILIZED, FOR SOLUTION INTRAMUSCULAR; INTRAVENOUS EVERY 24 HOURS
Refills: 0 | Status: DISCONTINUED | OUTPATIENT
Start: 2024-06-07 | End: 2024-06-09

## 2024-06-07 RX ORDER — ERTAPENEM SODIUM 1 G/1
1000 INJECTION, POWDER, LYOPHILIZED, FOR SOLUTION INTRAMUSCULAR; INTRAVENOUS EVERY 24 HOURS
Refills: 0 | Status: DISCONTINUED | OUTPATIENT
Start: 2024-06-07 | End: 2024-06-07

## 2024-06-07 RX ADMIN — ATORVASTATIN CALCIUM 80 MILLIGRAM(S): 80 TABLET, FILM COATED ORAL at 23:07

## 2024-06-07 RX ADMIN — SODIUM CHLORIDE 1000 MILLILITER(S): 9 INJECTION INTRAMUSCULAR; INTRAVENOUS; SUBCUTANEOUS at 11:28

## 2024-06-07 RX ADMIN — Medication 3 MILLILITER(S): at 17:50

## 2024-06-07 RX ADMIN — Medication 3 MILLILITER(S): at 20:16

## 2024-06-07 RX ADMIN — Medication 1000 MILLIGRAM(S): at 10:39

## 2024-06-07 RX ADMIN — Medication 1000 MILLIGRAM(S): at 10:27

## 2024-06-07 RX ADMIN — Medication 125 MILLIGRAM(S): at 10:09

## 2024-06-07 RX ADMIN — Medication 400 MILLIGRAM(S): at 10:09

## 2024-06-07 RX ADMIN — Medication 3 MILLILITER(S): at 10:10

## 2024-06-07 RX ADMIN — BUDESONIDE AND FORMOTEROL FUMARATE DIHYDRATE 2 PUFF(S): 160; 4.5 AEROSOL RESPIRATORY (INHALATION) at 20:16

## 2024-06-07 RX ADMIN — SODIUM CHLORIDE 1000 MILLILITER(S): 9 INJECTION INTRAMUSCULAR; INTRAVENOUS; SUBCUTANEOUS at 11:34

## 2024-06-07 RX ADMIN — SODIUM CHLORIDE 1000 MILLILITER(S): 9 INJECTION INTRAMUSCULAR; INTRAVENOUS; SUBCUTANEOUS at 10:08

## 2024-06-07 RX ADMIN — Medication 250 MILLIGRAM(S): at 10:10

## 2024-06-07 RX ADMIN — PIPERACILLIN AND TAZOBACTAM 200 GRAM(S): 4; .5 INJECTION, POWDER, LYOPHILIZED, FOR SOLUTION INTRAVENOUS at 10:10

## 2024-06-07 RX ADMIN — Medication 3 MILLILITER(S): at 10:09

## 2024-06-07 RX ADMIN — Medication 100 MILLIGRAM(S): at 23:06

## 2024-06-07 RX ADMIN — SODIUM CHLORIDE 75 MILLILITER(S): 9 INJECTION INTRAMUSCULAR; INTRAVENOUS; SUBCUTANEOUS at 23:07

## 2024-06-07 RX ADMIN — SODIUM CHLORIDE 1000 MILLILITER(S): 9 INJECTION INTRAMUSCULAR; INTRAVENOUS; SUBCUTANEOUS at 10:27

## 2024-06-07 RX ADMIN — Medication 1000 UNIT(S): at 23:07

## 2024-06-07 RX ADMIN — ERTAPENEM SODIUM 120 MILLIGRAM(S): 1 INJECTION, POWDER, LYOPHILIZED, FOR SOLUTION INTRAMUSCULAR; INTRAVENOUS at 21:26

## 2024-06-07 RX ADMIN — PIPERACILLIN AND TAZOBACTAM 3.38 GRAM(S): 4; .5 INJECTION, POWDER, LYOPHILIZED, FOR SOLUTION INTRAVENOUS at 10:27

## 2024-06-07 RX ADMIN — Medication 1000 MILLIGRAM(S): at 11:27

## 2024-06-07 NOTE — H&P ADULT - HISTORY OF PRESENT ILLNESS
77 yo M with PMHx of HTN, HLD, GERD, Atrial fibrillation (on Eliquis), Asthma, COPD, hx of benign lung cancer R lobe s/p resection,  CAD (s/p 5 stents ~2001), presents to the ED with X days of fever     Denies fever, chills, chest pain, palpitations, SOB, cough, abdominal pain, nausea, vomiting, diarrhea, constipation, urinary frequency, urgency, or dysuria, headaches, changes in vision, dizziness, numbness, tingling.  Denies recent travel, recent antibiotic use, or sick contacts.    ED Course:   Vitals: BP: 83/46, HR: 114, Temp: 99.7, RR: 18, SpO2: 90% on RA    Labs: WBC 16.61, CKMB 3.8, Trop 11.4, proBNP 49, RVP negative   UA: Cloudy, Protein 300, Lekuesterase Large, Blood small, WBC too numerous to count, occasional bacteria   CXR: no active disease   CT Chest PE: No pulmonary embolus.  CT Abdomen and pelvis: No acute abnormality in the abdomen or pelvis.  EKG:   Received in the ED: Ofirmev x1, Solumedrol 125 mg IVP x1, Zosyn 3.375 mg IVPB, Vancomycin 1g IVPB, 2L NS bolus, Duoneb x3    79 yo M with PMHx of HTN, HLD, GERD, Atrial fibrillation, Asthma, COPD, hx of benign lung cancer R lobe s/p resection,  CAD (s/p 5 stents ~2001), and recurrent UTIs presents to the ED with X days of fever     Denies fever, chills, chest pain, palpitations, SOB, cough, abdominal pain, nausea, vomiting, diarrhea, constipation, urinary frequency, urgency, or dysuria, headaches, changes in vision, dizziness, numbness, tingling.  Denies recent travel, recent antibiotic use, or sick contacts.    ED Course:   Vitals: BP: 83/46, HR: 114, Temp: 99.7, RR: 18, SpO2: 90% on RA    Labs: WBC 16.61, CKMB 3.8, Trop 11.4, proBNP 49, RVP negative   UA: Cloudy, Protein 300, Lekuesterase Large, Blood small, WBC too numerous to count, occasional bacteria   CXR: no active disease   CT Chest PE: No pulmonary embolus.  CT Abdomen and pelvis: No acute abnormality in the abdomen or pelvis.  EKG: Sinus Tachycardia 114 bpm, rightward axis shift   Received in the ED: Ofirmev x1, Solumedrol 125 mg IVP x1, Zosyn 3.375 mg IVPB, Vancomycin 1g IVPB, 2L NS bolus, Duoneb x3    77 yo M with PMHx of HTN, HLD, GERD, Atrial fibrillation, Asthma, COPD, hx of benign lung cancer R lobe s/p resection,  CAD (s/p 5 stents ~2001), and recurrent UTIs presents to the ED with dysuria. Patient notes he began having burning with urination about 3 days ago. He has Augmentin prescribed by his Urologist Dr. Sena as needed for when he begins to feel dysuria, however, patient did not take the antibiotics. Then this AM, he woke up around 3:00AM with rigors, SOB and nausea. Patient tried to do a home breathing treatment but had 1 episode of vomiting. He attempted to go back to sleep, but started to become a little confused on the time of the day. Patient felt it was necessary for him to come to ED for evaluation.     ED Course:   Vitals: BP: 83/46, HR: 114, Temp: 99.7, RR: 18, SpO2: 90% on RA    Labs: WBC 16.61, CKMB 3.8, Trop 11.4, proBNP 49, RVP negative   UA: Cloudy, Protein 300, Lekuesterase Large, Blood small, WBC too numerous to count, occasional bacteria   EKG: Sinus Tachycardia 114 bpm, rightward axis shift   Received in the ED: Ofirmev x1, Solumedrol 125 mg IVP x1, Zosyn 3.375 mg IVPB, Vancomycin 1g IVPB, 2L NS bolus, Duoneb x3     Imaging:  CXR: no active disease   CT Chest PE: No pulmonary embolus.  CT Abdomen and pelvis: No acute abnormality in the abdomen or pelvis.     79 yo M with PMHx of HTN, HLD, GERD, Atrial fibrillation, Asthma, COPD, hx of benign lung cancer R lobe s/p resection,  CAD (s/p 5 stents ~2001), and recurrent UTIs presents to the ED with dysuria. Patient notes he began having burning with urination about 3 days ago. He has Augmentin prescribed by his Urologist Dr. Sena as needed for when he begins to feel dysuria, however, patient did not take the antibiotics. Then this AM, he woke up around 3:00AM with rigors, SOB and nausea. Patient tried to do a home breathing treatment but had 1 episode of vomiting. He attempted to go back to sleep, but started to become a little confused on the time of the day. Patient felt it was necessary for him to come to ED for evaluation.     ED Course:   Vitals: BP: 83/46, HR: 114, Temp: 99.7, RR: 18, SpO2: 90% on RA    Labs: WBC 16.61, CKMB 3.8, Trop 11.4, proBNP 49, RVP negative   UA: Cloudy, Protein 300, Leku esterase Large, Blood small, WBC too numerous to count, occasional bacteria   EKG: Sinus Tachycardia 114 bpm, rightward axis shift   Received in the ED: Ofirmev x1, Solumedrol 125 mg IVP x1, Zosyn 3.375 mg IVPB, Vancomycin 1g IVPB, 2L NS bolus, Duoneb x3     Imaging:  CXR: no active disease   CT Chest PE: No pulmonary embolus.  CT Abdomen and pelvis: No acute abnormality in the abdomen or pelvis.

## 2024-06-07 NOTE — H&P ADULT - PROBLEM SELECTOR PROBLEM 2
TO room 3 with mom c/o cough congestion sneezing pulling left ear.   Completed amoxicillin Friday for OM COPD with asthma

## 2024-06-07 NOTE — H&P ADULT - NSHPSOCIALHISTORY_GEN_ALL_CORE
Lives:  ADLs:  Diet:  Vaccination:  Occupation:  Alcohol Use:  Tobacco Use:  Recreational Drug Use: Lives: At home with wife   ADLs: Fully independent, no problem ambulating   Alcohol Use: None   Tobacco Use: Former smoker quit >10yrs ago   Recreational Drug Use: None

## 2024-06-07 NOTE — ED ADULT TRIAGE NOTE - CHIEF COMPLAINT QUOTE
Pt c/o bladder pain, dysuria and burning on urination. Pt reports frequent UTI's and also reports this causes him to become SOB d/t the pain. Pt appears uncomfortable during triage, shifting in chair. 90% RA, placed on 3L NC. Wears 2L at night for hx COPD

## 2024-06-07 NOTE — H&P ADULT - PROBLEM SELECTOR PLAN 1
- Severe Sepsis on admission: WBC 16.61, hypotension, tachycardia, tachypnea, suspected source   - UA: Cloudy, Protein 300, Lekuesterase Large, Blood small, WBC too numerous to count, occasional bacteria   - CT Abdomen and pelvis: No acute abnormality in the abdomen or pelvis  - Frequent UTIs with prophylactic Macrobid   - Blood and Urine cultures ordered  - S/p Ofirmev x1, Zosyn 3.375 mg IVPB, Vancomycin 1g IVPB, 2L NS bolus in ED   - Previous urine cultures from 10/22 growing ESBL E. coli with sensitivities to Ertapenem and Meropenem, resistant to cephalosporins   - Continue with Ertapenem   - Tolerating PO, continue DASH diet   - IVF NS @75 cc/hr for 16 hours   - Infectious Disease consult, Dr. Marc, follow up recommendations - Severe Sepsis on admission: WBC 16.61, hypotension, Fever  tachycardia, tachypnea, Likely 2/2 UTI  Nl Lactate   - UA: Cloudy, Protein 300, Lekuesterase Large, Blood small, WBC too numerous to count, occasional bacteria   - CT Abdomen and pelvis: No acute abnormality in the abdomen or pelvis  - Frequent UTIs with prophylactic Macrobid   - Blood c/s x 2  and Urine cultures ordered  - S/p Ofirmev x1, Zosyn 3.375 mg IVPB, Vancomycin 1g IVPB, 2L NS bolus in ED   - Previous urine cultures from 10/22 growing ESBL E. coli with sensitivities to Ertapenem and Meropenem, resistant to cephalosporins   - Continue with Ertapenem 1gm IVPB daily, IVF continue   -CBC in AM , PRN Tylenol for fever  - Tolerating PO, continue DASH diet   - IVF NS @75 cc/hr for 16 hours   - Infectious Disease consult, Dr. Marc, follow up recommendations

## 2024-06-07 NOTE — H&P ADULT - GASTROINTESTINAL
details… soft/nontender/nondistended/normal active bowel sounds soft/nontender/normal active bowel sounds/distended soft/nontender/normal active bowel sounds/no guarding/no rigidity/no organomegaly/no palpable riri/no masses palpable/distended

## 2024-06-07 NOTE — H&P ADULT - PROBLEM SELECTOR PLAN 2
- Persistent SOB on admission requiring supplementation; on home O2, 2L at night   - Home medications of Albuterol inhalers, Budesonide inhaler, Symbicort, Prednisone 5mg PRN (?)   - CXR: no active disease   - CT Chest PE: No pulmonary embolus  - Wean daytime O2 as tolerated, SPO2 goal 88-94%   - Solumedrol 125 mg IVP x1, Duoneb x3  - Continue home inhalers, hold prednisone - Persistent SOB on admission requiring supplementation; on home O2, 2L at night   - Home medications of Albuterol inhalers, Budesonide inhaler, Symbicort, Spiriva, Prednisone 5mg PRN (?)   - CXR: no active disease   - CT Chest PE: No pulmonary embolus  - Wean daytime O2 as tolerated, SPO2 goal 88-94%   - Solumedrol 125 mg IVP x1, Duoneb x3  - Continue home inhalers, hold prednisone  - Pulm consulted, f/u recs -AC on chronic hypoxic respiratory Failure    Persistent SOB on admission requiring supplementation; on home O2, 2L at night   - Home medications of Albuterol inhalers, Budesonide inhaler, Symbicort, Spiriva, Prednisone 5mg PRN (?)   - CXR: no active disease   - CT Chest PE: No pulmonary embolus  - Wean daytime O2 as tolerated, SPO2 goal 88-94%   - Solumedrol 125 mg IVP x1, Duoneb x3  - Continue home inhalers, hold prednisone  - Pulm consulted, f/u recs Dr Bunch

## 2024-06-07 NOTE — H&P ADULT - NSICDXPASTMEDICALHX_GEN_ALL_CORE_FT
PAST MEDICAL HISTORY:  2019 novel coronavirus disease (COVID-19) DX 11/4/2022    Asthma     Chronic obstructive pulmonary disease Asthma/copd    GERD (gastroesophageal reflux disease)     GI bleed while on Antiplatelets    History of atrial fibrillation     HLD (hyperlipidemia)     HTN (hypertension)     Implantable loop recorder present     Nephrolithiasis s/p Lithotripsy    Stented coronary artery 2000, 2006 , 2016 and April 2023, total of 6 JESSE stents    Umbilical hernia with obstruction, without gangrene      PAST MEDICAL HISTORY:  2019 novel coronavirus disease (COVID-19) DX 11/4/2022    Asthma     Chronic atrial fibrillation     Chronic obstructive pulmonary disease Asthma/copd    Frequent UTI     GERD (gastroesophageal reflux disease)     GI bleed while on Antiplatelets    History of atrial fibrillation     HLD (hyperlipidemia)     HTN (hypertension)     Implantable loop recorder present     Nephrolithiasis s/p Lithotripsy    Stented coronary artery 2000, 2006 , 2016 and April 2023, total of 6 JESSE stents    Umbilical hernia with obstruction, without gangrene

## 2024-06-07 NOTE — H&P ADULT - PROBLEM SELECTOR PLAN 3
- EKG: Sinus Tachycardia 114 bpm, rightward axis shift   - Rate controlled in sinus @ 85 bpm on monitor s/p fluid resuscitation   - No longer on AV damion agents or anticoagulation per Cardiology, [facility] (Joe)   - Continue to monitor -H/O A Fib -Not on AC   - EKG: Sinus Tachycardia 114 bpm, rightward axis shift   - Rate controlled in sinus @ 85 bpm on monitor s/p fluid resuscitation   - No longer on AV damion agents or anticoagulation per Cardiology, [facility] (Joe)   - Continue to monitor

## 2024-06-07 NOTE — CONSULT NOTE ADULT - ASSESSMENT
[facility]  Chart Reviewed-Full Consult to follow for any immediate concerns please fell free to contact us directly at  [phone] and have us paged or text my cell # [phone]  Cesar Marc MD PhD    Over the weekend Dr. Carmen Rebolledo will be covering for our group. If you have any questions, concerns or new micro data please reach out to them at [phone].

## 2024-06-07 NOTE — H&P ADULT - PROBLEM SELECTOR PLAN 5
- Currently not on BP control   - Hypotensive in ED, improving with volume resuscitation - Currently not on BP control   - Hypotensive in ED, 2/2 sepsis improving with volume resuscitation

## 2024-06-07 NOTE — ED PROVIDER NOTE - NSICDXPASTMEDICALHX_GEN_ALL_CORE_FT
PAST MEDICAL HISTORY:  2019 novel coronavirus disease (COVID-19) DX 11/4/2022    Asthma     Chronic atrial fibrillation     Chronic obstructive pulmonary disease Asthma/copd    Frequent UTI     GERD (gastroesophageal reflux disease)     GI bleed while on Antiplatelets    History of atrial fibrillation     HLD (hyperlipidemia)     HTN (hypertension)     Implantable loop recorder present     Nephrolithiasis s/p Lithotripsy    Stented coronary artery 2000, 2006 , 2016 and April 2023, total of 6 JESSE stents    Umbilical hernia with obstruction, without gangrene

## 2024-06-07 NOTE — H&P ADULT - PROBLEM SELECTOR PLAN 4
- CKMB 3.8, Trop 11.4  - Likely elevated CKMB in setting of demand   - No evidence of active ischemia on EKG, however too tachycardic to assess   - Repeat EKG STAT, follow up results   - Repeat cardiac enzymes ordered, follow up results

## 2024-06-07 NOTE — H&P ADULT - CARDIOVASCULAR
details… S1 S2 present/no gallops/no rub/no murmur/irregular rate and rhythm regular rate and rhythm/S1 S2 present/no gallops/no rub/no murmur regular rate and rhythm/S1 S2 present/no gallops/no rub/no murmur/no pedal edema/vascular

## 2024-06-07 NOTE — ED PROVIDER NOTE - CLINICAL SUMMARY MEDICAL DECISION MAKING FREE TEXT BOX
Chinedu Prescott MD (Attending Physician): The patient is a 78y Male with pmhx of HTN, COPD (wears 2L at night), GERD, Afib, CAD with 5 stents (last one in 2016), incarcerated umbilical hernia repair p/w suprapubic pain and dysuria. DDx includes, but not limited to: UTI, COPD exacerbation, PNA, viral syndrome, appendicitis, kidney stone, PE. ekg, cxr, CTA chest, CT a/p, labs, urine, solu-medrol, duonebs, tylenol, abx, IVF. Will most likely require admission.

## 2024-06-07 NOTE — H&P ADULT - ASSESSMENT
79 yo M with PMHx of HTN, HLD, GERD, Atrial fibrillation (on Eliquis), Asthma, COPD, hx of benign lung cancer R lobe s/p resection,  CAD (s/p 5 stents ~2001), presents to the ED with X days of fever     Denies fever, chills, chest pain, palpitations, SOB, cough, abdominal pain, nausea, vomiting, diarrhea, constipation, urinary frequency, urgency, or dysuria, headaches, changes in vision, dizziness, numbness, tingling.  Denies recent travel, recent antibiotic use, or sick contacts.    ED Course:   Vitals: BP: 83/46, HR: 114, Temp: 99.7, RR: 18, SpO2: 90% on RA    Labs: WBC 16.61, CKMB 3.8, Trop 11.4, proBNP 49, RVP negative   UA: Cloudy, Protein 300, Lekuesterase Large, Blood small, WBC too numerous to count, occasional bacteria   CXR: no active disease   CT Chest PE: No pulmonary embolus.  CT Abdomen and pelvis: No acute abnormality in the abdomen or pelvis.  EKG:   Received in the ED: Ofirmev x1, Solumedrol 125 mg IVP x1, Zosyn 3.375 mg IVPB, Vancomycin 1g IVPB, 2L NS bolus, Duoneb x3      77 yo M with PMHx of HTN, HLD, GERD, Atrial fibrillation (on Eliquis), Asthma, COPD, hx of benign lung cancer R lobe s/p resection,  CAD (s/p 5 stents ~2001), presents to the ED with X days of fever        79 yo M with PMHx of HTN, HLD, GERD, Atrial fibrillation Not on AC , Asthma, COPD, hx of benign lung cancer R lobe s/p resection,  CAD (s/p 5 stents ~2001), presents to the ED with 3 days of Dysuria ,fever  Leukocytosis, Sepsis, UTI

## 2024-06-07 NOTE — ED ADULT NURSE REASSESSMENT NOTE - NS ED NURSE REASSESS COMMENT FT1
Pt denies SOB, has normal unlabored respirations, Call bell within reach, resting in bed, rails up and wheels locked in place.

## 2024-06-07 NOTE — ED PROVIDER NOTE - OBJECTIVE STATEMENT
The patient is a 78y Male with pmhx of HTN, COPD (wears 2L at night), GERD, Afib, CAD with 5 stents (last one in 2016), incarcerated umbilical hernia repair p/w suprapubic pain and dysuria. Pt reports hx of frequent UTIs and also reports this causes him to become SOB due to the pain. Pt appears uncomfortable during triage, shifting in the chair. Pt is satting 90% on RA, placed on 3L NC. Endorsing mild wheezing. Says he developed fever around 3am today. Denies cp, n/v/d, leg swelling, hematuria, flank pain. NKDA.

## 2024-06-07 NOTE — H&P ADULT - RESPIRATORY
diminished breath sounds, L/diminished breath sounds, R/wheezes Wheeze on right/diminished breath sounds, L/diminished breath sounds, R/wheezes Wheeze on right/no rales/no rhonchi/no respiratory distress/respirations non-labored/diminished breath sounds, L/diminished breath sounds, R/wheezes

## 2024-06-07 NOTE — H&P ADULT - PSYCHIATRIC
normal affect/alert and oriented x3/normal behavior negative normal/normal affect/alert and oriented x3/normal behavior

## 2024-06-07 NOTE — H&P ADULT - ATTENDING COMMENTS
79 yo M with PMHx of HTN, HLD, GERD, Atrial fibrillation Not on AC , Asthma, COPD, hx of benign lung cancer R lobe s/p resection,  CAD (s/p 5 stents ~2001), presents to the ED with 3 days of Dysuria ,fever  Leukocytosis, Sepsis, UTI  Pt seen, Examined, case & care plan d/w pt, residents at detail.  Consults-  ID Dr Marc  Pulmonary-Dr Bunch  PT Eval  AM Labs   PO diet  GI/DVT ppx

## 2024-06-07 NOTE — ED PROVIDER NOTE - PHYSICAL EXAMINATION
GEN - +In mild discomfort, A&Ox3  HEAD - NC/AT  EYES - PERRL, EOMI  ENT - Airway patent, +mucous membranes dry  PULMONARY - +Mild wheezing diffusely, +mildly increased wob, +satting 98% on 3L O2 via NC  CARDIAC - +S1S2, RRR, no M/G/R, no JVD  ABDOMEN - +BS, ND, +TTP in b/l lower abd, soft, no guarding, no rebound, no masses, no rigidity   - No CVA TTP b/l  EXTREMITIES - FROM, symmetric pulses, no edema  SKIN - No rash or bruising  NEUROLOGIC - Alert, speech clear, no focal deficits  PSYCH - Normal mood/affect, normal insight

## 2024-06-08 ENCOUNTER — TRANSCRIPTION ENCOUNTER (OUTPATIENT)
Age: 79
End: 2024-06-08

## 2024-06-08 LAB
-  CTX-M RESISTANCE MARKER: SIGNIFICANT CHANGE UP
-  ESBL: SIGNIFICANT CHANGE UP
A1C WITH ESTIMATED AVERAGE GLUCOSE RESULT: 6.1 % — HIGH (ref 4–5.6)
ALBUMIN SERPL ELPH-MCNC: 3.5 G/DL — SIGNIFICANT CHANGE UP (ref 3.3–5)
ALP SERPL-CCNC: 63 U/L — SIGNIFICANT CHANGE UP (ref 40–120)
ALT FLD-CCNC: 45 U/L — SIGNIFICANT CHANGE UP (ref 12–78)
ANION GAP SERPL CALC-SCNC: 5 MMOL/L — SIGNIFICANT CHANGE UP (ref 5–17)
AST SERPL-CCNC: 30 U/L — SIGNIFICANT CHANGE UP (ref 15–37)
BASOPHILS # BLD AUTO: 0 K/UL — SIGNIFICANT CHANGE UP (ref 0–0.2)
BASOPHILS NFR BLD AUTO: 0 % — SIGNIFICANT CHANGE UP (ref 0–2)
BILIRUB SERPL-MCNC: 0.7 MG/DL — SIGNIFICANT CHANGE UP (ref 0.2–1.2)
BUN SERPL-MCNC: 17 MG/DL — SIGNIFICANT CHANGE UP (ref 7–23)
CALCIUM SERPL-MCNC: 8.6 MG/DL — SIGNIFICANT CHANGE UP (ref 8.5–10.1)
CHLORIDE SERPL-SCNC: 109 MMOL/L — HIGH (ref 96–108)
CHOLEST SERPL-MCNC: 101 MG/DL — SIGNIFICANT CHANGE UP
CK MB CFR SERPL CALC: 6.6 NG/ML — HIGH (ref 0–3.6)
CO2 SERPL-SCNC: 26 MMOL/L — SIGNIFICANT CHANGE UP (ref 22–31)
CREAT SERPL-MCNC: 1.1 MG/DL — SIGNIFICANT CHANGE UP (ref 0.5–1.3)
CULTURE RESULTS: SIGNIFICANT CHANGE UP
E COLI DNA BLD POS QL NAA+NON-PROBE: SIGNIFICANT CHANGE UP
EGFR: 69 ML/MIN/1.73M2 — SIGNIFICANT CHANGE UP
EOSINOPHIL # BLD AUTO: 0 K/UL — SIGNIFICANT CHANGE UP (ref 0–0.5)
EOSINOPHIL NFR BLD AUTO: 0 % — SIGNIFICANT CHANGE UP (ref 0–6)
ESTIMATED AVERAGE GLUCOSE: 128 MG/DL — HIGH (ref 68–114)
GLUCOSE SERPL-MCNC: 160 MG/DL — HIGH (ref 70–99)
GRAM STN FLD: ABNORMAL
HCT VFR BLD CALC: 39.8 % — SIGNIFICANT CHANGE UP (ref 39–50)
HDLC SERPL-MCNC: 66 MG/DL — SIGNIFICANT CHANGE UP
HGB BLD-MCNC: 13.4 G/DL — SIGNIFICANT CHANGE UP (ref 13–17)
LIPID PNL WITH DIRECT LDL SERPL: 24 MG/DL — SIGNIFICANT CHANGE UP
LYMPHOCYTES # BLD AUTO: 1.3 K/UL — SIGNIFICANT CHANGE UP (ref 1–3.3)
LYMPHOCYTES # BLD AUTO: 4 % — LOW (ref 13–44)
MAGNESIUM SERPL-MCNC: 1.9 MG/DL — SIGNIFICANT CHANGE UP (ref 1.6–2.6)
MCHC RBC-ENTMCNC: 33.2 PG — SIGNIFICANT CHANGE UP (ref 27–34)
MCHC RBC-ENTMCNC: 33.7 GM/DL — SIGNIFICANT CHANGE UP (ref 32–36)
MCV RBC AUTO: 98.5 FL — SIGNIFICANT CHANGE UP (ref 80–100)
METHOD TYPE: SIGNIFICANT CHANGE UP
MONOCYTES # BLD AUTO: 1.3 K/UL — HIGH (ref 0–0.9)
MONOCYTES NFR BLD AUTO: 4 % — SIGNIFICANT CHANGE UP (ref 2–14)
NEUTROPHILS # BLD AUTO: 29.86 K/UL — HIGH (ref 1.8–7.4)
NEUTROPHILS NFR BLD AUTO: 91 % — HIGH (ref 43–77)
NON HDL CHOLESTEROL: 35 MG/DL — SIGNIFICANT CHANGE UP
NRBC # BLD: SIGNIFICANT CHANGE UP /100 WBCS (ref 0–0)
PHOSPHATE SERPL-MCNC: 2.9 MG/DL — SIGNIFICANT CHANGE UP (ref 2.5–4.5)
PLATELET # BLD AUTO: 150 K/UL — SIGNIFICANT CHANGE UP (ref 150–400)
POTASSIUM SERPL-MCNC: 3.9 MMOL/L — SIGNIFICANT CHANGE UP (ref 3.5–5.3)
POTASSIUM SERPL-SCNC: 3.9 MMOL/L — SIGNIFICANT CHANGE UP (ref 3.5–5.3)
PROT SERPL-MCNC: 6.6 G/DL — SIGNIFICANT CHANGE UP (ref 6–8.3)
RBC # BLD: 4.04 M/UL — LOW (ref 4.2–5.8)
RBC # FLD: 14 % — SIGNIFICANT CHANGE UP (ref 10.3–14.5)
SODIUM SERPL-SCNC: 140 MMOL/L — SIGNIFICANT CHANGE UP (ref 135–145)
SPECIMEN SOURCE: SIGNIFICANT CHANGE UP
SPECIMEN SOURCE: SIGNIFICANT CHANGE UP
TRIGL SERPL-MCNC: 41 MG/DL — SIGNIFICANT CHANGE UP
WBC # BLD: 32.46 K/UL — HIGH (ref 3.8–10.5)
WBC # FLD AUTO: 32.46 K/UL — HIGH (ref 3.8–10.5)

## 2024-06-08 RX ORDER — BUDESONIDE, MICRONIZED 100 %
0.5 POWDER (GRAM) MISCELLANEOUS EVERY 12 HOURS
Refills: 0 | Status: DISCONTINUED | OUTPATIENT
Start: 2024-06-08 | End: 2024-06-09

## 2024-06-08 RX ADMIN — ERTAPENEM SODIUM 120 MILLIGRAM(S): 1 INJECTION, POWDER, LYOPHILIZED, FOR SOLUTION INTRAMUSCULAR; INTRAVENOUS at 21:31

## 2024-06-08 RX ADMIN — PREGABALIN 1000 MICROGRAM(S): 225 CAPSULE ORAL at 11:20

## 2024-06-08 RX ADMIN — Medication 81 MILLIGRAM(S): at 11:21

## 2024-06-08 RX ADMIN — BUDESONIDE AND FORMOTEROL FUMARATE DIHYDRATE 2 PUFF(S): 160; 4.5 AEROSOL RESPIRATORY (INHALATION) at 20:55

## 2024-06-08 RX ADMIN — Medication 1000 UNIT(S): at 21:31

## 2024-06-08 RX ADMIN — Medication 0.5 MILLIGRAM(S): at 19:46

## 2024-06-08 RX ADMIN — Medication 3 MILLILITER(S): at 19:46

## 2024-06-08 RX ADMIN — Medication 3 MILLILITER(S): at 07:32

## 2024-06-08 RX ADMIN — PANTOPRAZOLE SODIUM 40 MILLIGRAM(S): 20 TABLET, DELAYED RELEASE ORAL at 06:34

## 2024-06-08 RX ADMIN — Medication 3 MILLILITER(S): at 13:22

## 2024-06-08 RX ADMIN — Medication 20 MILLIGRAM(S): at 05:35

## 2024-06-08 RX ADMIN — ATORVASTATIN CALCIUM 80 MILLIGRAM(S): 80 TABLET, FILM COATED ORAL at 21:30

## 2024-06-08 RX ADMIN — Medication 650 MILLIGRAM(S): at 11:31

## 2024-06-08 RX ADMIN — Medication 500 MILLIGRAM(S): at 11:20

## 2024-06-08 RX ADMIN — Medication 0.5 MILLIGRAM(S): at 07:33

## 2024-06-08 RX ADMIN — Medication 100 MILLIGRAM(S): at 21:30

## 2024-06-08 RX ADMIN — CLOPIDOGREL BISULFATE 75 MILLIGRAM(S): 75 TABLET, FILM COATED ORAL at 11:20

## 2024-06-08 NOTE — PROGRESS NOTE ADULT - PROBLEM SELECTOR PLAN 2
-AC on chronic hypoxic respiratory Failure    Persistent SOB on admission requiring supplementation; on home O2, 2L at night   - Home medications of Albuterol inhalers, Budesonide inhaler, Symbicort, Spiriva, Prednisone 5mg   - CXR: no active disease   - CT Chest PE: No pulmonary embolus  - Wean daytime O2 as tolerated, SPO2 goal 88-94%   - Solumedrol 125 mg IVP x1, Duoneb x3  - Continue home inhalers  - prednisone 20mg 5 day course  - Pulm consulted, Dr Bunch, recs appreciated -AC on chronic hypoxic respiratory Failure    Persistent SOB on admission requiring supplementation; on home O2, 2L at night   - Home medications of Albuterol inhalers, Budesonide inhaler, Symbicort, Spiriva, Prednisone 5mg   - CXR: no active disease   - CT Chest PE: No pulmonary embolus  - Wean daytime O2 as tolerated, SPO2 goal 88-94%   - Solumedrol 125 mg IVP x1, Duoneb x3  - Continue home inhalers  - prednisone 20mg 5 day course  Duoneb q 6 hrs   - Pulm consulted, Dr Bunch, recs appreciated

## 2024-06-08 NOTE — DISCHARGE NOTE PROVIDER - PROVIDER TOKENS
PROVIDER:[TOKEN:[[id number]],FOLLOWUP:[1 week]] PROVIDER:[TOKEN:[[id number]],FOLLOWUP:[1 week]],PROVIDER:[TOKEN:[[id number]]],PROVIDER:[TOKEN:[[id number]]]

## 2024-06-08 NOTE — DISCHARGE NOTE PROVIDER - HOSPITAL COURSE
HPI:  77 yo M with PMHx of HTN, HLD, GERD, Atrial fibrillation, Asthma, COPD, hx of benign lung cancer R lobe s/p resection,  CAD (s/p 5 stents ~2001), and recurrent UTIs presents to the ED with dysuria. Patient notes he began having burning with urination about 3 days ago. He has Augmentin prescribed by his Urologist Dr. Sena as needed for when he begins to feel dysuria, however, patient did not take the antibiotics. Then this AM, he woke up around 3:00AM with rigors, SOB and nausea. Patient tried to do a home breathing treatment but had 1 episode of vomiting. He attempted to go back to sleep, but started to become a little confused on the time of the day. Patient felt it was necessary for him to come to ED for evaluation.     ED Course:   Vitals: BP: 83/46, HR: 114, Temp: 99.7, RR: 18, SpO2: 90% on RA    Labs: WBC 16.61, CKMB 3.8, Trop 11.4, proBNP 49, RVP negative   UA: Cloudy, Protein 300, Leku esterase Large, Blood small, WBC too numerous to count, occasional bacteria   EKG: Sinus Tachycardia 114 bpm, rightward axis shift   Received in the ED: Ofirmev x1, Solumedrol 125 mg IVP x1, Zosyn 3.375 mg IVPB, Vancomycin 1g IVPB, 2L NS bolus, Duoneb x3     Imaging:  CXR: no active disease   CT Chest PE: No pulmonary embolus.  CT Abdomen and pelvis: No acute abnormality in the abdomen or pelvis.     (07 Jun 2024 17:15)      ---  HOSPITAL COURSE: Patient admitted with urosepsis, started on IV Ertapenem due to hx of ESBL. Blood cultures grew ESBL. Patient     ---  CONSULTANTS:     ---  TIME SPENT:  I, the attending physician, was physically present for the key portions of the evaluation and management (E/M) service provided. The total amount of time spent reviewing the hospital notes, laboratory values, imaging findings, assessing/counseling the patient, discussing with consultant physicians, social work, nursing staff was -- minutes    ---  Primary care provider was made aware of plan for discharge:      [  ] NO     [  ] YES   HPI:  79 yo M with PMHx of HTN, HLD, GERD, Atrial fibrillation, Asthma, COPD, hx of benign lung cancer R lobe s/p resection,  CAD (s/p 5 stents ~2001), and recurrent UTIs presents to the ED with dysuria. Patient notes he began having burning with urination about 3 days ago. He has Augmentin prescribed by his Urologist Dr. Sena as needed for when he begins to feel dysuria, however, patient did not take the antibiotics. Then this AM, he woke up around 3:00AM with rigors, SOB and nausea. Patient tried to do a home breathing treatment but had 1 episode of vomiting. He attempted to go back to sleep, but started to become a little confused on the time of the day. Patient felt it was necessary for him to come to ED for evaluation.     ED Course:   Vitals: BP: 83/46, HR: 114, Temp: 99.7, RR: 18, SpO2: 90% on RA    Labs: WBC 16.61, CKMB 3.8, Trop 11.4, proBNP 49, RVP negative   UA: Cloudy, Protein 300, Leku esterase Large, Blood small, WBC too numerous to count, occasional bacteria   EKG: Sinus Tachycardia 114 bpm, rightward axis shift   Received in the ED: Ofirmev x1, Solumedrol 125 mg IVP x1, Zosyn 3.375 mg IVPB, Vancomycin 1g IVPB, 2L NS bolus, Duoneb x3     Imaging:  CXR: no active disease   CT Chest PE: No pulmonary embolus.  CT Abdomen and pelvis: No acute abnormality in the abdomen or pelvis.     (07 Jun 2024 17:15)      ---  HOSPITAL COURSE: Patient admitted with urosepsis, started on IV Ertapenem due to hx of ESBL. Blood cultures grew ESBL. Urine culture grew ***. Patient also noted to be wheezing in the setting of hx COPD, started on standing nebulizers and increase in dosage of prednisone.    The patient was evaluated by physical therapy and determined to have no skilled PT needs.    Patient was seen and examined on day of    ---  CONSULTANTS:     ---  TIME SPENT:  I, the attending physician, was physically present for the key portions of the evaluation and management (E/M) service provided. The total amount of time spent reviewing the hospital notes, laboratory values, imaging findings, assessing/counseling the patient, discussing with consultant physicians, social work, nursing staff was -- minutes    ---  Primary care provider was made aware of plan for discharge:      [  ] NO     [  ] YES   HPI:  79 yo M with PMHx of HTN, HLD, GERD, Atrial fibrillation, Asthma, COPD, hx of benign lung cancer R lobe s/p resection,  CAD (s/p 5 stents ~2001), and recurrent UTIs presents to the ED with dysuria. Patient notes he began having burning with urination about 3 days ago. He has Augmentin prescribed by his Urologist Dr. Sena as needed for when he begins to feel dysuria, however, patient did not take the antibiotics. Then this AM, he woke up around 3:00AM with rigors, SOB and nausea. Patient tried to do a home breathing treatment but had 1 episode of vomiting. He attempted to go back to sleep, but started to become a little confused on the time of the day. Patient felt it was necessary for him to come to ED for evaluation.     ED Course:   Vitals: BP: 83/46, HR: 114, Temp: 99.7, RR: 18, SpO2: 90% on RA    Labs: WBC 16.61, CKMB 3.8, Trop 11.4, proBNP 49, RVP negative   UA: Cloudy, Protein 300, Leku esterase Large, Blood small, WBC too numerous to count, occasional bacteria   EKG: Sinus Tachycardia 114 bpm, rightward axis shift   Received in the ED: Ofirmev x1, Solumedrol 125 mg IVP x1, Zosyn 3.375 mg IVPB, Vancomycin 1g IVPB, 2L NS bolus, Duoneb x3     Imaging:  CXR: no active disease   CT Chest PE: No pulmonary embolus.  CT Abdomen and pelvis: No acute abnormality in the abdomen or pelvis.     (07 Jun 2024 17:15)      ---  HOSPITAL COURSE: Patient admitted with urosepsis, started on IV Ertapenem due to hx of ESBL. Blood cultures grew ESBL. Urine culture grew ***. Patient also noted to be wheezing in the setting of hx COPD, started on standing nebulizers and increase in dosage of prednisone.    The patient was evaluated by physical therapy and determined to have no skilled PT needs.    Patient was seen and examined on day of discharge and determined to be medically stable for discharge home.    ---  CONSULTANTS:     ---  TIME SPENT:  I, the attending physician, was physically present for the key portions of the evaluation and management (E/M) service provided. The total amount of time spent reviewing the hospital notes, laboratory values, imaging findings, assessing/counseling the patient, discussing with consultant physicians, social work, nursing staff was -- minutes    ---  Primary care provider was made aware of plan for discharge:      [  ] NO     [  ] YES   HPI:  79 yo M with PMHx of HTN, HLD, GERD, Atrial fibrillation, Asthma, COPD, hx of benign lung cancer R lobe s/p resection,  CAD (s/p 5 stents ~2001), and recurrent UTIs presents to the ED with dysuria. Patient notes he began having burning with urination about 3 days ago. He has Augmentin prescribed by his Urologist Dr. Sena as needed for when he begins to feel dysuria, however, patient did not take the antibiotics. Then this AM, he woke up around 3:00AM with rigors, SOB and nausea. Patient tried to do a home breathing treatment but had 1 episode of vomiting. He attempted to go back to sleep, but started to become a little confused on the time of the day. Patient felt it was necessary for him to come to ED for evaluation.     ED Course:   Vitals: BP: 83/46, HR: 114, Temp: 99.7, RR: 18, SpO2: 90% on RA    Labs: WBC 16.61, CKMB 3.8, Trop 11.4, proBNP 49, RVP negative   UA: Cloudy, Protein 300, Leku esterase Large, Blood small, WBC too numerous to count, occasional bacteria   EKG: Sinus Tachycardia 114 bpm, rightward axis shift   Received in the ED: Ofirmev x1, Solumedrol 125 mg IVP x1, Zosyn 3.375 mg IVPB, Vancomycin 1g IVPB, 2L NS bolus, Duoneb x3     Imaging:  CXR: no active disease   CT Chest PE: No pulmonary embolus.  CT Abdomen and pelvis: No acute abnormality in the abdomen or pelvis.     (07 Jun 2024 17:15)      ---  HOSPITAL COURSE: Patient admitted with urosepsis, started on IV Ertapenem due to hx of ESBL. Blood cultures grew ESBL E. coli. Urine culture grew normal urogenital cathi. Patient also noted to be wheezing in the setting of hx COPD, started on standing nebulizers, home inhalers, and increase in dosage of prednisone.    The patient was evaluated by physical therapy and determined to have no skilled PT needs.    Patient was seen and examined on day of discharge and determined to be medically stable for discharge home.    ---  CONSULTANTS:   ID - Dr. Monico Bui - Dr. Bunch    ---  TIME SPENT:  I, the attending physician, was physically present for the key portions of the evaluation and management (E/M) service provided. The total amount of time spent reviewing the hospital notes, laboratory values, imaging findings, assessing/counseling the patient, discussing with consultant physicians, social work, nursing staff was -- minutes    ---  Primary care provider was made aware of plan for discharge:      [  ] NO     [  ] YES   HPI:  79 yo M with PMHx of HTN, HLD, GERD, Atrial fibrillation, Asthma, COPD, hx of benign lung cancer R lobe s/p resection,  CAD (s/p 5 stents ~2001), and recurrent UTIs presents to the ED with dysuria. Patient notes he began having burning with urination about 3 days ago. He has Augmentin prescribed by his Urologist Dr. Sena as needed for when he begins to feel dysuria, however, patient did not take the antibiotics. Then this AM, he woke up around 3:00AM with rigors, SOB and nausea. Patient tried to do a home breathing treatment but had 1 episode of vomiting. He attempted to go back to sleep, but started to become a little confused on the time of the day. Patient felt it was necessary for him to come to ED for evaluation.     ED Course:   Vitals: BP: 83/46, HR: 114, Temp: 99.7, RR: 18, SpO2: 90% on RA    Labs: WBC 16.61, CKMB 3.8, Trop 11.4, proBNP 49, RVP negative   UA: Cloudy, Protein 300, Leku esterase Large, Blood small, WBC too numerous to count, occasional bacteria   EKG: Sinus Tachycardia 114 bpm, rightward axis shift   Received in the ED: Ofirmev x1, Solumedrol 125 mg IVP x1, Zosyn 3.375 mg IVPB, Vancomycin 1g IVPB, 2L NS bolus, Duoneb x3     Imaging:  CXR: no active disease   CT Chest PE: No pulmonary embolus.  CT Abdomen and pelvis: No acute abnormality in the abdomen or pelvis.     (07 Jun 2024 17:15)      ---  HOSPITAL COURSE: Patient admitted with sepsis, started on IV Ertapenem due to hx of ESBL. Blood cultures grew ESBL E. coli. Urine culture grew normal urogenital cathi. Patient also noted to be wheezing in the setting of hx COPD, started on standing nebulizers, home inhalers, and increase in dosage of prednisone for 5 days. Repeat blood cultures NGTD. ID consulted. Given unclear source, patient had midline placed on ____ and was discharged on IV ertapenem 1g daily to complete 4 week course ending 7/4    The patient was evaluated by physical therapy and determined to have no skilled PT needs.    Patient was seen and examined on day of discharge and determined to be medically stable for discharge home.    ---  CONSULTANTS:   ID - Dr. Monico Bui - Dr. Bunch    ---  TIME SPENT:  I, the attending physician, was physically present for the key portions of the evaluation and management (E/M) service provided. The total amount of time spent reviewing the hospital notes, laboratory values, imaging findings, assessing/counseling the patient, discussing with consultant physicians, social work, nursing staff was -- minutes    ---  Primary care provider was made aware of plan for discharge:      [  ] NO     [  ] YES   HPI:  77 yo M with PMHx of HTN, HLD, GERD, Atrial fibrillation, Asthma, COPD, hx of benign lung cancer R lobe s/p resection,  CAD (s/p 5 stents ~2001), and recurrent UTIs presents to the ED with dysuria. Patient notes he began having burning with urination about 3 days ago. He has Augmentin prescribed by his Urologist Dr. Sena as needed for when he begins to feel dysuria, however, patient did not take the antibiotics. Then this AM, he woke up around 3:00AM with rigors, SOB and nausea. Patient tried to do a home breathing treatment but had 1 episode of vomiting. He attempted to go back to sleep, but started to become a little confused on the time of the day. Patient felt it was necessary for him to come to ED for evaluation.     ED Course:   Vitals: BP: 83/46, HR: 114, Temp: 99.7, RR: 18, SpO2: 90% on RA    Labs: WBC 16.61, CKMB 3.8, Trop 11.4, proBNP 49, RVP negative   UA: Cloudy, Protein 300, Leku esterase Large, Blood small, WBC too numerous to count, occasional bacteria   EKG: Sinus Tachycardia 114 bpm, rightward axis shift   Received in the ED: Ofirmev x1, Solumedrol 125 mg IVP x1, Zosyn 3.375 mg IVPB, Vancomycin 1g IVPB, 2L NS bolus, Duoneb x3     Imaging:  CXR: no active disease   CT Chest PE: No pulmonary embolus.  CT Abdomen and pelvis: No acute abnormality in the abdomen or pelvis.     (07 Jun 2024 17:15)      ---  HOSPITAL COURSE: Patient admitted with sepsis, started on IV Ertapenem due to hx of ESBL. Blood cultures grew ESBL E. coli. Urine culture grew normal urogenital cathi. Patient also noted to be wheezing in the setting of hx COPD, started on standing nebulizers, home inhalers, and increase in dosage of prednisone for 5 days (increased course completed). Repeat blood cultures NGTD. ID consulted. Given unclear source, patient had midline placed on ____ and was discharged on IV ertapenem 1g daily to complete 4 week course ending 7/4    The patient was evaluated by physical therapy and determined to have no skilled PT needs.    Patient was seen and examined on day of discharge and determined to be medically stable for discharge home.    PHYSICAL EXAM  as per progress note    ---  CONSULTANTS:   ID - Dr. Monico Bui - Dr. Bunch    ---  TIME SPENT:  I, the attending physician, was physically present for the key portions of the evaluation and management (E/M) service provided. The total amount of time spent reviewing the hospital notes, laboratory values, imaging findings, assessing/counseling the patient, discussing with consultant physicians, social work, nursing staff was -- minutes    ---  Primary care provider was made aware of plan for discharge:      [  ] NO     [  ] YES   HPI:  79 yo M with PMHx of HTN, HLD, GERD, Atrial fibrillation, Asthma, COPD, hx of benign lung cancer R lobe s/p resection,  CAD (s/p 5 stents ~2001), and recurrent UTIs presents to the ED with dysuria. Patient notes he began having burning with urination about 3 days ago. He has Augmentin prescribed by his Urologist Dr. Sena as needed for when he begins to feel dysuria, however, patient did not take the antibiotics. Then this AM, he woke up around 3:00AM with rigors, SOB and nausea. Patient tried to do a home breathing treatment but had 1 episode of vomiting. He attempted to go back to sleep, but started to become a little confused on the time of the day. Patient felt it was necessary for him to come to ED for evaluation.     ED Course:   Vitals: BP: 83/46, HR: 114, Temp: 99.7, RR: 18, SpO2: 90% on RA    Labs: WBC 16.61, CKMB 3.8, Trop 11.4, proBNP 49, RVP negative   UA: Cloudy, Protein 300, Leku esterase Large, Blood small, WBC too numerous to count, occasional bacteria   EKG: Sinus Tachycardia 114 bpm, rightward axis shift   Received in the ED: Ofirmev x1, Solumedrol 125 mg IVP x1, Zosyn 3.375 mg IVPB, Vancomycin 1g IVPB, 2L NS bolus, Duoneb x3     Imaging:  CXR: no active disease   CT Chest PE: No pulmonary embolus.  CT Abdomen and pelvis: No acute abnormality in the abdomen or pelvis.     (07 Jun 2024 17:15)      ---  HOSPITAL COURSE: Patient admitted with sepsis, started on IV Ertapenem due to hx of ESBL. Blood cultures grew ESBL E. coli. Urine culture grew normal urogenital cathi. Patient also noted to be wheezing in the setting of hx COPD, started on standing nebulizers, home inhalers, and increase in dosage of prednisone for 5 days (increased course completed). Repeat blood cultures NGTD. ID consulted. PSA level noted to be elevated. Given unclear source, patient had midline placed on 6/12 and was discharged on IV ertapenem 1g daily to complete 4 week course ending 7/4    The patient was evaluated by physical therapy and determined to have no skilled PT needs.    Patient was seen and examined on day of discharge and determined to be medically stable for discharge home.    PHYSICAL EXAM  as per progress note    ---  CONSULTANTS:   ID - Dr. Monico Bui - Dr. Bunch    ---  TIME SPENT:  I, the attending physician, was physically present for the key portions of the evaluation and management (E/M) service provided. The total amount of time spent reviewing the hospital notes, laboratory values, imaging findings, assessing/counseling the patient, discussing with consultant physicians, social work, nursing staff was -- minutes    ---  Primary care provider was made aware of plan for discharge:      [  ] NO     [  ] YES   HPI:  77 yo M with PMHx of HTN, HLD, GERD, Atrial fibrillation, Asthma, COPD, hx of benign lung cancer R lobe s/p resection,  CAD (s/p 5 stents ~2001), and recurrent UTIs presents to the ED with dysuria. Patient notes he began having burning with urination about 3 days ago. He has Augmentin prescribed by his Urologist Dr. Sena as needed for when he begins to feel dysuria, however, patient did not take the antibiotics. Then this AM, he woke up around 3:00AM with rigors, SOB and nausea. Patient tried to do a home breathing treatment but had 1 episode of vomiting. He attempted to go back to sleep, but started to become a little confused on the time of the day. Patient felt it was necessary for him to come to ED for evaluation.     ED Course:   Vitals: BP: 83/46, HR: 114, Temp: 99.7, RR: 18, SpO2: 90% on RA    Labs: WBC 16.61, CKMB 3.8, Trop 11.4, proBNP 49, RVP negative   UA: Cloudy, Protein 300, Leku esterase Large, Blood small, WBC too numerous to count, occasional bacteria   EKG: Sinus Tachycardia 114 bpm, rightward axis shift   Received in the ED: Ofirmev x1, Solumedrol 125 mg IVP x1, Zosyn 3.375 mg IVPB, Vancomycin 1g IVPB, 2L NS bolus, Duoneb x3     Imaging:  CXR: no active disease   CT Chest PE: No pulmonary embolus.  CT Abdomen and pelvis: No acute abnormality in the abdomen or pelvis.     (07 Jun 2024 17:15)      ---  HOSPITAL COURSE: Patient admitted with sepsis, started on IV Ertapenem due to hx of ESBL. Blood cultures grew ESBL E. coli. Urine culture grew normal urogenital cathi. Patient also noted to be wheezing in the setting of hx COPD, started on standing nebulizers, home inhalers, and increase in dosage of prednisone for 5 days (increased course completed). Repeat blood cultures NGTD. ID consulted. PSA level noted to be elevated. Given unclear source, patient had midline placed on 6/12 and was discharged on IV ertapenem 1g daily to complete 4 week course ending 7/4    The patient was evaluated by physical therapy and determined to have no skilled PT needs.    Patient was seen and examined on day of discharge and determined to be medically stable for discharge home.    PHYSICAL EXAM  as per progress note    ---  CONSULTANTS:   ID - Dr. Monico Bui - Dr. Bunch    ---  TIME SPENT:  I, the attending physician, was physically present for the key portions of the evaluation and management (E/M) service provided. The total amount of time spent reviewing the hospital notes, laboratory values, imaging findings, assessing/counseling the patient, discussing with consultant physicians, social work, nursing staff was 60 minutes    ---  Primary care provider was made aware of plan for discharge:      [  ] NO     [ x ] YES

## 2024-06-08 NOTE — CONSULT NOTE ADULT - SUBJECTIVE AND OBJECTIVE BOX
Date/Time Patient Seen:  		  Referring MD:   Data Reviewed	       Patient is a 78y old  Male who presents with a chief complaint of Sepsis (07 Jun 2024 18:47)      Subjective/HPI   79 yo M with PMHx of HTN, HLD, GERD, Atrial fibrillation, Asthma, COPD, hx of benign lung cancer R lobe s/p resection,  CAD (s/p 5 stents ~2001), and recurrent UTIs presents to the ED with dysuria. Patient notes he began having burning with urination about 3 days ago. He has Augmentin prescribed by his Urologist Dr. Sena as needed for when he begins to feel dysuria, however, patient did not take the antibiotics. Then this AM, he woke up around 3:00AM with rigors, SOB and nausea. Patient tried to do a home breathing treatment but had 1 episode of vomiting. He attempted to go back to sleep, but started to become a little confused on the time of the day. Patient felt it was necessary for him to come to ED for evaluation.   PAST MEDICAL & SURGICAL HISTORY:  Chronic obstructive pulmonary disease  Asthma/copd    Asthma    Stented coronary artery  2000, 2006 , 2016 and April 2023, total of 6 JESSE stents    HTN (hypertension)    HLD (hyperlipidemia)    GERD (gastroesophageal reflux disease)    Nephrolithiasis  s/p Lithotripsy    GI bleed  while on Antiplatelets    Umbilical hernia with obstruction, without gangrene    2019 novel coronavirus disease (COVID-19)  DX 11/4/2022    History of atrial fibrillation    Implantable loop recorder present    Frequent UTI    Chronic atrial fibrillation    Lung tumor  Rt Lung tumor resection,benign    S/P primary angioplasty with coronary stent    S/P TURP  HTN (hypertension)     Implantable loop recorder present     Nephrolithiasis s/p Lithotripsy    Stented coronary artery 2000, 2006 , 2016 and April 2023, total of 6 JESSE stents    Umbilical hernia with obstruction, without gangrene.    PAST SURGICAL HISTORY:  Lung tumor Rt Lung tumor resection,benign    S/P primary angioplasty with coronary stent     S/P TURP.     FAMILY HISTORY:  Family history of heart disease  Family history of stroke.     Social History:  · Substance use	No  · Social History (marital status, living situation, occupation, and sexual history)	Lives: At home with wife   ADLs: Fully independent, no problem ambulating   Alcohol Use: None   Tobacco Use: Former smoker quit >10yrs ago   Recreational Drug Use: None     Tobacco Screening:  · Core Measure Site	Yes  · Has the patient used tobacco in the past 30 days?	No    Risk Assessment:    Present on Admission:  Deep Venous Thrombosis	no  Pulmonary Embolus	no  Urinary Catheter	no   Central Venous Catheter/PICC Line	no   Surgical Site Incision	no   Pressure Ulcer(s)	no      HIV Screening:  · In accordance with [place] State law, we offer every patient who comes to our ED an HIV test. Would you like to be tested today?	Opt out     Hepatitis C Test Questions:  · In accordance with [facility] Law, we offer every patient a Hepatitis C test. Would you like to be tested today?	Opt out          Medication list         MEDICATIONS  (STANDING):  albuterol/ipratropium for Nebulization 3 milliLiter(s) Nebulizer every 6 hours  ascorbic acid 500 milliGRAM(s) Oral daily  aspirin  chewable 81 milliGRAM(s) Oral daily  atorvastatin 80 milliGRAM(s) Oral at bedtime  budesonide 160 MICROgram(s)/formoterol 4.5 MICROgram(s) Inhaler 2 Puff(s) Inhalation two times a day  cholecalciferol 1000 Unit(s) Oral at bedtime  clopidogrel Tablet 75 milliGRAM(s) Oral daily  cyanocobalamin 1000 MICROGram(s) Oral daily  enoxaparin Injectable 40 milliGRAM(s) SubCutaneous every 24 hours  ertapenem  IVPB 1000 milliGRAM(s) IV Intermittent every 24 hours  pantoprazole    Tablet 40 milliGRAM(s) Oral before breakfast  predniSONE   Tablet 20 milliGRAM(s) Oral daily  pyridoxine 100 milliGRAM(s) Oral at bedtime  sodium chloride 0.9%. 1000 milliLiter(s) (75 mL/Hr) IV Continuous <Continuous>  tiotropium 2.5 MICROgram(s) Inhaler 2 Puff(s) Inhalation daily    MEDICATIONS  (PRN):  acetaminophen     Tablet .. 650 milliGRAM(s) Oral every 6 hours PRN Temp greater or equal to 38C (100.4F), Mild Pain (1 - 3)  albuterol    90 MICROgram(s) HFA Inhaler 2 Puff(s) Inhalation every 6 hours PRN for shortness of breath and/or wheezing  aluminum hydroxide/magnesium hydroxide/simethicone Suspension 30 milliLiter(s) Oral every 4 hours PRN Dyspepsia  buDESOnide    Inhalation Suspension 0.5 milliGRAM(s) Inhalation two times a day PRN SOB/wheezing  melatonin 3 milliGRAM(s) Oral at bedtime PRN Insomnia  ondansetron Injectable 4 milliGRAM(s) IV Push every 8 hours PRN Nausea and/or Vomiting         Vitals log        ICU Vital Signs Last 24 Hrs  T(C): 36.6 (08 Jun 2024 04:56), Max: 37.6 (07 Jun 2024 09:19)  T(F): 97.9 (08 Jun 2024 04:56), Max: 99.7 (07 Jun 2024 09:19)  HR: 78 (08 Jun 2024 04:56) (75 - 114)  BP: 104/44 (08 Jun 2024 04:56) (83/46 - 113/62)  BP(mean): --  ABP: --  ABP(mean): --  RR: 18 (08 Jun 2024 04:56) (16 - 27)  SpO2: 92% (08 Jun 2024 04:56) (90% - 100%)    O2 Parameters below as of 08 Jun 2024 04:56  Patient On (Oxygen Delivery Method): nasal cannula  O2 Flow (L/min): 2               Input and Output:  I&O's Detail    07 Jun 2024 07:01  -  08 Jun 2024 05:33  --------------------------------------------------------  IN:  Total IN: 0 mL    OUT:    Voided (mL): 350 mL  Total OUT: 350 mL    Total NET: -350 mL          Lab Data                        15.3   16.61 )-----------( 161      ( 07 Jun 2024 10:00 )             44.9     06-07    142  |  106  |  22  ----------------------------<  102<H>  3.8   |  32<H>  |  1.10    Ca    9.1      07 Jun 2024 10:00  Mg     1.8     06-07    TPro  6.7  /  Alb  3.6  /  TBili  0.8  /  DBili  x   /  AST  32  /  ALT  47  /  AlkPhos  69  06-07      CARDIAC MARKERS ( 07 Jun 2024 18:30 )  x     / x     / 236 U/L / x     / 5.0 ng/mL  CARDIAC MARKERS ( 07 Jun 2024 10:00 )  x     / x     / x     / x     / 3.8 ng/mL        Review of Systems	      Objective     Physical Examination        Pertinent Lab findings & Imaging      Liz:  NO   Adequate UO     I&O's Detail    07 Jun 2024 07:01  -  08 Jun 2024 05:33  --------------------------------------------------------  IN:  Total IN: 0 mL    OUT:    Voided (mL): 350 mL  Total OUT: 350 mL    Total NET: -350 mL               Discussed with:     Cultures:	  Culture Results:   Growth in anaerobic bottle: Gram Negative Rods  Direct identification is available within approximately 3-5  hours either by Blood Panel Multiplexed PCR or Direct  MALDI-TOF. Details: [email or web address] (06-07 @ 09:45)        Radiology      ACC: [id number] EXAM:  CT ABDOMEN AND PELVIS IC   ORDERED BY:  WILLIAM ROSSI     ACC: [id number] EXAM:  CT ANGIO CHEST PULM ART WAWIC   ORDERED BY:  WILLIAM ROSSI     PROCEDURE DATE:  06/07/2024          INTERPRETATION:  CLINICAL INFORMATION: fever, sob, hypoxia (evaluate for   PE vs. PNA vs. pulmonary edema) PCT    COMPARISON: 10.11.20.    CONTRAST/COMPLICATIONS:  IV Contrast: Omnipaque 350  90 cc administered   10 cc discarded  Oral Contrast: NONE  Complications: None reported at time of study completion    PROCEDURE:  CT Angiography of the Chest was performed followed by portal venous phase   imaging of the Abdomen and Pelvis.  Sagittal and coronal reformats were performed as well as 3D (MIP)   reconstructions.    FINDINGS:  CHEST:  LUNGS AND LARGE AIRWAYS: Patent central airways. Peripheral reticular   opacities are again noted. Post surgical changes right upper lobe.  PLEURA: No pleural effusion.  VESSELS: Within normal limits.  HEART: Heart size is normal.  No pericardial effusion.  MEDIASTINUM AND MEHUL: No lymphadenopathy.  CHEST WALL AND LOWER NECK: A left chest wall loop recorder.    ABDOMEN AND PELVIS:  LIVER: Cysts and other lesions too small to characterize.  BILE DUCTS: Normal caliber.  GALLBLADDER: Within normal limits.  SPLEEN: Within normal limits.  PANCREAS: Within normal limits.  ADRENALS: Within normal limits.  KIDNEYS/URETERS: Cysts and other lesions too small to characterize.  .   Punctate left renal calculus.    BLADDER: Within normal limits.  REPRODUCTIVE ORGANS: Within normal limits.    BOWEL: No bowel obstruction. The appendix is normal.  PERITONEUM: No ascites.  VESSELS:  Within normal limits.  RETROPERITONEUM/LYMPH NODES: No lymphadenopathy.  ABDOMINAL WALL: Within normal limits.  BONES: Within normal limits.    IMPRESSION: No pulmonary embolus.    No acute abnormality in the abdomen or pelvis.    --- End of Report ---            JAYLON CRUZ MD; Attending Radiologist  This document has been electronically signed. Jun 7 2024  3:46PM                         Date/Time Patient Seen:  		  Referring MD:   Data Reviewed	       Patient is a 78y old  Male who presents with a chief complaint of Sepsis (07 Jun 2024 18:47)      Subjective/HPI   79 yo M with PMHx of HTN, HLD, GERD, Atrial fibrillation, Asthma, COPD, hx of benign lung cancer R lobe s/p resection,  CAD (s/p 5 stents ~2001), and recurrent UTIs presents to the ED with dysuria. Patient notes he began having burning with urination about 3 days ago. He has Augmentin prescribed by his Urologist Dr. Sena as needed for when he begins to feel dysuria, however, patient did not take the antibiotics. Then this AM, he woke up around 3:00AM with rigors, SOB and nausea. Patient tried to do a home breathing treatment but had 1 episode of vomiting. He attempted to go back to sleep, but started to become a little confused on the time of the day. Patient felt it was necessary for him to come to ED for evaluation.   PAST MEDICAL & SURGICAL HISTORY:  Chronic obstructive pulmonary disease  Asthma/copd    Asthma    Stented coronary artery  2000, 2006 , 2016 and April 2023, total of 6 JESSE stents    HTN (hypertension)    HLD (hyperlipidemia)    GERD (gastroesophageal reflux disease)    Nephrolithiasis  s/p Lithotripsy    GI bleed  while on Antiplatelets    Umbilical hernia with obstruction, without gangrene    2019 novel coronavirus disease (COVID-19)  DX 11/4/2022    History of atrial fibrillation    Implantable loop recorder present    Frequent UTI    Chronic atrial fibrillation    Lung tumor  Rt Lung tumor resection,benign    S/P primary angioplasty with coronary stent    S/P TURP  HTN (hypertension)     Implantable loop recorder present     Nephrolithiasis s/p Lithotripsy    Stented coronary artery 2000, 2006 , 2016 and April 2023, total of 6 JESSE stents    Umbilical hernia with obstruction, without gangrene.    PAST SURGICAL HISTORY:  Lung tumor Rt Lung tumor resection,benign    S/P primary angioplasty with coronary stent     S/P TURP.     FAMILY HISTORY:  Family history of heart disease  Family history of stroke.     Social History:  · Substance use	No  · Social History (marital status, living situation, occupation, and sexual history)	Lives: At home with wife   ADLs: Fully independent, no problem ambulating   Alcohol Use: None   Tobacco Use: Former smoker quit >10yrs ago   Recreational Drug Use: None     Tobacco Screening:  · Core Measure Site	Yes  · Has the patient used tobacco in the past 30 days?	No    Risk Assessment:    Present on Admission:  Deep Venous Thrombosis	no  Pulmonary Embolus	no  Urinary Catheter	no   Central Venous Catheter/PICC Line	no   Surgical Site Incision	no   Pressure Ulcer(s)	no      HIV Screening:  · In accordance with [place] law, we offer every patient who comes to our ED an HIV test. Would you like to be tested today?	Opt out     Hepatitis C Test Questions:  · In accordance with [facility] Law, we offer every patient a Hepatitis C test. Would you like to be tested today?	Opt out          Medication list         MEDICATIONS  (STANDING):  albuterol/ipratropium for Nebulization 3 milliLiter(s) Nebulizer every 6 hours  ascorbic acid 500 milliGRAM(s) Oral daily  aspirin  chewable 81 milliGRAM(s) Oral daily  atorvastatin 80 milliGRAM(s) Oral at bedtime  budesonide 160 MICROgram(s)/formoterol 4.5 MICROgram(s) Inhaler 2 Puff(s) Inhalation two times a day  cholecalciferol 1000 Unit(s) Oral at bedtime  clopidogrel Tablet 75 milliGRAM(s) Oral daily  cyanocobalamin 1000 MICROGram(s) Oral daily  enoxaparin Injectable 40 milliGRAM(s) SubCutaneous every 24 hours  ertapenem  IVPB 1000 milliGRAM(s) IV Intermittent every 24 hours  pantoprazole    Tablet 40 milliGRAM(s) Oral before breakfast  predniSONE   Tablet 20 milliGRAM(s) Oral daily  pyridoxine 100 milliGRAM(s) Oral at bedtime  sodium chloride 0.9%. 1000 milliLiter(s) (75 mL/Hr) IV Continuous <Continuous>  tiotropium 2.5 MICROgram(s) Inhaler 2 Puff(s) Inhalation daily    MEDICATIONS  (PRN):  acetaminophen     Tablet .. 650 milliGRAM(s) Oral every 6 hours PRN Temp greater or equal to 38C (100.4F), Mild Pain (1 - 3)  albuterol    90 MICROgram(s) HFA Inhaler 2 Puff(s) Inhalation every 6 hours PRN for shortness of breath and/or wheezing  aluminum hydroxide/magnesium hydroxide/simethicone Suspension 30 milliLiter(s) Oral every 4 hours PRN Dyspepsia  buDESOnide    Inhalation Suspension 0.5 milliGRAM(s) Inhalation two times a day PRN SOB/wheezing  melatonin 3 milliGRAM(s) Oral at bedtime PRN Insomnia  ondansetron Injectable 4 milliGRAM(s) IV Push every 8 hours PRN Nausea and/or Vomiting         Vitals log        ICU Vital Signs Last 24 Hrs  T(C): 36.6 (08 Jun 2024 04:56), Max: 37.6 (07 Jun 2024 09:19)  T(F): 97.9 (08 Jun 2024 04:56), Max: 99.7 (07 Jun 2024 09:19)  HR: 78 (08 Jun 2024 04:56) (75 - 114)  BP: 104/44 (08 Jun 2024 04:56) (83/46 - 113/62)  BP(mean): --  ABP: --  ABP(mean): --  RR: 18 (08 Jun 2024 04:56) (16 - 27)  SpO2: 92% (08 Jun 2024 04:56) (90% - 100%)    O2 Parameters below as of 08 Jun 2024 04:56  Patient On (Oxygen Delivery Method): nasal cannula  O2 Flow (L/min): 2               Input and Output:  I&O's Detail    07 Jun 2024 07:01  -  08 Jun 2024 05:33  --------------------------------------------------------  IN:  Total IN: 0 mL    OUT:    Voided (mL): 350 mL  Total OUT: 350 mL    Total NET: -350 mL          Lab Data                        15.3   16.61 )-----------( 161      ( 07 Jun 2024 10:00 )             44.9     06-07    142  |  106  |  22  ----------------------------<  102<H>  3.8   |  32<H>  |  1.10    Ca    9.1      07 Jun 2024 10:00  Mg     1.8     06-07    TPro  6.7  /  Alb  3.6  /  TBili  0.8  /  DBili  x   /  AST  32  /  ALT  47  /  AlkPhos  69  06-07      CARDIAC MARKERS ( 07 Jun 2024 18:30 )  x     / x     / 236 U/L / x     / 5.0 ng/mL  CARDIAC MARKERS ( 07 Jun 2024 10:00 )  x     / x     / x     / x     / 3.8 ng/mL        Review of Systems	    cough  weakness  sob  michelle  on o2 support    Objective     Physical Examination    heart s1s2  lung dc bS  head nc  verbal  alert  cn grossly int  moves extr  on o2 support      Pertinent Lab findings & Imaging      Agusto:  NO   Adequate UO     I&O's Detail    07 Jun 2024 07:01  -  08 Jun 2024 05:33  --------------------------------------------------------  IN:  Total IN: 0 mL    OUT:    Voided (mL): 350 mL  Total OUT: 350 mL    Total NET: -350 mL               Discussed with:     Cultures:	  Culture Results:   Growth in anaerobic bottle: Gram Negative Rods  Direct identification is available within approximately 3-5  hours either by Blood Panel Multiplexed PCR or Direct  MALDI-TOF. Details: [email or web address] (06-07 @ 09:45)        Radiology      ACC: [id number] EXAM:  CT ABDOMEN AND PELVIS IC   ORDERED BY:  WILLIAM ROSSI     ACC: [id number] EXAM:  CT ANGIO CHEST PULM ART WAWIC   ORDERED BY:  WILLIAM ROSSI     PROCEDURE DATE:  06/07/2024          INTERPRETATION:  CLINICAL INFORMATION: fever, sob, hypoxia (evaluate for   PE vs. PNA vs. pulmonary edema) PCT    COMPARISON: 10.11.20.    CONTRAST/COMPLICATIONS:  IV Contrast: Omnipaque 350  90 cc administered   10 cc discarded  Oral Contrast: NONE  Complications: None reported at time of study completion    PROCEDURE:  CT Angiography of the Chest was performed followed by portal venous phase   imaging of the Abdomen and Pelvis.  Sagittal and coronal reformats were performed as well as 3D (MIP)   reconstructions.    FINDINGS:  CHEST:  LUNGS AND LARGE AIRWAYS: Patent central airways. Peripheral reticular   opacities are again noted. Post surgical changes right upper lobe.  PLEURA: No pleural effusion.  VESSELS: Within normal limits.  HEART: Heart size is normal.  No pericardial effusion.  MEDIASTINUM AND MEHUL: No lymphadenopathy.  CHEST WALL AND LOWER NECK: A left chest wall loop recorder.    ABDOMEN AND PELVIS:  LIVER: Cysts and other lesions too small to characterize.  BILE DUCTS: Normal caliber.  GALLBLADDER: Within normal limits.  SPLEEN: Within normal limits.  PANCREAS: Within normal limits.  ADRENALS: Within normal limits.  KIDNEYS/URETERS: Cysts and other lesions too small to characterize.  .   Punctate left renal calculus.    BLADDER: Within normal limits.  REPRODUCTIVE ORGANS: Within normal limits.    BOWEL: No bowel obstruction. The appendix is normal.  PERITONEUM: No ascites.  VESSELS:  Within normal limits.  RETROPERITONEUM/LYMPH NODES: No lymphadenopathy.  ABDOMINAL WALL: Within normal limits.  BONES: Within normal limits.    IMPRESSION: No pulmonary embolus.    No acute abnormality in the abdomen or pelvis.    --- End of Report ---            JAYLON CRUZ MD; Attending Radiologist  This document has been electronically signed. Jun 7 2024  3:46PM

## 2024-06-08 NOTE — CONSULT NOTE ADULT - ASSESSMENT
79 yo M with PMHx of HTN, HLD, GERD, Atrial fibrillation, Asthma, COPD, hx of benign lung cancer R lobe s/p resection,  CAD (s/p 5 stents ~2001), and recurrent UTIs presents to the ED with dysuria.   hypotension tachycardia and leukocytosis  sepsis gn bacteremia gu etiology  left renal stone     plan  blood cx esbl ecoli  cont ertapenem day 2   check surveillance cx  trend wbc

## 2024-06-08 NOTE — PHYSICAL THERAPY INITIAL EVALUATION ADULT - PERTINENT HX OF CURRENT PROBLEM, REHAB EVAL
77 yo M with PMHx of HTN, HLD, GERD, Atrial fibrillation Not on AC , Asthma, COPD, hx of benign lung cancer R lobe s/p resection,  CAD (s/p 5 stents ~2001), presents to the ED with 3 days of Dysuria ,fever  Leukocytosis, Sepsis, UTI

## 2024-06-08 NOTE — CONSULT NOTE ADULT - ASSESSMENT
79 yo M with PMHx of HTN, HLD, GERD, Atrial fibrillation, Asthma, COPD, hx of benign lung cancer R lobe s/p resection,  CAD (s/p 5 stents ~2001), and recurrent UTIs presents to the ED with dysuria. Patient notes he began having burning with urination about 3 days ago. He has Augmentin prescribed by his Urologist Dr. Sena as needed for when he begins to feel dysuria, however, patient did not take the antibiotics. 79 yo M with PMHx of HTN, HLD, GERD, Atrial fibrillation, Asthma, COPD, hx of benign lung cancer R lobe s/p resection,  CAD (s/p 5 stents ~2001), and recurrent UTIs presents to the ED with dysuria. Patient notes he began having burning with urination about 3 days ago. He has Augmentin prescribed by his Urologist Dr. Sena as needed for when he begins to feel dysuria, however, patient did not take the antibiotics.    abnormal chest imaging  copd  emphysema  HLD  HTN  OP  OA  AF  hx of Lung Surgery -   CAD    ct chest imaging reviewed - unclear correlation - etiol - will benefit from rpt imaging in 6 - 8 weeks and follow up with his outpatient Pulm MD - Dr Dang in [place] - [facility]  eval for Acute Infection   emp ABX  Biomarkers and Cx -   ID eval  copd - asthma hx - nebs - inhalers and short course of Prednisone  o2 support as needed  goal sat > 88 pct  cvs rx regimen  BP control  on Anti Platelet rx regimen  nutrition  - dietary discretion  emotional support  anxiolysis  seasonal vaccination counseling

## 2024-06-08 NOTE — PATIENT PROFILE ADULT - FALL HARM RISK - UNIVERSAL INTERVENTIONS
Bed in lowest position, wheels locked, appropriate side rails in place/Call bell, personal items and telephone in reach/Instruct patient to call for assistance before getting out of bed or chair/Non-slip footwear when patient is out of bed/[place] to call system/Physically safe environment - no spills, clutter or unnecessary equipment/Purposeful Proactive Rounding/Room/bathroom lighting operational, light cord in reach

## 2024-06-08 NOTE — PROGRESS NOTE ADULT - SUBJECTIVE AND OBJECTIVE BOX
Patient is a 78y old  Male who presents with a chief complaint of Sepsis (08 Jun 2024 05:32)    HPI:  77 yo M with PMHx of HTN, HLD, GERD, Atrial fibrillation, Asthma, COPD, hx of benign lung cancer R lobe s/p resection,  CAD (s/p 5 stents ~2001), and recurrent UTIs presents to the ED with dysuria. Patient notes he began having burning with urination about 3 days ago. He has Augmentin prescribed by his Urologist Dr. Sena as needed for when he begins to feel dysuria, however, patient did not take the antibiotics. Then this AM, he woke up around 3:00AM with rigors, SOB and nausea. Patient tried to do a home breathing treatment but had 1 episode of vomiting. He attempted to go back to sleep, but started to become a little confused on the time of the day. Patient felt it was necessary for him to come to ED for evaluation.     ED Course:   Vitals: BP: 83/46, HR: 114, Temp: 99.7, RR: 18, SpO2: 90% on RA    Labs: WBC 16.61, CKMB 3.8, Trop 11.4, proBNP 49, RVP negative   UA: Cloudy, Protein 300, Leku esterase Large, Blood small, WBC too numerous to count, occasional bacteria   EKG: Sinus Tachycardia 114 bpm, rightward axis shift   Received in the ED: Ofirmev x1, Solumedrol 125 mg IVP x1, Zosyn 3.375 mg IVPB, Vancomycin 1g IVPB, 2L NS bolus, Duoneb x3     Imaging:  CXR: no active disease   CT Chest PE: No pulmonary embolus.  CT Abdomen and pelvis: No acute abnormality in the abdomen or pelvis.     (07 Jun 2024 17:15)    INTERVAL HPI:  6/7 - admitted  TODAY- Pt was seen and examined at bedside. Pt states that he feels well and denies dysuria. Pt denies headache, dizziness, lightheadedness, fever, chills, body aches, CP, SOB, palpitations, abdominal pain, n/v, numbness/tingling. No other complaints at this time.     OVERNIGHT EVENTS: No acute overnight events.     Home Medications:  albuterol 90 mcg/inh inhalation aerosol: 2 puff(s) inhaled once a day as needed for  shortness of breath and/or wheezing (07 Jun 2024 17:45)  ascorbic acid 500 mg oral tablet: 1 orally once a day (in the morning) (07 Jun 2024 17:52)  aspirin 81 mg oral tablet: 1 orally once a day (in the morning) (07 Jun 2024 17:52)  B-12 500 mcg sublingual tablet: 1 sublingually once a day (at bedtime) (07 Jun 2024 17:52)  budesonide 0.5 mg/2 mL inhalation suspension: 2 puff(s) by nebulizer 2 times a day as needed for  shortness of breath and/or wheezing (07 Jun 2024 17:47)  cholecalciferol 25 mcg (1000 intl units) oral tablet: 1 orally once a day (at bedtime) (07 Jun 2024 17:52)  clopidogrel 75 mg oral tablet: 1 orally once a day (in the morning) (07 Jun 2024 17:52)  esomeprazole 40 mg oral delayed release capsule: 1 orally once a day (in the morning) (07 Jun 2024 17:52)  ipratropium-albuterol 0.5 mg-2.5 mg/3 mL inhalation solution: 3 puff(s) by nebulizer every 6 hours as needed for  shortness of breath and/or wheezing (07 Jun 2024 17:49)  nitrofurantoin macrocrystals 100 mg oral capsule: 1 orally once a day (at bedtime) (07 Jun 2024 17:52)  predniSONE 5 mg oral tablet: 1 tab(s) orally once a day as needed for  shortness of breath and/or wheezing (07 Jun 2024 17:51)  rosuvastatin 20 mg oral tablet: 1 orally once a day (at bedtime) (07 Jun 2024 17:52)  Spiriva 18 mcg inhalation capsule: 1 inhaled once a day (in the morning) (07 Jun 2024 17:52)  Symbicort 160 mcg-4.5 mcg/inh inhalation aerosol: 2 puff(s) inhaled 2 times a day (07 Jun 2024 17:47)  Vitamin B6 100 mg oral tablet: 1 orally once a day (at bedtime) (07 Jun 2024 17:52)  zinc (as gluconate) 50 mg oral tablet: orally once a day (in the morning) (07 Jun 2024 17:52)      MEDICATIONS  (STANDING):  albuterol/ipratropium for Nebulization 3 milliLiter(s) Nebulizer every 6 hours  ascorbic acid 500 milliGRAM(s) Oral daily  aspirin  chewable 81 milliGRAM(s) Oral daily  atorvastatin 80 milliGRAM(s) Oral at bedtime  buDESOnide    Inhalation Suspension 0.5 milliGRAM(s) Inhalation every 12 hours  budesonide 160 MICROgram(s)/formoterol 4.5 MICROgram(s) Inhaler 2 Puff(s) Inhalation two times a day  cholecalciferol 1000 Unit(s) Oral at bedtime  clopidogrel Tablet 75 milliGRAM(s) Oral daily  cyanocobalamin 1000 MICROGram(s) Oral daily  enoxaparin Injectable 40 milliGRAM(s) SubCutaneous every 24 hours  ertapenem  IVPB 1000 milliGRAM(s) IV Intermittent every 24 hours  pantoprazole    Tablet 40 milliGRAM(s) Oral before breakfast  predniSONE   Tablet 20 milliGRAM(s) Oral daily  pyridoxine 100 milliGRAM(s) Oral at bedtime  sodium chloride 0.9%. 1000 milliLiter(s) (75 mL/Hr) IV Continuous <Continuous>  tiotropium 2.5 MICROgram(s) Inhaler 2 Puff(s) Inhalation daily    MEDICATIONS  (PRN):  acetaminophen     Tablet .. 650 milliGRAM(s) Oral every 6 hours PRN Temp greater or equal to 38C (100.4F), Mild Pain (1 - 3)  albuterol    90 MICROgram(s) HFA Inhaler 2 Puff(s) Inhalation every 6 hours PRN for shortness of breath and/or wheezing  aluminum hydroxide/magnesium hydroxide/simethicone Suspension 30 milliLiter(s) Oral every 4 hours PRN Dyspepsia  melatonin 3 milliGRAM(s) Oral at bedtime PRN Insomnia  ondansetron Injectable 4 milliGRAM(s) IV Push every 8 hours PRN Nausea and/or Vomiting      No Known Allergies      Social History:  Lives: At home with wife   ADLs: Fully independent, no problem ambulating   Alcohol Use: None   Tobacco Use: Former smoker quit >10yrs ago   Recreational Drug Use: None (07 Jun 2024 17:15)      REVIEW OF SYSTEMS:  CONSTITUTIONAL: No fever, No chills, No fatigue, No myalgia, No Body ache, No Weakness  EYES: No eye pain,  No visual disturbances, No discharge, No Redness  ENMT: No ear pain, No nose bleed, No vertigo; No sinus pain, No throat pain, No Congestion  NECK: No pain, No stiffness  RESPIRATORY: No cough, No wheezing, No hemoptysis, No shortness of breath  CARDIOVASCULAR: No chest pain, No palpitations  GASTROINTESTINAL: No abdominal pain, No epigastric pain. No nausea, No vomiting, No diarrhea, No constipation; [ x ] BM-  GENITOURINARY: No dysuria, No frequency, No urgency, No hematuria, No incontinence  NEUROLOGICAL: No headaches, No dizziness, No numbness, No tingling, No tremors, No weakness  EXTREMITIES: No Swelling, No Pain, No Edema  SKIN: [ x ] No itching, burning, rashes, or lesions   MUSCULOSKELETAL: No joint pain, No joint swelling; No muscle pain, No back pain, No extremity pain  PSYCHIATRIC: No depression, No anxiety, No mood swings, No difficulty sleeping at night  PAIN SCALE: [ X ] None  [  ] Other-  ROS Unable to obtain due to: [  ] Dementia  [  ] Lethargy  [  ] Sedated  [  ] Non verbal  REST OF REVIEW OF SYSTEMS: [ x ] Normal     Vital Signs Last 24 Hrs  T(C): 36.7 (08 Jun 2024 12:10), Max: 36.8 (07 Jun 2024 13:26)  T(F): 98 (08 Jun 2024 12:10), Max: 98.3 (07 Jun 2024 15:51)  HR: 79 (08 Jun 2024 12:10) (63 - 92)  BP: 107/52 (08 Jun 2024 12:10) (91/56 - 113/62)  BP(mean): --  RR: 18 (08 Jun 2024 12:10) (16 - 18)  SpO2: 92% (08 Jun 2024 12:10) (92% - 98%)    Parameters below as of 08 Jun 2024 12:10  Patient On (Oxygen Delivery Method): room air        CAPILLARY BLOOD GLUCOSE          I&O's Summary    07 Jun 2024 07:01  -  08 Jun 2024 07:00  --------------------------------------------------------  IN: 0 mL / OUT: 550 mL / NET: -550 mL      PHYSICAL EXAM:  GENERAL:  [ x ] NAD, [ x ] Well appearing, [  ] Agitated, [  ] Drowsy, [  ] Lethargy, [  ] Confused   HEAD:  [ x ] Normal, [  ] Other  EYES:  [ x ] EOMI, [ x ] PERRLA, [ x ] Conjunctiva and sclera clear normal, [  ] Other, [  ] Pallor, [  ] Discharge  ENMT:  [ x ] Normal, [ x ] Moist mucous membranes, [  ] Good dentition, [  ] No thrush  NECK:  [ x ] Supple, [  ] No JVD, [ x ] Normal thyroid, [  ] Lymphadenopathy, [  ] Other  CHEST/LUNG:  [  ] Clear to auscultation bilaterally, [ x ] Breath Sounds decreased b/l, [  ] Poor effort, [ x ] No rales, [ x ] No rhonchi, [ x ] mild exp. wheeze b/l  HEART:  [ x ] Regular rate and rhythm, [  ] Tachycardia, [  ] Bradycardia, [  ] Irregular, [ x ] No murmurs, No rubs, No gallops, [  ] PPM in place (Mfr:  )  ABDOMEN:  [ x ] Soft, [ x ] Nontender, [ x ] Nondistended, [ x ] No mass, [ x ] Bowel sounds present, [  ] Obese  NERVOUS SYSTEM:  [ x ] Alert & Oriented x3__, [ x ] Nonfocal, [  ] Confusion, [  ] Encephalopathic, [  ] Sedated, [  ] Unable to assess, [  ] Dementia, [  ] Other-  EXTREMITIES:  [ x ] 2+ Peripheral Pulses, No clubbing, No cyanosis,  [  ] Edema B/L lower EXT, [  ] PVD stasis skin changes B/L lower EXT, [  ] Wound  LYMPH:  No lymphadenopathy noted  SKIN:  [ x ] No rashes or lesions, [  ] Pressure ulcers, [  ] Ecchymosis, [  ] Skin tears, [  ] Other    DIET: Diet, DASH/TLC:   Sodium & Cholesterol Restricted (06-07-24 @ 18:02)      LABS:                        13.4   32.46 )-----------( 150      ( 08 Jun 2024 08:05 )             39.8     07 Jun 2024 18:30    140    |  109    |  17     ----------------------------<  160    3.9     |  26     |  1.10     Ca    8.6        07 Jun 2024 18:30  Phos  2.9       07 Jun 2024 18:30  Mg     1.9       07 Jun 2024 18:30    TPro  6.6    /  Alb  3.5    /  TBili  0.7    /  DBili  x      /  AST  30     /  ALT  45     /  AlkPhos  63     07 Jun 2024 18:30    PT/INR - ( 07 Jun 2024 10:00 )   PT: 12.4 sec;   INR: 1.06 ratio         PTT - ( 07 Jun 2024 10:00 )  PTT:25.6 sec  Urinalysis Basic - ( 07 Jun 2024 18:30 )    Color: x / Appearance: x / SG: x / pH: x  Gluc: 160 mg/dL / Ketone: x  / Bili: x / Urobili: x   Blood: x / Protein: x / Nitrite: x   Leuk Esterase: x / RBC: x / WBC x   Sq Epi: x / Non Sq Epi: x / Bacteria: x      Culture Results:   Growth in anaerobic bottle: Gram Negative Rods  Direct identification is available within approximately 3-5  hours either by Blood Panel Multiplexed PCR or Direct  MALDI-TOF. Details: [email or web address] (06-07 @ 09:45)    culture blood  -- .Blood Blood-Peripheral 06-07 @ 09:45    culture urine  --  06-07 @ 09:45    CARDIAC MARKERS ( 08 Jun 2024 08:05 )  x     / x     / x     / x     / 6.6 ng/mL  CARDIAC MARKERS ( 07 Jun 2024 18:30 )  x     / x     / 236 U/L / x     / 5.0 ng/mL  CARDIAC MARKERS ( 07 Jun 2024 10:00 )  x     / x     / x     / x     / 3.8 ng/mL        Culture - Blood (collected 07 Jun 2024 09:45)  Source: .Blood Blood-Peripheral  Gram Stain (08 Jun 2024 03:07):    Growth in anaerobic bottle: Gram Negative Rods  Preliminary Report (08 Jun 2024 03:07):    Growth in anaerobic bottle: Gram Negative Rods    Direct identification is available within approximately 3-5    hours either by Blood Panel Multiplexed PCR or Direct    MALDI-TOF. Details: [email or web address]  Organism: Blood Culture PCR (08 Jun 2024 04:21)  Organism: Blood Culture PCR (08 Jun 2024 04:21)       Anemia Panel:      Thyroid Panel:  Thyroid Stimulating Hormone, Serum: 1.06 uIU/mL (06-07-24 @ 10:00)        Lipase: 25 U/L (06-07-24 @ 10:00)          RADIOLOGY & ADDITIONAL TESTS: _______      HEALTH ISSUES - PROBLEM Dx:  UTI (urinary tract infection)    HTN (hypertension)    HLD (hyperlipidemia)    Afib    CAD (coronary artery disease)    COPD with asthma    Need for prophylactic measure    GERD (gastroesophageal reflux disease)    Cardiac enzymes elevated          Consultant(s) Notes Reviewed:  [ x ] YES     Care Discussed with [ x ] Consultants, [ x ] Patient, [ x ] Family, [  ] HCP, [ x ] , [  ] Social Service, [ x ] RN, [  ] Physical Therapy, [  ] Palliative Care Team  DVT PPX: [ X ] Lovenox, [  ] SC Heparin, [  ] Coumadin, [  ] Xarelto, [  ] Eliquis, [  ] Pradaxa, [  ] IV Heparin drip, [  ] SCD, [  ] Ambulation, [  ] Contraindicated 2/2 GI Bleed, [  ] Contraindicated 2/2  Bleed, [  ] Contraindicated 2/2 Brain Bleed  Advanced Directive: [ X ] None, [  ] DNR/DNI Patient is a 78y old  Male who presents with a chief complaint of Sepsis (08 Jun 2024 05:32)    HPI:  79 yo M with PMHx of HTN, HLD, GERD, Atrial fibrillation, Asthma, COPD, hx of benign lung cancer R lobe s/p resection,  CAD (s/p 5 stents ~2001), and recurrent UTIs presents to the ED with dysuria. Patient notes he began having burning with urination about 3 days ago. He has Augmentin prescribed by his Urologist Dr. Sena as needed for when he begins to feel dysuria, however, patient did not take the antibiotics. Then this AM, he woke up around 3:00AM with rigors, SOB and nausea. Patient tried to do a home breathing treatment but had 1 episode of vomiting. He attempted to go back to sleep, but started to become a little confused on the time of the day. Patient felt it was necessary for him to come to ED for evaluation.     ED Course:   Vitals: BP: 83/46, HR: 114, Temp: 99.7, RR: 18, SpO2: 90% on RA    Labs: WBC 16.61, CKMB 3.8, Trop 11.4, proBNP 49, RVP negative   UA: Cloudy, Protein 300, Leku esterase Large, Blood small, WBC too numerous to count, occasional bacteria   EKG: Sinus Tachycardia 114 bpm, rightward axis shift   Received in the ED: Ofirmev x1, Solumedrol 125 mg IVP x1, Zosyn 3.375 mg IVPB, Vancomycin 1g IVPB, 2L NS bolus, Duoneb x3     Imaging:  CXR: no active disease   CT Chest PE: No pulmonary embolus.  CT Abdomen and pelvis: No acute abnormality in the abdomen or pelvis.     (07 Jun 2024 17:15)    INTERVAL HPI:  6/7 - admitted  TODAY- Pt was seen and examined at bedside. Pt states that he feels well and denies dysuria. Pt denies headache, dizziness, lightheadedness, fever, chills, body aches, CP, SOB, palpitations, abdominal pain, n/v, numbness/tingling. No other complaints at this time.  ESBL E COLI Bacteremia ,LEUKOCYTOSIS ,On IV Abx     OVERNIGHT EVENTS: No acute overnight events.     Home Medications:  albuterol 90 mcg/inh inhalation aerosol: 2 puff(s) inhaled once a day as needed for  shortness of breath and/or wheezing (07 Jun 2024 17:45)  ascorbic acid 500 mg oral tablet: 1 orally once a day (in the morning) (07 Jun 2024 17:52)  aspirin 81 mg oral tablet: 1 orally once a day (in the morning) (07 Jun 2024 17:52)  B-12 500 mcg sublingual tablet: 1 sublingually once a day (at bedtime) (07 Jun 2024 17:52)  budesonide 0.5 mg/2 mL inhalation suspension: 2 puff(s) by nebulizer 2 times a day as needed for  shortness of breath and/or wheezing (07 Jun 2024 17:47)  cholecalciferol 25 mcg (1000 intl units) oral tablet: 1 orally once a day (at bedtime) (07 Jun 2024 17:52)  clopidogrel 75 mg oral tablet: 1 orally once a day (in the morning) (07 Jun 2024 17:52)  esomeprazole 40 mg oral delayed release capsule: 1 orally once a day (in the morning) (07 Jun 2024 17:52)  ipratropium-albuterol 0.5 mg-2.5 mg/3 mL inhalation solution: 3 puff(s) by nebulizer every 6 hours as needed for  shortness of breath and/or wheezing (07 Jun 2024 17:49)  nitrofurantoin macrocrystals 100 mg oral capsule: 1 orally once a day (at bedtime) (07 Jun 2024 17:52)  predniSONE 5 mg oral tablet: 1 tab(s) orally once a day as needed for  shortness of breath and/or wheezing (07 Jun 2024 17:51)  rosuvastatin 20 mg oral tablet: 1 orally once a day (at bedtime) (07 Jun 2024 17:52)  Spiriva 18 mcg inhalation capsule: 1 inhaled once a day (in the morning) (07 Jun 2024 17:52)  Symbicort 160 mcg-4.5 mcg/inh inhalation aerosol: 2 puff(s) inhaled 2 times a day (07 Jun 2024 17:47)  Vitamin B6 100 mg oral tablet: 1 orally once a day (at bedtime) (07 Jun 2024 17:52)  zinc (as gluconate) 50 mg oral tablet: orally once a day (in the morning) (07 Jun 2024 17:52)      MEDICATIONS  (STANDING):  albuterol/ipratropium for Nebulization 3 milliLiter(s) Nebulizer every 6 hours  ascorbic acid 500 milliGRAM(s) Oral daily  aspirin  chewable 81 milliGRAM(s) Oral daily  atorvastatin 80 milliGRAM(s) Oral at bedtime  buDESOnide    Inhalation Suspension 0.5 milliGRAM(s) Inhalation every 12 hours  budesonide 160 MICROgram(s)/formoterol 4.5 MICROgram(s) Inhaler 2 Puff(s) Inhalation two times a day  cholecalciferol 1000 Unit(s) Oral at bedtime  clopidogrel Tablet 75 milliGRAM(s) Oral daily  cyanocobalamin 1000 MICROGram(s) Oral daily  enoxaparin Injectable 40 milliGRAM(s) SubCutaneous every 24 hours  ertapenem  IVPB 1000 milliGRAM(s) IV Intermittent every 24 hours  pantoprazole    Tablet 40 milliGRAM(s) Oral before breakfast  predniSONE   Tablet 20 milliGRAM(s) Oral daily  pyridoxine 100 milliGRAM(s) Oral at bedtime  sodium chloride 0.9%. 1000 milliLiter(s) (75 mL/Hr) IV Continuous <Continuous>  tiotropium 2.5 MICROgram(s) Inhaler 2 Puff(s) Inhalation daily    MEDICATIONS  (PRN):  acetaminophen     Tablet .. 650 milliGRAM(s) Oral every 6 hours PRN Temp greater or equal to 38C (100.4F), Mild Pain (1 - 3)  albuterol    90 MICROgram(s) HFA Inhaler 2 Puff(s) Inhalation every 6 hours PRN for shortness of breath and/or wheezing  aluminum hydroxide/magnesium hydroxide/simethicone Suspension 30 milliLiter(s) Oral every 4 hours PRN Dyspepsia  melatonin 3 milliGRAM(s) Oral at bedtime PRN Insomnia  ondansetron Injectable 4 milliGRAM(s) IV Push every 8 hours PRN Nausea and/or Vomiting      No Known Allergies      Social History:  Lives: At home with wife   ADLs: Fully independent, no problem ambulating   Alcohol Use: None   Tobacco Use: Former smoker quit >10yrs ago   Recreational Drug Use: None (07 Jun 2024 17:15)      REVIEW OF SYSTEMS: i am OK  CONSTITUTIONAL: No fever, No chills, No fatigue, No myalgia, No Body ache, No Weakness  EYES: No eye pain,  No visual disturbances, No discharge, No Redness  ENMT: No ear pain, No nose bleed, No vertigo; No sinus pain, No throat pain, No Congestion  NECK: No pain, No stiffness  RESPIRATORY: No cough, No wheezing, No hemoptysis, No shortness of breath  CARDIOVASCULAR: No chest pain, No palpitations  GASTROINTESTINAL: No abdominal pain, No epigastric pain. No nausea, No vomiting, No diarrhea, No constipation; [ x ] BM-  GENITOURINARY: No dysuria, No frequency, No urgency, No hematuria, No incontinence  NEUROLOGICAL: No headaches, No dizziness, No numbness, No tingling, No tremors, No weakness  EXTREMITIES: No Swelling, No Pain, No Edema  SKIN: [ x ] No itching, burning, rashes, or lesions   MUSCULOSKELETAL: No joint pain, No joint swelling; No muscle pain, No back pain, No extremity pain  PSYCHIATRIC: No depression, No anxiety, No mood swings, No difficulty sleeping at night  PAIN SCALE: [ X ] None  [  ] Other-  ROS Unable to obtain due to: [  ] Dementia  [  ] Lethargy  [  ] Sedated  [  ] Non verbal  REST OF REVIEW OF SYSTEMS: [ x ] Normal     Vital Signs Last 24 Hrs  T(C): 36.7 (08 Jun 2024 12:10), Max: 36.8 (07 Jun 2024 13:26)  T(F): 98 (08 Jun 2024 12:10), Max: 98.3 (07 Jun 2024 15:51)  HR: 79 (08 Jun 2024 12:10) (63 - 92)  BP: 107/52 (08 Jun 2024 12:10) (91/56 - 113/62)  BP(mean): --  RR: 18 (08 Jun 2024 12:10) (16 - 18)  SpO2: 92% (08 Jun 2024 12:10) (92% - 98%)    Parameters below as of 08 Jun 2024 12:10  Patient On (Oxygen Delivery Method): room air        CAPILLARY BLOOD GLUCOSE          I&O's Summary    07 Jun 2024 07:01  -  08 Jun 2024 07:00  --------------------------------------------------------  IN: 0 mL / OUT: 550 mL / NET: -550 mL      PHYSICAL EXAM:  GENERAL:  [ x ] NAD, [ x ] Well appearing, [  ] Agitated, [  ] Drowsy, [  ] Lethargy, [  ] Confused   HEAD:  [ x ] Normal, [  ] Other  EYES:  [ x ] EOMI, [ x ] PERRLA, [ x ] Conjunctiva and sclera clear normal, [  ] Other, [  ] Pallor, [  ] Discharge  ENMT:  [ x ] Normal, [ x ] Moist mucous membranes, [  ] Good dentition, [  ] No thrush  NECK:  [ x ] Supple, [  ] No JVD, [ x ] Normal thyroid, [  ] Lymphadenopathy, [  ] Other  CHEST/LUNG:  [  ] Clear to auscultation bilaterally, [ x ] Breath Sounds decreased b/l, [  ] Poor effort, [ x ] No rales, [ x ] No rhonchi, [ x ] mild exp. wheeze b/l  HEART:  [ x ] Regular rate and rhythm, [  ] Tachycardia, [  ] Bradycardia, [  ] Irregular, [ x ] No murmurs, No rubs, No gallops, [  ] PPM in place (Mfr:  )  ABDOMEN:  [ x ] Soft, [ x ] Nontender, [ x ] Nondistended, [ x ] No mass, [ x ] Bowel sounds present, [  ] Obese  NERVOUS SYSTEM:  [ x ] Alert & Oriented x3__, [ x ] Nonfocal, [  ] Confusion, [  ] Encephalopathic, [  ] Sedated, [  ] Unable to assess, [  ] Dementia, [  ] Other-  EXTREMITIES:  [ x ] 2+ Peripheral Pulses, No clubbing, No cyanosis,  [  ] Edema B/L lower EXT, [  ] PVD stasis skin changes B/L lower EXT, [  ] Wound  LYMPH:  No lymphadenopathy noted  SKIN:  [ x ] No rashes or lesions, [  ] Pressure ulcers, [  ] Ecchymosis, [  ] Skin tears, [  ] Other    DIET: Diet, DASH/TLC:   Sodium & Cholesterol Restricted (06-07-24 @ 18:02)      LABS:                        13.4   32.46 )-----------( 150      ( 08 Jun 2024 08:05 )             39.8     07 Jun 2024 18:30    140    |  109    |  17     ----------------------------<  160    3.9     |  26     |  1.10     Ca    8.6        07 Jun 2024 18:30  Phos  2.9       07 Jun 2024 18:30  Mg     1.9       07 Jun 2024 18:30    TPro  6.6    /  Alb  3.5    /  TBili  0.7    /  DBili  x      /  AST  30     /  ALT  45     /  AlkPhos  63     07 Jun 2024 18:30    PT/INR - ( 07 Jun 2024 10:00 )   PT: 12.4 sec;   INR: 1.06 ratio         PTT - ( 07 Jun 2024 10:00 )  PTT:25.6 sec  Urinalysis Basic - ( 07 Jun 2024 18:30 )    Color: x / Appearance: x / SG: x / pH: x  Gluc: 160 mg/dL / Ketone: x  / Bili: x / Urobili: x   Blood: x / Protein: x / Nitrite: x   Leuk Esterase: x / RBC: x / WBC x   Sq Epi: x / Non Sq Epi: x / Bacteria: x      Culture Results:   Growth in anaerobic bottle: Gram Negative Rods  Direct identification is available within approximately 3-5  hours either by Blood Panel Multiplexed PCR or Direct  MALDI-TOF. Details: [email or web address] (06-07 @ 09:45)    culture blood  -- .Blood Blood-Peripheral 06-07 @ 09:45    culture urine  --  06-07 @ 09:45    CARDIAC MARKERS ( 08 Jun 2024 08:05 )  x     / x     / x     / x     / 6.6 ng/mL  CARDIAC MARKERS ( 07 Jun 2024 18:30 )  x     / x     / 236 U/L / x     / 5.0 ng/mL  CARDIAC MARKERS ( 07 Jun 2024 10:00 )  x     / x     / x     / x     / 3.8 ng/mL        Culture - Blood (collected 07 Jun 2024 09:45)  Source: .Blood Blood-Peripheral  Gram Stain (08 Jun 2024 03:07):    Growth in anaerobic bottle: Gram Negative Rods  Preliminary Report (08 Jun 2024 03:07):    Growth in anaerobic bottle: Gram Negative Rods    Direct identification is available within approximately 3-5    hours either by Blood Panel Multiplexed PCR or Direct    MALDI-TOF. Details: [email or web address]  Organism: Blood Culture PCR (08 Jun 2024 04:21)  Organism: Blood Culture PCR (08 Jun 2024 04:21)       Anemia Panel:      Thyroid Panel:  Thyroid Stimulating Hormone, Serum: 1.06 uIU/mL (06-07-24 @ 10:00)    Lipase: 25 U/L (06-07-24 @ 10:00)    RADIOLOGY & ADDITIONAL TESTS: _______      HEALTH ISSUES - PROBLEM Dx:  UTI (urinary tract infection)    HTN (hypertension)    HLD (hyperlipidemia)    Afib    CAD (coronary artery disease)    COPD with asthma    Need for prophylactic measure    GERD (gastroesophageal reflux disease)    Cardiac enzymes elevated          Consultant(s) Notes Reviewed:  [ x ] YES     Care Discussed with [ x ] Consultants, [ x ] Patient, [ x ] Family, [  ] HCP, [ x ] , [  ] Social Service, [ x ] RN, [  ] Physical Therapy, [  ] Palliative Care Team  DVT PPX: [ X ] Lovenox, [  ] SC Heparin, [  ] Coumadin, [  ] Xarelto, [  ] Eliquis, [  ] Pradaxa, [  ] IV Heparin drip, [  ] SCD, [  ] Ambulation, [  ] Contraindicated 2/2 GI Bleed, [  ] Contraindicated 2/2  Bleed, [  ] Contraindicated 2/2 Brain Bleed  Advanced Directive: [ X ] None, [  ] DNR/DNI

## 2024-06-08 NOTE — DISCHARGE NOTE PROVIDER - NSDCMRMEDTOKEN_GEN_ALL_CORE_FT
albuterol 90 mcg/inh inhalation aerosol: 2 puff(s) inhaled once a day as needed for  shortness of breath and/or wheezing  ascorbic acid 500 mg oral tablet: 1 orally once a day (in the morning)  aspirin 81 mg oral tablet: 1 orally once a day (in the morning)  B-12 500 mcg sublingual tablet: 1 sublingually once a day (at bedtime)  budesonide 0.5 mg/2 mL inhalation suspension: 2 puff(s) by nebulizer 2 times a day as needed for  shortness of breath and/or wheezing  cholecalciferol 25 mcg (1000 intl units) oral tablet: 1 orally once a day (at bedtime)  clopidogrel 75 mg oral tablet: 1 orally once a day (in the morning)  esomeprazole 40 mg oral delayed release capsule: 1 orally once a day (in the morning)  ipratropium-albuterol 0.5 mg-2.5 mg/3 mL inhalation solution: 3 puff(s) by nebulizer every 6 hours as needed for  shortness of breath and/or wheezing  nitrofurantoin macrocrystals 100 mg oral capsule: 1 orally once a day (at bedtime)  predniSONE 5 mg oral tablet: 1 tab(s) orally once a day as needed for  shortness of breath and/or wheezing  rosuvastatin 20 mg oral tablet: 1 orally once a day (at bedtime)  Spiriva 18 mcg inhalation capsule: 1 inhaled once a day (in the morning)  Symbicort 160 mcg-4.5 mcg/inh inhalation aerosol: 2 puff(s) inhaled 2 times a day  Vitamin B6 100 mg oral tablet: 1 orally once a day (at bedtime)  zinc (as gluconate) 50 mg oral tablet: orally once a day (in the morning)   albuterol 90 mcg/inh inhalation aerosol: 2 puff(s) inhaled once a day as needed for  shortness of breath and/or wheezing  ascorbic acid 500 mg oral tablet: 1 orally once a day (in the morning)  aspirin 81 mg oral tablet: 1 orally once a day (in the morning)  B-12 500 mcg sublingual tablet: 1 sublingually once a day (at bedtime)  budesonide 0.5 mg/2 mL inhalation suspension: 2 puff(s) by nebulizer 2 times a day as needed for  shortness of breath and/or wheezing  cholecalciferol 25 mcg (1000 intl units) oral tablet: 1 orally once a day (at bedtime)  clopidogrel 75 mg oral tablet: 1 orally once a day (in the morning)  ertapenem 1 g injection: 1,000 milligram(s) injectable every 24 hours last day 7/4/2024  esomeprazole 40 mg oral delayed release capsule: 1 orally once a day (in the morning)  ipratropium-albuterol 0.5 mg-2.5 mg/3 mL inhalation solution: 3 puff(s) by nebulizer every 6 hours as needed for  shortness of breath and/or wheezing  predniSONE 5 mg oral tablet: 1 tab(s) orally once a day as needed for  shortness of breath and/or wheezing  rosuvastatin 20 mg oral tablet: 1 orally once a day (at bedtime)  saccharomyces boulardii lyo 250 mg oral capsule: 1 cap(s) orally 2 times a day  Spiriva 18 mcg inhalation capsule: 1 inhaled once a day (in the morning)  Symbicort 160 mcg-4.5 mcg/inh inhalation aerosol: 2 puff(s) inhaled 2 times a day  Vitamin B6 100 mg oral tablet: 1 orally once a day (at bedtime)  zinc (as gluconate) 50 mg oral tablet: orally once a day (in the morning)

## 2024-06-08 NOTE — DISCHARGE NOTE PROVIDER - CARE PROVIDERS DIRECT ADDRESSES
[email or web address]

## 2024-06-08 NOTE — CONSULT NOTE ADULT - SUBJECTIVE AND OBJECTIVE BOX
Opt, Division of Infectious Diseases  DONALD Rags[place] S. Shah, Y. Patel, [facility]   [phone]  after hours and weekends [phone]    BASILIO LANDRY  78y, Male  [id number]    HPI--  HPI:  79 yo M with PMHx of HTN, HLD, GERD, Atrial fibrillation, Asthma, COPD, hx of benign lung cancer R lobe s/p resection,  CAD (s/p 5 stents ~2001), and recurrent UTIs presents to the ED with dysuria. Patient notes he began having burning with urination about 3 days ago. Then this AM, he woke up around 3:00AM with rigors, SOB and nausea. Patient tried to do a home breathing treatment but had 1 episode of vomiting. He attempted to go back to sleep, but started to become a little confused on the time of the day. Patient felt it was necessary for him to come to ED for evaluation.   similar episode about 2 years ago    PMH/PSH--  Chronic obstructive pulmonary disease  Asthma  Stented coronary artery  HTN (hypertension)  HLD (hyperlipidemia)  GERD (gastroesophageal reflux disease)  Nephrolithiasis  GI bleed  Umbilical hernia with obstruction, without gangrene  2019 novel coronavirus disease (COVID-19)  History of atrial fibrillation  Implantable loop recorder present  Frequent UTI  Chronic atrial fibrillation  Lung tumor  S/P primary angioplasty with coronary stent  S/P TURP      Allergies--  nkda    Medications--  Antibiotics: ertapenem  IVPB 1000 milliGRAM(s) IV Intermittent every 24 hours    Immunologic:   Other: acetaminophen     Tablet .. PRN  albuterol    90 MICROgram(s) HFA Inhaler PRN  albuterol/ipratropium for Nebulization  aluminum hydroxide/magnesium hydroxide/simethicone Suspension PRN  ascorbic acid  aspirin  chewable  atorvastatin  buDESOnide    Inhalation Suspension  budesonide 160 MICROgram(s)/formoterol 4.5 MICROgram(s) Inhaler  cholecalciferol  clopidogrel Tablet  cyanocobalamin  enoxaparin Injectable  melatonin PRN  ondansetron Injectable PRN  pantoprazole    Tablet  predniSONE   Tablet  pyridoxine  sodium chloride 0.9%.  tiotropium 2.5 MICROgram(s) Inhaler      Social History--  EtOH: denies ***  Tobacco: former  Drug Use: denies ***    Family/Marital History--  No pertinent family history in first degree relatives  Family history of heart disease  Family history of stroke           Travel/Environmental/Occupational History:  retired jeweler     Review of Systems:  REVIEW OF SYSTEMS  General: no fever, no chills, no wt loss	  Ophthalmologic: no blurry vision  Respiratory and Thorax: no cough, no dyspnea  Cardiovascular: no chest pain, no palpitations  Gastrointestinal:  no nausea, + vomiting, diarrhea  Genitourinary: + dysuria, no urgency, no frequency	  Musculoskeletal: no myalgias	  Neurological:  no headache	    Physical Exam--  Vital Signs: T(F): 98 (06-08-24 @ 12:10), Max: 98.1 (06-07-24 @ 21:58)  HR: 75 (06-08-24 @ 13:22)  BP: 107/52 (06-08-24 @ 12:10)  RR: 18 (06-08-24 @ 12:10)  SpO2: 93% (06-08-24 @ 13:22)  Wt(kg): --  General: Nontoxic-appearing Male in no acute distress.  HEENT: AT/NC.  Neck: Not rigid. No sense of mass.  Nodes: None palpable.  Lungs: Clear bilaterally without rales, wheezing or rhonchi  Heart: Regular rate and rhythm.   Abdomen: Bowel sounds present and normoactive. Soft. Nondistended. Nontender.   Back: No spinal tenderness. No costovertebral angle tenderness.   Extremities: No cyanosis or clubbing. No edema.   Skin: Warm. Dry. Good turgor. No rash. No vasculitic stigmata.  Psychiatric: Appropriate affect and mood for situation.         Laboratory & Imaging Data--  CBC                        13.4   32.46 )-----------( 150      ( 08 Jun 2024 08:05 )             39.8       Chemistries  06-07    140  |  109<H>  |  17  ----------------------------<  160<H>  3.9   |  26  |  1.10    Ca    8.6      07 Jun 2024 18:30  Phos  2.9     06-07  Mg     1.9     06-07    TPro  6.6  /  Alb  3.5  /  TBili  0.7  /  DBili  x   /  AST  30  /  ALT  45  /  AlkPhos  63  06-07      Culture Data    Culture - Urine (collected 07 Jun 2024 11:40)  Source: Clean Catch Clean Catch (Midstream)  Final Report (08 Jun 2024 15:55):    <10,000 CFU/mL Normal Urogenital Yancy    Culture - Blood (collected 07 Jun 2024 10:00)  Source: .Blood Blood-Peripheral  Preliminary Report (08 Jun 2024 17:01):    No growth at 24 hours    Culture - Blood (collected 07 Jun 2024 09:45)  Source: .Blood Blood-Peripheral  Gram Stain (08 Jun 2024 03:07):    Growth in anaerobic bottle: Gram Negative Rods  Preliminary Report (08 Jun 2024 03:07):    Growth in anaerobic bottle: Gram Negative Rods    Direct identification is available within approximately 3-5    hours either by Blood Panel Multiplexed PCR or Direct    MALDI-TOF. Details: [email or web address]  Organism: Blood Culture PCR (08 Jun 2024 04:21)  Organism: Blood Culture PCR (08 Jun 2024 04:21)      < from: CT Abdomen and Pelvis w/ IV Cont (06.07.24 @ 14:55) >  VESSELS: Within normal limits.  HEART: Heart size is normal.  No pericardial effusion.  MEDIASTINUM AND MEHUL: No lymphadenopathy.  CHEST WALL AND LOWER NECK: A left chest wall loop recorder.    ABDOMEN AND PELVIS:  LIVER: Cysts and other lesions too small to characterize.  BILE DUCTS: Normal caliber.  GALLBLADDER: Within normal limits.  SPLEEN: Within normal limits.  PANCREAS: Within normal limits.  ADRENALS: Within normal limits.  KIDNEYS/URETERS: Cysts and other lesions too small to characterize.  .   Punctate left renal calculus.    BLADDER: Within normal limits.  REPRODUCTIVE ORGANS: Within normal limits.    BOWEL: No bowel obstruction. The appendix is normal.  PERITONEUM: No ascites.  VESSELS:  Within normal limits.  RETROPERITONEUM/LYMPH NODES: No lymphadenopathy.  ABDOMINAL WALL: Within normal limits.  BONES: Within normal limits.    IMPRESSION: No pulmonary embolus.    No acute abnormality in the abdomen or pelvis.    < end of copied text >  < from: CT Angio Chest PE Protocol w/ IV Cont (06.07.24 @ 14:49) >  PROCEDURE:  CT Angiography of the Chest was performed followed by portal venous phase   imaging of the Abdomen and Pelvis.  Sagittal and coronal reformats were performed as well as 3D (MIP)   reconstructions.    FINDINGS:  CHEST:  LUNGS AND LARGE AIRWAYS: Patent central airways. Peripheral reticular   opacities are again noted. Post surgical changes right upper lobe.  PLEURA: No pleural effusion.  VESSELS: Within normal limits.  HEART: Heart size is normal.  No pericardial effusion.  MEDIASTINUM AND MEHUL: No lymphadenopathy.  CHEST WALL AND LOWER NECK: A left chest wall loop recorder.    ABDOMEN AND PELVIS:  LIVER: Cysts and other lesions too small to characterize.  BILE DUCTS: Normal caliber.  GALLBLADDER: Within normal limits.  SPLEEN: Within normal limits.  PANCREAS: Within normal limits.  ADRENALS: Within normal limits.  KIDNEYS/URETERS: Cysts and other lesions too small to characterize.  .   Punctate left renal calculus.    BLADDER: Within normal limits.  REPRODUCTIVE ORGANS: Within normal limits.    BOWEL: No bowel obstruction. The appendix is normal.  PERITONEUM: No ascites.  VESSELS:  Within normal limits.  RETROPERITONEUM/LYMPH NODES: No lymphadenopathy.  ABDOMINAL WALL: Within normal limits.  BONES: Within normal limits.    IMPRESSION: No pulmonary embolus.    No acute abnormality in the abdomen or pelvis.    < end of copied text >

## 2024-06-08 NOTE — DISCHARGE NOTE PROVIDER - NSDCCPCAREPLAN_GEN_ALL_CORE_FT
PRINCIPAL DISCHARGE DIAGNOSIS  Diagnosis: Sepsis due to urinary tract infection  Assessment and Plan of Treatment: You were diagnosed with sepsis, a severe infection, likely caused by an infection in your blood stream, which originated from ____. This infection was controlled with antibiotics and IV fluids. You must continue taking antibiotic ______ for an additional ______  with the last day being __________. You must follow up with your primary care doctor upon discharge for surveillance and routine monitoring.        SECONDARY DISCHARGE DIAGNOSES  Diagnosis: COPD with asthma  Assessment and Plan of Treatment: You have a history of COPD and were noted to have a flare when you arrived to the hospital. You were placed on nebulizers to help your breathing, and your home dose of prednisone was increase for 5 days to help decrease inflammation.  Please follow up with your pulmonologist within 1-2 weeks of discharge.       PRINCIPAL DISCHARGE DIAGNOSIS  Diagnosis: Sepsis due to urinary tract infection  Assessment and Plan of Treatment: You were diagnosed with sepsis, a severe infection, likely caused by an infection in your blood stream, which originated from ____. This infection was controlled with antibiotics and IV fluids. You must continue taking antibiotic ______ for an additional ______  with the last day being __________. You must follow up with your primary care doctor upon discharge for surveillance and routine monitoring.        SECONDARY DISCHARGE DIAGNOSES  Diagnosis: COPD with asthma  Assessment and Plan of Treatment: You have a history of COPD and were noted to have a flare when you arrived to the hospital. You were placed on nebulizers to help your breathing, and your home dose of prednisone was increased for 5 days to help decrease inflammation.  Please continue taking your home inhalers and usual home dose of prednisone.  Please follow up with your pulmonologist within 1-2 weeks of discharge.       PRINCIPAL DISCHARGE DIAGNOSIS  Diagnosis: Sepsis due to urinary tract infection  Assessment and Plan of Treatment: You were diagnosed with sepsis, a severe infection, likely caused by an infection in your blood stream. This infection was controlled with antibiotics and IV fluids. You had a midline placed and must continue taking IV Ertapenem 1g daily for an additional ______  with the last day being 7/4/24. You must follow up with your primary care doctor upon discharge for surveillance and routine monitoring.        SECONDARY DISCHARGE DIAGNOSES  Diagnosis: COPD with asthma  Assessment and Plan of Treatment: You have a history of COPD and were noted to have a flare when you arrived to the hospital. You were placed on nebulizers to help your breathing, and your home dose of prednisone was increased for 5 days to help decrease inflammation.  Please continue taking your home inhalers and usual home dose of prednisone.  Please follow up with your pulmonologist within 1-2 weeks of discharge.       PRINCIPAL DISCHARGE DIAGNOSIS  Diagnosis: Sepsis  Assessment and Plan of Treatment: You were diagnosed with sepsis, a severe infection, likely caused by an infection in your blood stream. This infection was controlled with antibiotics and IV fluids.   You had a midline placed and must continue taking IV Ertapenem 1g daily with the last day being 7/4/24.  You must follow up with your primary care doctor upon discharge for surveillance and routine monitoring.      SECONDARY DISCHARGE DIAGNOSES  Diagnosis: COPD with asthma  Assessment and Plan of Treatment: You have a history of COPD and were noted to have a flare when you arrived to the hospital. You were placed on nebulizers to help your breathing, and your home dose of prednisone was increased for 5 days to help decrease inflammation.  Please continue taking your home inhalers and usual home dose of prednisone.  Please follow up with your pulmonologist within 1-2 weeks of discharge.       PRINCIPAL DISCHARGE DIAGNOSIS  Diagnosis: Sepsis  Assessment and Plan of Treatment: You were diagnosed with sepsis, a severe infection, likely caused by an infection in your blood stream. This infection was controlled with antibiotics and IV fluids.   You had a midline placed and must continue taking IV Ertapenem 1g daily with the last day being 7/4/24.  Please do NOT take your home nitrofurantoin as you will be receiving the ertapenem antibiotic  You must follow up with your primary care doctor upon discharge for surveillance and routine monitoring.      SECONDARY DISCHARGE DIAGNOSES  Diagnosis: COPD with asthma  Assessment and Plan of Treatment: You have a history of COPD and were noted to have a flare when you arrived to the hospital. You were placed on nebulizers to help your breathing, and your home dose of prednisone was increased for 5 days to help decrease inflammation.  Please continue taking your home inhalers and usual home dose of prednisone.  Please follow up with your pulmonologist within 1-2 weeks of discharge.       PRINCIPAL DISCHARGE DIAGNOSIS  Diagnosis: Sepsis  Assessment and Plan of Treatment: You were diagnosed with sepsis, a severe infection,  infection in your blood stream. E Coli ESBL +, Repeat Blood Culture is NEG   -Likely Prostatitis   -This infection was treated with IV  antibiotics and IV fluids.   You had a midline placed and must continue taking IV Ertapenem 1g daily with the last day being 7/4/24. Via MID Line   Please do NOT take your home nitrofurantoin as you will be receiving the ertapenem antibiotic & follow up with urologist   You must follow up with your primary care doctor upon discharge for surveillance and routine monitoring.      SECONDARY DISCHARGE DIAGNOSES  Diagnosis: COPD with asthma  Assessment and Plan of Treatment: You have a history of COPD  with COPD  flare when you arrived to the hospital.   -You were placed on nebulizers to help your breathing, and your home dose of prednisone was increased for 5 days to help improve symptoms    Please continue taking your home inhalers and usual home dose of prednisone 5 mg daily  Continue all your home Inh Spiriva, Symbicort   Follow up with Dr Dang in 1week or as schedule.  Please follow up with your pulmonologist within 1-2 weeks of discharge.      Diagnosis: CAD (coronary artery disease)  Assessment and Plan of Treatment: On ASA, Plavix & Statin daily    Diagnosis: Afib  Assessment and Plan of Treatment: HR stable  Not on Ac  Follow up with TOMASA OSPINA at [facility] for possible Watchman eval    Diagnosis: GERD (gastroesophageal reflux disease)  Assessment and Plan of Treatment: Continue PPI    Diagnosis: HLD (hyperlipidemia)  Assessment and Plan of Treatment: On Statin

## 2024-06-08 NOTE — DISCHARGE NOTE PROVIDER - CARE PROVIDER_API CALL
Yaniv Laurent Carlisle  Internal Medicine  [address] [place] [id number]  Phone: [phone]  Fax: [phone]  Follow Up Time: 1 week   Yaniv Laurent Warsaw  Internal Medicine  [address] [place] [address]  Phone: [phone]  Fax: [phone]  Follow Up Time: 1 week    Cesar Marc  Infectious Disease  [address] [place] [id number]  Phone: [phone]  Fax: [phone]  Follow Up Time:     Dianna Dang  Pulmonary Disease  [address] [facility] [address]  [place] [address]  Phone: [phone]  Fax: [phone]  Follow Up Time:

## 2024-06-08 NOTE — PROGRESS NOTE ADULT - PROBLEM SELECTOR PLAN 3
-H/O A Fib -Not on AC   - EKG: Sinus Tachycardia 114 bpm, rightward axis shift   - Rate controlled in sinus @ 85 bpm on monitor s/p fluid resuscitation   - No longer on AV damion agents or anticoagulation per Cardiology, [facility] (Joe)   - Continue to monitor -H/O A Fib -Not on AC as Anemia  - EKG: Sinus Tachycardia 114 bpm, rightward axis shift   - Rate controlled in sinus @ 85 bpm on monitor s/p fluid resuscitation   - No longer on AV damion agents or anticoagulation per Cardiology, [facility] (Joe)   - HR stable

## 2024-06-08 NOTE — PROGRESS NOTE ADULT - PROBLEM SELECTOR PLAN 1
Patient Education - Anticoagulation - Severe Sepsis on admission: WBC 16.61, hypotension, Fever  tachycardia, tachypnea, Likely 2/2 UTI  Nl Lactate   - UA: Cloudy, Protein 300, Lekuesterase Large, Blood small, WBC too numerous to count, occasional bacteria   - CT Abdomen and pelvis: No acute abnormality in the abdomen or pelvis  - Frequent UTIs with prophylactic Macrobid   - BCx = ESBL e. coli  - f/u repeat BCx  - f/u UCx  - Continue with Ertapenem 1gm IVPB daily  -CBC in AM , PRN Tylenol for fever  - Tolerating PO, continue DASH diet   - Infectious Disease consult, Dr. Marc, recs appreciated - Severe Sepsis on admission: WBC 16.61, hypotension, Fever  tachycardia, tachypnea, Likely 2/2 UTI  Nl Lactate   - UA: Cloudy, Protein 300, Lekuesterase Large, Blood small, WBC too numerous to count, occasional bacteria   - CT Abdomen and pelvis: No acute abnormality in the abdomen or pelvis  - Frequent UTIs with prophylactic Macrobid , LEUKOCYTOSIS   - BCx = ESBL e. coli  - f/u repeat BCx x 2 in AM   - f/u UCx  - Continue with Ertapenem 1gm IVPB daily  -CBC in AM , PRN Tylenol for fever  - Tolerating PO, continue DASH diet   - Infectious Disease consult, Dr. Marc, recs appreciated

## 2024-06-08 NOTE — PHYSICAL THERAPY INITIAL EVALUATION ADULT - ADDITIONAL COMMENTS
Patient reports that he lives in a private house with wife, 3 SHAUNA, bed/bath on main level, independent with ADLs/ambulation without device PTA.

## 2024-06-09 LAB
-  AMPICILLIN/SULBACTAM: SIGNIFICANT CHANGE UP
-  AMPICILLIN: SIGNIFICANT CHANGE UP
-  AZTREONAM: SIGNIFICANT CHANGE UP
-  CEFAZOLIN: SIGNIFICANT CHANGE UP
-  CEFEPIME: SIGNIFICANT CHANGE UP
-  CEFTRIAXONE: SIGNIFICANT CHANGE UP
-  CIPROFLOXACIN: SIGNIFICANT CHANGE UP
-  ERTAPENEM: SIGNIFICANT CHANGE UP
-  GENTAMICIN: SIGNIFICANT CHANGE UP
-  IMIPENEM: SIGNIFICANT CHANGE UP
-  LEVOFLOXACIN: SIGNIFICANT CHANGE UP
-  MEROPENEM: SIGNIFICANT CHANGE UP
-  PIPERACILLIN/TAZOBACTAM: SIGNIFICANT CHANGE UP
-  TOBRAMYCIN: SIGNIFICANT CHANGE UP
-  TRIMETHOPRIM/SULFAMETHOXAZOLE: SIGNIFICANT CHANGE UP
ALBUMIN SERPL ELPH-MCNC: 2.9 G/DL — LOW (ref 3.3–5)
ALP SERPL-CCNC: 74 U/L — SIGNIFICANT CHANGE UP (ref 40–120)
ALT FLD-CCNC: 38 U/L — SIGNIFICANT CHANGE UP (ref 12–78)
ANION GAP SERPL CALC-SCNC: 1 MMOL/L — LOW (ref 5–17)
AST SERPL-CCNC: 21 U/L — SIGNIFICANT CHANGE UP (ref 15–37)
BILIRUB SERPL-MCNC: 0.5 MG/DL — SIGNIFICANT CHANGE UP (ref 0.2–1.2)
BUN SERPL-MCNC: 19 MG/DL — SIGNIFICANT CHANGE UP (ref 7–23)
CALCIUM SERPL-MCNC: 9.4 MG/DL — SIGNIFICANT CHANGE UP (ref 8.5–10.1)
CHLORIDE SERPL-SCNC: 109 MMOL/L — HIGH (ref 96–108)
CO2 SERPL-SCNC: 32 MMOL/L — HIGH (ref 22–31)
CREAT SERPL-MCNC: 0.75 MG/DL — SIGNIFICANT CHANGE UP (ref 0.5–1.3)
CULTURE RESULTS: ABNORMAL
EGFR: 93 ML/MIN/1.73M2 — SIGNIFICANT CHANGE UP
GLUCOSE SERPL-MCNC: 108 MG/DL — HIGH (ref 70–99)
HCT VFR BLD CALC: 40.9 % — SIGNIFICANT CHANGE UP (ref 39–50)
HGB BLD-MCNC: 13.7 G/DL — SIGNIFICANT CHANGE UP (ref 13–17)
MCHC RBC-ENTMCNC: 32.6 PG — SIGNIFICANT CHANGE UP (ref 27–34)
MCHC RBC-ENTMCNC: 33.5 GM/DL — SIGNIFICANT CHANGE UP (ref 32–36)
MCV RBC AUTO: 97.4 FL — SIGNIFICANT CHANGE UP (ref 80–100)
METHOD TYPE: SIGNIFICANT CHANGE UP
NRBC # BLD: 0 /100 WBCS — SIGNIFICANT CHANGE UP (ref 0–0)
ORGANISM # SPEC MICROSCOPIC CNT: ABNORMAL
ORGANISM # SPEC MICROSCOPIC CNT: ABNORMAL
ORGANISM # SPEC MICROSCOPIC CNT: SIGNIFICANT CHANGE UP
PLATELET # BLD AUTO: 151 K/UL — SIGNIFICANT CHANGE UP (ref 150–400)
POTASSIUM SERPL-MCNC: 3.7 MMOL/L — SIGNIFICANT CHANGE UP (ref 3.5–5.3)
POTASSIUM SERPL-SCNC: 3.7 MMOL/L — SIGNIFICANT CHANGE UP (ref 3.5–5.3)
PROT SERPL-MCNC: 6.3 G/DL — SIGNIFICANT CHANGE UP (ref 6–8.3)
RBC # BLD: 4.2 M/UL — SIGNIFICANT CHANGE UP (ref 4.2–5.8)
RBC # FLD: 13.7 % — SIGNIFICANT CHANGE UP (ref 10.3–14.5)
SODIUM SERPL-SCNC: 142 MMOL/L — SIGNIFICANT CHANGE UP (ref 135–145)
SPECIMEN SOURCE: SIGNIFICANT CHANGE UP
WBC # BLD: 19 K/UL — HIGH (ref 3.8–10.5)
WBC # FLD AUTO: 19 K/UL — HIGH (ref 3.8–10.5)

## 2024-06-09 RX ORDER — BUDESONIDE, MICRONIZED 100 %
0.5 POWDER (GRAM) MISCELLANEOUS EVERY 12 HOURS
Refills: 0 | Status: DISCONTINUED | OUTPATIENT
Start: 2024-06-09 | End: 2024-06-11

## 2024-06-09 RX ORDER — ERTAPENEM SODIUM 1 G/1
1000 INJECTION, POWDER, LYOPHILIZED, FOR SOLUTION INTRAMUSCULAR; INTRAVENOUS ONCE
Refills: 0 | Status: COMPLETED | OUTPATIENT
Start: 2024-06-09 | End: 2024-06-09

## 2024-06-09 RX ORDER — SACCHAROMYCES BOULARDII 250 MG
250 POWDER IN PACKET (EA) ORAL
Refills: 0 | Status: DISCONTINUED | OUTPATIENT
Start: 2024-06-09 | End: 2024-06-12

## 2024-06-09 RX ADMIN — Medication 100 MILLIGRAM(S): at 21:47

## 2024-06-09 RX ADMIN — PANTOPRAZOLE SODIUM 40 MILLIGRAM(S): 20 TABLET, DELAYED RELEASE ORAL at 06:38

## 2024-06-09 RX ADMIN — Medication 0.5 MILLIGRAM(S): at 19:29

## 2024-06-09 RX ADMIN — Medication 3 MILLILITER(S): at 07:53

## 2024-06-09 RX ADMIN — ERTAPENEM SODIUM 120 MILLIGRAM(S): 1 INJECTION, POWDER, LYOPHILIZED, FOR SOLUTION INTRAMUSCULAR; INTRAVENOUS at 19:11

## 2024-06-09 RX ADMIN — Medication 0.5 MILLIGRAM(S): at 07:38

## 2024-06-09 RX ADMIN — BUDESONIDE AND FORMOTEROL FUMARATE DIHYDRATE 2 PUFF(S): 160; 4.5 AEROSOL RESPIRATORY (INHALATION) at 21:46

## 2024-06-09 RX ADMIN — Medication 250 MILLIGRAM(S): at 19:11

## 2024-06-09 RX ADMIN — BUDESONIDE AND FORMOTEROL FUMARATE DIHYDRATE 2 PUFF(S): 160; 4.5 AEROSOL RESPIRATORY (INHALATION) at 06:36

## 2024-06-09 RX ADMIN — Medication 3 MILLILITER(S): at 19:31

## 2024-06-09 RX ADMIN — Medication 81 MILLIGRAM(S): at 13:24

## 2024-06-09 RX ADMIN — CLOPIDOGREL BISULFATE 75 MILLIGRAM(S): 75 TABLET, FILM COATED ORAL at 13:24

## 2024-06-09 RX ADMIN — PREGABALIN 1000 MICROGRAM(S): 225 CAPSULE ORAL at 13:22

## 2024-06-09 RX ADMIN — Medication 3 MILLILITER(S): at 13:51

## 2024-06-09 RX ADMIN — Medication 500 MILLIGRAM(S): at 13:23

## 2024-06-09 RX ADMIN — Medication 20 MILLIGRAM(S): at 06:38

## 2024-06-09 RX ADMIN — ATORVASTATIN CALCIUM 80 MILLIGRAM(S): 80 TABLET, FILM COATED ORAL at 21:47

## 2024-06-09 RX ADMIN — Medication 1000 UNIT(S): at 21:47

## 2024-06-09 RX ADMIN — TIOTROPIUM BROMIDE 2 PUFF(S): 18 CAPSULE ORAL; RESPIRATORY (INHALATION) at 06:36

## 2024-06-09 RX ADMIN — ENOXAPARIN SODIUM 40 MILLIGRAM(S): 100 INJECTION SUBCUTANEOUS at 06:39

## 2024-06-09 NOTE — PROGRESS NOTE ADULT - PROBLEM SELECTOR PLAN 3
-H/O A Fib -Not on AC as Anemia  - EKG: Sinus Tachycardia 114 bpm, rightward axis shift   - Rate controlled in sinus @ 85 bpm on monitor s/p fluid resuscitation   - No longer on AV admion agents or anticoagulation per Cardiology, [facility] (Joe)   - HR stable

## 2024-06-09 NOTE — PROGRESS NOTE ADULT - ASSESSMENT
74M h/o nephrolithiasis, GERD, HTN, HLD, CAD s/p stents x5, asthma and COPD with multiple prior hospitalizations (no previous intubations), R lung tumor s/p VATS w/ resection, recent prostate bx 12/17 admitted with septic shock 2/2 E.coli bacteremia from hematogenous spread s/p prostate bx  c/b acute bronchospasm, status asthmaticus suspected due to SIRS after spiking fever to 102 requiring intubation now successfully extubated    - Neuro: mental status at baseline s/p intubation/sedation, no acute issues  - CV: remains hypotensive off sedation, likely distributive shock in setting of sepsis from E.coli bacteremia; continue levophed for pressure support, titrate to goal MAP > 65  - Pulm: restarted symbicort, spiriva held while getting DuoNeb qhr; continue albuterol prn and prednisone 40mg daily to complete 5 days; nasal canula O2 for goal O2 sat > 92%  - GI: restarted DASH/TLC diet, continue home protonix for GERD  - Renal: YUE on admission resolved; urinary retention overnight requiring andrews placement, sedation related vs BPH - can do trial of void when pt more mobile continue to trend; monitor I/Os  - ID: E.coli bacteremia likely 2/2 recent prostate biopsy, Day 2 of ertapenem today, awaiting sensitivities, can de-escalate if not ESBL species; leukocytosis likely stress response and steroid related, will trend; bands unchanged  - Heme: dvt ppx with lovenox 74M h/o nephrolithiasis, GERD, HTN, HLD, CAD s/p stents x5, asthma and COPD with multiple prior hospitalizations (no previous intubations), R lung tumor s/p VATS w/ resection, recent prostate bx 12/17 admitted with septic shock 2/2 E.coli bacteremia from hematogenous spread s/p prostate bx  c/b acute bronchospasm, status asthmaticus suspected due to SIRS after spiking fever to 102 requiring intubation now successfully extubated    - Neuro: mental status at baseline s/p intubation/sedation, no acute issues  - CV: remains hypotensive off sedation, likely distributive shock in setting of sepsis from E.coli bacteremia; continue levophed for pressure support, titrate to goal MAP > 65  - Pulm: restarted symbicort, spiriva held while getting DuoNeb qhr; continue albuterol prn and prednisone 40mg daily to complete 5 days; nasal canula O2 for goal O2 sat > 92%  - GI: restarted DASH/TLC diet, continue home protonix for GERD  - Renal: YUE on admission resolved; urinary retention overnight requiring andrews placement, sedation related vs BPH - can do trial of void when pt more mobile continue to trend; monitor I/Os  - ID: E.coli bacteremia likely 2/2 recent prostate biopsy, Day 2 of ertapenem today, awaiting sensitivities, can de-escalate if not ESBL species; leukocytosis likely stress response and steroid related, will trend; bands unchanged; surveillance cultures sent this AM  - Heme: dvt ppx with lovenox No

## 2024-06-09 NOTE — PROGRESS NOTE ADULT - PROBLEM SELECTOR PLAN 1
- Severe Sepsis on admission: WBC 16.61, hypotension, Fever  tachycardia, tachypnea, Likely 2/2 UTI  Nl Lactate   - UA: Cloudy, Protein 300, Leukocyte esterase Large, Blood small, WBC too numerous to count, occasional bacteria   - CT Abdomen and pelvis: No acute abnormality in the abdomen or pelvis  - Frequent UTIs with prophylactic Macrobid , LEUKOCYTOSIS   - BCx = ESBL e. coli  - repeat BCx x 2 = NGTD  - UCx: normal urogenital cathi  - Continue with Ertapenem 1gm IVPB daily  -CBC in AM , PRN Tylenol for fever  - Tolerating PO, continue DASH diet   - Infectious Disease consult, Dr. Marc, recs appreciated - Severe Sepsis on admission: WBC 16.61, hypotension, Fever  tachycardia, tachypnea, Likely 2/2 UTI  Nl Lactate   - UA: Cloudy, Protein 300, Leukocyte esterase Large, Blood small, WBC too numerous to count, occasional bacteria   - CT Abdomen and pelvis: No acute abnormality in the abdomen or pelvis  - Frequent UTIs with prophylactic Macrobid , LEUKOCYTOSIS   - BCx = ESBL e. coli  - f/u repeat BCx x 2  - UCx: normal urogenital cathi  - Continue with Ertapenem 1gm IVPB daily  -CBC in AM , PRN Tylenol for fever  - Tolerating PO, continue DASH diet   - Infectious Disease consult, Dr. Marc, recs appreciated - Severe Sepsis on admission: WBC 16.61, hypotension, Fever  tachycardia, tachypnea, Likely 2/2 UTI  Nl Lactate   - UA: Cloudy, Protein 300, Leukocyte esterase Large, Blood small, WBC too numerous to count, occasional bacteria   - CT Abdomen and pelvis: No acute abnormality in the abdomen or pelvis  - Frequent UTIs with prophylactic Macrobid , LEUKOCYTOSIS   - BCx = ESBL e. coli  - f/u repeat BCx x 2  - UCx: normal urogenital cathi  - Continue with Ertapenem 1gm IVPB daily  -CBC in AM , PRN Tylenol for fever  - Tolerating PO, continue DASH diet   - Infectious Disease - Dr. Marc, /Dr lindsey follow up -continue IV Invanz

## 2024-06-09 NOTE — PROGRESS NOTE ADULT - ASSESSMENT
79 yo M with PMHx of HTN, HLD, GERD, Atrial fibrillation Not on AC , Asthma, COPD, hx of benign lung cancer R lobe s/p resection,  CAD (s/p 5 stents ~2001), presents to the ED with 3 days of Dysuria ,fever  Leukocytosis, Sepsis, UTI

## 2024-06-09 NOTE — PROGRESS NOTE ADULT - ASSESSMENT
79 yo M with PMHx of HTN, HLD, GERD, Atrial fibrillation, Asthma, COPD, hx of benign lung cancer R lobe s/p resection,  CAD (s/p 5 stents ~2001), and recurrent UTIs presents to the ED with dysuria. Patient notes he began having burning with urination about 3 days ago. He has Augmentin prescribed by his Urologist Dr. Sena as needed for when he begins to feel dysuria, however, patient did not take the antibiotics.    abnormal chest imaging  copd  emphysema  HLD  HTN  OP  OA  AF  hx of Lung Surgery -   CAD    ID eval noted  on INVANZ  cx -   nebs - steroids - symbicort -   VS noted    ct chest imaging reviewed - unclear correlation - etiol - will benefit from rpt imaging in 6 - 8 weeks and follow up with his outpatient Pulm MD - Dr Dang in [place] - [facility]  eval for Acute Infection   emp ABX  Biomarkers and Cx -   ID eval  copd - asthma hx - nebs - inhalers and short course of Prednisone  o2 support as needed  goal sat > 88 pct  cvs rx regimen  BP control  on Anti Platelet rx regimen  nutrition  - dietary discretion  emotional support  anxiolysis  seasonal vaccination counseling 79 yo M with PMHx of HTN, HLD, GERD, Atrial fibrillation, Asthma, COPD, hx of benign lung cancer R lobe s/p resection,  CAD (s/p 5 stents ~2001), and recurrent UTIs presents to the ED with dysuria. Patient notes he began having burning with urination about 3 days ago. He has Augmentin prescribed by his Urologist Dr. Sena as needed for when he begins to feel dysuria, however, patient did not take the antibiotics.    abnormal chest imaging  copd  emphysema  HLD  HTN  OP  OA  AF  hx of Lung Surgery -   CAD    ID eval noted  on INVANZ  cx -   nebs - steroids - symbicort - (dc'd pulmicort - pt is already on Symbicort - has inhaled steroid component)  pt is upset about hospital's timing and administration of his home meds  VS noted    ct chest imaging reviewed - unclear correlation - etiol - will benefit from rpt imaging in 6 - 8 weeks and follow up with his outpatient Pulm MD - Dr Dang in [place] - [facility]  eval for Acute Infection   emp ABX  Biomarkers and Cx -   ID eval  copd - asthma hx - nebs - inhalers and short course of Prednisone  o2 support as needed  goal sat > 88 pct  cvs rx regimen  BP control  on Anti Platelet rx regimen  nutrition  - dietary discretion  emotional support  anxiolysis  seasonal vaccination counseling

## 2024-06-09 NOTE — PROGRESS NOTE ADULT - SUBJECTIVE AND OBJECTIVE BOX
Date/Time Patient Seen:  		  Referring MD:   Data Reviewed	       Patient is a 78y old  Male who presents with a chief complaint of Sepsis (08 Jun 2024 17:16)      Subjective/HPI     PAST MEDICAL & SURGICAL HISTORY:  Chronic obstructive pulmonary disease  Asthma/copd    Asthma    Stented coronary artery  2000, 2006 , 2016 and April 2023, total of 6 JESSE stents    HTN (hypertension)    HLD (hyperlipidemia)    GERD (gastroesophageal reflux disease)    Nephrolithiasis  s/p Lithotripsy    GI bleed  while on Antiplatelets    Umbilical hernia with obstruction, without gangrene    2019 novel coronavirus disease (COVID-19)  DX 11/4/2022    History of atrial fibrillation    Implantable loop recorder present    Frequent UTI    Chronic atrial fibrillation    Lung tumor  Rt Lung tumor resection,benign    S/P primary angioplasty with coronary stent    S/P TURP          Medication list         MEDICATIONS  (STANDING):  albuterol/ipratropium for Nebulization 3 milliLiter(s) Nebulizer every 6 hours  ascorbic acid 500 milliGRAM(s) Oral daily  aspirin  chewable 81 milliGRAM(s) Oral daily  atorvastatin 80 milliGRAM(s) Oral at bedtime  buDESOnide    Inhalation Suspension 0.5 milliGRAM(s) Inhalation every 12 hours  budesonide 160 MICROgram(s)/formoterol 4.5 MICROgram(s) Inhaler 2 Puff(s) Inhalation two times a day  cholecalciferol 1000 Unit(s) Oral at bedtime  clopidogrel Tablet 75 milliGRAM(s) Oral daily  cyanocobalamin 1000 MICROGram(s) Oral daily  enoxaparin Injectable 40 milliGRAM(s) SubCutaneous every 24 hours  ertapenem  IVPB 1000 milliGRAM(s) IV Intermittent every 24 hours  pantoprazole    Tablet 40 milliGRAM(s) Oral before breakfast  predniSONE   Tablet 20 milliGRAM(s) Oral daily  pyridoxine 100 milliGRAM(s) Oral at bedtime  sodium chloride 0.9%. 1000 milliLiter(s) (75 mL/Hr) IV Continuous <Continuous>  tiotropium 2.5 MICROgram(s) Inhaler 2 Puff(s) Inhalation daily    MEDICATIONS  (PRN):  acetaminophen     Tablet .. 650 milliGRAM(s) Oral every 6 hours PRN Temp greater or equal to 38C (100.4F), Mild Pain (1 - 3)  albuterol    90 MICROgram(s) HFA Inhaler 2 Puff(s) Inhalation every 6 hours PRN for shortness of breath and/or wheezing  aluminum hydroxide/magnesium hydroxide/simethicone Suspension 30 milliLiter(s) Oral every 4 hours PRN Dyspepsia  melatonin 3 milliGRAM(s) Oral at bedtime PRN Insomnia  ondansetron Injectable 4 milliGRAM(s) IV Push every 8 hours PRN Nausea and/or Vomiting         Vitals log        ICU Vital Signs Last 24 Hrs  T(C): 36.9 (08 Jun 2024 20:18), Max: 36.9 (08 Jun 2024 20:18)  T(F): 98.4 (08 Jun 2024 20:18), Max: 98.4 (08 Jun 2024 20:18)  HR: 77 (08 Jun 2024 20:18) (63 - 79)  BP: 113/55 (08 Jun 2024 20:18) (107/52 - 113/55)  BP(mean): --  ABP: --  ABP(mean): --  RR: 18 (08 Jun 2024 20:18) (18 - 18)  SpO2: 93% (08 Jun 2024 20:18) (92% - 96%)    O2 Parameters below as of 08 Jun 2024 20:18  Patient On (Oxygen Delivery Method): room air                 Input and Output:  I&O's Detail    07 Jun 2024 07:01  -  08 Jun 2024 07:00  --------------------------------------------------------  IN:  Total IN: 0 mL    OUT:    Voided (mL): 550 mL  Total OUT: 550 mL    Total NET: -550 mL          Lab Data                        13.4   32.46 )-----------( 150      ( 08 Jun 2024 08:05 )             39.8     06-07    140  |  109<H>  |  17  ----------------------------<  160<H>  3.9   |  26  |  1.10    Ca    8.6      07 Jun 2024 18:30  Phos  2.9     06-07  Mg     1.9     06-07    TPro  6.6  /  Alb  3.5  /  TBili  0.7  /  DBili  x   /  AST  30  /  ALT  45  /  AlkPhos  63  06-07      CARDIAC MARKERS ( 08 Jun 2024 08:05 )  x     / x     / x     / x     / 6.6 ng/mL  CARDIAC MARKERS ( 07 Jun 2024 18:30 )  x     / x     / 236 U/L / x     / 5.0 ng/mL  CARDIAC MARKERS ( 07 Jun 2024 10:00 )  x     / x     / x     / x     / 3.8 ng/mL        Review of Systems	      Objective     Physical Examination    heart s1s2  lung dc BS  head nc      Pertinent Lab findings & Imaging      Agusto:  NO   Adequate UO     I&O's Detail    07 Jun 2024 07:01  -  08 Jun 2024 07:00  --------------------------------------------------------  IN:  Total IN: 0 mL    OUT:    Voided (mL): 550 mL  Total OUT: 550 mL    Total NET: -550 mL               Discussed with:     Cultures:	        Radiology

## 2024-06-09 NOTE — PROGRESS NOTE ADULT - SUBJECTIVE AND OBJECTIVE BOX
Patient is a 78y old  Male who presents with a chief complaint of Sepsis (09 Jun 2024 05:17)    HPI:  79 yo M with PMHx of HTN, HLD, GERD, Atrial fibrillation, Asthma, COPD, hx of benign lung cancer R lobe s/p resection,  CAD (s/p 5 stents ~2001), and recurrent UTIs presents to the ED with dysuria. Patient notes he began having burning with urination about 3 days ago. He has Augmentin prescribed by his Urologist Dr. Sena as needed for when he begins to feel dysuria, however, patient did not take the antibiotics. Then this AM, he woke up around 3:00AM with rigors, SOB and nausea. Patient tried to do a home breathing treatment but had 1 episode of vomiting. He attempted to go back to sleep, but started to become a little confused on the time of the day. Patient felt it was necessary for him to come to ED for evaluation.     ED Course:   Vitals: BP: 83/46, HR: 114, Temp: 99.7, RR: 18, SpO2: 90% on RA    Labs: WBC 16.61, CKMB 3.8, Trop 11.4, proBNP 49, RVP negative   UA: Cloudy, Protein 300, Leku esterase Large, Blood small, WBC too numerous to count, occasional bacteria   EKG: Sinus Tachycardia 114 bpm, rightward axis shift   Received in the ED: Ofirmev x1, Solumedrol 125 mg IVP x1, Zosyn 3.375 mg IVPB, Vancomycin 1g IVPB, 2L NS bolus, Duoneb x3     Imaging:  CXR: no active disease   CT Chest PE: No pulmonary embolus.  CT Abdomen and pelvis: No acute abnormality in the abdomen or pelvis.     (07 Jun 2024 17:15)    INTERVAL HPI:  6/7 - admitted  6/8 - Pt was seen and examined at bedside. Pt states that he feels well and denies dysuria. Pt denies headache, dizziness, lightheadedness, fever, chills, body aches, CP, SOB, palpitations, abdominal pain, n/v, numbness/tingling. No other complaints at this time.  ESBL E COLI Bacteremia ,LEUKOCYTOSIS ,On IV Abx   6/9 - Pt was seen and examined at bedside. Pt states that he still feels well and continues to deny dysuria. Patient also endorsed an episode of loose stool but not diarrhea. Pt denies headache, dizziness, lightheadedness, fever, chills, body aches, CP, SOB, palpitations, abdominal pain, n/v, numbness/tingling. No other complaints at this time.     OVERNIGHT EVENTS: No acute overnight events.     Home Medications:  albuterol 90 mcg/inh inhalation aerosol: 2 puff(s) inhaled once a day as needed for  shortness of breath and/or wheezing (07 Jun 2024 17:45)  ascorbic acid 500 mg oral tablet: 1 orally once a day (in the morning) (07 Jun 2024 17:52)  aspirin 81 mg oral tablet: 1 orally once a day (in the morning) (07 Jun 2024 17:52)  B-12 500 mcg sublingual tablet: 1 sublingually once a day (at bedtime) (07 Jun 2024 17:52)  budesonide 0.5 mg/2 mL inhalation suspension: 2 puff(s) by nebulizer 2 times a day as needed for  shortness of breath and/or wheezing (07 Jun 2024 17:47)  cholecalciferol 25 mcg (1000 intl units) oral tablet: 1 orally once a day (at bedtime) (07 Jun 2024 17:52)  clopidogrel 75 mg oral tablet: 1 orally once a day (in the morning) (07 Jun 2024 17:52)  esomeprazole 40 mg oral delayed release capsule: 1 orally once a day (in the morning) (07 Jun 2024 17:52)  ipratropium-albuterol 0.5 mg-2.5 mg/3 mL inhalation solution: 3 puff(s) by nebulizer every 6 hours as needed for  shortness of breath and/or wheezing (07 Jun 2024 17:49)  nitrofurantoin macrocrystals 100 mg oral capsule: 1 orally once a day (at bedtime) (07 Jun 2024 17:52)  predniSONE 5 mg oral tablet: 1 tab(s) orally once a day as needed for  shortness of breath and/or wheezing (07 Jun 2024 17:51)  rosuvastatin 20 mg oral tablet: 1 orally once a day (at bedtime) (07 Jun 2024 17:52)  Spiriva 18 mcg inhalation capsule: 1 inhaled once a day (in the morning) (07 Jun 2024 17:52)  Symbicort 160 mcg-4.5 mcg/inh inhalation aerosol: 2 puff(s) inhaled 2 times a day (07 Jun 2024 17:47)  Vitamin B6 100 mg oral tablet: 1 orally once a day (at bedtime) (07 Jun 2024 17:52)  zinc (as gluconate) 50 mg oral tablet: orally once a day (in the morning) (07 Jun 2024 17:52)      MEDICATIONS  (STANDING):  albuterol/ipratropium for Nebulization 3 milliLiter(s) Nebulizer every 6 hours  ascorbic acid 500 milliGRAM(s) Oral daily  aspirin  chewable 81 milliGRAM(s) Oral daily  atorvastatin 80 milliGRAM(s) Oral at bedtime  buDESOnide    Inhalation Suspension 0.5 milliGRAM(s) Inhalation every 12 hours  budesonide 160 MICROgram(s)/formoterol 4.5 MICROgram(s) Inhaler 2 Puff(s) Inhalation two times a day  cholecalciferol 1000 Unit(s) Oral at bedtime  clopidogrel Tablet 75 milliGRAM(s) Oral daily  cyanocobalamin 1000 MICROGram(s) Oral daily  enoxaparin Injectable 40 milliGRAM(s) SubCutaneous every 24 hours  ertapenem  IVPB 1000 milliGRAM(s) IV Intermittent every 24 hours  pantoprazole    Tablet 40 milliGRAM(s) Oral before breakfast  predniSONE   Tablet 20 milliGRAM(s) Oral daily  pyridoxine 100 milliGRAM(s) Oral at bedtime  sodium chloride 0.9%. 1000 milliLiter(s) (75 mL/Hr) IV Continuous <Continuous>  tiotropium 2.5 MICROgram(s) Inhaler 2 Puff(s) Inhalation daily    MEDICATIONS  (PRN):  acetaminophen     Tablet .. 650 milliGRAM(s) Oral every 6 hours PRN Temp greater or equal to 38C (100.4F), Mild Pain (1 - 3)  albuterol    90 MICROgram(s) HFA Inhaler 2 Puff(s) Inhalation every 6 hours PRN for shortness of breath and/or wheezing  aluminum hydroxide/magnesium hydroxide/simethicone Suspension 30 milliLiter(s) Oral every 4 hours PRN Dyspepsia  melatonin 3 milliGRAM(s) Oral at bedtime PRN Insomnia  ondansetron Injectable 4 milliGRAM(s) IV Push every 8 hours PRN Nausea and/or Vomiting      No Known Allergies      Social History:  Lives: At home with wife   ADLs: Fully independent, no problem ambulating   Alcohol Use: None   Tobacco Use: Former smoker quit >10yrs ago   Recreational Drug Use: None (07 Jun 2024 17:15)      REVIEW OF SYSTEMS:  CONSTITUTIONAL: No fever, No chills, No fatigue, No myalgia, No Body ache, No Weakness  EYES: No eye pain,  No visual disturbances, No discharge, No Redness  ENMT: No ear pain, No nose bleed, No vertigo; No sinus pain, No throat pain, No Congestion  NECK: No pain, No stiffness  RESPIRATORY: No cough, No wheezing, No hemoptysis, No shortness of breath  CARDIOVASCULAR: No chest pain, No palpitations  GASTROINTESTINAL: No abdominal pain, No epigastric pain. No nausea, No vomiting, No diarrhea, No constipation; [ x ] BM-  GENITOURINARY: No dysuria, No frequency, No urgency, No hematuria, No incontinence  NEUROLOGICAL: No headaches, No dizziness, No numbness, No tingling, No tremors, No weakness  EXTREMITIES: No Swelling, No Pain, No Edema  SKIN: [ x ] No itching, burning, rashes, or lesions   MUSCULOSKELETAL: No joint pain, No joint swelling; No muscle pain, No back pain, No extremity pain  PSYCHIATRIC: No depression, No anxiety, No mood swings, No difficulty sleeping at night  PAIN SCALE: [ x ] None  [  ] Other-  ROS Unable to obtain due to: [  ] Dementia  [  ] Lethargy  [  ] Sedated  [  ] Non verbal  REST OF REVIEW OF SYSTEMS: [ x ] Normal     Vital Signs Last 24 Hrs  T(C): 36.7 (09 Jun 2024 05:22), Max: 36.9 (08 Jun 2024 20:18)  T(F): 98 (09 Jun 2024 05:22), Max: 98.4 (08 Jun 2024 20:18)  HR: 83 (09 Jun 2024 07:53) (65 - 84)  BP: 107/59 (09 Jun 2024 05:22) (107/52 - 113/55)  BP(mean): --  RR: 18 (09 Jun 2024 05:22) (18 - 18)  SpO2: 94% (09 Jun 2024 07:53) (92% - 98%)    Parameters below as of 09 Jun 2024 07:53  Patient On (Oxygen Delivery Method): nasal cannula        CAPILLARY BLOOD GLUCOSE          I&O's Summary    PHYSICAL EXAM:  GENERAL:  [ x ] NAD, [ x ] Well appearing, [  ] Agitated, [  ] Drowsy, [  ] Lethargy, [  ] Confused   HEAD:  [ x ] Normal, [  ] Other  EYES:  [ x ] EOMI, [ x ] PERRLA, [ x ] Conjunctiva and sclera clear normal, [  ] Other, [  ] Pallor, [  ] Discharge  ENMT:  [ x ] Normal, [ x ] Moist mucous membranes, [  ] Good dentition, [  ] No thrush  NECK:  [ x ] Supple, [  ] No JVD, [ x ] Normal thyroid, [  ] Lymphadenopathy, [  ] Other  CHEST/LUNG:  [  ] Clear to auscultation bilaterally, [ x ] Breath Sounds decreased b/l, [  ] Poor effort, [ x ] No rales, [ x ] No rhonchi, [ x ] mild wheeze b/l  HEART:  [ x ] Regular rate and rhythm, [  ] Tachycardia, [  ] Bradycardia, [  ] Irregular, [ x ] No murmurs, No rubs, No gallops, [  ] PPM in place (Mfr:  )  ABDOMEN:  [ x ] Soft, [ x ] Nontender, [ x ] Nondistended, [ x ] No mass, [ x ] Bowel sounds present, [  ] Obese  NERVOUS SYSTEM:  [ x ] Alert & Oriented x3__, [ x ] Nonfocal, [  ] Confusion, [  ] Encephalopathic, [  ] Sedated, [  ] Unable to assess, [  ] Dementia, [  ] Other-  EXTREMITIES:  [ x ] 2+ Peripheral Pulses, No clubbing, No cyanosis,  [  ] Edema B/L lower EXT, [  ] PVD stasis skin changes B/L lower EXT, [  ] Wound  LYMPH:  No lymphadenopathy noted  SKIN:  [ x ] No rashes or lesions, [  ] Pressure ulcers, [  ] Ecchymosis, [  ] Skin tears, [  ] Other    DIET: Diet, DASH/TLC:   Sodium & Cholesterol Restricted (06-07-24 @ 18:02)      LABS:                        13.7   19.00 )-----------( 151      ( 09 Jun 2024 07:58 )             40.9     09 Jun 2024 07:58    142    |  109    |  19     ----------------------------<  108    3.7     |  32     |  0.75     Ca    9.4        09 Jun 2024 07:58    TPro  6.3    /  Alb  2.9    /  TBili  0.5    /  DBili  x      /  AST  21     /  ALT  38     /  AlkPhos  74     09 Jun 2024 07:58      Urinalysis Basic - ( 09 Jun 2024 07:58 )    Color: x / Appearance: x / SG: x / pH: x  Gluc: 108 mg/dL / Ketone: x  / Bili: x / Urobili: x   Blood: x / Protein: x / Nitrite: x   Leuk Esterase: x / RBC: x / WBC x   Sq Epi: x / Non Sq Epi: x / Bacteria: x      Culture Results:   <10,000 CFU/mL Normal Urogenital Yancy (06-07 @ 11:40)  Culture Results:   No growth at 24 hours (06-07 @ 10:00)  Culture Results:   Growth in anaerobic bottle: Escherichia coli  Direct identification is available within approximately 3-5  hours either by Blood Panel Multiplexed PCR or Direct  MALDI-TOF. Details: [email or web address] (06-07 @ 09:45)    culture blood  -- Clean Catch Clean Catch (Midstream) 06-07 @ 11:40    culture urine  --  06-07 @ 11:40    CARDIAC MARKERS ( 08 Jun 2024 08:05 )  x     / x     / x     / x     / 6.6 ng/mL  CARDIAC MARKERS ( 07 Jun 2024 18:30 )  x     / x     / 236 U/L / x     / 5.0 ng/mL  CARDIAC MARKERS ( 07 Jun 2024 10:00 )  x     / x     / x     / x     / 3.8 ng/mL        Culture - Urine (collected 07 Jun 2024 11:40)  Source: Clean Catch Clean Catch (Midstream)  Final Report (08 Jun 2024 15:55):    <10,000 CFU/mL Normal Urogenital Yancy    Culture - Blood (collected 07 Jun 2024 10:00)  Source: .Blood Blood-Peripheral  Preliminary Report (08 Jun 2024 17:01):    No growth at 24 hours    Culture - Blood (collected 07 Jun 2024 09:45)  Source: .Blood Blood-Peripheral  Gram Stain (08 Jun 2024 03:07):    Growth in anaerobic bottle: Gram Negative Rods  Preliminary Report (08 Jun 2024 19:05):    Growth in anaerobic bottle: Escherichia coli    Direct identification is available within approximately 3-5    hours either by Blood Panel Multiplexed PCR or Direct    MALDI-TOF. Details: [email or web address]  Organism: Blood Culture PCR (08 Jun 2024 04:21)  Organism: Blood Culture PCR (08 Jun 2024 04:21)       Anemia Panel:      Thyroid Panel:  Thyroid Stimulating Hormone, Serum: 1.06 uIU/mL (06-07-24 @ 10:00)        Lipase: 25 U/L (06-07-24 @ 10:00)          RADIOLOGY & ADDITIONAL TESTS: _______      HEALTH ISSUES - PROBLEM Dx:  UTI (urinary tract infection)    HTN (hypertension)    HLD (hyperlipidemia)    Afib    CAD (coronary artery disease)    COPD with asthma    Need for prophylactic measure    GERD (gastroesophageal reflux disease)    Cardiac enzymes elevated          Consultant(s) Notes Reviewed:  [ x ] YES     Care Discussed with [ x ] Consultants, [ x ] Patient, [ x ] Family, [  ] HCP, [ x ] , [  ] Social Service, [ x ] RN, [  ] Physical Therapy, [  ] Palliative Care Team  DVT PPX: [ X ] Lovenox, [  ] SC Heparin, [  ] Coumadin, [  ] Xarelto, [  ] Eliquis, [  ] Pradaxa, [  ] IV Heparin drip, [  ] SCD, [  ] Ambulation, [  ] Contraindicated 2/2 GI Bleed, [  ] Contraindicated 2/2  Bleed, [  ] Contraindicated 2/2 Brain Bleed  Advanced Directive: [ X ] None, [  ] DNR/DNI Patient is a 78y old  Male who presents with a chief complaint of Sepsis (09 Jun 2024 05:17)    HPI:  77 yo M with PMHx of HTN, HLD, GERD, Atrial fibrillation, Asthma, COPD, hx of benign lung cancer R lobe s/p resection,  CAD (s/p 5 stents ~2001), and recurrent UTIs presents to the ED with dysuria. Patient notes he began having burning with urination about 3 days ago. He has Augmentin prescribed by his Urologist Dr. Sena as needed for when he begins to feel dysuria, however, patient did not take the antibiotics. Then this AM, he woke up around 3:00AM with rigors, SOB and nausea. Patient tried to do a home breathing treatment but had 1 episode of vomiting. He attempted to go back to sleep, but started to become a little confused on the time of the day. Patient felt it was necessary for him to come to ED for evaluation.     ED Course:   Vitals: BP: 83/46, HR: 114, Temp: 99.7, RR: 18, SpO2: 90% on RA    Labs: WBC 16.61, CKMB 3.8, Trop 11.4, proBNP 49, RVP negative   UA: Cloudy, Protein 300, Leku esterase Large, Blood small, WBC too numerous to count, occasional bacteria   EKG: Sinus Tachycardia 114 bpm, rightward axis shift   Received in the ED: Ofirmev x1, Solumedrol 125 mg IVP x1, Zosyn 3.375 mg IVPB, Vancomycin 1g IVPB, 2L NS bolus, Duoneb x3     Imaging:  CXR: no active disease   CT Chest PE: No pulmonary embolus.  CT Abdomen and pelvis: No acute abnormality in the abdomen or pelvis.     (07 Jun 2024 17:15)    INTERVAL HPI:  6/7 - admitted  6/8 - Pt was seen and examined at bedside. Pt states that he feels well and denies dysuria. Pt denies headache, dizziness, lightheadedness, fever, chills, body aches, CP, SOB, palpitations, abdominal pain, n/v, numbness/tingling. No other complaints at this time.  ESBL E COLI Bacteremia ,LEUKOCYTOSIS ,On IV Abx   6/9 - Pt was seen and examined at bedside. Pt states that he still feels well and continues to deny dysuria. Patient also endorsed an episode of loose stool but not diarrhea. Pt denies headache, dizziness, lightheadedness, fever, chills, body aches, CP, SOB, palpitations, abdominal pain, n/v, numbness/tingling. No other complaints at this time.     OVERNIGHT EVENTS: No acute overnight events.     Home Medications:  albuterol 90 mcg/inh inhalation aerosol: 2 puff(s) inhaled once a day as needed for  shortness of breath and/or wheezing (07 Jun 2024 17:45)  ascorbic acid 500 mg oral tablet: 1 orally once a day (in the morning) (07 Jun 2024 17:52)  aspirin 81 mg oral tablet: 1 orally once a day (in the morning) (07 Jun 2024 17:52)  B-12 500 mcg sublingual tablet: 1 sublingually once a day (at bedtime) (07 Jun 2024 17:52)  budesonide 0.5 mg/2 mL inhalation suspension: 2 puff(s) by nebulizer 2 times a day as needed for  shortness of breath and/or wheezing (07 Jun 2024 17:47)  cholecalciferol 25 mcg (1000 intl units) oral tablet: 1 orally once a day (at bedtime) (07 Jun 2024 17:52)  clopidogrel 75 mg oral tablet: 1 orally once a day (in the morning) (07 Jun 2024 17:52)  esomeprazole 40 mg oral delayed release capsule: 1 orally once a day (in the morning) (07 Jun 2024 17:52)  ipratropium-albuterol 0.5 mg-2.5 mg/3 mL inhalation solution: 3 puff(s) by nebulizer every 6 hours as needed for  shortness of breath and/or wheezing (07 Jun 2024 17:49)  nitrofurantoin macrocrystals 100 mg oral capsule: 1 orally once a day (at bedtime) (07 Jun 2024 17:52)  predniSONE 5 mg oral tablet: 1 tab(s) orally once a day as needed for  shortness of breath and/or wheezing (07 Jun 2024 17:51)  rosuvastatin 20 mg oral tablet: 1 orally once a day (at bedtime) (07 Jun 2024 17:52)  Spiriva 18 mcg inhalation capsule: 1 inhaled once a day (in the morning) (07 Jun 2024 17:52)  Symbicort 160 mcg-4.5 mcg/inh inhalation aerosol: 2 puff(s) inhaled 2 times a day (07 Jun 2024 17:47)  Vitamin B6 100 mg oral tablet: 1 orally once a day (at bedtime) (07 Jun 2024 17:52)  zinc (as gluconate) 50 mg oral tablet: orally once a day (in the morning) (07 Jun 2024 17:52)      MEDICATIONS  (STANDING):  albuterol/ipratropium for Nebulization 3 milliLiter(s) Nebulizer every 6 hours  ascorbic acid 500 milliGRAM(s) Oral daily  aspirin  chewable 81 milliGRAM(s) Oral daily  atorvastatin 80 milliGRAM(s) Oral at bedtime  buDESOnide    Inhalation Suspension 0.5 milliGRAM(s) Inhalation every 12 hours  budesonide 160 MICROgram(s)/formoterol 4.5 MICROgram(s) Inhaler 2 Puff(s) Inhalation two times a day  cholecalciferol 1000 Unit(s) Oral at bedtime  clopidogrel Tablet 75 milliGRAM(s) Oral daily  cyanocobalamin 1000 MICROGram(s) Oral daily  enoxaparin Injectable 40 milliGRAM(s) SubCutaneous every 24 hours  ertapenem  IVPB 1000 milliGRAM(s) IV Intermittent every 24 hours  pantoprazole    Tablet 40 milliGRAM(s) Oral before breakfast  predniSONE   Tablet 20 milliGRAM(s) Oral daily  pyridoxine 100 milliGRAM(s) Oral at bedtime  sodium chloride 0.9%. 1000 milliLiter(s) (75 mL/Hr) IV Continuous <Continuous>  tiotropium 2.5 MICROgram(s) Inhaler 2 Puff(s) Inhalation daily    MEDICATIONS  (PRN):  acetaminophen     Tablet .. 650 milliGRAM(s) Oral every 6 hours PRN Temp greater or equal to 38C (100.4F), Mild Pain (1 - 3)  albuterol    90 MICROgram(s) HFA Inhaler 2 Puff(s) Inhalation every 6 hours PRN for shortness of breath and/or wheezing  aluminum hydroxide/magnesium hydroxide/simethicone Suspension 30 milliLiter(s) Oral every 4 hours PRN Dyspepsia  melatonin 3 milliGRAM(s) Oral at bedtime PRN Insomnia  ondansetron Injectable 4 milliGRAM(s) IV Push every 8 hours PRN Nausea and/or Vomiting      No Known Allergies      Social History:  Lives: At home with wife   ADLs: Fully independent, no problem ambulating   Alcohol Use: None   Tobacco Use: Former smoker quit >10yrs ago   Recreational Drug Use: None (07 Jun 2024 17:15)      REVIEW OF SYSTEMS:  CONSTITUTIONAL: No fever, No chills, No fatigue, No myalgia, No Body ache, No Weakness  EYES: No eye pain,  No visual disturbances, No discharge, No Redness  ENMT: No ear pain, No nose bleed, No vertigo; No sinus pain, No throat pain, No Congestion  NECK: No pain, No stiffness  RESPIRATORY: No cough, No wheezing, No hemoptysis, No shortness of breath  CARDIOVASCULAR: No chest pain, No palpitations  GASTROINTESTINAL: No abdominal pain, No epigastric pain. No nausea, No vomiting, No diarrhea, No constipation; [ x ] BM-  GENITOURINARY: No dysuria, No frequency, No urgency, No hematuria, No incontinence  NEUROLOGICAL: No headaches, No dizziness, No numbness, No tingling, No tremors, No weakness  EXTREMITIES: No Swelling, No Pain, No Edema  SKIN: [ x ] No itching, burning, rashes, or lesions   MUSCULOSKELETAL: No joint pain, No joint swelling; No muscle pain, No back pain, No extremity pain  PSYCHIATRIC: No depression, No anxiety, No mood swings, No difficulty sleeping at night  PAIN SCALE: [ x ] None  [  ] Other-  ROS Unable to obtain due to: [  ] Dementia  [  ] Lethargy  [  ] Sedated  [  ] Non verbal  REST OF REVIEW OF SYSTEMS: [ x ] Normal     Vital Signs Last 24 Hrs  T(C): 36.7 (09 Jun 2024 05:22), Max: 36.9 (08 Jun 2024 20:18)  T(F): 98 (09 Jun 2024 05:22), Max: 98.4 (08 Jun 2024 20:18)  HR: 83 (09 Jun 2024 07:53) (65 - 84)  BP: 107/59 (09 Jun 2024 05:22) (107/52 - 113/55)  BP(mean): --  RR: 18 (09 Jun 2024 05:22) (18 - 18)  SpO2: 94% (09 Jun 2024 07:53) (92% - 98%)    Parameters below as of 09 Jun 2024 07:53  Patient On (Oxygen Delivery Method): nasal cannula        CAPILLARY BLOOD GLUCOSE          I&O's Summary    PHYSICAL EXAM:  GENERAL:  [ x ] NAD, [ x ] Well appearing, [  ] Agitated, [  ] Drowsy, [  ] Lethargy, [  ] Confused   HEAD:  [ x ] Normal, [  ] Other  EYES:  [ x ] EOMI, [ x ] PERRLA, [ x ] Conjunctiva and sclera clear normal, [  ] Other, [  ] Pallor, [  ] Discharge  ENMT:  [ x ] Normal, [ x ] Moist mucous membranes, [  ] Good dentition, [  ] No thrush  NECK:  [ x ] Supple, [  ] No JVD, [ x ] Normal thyroid, [  ] Lymphadenopathy, [  ] Other  CHEST/LUNG:  [  ] Clear to auscultation bilaterally, [ x ] Breath Sounds decreased b/l, [  ] Poor effort, [ x ] No rales, [ x ] No rhonchi, [ x ] mild wheeze b/l  HEART:  [ x ] Regular rate and rhythm, [  ] Tachycardia, [  ] Bradycardia, [  ] Irregular, [ x ] No murmurs, No rubs, No gallops, [  ] PPM in place (Mfr:  )  ABDOMEN:  [ x ] Soft, [ x ] Nontender, [ x ] Nondistended, [ x ] No mass, [ x ] Bowel sounds present, [  ] Obese  NERVOUS SYSTEM:  [ x ] Alert & Oriented x3__, [ x ] Nonfocal, [  ] Confusion, [  ] Encephalopathic, [  ] Sedated, [  ] Unable to assess, [  ] Dementia, [  ] Other-  EXTREMITIES:  [ x ] 2+ Peripheral Pulses, No clubbing, No cyanosis,  [  ] Edema B/L lower EXT, [  ] PVD stasis skin changes B/L lower EXT, [  ] Wound  LYMPH:  No lymphadenopathy noted  SKIN:  [ x ] No rashes or lesions, [  ] Pressure ulcers, [  ] Ecchymosis, [  ] Skin tears, [  ] Other    DIET: Diet, DASH/TLC:   Sodium & Cholesterol Restricted (06-07-24 @ 18:02)      LABS:                        13.7   19.00 )-----------( 151      ( 09 Jun 2024 07:58 )             40.9     09 Jun 2024 07:58    142    |  109    |  19     ----------------------------<  108    3.7     |  32     |  0.75     Ca    9.4        09 Jun 2024 07:58    TPro  6.3    /  Alb  2.9    /  TBili  0.5    /  DBili  x      /  AST  21     /  ALT  38     /  AlkPhos  74     09 Jun 2024 07:58      Urinalysis Basic - ( 09 Jun 2024 07:58 )    Color: x / Appearance: x / SG: x / pH: x  Gluc: 108 mg/dL / Ketone: x  / Bili: x / Urobili: x   Blood: x / Protein: x / Nitrite: x   Leuk Esterase: x / RBC: x / WBC x   Sq Epi: x / Non Sq Epi: x / Bacteria: x      Culture Results:   <10,000 CFU/mL Normal Urogenital Yancy (06-07 @ 11:40)  Culture Results:   No growth at 24 hours (06-07 @ 10:00)  Culture Results:   Growth in anaerobic bottle: Escherichia coli  Direct identification is available within approximately 3-5  hours either by Blood Panel Multiplexed PCR or Direct  MALDI-TOF. Details: [email or web address] (06-07 @ 09:45)    culture blood  -- Clean Catch Clean Catch (Midstream) 06-07 @ 11:40    culture urine  --  06-07 @ 11:40    CARDIAC MARKERS ( 08 Jun 2024 08:05 )  x     / x     / x     / x     / 6.6 ng/mL  CARDIAC MARKERS ( 07 Jun 2024 18:30 )  x     / x     / 236 U/L / x     / 5.0 ng/mL  CARDIAC MARKERS ( 07 Jun 2024 10:00 )  x     / x     / x     / x     / 3.8 ng/mL        Culture - Urine (collected 07 Jun 2024 11:40)  Source: Clean Catch Clean Catch (Midstream)  Final Report (08 Jun 2024 15:55):    <10,000 CFU/mL Normal Urogenital Yancy    Culture - Blood (collected 07 Jun 2024 10:00)  Source: .Blood Blood-Peripheral  Preliminary Report (08 Jun 2024 17:01):    No growth at 24 hours    Culture - Blood (collected 07 Jun 2024 09:45)  Source: .Blood Blood-Peripheral  Gram Stain (08 Jun 2024 03:07):    Growth in anaerobic bottle: Gram Negative Rods  Preliminary Report (08 Jun 2024 19:05):    Growth in anaerobic bottle: Escherichia coli    Direct identification is available within approximately 3-5    hours either by Blood Panel Multiplexed PCR or Direct    MALDI-TOF. Details: [email or web address]  Organism: Blood Culture PCR (08 Jun 2024 04:21)  Organism: Blood Culture PCR (08 Jun 2024 04:21)       Anemia Panel:      Thyroid Panel:  Thyroid Stimulating Hormone, Serum: 1.06 uIU/mL (06-07-24 @ 10:00)    Lipase: 25 U/L (06-07-24 @ 10:00)      RADIOLOGY & ADDITIONAL TESTS: _______      HEALTH ISSUES - PROBLEM Dx:  UTI (urinary tract infection)    HTN (hypertension)    HLD (hyperlipidemia)    Afib    CAD (coronary artery disease)    COPD with asthma    Need for prophylactic measure    GERD (gastroesophageal reflux disease)    Cardiac enzymes elevated      Consultant(s) Notes Reviewed:  [ x ] YES     Care Discussed with [ x ] Consultants, [ x ] Patient, [ x ] Family, [  ] HCP, [ x ] , [  ] Social Service, [ x ] RN, [  ] Physical Therapy, [  ] Palliative Care Team  DVT PPX: [ X ] Lovenox, [  ] SC Heparin, [  ] Coumadin, [  ] Xarelto, [  ] Eliquis, [  ] Pradaxa, [  ] IV Heparin drip, [  ] SCD, [  ] Ambulation, [  ] Contraindicated 2/2 GI Bleed, [  ] Contraindicated 2/2  Bleed, [  ] Contraindicated 2/2 Brain Bleed  Advanced Directive: [ X ] None, [  ] DNR/DNI

## 2024-06-09 NOTE — PROGRESS NOTE ADULT - PROBLEM SELECTOR PLAN 2
-AC on chronic hypoxic respiratory Failure    Persistent SOB on admission requiring supplementation; on home O2, 2L at night   - Home medications of Albuterol inhalers, Budesonide inhaler, Symbicort, Spiriva, Prednisone 5mg   - CXR: no active disease   - CT Chest PE: No pulmonary embolus  - Wean daytime O2 as tolerated, SPO2 goal 88-94%   - Solumedrol 125 mg IVP x1, Duoneb x3  - Continue home inhalers  - prednisone 20mg 5 day course  Duoneb q 6 hrs   - Pulm consulted, Dr Bunch, recs appreciated

## 2024-06-10 LAB
ANION GAP SERPL CALC-SCNC: 5 MMOL/L — SIGNIFICANT CHANGE UP (ref 5–17)
BASOPHILS # BLD AUTO: 0.03 K/UL — SIGNIFICANT CHANGE UP (ref 0–0.2)
BASOPHILS NFR BLD AUTO: 0.3 % — SIGNIFICANT CHANGE UP (ref 0–2)
BUN SERPL-MCNC: 14 MG/DL — SIGNIFICANT CHANGE UP (ref 7–23)
CALCIUM SERPL-MCNC: 9.3 MG/DL — SIGNIFICANT CHANGE UP (ref 8.5–10.1)
CHLORIDE SERPL-SCNC: 109 MMOL/L — HIGH (ref 96–108)
CO2 SERPL-SCNC: 29 MMOL/L — SIGNIFICANT CHANGE UP (ref 22–31)
CREAT SERPL-MCNC: 0.7 MG/DL — SIGNIFICANT CHANGE UP (ref 0.5–1.3)
EGFR: 95 ML/MIN/1.73M2 — SIGNIFICANT CHANGE UP
EOSINOPHIL # BLD AUTO: 0.11 K/UL — SIGNIFICANT CHANGE UP (ref 0–0.5)
EOSINOPHIL NFR BLD AUTO: 0.9 % — SIGNIFICANT CHANGE UP (ref 0–6)
GLUCOSE SERPL-MCNC: 93 MG/DL — SIGNIFICANT CHANGE UP (ref 70–99)
HCT VFR BLD CALC: 39.9 % — SIGNIFICANT CHANGE UP (ref 39–50)
HGB BLD-MCNC: 13.2 G/DL — SIGNIFICANT CHANGE UP (ref 13–17)
IMM GRANULOCYTES NFR BLD AUTO: 0.6 % — SIGNIFICANT CHANGE UP (ref 0–0.9)
LYMPHOCYTES # BLD AUTO: 0.96 K/UL — LOW (ref 1–3.3)
LYMPHOCYTES # BLD AUTO: 8.2 % — LOW (ref 13–44)
MCHC RBC-ENTMCNC: 32.3 PG — SIGNIFICANT CHANGE UP (ref 27–34)
MCHC RBC-ENTMCNC: 33.1 GM/DL — SIGNIFICANT CHANGE UP (ref 32–36)
MCV RBC AUTO: 97.6 FL — SIGNIFICANT CHANGE UP (ref 80–100)
MONOCYTES # BLD AUTO: 0.53 K/UL — SIGNIFICANT CHANGE UP (ref 0–0.9)
MONOCYTES NFR BLD AUTO: 4.5 % — SIGNIFICANT CHANGE UP (ref 2–14)
NEUTROPHILS # BLD AUTO: 9.95 K/UL — HIGH (ref 1.8–7.4)
NEUTROPHILS NFR BLD AUTO: 85.5 % — HIGH (ref 43–77)
NRBC # BLD: 0 /100 WBCS — SIGNIFICANT CHANGE UP (ref 0–0)
PLATELET # BLD AUTO: 165 K/UL — SIGNIFICANT CHANGE UP (ref 150–400)
POTASSIUM SERPL-MCNC: 4 MMOL/L — SIGNIFICANT CHANGE UP (ref 3.5–5.3)
POTASSIUM SERPL-SCNC: 4 MMOL/L — SIGNIFICANT CHANGE UP (ref 3.5–5.3)
RBC # BLD: 4.09 M/UL — LOW (ref 4.2–5.8)
RBC # FLD: 13.8 % — SIGNIFICANT CHANGE UP (ref 10.3–14.5)
SODIUM SERPL-SCNC: 143 MMOL/L — SIGNIFICANT CHANGE UP (ref 135–145)
WBC # BLD: 11.65 K/UL — HIGH (ref 3.8–10.5)
WBC # FLD AUTO: 11.65 K/UL — HIGH (ref 3.8–10.5)

## 2024-06-10 RX ORDER — ERTAPENEM SODIUM 1 G/1
1000 INJECTION, POWDER, LYOPHILIZED, FOR SOLUTION INTRAMUSCULAR; INTRAVENOUS EVERY 24 HOURS
Refills: 0 | Status: DISCONTINUED | OUTPATIENT
Start: 2024-06-10 | End: 2024-06-12

## 2024-06-10 RX ORDER — ZINC SULFATE TAB 220 MG (50 MG ZINC EQUIVALENT) 220 (50 ZN) MG
220 TAB ORAL DAILY
Refills: 0 | Status: DISCONTINUED | OUTPATIENT
Start: 2024-06-10 | End: 2024-06-12

## 2024-06-10 RX ADMIN — BUDESONIDE AND FORMOTEROL FUMARATE DIHYDRATE 2 PUFF(S): 160; 4.5 AEROSOL RESPIRATORY (INHALATION) at 06:17

## 2024-06-10 RX ADMIN — Medication 3 MILLILITER(S): at 07:58

## 2024-06-10 RX ADMIN — BUDESONIDE AND FORMOTEROL FUMARATE DIHYDRATE 2 PUFF(S): 160; 4.5 AEROSOL RESPIRATORY (INHALATION) at 18:43

## 2024-06-10 RX ADMIN — Medication 500 MILLIGRAM(S): at 11:09

## 2024-06-10 RX ADMIN — Medication 81 MILLIGRAM(S): at 11:09

## 2024-06-10 RX ADMIN — TIOTROPIUM BROMIDE 2 PUFF(S): 18 CAPSULE ORAL; RESPIRATORY (INHALATION) at 06:17

## 2024-06-10 RX ADMIN — Medication 250 MILLIGRAM(S): at 06:10

## 2024-06-10 RX ADMIN — Medication 0.5 MILLIGRAM(S): at 19:17

## 2024-06-10 RX ADMIN — Medication 250 MILLIGRAM(S): at 18:42

## 2024-06-10 RX ADMIN — Medication 100 MILLIGRAM(S): at 21:23

## 2024-06-10 RX ADMIN — CLOPIDOGREL BISULFATE 75 MILLIGRAM(S): 75 TABLET, FILM COATED ORAL at 11:09

## 2024-06-10 RX ADMIN — PREGABALIN 1000 MICROGRAM(S): 225 CAPSULE ORAL at 11:09

## 2024-06-10 RX ADMIN — Medication 3 MILLILITER(S): at 13:53

## 2024-06-10 RX ADMIN — ATORVASTATIN CALCIUM 80 MILLIGRAM(S): 80 TABLET, FILM COATED ORAL at 21:23

## 2024-06-10 RX ADMIN — ZINC SULFATE TAB 220 MG (50 MG ZINC EQUIVALENT) 220 MILLIGRAM(S): 220 (50 ZN) TAB at 11:21

## 2024-06-10 RX ADMIN — ENOXAPARIN SODIUM 40 MILLIGRAM(S): 100 INJECTION SUBCUTANEOUS at 06:09

## 2024-06-10 RX ADMIN — PANTOPRAZOLE SODIUM 40 MILLIGRAM(S): 20 TABLET, DELAYED RELEASE ORAL at 06:10

## 2024-06-10 RX ADMIN — Medication 20 MILLIGRAM(S): at 06:09

## 2024-06-10 RX ADMIN — Medication 0.5 MILLIGRAM(S): at 07:58

## 2024-06-10 RX ADMIN — Medication 1000 UNIT(S): at 21:23

## 2024-06-10 RX ADMIN — Medication 3 MILLILITER(S): at 19:17

## 2024-06-10 RX ADMIN — ERTAPENEM SODIUM 120 MILLIGRAM(S): 1 INJECTION, POWDER, LYOPHILIZED, FOR SOLUTION INTRAMUSCULAR; INTRAVENOUS at 18:43

## 2024-06-10 NOTE — CARE COORDINATION ASSESSMENT. - NSCAREPROVIDERS_GEN_ALL_CORE_FT
CARE PROVIDERS:  Accepting Physician: Eduardo Lopez  Administration: Layton Adams  Administration: Rajeev Brown  Admitting: Eduardo Lopez  Attending: Eduardo Lopez  Case Management: Demetrice Blair  Consultant: Cesar Marc  Consultant: Burak Bunch  Consultant: Carmen Rebolledo  ED Attending: Chinedu Prescott  ED Nurse: Vidhya Cuba  Infection Control: Zain Arreguin  Nurse: Yoselin Boo  Nurse: Behringer, Megan  Ordered: Doctor, Unknown  Outpatient Provider: Charlie Sylvester  Outpatient Provider: Luis E Degroot  Override: Mercedes Méndez  Override: Gaurav Rodriguez  Override: Johanna Gruber  PCA/Nursing Assistant: Gaurav Rodriguez  Primary Team: Syl Zendejas  Primary Team: Kt Guerrero  Registered Dietitian: Lisbet Last  Respiratory Therapy: Jennifer Levy  : Gin Noe

## 2024-06-10 NOTE — PROGRESS NOTE ADULT - SUBJECTIVE AND OBJECTIVE BOX
[facility] DIV[facility] of INFECTIOUS DISEASE  Cesar Marc MD PhD, Carmen Rebolledo MD, Mary Lopez MD, Jose Elias Urena MD, Ryan Guevara MD  and providing coverage with Oscar Sauer MD  Providing Infectious Disease Consultations at [facility], [facility], [facility]'s    Office# [phone] to schedule follow up appointments  Answering Service for urgent calls or New Consults [phone]  Cell# to text for urgent issues Cesar Marc [phone]     infectious diseases progress note:    BASILIO LANDRY is a 78y y. o. Male patient    Overnight and events of the last 24hrs reviewed    Allergies    No Known Allergies    Intolerances        ANTIBIOTICS/RELEVANT:  antimicrobials  ertapenem  IVPB 1000 milliGRAM(s) IV Intermittent every 24 hours    immunologic:    OTHER:  acetaminophen     Tablet .. 650 milliGRAM(s) Oral every 6 hours PRN  albuterol    90 MICROgram(s) HFA Inhaler 2 Puff(s) Inhalation every 6 hours PRN  albuterol/ipratropium for Nebulization 3 milliLiter(s) Nebulizer every 6 hours  aluminum hydroxide/magnesium hydroxide/simethicone Suspension 30 milliLiter(s) Oral every 4 hours PRN  ascorbic acid 500 milliGRAM(s) Oral daily  aspirin  chewable 81 milliGRAM(s) Oral daily  atorvastatin 80 milliGRAM(s) Oral at bedtime  buDESOnide    Inhalation Suspension 0.5 milliGRAM(s) Inhalation every 12 hours  budesonide 160 MICROgram(s)/formoterol 4.5 MICROgram(s) Inhaler 2 Puff(s) Inhalation two times a day  cholecalciferol 1000 Unit(s) Oral at bedtime  clopidogrel Tablet 75 milliGRAM(s) Oral daily  cyanocobalamin 1000 MICROGram(s) Oral daily  enoxaparin Injectable 40 milliGRAM(s) SubCutaneous every 24 hours  melatonin 3 milliGRAM(s) Oral at bedtime PRN  ondansetron Injectable 4 milliGRAM(s) IV Push every 8 hours PRN  pantoprazole    Tablet 40 milliGRAM(s) Oral before breakfast  predniSONE   Tablet 20 milliGRAM(s) Oral daily  pyridoxine 100 milliGRAM(s) Oral at bedtime  saccharomyces boulardii 250 milliGRAM(s) Oral two times a day  sodium chloride 0.9%. 1000 milliLiter(s) IV Continuous <Continuous>  tiotropium 2.5 MICROgram(s) Inhaler 2 Puff(s) Inhalation daily  zinc sulfate 220 milliGRAM(s) Oral daily      Objective:  Vital Signs Last 24 Hrs  T(C): 36.6 (10 Godwin 2024 05:03), Max: 36.7 (09 Jun 2024 12:26)  T(F): 97.9 (10 Godwin 2024 05:03), Max: 98 (09 Jun 2024 12:26)  HR: 70 (10 Godwin 2024 07:58) (69 - 90)  BP: 109/59 (10 Ogdwin 2024 05:03) (109/59 - 146/80)  BP(mean): --  RR: 17 (10 Godwin 2024 05:03) (17 - 18)  SpO2: 92% (10 Godwin 2024 07:58) (92% - 98%)    Parameters below as of 10 Godwin 2024 07:58  Patient On (Oxygen Delivery Method): room air        T(C): 36.6 (06-10-24 @ 05:03), Max: 36.9 (06-08-24 @ 20:18)  T(C): 36.6 (06-10-24 @ 05:03), Max: 37 (06-07-24 @ 11:45)  T(C): 36.6 (06-10-24 @ 05:03), Max: 37.6 (06-07-24 @ 09:19)    PHYSICAL EXAM:  HEENT: NC atraumatic  Neck: supple  Respiratory: no accessory muscle use, breathing comfortably  Cardiovascular: distant  Gastrointestinal: normal appearing, nondistended  Extremities: no clubbing, no cyanosis,        LABS:                          13.2   11.65 )-----------( 165      ( 10 Godwin 2024 07:10 )             39.9       WBC  11.65 06-10 @ 07:10  19.00 06-09 @ 07:58  32.46 06-08 @ 08:05  16.61 06-07 @ 10:00      06-10    143  |  109<H>  |  14  ----------------------------<  93  4.0   |  29  |  0.70    Ca    9.3      10 Godwin 2024 07:10    TPro  6.3  /  Alb  2.9<L>  /  TBili  0.5  /  DBili  x   /  AST  21  /  ALT  38  /  AlkPhos  74  06-09      Creatinine: 0.70 mg/dL (06-10-24 @ 07:10)  Creatinine: 0.75 mg/dL (06-09-24 @ 07:58)  Creatinine: 1.10 mg/dL (06-07-24 @ 18:30)  Creatinine: 1.10 mg/dL (06-07-24 @ 10:00)        Urinalysis Basic - ( 10 Godwin 2024 07:10 )    Color: x / Appearance: x / SG: x / pH: x  Gluc: 93 mg/dL / Ketone: x  / Bili: x / Urobili: x   Blood: x / Protein: x / Nitrite: x   Leuk Esterase: x / RBC: x / WBC x   Sq Epi: x / Non Sq Epi: x / Bacteria: x            INFLAMMATORY MARKERS      MICROBIOLOGY:    Culture - Blood (06.07.24 @ 09:45)    -  Tobramycin: S <=2   -  Trimethoprim/Sulfamethoxazole: R >2/38   -  Cefepime: R >16   -  Ciprofloxacin: R >2   -  Ceftriaxone: R >32   -  Ertapenem: S <=0.5   -  Gentamicin: S <=2   -  Imipenem: S <=1   -  Levofloxacin: R >4   -  Meropenem: S <=1   -  Piperacillin/Tazobactam: R <=8   -  Ampicillin: R >16 These ampicillin results predict results for amoxicillin   -  Ampicillin/Sulbactam: R 8/4   -  Cefazolin: R >16   -  Aztreonam: R 8   -  CTX-M Resistance Marker: Detec   -  ESBL: Detec   -  Escherichia coli: Detec   Specimen Source: .Blood Blood-Peripheral   Organism: Blood Culture PCR   Organism: Escherichia coli ESBL   Culture Results:   Growth in anaerobic bottle: Escherichia coli ESBL  Direct identification is available within approximately 3-5  hours either by Blood Panel Multiplexed PCR or Direct  MALDI-TOF. Details: [email or web address]   Organism Identification: Blood Culture PCR  Escherichia coli ESBL   Method Type: PCR   Method Type: EFRAÍN        RADIOLOGY & ADDITIONAL STUDIES:

## 2024-06-10 NOTE — PROGRESS NOTE ADULT - PROBLEM SELECTOR PLAN 3
-H/O A Fib -Not on AC as Anemia  - EKG: Sinus Tachycardia 114 bpm, rightward axis shift   - Rate controlled in sinus @ 85 bpm on monitor s/p fluid resuscitation   - No longer on AV damion agents or anticoagulation per Cardiology, [facility] (Joe)   - HR stable

## 2024-06-10 NOTE — PROGRESS NOTE ADULT - ASSESSMENT
77 yo M with PMHx of HTN, HLD, GERD, Atrial fibrillation, Asthma, COPD, hx of benign lung cancer R lobe s/p resection,  CAD (s/p 5 stents ~2001), and recurrent UTIs presented to the ED with dysuria.  hypotension tachycardia and leukocytosis  sepsis gn bacteremia gu etiology  left renal stone     RECOMMENDATIONS  ESBL Bacteremia   Culture - Blood (06.07.24 @ 09:45) : Escherichia coli ESBL  rpt Culture - Blood collected 6/9 am NGTD  Urine culture -NGTD  no clear source, pt did have dysuria, noted pyuria, recurrent UTIs but as noted NGTD on urine culture  Ertapenem started 6/7 -continue for now but if this is due my need midline and extended Rx    Leukocytosis improving    Thank you for consulting us and involving us in the management of this most interesting and challenging case.  We will follow along in the care of this patient. Please call us at [phone] or text me directly on my cell# at [phone] with any concerns.

## 2024-06-10 NOTE — CARE COORDINATION ASSESSMENT. - PATIENT WAS INVOLVED WITH A HOMECARE AGENCY PRIOR TO ADMISSION?
Pt had [facility] for antbx infusion w/ [facility] in 2020 and is requesting same set up if LT antbx is needed on DC/No

## 2024-06-10 NOTE — PROGRESS NOTE ADULT - ASSESSMENT
77 yo M with PMHx of HTN, HLD, GERD, Atrial fibrillation, Asthma, COPD, hx of benign lung cancer R lobe s/p resection,  CAD (s/p 5 stents ~2001), and recurrent UTIs presents to the ED with dysuria. Patient notes he began having burning with urination about 3 days ago. He has Augmentin prescribed by his Urologist Dr. Sena as needed for when he begins to feel dysuria, however, patient did not take the antibiotics.    abnormal chest imaging  copd  emphysema  HLD  HTN  OP  OA  AF  hx of Lung Surgery -   CAD    would dc Pulmicort inhalation - pt is on symbicort - double the dose of inhaled steroid      ct chest imaging reviewed - unclear correlation - etiol - will benefit from rpt imaging in 6 - 8 weeks and follow up with his outpatient Pulm MD - Dr Dang in [place] - [facility]  eval for Acute Infection   emp ABX s/p  Biomarkers and Cx -   ID eval  copd - asthma hx - nebs - inhalers and short course of Prednisone  o2 support as needed  goal sat > 88 pct  cvs rx regimen  BP control  on Anti Platelet rx regimen  nutrition  - dietary discretion  emotional support  anxiolysis  seasonal vaccination counseling

## 2024-06-10 NOTE — PROGRESS NOTE ADULT - PROBLEM SELECTOR PLAN 1
- Severe Sepsis on admission: WBC 16.61, hypotension, Fever  tachycardia, tachypnea, Likely 2/2 UTI  Nl Lactate   - UA: Cloudy, Protein 300, Leukocyte esterase Large, Blood small, WBC too numerous to count, occasional bacteria   - CT Abdomen and pelvis: No acute abnormality in the abdomen or pelvis  - Frequent UTIs with prophylactic Macrobid , leukocytosis IMPROVING  - BCx = ESBL e. coli  - f/u repeat BCx x 2  - UCx: normal urogenital cathi  - Continue with Ertapenem 1gm IVPB daily  -CBC in AM , PRN Tylenol for fever  - Tolerating PO, continue DASH diet   - Infectious Disease - Dr. Marc, /Dr lindsey follow up -continue IV Invanz - Severe Sepsis on admission: WBC 16.61, hypotension, Fever  tachycardia, tachypnea, Likely 2/2 UTI  Nl Lactate   - UA: Cloudy, Protein 300, Leukocyte esterase Large, Blood small, WBC too numerous to count, occasional bacteria   - CT Abdomen and pelvis: No acute abnormality in the abdomen or pelvis  - Frequent UTIs with prophylactic Macrobid , leukocytosis IMPROVING  - BCx = ESBL e. coli  - f/u repeat BCx x 2  - UCx: normal urogenital cathi  - Continue with Ertapenem 1gm IVPB daily  -CBC in AM , PRN Tylenol for fever  - Tolerating PO, continue DASH diet   - Infectious Disease - Dr. Marc, - follow up -continue IV Invanz 1 gm daily .

## 2024-06-10 NOTE — CASE MANAGEMENT PROGRESS NOTE - NSCMPROGRESSNOTE_GEN_ALL_CORE
Discussed patient with Dr. Lopez and discussed transition planning at bedside with Dr and patient. PLAN-as per ID, likely will be for midline placement and LT antbx which pt states he has had in past(2020) and he and his wife are able to learn again, requesting [facility] as had in past. DX: +ESBL bacteremia on Ertapenem. Ref sent to [facility] to run insurance, [facility] ref started. CM will continue to follow.

## 2024-06-10 NOTE — PROGRESS NOTE ADULT - SUBJECTIVE AND OBJECTIVE BOX
Date/Time Patient Seen:  		  Referring MD:   Data Reviewed	       Patient is a 78y old  Male who presents with a chief complaint of Sepsis (09 Jun 2024 11:29)      Subjective/HPI     PAST MEDICAL & SURGICAL HISTORY:  Chronic obstructive pulmonary disease  Asthma/copd    Asthma    Stented coronary artery  2000, 2006 , 2016 and April 2023, total of 6 JESSE stents    HTN (hypertension)    HLD (hyperlipidemia)    GERD (gastroesophageal reflux disease)    Nephrolithiasis  s/p Lithotripsy    GI bleed  while on Antiplatelets    Umbilical hernia with obstruction, without gangrene    2019 novel coronavirus disease (COVID-19)  DX 11/4/2022    History of atrial fibrillation    Implantable loop recorder present    Frequent UTI    Chronic atrial fibrillation    Lung tumor  Rt Lung tumor resection,benign    S/P primary angioplasty with coronary stent    S/P TURP          Medication list         MEDICATIONS  (STANDING):  albuterol/ipratropium for Nebulization 3 milliLiter(s) Nebulizer every 6 hours  ascorbic acid 500 milliGRAM(s) Oral daily  aspirin  chewable 81 milliGRAM(s) Oral daily  atorvastatin 80 milliGRAM(s) Oral at bedtime  buDESOnide    Inhalation Suspension 0.5 milliGRAM(s) Inhalation every 12 hours  budesonide 160 MICROgram(s)/formoterol 4.5 MICROgram(s) Inhaler 2 Puff(s) Inhalation two times a day  cholecalciferol 1000 Unit(s) Oral at bedtime  clopidogrel Tablet 75 milliGRAM(s) Oral daily  cyanocobalamin 1000 MICROGram(s) Oral daily  enoxaparin Injectable 40 milliGRAM(s) SubCutaneous every 24 hours  pantoprazole    Tablet 40 milliGRAM(s) Oral before breakfast  predniSONE   Tablet 20 milliGRAM(s) Oral daily  pyridoxine 100 milliGRAM(s) Oral at bedtime  saccharomyces boulardii 250 milliGRAM(s) Oral two times a day  sodium chloride 0.9%. 1000 milliLiter(s) (75 mL/Hr) IV Continuous <Continuous>  tiotropium 2.5 MICROgram(s) Inhaler 2 Puff(s) Inhalation daily    MEDICATIONS  (PRN):  acetaminophen     Tablet .. 650 milliGRAM(s) Oral every 6 hours PRN Temp greater or equal to 38C (100.4F), Mild Pain (1 - 3)  albuterol    90 MICROgram(s) HFA Inhaler 2 Puff(s) Inhalation every 6 hours PRN for shortness of breath and/or wheezing  aluminum hydroxide/magnesium hydroxide/simethicone Suspension 30 milliLiter(s) Oral every 4 hours PRN Dyspepsia  melatonin 3 milliGRAM(s) Oral at bedtime PRN Insomnia  ondansetron Injectable 4 milliGRAM(s) IV Push every 8 hours PRN Nausea and/or Vomiting         Vitals log        ICU Vital Signs Last 24 Hrs  T(C): 36.6 (10 Godwin 2024 05:03), Max: 36.7 (09 Jun 2024 05:22)  T(F): 97.9 (10 Godwin 2024 05:03), Max: 98 (09 Jun 2024 05:22)  HR: 69 (10 Godwin 2024 05:03) (65 - 90)  BP: 109/59 (10 Godwin 2024 05:03) (107/59 - 146/80)  BP(mean): --  ABP: --  ABP(mean): --  RR: 17 (10 Godwin 2024 05:03) (17 - 18)  SpO2: 98% (10 Godwin 2024 05:03) (92% - 98%)    O2 Parameters below as of 10 Godwin 2024 05:03  Patient On (Oxygen Delivery Method): nasal cannula                 Input and Output:  I&O's Detail    09 Jun 2024 07:01  -  10 Godwin 2024 05:14  --------------------------------------------------------  IN:  Total IN: 0 mL    OUT:    Voided (mL): 250 mL  Total OUT: 250 mL    Total NET: -250 mL          Lab Data                        13.7   19.00 )-----------( 151      ( 09 Jun 2024 07:58 )             40.9     06-09    142  |  109<H>  |  19  ----------------------------<  108<H>  3.7   |  32<H>  |  0.75    Ca    9.4      09 Jun 2024 07:58    TPro  6.3  /  Alb  2.9<L>  /  TBili  0.5  /  DBili  x   /  AST  21  /  ALT  38  /  AlkPhos  74  06-09      CARDIAC MARKERS ( 08 Jun 2024 08:05 )  x     / x     / x     / x     / 6.6 ng/mL        Review of Systems	      Objective     Physical Examination    heart s1s2  lung dc BS  head nc      Pertinent Lab findings & Imaging      Agusto:  NO   Adequate UO     I&O's Detail    09 Jun 2024 07:01  -  10 Godwin 2024 05:14  --------------------------------------------------------  IN:  Total IN: 0 mL    OUT:    Voided (mL): 250 mL  Total OUT: 250 mL    Total NET: -250 mL               Discussed with:     Cultures:	        Radiology

## 2024-06-10 NOTE — CARE COORDINATION ASSESSMENT. - NSPASTMEDSURGHISTORY_GEN_ALL_CORE_FT
PAST MEDICAL & SURGICAL HISTORY:  Stented coronary artery  2000, 2006 , 2016 and April 2023, total of 6 JESSE stents      Asthma      Chronic obstructive pulmonary disease  Asthma/copd      Lung tumor  Rt Lung tumor resection,benign      Nephrolithiasis  s/p Lithotripsy      GERD (gastroesophageal reflux disease)      HLD (hyperlipidemia)      HTN (hypertension)      S/P primary angioplasty with coronary stent      GI bleed  while on Antiplatelets      S/P TURP      History of atrial fibrillation      2019 novel coronavirus disease (COVID-19)  DX 11/4/2022      Umbilical hernia with obstruction, without gangrene      Implantable loop recorder present      Chronic atrial fibrillation      Frequent UTI

## 2024-06-10 NOTE — PROGRESS NOTE ADULT - SUBJECTIVE AND OBJECTIVE BOX
Patient is a 78y old  Male who presents with a chief complaint of Sepsis (10 Godwin 2024 05:14)    HPI:  79 yo M with PMHx of HTN, HLD, GERD, Atrial fibrillation, Asthma, COPD, hx of benign lung cancer R lobe s/p resection,  CAD (s/p 5 stents ~2001), and recurrent UTIs presents to the ED with dysuria. Patient notes he began having burning with urination about 3 days ago. He has Augmentin prescribed by his Urologist Dr. Sena as needed for when he begins to feel dysuria, however, patient did not take the antibiotics. Then this AM, he woke up around 3:00AM with rigors, SOB and nausea. Patient tried to do a home breathing treatment but had 1 episode of vomiting. He attempted to go back to sleep, but started to become a little confused on the time of the day. Patient felt it was necessary for him to come to ED for evaluation.     ED Course:   Vitals: BP: 83/46, HR: 114, Temp: 99.7, RR: 18, SpO2: 90% on RA    Labs: WBC 16.61, CKMB 3.8, Trop 11.4, proBNP 49, RVP negative   UA: Cloudy, Protein 300, Leku esterase Large, Blood small, WBC too numerous to count, occasional bacteria   EKG: Sinus Tachycardia 114 bpm, rightward axis shift   Received in the ED: Ofirmev x1, Solumedrol 125 mg IVP x1, Zosyn 3.375 mg IVPB, Vancomycin 1g IVPB, 2L NS bolus, Duoneb x3     Imaging:  CXR: no active disease   CT Chest PE: No pulmonary embolus.  CT Abdomen and pelvis: No acute abnormality in the abdomen or pelvis.     (07 Jun 2024 17:15)    INTERVAL HPI:  6/7 - admitted  6/8 - Pt was seen and examined at bedside. Pt states that he feels well and denies dysuria. Pt denies headache, dizziness, lightheadedness, fever, chills, body aches, CP, SOB, palpitations, abdominal pain, n/v, numbness/tingling. No other complaints at this time.  ESBL E COLI Bacteremia ,LEUKOCYTOSIS ,On IV Abx   6/9 - Pt was seen and examined at bedside. Pt states that he still feels well and continues to deny dysuria. Patient also endorsed an episode of loose stool but not diarrhea. Pt denies headache, dizziness, lightheadedness, fever, chills, body aches, CP, SOB, palpitations, abdominal pain, n/v, numbness/tingling. No other complaints at this time.   6/10 -  Pt was seen and examined at bedside. Pt states that he feels comfortable without day time O2 and feels well. Pt denies headache, dizziness, lightheadedness, fever, chills, body aches, CP, SOB, palpitations, abdominal pain, n/v, numbness/tingling. No other complaints at this time.     OVERNIGHT EVENTS: No acute overnight events.     Home Medications:  albuterol 90 mcg/inh inhalation aerosol: 2 puff(s) inhaled once a day as needed for  shortness of breath and/or wheezing (07 Jun 2024 17:45)  ascorbic acid 500 mg oral tablet: 1 orally once a day (in the morning) (07 Jun 2024 17:52)  aspirin 81 mg oral tablet: 1 orally once a day (in the morning) (07 Jun 2024 17:52)  B-12 500 mcg sublingual tablet: 1 sublingually once a day (at bedtime) (07 Jun 2024 17:52)  budesonide 0.5 mg/2 mL inhalation suspension: 2 puff(s) by nebulizer 2 times a day as needed for  shortness of breath and/or wheezing (07 Jun 2024 17:47)  cholecalciferol 25 mcg (1000 intl units) oral tablet: 1 orally once a day (at bedtime) (07 Jun 2024 17:52)  clopidogrel 75 mg oral tablet: 1 orally once a day (in the morning) (07 Jun 2024 17:52)  esomeprazole 40 mg oral delayed release capsule: 1 orally once a day (in the morning) (07 Jun 2024 17:52)  ipratropium-albuterol 0.5 mg-2.5 mg/3 mL inhalation solution: 3 puff(s) by nebulizer every 6 hours as needed for  shortness of breath and/or wheezing (07 Jun 2024 17:49)  nitrofurantoin macrocrystals 100 mg oral capsule: 1 orally once a day (at bedtime) (07 Jun 2024 17:52)  predniSONE 5 mg oral tablet: 1 tab(s) orally once a day as needed for  shortness of breath and/or wheezing (07 Jun 2024 17:51)  rosuvastatin 20 mg oral tablet: 1 orally once a day (at bedtime) (07 Jun 2024 17:52)  Spiriva 18 mcg inhalation capsule: 1 inhaled once a day (in the morning) (07 Jun 2024 17:52)  Symbicort 160 mcg-4.5 mcg/inh inhalation aerosol: 2 puff(s) inhaled 2 times a day (07 Jun 2024 17:47)  Vitamin B6 100 mg oral tablet: 1 orally once a day (at bedtime) (07 Jun 2024 17:52)  zinc (as gluconate) 50 mg oral tablet: orally once a day (in the morning) (07 Jun 2024 17:52)      MEDICATIONS  (STANDING):  albuterol/ipratropium for Nebulization 3 milliLiter(s) Nebulizer every 6 hours  ascorbic acid 500 milliGRAM(s) Oral daily  aspirin  chewable 81 milliGRAM(s) Oral daily  atorvastatin 80 milliGRAM(s) Oral at bedtime  buDESOnide    Inhalation Suspension 0.5 milliGRAM(s) Inhalation every 12 hours  budesonide 160 MICROgram(s)/formoterol 4.5 MICROgram(s) Inhaler 2 Puff(s) Inhalation two times a day  cholecalciferol 1000 Unit(s) Oral at bedtime  clopidogrel Tablet 75 milliGRAM(s) Oral daily  cyanocobalamin 1000 MICROGram(s) Oral daily  enoxaparin Injectable 40 milliGRAM(s) SubCutaneous every 24 hours  ertapenem  IVPB 1000 milliGRAM(s) IV Intermittent every 24 hours  pantoprazole    Tablet 40 milliGRAM(s) Oral before breakfast  predniSONE   Tablet 20 milliGRAM(s) Oral daily  pyridoxine 100 milliGRAM(s) Oral at bedtime  saccharomyces boulardii 250 milliGRAM(s) Oral two times a day  sodium chloride 0.9%. 1000 milliLiter(s) (75 mL/Hr) IV Continuous <Continuous>  tiotropium 2.5 MICROgram(s) Inhaler 2 Puff(s) Inhalation daily    MEDICATIONS  (PRN):  acetaminophen     Tablet .. 650 milliGRAM(s) Oral every 6 hours PRN Temp greater or equal to 38C (100.4F), Mild Pain (1 - 3)  albuterol    90 MICROgram(s) HFA Inhaler 2 Puff(s) Inhalation every 6 hours PRN for shortness of breath and/or wheezing  aluminum hydroxide/magnesium hydroxide/simethicone Suspension 30 milliLiter(s) Oral every 4 hours PRN Dyspepsia  melatonin 3 milliGRAM(s) Oral at bedtime PRN Insomnia  ondansetron Injectable 4 milliGRAM(s) IV Push every 8 hours PRN Nausea and/or Vomiting      No Known Allergies      Social History:  Lives: At home with wife   ADLs: Fully independent, no problem ambulating   Alcohol Use: None   Tobacco Use: Former smoker quit >10yrs ago   Recreational Drug Use: None (07 Jun 2024 17:15)      REVIEW OF SYSTEMS:  CONSTITUTIONAL: No fever, No chills, No fatigue, No myalgia, No Body ache, No Weakness  EYES: No eye pain,  No visual disturbances, No discharge, No Redness  ENMT: No ear pain, No nose bleed, No vertigo; No sinus pain, No throat pain, No Congestion  NECK: No pain, No stiffness  RESPIRATORY: No cough, No wheezing, No hemoptysis, No shortness of breath  CARDIOVASCULAR: No chest pain, No palpitations  GASTROINTESTINAL: No abdominal pain, No epigastric pain. No nausea, No vomiting, No diarrhea, No constipation; [ x ] BM-  GENITOURINARY: No dysuria, No frequency, No urgency, No hematuria, No incontinence  NEUROLOGICAL: No headaches, No dizziness, No numbness, No tingling, No tremors, No weakness  EXTREMITIES: No Swelling, No Pain, No Edema  SKIN: [ x ] No itching, burning, rashes, or lesions   MUSCULOSKELETAL: No joint pain, No joint swelling; No muscle pain, No back pain, No extremity pain  PSYCHIATRIC: No depression, No anxiety, No mood swings, No difficulty sleeping at night  PAIN SCALE: [ x ] None  [  ] Other-  ROS Unable to obtain due to: [  ] Dementia  [  ] Lethargy  [  ] Sedated  [  ] Non verbal  REST OF REVIEW OF SYSTEMS: [ x ] Normal     Vital Signs Last 24 Hrs  T(C): 36.6 (10 Godwin 2024 05:03), Max: 36.7 (09 Jun 2024 12:26)  T(F): 97.9 (10 Godwin 2024 05:03), Max: 98 (09 Jun 2024 12:26)  HR: 70 (10 Godwin 2024 07:58) (69 - 90)  BP: 109/59 (10 Godwin 2024 05:03) (109/59 - 146/80)  BP(mean): --  RR: 17 (10 Godwin 2024 05:03) (17 - 18)  SpO2: 92% (10 Godwin 2024 07:58) (92% - 98%)    Parameters below as of 10 Godwin 2024 07:58  Patient On (Oxygen Delivery Method): room air        CAPILLARY BLOOD GLUCOSE          I&O's Summary    09 Jun 2024 07:01  -  10 Godwin 2024 07:00  --------------------------------------------------------  IN: 0 mL / OUT: 250 mL / NET: -250 mL      PHYSICAL EXAM:  GENERAL:  [ x ] NAD, [ x ] Well appearing, [  ] Agitated, [  ] Drowsy, [  ] Lethargy, [  ] Confused   HEAD:  [ x ] Normal, [  ] Other  EYES:  [ x ] EOMI, [ x ] PERRLA, [ x ] Conjunctiva and sclera clear normal, [  ] Other, [  ] Pallor, [  ] Discharge  ENMT:  [ x ] Normal, [ x ] Moist mucous membranes, [  ] Good dentition, [  ] No thrush  NECK:  [ x ] Supple, [  ] No JVD, [ x ] Normal thyroid, [  ] Lymphadenopathy, [  ] Other  CHEST/LUNG:  [  ] Clear to auscultation bilaterally, [ x ] Breath Sounds decreased b/l, [  ] Poor effort, [ x ] No rales, [ x ] No rhonchi, [ x ] mild wheeze b/l  HEART:  [ x ] Regular rate and rhythm, [  ] Tachycardia, [  ] Bradycardia, [  ] Irregular, [ x ] No murmurs, No rubs, No gallops, [  ] PPM in place (Mfr:  )  ABDOMEN:  [ x ] Soft, [ x ] Nontender, [ x ] Nondistended, [ x ] No mass, [ x ] Bowel sounds present, [  ] Obese  NERVOUS SYSTEM:  [ x ] Alert & Oriented x3__, [ x ] Nonfocal, [  ] Confusion, [  ] Encephalopathic, [  ] Sedated, [  ] Unable to assess, [  ] Dementia, [  ] Other-  EXTREMITIES:  [ x ] 2+ Peripheral Pulses, No clubbing, No cyanosis,  [  ] Edema B/L lower EXT, [  ] PVD stasis skin changes B/L lower EXT, [  ] Wound  LYMPH:  No lymphadenopathy noted  SKIN:  [ x ] No rashes or lesions, [  ] Pressure ulcers, [  ] Ecchymosis, [  ] Skin tears, [  ] Other    DIET: Diet, DASH/TLC:   Sodium & Cholesterol Restricted (06-07-24 @ 18:02)      LABS:                        13.2   11.65 )-----------( 165      ( 10 Godwin 2024 07:10 )             39.9     10 Godwin 2024 07:10    143    |  109    |  14     ----------------------------<  93     4.0     |  29     |  0.70     Ca    9.3        10 Godwin 2024 07:10        Urinalysis Basic - ( 10 Godwin 2024 07:10 )    Color: x / Appearance: x / SG: x / pH: x  Gluc: 93 mg/dL / Ketone: x  / Bili: x / Urobili: x   Blood: x / Protein: x / Nitrite: x   Leuk Esterase: x / RBC: x / WBC x   Sq Epi: x / Non Sq Epi: x / Bacteria: x      Culture Results:   <10,000 CFU/mL Normal Urogenital Yancy (06-07 @ 11:40)  Culture Results:   No growth at 48 Hours (06-07 @ 10:00)  Culture Results:   Growth in anaerobic bottle: Escherichia coli ESBL  Direct identification is available within approximately 3-5  hours either by Blood Panel Multiplexed PCR or Direct  MALDI-TOF. Details: [email or web address] (06-07 @ 09:45)      CARDIAC MARKERS ( 08 Jun 2024 08:05 )  x     / x     / x     / x     / 6.6 ng/mL  CARDIAC MARKERS ( 07 Jun 2024 18:30 )  x     / x     / 236 U/L / x     / 5.0 ng/mL  CARDIAC MARKERS ( 07 Jun 2024 10:00 )  x     / x     / x     / x     / 3.8 ng/mL        Culture - Urine (collected 07 Jun 2024 11:40)  Source: Clean Catch Clean Catch (Midstream)  Final Report (08 Jun 2024 15:55):    <10,000 CFU/mL Normal Urogenital Yancy    Culture - Blood (collected 07 Jun 2024 10:00)  Source: .Blood Blood-Peripheral  Preliminary Report (09 Jun 2024 17:01):    No growth at 48 Hours    Culture - Blood (collected 07 Jun 2024 09:45)  Source: .Blood Blood-Peripheral  Gram Stain (08 Jun 2024 03:07):    Growth in anaerobic bottle: Gram Negative Rods  Final Report (09 Jun 2024 14:48):    Growth in anaerobic bottle: Escherichia coli ESBL    Direct identification is available within approximately 3-5    hours either by Blood Panel Multiplexed PCR or Direct    MALDI-TOF. Details: [email or web address]  Organism: Blood Culture PCR  Escherichia coli ESBL (09 Jun 2024 14:48)  Organism: Escherichia coli ESBL (09 Jun 2024 14:48)  Organism: Blood Culture PCR (09 Jun 2024 14:48)       Anemia Panel:      Thyroid Panel:  Thyroid Stimulating Hormone, Serum: 1.06 uIU/mL (06-07-24 @ 10:00)        Lipase: 25 U/L (06-07-24 @ 10:00)          RADIOLOGY & ADDITIONAL TESTS: _______      HEALTH ISSUES - PROBLEM Dx:  UTI (urinary tract infection)    HTN (hypertension)    HLD (hyperlipidemia)    Afib    CAD (coronary artery disease)    COPD with asthma    Need for prophylactic measure    GERD (gastroesophageal reflux disease)    Cardiac enzymes elevated          Consultant(s) Notes Reviewed:  [ x ] YES     Care Discussed with [ x ] Consultants, [ x ] Patient, [ x ] Family, [  ] HCP, [ x ] , [  ] Social Service, [ x ] RN, [  ] Physical Therapy, [  ] Palliative Care Team  DVT PPX: [ X ] Lovenox, [  ] SC Heparin, [  ] Coumadin, [  ] Xarelto, [  ] Eliquis, [  ] Pradaxa, [  ] IV Heparin drip, [  ] SCD, [  ] Ambulation, [  ] Contraindicated 2/2 GI Bleed, [  ] Contraindicated 2/2  Bleed, [  ] Contraindicated 2/2 Brain Bleed  Advanced Directive: [ X ] None, [  ] DNR/DNI Patient is a 78y old  Male who presents with a chief complaint of Sepsis (10 Godwin 2024 05:14)    HPI:  79 yo M with PMHx of HTN, HLD, GERD, Atrial fibrillation, Asthma, COPD, hx of benign lung cancer R lobe s/p resection,  CAD (s/p 5 stents ~2001), and recurrent UTIs presents to the ED with dysuria. Patient notes he began having burning with urination about 3 days ago. He has Augmentin prescribed by his Urologist Dr. Sena as needed for when he begins to feel dysuria, however, patient did not take the antibiotics. Then this AM, he woke up around 3:00AM with rigors, SOB and nausea. Patient tried to do a home breathing treatment but had 1 episode of vomiting. He attempted to go back to sleep, but started to become a little confused on the time of the day. Patient felt it was necessary for him to come to ED for evaluation.     ED Course:   Vitals: BP: 83/46, HR: 114, Temp: 99.7, RR: 18, SpO2: 90% on RA    Labs: WBC 16.61, CKMB 3.8, Trop 11.4, proBNP 49, RVP negative   UA: Cloudy, Protein 300, Leku esterase Large, Blood small, WBC too numerous to count, occasional bacteria   EKG: Sinus Tachycardia 114 bpm, rightward axis shift   Received in the ED: Ofirmev x1, Solumedrol 125 mg IVP x1, Zosyn 3.375 mg IVPB, Vancomycin 1g IVPB, 2L NS bolus, Duoneb x3     Imaging:  CXR: no active disease   CT Chest PE: No pulmonary embolus.  CT Abdomen and pelvis: No acute abnormality in the abdomen or pelvis.     (07 Jun 2024 17:15)    INTERVAL HPI:  6/7 - admitted  6/8 - Pt was seen and examined at bedside. Pt states that he feels well and denies dysuria. Pt denies headache, dizziness, lightheadedness, fever, chills, body aches, CP, SOB, palpitations, abdominal pain, n/v, numbness/tingling. No other complaints at this time.  ESBL E COLI Bacteremia ,LEUKOCYTOSIS ,On IV Abx   6/9 - Pt was seen and examined at bedside. Pt states that he still feels well and continues to deny dysuria. Patient also endorsed an episode of loose stool but not diarrhea. Pt denies headache, dizziness, lightheadedness, fever, chills, body aches, CP, SOB, palpitations, abdominal pain, n/v, numbness/tingling. No other complaints at this time.   6/10 -  Pt was seen and examined at bedside. Pt states that he feels comfortable without day time O2 and feels well. Pt denies headache, dizziness, lightheadedness, fever, chills, body aches, CP, SOB, palpitations, abdominal pain, n/v, numbness/tingling. No other complaints at this time. on IV Invanz daily    OVERNIGHT EVENTS: No acute overnight events.     Home Medications:  albuterol 90 mcg/inh inhalation aerosol: 2 puff(s) inhaled once a day as needed for  shortness of breath and/or wheezing (07 Jun 2024 17:45)  ascorbic acid 500 mg oral tablet: 1 orally once a day (in the morning) (07 Jun 2024 17:52)  aspirin 81 mg oral tablet: 1 orally once a day (in the morning) (07 Jun 2024 17:52)  B-12 500 mcg sublingual tablet: 1 sublingually once a day (at bedtime) (07 Jun 2024 17:52)  budesonide 0.5 mg/2 mL inhalation suspension: 2 puff(s) by nebulizer 2 times a day as needed for  shortness of breath and/or wheezing (07 Jun 2024 17:47)  cholecalciferol 25 mcg (1000 intl units) oral tablet: 1 orally once a day (at bedtime) (07 Jun 2024 17:52)  clopidogrel 75 mg oral tablet: 1 orally once a day (in the morning) (07 Jun 2024 17:52)  esomeprazole 40 mg oral delayed release capsule: 1 orally once a day (in the morning) (07 Jun 2024 17:52)  ipratropium-albuterol 0.5 mg-2.5 mg/3 mL inhalation solution: 3 puff(s) by nebulizer every 6 hours as needed for  shortness of breath and/or wheezing (07 Jun 2024 17:49)  nitrofurantoin macrocrystals 100 mg oral capsule: 1 orally once a day (at bedtime) (07 Jun 2024 17:52)  predniSONE 5 mg oral tablet: 1 tab(s) orally once a day as needed for  shortness of breath and/or wheezing (07 Jun 2024 17:51)  rosuvastatin 20 mg oral tablet: 1 orally once a day (at bedtime) (07 Jun 2024 17:52)  Spiriva 18 mcg inhalation capsule: 1 inhaled once a day (in the morning) (07 Jun 2024 17:52)  Symbicort 160 mcg-4.5 mcg/inh inhalation aerosol: 2 puff(s) inhaled 2 times a day (07 Jun 2024 17:47)  Vitamin B6 100 mg oral tablet: 1 orally once a day (at bedtime) (07 Jun 2024 17:52)  zinc (as gluconate) 50 mg oral tablet: orally once a day (in the morning) (07 Jun 2024 17:52)      MEDICATIONS  (STANDING):  albuterol/ipratropium for Nebulization 3 milliLiter(s) Nebulizer every 6 hours  ascorbic acid 500 milliGRAM(s) Oral daily  aspirin  chewable 81 milliGRAM(s) Oral daily  atorvastatin 80 milliGRAM(s) Oral at bedtime  buDESOnide    Inhalation Suspension 0.5 milliGRAM(s) Inhalation every 12 hours  budesonide 160 MICROgram(s)/formoterol 4.5 MICROgram(s) Inhaler 2 Puff(s) Inhalation two times a day  cholecalciferol 1000 Unit(s) Oral at bedtime  clopidogrel Tablet 75 milliGRAM(s) Oral daily  cyanocobalamin 1000 MICROGram(s) Oral daily  enoxaparin Injectable 40 milliGRAM(s) SubCutaneous every 24 hours  ertapenem  IVPB 1000 milliGRAM(s) IV Intermittent every 24 hours  pantoprazole    Tablet 40 milliGRAM(s) Oral before breakfast  predniSONE   Tablet 20 milliGRAM(s) Oral daily  pyridoxine 100 milliGRAM(s) Oral at bedtime  saccharomyces boulardii 250 milliGRAM(s) Oral two times a day  sodium chloride 0.9%. 1000 milliLiter(s) (75 mL/Hr) IV Continuous <Continuous>  tiotropium 2.5 MICROgram(s) Inhaler 2 Puff(s) Inhalation daily    MEDICATIONS  (PRN):  acetaminophen     Tablet .. 650 milliGRAM(s) Oral every 6 hours PRN Temp greater or equal to 38C (100.4F), Mild Pain (1 - 3)  albuterol    90 MICROgram(s) HFA Inhaler 2 Puff(s) Inhalation every 6 hours PRN for shortness of breath and/or wheezing  aluminum hydroxide/magnesium hydroxide/simethicone Suspension 30 milliLiter(s) Oral every 4 hours PRN Dyspepsia  melatonin 3 milliGRAM(s) Oral at bedtime PRN Insomnia  ondansetron Injectable 4 milliGRAM(s) IV Push every 8 hours PRN Nausea and/or Vomiting      No Known Allergies      Social History:  Lives: At home with wife   ADLs: Fully independent, no problem ambulating   Alcohol Use: None   Tobacco Use: Former smoker quit >10yrs ago   Recreational Drug Use: None (07 Jun 2024 17:15)      REVIEW OF SYSTEMS:  CONSTITUTIONAL: No fever, No chills, No fatigue, No myalgia, No Body ache, No Weakness  EYES: No eye pain,  No visual disturbances, No discharge, No Redness  ENMT: No ear pain, No nose bleed, No vertigo; No sinus pain, No throat pain, No Congestion  NECK: No pain, No stiffness  RESPIRATORY: No cough, No wheezing, No hemoptysis, No shortness of breath  CARDIOVASCULAR: No chest pain, No palpitations  GASTROINTESTINAL: No abdominal pain, No epigastric pain. No nausea, No vomiting, No diarrhea, No constipation; [ x ] BM-  GENITOURINARY: No dysuria, No frequency, No urgency, No hematuria, No incontinence  NEUROLOGICAL: No headaches, No dizziness, No numbness, No tingling, No tremors, No weakness  EXTREMITIES: No Swelling, No Pain, No Edema  SKIN: [ x ] No itching, burning, rashes, or lesions   MUSCULOSKELETAL: No joint pain, No joint swelling; No muscle pain, No back pain, No extremity pain  PSYCHIATRIC: No depression, No anxiety, No mood swings, No difficulty sleeping at night  PAIN SCALE: [ x ] None  [  ] Other-  ROS Unable to obtain due to: [  ] Dementia  [  ] Lethargy  [  ] Sedated  [  ] Non verbal  REST OF REVIEW OF SYSTEMS: [ x ] Normal     Vital Signs Last 24 Hrs  T(C): 36.6 (10 Godwin 2024 05:03), Max: 36.7 (09 Jun 2024 12:26)  T(F): 97.9 (10 Godwin 2024 05:03), Max: 98 (09 Jun 2024 12:26)  HR: 70 (10 Godwin 2024 07:58) (69 - 90)  BP: 109/59 (10 Godwin 2024 05:03) (109/59 - 146/80)  BP(mean): --  RR: 17 (10 Godwin 2024 05:03) (17 - 18)  SpO2: 92% (10 Godwin 2024 07:58) (92% - 98%)    Parameters below as of 10 Godwin 2024 07:58  Patient On (Oxygen Delivery Method): room air        CAPILLARY BLOOD GLUCOSE          I&O's Summary    09 Jun 2024 07:01  -  10 Godwin 2024 07:00  --------------------------------------------------------  IN: 0 mL / OUT: 250 mL / NET: -250 mL      PHYSICAL EXAM:  GENERAL:  [ x ] NAD, [ x ] Well appearing, [  ] Agitated, [  ] Drowsy, [  ] Lethargy, [  ] Confused   HEAD:  [ x ] Normal, [  ] Other  EYES:  [ x ] EOMI, [ x ] PERRLA, [ x ] Conjunctiva and sclera clear normal, [  ] Other, [  ] Pallor, [  ] Discharge  ENMT:  [ x ] Normal, [ x ] Moist mucous membranes, [  ] Good dentition, [  ] No thrush  NECK:  [ x ] Supple, [  ] No JVD, [ x ] Normal thyroid, [  ] Lymphadenopathy, [  ] Other  CHEST/LUNG:  [x  ] Clear to auscultation bilaterally, [ x ] Breath Sounds decreased b/l, [  ] Poor effort, [ x ] No rales, [ x ] No rhonchi, [ x ] mild wheeze b/l  HEART:  [ x ] Regular rate and rhythm, [  ] Tachycardia, [  ] Bradycardia, [  ] Irregular, [ x ] No murmurs, No rubs, No gallops, [  ] PPM in place (Mfr:  )  ABDOMEN:  [ x ] Soft, [ x ] Nontender, [ x ] Nondistended, [ x ] No mass, [ x ] Bowel sounds present, [  ] Obese  NERVOUS SYSTEM:  [ x ] Alert & Oriented x3__, [ x ] Nonfocal, [  ] Confusion, [  ] Encephalopathic, [  ] Sedated, [  ] Unable to assess, [  ] Dementia, [  ] Other-  EXTREMITIES:  [ x ] 2+ Peripheral Pulses, No clubbing, No cyanosis,  [  ] Edema B/L lower EXT, [  ] PVD stasis skin changes B/L lower EXT, [  ] Wound  LYMPH:  No lymphadenopathy noted  SKIN:  [ x ] No rashes or lesions, [  ] Pressure ulcers, [  ] Ecchymosis, [  ] Skin tears, [  ] Other    DIET: Diet, DASH/TLC:   Sodium & Cholesterol Restricted (06-07-24 @ 18:02)      LABS:                        13.2   11.65 )-----------( 165      ( 10 Godwin 2024 07:10 )             39.9     10 Godwin 2024 07:10    143    |  109    |  14     ----------------------------<  93     4.0     |  29     |  0.70     Ca    9.3        10 Godwin 2024 07:10        Urinalysis Basic - ( 10 Godwin 2024 07:10 )    Color: x / Appearance: x / SG: x / pH: x  Gluc: 93 mg/dL / Ketone: x  / Bili: x / Urobili: x   Blood: x / Protein: x / Nitrite: x   Leuk Esterase: x / RBC: x / WBC x   Sq Epi: x / Non Sq Epi: x / Bacteria: x      Culture Results:   <10,000 CFU/mL Normal Urogenital Yancy (06-07 @ 11:40)  Culture Results:   No growth at 48 Hours (06-07 @ 10:00)  Culture Results:   Growth in anaerobic bottle: Escherichia coli ESBL  Direct identification is available within approximately 3-5  hours either by Blood Panel Multiplexed PCR or Direct  MALDI-TOF. Details: [email or web address] (06-07 @ 09:45)      CARDIAC MARKERS ( 08 Jun 2024 08:05 )  x     / x     / x     / x     / 6.6 ng/mL  CARDIAC MARKERS ( 07 Jun 2024 18:30 )  x     / x     / 236 U/L / x     / 5.0 ng/mL  CARDIAC MARKERS ( 07 Jun 2024 10:00 )  x     / x     / x     / x     / 3.8 ng/mL        Culture - Urine (collected 07 Jun 2024 11:40)  Source: Clean Catch Clean Catch (Midstream)  Final Report (08 Jun 2024 15:55):    <10,000 CFU/mL Normal Urogenital Yancy    Culture - Blood (collected 07 Jun 2024 10:00)  Source: .Blood Blood-Peripheral  Preliminary Report (09 Jun 2024 17:01):    No growth at 48 Hours    Culture - Blood (collected 07 Jun 2024 09:45)  Source: .Blood Blood-Peripheral  Gram Stain (08 Jun 2024 03:07):    Growth in anaerobic bottle: Gram Negative Rods  Final Report (09 Jun 2024 14:48):    Growth in anaerobic bottle: Escherichia coli ESBL    Direct identification is available within approximately 3-5    hours either by Blood Panel Multiplexed PCR or Direct    MALDI-TOF. Details: [email or web address]  Organism: Blood Culture PCR  Escherichia coli ESBL (09 Jun 2024 14:48)  Organism: Escherichia coli ESBL (09 Jun 2024 14:48)  Organism: Blood Culture PCR (09 Jun 2024 14:48)       Anemia Panel:      Thyroid Panel:  Thyroid Stimulating Hormone, Serum: 1.06 uIU/mL (06-07-24 @ 10:00)        Lipase: 25 U/L (06-07-24 @ 10:00)      RADIOLOGY & ADDITIONAL TESTS: _______      HEALTH ISSUES - PROBLEM Dx:  UTI (urinary tract infection)    HTN (hypertension)    HLD (hyperlipidemia)    Afib    CAD (coronary artery disease)    COPD with asthma    Need for prophylactic measure    GERD (gastroesophageal reflux disease)    Cardiac enzymes elevated          Consultant(s) Notes Reviewed:  [ x ] YES     Care Discussed with [ x ] Consultants, [ x ] Patient, [ x ] Family, [  ] HCP, [ x ] , [  ] Social Service, [ x ] RN, [  ] Physical Therapy, [  ] Palliative Care Team  DVT PPX: [ X ] Lovenox, [  ] SC Heparin, [  ] Coumadin, [  ] Xarelto, [  ] Eliquis, [  ] Pradaxa, [  ] IV Heparin drip, [  ] SCD, [  ] Ambulation, [  ] Contraindicated 2/2 GI Bleed, [  ] Contraindicated 2/2  Bleed, [  ] Contraindicated 2/2 Brain Bleed  Advanced Directive: [ X ] None, [  ] DNR/DNI

## 2024-06-10 NOTE — CARE COORDINATION ASSESSMENT. - OTHER PERTINENT DISCHARGE PLANNING INFORMATION:
Met with patient at bedside. role of CM/transition planning explained. Patient verbalizes understanding. Transition planning information provided. Patient lives with wife in private house, 3 SHAUNA, 0 inside. Independent with all ADL's and ambulation, drives PTA. No needs/HCS present PTA. Pt has home O2 for nocturnal 2L NC with [employer], has home conc and POC. Family will transport home.  As per ID, likely will be for midline placement and LT antbx which pt states he has had in past(2020) and he and his wife are able to learn again, requesting [facility] as had in past. CM contact information provided. CM will continue to follow.

## 2024-06-11 LAB
ANION GAP SERPL CALC-SCNC: 4 MMOL/L — LOW (ref 5–17)
BASOPHILS # BLD AUTO: 0.03 K/UL — SIGNIFICANT CHANGE UP (ref 0–0.2)
BASOPHILS NFR BLD AUTO: 0.4 % — SIGNIFICANT CHANGE UP (ref 0–2)
BUN SERPL-MCNC: 16 MG/DL — SIGNIFICANT CHANGE UP (ref 7–23)
CALCIUM SERPL-MCNC: 9 MG/DL — SIGNIFICANT CHANGE UP (ref 8.5–10.1)
CHLORIDE SERPL-SCNC: 110 MMOL/L — HIGH (ref 96–108)
CO2 SERPL-SCNC: 29 MMOL/L — SIGNIFICANT CHANGE UP (ref 22–31)
CREAT SERPL-MCNC: 0.66 MG/DL — SIGNIFICANT CHANGE UP (ref 0.5–1.3)
EGFR: 97 ML/MIN/1.73M2 — SIGNIFICANT CHANGE UP
EOSINOPHIL # BLD AUTO: 0.14 K/UL — SIGNIFICANT CHANGE UP (ref 0–0.5)
EOSINOPHIL NFR BLD AUTO: 1.8 % — SIGNIFICANT CHANGE UP (ref 0–6)
GLUCOSE SERPL-MCNC: 93 MG/DL — SIGNIFICANT CHANGE UP (ref 70–99)
HCT VFR BLD CALC: 41.4 % — SIGNIFICANT CHANGE UP (ref 39–50)
HGB BLD-MCNC: 13.9 G/DL — SIGNIFICANT CHANGE UP (ref 13–17)
IMM GRANULOCYTES NFR BLD AUTO: 1.6 % — HIGH (ref 0–0.9)
LYMPHOCYTES # BLD AUTO: 1.22 K/UL — SIGNIFICANT CHANGE UP (ref 1–3.3)
LYMPHOCYTES # BLD AUTO: 15.3 % — SIGNIFICANT CHANGE UP (ref 13–44)
MCHC RBC-ENTMCNC: 32.3 PG — SIGNIFICANT CHANGE UP (ref 27–34)
MCHC RBC-ENTMCNC: 33.6 GM/DL — SIGNIFICANT CHANGE UP (ref 32–36)
MCV RBC AUTO: 96.3 FL — SIGNIFICANT CHANGE UP (ref 80–100)
MONOCYTES # BLD AUTO: 0.51 K/UL — SIGNIFICANT CHANGE UP (ref 0–0.9)
MONOCYTES NFR BLD AUTO: 6.4 % — SIGNIFICANT CHANGE UP (ref 2–14)
NEUTROPHILS # BLD AUTO: 5.96 K/UL — SIGNIFICANT CHANGE UP (ref 1.8–7.4)
NEUTROPHILS NFR BLD AUTO: 74.5 % — SIGNIFICANT CHANGE UP (ref 43–77)
NRBC # BLD: 0 /100 WBCS — SIGNIFICANT CHANGE UP (ref 0–0)
PLATELET # BLD AUTO: 179 K/UL — SIGNIFICANT CHANGE UP (ref 150–400)
POTASSIUM SERPL-MCNC: 3.6 MMOL/L — SIGNIFICANT CHANGE UP (ref 3.5–5.3)
POTASSIUM SERPL-SCNC: 3.6 MMOL/L — SIGNIFICANT CHANGE UP (ref 3.5–5.3)
PSA FLD-MCNC: 6.87 NG/ML — HIGH (ref 0–4)
RBC # BLD: 4.3 M/UL — SIGNIFICANT CHANGE UP (ref 4.2–5.8)
RBC # FLD: 13.5 % — SIGNIFICANT CHANGE UP (ref 10.3–14.5)
SODIUM SERPL-SCNC: 143 MMOL/L — SIGNIFICANT CHANGE UP (ref 135–145)
WBC # BLD: 7.99 K/UL — SIGNIFICANT CHANGE UP (ref 3.8–10.5)
WBC # FLD AUTO: 7.99 K/UL — SIGNIFICANT CHANGE UP (ref 3.8–10.5)

## 2024-06-11 RX ORDER — POTASSIUM CHLORIDE 20 MEQ
40 PACKET (EA) ORAL ONCE
Refills: 0 | Status: COMPLETED | OUTPATIENT
Start: 2024-06-11 | End: 2024-06-11

## 2024-06-11 RX ADMIN — ZINC SULFATE TAB 220 MG (50 MG ZINC EQUIVALENT) 220 MILLIGRAM(S): 220 (50 ZN) TAB at 11:40

## 2024-06-11 RX ADMIN — ERTAPENEM SODIUM 120 MILLIGRAM(S): 1 INJECTION, POWDER, LYOPHILIZED, FOR SOLUTION INTRAMUSCULAR; INTRAVENOUS at 17:24

## 2024-06-11 RX ADMIN — Medication 20 MILLIGRAM(S): at 05:22

## 2024-06-11 RX ADMIN — CLOPIDOGREL BISULFATE 75 MILLIGRAM(S): 75 TABLET, FILM COATED ORAL at 11:40

## 2024-06-11 RX ADMIN — BUDESONIDE AND FORMOTEROL FUMARATE DIHYDRATE 2 PUFF(S): 160; 4.5 AEROSOL RESPIRATORY (INHALATION) at 08:55

## 2024-06-11 RX ADMIN — Medication 1000 UNIT(S): at 22:27

## 2024-06-11 RX ADMIN — Medication 3 MILLILITER(S): at 19:26

## 2024-06-11 RX ADMIN — Medication 81 MILLIGRAM(S): at 11:40

## 2024-06-11 RX ADMIN — Medication 250 MILLIGRAM(S): at 17:24

## 2024-06-11 RX ADMIN — PREGABALIN 1000 MICROGRAM(S): 225 CAPSULE ORAL at 11:40

## 2024-06-11 RX ADMIN — Medication 3 MILLILITER(S): at 13:37

## 2024-06-11 RX ADMIN — Medication 250 MILLIGRAM(S): at 05:22

## 2024-06-11 RX ADMIN — Medication 40 MILLIEQUIVALENT(S): at 14:52

## 2024-06-11 RX ADMIN — Medication 100 MILLIGRAM(S): at 22:27

## 2024-06-11 RX ADMIN — Medication 500 MILLIGRAM(S): at 11:40

## 2024-06-11 RX ADMIN — BUDESONIDE AND FORMOTEROL FUMARATE DIHYDRATE 2 PUFF(S): 160; 4.5 AEROSOL RESPIRATORY (INHALATION) at 18:30

## 2024-06-11 RX ADMIN — Medication 3 MILLILITER(S): at 08:19

## 2024-06-11 RX ADMIN — PANTOPRAZOLE SODIUM 40 MILLIGRAM(S): 20 TABLET, DELAYED RELEASE ORAL at 06:23

## 2024-06-11 RX ADMIN — ATORVASTATIN CALCIUM 80 MILLIGRAM(S): 80 TABLET, FILM COATED ORAL at 22:27

## 2024-06-11 RX ADMIN — TIOTROPIUM BROMIDE 2 PUFF(S): 18 CAPSULE ORAL; RESPIRATORY (INHALATION) at 08:55

## 2024-06-11 NOTE — PROGRESS NOTE ADULT - SUBJECTIVE AND OBJECTIVE BOX
Patient is a 78y old  Male who presents with a chief complaint of Sepsis (11 Jun 2024 05:23)    HPI:  79 yo M with PMHx of HTN, HLD, GERD, Atrial fibrillation, Asthma, COPD, hx of benign lung cancer R lobe s/p resection,  CAD (s/p 5 stents ~2001), and recurrent UTIs presents to the ED with dysuria. Patient notes he began having burning with urination about 3 days ago. He has Augmentin prescribed by his Urologist Dr. Sena as needed for when he begins to feel dysuria, however, patient did not take the antibiotics. Then this AM, he woke up around 3:00AM with rigors, SOB and nausea. Patient tried to do a home breathing treatment but had 1 episode of vomiting. He attempted to go back to sleep, but started to become a little confused on the time of the day. Patient felt it was necessary for him to come to ED for evaluation.     ED Course:   Vitals: BP: 83/46, HR: 114, Temp: 99.7, RR: 18, SpO2: 90% on RA    Labs: WBC 16.61, CKMB 3.8, Trop 11.4, proBNP 49, RVP negative   UA: Cloudy, Protein 300, Leku esterase Large, Blood small, WBC too numerous to count, occasional bacteria   EKG: Sinus Tachycardia 114 bpm, rightward axis shift   Received in the ED: Ofirmev x1, Solumedrol 125 mg IVP x1, Zosyn 3.375 mg IVPB, Vancomycin 1g IVPB, 2L NS bolus, Duoneb x3     Imaging:  CXR: no active disease   CT Chest PE: No pulmonary embolus.  CT Abdomen and pelvis: No acute abnormality in the abdomen or pelvis.     (07 Jun 2024 17:15)    INTERVAL HPI:  6/7 - admitted  6/8 - Pt was seen and examined at bedside. Pt states that he feels well and denies dysuria. Pt denies headache, dizziness, lightheadedness, fever, chills, body aches, CP, SOB, palpitations, abdominal pain, n/v, numbness/tingling. No other complaints at this time.  ESBL E COLI Bacteremia ,LEUKOCYTOSIS ,On IV Abx   6/9 - Pt was seen and examined at bedside. Pt states that he still feels well and continues to deny dysuria. Patient also endorsed an episode of loose stool but not diarrhea. Pt denies headache, dizziness, lightheadedness, fever, chills, body aches, CP, SOB, palpitations, abdominal pain, n/v, numbness/tingling. No other complaints at this time.   6/10 -  Pt was seen and examined at bedside. Pt states that he feels comfortable without day time O2 and feels well. Pt denies headache, dizziness, lightheadedness, fever, chills, body aches, CP, SOB, palpitations, abdominal pain, n/v, numbness/tingling. No other complaints at this time. on IV Invanz daily  6/11 - Pt was seen and examined at bedside. Pt has no complaints at this time. Pt denies headache, dizziness, lightheadedness, fever, chills, body aches, CP, SOB, palpitations, abdominal pain, n/v, numbness/tingling. No other complaints at this time.     OVERNIGHT EVENTS: No acute overnight events.     Home Medications:  albuterol 90 mcg/inh inhalation aerosol: 2 puff(s) inhaled once a day as needed for  shortness of breath and/or wheezing (07 Jun 2024 17:45)  ascorbic acid 500 mg oral tablet: 1 orally once a day (in the morning) (07 Jun 2024 17:52)  aspirin 81 mg oral tablet: 1 orally once a day (in the morning) (07 Jun 2024 17:52)  B-12 500 mcg sublingual tablet: 1 sublingually once a day (at bedtime) (07 Jun 2024 17:52)  budesonide 0.5 mg/2 mL inhalation suspension: 2 puff(s) by nebulizer 2 times a day as needed for  shortness of breath and/or wheezing (07 Jun 2024 17:47)  cholecalciferol 25 mcg (1000 intl units) oral tablet: 1 orally once a day (at bedtime) (07 Jun 2024 17:52)  clopidogrel 75 mg oral tablet: 1 orally once a day (in the morning) (07 Jun 2024 17:52)  esomeprazole 40 mg oral delayed release capsule: 1 orally once a day (in the morning) (07 Jun 2024 17:52)  ipratropium-albuterol 0.5 mg-2.5 mg/3 mL inhalation solution: 3 puff(s) by nebulizer every 6 hours as needed for  shortness of breath and/or wheezing (07 Jun 2024 17:49)  nitrofurantoin macrocrystals 100 mg oral capsule: 1 orally once a day (at bedtime) (07 Jun 2024 17:52)  predniSONE 5 mg oral tablet: 1 tab(s) orally once a day as needed for  shortness of breath and/or wheezing (07 Jun 2024 17:51)  rosuvastatin 20 mg oral tablet: 1 orally once a day (at bedtime) (07 Jun 2024 17:52)  Spiriva 18 mcg inhalation capsule: 1 inhaled once a day (in the morning) (07 Jun 2024 17:52)  Symbicort 160 mcg-4.5 mcg/inh inhalation aerosol: 2 puff(s) inhaled 2 times a day (07 Jun 2024 17:47)  Vitamin B6 100 mg oral tablet: 1 orally once a day (at bedtime) (07 Jun 2024 17:52)  zinc (as gluconate) 50 mg oral tablet: orally once a day (in the morning) (07 Jun 2024 17:52)      MEDICATIONS  (STANDING):  albuterol/ipratropium for Nebulization 3 milliLiter(s) Nebulizer every 6 hours  ascorbic acid 500 milliGRAM(s) Oral daily  aspirin  chewable 81 milliGRAM(s) Oral daily  atorvastatin 80 milliGRAM(s) Oral at bedtime  budesonide 160 MICROgram(s)/formoterol 4.5 MICROgram(s) Inhaler 2 Puff(s) Inhalation two times a day  cholecalciferol 1000 Unit(s) Oral at bedtime  clopidogrel Tablet 75 milliGRAM(s) Oral daily  cyanocobalamin 1000 MICROGram(s) Oral daily  enoxaparin Injectable 40 milliGRAM(s) SubCutaneous every 24 hours  ertapenem  IVPB 1000 milliGRAM(s) IV Intermittent every 24 hours  pantoprazole    Tablet 40 milliGRAM(s) Oral before breakfast  predniSONE   Tablet 20 milliGRAM(s) Oral daily  pyridoxine 100 milliGRAM(s) Oral at bedtime  saccharomyces boulardii 250 milliGRAM(s) Oral two times a day  sodium chloride 0.9%. 1000 milliLiter(s) (75 mL/Hr) IV Continuous <Continuous>  tiotropium 2.5 MICROgram(s) Inhaler 2 Puff(s) Inhalation daily  zinc sulfate 220 milliGRAM(s) Oral daily    MEDICATIONS  (PRN):  acetaminophen     Tablet .. 650 milliGRAM(s) Oral every 6 hours PRN Temp greater or equal to 38C (100.4F), Mild Pain (1 - 3)  albuterol    90 MICROgram(s) HFA Inhaler 2 Puff(s) Inhalation every 6 hours PRN for shortness of breath and/or wheezing  aluminum hydroxide/magnesium hydroxide/simethicone Suspension 30 milliLiter(s) Oral every 4 hours PRN Dyspepsia  melatonin 3 milliGRAM(s) Oral at bedtime PRN Insomnia  ondansetron Injectable 4 milliGRAM(s) IV Push every 8 hours PRN Nausea and/or Vomiting      No Known Allergies      Social History:  Lives: At home with wife   ADLs: Fully independent, no problem ambulating   Alcohol Use: None   Tobacco Use: Former smoker quit >10yrs ago   Recreational Drug Use: None (07 Jun 2024 17:15)      REVIEW OF SYSTEMS:  CONSTITUTIONAL: No fever, No chills, No fatigue, No myalgia, No Body ache, No Weakness  EYES: No eye pain,  No visual disturbances, No discharge, No Redness  ENMT: No ear pain, No nose bleed, No vertigo; No sinus pain, No throat pain, No Congestion  NECK: No pain, No stiffness  RESPIRATORY: No cough, No wheezing, No hemoptysis, No shortness of breath  CARDIOVASCULAR: No chest pain, No palpitations  GASTROINTESTINAL: No abdominal pain, No epigastric pain. No nausea, No vomiting, No diarrhea, No constipation; [ x ] BM-  GENITOURINARY: No dysuria, No frequency, No urgency, No hematuria, No incontinence  NEUROLOGICAL: No headaches, No dizziness, No numbness, No tingling, No tremors, No weakness  EXTREMITIES: No Swelling, No Pain, No Edema  SKIN: [ x ] No itching, burning, rashes, or lesions   MUSCULOSKELETAL: No joint pain, No joint swelling; No muscle pain, No back pain, No extremity pain  PSYCHIATRIC: No depression, No anxiety, No mood swings, No difficulty sleeping at night  PAIN SCALE: [ X ] None  [  ] Other-  ROS Unable to obtain due to: [  ] Dementia  [  ] Lethargy  [  ] Sedated  [  ] Non verbal  REST OF REVIEW OF SYSTEMS: [ x ] Normal     Vital Signs Last 24 Hrs  T(C): 36.4 (11 Jun 2024 04:43), Max: 36.5 (10 Godwin 2024 11:55)  T(F): 97.5 (11 Jun 2024 04:43), Max: 97.7 (10 Godwin 2024 11:55)  HR: 67 (11 Jun 2024 08:27) (56 - 92)  BP: 124/53 (11 Jun 2024 04:43) (124/53 - 153/65)  BP(mean): --  RR: 16 (11 Jun 2024 04:43) (16 - 17)  SpO2: 98% (11 Jun 2024 08:27) (93% - 98%)    Parameters below as of 11 Jun 2024 08:27  Patient On (Oxygen Delivery Method): nasal cannula, 3LPM        CAPILLARY BLOOD GLUCOSE          I&O's Summary    PHYSICAL EXAM:  GENERAL:  [ x ] NAD, [ x ] Well appearing, [  ] Agitated, [  ] Drowsy, [  ] Lethargy, [  ] Confused   HEAD:  [ x ] Normal, [  ] Other  EYES:  [ x ] EOMI, [ x ] PERRLA, [ x ] Conjunctiva and sclera clear normal, [  ] Other, [  ] Pallor, [  ] Discharge  ENMT:  [ x ] Normal, [ x ] Moist mucous membranes, [  ] Good dentition, [  ] No thrush  NECK:  [ x ] Supple, [  ] No JVD, [ x ] Normal thyroid, [  ] Lymphadenopathy, [  ] Other  CHEST/LUNG:  [x  ] Clear to auscultation bilaterally, [ x ] Breath Sounds decreased b/l, [  ] Poor effort, [ x ] No rales, [ x ] No rhonchi, [ x ] mild wheeze b/l  HEART:  [ x ] Regular rate and rhythm, [  ] Tachycardia, [  ] Bradycardia, [  ] Irregular, [ x ] No murmurs, No rubs, No gallops, [  ] PPM in place (Mfr:  )  ABDOMEN:  [ x ] Soft, [ x ] Nontender, [ x ] Nondistended, [ x ] No mass, [ x ] Bowel sounds present, [  ] Obese  NERVOUS SYSTEM:  [ x ] Alert & Oriented x3__, [ x ] Nonfocal, [  ] Confusion, [  ] Encephalopathic, [  ] Sedated, [  ] Unable to assess, [  ] Dementia, [  ] Other-  EXTREMITIES:  [ x ] 2+ Peripheral Pulses, No clubbing, No cyanosis,  [  ] Edema B/L lower EXT, [  ] PVD stasis skin changes B/L lower EXT, [  ] Wound  LYMPH:  No lymphadenopathy noted  SKIN:  [ x ] No rashes or lesions, [  ] Pressure ulcers, [  ] Ecchymosis, [  ] Skin tears, [  ] Other    DIET: Diet, DASH/TLC:   Sodium & Cholesterol Restricted (06-07-24 @ 18:02)      LABS:                        13.9   7.99  )-----------( 179      ( 11 Jun 2024 07:40 )             41.4     11 Jun 2024 07:40    143    |  110    |  16     ----------------------------<  93     3.6     |  29     |  0.66     Ca    9.0        11 Jun 2024 07:40        Urinalysis Basic - ( 11 Jun 2024 07:40 )    Color: x / Appearance: x / SG: x / pH: x  Gluc: 93 mg/dL / Ketone: x  / Bili: x / Urobili: x   Blood: x / Protein: x / Nitrite: x   Leuk Esterase: x / RBC: x / WBC x   Sq Epi: x / Non Sq Epi: x / Bacteria: x      Culture Results:   No growth at 24 hours (06-09 @ 08:04)  Culture Results:   No growth at 24 hours (06-09 @ 07:58)  Culture Results:   <10,000 CFU/mL Normal Urogenital Yancy (06-07 @ 11:40)  Culture Results:   No growth at 72 Hours (06-07 @ 10:00)  Culture Results:   Growth in anaerobic bottle: Escherichia coli ESBL  Direct identification is available within approximately 3-5  hours either by Blood Panel Multiplexed PCR or Direct  MALDI-TOF. Details: [email or web address] (06-07 @ 09:45)      CARDIAC MARKERS ( 08 Jun 2024 08:05 )  x     / x     / x     / x     / 6.6 ng/mL  CARDIAC MARKERS ( 07 Jun 2024 18:30 )  x     / x     / 236 U/L / x     / 5.0 ng/mL  CARDIAC MARKERS ( 07 Jun 2024 10:00 )  x     / x     / x     / x     / 3.8 ng/mL        Culture - Blood (collected 09 Jun 2024 08:04)  Source: .Blood Blood-Peripheral  Preliminary Report (10 Godwin 2024 17:02):    No growth at 24 hours    Culture - Blood (collected 09 Jun 2024 07:58)  Source: .Blood Blood-Peripheral  Preliminary Report (10 Godwin 2024 17:02):    No growth at 24 hours    Culture - Urine (collected 07 Jun 2024 11:40)  Source: Clean Catch Clean Catch (Midstream)  Final Report (08 Jun 2024 15:55):    <10,000 CFU/mL Normal Urogenital Yancy    Culture - Blood (collected 07 Jun 2024 10:00)  Source: .Blood Blood-Peripheral  Preliminary Report (10 Godwin 2024 17:01):    No growth at 72 Hours    Culture - Blood (collected 07 Jun 2024 09:45)  Source: .Blood Blood-Peripheral  Gram Stain (08 Jun 2024 03:07):    Growth in anaerobic bottle: Gram Negative Rods  Final Report (09 Jun 2024 14:48):    Growth in anaerobic bottle: Escherichia coli ESBL    Direct identification is available within approximately 3-5    hours either by Blood Panel Multiplexed PCR or Direct    MALDI-TOF. Details: [email or web address]  Organism: Blood Culture PCR  Escherichia coli ESBL (09 Jun 2024 14:48)  Organism: Escherichia coli ESBL (09 Jun 2024 14:48)  Organism: Blood Culture PCR (09 Jun 2024 14:48)       Anemia Panel:      Thyroid Panel:  Thyroid Stimulating Hormone, Serum: 1.06 uIU/mL (06-07-24 @ 10:00)        Lipase: 25 U/L (06-07-24 @ 10:00)          RADIOLOGY & ADDITIONAL TESTS: _______      HEALTH ISSUES - PROBLEM Dx:  UTI (urinary tract infection)    HTN (hypertension)    HLD (hyperlipidemia)    Afib    CAD (coronary artery disease)    COPD with asthma    Need for prophylactic measure    GERD (gastroesophageal reflux disease)    Cardiac enzymes elevated          Consultant(s) Notes Reviewed:  [ x ] YES     Care Discussed with [ x ] Consultants, [ x ] Patient, [ x ] Family, [  ] HCP, [ x ] , [  ] Social Service, [ x ] RN, [  ] Physical Therapy, [  ] Palliative Care Team  DVT PPX: [ X ] Lovenox, [  ] SC Heparin, [  ] Coumadin, [  ] Xarelto, [  ] Eliquis, [  ] Pradaxa, [  ] IV Heparin drip, [  ] SCD, [  ] Ambulation, [  ] Contraindicated 2/2 GI Bleed, [  ] Contraindicated 2/2  Bleed, [  ] Contraindicated 2/2 Brain Bleed  Advanced Directive: [ X ] None, [  ] DNR/DNI Patient is a 78y old  Male who presents with a chief complaint of Sepsis (11 Jun 2024 05:23)    HPI:  77 yo M with PMHx of HTN, HLD, GERD, Atrial fibrillation, Asthma, COPD, hx of benign lung cancer R lobe s/p resection,  CAD (s/p 5 stents ~2001), and recurrent UTIs presents to the ED with dysuria. Patient notes he began having burning with urination about 3 days ago. He has Augmentin prescribed by his Urologist Dr. Sena as needed for when he begins to feel dysuria, however, patient did not take the antibiotics. Then this AM, he woke up around 3:00AM with rigors, SOB and nausea. Patient tried to do a home breathing treatment but had 1 episode of vomiting. He attempted to go back to sleep, but started to become a little confused on the time of the day. Patient felt it was necessary for him to come to ED for evaluation.     ED Course:   Vitals: BP: 83/46, HR: 114, Temp: 99.7, RR: 18, SpO2: 90% on RA    Labs: WBC 16.61, CKMB 3.8, Trop 11.4, proBNP 49, RVP negative   UA: Cloudy, Protein 300, Leku esterase Large, Blood small, WBC too numerous to count, occasional bacteria   EKG: Sinus Tachycardia 114 bpm, rightward axis shift   Received in the ED: Ofirmev x1, Solumedrol 125 mg IVP x1, Zosyn 3.375 mg IVPB, Vancomycin 1g IVPB, 2L NS bolus, Duoneb x3     Imaging:  CXR: no active disease   CT Chest PE: No pulmonary embolus.  CT Abdomen and pelvis: No acute abnormality in the abdomen or pelvis.     (07 Jun 2024 17:15)    INTERVAL HPI:  6/7 - admitted  6/8 - Pt was seen and examined at bedside. Pt states that he feels well and denies dysuria. Pt denies headache, dizziness, lightheadedness, fever, chills, body aches, CP, SOB, palpitations, abdominal pain, n/v, numbness/tingling. No other complaints at this time.  ESBL E COLI Bacteremia ,LEUKOCYTOSIS ,On IV Abx   6/9 - Pt was seen and examined at bedside. Pt states that he still feels well and continues to deny dysuria. Patient also endorsed an episode of loose stool but not diarrhea. Pt denies headache, dizziness, lightheadedness, fever, chills, body aches, CP, SOB, palpitations, abdominal pain, n/v, numbness/tingling. No other complaints at this time.   6/10 -  Pt was seen and examined at bedside. Pt states that he feels comfortable without day time O2 and feels well. Pt denies headache, dizziness, lightheadedness, fever, chills, body aches, CP, SOB, palpitations, abdominal pain, n/v, numbness/tingling. No other complaints at this time. on IV Invanz daily  6/11 - Pt was seen and examined at bedside. Pt has no complaints at this time. Pt denies headache, dizziness, lightheadedness, fever, chills, body aches, CP, SOB, palpitations, abdominal pain, n/v, numbness/tingling. No other complaints at this time. Plan for MID line    OVERNIGHT EVENTS: No acute overnight events.     Home Medications:  albuterol 90 mcg/inh inhalation aerosol: 2 puff(s) inhaled once a day as needed for  shortness of breath and/or wheezing (07 Jun 2024 17:45)  ascorbic acid 500 mg oral tablet: 1 orally once a day (in the morning) (07 Jun 2024 17:52)  aspirin 81 mg oral tablet: 1 orally once a day (in the morning) (07 Jun 2024 17:52)  B-12 500 mcg sublingual tablet: 1 sublingually once a day (at bedtime) (07 Jun 2024 17:52)  budesonide 0.5 mg/2 mL inhalation suspension: 2 puff(s) by nebulizer 2 times a day as needed for  shortness of breath and/or wheezing (07 Jun 2024 17:47)  cholecalciferol 25 mcg (1000 intl units) oral tablet: 1 orally once a day (at bedtime) (07 Jun 2024 17:52)  clopidogrel 75 mg oral tablet: 1 orally once a day (in the morning) (07 Jun 2024 17:52)  esomeprazole 40 mg oral delayed release capsule: 1 orally once a day (in the morning) (07 Jun 2024 17:52)  ipratropium-albuterol 0.5 mg-2.5 mg/3 mL inhalation solution: 3 puff(s) by nebulizer every 6 hours as needed for  shortness of breath and/or wheezing (07 Jun 2024 17:49)  nitrofurantoin macrocrystals 100 mg oral capsule: 1 orally once a day (at bedtime) (07 Jun 2024 17:52)  predniSONE 5 mg oral tablet: 1 tab(s) orally once a day as needed for  shortness of breath and/or wheezing (07 Jun 2024 17:51)  rosuvastatin 20 mg oral tablet: 1 orally once a day (at bedtime) (07 Jun 2024 17:52)  Spiriva 18 mcg inhalation capsule: 1 inhaled once a day (in the morning) (07 Jun 2024 17:52)  Symbicort 160 mcg-4.5 mcg/inh inhalation aerosol: 2 puff(s) inhaled 2 times a day (07 Jun 2024 17:47)  Vitamin B6 100 mg oral tablet: 1 orally once a day (at bedtime) (07 Jun 2024 17:52)  zinc (as gluconate) 50 mg oral tablet: orally once a day (in the morning) (07 Jun 2024 17:52)      MEDICATIONS  (STANDING):  albuterol/ipratropium for Nebulization 3 milliLiter(s) Nebulizer every 6 hours  ascorbic acid 500 milliGRAM(s) Oral daily  aspirin  chewable 81 milliGRAM(s) Oral daily  atorvastatin 80 milliGRAM(s) Oral at bedtime  budesonide 160 MICROgram(s)/formoterol 4.5 MICROgram(s) Inhaler 2 Puff(s) Inhalation two times a day  cholecalciferol 1000 Unit(s) Oral at bedtime  clopidogrel Tablet 75 milliGRAM(s) Oral daily  cyanocobalamin 1000 MICROGram(s) Oral daily  enoxaparin Injectable 40 milliGRAM(s) SubCutaneous every 24 hours  ertapenem  IVPB 1000 milliGRAM(s) IV Intermittent every 24 hours  pantoprazole    Tablet 40 milliGRAM(s) Oral before breakfast  predniSONE   Tablet 20 milliGRAM(s) Oral daily  pyridoxine 100 milliGRAM(s) Oral at bedtime  saccharomyces boulardii 250 milliGRAM(s) Oral two times a day  sodium chloride 0.9%. 1000 milliLiter(s) (75 mL/Hr) IV Continuous <Continuous>  tiotropium 2.5 MICROgram(s) Inhaler 2 Puff(s) Inhalation daily  zinc sulfate 220 milliGRAM(s) Oral daily    MEDICATIONS  (PRN):  acetaminophen     Tablet .. 650 milliGRAM(s) Oral every 6 hours PRN Temp greater or equal to 38C (100.4F), Mild Pain (1 - 3)  albuterol    90 MICROgram(s) HFA Inhaler 2 Puff(s) Inhalation every 6 hours PRN for shortness of breath and/or wheezing  aluminum hydroxide/magnesium hydroxide/simethicone Suspension 30 milliLiter(s) Oral every 4 hours PRN Dyspepsia  melatonin 3 milliGRAM(s) Oral at bedtime PRN Insomnia  ondansetron Injectable 4 milliGRAM(s) IV Push every 8 hours PRN Nausea and/or Vomiting      No Known Allergies      Social History:  Lives: At home with wife   ADLs: Fully independent, no problem ambulating   Alcohol Use: None   Tobacco Use: Former smoker quit >10yrs ago   Recreational Drug Use: None (07 Jun 2024 17:15)      REVIEW OF SYSTEMS:  CONSTITUTIONAL: No fever, No chills, No fatigue, No myalgia, No Body ache, No Weakness  EYES: No eye pain,  No visual disturbances, No discharge, No Redness  ENMT: No ear pain, No nose bleed, No vertigo; No sinus pain, No throat pain, No Congestion  NECK: No pain, No stiffness  RESPIRATORY: No cough, No wheezing, No hemoptysis, No shortness of breath  CARDIOVASCULAR: No chest pain, No palpitations  GASTROINTESTINAL: No abdominal pain, No epigastric pain. No nausea, No vomiting, No diarrhea, No constipation; [ x ] BM-  GENITOURINARY: No dysuria, No frequency, No urgency, No hematuria, No incontinence  NEUROLOGICAL: No headaches, No dizziness, No numbness, No tingling, No tremors, No weakness  EXTREMITIES: No Swelling, No Pain, No Edema  SKIN: [ x ] No itching, burning, rashes, or lesions   MUSCULOSKELETAL: No joint pain, No joint swelling; No muscle pain, No back pain, No extremity pain  PSYCHIATRIC: No depression, No anxiety, No mood swings, No difficulty sleeping at night  PAIN SCALE: [ X ] None  [  ] Other-  ROS Unable to obtain due to: [  ] Dementia  [  ] Lethargy  [  ] Sedated  [  ] Non verbal  REST OF REVIEW OF SYSTEMS: [ x ] Normal     Vital Signs Last 24 Hrs  T(C): 36.4 (11 Jun 2024 04:43), Max: 36.5 (10 Godwin 2024 11:55)  T(F): 97.5 (11 Jun 2024 04:43), Max: 97.7 (10 Godwin 2024 11:55)  HR: 67 (11 Jun 2024 08:27) (56 - 92)  BP: 124/53 (11 Jun 2024 04:43) (124/53 - 153/65)  BP(mean): --  RR: 16 (11 Jun 2024 04:43) (16 - 17)  SpO2: 98% (11 Jun 2024 08:27) (93% - 98%)    Parameters below as of 11 Jun 2024 08:27  Patient On (Oxygen Delivery Method): nasal cannula, 3LPM        CAPILLARY BLOOD GLUCOSE          I&O's Summary    PHYSICAL EXAM:  GENERAL:  [ x ] NAD, [ x ] Well appearing, [  ] Agitated, [  ] Drowsy, [  ] Lethargy, [  ] Confused   HEAD:  [ x ] Normal, [  ] Other  EYES:  [ x ] EOMI, [ x ] PERRLA, [ x ] Conjunctiva and sclera clear normal, [  ] Other, [  ] Pallor, [  ] Discharge  ENMT:  [ x ] Normal, [ x ] Moist mucous membranes, [  ] Good dentition, [  ] No thrush  NECK:  [ x ] Supple, [  ] No JVD, [ x ] Normal thyroid, [  ] Lymphadenopathy, [  ] Other  CHEST/LUNG:  [x  ] Clear to auscultation bilaterally, [ x ] Breath Sounds decreased b/l, [  ] Poor effort, [ x ] No rales, [ x ] No rhonchi, [ x ] mild wheeze b/l  HEART:  [ x ] Regular rate and rhythm, [  ] Tachycardia, [  ] Bradycardia, [  ] Irregular, [ x ] No murmurs, No rubs, No gallops, [  ] PPM in place (Mfr:  )  ABDOMEN:  [ x ] Soft, [ x ] Nontender, [ x ] Nondistended, [ x ] No mass, [ x ] Bowel sounds present, [  ] Obese  NERVOUS SYSTEM:  [ x ] Alert & Oriented x3__, [ x ] Nonfocal, [  ] Confusion, [  ] Encephalopathic, [  ] Sedated, [  ] Unable to assess, [  ] Dementia, [  ] Other-  EXTREMITIES:  [ x ] 2+ Peripheral Pulses, No clubbing, No cyanosis,  [  ] Edema B/L lower EXT, [  ] PVD stasis skin changes B/L lower EXT, [  ] Wound  LYMPH:  No lymphadenopathy noted  SKIN:  [ x ] No rashes or lesions, [  ] Pressure ulcers, [  ] Ecchymosis, [  ] Skin tears, [  ] Other    DIET: Diet, DASH/TLC:   Sodium & Cholesterol Restricted (06-07-24 @ 18:02)      LABS:                        13.9   7.99  )-----------( 179      ( 11 Jun 2024 07:40 )             41.4     11 Jun 2024 07:40    143    |  110    |  16     ----------------------------<  93     3.6     |  29     |  0.66     Ca    9.0        11 Jun 2024 07:40        Urinalysis Basic - ( 11 Jun 2024 07:40 )    Color: x / Appearance: x / SG: x / pH: x  Gluc: 93 mg/dL / Ketone: x  / Bili: x / Urobili: x   Blood: x / Protein: x / Nitrite: x   Leuk Esterase: x / RBC: x / WBC x   Sq Epi: x / Non Sq Epi: x / Bacteria: x      Culture Results:   No growth at 24 hours (06-09 @ 08:04)  Culture Results:   No growth at 24 hours (06-09 @ 07:58)  Culture Results:   <10,000 CFU/mL Normal Urogenital Yancy (06-07 @ 11:40)  Culture Results:   No growth at 72 Hours (06-07 @ 10:00)  Culture Results:   Growth in anaerobic bottle: Escherichia coli ESBL  Direct identification is available within approximately 3-5  hours either by Blood Panel Multiplexed PCR or Direct  MALDI-TOF. Details: [email or web address] (06-07 @ 09:45)      CARDIAC MARKERS ( 08 Jun 2024 08:05 )  x     / x     / x     / x     / 6.6 ng/mL  CARDIAC MARKERS ( 07 Jun 2024 18:30 )  x     / x     / 236 U/L / x     / 5.0 ng/mL  CARDIAC MARKERS ( 07 Jun 2024 10:00 )  x     / x     / x     / x     / 3.8 ng/mL        Culture - Blood (collected 09 Jun 2024 08:04)  Source: .Blood Blood-Peripheral  Preliminary Report (10 Godwin 2024 17:02):    No growth at 24 hours    Culture - Blood (collected 09 Jun 2024 07:58)  Source: .Blood Blood-Peripheral  Preliminary Report (10 Godwin 2024 17:02):    No growth at 24 hours    Culture - Urine (collected 07 Jun 2024 11:40)  Source: Clean Catch Clean Catch (Midstream)  Final Report (08 Jun 2024 15:55):    <10,000 CFU/mL Normal Urogenital Yancy    Culture - Blood (collected 07 Jun 2024 10:00)  Source: .Blood Blood-Peripheral  Preliminary Report (10 Godwin 2024 17:01):    No growth at 72 Hours    Culture - Blood (collected 07 Jun 2024 09:45)  Source: .Blood Blood-Peripheral  Gram Stain (08 Jun 2024 03:07):    Growth in anaerobic bottle: Gram Negative Rods  Final Report (09 Jun 2024 14:48):    Growth in anaerobic bottle: Escherichia coli ESBL    Direct identification is available within approximately 3-5    hours either by Blood Panel Multiplexed PCR or Direct    MALDI-TOF. Details: [email or web address]  Organism: Blood Culture PCR  Escherichia coli ESBL (09 Jun 2024 14:48)  Organism: Escherichia coli ESBL (09 Jun 2024 14:48)  Organism: Blood Culture PCR (09 Jun 2024 14:48)       Anemia Panel:      Thyroid Panel:  Thyroid Stimulating Hormone, Serum: 1.06 uIU/mL (06-07-24 @ 10:00)        Lipase: 25 U/L (06-07-24 @ 10:00)          RADIOLOGY & ADDITIONAL TESTS: _______      HEALTH ISSUES - PROBLEM Dx:  UTI (urinary tract infection)    HTN (hypertension)    HLD (hyperlipidemia)    Afib    CAD (coronary artery disease)    COPD with asthma    Need for prophylactic measure    GERD (gastroesophageal reflux disease)    Cardiac enzymes elevated          Consultant(s) Notes Reviewed:  [ x ] YES     Care Discussed with [ x ] Consultants, [ x ] Patient, [ x ] Family, [  ] HCP, [ x ] , [  ] Social Service, [ x ] RN, [  ] Physical Therapy, [  ] Palliative Care Team  DVT PPX: [ X ] Lovenox, [  ] SC Heparin, [  ] Coumadin, [  ] Xarelto, [  ] Eliquis, [  ] Pradaxa, [  ] IV Heparin drip, [  ] SCD, [  ] Ambulation, [  ] Contraindicated 2/2 GI Bleed, [  ] Contraindicated 2/2  Bleed, [  ] Contraindicated 2/2 Brain Bleed  Advanced Directive: [ X ] None, [  ] DNR/DNI

## 2024-06-11 NOTE — PROGRESS NOTE ADULT - ASSESSMENT
77 yo M with PMHx of HTN, HLD, GERD, Atrial fibrillation, Asthma, COPD, hx of benign lung cancer R lobe s/p resection,  CAD (s/p 5 stents ~2001), and recurrent UTIs presents to the ED with dysuria. Patient notes he began having burning with urination about 3 days ago. He has Augmentin prescribed by his Urologist Dr. Sena as needed for when he begins to feel dysuria, however, patient did not take the antibiotics.    abnormal chest imaging  copd  emphysema  HLD  HTN  OP  OA  AF  hx of Lung Surgery -   CAD    would dc Pulmicort inhalation - pt is on symbicort - double the dose of inhaled steroid  on abx  vs noted  on RA    ct chest imaging reviewed - unclear correlation - etiol - will benefit from rpt imaging in 6 - 8 weeks and follow up with his outpatient Pulm MD - Dr Dang in [place] - [facility]  eval for Acute Infection   emp ABX s/p  Biomarkers and Cx -   ID eval  copd - asthma hx - nebs - inhalers and short course of Prednisone  o2 support as needed  goal sat > 88 pct  cvs rx regimen  BP control  on Anti Platelet rx regimen  nutrition  - dietary discretion  emotional support  anxiolysis  seasonal vaccination counseling

## 2024-06-11 NOTE — PROGRESS NOTE ADULT - PROBLEM SELECTOR PLAN 3
-H/O A Fib -Not on AC as Anemia  - EKG: Sinus Tachycardia 114 bpm, rightward axis shift   - Rate controlled in sinus @ 85 bpm on monitor s/p fluid resuscitation   - No longer on AV damion agents or anticoagulation per Cardiology, [facility] ([place])   - HR stable

## 2024-06-11 NOTE — PROGRESS NOTE ADULT - PROBLEM SELECTOR PLAN 1
- Severe Sepsis on admission: WBC 16.61, hypotension, Fever  tachycardia, tachypnea, Likely 2/2 UTI  Nl Lactate   - UA: Cloudy, Protein 300, Leukocyte esterase Large, Blood small, WBC too numerous to count, occasional bacteria   - CT Abdomen and pelvis: No acute abnormality in the abdomen or pelvis  - Frequent UTIs with prophylactic Macrobid , leukocytosis IMPROVING  - BCx = ESBL e. coli  - f/u repeat BCx = NGTD  - UCx: normal urogenital cathi  - Continue with Ertapenem 1gm IVPB daily  -CBC in AM , PRN Tylenol for fever  - Tolerating PO, continue DASH diet   - Infectious Disease - Dr. Marc, - follow up -continue IV Invanz 1 gm daily . - Severe Sepsis on admission: WBC 16.61, hypotension, Fever  tachycardia, tachypnea, Likely 2/2 UTI  Nl Lactate   - UA: Cloudy, Protein 300, Leukocyte esterase Large, Blood small, WBC too numerous to count, occasional bacteria   - CT Abdomen and pelvis: No acute abnormality in the abdomen or pelvis  - Frequent UTIs with prophylactic Macrobid , leukocytosis IMPROVING  - BCx = ESBL e. coli  - f/u repeat BCx = NGTD  - UCx: normal urogenital cathi  - Continue with Ertapenem 1gm IVPB daily -last date 7/4/24  -CBC in AM , PRN Tylenol for fever  - Tolerating PO, continue DASH diet   - Infectious Disease - Dr. Marc, - follow up -continue IV Invanz 1 gm daily .4 weeks Abx -28 days

## 2024-06-11 NOTE — PROGRESS NOTE ADULT - SUBJECTIVE AND OBJECTIVE BOX
Date/Time Patient Seen:  		  Referring MD:   Data Reviewed	       Patient is a 78y old  Male who presents with a chief complaint of Sepsis (10 Godwin 2024 11:43)      Subjective/HPI     PAST MEDICAL & SURGICAL HISTORY:  Chronic obstructive pulmonary disease  Asthma/copd    Asthma    Stented coronary artery  2000, 2006 , 2016 and April 2023, total of 6 JESSE stents    HTN (hypertension)    HLD (hyperlipidemia)    GERD (gastroesophageal reflux disease)    Nephrolithiasis  s/p Lithotripsy    GI bleed  while on Antiplatelets    Umbilical hernia with obstruction, without gangrene    2019 novel coronavirus disease (COVID-19)  DX 11/4/2022    History of atrial fibrillation    Implantable loop recorder present    Frequent UTI    Chronic atrial fibrillation    Lung tumor  Rt Lung tumor resection,benign    S/P primary angioplasty with coronary stent    S/P TURP          Medication list         MEDICATIONS  (STANDING):  albuterol/ipratropium for Nebulization 3 milliLiter(s) Nebulizer every 6 hours  ascorbic acid 500 milliGRAM(s) Oral daily  aspirin  chewable 81 milliGRAM(s) Oral daily  atorvastatin 80 milliGRAM(s) Oral at bedtime  buDESOnide    Inhalation Suspension 0.5 milliGRAM(s) Inhalation every 12 hours  budesonide 160 MICROgram(s)/formoterol 4.5 MICROgram(s) Inhaler 2 Puff(s) Inhalation two times a day  cholecalciferol 1000 Unit(s) Oral at bedtime  clopidogrel Tablet 75 milliGRAM(s) Oral daily  cyanocobalamin 1000 MICROGram(s) Oral daily  enoxaparin Injectable 40 milliGRAM(s) SubCutaneous every 24 hours  ertapenem  IVPB 1000 milliGRAM(s) IV Intermittent every 24 hours  pantoprazole    Tablet 40 milliGRAM(s) Oral before breakfast  predniSONE   Tablet 20 milliGRAM(s) Oral daily  pyridoxine 100 milliGRAM(s) Oral at bedtime  saccharomyces boulardii 250 milliGRAM(s) Oral two times a day  sodium chloride 0.9%. 1000 milliLiter(s) (75 mL/Hr) IV Continuous <Continuous>  tiotropium 2.5 MICROgram(s) Inhaler 2 Puff(s) Inhalation daily  zinc sulfate 220 milliGRAM(s) Oral daily    MEDICATIONS  (PRN):  acetaminophen     Tablet .. 650 milliGRAM(s) Oral every 6 hours PRN Temp greater or equal to 38C (100.4F), Mild Pain (1 - 3)  albuterol    90 MICROgram(s) HFA Inhaler 2 Puff(s) Inhalation every 6 hours PRN for shortness of breath and/or wheezing  aluminum hydroxide/magnesium hydroxide/simethicone Suspension 30 milliLiter(s) Oral every 4 hours PRN Dyspepsia  melatonin 3 milliGRAM(s) Oral at bedtime PRN Insomnia  ondansetron Injectable 4 milliGRAM(s) IV Push every 8 hours PRN Nausea and/or Vomiting         Vitals log        ICU Vital Signs Last 24 Hrs  T(C): 36.4 (11 Jun 2024 04:43), Max: 36.5 (10 Godwin 2024 11:55)  T(F): 97.5 (11 Jun 2024 04:43), Max: 97.7 (10 Godwin 2024 11:55)  HR: 56 (11 Jun 2024 04:43) (56 - 92)  BP: 124/53 (11 Jun 2024 04:43) (124/53 - 153/65)  BP(mean): --  ABP: --  ABP(mean): --  RR: 16 (11 Jun 2024 04:43) (16 - 17)  SpO2: 98% (11 Jun 2024 04:43) (92% - 98%)    O2 Parameters below as of 11 Jun 2024 04:43  Patient On (Oxygen Delivery Method): room air                 Input and Output:  I&O's Detail    09 Jun 2024 07:01  -  10 Godwin 2024 07:00  --------------------------------------------------------  IN:  Total IN: 0 mL    OUT:    Voided (mL): 250 mL  Total OUT: 250 mL    Total NET: -250 mL          Lab Data                        13.2   11.65 )-----------( 165      ( 10 Godwin 2024 07:10 )             39.9     06-10    143  |  109<H>  |  14  ----------------------------<  93  4.0   |  29  |  0.70    Ca    9.3      10 Godwin 2024 07:10    TPro  6.3  /  Alb  2.9<L>  /  TBili  0.5  /  DBili  x   /  AST  21  /  ALT  38  /  AlkPhos  74  06-09            Review of Systems	      Objective     Physical Examination    heart s1s2  lung dc BS  head nc      Pertinent Lab findings & Imaging      Agusto:  NO   Adequate UO     I&O's Detail    09 Jun 2024 07:01  -  10 Godwin 2024 07:00  --------------------------------------------------------  IN:  Total IN: 0 mL    OUT:    Voided (mL): 250 mL  Total OUT: 250 mL    Total NET: -250 mL               Discussed with:     Cultures:	        Radiology

## 2024-06-11 NOTE — PROGRESS NOTE ADULT - ASSESSMENT
79 yo M with PMHx of HTN, HLD, GERD, Atrial fibrillation, Asthma, COPD, hx of benign lung cancer R lobe s/p resection,  CAD (s/p 5 stents ~2001), and recurrent UTIs presented to the ED with dysuria.  hypotension tachycardia and leukocytosis  sepsis gn bacteremia gu etiology  left renal stone     RECOMMENDATIONS  ESBL Bacteremia   Culture - Blood (06.07.24 @ 09:45) : Escherichia coli ESBL  rpt Culture - Blood collected 6/9 am NGTD past 48 hours  Urine culture -NGTD  no clear source, pt did have dysuria, noted pyuria, recurrent UTIs but as noted NGTD on urine culture, working diagnosis is prostatitis so   Ertapenem started 6/7 -  rec-Ertapenem 1 gram IV daily x 28 days so 7/4 as last day  weekly labs faxed to [phone]  office number [phone]  Midline ordered and can be removed at end of Rx-nl care    can dc when midline  in place and home abx arranged    Leukocytosis improving    Thank you for consulting us and involving us in the management of this most interesting and challenging case.  We will follow along in the care of this patient. Please call us at [phone] or text me directly on my cell# at [phone] with any concerns.

## 2024-06-11 NOTE — PROGRESS NOTE ADULT - SUBJECTIVE AND OBJECTIVE BOX
[facility] of [facility]FECTIOUS DISEASE  Cesar Marc MD PhD, Carmen Rebolledo MD, Mary Lopez MD, Jose Elias Urena MD, Ryan Guevara MD  and providing coverage with Oscar Sauer MD  Providing Infectious Disease Consultations at [facility], [facility], [facility]    Office# [phone] to schedule follow up appointments  Answering Service for urgent calls or New Consults [phone]  Cell# to text for urgent issues Cesar Marc [phone]     infectious diseases progress note:    BASILIO LANDRY is a 78y y. o. Male patient    Overnight and events of the last 24hrs reviewed    Allergies    No Known Allergies    Intolerances        ANTIBIOTICS/RELEVANT:  antimicrobials  ertapenem  IVPB 1000 milliGRAM(s) IV Intermittent every 24 hours    immunologic:    OTHER:  acetaminophen     Tablet .. 650 milliGRAM(s) Oral every 6 hours PRN  albuterol    90 MICROgram(s) HFA Inhaler 2 Puff(s) Inhalation every 6 hours PRN  albuterol/ipratropium for Nebulization 3 milliLiter(s) Nebulizer every 6 hours  aluminum hydroxide/magnesium hydroxide/simethicone Suspension 30 milliLiter(s) Oral every 4 hours PRN  ascorbic acid 500 milliGRAM(s) Oral daily  aspirin  chewable 81 milliGRAM(s) Oral daily  atorvastatin 80 milliGRAM(s) Oral at bedtime  budesonide 160 MICROgram(s)/formoterol 4.5 MICROgram(s) Inhaler 2 Puff(s) Inhalation two times a day  cholecalciferol 1000 Unit(s) Oral at bedtime  clopidogrel Tablet 75 milliGRAM(s) Oral daily  cyanocobalamin 1000 MICROGram(s) Oral daily  enoxaparin Injectable 40 milliGRAM(s) SubCutaneous every 24 hours  melatonin 3 milliGRAM(s) Oral at bedtime PRN  ondansetron Injectable 4 milliGRAM(s) IV Push every 8 hours PRN  pantoprazole    Tablet 40 milliGRAM(s) Oral before breakfast  predniSONE   Tablet 20 milliGRAM(s) Oral daily  pyridoxine 100 milliGRAM(s) Oral at bedtime  saccharomyces boulardii 250 milliGRAM(s) Oral two times a day  sodium chloride 0.9%. 1000 milliLiter(s) IV Continuous <Continuous>  tiotropium 2.5 MICROgram(s) Inhaler 2 Puff(s) Inhalation daily  zinc sulfate 220 milliGRAM(s) Oral daily      Objective:  Vital Signs Last 24 Hrs  T(C): 36.6 (11 Jun 2024 11:15), Max: 36.6 (11 Jun 2024 11:15)  T(F): 97.8 (11 Jun 2024 11:15), Max: 97.8 (11 Jun 2024 11:15)  HR: 85 (11 Jun 2024 11:15) (56 - 92)  BP: 136/71 (11 Jun 2024 11:15) (124/53 - 153/65)  BP(mean): --  RR: 16 (11 Jun 2024 11:15) (16 - 17)  SpO2: 92% (11 Jun 2024 11:15) (92% - 98%)    Parameters below as of 11 Jun 2024 11:15  Patient On (Oxygen Delivery Method): room air        T(C): 36.6 (06-11-24 @ 11:15), Max: 36.7 (06-09-24 @ 12:26)  T(C): 36.6 (06-11-24 @ 11:15), Max: 36.9 (06-08-24 @ 20:18)  T(C): 36.6 (06-11-24 @ 11:15), Max: 37 (06-07-24 @ 11:45)    PHYSICAL EXAM:  HEENT: NC atraumatic  Neck: supple  Respiratory: no accessory muscle use, breathing comfortably  Cardiovascular: distant  Gastrointestinal: normal appearing, nondistended  Extremities: no clubbing, no cyanosis,        LABS:                          13.9   7.99  )-----------( 179      ( 11 Jun 2024 07:40 )             41.4       WBC  7.99 06-11 @ 07:40  11.65 06-10 @ 07:10  19.00 06-09 @ 07:58  32.46 06-08 @ 08:05  16.61 06-07 @ 10:00      06-11    143  |  110<H>  |  16  ----------------------------<  93  3.6   |  29  |  0.66    Ca    9.0      11 Jun 2024 07:40        Creatinine: 0.66 mg/dL (06-11-24 @ 07:40)  Creatinine: 0.70 mg/dL (06-10-24 @ 07:10)  Creatinine: 0.75 mg/dL (06-09-24 @ 07:58)  Creatinine: 1.10 mg/dL (06-07-24 @ 18:30)  Creatinine: 1.10 mg/dL (06-07-24 @ 10:00)        Urinalysis Basic - ( 11 Jun 2024 07:40 )    Color: x / Appearance: x / SG: x / pH: x  Gluc: 93 mg/dL / Ketone: x  / Bili: x / Urobili: x   Blood: x / Protein: x / Nitrite: x   Leuk Esterase: x / RBC: x / WBC x   Sq Epi: x / Non Sq Epi: x / Bacteria: x            INFLAMMATORY MARKERS      MICROBIOLOGY:    Culture - Blood (06.09.24 @ 08:04)    Specimen Source: .Blood Blood-Peripheral   Culture Results:   No growth at 24 hours    Culture - Blood (06.07.24 @ 09:45)    -  CTX-M Resistance Marker: Detec   -  ESBL: Detec   -  Escherichia coli: Detec   Gram Stain:   Growth in anaerobic bottle: Gram Negative Rods   -  Gentamicin: S <=2   -  Imipenem: S <=1   -  Levofloxacin: R >4   -  Meropenem: S <=1   -  Piperacillin/Tazobactam: R <=8   -  Tobramycin: S <=2   -  Trimethoprim/Sulfamethoxazole: R >2/38   -  Ampicillin/Sulbactam: R 8/4   -  Aztreonam: R 8   -  Cefazolin: R >16   -  Cefepime: R >16   -  Ceftriaxone: R >32   -  Ciprofloxacin: R >2   -  Ampicillin: R >16 These ampicillin results predict results for amoxicillin   -  Ertapenem: S <=0.5   Specimen Source: .Blood Blood-Peripheral   Organism: Blood Culture PCR   Organism: Escherichia coli ESBL   Culture Results:   Growth in anaerobic bottle: Escherichia coli ESBL  Direct identification is available within approximately 3-5  hours either by Blood Panel Multiplexed PCR or Direct  MALDI-TOF. Details: [email or web address]   Organism Identification: Blood Culture PCR  Escherichia coli ESBL   Method Type: PCR   Method Type: EFRAÍN        RADIOLOGY & ADDITIONAL STUDIES:

## 2024-06-12 ENCOUNTER — TRANSCRIPTION ENCOUNTER (OUTPATIENT)
Age: 79
End: 2024-06-12

## 2024-06-12 VITALS — OXYGEN SATURATION: 94 %

## 2024-06-12 DIAGNOSIS — A41.9 SEPSIS, UNSPECIFIED ORGANISM: ICD-10-CM

## 2024-06-12 LAB
ANION GAP SERPL CALC-SCNC: 2 MMOL/L — LOW (ref 5–17)
APTT BLD: 27.8 SEC — SIGNIFICANT CHANGE UP (ref 24.5–35.6)
BUN SERPL-MCNC: 16 MG/DL — SIGNIFICANT CHANGE UP (ref 7–23)
CALCIUM SERPL-MCNC: 8.9 MG/DL — SIGNIFICANT CHANGE UP (ref 8.5–10.1)
CHLORIDE SERPL-SCNC: 109 MMOL/L — HIGH (ref 96–108)
CO2 SERPL-SCNC: 31 MMOL/L — SIGNIFICANT CHANGE UP (ref 22–31)
CREAT SERPL-MCNC: 0.79 MG/DL — SIGNIFICANT CHANGE UP (ref 0.5–1.3)
CULTURE RESULTS: SIGNIFICANT CHANGE UP
EGFR: 92 ML/MIN/1.73M2 — SIGNIFICANT CHANGE UP
GLUCOSE SERPL-MCNC: 96 MG/DL — SIGNIFICANT CHANGE UP (ref 70–99)
HCT VFR BLD CALC: 42.3 % — SIGNIFICANT CHANGE UP (ref 39–50)
HGB BLD-MCNC: 14.2 G/DL — SIGNIFICANT CHANGE UP (ref 13–17)
INR BLD: 0.93 RATIO — SIGNIFICANT CHANGE UP (ref 0.85–1.18)
MCHC RBC-ENTMCNC: 32 PG — SIGNIFICANT CHANGE UP (ref 27–34)
MCHC RBC-ENTMCNC: 33.6 GM/DL — SIGNIFICANT CHANGE UP (ref 32–36)
MCV RBC AUTO: 95.3 FL — SIGNIFICANT CHANGE UP (ref 80–100)
NRBC # BLD: 0 /100 WBCS — SIGNIFICANT CHANGE UP (ref 0–0)
PLATELET # BLD AUTO: 190 K/UL — SIGNIFICANT CHANGE UP (ref 150–400)
POTASSIUM SERPL-MCNC: 3.7 MMOL/L — SIGNIFICANT CHANGE UP (ref 3.5–5.3)
POTASSIUM SERPL-SCNC: 3.7 MMOL/L — SIGNIFICANT CHANGE UP (ref 3.5–5.3)
PROTHROM AB SERPL-ACNC: 10.9 SEC — SIGNIFICANT CHANGE UP (ref 9.5–13)
RBC # BLD: 4.44 M/UL — SIGNIFICANT CHANGE UP (ref 4.2–5.8)
RBC # FLD: 13.3 % — SIGNIFICANT CHANGE UP (ref 10.3–14.5)
SODIUM SERPL-SCNC: 142 MMOL/L — SIGNIFICANT CHANGE UP (ref 135–145)
SPECIMEN SOURCE: SIGNIFICANT CHANGE UP
WBC # BLD: 10.01 K/UL — SIGNIFICANT CHANGE UP (ref 3.8–10.5)
WBC # FLD AUTO: 10.01 K/UL — SIGNIFICANT CHANGE UP (ref 3.8–10.5)

## 2024-06-12 PROCEDURE — 96375 TX/PRO/DX INJ NEW DRUG ADDON: CPT

## 2024-06-12 PROCEDURE — G0103: CPT

## 2024-06-12 PROCEDURE — 85730 THROMBOPLASTIN TIME PARTIAL: CPT

## 2024-06-12 PROCEDURE — 80048 BASIC METABOLIC PNL TOTAL CA: CPT

## 2024-06-12 PROCEDURE — 80053 COMPREHEN METABOLIC PANEL: CPT

## 2024-06-12 PROCEDURE — 83036 HEMOGLOBIN GLYCOSYLATED A1C: CPT

## 2024-06-12 PROCEDURE — 87186 SC STD MICRODIL/AGAR DIL: CPT

## 2024-06-12 PROCEDURE — 81001 URINALYSIS AUTO W/SCOPE: CPT

## 2024-06-12 PROCEDURE — 85610 PROTHROMBIN TIME: CPT

## 2024-06-12 PROCEDURE — 80061 LIPID PANEL: CPT

## 2024-06-12 PROCEDURE — 84630 ASSAY OF ZINC: CPT

## 2024-06-12 PROCEDURE — 85025 COMPLETE CBC W/AUTO DIFF WBC: CPT

## 2024-06-12 PROCEDURE — 82803 BLOOD GASES ANY COMBINATION: CPT

## 2024-06-12 PROCEDURE — 36415 COLL VENOUS BLD VENIPUNCTURE: CPT

## 2024-06-12 PROCEDURE — 87150 DNA/RNA AMPLIFIED PROBE: CPT

## 2024-06-12 PROCEDURE — 87040 BLOOD CULTURE FOR BACTERIA: CPT

## 2024-06-12 PROCEDURE — C1751: CPT

## 2024-06-12 PROCEDURE — 83880 ASSAY OF NATRIURETIC PEPTIDE: CPT

## 2024-06-12 PROCEDURE — 83735 ASSAY OF MAGNESIUM: CPT

## 2024-06-12 PROCEDURE — 83605 ASSAY OF LACTIC ACID: CPT

## 2024-06-12 PROCEDURE — 94640 AIRWAY INHALATION TREATMENT: CPT

## 2024-06-12 PROCEDURE — 76937 US GUIDE VASCULAR ACCESS: CPT

## 2024-06-12 PROCEDURE — 36573 INSJ PICC RS&I 5 YR+: CPT

## 2024-06-12 PROCEDURE — 82553 CREATINE MB FRACTION: CPT

## 2024-06-12 PROCEDURE — 84443 ASSAY THYROID STIM HORMONE: CPT

## 2024-06-12 PROCEDURE — 93005 ELECTROCARDIOGRAM TRACING: CPT

## 2024-06-12 PROCEDURE — 94760 N-INVAS EAR/PLS OXIMETRY 1: CPT

## 2024-06-12 PROCEDURE — 82550 ASSAY OF CK (CPK): CPT

## 2024-06-12 PROCEDURE — 71045 X-RAY EXAM CHEST 1 VIEW: CPT

## 2024-06-12 PROCEDURE — 36410 VNPNXR 3YR/> PHY/QHP DX/THER: CPT

## 2024-06-12 PROCEDURE — 71275 CT ANGIOGRAPHY CHEST: CPT | Mod: MC

## 2024-06-12 PROCEDURE — 84100 ASSAY OF PHOSPHORUS: CPT

## 2024-06-12 PROCEDURE — 83690 ASSAY OF LIPASE: CPT

## 2024-06-12 PROCEDURE — 87077 CULTURE AEROBIC IDENTIFY: CPT

## 2024-06-12 PROCEDURE — 76937 US GUIDE VASCULAR ACCESS: CPT | Mod: 26

## 2024-06-12 PROCEDURE — 99285 EMERGENCY DEPT VISIT HI MDM: CPT

## 2024-06-12 PROCEDURE — 0225U NFCT DS DNA&RNA 21 SARSCOV2: CPT

## 2024-06-12 PROCEDURE — 87086 URINE CULTURE/COLONY COUNT: CPT

## 2024-06-12 PROCEDURE — 96365 THER/PROPH/DIAG IV INF INIT: CPT

## 2024-06-12 PROCEDURE — 96367 TX/PROPH/DG ADDL SEQ IV INF: CPT

## 2024-06-12 PROCEDURE — 85027 COMPLETE CBC AUTOMATED: CPT

## 2024-06-12 PROCEDURE — 74177 CT ABD & PELVIS W/CONTRAST: CPT | Mod: MC

## 2024-06-12 PROCEDURE — 97162 PT EVAL MOD COMPLEX 30 MIN: CPT

## 2024-06-12 PROCEDURE — 84484 ASSAY OF TROPONIN QUANT: CPT

## 2024-06-12 RX ORDER — NITROFURANTOIN MACROCRYSTAL 50 MG
1 CAPSULE ORAL
Refills: 0 | DISCHARGE

## 2024-06-12 RX ORDER — ERTAPENEM SODIUM 1 G/1
1000 INJECTION, POWDER, LYOPHILIZED, FOR SOLUTION INTRAMUSCULAR; INTRAVENOUS
Qty: 0 | Refills: 0 | DISCHARGE
Start: 2024-06-12

## 2024-06-12 RX ORDER — BUDESONIDE, MICRONIZED 100 %
0.5 POWDER (GRAM) MISCELLANEOUS EVERY 12 HOURS
Refills: 0 | Status: DISCONTINUED | OUTPATIENT
Start: 2024-06-12 | End: 2024-06-12

## 2024-06-12 RX ORDER — POTASSIUM CHLORIDE 20 MEQ
40 PACKET (EA) ORAL ONCE
Refills: 0 | Status: COMPLETED | OUTPATIENT
Start: 2024-06-12 | End: 2024-06-12

## 2024-06-12 RX ORDER — ERTAPENEM SODIUM 1 G/1
1000 INJECTION, POWDER, LYOPHILIZED, FOR SOLUTION INTRAMUSCULAR; INTRAVENOUS ONCE
Refills: 0 | Status: COMPLETED | OUTPATIENT
Start: 2024-06-12 | End: 2024-06-12

## 2024-06-12 RX ORDER — SACCHAROMYCES BOULARDII 250 MG
1 POWDER IN PACKET (EA) ORAL
Qty: 60 | Refills: 0
Start: 2024-06-12 | End: 2024-07-11

## 2024-06-12 RX ORDER — ALBUTEROL 90 UG/1
2.5 AEROSOL, METERED ORAL ONCE
Refills: 0 | Status: COMPLETED | OUTPATIENT
Start: 2024-06-12 | End: 2024-06-12

## 2024-06-12 RX ADMIN — PANTOPRAZOLE SODIUM 40 MILLIGRAM(S): 20 TABLET, DELAYED RELEASE ORAL at 06:26

## 2024-06-12 RX ADMIN — Medication 81 MILLIGRAM(S): at 11:25

## 2024-06-12 RX ADMIN — ZINC SULFATE TAB 220 MG (50 MG ZINC EQUIVALENT) 220 MILLIGRAM(S): 220 (50 ZN) TAB at 11:26

## 2024-06-12 RX ADMIN — Medication 3 MILLILITER(S): at 07:21

## 2024-06-12 RX ADMIN — Medication 3 MILLILITER(S): at 12:34

## 2024-06-12 RX ADMIN — PREGABALIN 1000 MICROGRAM(S): 225 CAPSULE ORAL at 11:25

## 2024-06-12 RX ADMIN — Medication 0.5 MILLIGRAM(S): at 08:01

## 2024-06-12 RX ADMIN — ERTAPENEM SODIUM 120 MILLIGRAM(S): 1 INJECTION, POWDER, LYOPHILIZED, FOR SOLUTION INTRAMUSCULAR; INTRAVENOUS at 13:44

## 2024-06-12 RX ADMIN — Medication 250 MILLIGRAM(S): at 06:26

## 2024-06-12 RX ADMIN — CLOPIDOGREL BISULFATE 75 MILLIGRAM(S): 75 TABLET, FILM COATED ORAL at 11:26

## 2024-06-12 RX ADMIN — Medication 20 MILLIGRAM(S): at 06:26

## 2024-06-12 RX ADMIN — Medication 40 MILLIEQUIVALENT(S): at 11:25

## 2024-06-12 RX ADMIN — Medication 500 MILLIGRAM(S): at 11:25

## 2024-06-12 NOTE — PROGRESS NOTE ADULT - PROBLEM SELECTOR PLAN 6
- Hx of 5 stents   - Continue home Aspirin and Plavix

## 2024-06-12 NOTE — PROGRESS NOTE ADULT - SUBJECTIVE AND OBJECTIVE BOX
Date/Time Patient Seen:  		  Referring MD:   Data Reviewed	       Patient is a 78y old  Male who presents with a chief complaint of Sepsis (11 Jun 2024 11:33)      Subjective/HPI     PAST MEDICAL & SURGICAL HISTORY:  Chronic obstructive pulmonary disease  Asthma/copd    Asthma    Stented coronary artery  2000, 2006 , 2016 and April 2023, total of 6 JESSE stents    HTN (hypertension)    HLD (hyperlipidemia)    GERD (gastroesophageal reflux disease)    Nephrolithiasis  s/p Lithotripsy    GI bleed  while on Antiplatelets    Umbilical hernia with obstruction, without gangrene    2019 novel coronavirus disease (COVID-19)  DX 11/4/2022    History of atrial fibrillation    Implantable loop recorder present    Frequent UTI    Chronic atrial fibrillation    Lung tumor  Rt Lung tumor resection,benign    S/P primary angioplasty with coronary stent    S/P TURP          Medication list         MEDICATIONS  (STANDING):  albuterol/ipratropium for Nebulization 3 milliLiter(s) Nebulizer every 6 hours  ascorbic acid 500 milliGRAM(s) Oral daily  aspirin  chewable 81 milliGRAM(s) Oral daily  atorvastatin 80 milliGRAM(s) Oral at bedtime  budesonide 160 MICROgram(s)/formoterol 4.5 MICROgram(s) Inhaler 2 Puff(s) Inhalation two times a day  cholecalciferol 1000 Unit(s) Oral at bedtime  clopidogrel Tablet 75 milliGRAM(s) Oral daily  cyanocobalamin 1000 MICROGram(s) Oral daily  enoxaparin Injectable 40 milliGRAM(s) SubCutaneous every 24 hours  ertapenem  IVPB 1000 milliGRAM(s) IV Intermittent every 24 hours  pantoprazole    Tablet 40 milliGRAM(s) Oral before breakfast  predniSONE   Tablet 20 milliGRAM(s) Oral daily  pyridoxine 100 milliGRAM(s) Oral at bedtime  saccharomyces boulardii 250 milliGRAM(s) Oral two times a day  sodium chloride 0.9%. 1000 milliLiter(s) (75 mL/Hr) IV Continuous <Continuous>  tiotropium 2.5 MICROgram(s) Inhaler 2 Puff(s) Inhalation daily  zinc sulfate 220 milliGRAM(s) Oral daily    MEDICATIONS  (PRN):  acetaminophen     Tablet .. 650 milliGRAM(s) Oral every 6 hours PRN Temp greater or equal to 38C (100.4F), Mild Pain (1 - 3)  albuterol    90 MICROgram(s) HFA Inhaler 2 Puff(s) Inhalation every 6 hours PRN for shortness of breath and/or wheezing  aluminum hydroxide/magnesium hydroxide/simethicone Suspension 30 milliLiter(s) Oral every 4 hours PRN Dyspepsia  melatonin 3 milliGRAM(s) Oral at bedtime PRN Insomnia  ondansetron Injectable 4 milliGRAM(s) IV Push every 8 hours PRN Nausea and/or Vomiting         Vitals log        ICU Vital Signs Last 24 Hrs  T(C): 36.8 (12 Jun 2024 04:34), Max: 36.8 (12 Jun 2024 04:34)  T(F): 98.3 (12 Jun 2024 04:34), Max: 98.3 (12 Jun 2024 04:34)  HR: 69 (12 Jun 2024 04:34) (67 - 85)  BP: 114/68 (12 Jun 2024 04:34) (114/68 - 136/71)  BP(mean): --  ABP: --  ABP(mean): --  RR: 16 (12 Jun 2024 04:34) (16 - 17)  SpO2: 98% (12 Jun 2024 04:34) (92% - 98%)    O2 Parameters below as of 12 Jun 2024 04:34  Patient On (Oxygen Delivery Method): room air                 Input and Output:  I&O's Detail      Lab Data                        13.9   7.99  )-----------( 179      ( 11 Jun 2024 07:40 )             41.4     06-11    143  |  110<H>  |  16  ----------------------------<  93  3.6   |  29  |  0.66    Ca    9.0      11 Jun 2024 07:40              Review of Systems	      Objective     Physical Examination    heart s1s2  lung dc BS  head nc      Pertinent Lab findings & Imaging      Agusto:  NO   Adequate UO     I&O's Detail           Discussed with:     Cultures:	        Radiology

## 2024-06-12 NOTE — DISCHARGE NOTE NURSING/CASE MANAGEMENT/SOCIAL WORK - PATIENT PORTAL LINK FT
You can access the FollowMyHealth Patient Portal offered by [facility] by registering at the following website: [email or web address]. By joining [facility]’s FollowMyHealth portal, you will also be able to view your health information using other applications (apps) compatible with our system.

## 2024-06-12 NOTE — PROGRESS NOTE ADULT - ASSESSMENT
77 yo M with PMHx of HTN, HLD, GERD, Atrial fibrillation, Asthma, COPD, hx of benign lung cancer R lobe s/p resection,  CAD (s/p 5 stents ~2001), and recurrent UTIs presents to the ED with dysuria. Patient notes he began having burning with urination about 3 days ago. He has Augmentin prescribed by his Urologist Dr. Sena as needed for when he begins to feel dysuria, however, patient did not take the antibiotics.    abnormal chest imaging  copd  emphysema  HLD  HTN  OP  OA  AF  hx of Lung Surgery -   CAD    VS noted  on abx    ct chest imaging reviewed - unclear correlation - etiol - will benefit from rpt imaging in 6 - 8 weeks and follow up with his outpatient Pulm MD - Dr Dang in [place] - [facility]  eval for Acute Infection   emp ABX s/p  Biomarkers and Cx -   ID eval  copd - asthma hx - nebs - inhalers and short course of Prednisone  o2 support as needed  goal sat > 88 pct  cvs rx regimen  BP control  on Anti Platelet rx regimen  nutrition  - dietary discretion  emotional support  anxiolysis  seasonal vaccination counseling

## 2024-06-12 NOTE — PROGRESS NOTE ADULT - PROBLEM SELECTOR PLAN 8
- Home medication of Esomeprazole   - Continue with therapeutic interchange

## 2024-06-12 NOTE — PROGRESS NOTE ADULT - ASSESSMENT
79 yo M with PMHx of HTN, HLD, GERD, Atrial fibrillation, Asthma, COPD, hx of benign lung cancer R lobe s/p resection,  CAD (s/p 5 stents ~2001), and recurrent UTIs presented to the ED with dysuria.  hypotension tachycardia and leukocytosis  sepsis gn bacteremia gu etiology  left renal stone     RECOMMENDATIONS  ESBL Bacteremia   Culture - Blood (06.07.24 @ 09:45) : Escherichia coli ESBL  rpt Culture - Blood collected 6/9 am NGTD past 48 hours  Urine culture -NGTD  no clear source, pt did have dysuria, noted pyuria, recurrent UTIs but as noted NGTD on urine culture, working diagnosis is prostatitis so   Ertapenem started 6/7 -(last in hospital dose today at noon  -Ertapenem 1 gram IV daily x 28 days so 7/4 as last day  weekly labs faxed to [phone]  office number [phone]  Midline care and can be removed at end of Rx-nl   [facility]    Leukocytosis improving    From an ID standpoint no further requirement for inpatient status for the management of ID issues. Fine with discharge from ID standpoint when other medical issues no longer require inpatient care and social issues allow for a safe discharge plan. To schedule an outpatient ID follow up appointment please call our office at [phone]      Thank you for consulting us and involving us in the management of this most interesting and challenging case.  We will follow along in the care of this patient. Please call us at [phone] or text me directly on my cell# at [phone] with any concerns.

## 2024-06-12 NOTE — PROGRESS NOTE ADULT - PROBLEM SELECTOR PLAN 2
-AC on chronic hypoxic respiratory Failure    Persistent SOB on admission requiring supplementation; on home O2, 2L at night   - Home medications of Albuterol inhalers, Budesonide inhaler, Symbicort, Spiriva, Prednisone 5mg   - CXR: no active disease   - CT Chest PE: No pulmonary embolus  - Wean daytime O2 as tolerated, SPO2 goal 88-94%   - Solumedrol 125 mg IVP x1, Duoneb x3 in ED  - Continue home inhalers  - prednisone 20mg 5 day course COMPLETE  - Duoneb q 6 hrs   - Pulm consulted, Dr Bunch, recs appreciated -AC on chronic hypoxic respiratory Failure    Persistent SOB on admission requiring supplementation; on home O2, 2L at night   - Home medications of Albuterol inhalers, Budesonide inhaler, Symbicort, Spiriva, Prednisone 5mg   - CXR: no active disease   - CT Chest PE: No pulmonary embolus  - Wean daytime O2 as tolerated, SPO2 goal 88-94%   - Solumedrol 125 mg IVP x1, Duoneb x3 in ED  - Continue home inhalers  - prednisone 20mg 5 day course COMPLETE  - Duoneb q 6 hrs   - Pulm consulted, Dr Bunch, -stable follow with Dr Dang as out pt.

## 2024-06-12 NOTE — PROGRESS NOTE ADULT - PROBLEM SELECTOR PLAN 1
- Severe Sepsis on admission: WBC 16.61, hypotension, Fever  tachycardia, tachypnea, Likely 2/2 UTI  Nl Lactate   - UA: Cloudy, Protein 300, Leukocyte esterase Large, Blood small, WBC too numerous to count, occasional bacteria   - CT Abdomen and pelvis: No acute abnormality in the abdomen or pelvis  - Frequent UTIs with prophylactic Macrobid , leukocytosis IMPROVING  - BCx = ESBL e. coli  - f/u repeat BCx = NGTD  - UCx: normal urogenital cathi  - Continue with Ertapenem 1gm IVPB daily -last date 7/4/24  - CBC in AM , PRN Tylenol for fever  - Tolerating PO, continue DASH diet   - Infectious Disease - Dr. Marc, - follow up -continue IV Invanz 1 gm daily .4 weeks Abx -28 days  - plan for midline 6/12 and to give pt duonebs ~30min before procedure - Severe Sepsis on admission: WBC 16.61, hypotension, Fever  tachycardia, tachypnea, Likely 2/2 UTI  Nl Lactate   - UA: Cloudy, Protein 300, Leukocyte esterase Large, Blood small, WBC too numerous to count, occasional bacteria   - CT Abdomen and pelvis: No acute abnormality in the abdomen or pelvis  - Frequent UTIs with prophylactic Macrobid , leukocytosis IMPROVING  - BCx = ESBL e. coli  - f/u repeat BCx = NGTD  - UCx: normal urogenital cathi  - Continue with Ertapenem 1gm IVPB daily -last date 7/4/24  - CBC in AM , PRN Tylenol for fever  - Tolerating PO, continue DASH diet   - Infectious Disease - Dr. Marc, - follow up -continue IV Invanz 1 gm daily .4 weeks Abx -28 days  - plan for midline 6/12 and to give pt albuterol nebulizer ~30min before procedure Severe Sepsis on admission: WBC 16.61, hypotension, Fever  tachycardia, tachypnea, Likely 2/2 UTI vs prostatitis   - UA: Cloudy, Protein 300, Leukocyte esterase Large, Blood small, WBC too numerous to count, occasional bacteria   - CT Abdomen and pelvis: No acute abnormality in the abdomen or pelvis  - Frequent UTIs with prophylactic Macrobid , leukocytosis IMPROVING  - BCx = ESBL e. coli  - f/u repeat BCx = NGTD  - UCx: normal urogenital cathi  - Continue with Ertapenem 1gm IVPB daily -last date 7/4/24  - CBC in AM , PRN Tylenol for fever  - Tolerating PO, continue DASH diet   - Infectious Disease - Dr. Marc, - follow up -continue IV Invanz 1 gm daily .4 weeks Abx -28 days  - plan for midline 6/12 and to give pt albuterol nebulizer ~30min before procedure Severe Sepsis on admission: WBC 16.61, hypotension, Fever  tachycardia, tachypnea, Likely 2/2 UTI vs prostatitis   - UA: Cloudy, Protein 300, Leukocyte esterase Large, Blood small, WBC too numerous to count, occasional bacteria   - CT Abdomen and pelvis: No acute abnormality in the abdomen or pelvis  - Frequent UTIs with prophylactic Macrobid , leukocytosis IMPROVING  - BCx = ESBL e. coli, Repeat Blood culture -NEG   - f/u repeat BCx = NGTD  - UCx: normal urogenital cathi  - Continue with Ertapenem 1gm IVPB daily -last date 7/4/24  - CBC in AM , PRN Tylenol for fever  - Tolerating PO, continue DASH diet   - Infectious Disease - Dr. Marc, - follow up -continue IV Invanz 1 gm daily .4 weeks Abx -28 days  - plan for midline 6/12 and to give pt albuterol nebulizer ~30min before procedure

## 2024-06-12 NOTE — CHART NOTE - NSCHARTNOTEFT_GEN_A_CORE
Patient states his home regimen of inhalers includes BOTH budesonide (Pulmicort) 0.5mg BID and budesonide 160mcg/formoterol 4.5mcg (Symbicort) BID   This is in addition to Duoneb q6h, Spiriva 18mcg QD, and albuterol 90mcg/inh QD PRN

## 2024-06-12 NOTE — PROGRESS NOTE ADULT - PROBLEM SELECTOR PLAN 3
-H/O A Fib -Not on AC as Anemia  - EKG: Sinus Tachycardia 114 bpm, rightward axis shift   - Rate controlled in sinus @ 85 bpm on monitor s/p fluid resuscitation   - No longer on AV damion agents or anticoagulation per Cardiology, [facility] (Joe)   - HR stable

## 2024-06-12 NOTE — CASE MANAGEMENT PROGRESS NOTE - NSCMPROGRESSNOTE_GEN_ALL_CORE
Pt was cleared for transition home today. Pt had a Left arm midline placed for LTIVA until 7/4/24, 1gm daily of IV Meropenum. The pt is independent and has no other skilled needs. The pt lives with his wife and has done his own JERONIMO in the past at home. The bedside nurse is aware of the plan of care for discharge. [facility] will meet the pt in the home tomorrow @ 5PM for the daily dose. The pt's brother in law will transport the pt home this afternoon. IMM reviewed and verbalized understanding and in agreement with the DC plan for today.

## 2024-06-12 NOTE — PROGRESS NOTE ADULT - PROBLEM SELECTOR PLAN 7
- Home Rosuvastatin 20   - Continue with therapeutic interchange

## 2024-06-12 NOTE — DISCHARGE NOTE NURSING/CASE MANAGEMENT/SOCIAL WORK - NSSCNAMETXT_GEN_ALL_CORE
[facility] care for your IV antibiotics- [phone] or [phone]  Nurse to visit the day after hospital discharge. Please contact the home care agency if you have not heard from them by 12 noon on the day after your hospital discharge.

## 2024-06-12 NOTE — PROGRESS NOTE ADULT - PROVIDER SPECIALTY LIST ADULT
Internal Medicine
Infectious Disease
Infectious Disease
Pulmonology
Pulmonology
Infectious Disease
Pulmonology
Pulmonology
Internal Medicine

## 2024-06-12 NOTE — PROGRESS NOTE ADULT - PROBLEM SELECTOR PLAN 5
- Currently not on BP control   - Hypotensive in ED, 2/2 sepsis improving with volume resuscitation

## 2024-06-12 NOTE — PROGRESS NOTE ADULT - SUBJECTIVE AND OBJECTIVE BOX
[facility] DIVISION of INFECTIOUS DISEASE  Cesar Marc MD PhD, Carmen Rebolledo MD, Mary Lopez MD, Jose Elias Urena MD, Ryan Guevara MD  and providing coverage with Oscar Sauer MD  Providing Infectious Disease Consultations at [facility], [facility], [facility]'s    Office# [phone] to schedule follow up appointments  Answering Service for urgent calls or New Consults [phone]  Cell# to text for urgent issues Cesar Marc [phone]     infectious diseases progress note:    BASILIO LANDRY is a 78y y. o. Male patient    Overnight and events of the last 24hrs reviewed    Allergies    No Known Allergies    Intolerances        ANTIBIOTICS/RELEVANT:  antimicrobials  ertapenem  IVPB 1000 milliGRAM(s) IV Intermittent once    immunologic:    OTHER:  acetaminophen     Tablet .. 650 milliGRAM(s) Oral every 6 hours PRN  albuterol    0.083% 2.5 milliGRAM(s) Nebulizer once  albuterol    90 MICROgram(s) HFA Inhaler 2 Puff(s) Inhalation every 6 hours PRN  albuterol/ipratropium for Nebulization 3 milliLiter(s) Nebulizer every 6 hours  aluminum hydroxide/magnesium hydroxide/simethicone Suspension 30 milliLiter(s) Oral every 4 hours PRN  ascorbic acid 500 milliGRAM(s) Oral daily  aspirin  chewable 81 milliGRAM(s) Oral daily  atorvastatin 80 milliGRAM(s) Oral at bedtime  buDESOnide    Inhalation Suspension 0.5 milliGRAM(s) Inhalation every 12 hours  budesonide 160 MICROgram(s)/formoterol 4.5 MICROgram(s) Inhaler 2 Puff(s) Inhalation two times a day  cholecalciferol 1000 Unit(s) Oral at bedtime  clopidogrel Tablet 75 milliGRAM(s) Oral daily  cyanocobalamin 1000 MICROGram(s) Oral daily  enoxaparin Injectable 40 milliGRAM(s) SubCutaneous every 24 hours  melatonin 3 milliGRAM(s) Oral at bedtime PRN  ondansetron Injectable 4 milliGRAM(s) IV Push every 8 hours PRN  pantoprazole    Tablet 40 milliGRAM(s) Oral before breakfast  pyridoxine 100 milliGRAM(s) Oral at bedtime  saccharomyces boulardii 250 milliGRAM(s) Oral two times a day  sodium chloride 0.9%. 1000 milliLiter(s) IV Continuous <Continuous>  tiotropium 2.5 MICROgram(s) Inhaler 2 Puff(s) Inhalation daily  zinc sulfate 220 milliGRAM(s) Oral daily      Objective:  Vital Signs Last 24 Hrs  T(C): 36.4 (12 Jun 2024 11:14), Max: 36.8 (12 Jun 2024 04:34)  T(F): 97.5 (12 Jun 2024 11:14), Max: 98.3 (12 Jun 2024 04:34)  HR: 88 (12 Jun 2024 11:14) (68 - 88)  BP: 118/76 (12 Jun 2024 11:14) (114/68 - 127/67)  BP(mean): --  RR: 16 (12 Jun 2024 11:14) (16 - 17)  SpO2: 92% (12 Jun 2024 11:14) (92% - 98%)    Parameters below as of 12 Jun 2024 11:14  Patient On (Oxygen Delivery Method): room air        T(C): 36.4 (06-12-24 @ 11:14), Max: 36.8 (06-12-24 @ 04:34)  T(C): 36.4 (06-12-24 @ 11:14), Max: 36.8 (06-12-24 @ 04:34)  T(C): 36.4 (06-12-24 @ 11:14), Max: 36.9 (06-08-24 @ 20:18)    PHYSICAL EXAM:  HEENT: NC atraumatic  Neck: supple  Respiratory: no accessory muscle use, breathing comfortably  Cardiovascular: distant  Gastrointestinal: normal appearing, nondistended  Extremities: no clubbing, no cyanosis,        LABS:                          14.2   10.01 )-----------( 190      ( 12 Jun 2024 07:18 )             42.3       WBC  10.01 06-12 @ 07:18  7.99 06-11 @ 07:40  11.65 06-10 @ 07:10  19.00 06-09 @ 07:58  32.46 06-08 @ 08:05  16.61 06-07 @ 10:00      06-12    142  |  109<H>  |  16  ----------------------------<  96  3.7   |  31  |  0.79    Ca    8.9      12 Jun 2024 07:18        Creatinine: 0.79 mg/dL (06-12-24 @ 07:18)  Creatinine: 0.66 mg/dL (06-11-24 @ 07:40)  Creatinine: 0.70 mg/dL (06-10-24 @ 07:10)  Creatinine: 0.75 mg/dL (06-09-24 @ 07:58)  Creatinine: 1.10 mg/dL (06-07-24 @ 18:30)  Creatinine: 1.10 mg/dL (06-07-24 @ 10:00)      PT/INR - ( 12 Jun 2024 07:18 )   PT: 10.9 sec;   INR: 0.93 ratio         PTT - ( 12 Jun 2024 07:18 )  PTT:27.8 sec  Urinalysis Basic - ( 12 Jun 2024 07:18 )    Color: x / Appearance: x / SG: x / pH: x  Gluc: 96 mg/dL / Ketone: x  / Bili: x / Urobili: x   Blood: x / Protein: x / Nitrite: x   Leuk Esterase: x / RBC: x / WBC x   Sq Epi: x / Non Sq Epi: x / Bacteria: x            INFLAMMATORY MARKERS      MICROBIOLOGY:              RADIOLOGY & ADDITIONAL STUDIES:

## 2024-06-12 NOTE — PROGRESS NOTE ADULT - SUBJECTIVE AND OBJECTIVE BOX
Patient is a 78y old  Male who presents with a chief complaint of Sepsis (12 Jun 2024 05:36)    HPI:  77 yo M with PMHx of HTN, HLD, GERD, Atrial fibrillation, Asthma, COPD, hx of benign lung cancer R lobe s/p resection,  CAD (s/p 5 stents ~2001), and recurrent UTIs presents to the ED with dysuria. Patient notes he began having burning with urination about 3 days ago. He has Augmentin prescribed by his Urologist Dr. Sena as needed for when he begins to feel dysuria, however, patient did not take the antibiotics. Then this AM, he woke up around 3:00AM with rigors, SOB and nausea. Patient tried to do a home breathing treatment but had 1 episode of vomiting. He attempted to go back to sleep, but started to become a little confused on the time of the day. Patient felt it was necessary for him to come to ED for evaluation.     ED Course:   Vitals: BP: 83/46, HR: 114, Temp: 99.7, RR: 18, SpO2: 90% on RA    Labs: WBC 16.61, CKMB 3.8, Trop 11.4, proBNP 49, RVP negative   UA: Cloudy, Protein 300, Leku esterase Large, Blood small, WBC too numerous to count, occasional bacteria   EKG: Sinus Tachycardia 114 bpm, rightward axis shift   Received in the ED: Ofirmev x1, Solumedrol 125 mg IVP x1, Zosyn 3.375 mg IVPB, Vancomycin 1g IVPB, 2L NS bolus, Duoneb x3     Imaging:  CXR: no active disease   CT Chest PE: No pulmonary embolus.  CT Abdomen and pelvis: No acute abnormality in the abdomen or pelvis.     (07 Jun 2024 17:15)    INTERVAL HPI:  6/7 - admitted  6/8 - Pt was seen and examined at bedside. Pt states that he feels well and denies dysuria. Pt denies headache, dizziness, lightheadedness, fever, chills, body aches, CP, SOB, palpitations, abdominal pain, n/v, numbness/tingling. No other complaints at this time.  ESBL E COLI Bacteremia ,LEUKOCYTOSIS ,On IV Abx   6/9 - Pt was seen and examined at bedside. Pt states that he still feels well and continues to deny dysuria. Patient also endorsed an episode of loose stool but not diarrhea. Pt denies headache, dizziness, lightheadedness, fever, chills, body aches, CP, SOB, palpitations, abdominal pain, n/v, numbness/tingling. No other complaints at this time.   6/10 -  Pt was seen and examined at bedside. Pt states that he feels comfortable without day time O2 and feels well. Pt denies headache, dizziness, lightheadedness, fever, chills, body aches, CP, SOB, palpitations, abdominal pain, n/v, numbness/tingling. No other complaints at this time. on IV Invanz daily  6/11 - Pt was seen and examined at bedside. Pt has no complaints at this time. Pt denies headache, dizziness, lightheadedness, fever, chills, body aches, CP, SOB, palpitations, abdominal pain, n/v, numbness/tingling. No other complaints at this time. Plan for MID line    6/12 - Pt was seen and examined at bedside. Pt states that he is frustrated that he is not getting his usual inhaler and patient reported increased difficulty breathing. Patient also states he usually has more SOB when he lays flat and would need duonebs ~30min before the IR procedure. Pt denies headache, dizziness, lightheadedness, fever, chills, body aches, CP, palpitations, abdominal pain, n/v, numbness/tingling. No other complaints at this time.     OVERNIGHT EVENTS: No acute overnight events.     Home Medications:  albuterol 90 mcg/inh inhalation aerosol: 2 puff(s) inhaled once a day as needed for  shortness of breath and/or wheezing (07 Jun 2024 17:45)  ascorbic acid 500 mg oral tablet: 1 orally once a day (in the morning) (07 Jun 2024 17:52)  aspirin 81 mg oral tablet: 1 orally once a day (in the morning) (07 Jun 2024 17:52)  B-12 500 mcg sublingual tablet: 1 sublingually once a day (at bedtime) (07 Jun 2024 17:52)  budesonide 0.5 mg/2 mL inhalation suspension: 2 puff(s) by nebulizer 2 times a day as needed for  shortness of breath and/or wheezing (07 Jun 2024 17:47)  cholecalciferol 25 mcg (1000 intl units) oral tablet: 1 orally once a day (at bedtime) (07 Jun 2024 17:52)  clopidogrel 75 mg oral tablet: 1 orally once a day (in the morning) (07 Jun 2024 17:52)  esomeprazole 40 mg oral delayed release capsule: 1 orally once a day (in the morning) (07 Jun 2024 17:52)  ipratropium-albuterol 0.5 mg-2.5 mg/3 mL inhalation solution: 3 puff(s) by nebulizer every 6 hours as needed for  shortness of breath and/or wheezing (07 Jun 2024 17:49)  nitrofurantoin macrocrystals 100 mg oral capsule: 1 orally once a day (at bedtime) (07 Jun 2024 17:52)  predniSONE 5 mg oral tablet: 1 tab(s) orally once a day as needed for  shortness of breath and/or wheezing (07 Jun 2024 17:51)  rosuvastatin 20 mg oral tablet: 1 orally once a day (at bedtime) (07 Jun 2024 17:52)  Spiriva 18 mcg inhalation capsule: 1 inhaled once a day (in the morning) (07 Jun 2024 17:52)  Symbicort 160 mcg-4.5 mcg/inh inhalation aerosol: 2 puff(s) inhaled 2 times a day (07 Jun 2024 17:47)  Vitamin B6 100 mg oral tablet: 1 orally once a day (at bedtime) (07 Jun 2024 17:52)  zinc (as gluconate) 50 mg oral tablet: orally once a day (in the morning) (07 Jun 2024 17:52)      MEDICATIONS  (STANDING):  albuterol/ipratropium for Nebulization 3 milliLiter(s) Nebulizer every 6 hours  ascorbic acid 500 milliGRAM(s) Oral daily  aspirin  chewable 81 milliGRAM(s) Oral daily  atorvastatin 80 milliGRAM(s) Oral at bedtime  buDESOnide    Inhalation Suspension 0.5 milliGRAM(s) Inhalation every 12 hours  budesonide 160 MICROgram(s)/formoterol 4.5 MICROgram(s) Inhaler 2 Puff(s) Inhalation two times a day  cholecalciferol 1000 Unit(s) Oral at bedtime  clopidogrel Tablet 75 milliGRAM(s) Oral daily  cyanocobalamin 1000 MICROGram(s) Oral daily  enoxaparin Injectable 40 milliGRAM(s) SubCutaneous every 24 hours  ertapenem  IVPB 1000 milliGRAM(s) IV Intermittent every 24 hours  pantoprazole    Tablet 40 milliGRAM(s) Oral before breakfast  potassium chloride   Powder 40 milliEquivalent(s) Oral once  pyridoxine 100 milliGRAM(s) Oral at bedtime  saccharomyces boulardii 250 milliGRAM(s) Oral two times a day  sodium chloride 0.9%. 1000 milliLiter(s) (75 mL/Hr) IV Continuous <Continuous>  tiotropium 2.5 MICROgram(s) Inhaler 2 Puff(s) Inhalation daily  zinc sulfate 220 milliGRAM(s) Oral daily    MEDICATIONS  (PRN):  acetaminophen     Tablet .. 650 milliGRAM(s) Oral every 6 hours PRN Temp greater or equal to 38C (100.4F), Mild Pain (1 - 3)  albuterol    90 MICROgram(s) HFA Inhaler 2 Puff(s) Inhalation every 6 hours PRN for shortness of breath and/or wheezing  aluminum hydroxide/magnesium hydroxide/simethicone Suspension 30 milliLiter(s) Oral every 4 hours PRN Dyspepsia  melatonin 3 milliGRAM(s) Oral at bedtime PRN Insomnia  ondansetron Injectable 4 milliGRAM(s) IV Push every 8 hours PRN Nausea and/or Vomiting      No Known Allergies      Social History:  Lives: At home with wife   ADLs: Fully independent, no problem ambulating   Alcohol Use: None   Tobacco Use: Former smoker quit >10yrs ago   Recreational Drug Use: None (07 Jun 2024 17:15)      REVIEW OF SYSTEMS:  CONSTITUTIONAL: No fever, No chills, No fatigue, No myalgia, No Body ache, No Weakness  EYES: No eye pain,  No visual disturbances, No discharge, No Redness  ENMT: No ear pain, No nose bleed, No vertigo; No sinus pain, No throat pain, No Congestion  NECK: No pain, No stiffness  RESPIRATORY: No cough, No wheezing, No hemoptysis, [ X ] shortness of breath  CARDIOVASCULAR: No chest pain, No palpitations  GASTROINTESTINAL: No abdominal pain, No epigastric pain. No nausea, No vomiting, No diarrhea, No constipation; [ x ] BM-  GENITOURINARY: No dysuria, No frequency, No urgency, No hematuria, No incontinence  NEUROLOGICAL: No headaches, No dizziness, No numbness, No tingling, No tremors, No weakness  EXTREMITIES: No Swelling, No Pain, No Edema  SKIN: [ x ] No itching, burning, rashes, or lesions   MUSCULOSKELETAL: No joint pain, No joint swelling; No muscle pain, No back pain, No extremity pain  PSYCHIATRIC: No depression, No anxiety, No mood swings, No difficulty sleeping at night  PAIN SCALE: [ X ] None  [  ] Other-  ROS Unable to obtain due to: [  ] Dementia  [  ] Lethargy  [  ] Sedated  [  ] Non verbal  REST OF REVIEW OF SYSTEMS: [ x ] Normal     Vital Signs Last 24 Hrs  T(C): 36.8 (12 Jun 2024 04:34), Max: 36.8 (12 Jun 2024 04:34)  T(F): 98.3 (12 Jun 2024 04:34), Max: 98.3 (12 Jun 2024 04:34)  HR: 69 (12 Jun 2024 04:34) (67 - 85)  BP: 114/68 (12 Jun 2024 04:34) (114/68 - 136/71)  BP(mean): --  RR: 16 (12 Jun 2024 04:34) (16 - 17)  SpO2: 98% (12 Jun 2024 04:34) (92% - 98%)    Parameters below as of 12 Jun 2024 04:34  Patient On (Oxygen Delivery Method): room air        CAPILLARY BLOOD GLUCOSE          I&O's Summary    PHYSICAL EXAM:  GENERAL:  [ x ] NAD, [ x ] Well appearing, [  ] Agitated, [  ] Drowsy, [  ] Lethargy, [  ] Confused   HEAD:  [ x ] Normal, [  ] Other  EYES:  [ x ] EOMI, [ x ] PERRLA, [ x ] Conjunctiva and sclera clear normal, [  ] Other, [  ] Pallor, [  ] Discharge  ENMT:  [ x ] Normal, [ x ] Moist mucous membranes, [  ] Good dentition, [  ] No thrush  NECK:  [ x ] Supple, [  ] No JVD, [ x ] Normal thyroid, [  ] Lymphadenopathy, [  ] Other  CHEST/LUNG:  [x  ] Clear to auscultation bilaterally, [ x ] Breath Sounds decreased b/l, [  ] Poor effort, [ x ] No rales, [ x ] No rhonchi, [ x ] mild wheeze b/l  HEART:  [ x ] Regular rate and rhythm, [  ] Tachycardia, [  ] Bradycardia, [  ] Irregular, [ x ] No murmurs, No rubs, No gallops, [  ] PPM in place (Mfr:  )  ABDOMEN:  [ x ] Soft, [ x ] Nontender, [ x ] Nondistended, [ x ] No mass, [ x ] Bowel sounds present, [  ] Obese  NERVOUS SYSTEM:  [ x ] Alert & Oriented x3__, [ x ] Nonfocal, [  ] Confusion, [  ] Encephalopathic, [  ] Sedated, [  ] Unable to assess, [  ] Dementia, [  ] Other-  EXTREMITIES:  [ x ] 2+ Peripheral Pulses, No clubbing, No cyanosis,  [  ] Edema B/L lower EXT, [  ] PVD stasis skin changes B/L lower EXT, [  ] Wound  LYMPH:  No lymphadenopathy noted  SKIN:  [ x ] No rashes or lesions, [  ] Pressure ulcers, [  ] Ecchymosis, [  ] Skin tears, [  ] Other    DIET: Diet, DASH/TLC:   Sodium & Cholesterol Restricted (06-07-24 @ 18:02)      LABS:                        14.2   10.01 )-----------( 190      ( 12 Jun 2024 07:18 )             42.3     12 Jun 2024 07:18    142    |  109    |  16     ----------------------------<  96     3.7     |  31     |  0.79     Ca    8.9        12 Jun 2024 07:18      PT/INR - ( 12 Jun 2024 07:18 )   PT: 10.9 sec;   INR: 0.93 ratio         PTT - ( 12 Jun 2024 07:18 )  PTT:27.8 sec  Urinalysis Basic - ( 12 Jun 2024 07:18 )    Color: x / Appearance: x / SG: x / pH: x  Gluc: 96 mg/dL / Ketone: x  / Bili: x / Urobili: x   Blood: x / Protein: x / Nitrite: x   Leuk Esterase: x / RBC: x / WBC x   Sq Epi: x / Non Sq Epi: x / Bacteria: x      Culture Results:   No growth at 48 Hours (06-09 @ 08:04)  Culture Results:   No growth at 48 Hours (06-09 @ 07:58)  Culture Results:   <10,000 CFU/mL Normal Urogenital Yancy (06-07 @ 11:40)  Culture Results:   No growth at 4 days (06-07 @ 10:00)  Culture Results:   Growth in anaerobic bottle: Escherichia coli ESBL  Direct identification is available within approximately 3-5  hours either by Blood Panel Multiplexed PCR or Direct  MALDI-TOF. Details: [email or web address] (06-07 @ 09:45)      CARDIAC MARKERS ( 08 Jun 2024 08:05 )  x     / x     / x     / x     / 6.6 ng/mL  CARDIAC MARKERS ( 07 Jun 2024 18:30 )  x     / x     / 236 U/L / x     / 5.0 ng/mL  CARDIAC MARKERS ( 07 Jun 2024 10:00 )  x     / x     / x     / x     / 3.8 ng/mL        Culture - Blood (collected 09 Jun 2024 08:04)  Source: .Blood Blood-Peripheral  Preliminary Report (11 Jun 2024 17:01):    No growth at 48 Hours    Culture - Blood (collected 09 Jun 2024 07:58)  Source: .Blood Blood-Peripheral  Preliminary Report (11 Jun 2024 17:01):    No growth at 48 Hours    Culture - Urine (collected 07 Jun 2024 11:40)  Source: Clean Catch Clean Catch (Midstream)  Final Report (08 Jun 2024 15:55):    <10,000 CFU/mL Normal Urogenital Yancy    Culture - Blood (collected 07 Jun 2024 10:00)  Source: .Blood Blood-Peripheral  Preliminary Report (11 Jun 2024 17:01):    No growth at 4 days    Culture - Blood (collected 07 Jun 2024 09:45)  Source: .Blood Blood-Peripheral  Gram Stain (08 Jun 2024 03:07):    Growth in anaerobic bottle: Gram Negative Rods  Final Report (09 Jun 2024 14:48):    Growth in anaerobic bottle: Escherichia coli ESBL    Direct identification is available within approximately 3-5    hours either by Blood Panel Multiplexed PCR or Direct    MALDI-TOF. Details: [email or web address]  Organism: Blood Culture PCR  Escherichia coli ESBL (09 Jun 2024 14:48)  Organism: Escherichia coli ESBL (09 Jun 2024 14:48)  Organism: Blood Culture PCR (09 Jun 2024 14:48)       Anemia Panel:      Thyroid Panel:  Thyroid Stimulating Hormone, Serum: 1.06 uIU/mL (06-07-24 @ 10:00)        Lipase: 25 U/L (06-07-24 @ 10:00)          RADIOLOGY & ADDITIONAL TESTS: _______      HEALTH ISSUES - PROBLEM Dx:  UTI (urinary tract infection)    HTN (hypertension)    HLD (hyperlipidemia)    Afib    CAD (coronary artery disease)    COPD with asthma    Need for prophylactic measure    GERD (gastroesophageal reflux disease)    Cardiac enzymes elevated          Consultant(s) Notes Reviewed:  [ x ] YES     Care Discussed with [ x ] Consultants, [ x ] Patient, [ x ] Family, [  ] HCP, [ x ] , [  ] Social Service, [ x ] RN, [  ] Physical Therapy, [  ] Palliative Care Team  DVT PPX: [ X ] Lovenox, [  ] SC Heparin, [  ] Coumadin, [  ] Xarelto, [  ] Eliquis, [  ] Pradaxa, [  ] IV Heparin drip, [  ] SCD, [  ] Ambulation, [  ] Contraindicated 2/2 GI Bleed, [  ] Contraindicated 2/2  Bleed, [  ] Contraindicated 2/2 Brain Bleed  Advanced Directive: [ X ] None, [  ] DNR/DNI Patient is a 78y old  Male who presents with a chief complaint of Sepsis (12 Jun 2024 05:36)    HPI:  79 yo M with PMHx of HTN, HLD, GERD, Atrial fibrillation, Asthma, COPD, hx of benign lung cancer R lobe s/p resection,  CAD (s/p 5 stents ~2001), and recurrent UTIs presents to the ED with dysuria. Patient notes he began having burning with urination about 3 days ago. He has Augmentin prescribed by his Urologist Dr. Sena as needed for when he begins to feel dysuria, however, patient did not take the antibiotics. Then this AM, he woke up around 3:00AM with rigors, SOB and nausea. Patient tried to do a home breathing treatment but had 1 episode of vomiting. He attempted to go back to sleep, but started to become a little confused on the time of the day. Patient felt it was necessary for him to come to ED for evaluation.     ED Course:   Vitals: BP: 83/46, HR: 114, Temp: 99.7, RR: 18, SpO2: 90% on RA    Labs: WBC 16.61, CKMB 3.8, Trop 11.4, proBNP 49, RVP negative   UA: Cloudy, Protein 300, Leku esterase Large, Blood small, WBC too numerous to count, occasional bacteria   EKG: Sinus Tachycardia 114 bpm, rightward axis shift   Received in the ED: Ofirmev x1, Solumedrol 125 mg IVP x1, Zosyn 3.375 mg IVPB, Vancomycin 1g IVPB, 2L NS bolus, Duoneb x3     Imaging:  CXR: no active disease   CT Chest PE: No pulmonary embolus.  CT Abdomen and pelvis: No acute abnormality in the abdomen or pelvis.     (07 Jun 2024 17:15)    INTERVAL HPI:  6/7 - admitted  6/8 - Pt was seen and examined at bedside. Pt states that he feels well and denies dysuria. Pt denies headache, dizziness, lightheadedness, fever, chills, body aches, CP, SOB, palpitations, abdominal pain, n/v, numbness/tingling. No other complaints at this time.  ESBL E COLI Bacteremia ,LEUKOCYTOSIS ,On IV Abx   6/9 - Pt was seen and examined at bedside. Pt states that he still feels well and continues to deny dysuria. Patient also endorsed an episode of loose stool but not diarrhea. Pt denies headache, dizziness, lightheadedness, fever, chills, body aches, CP, SOB, palpitations, abdominal pain, n/v, numbness/tingling. No other complaints at this time.   6/10 -  Pt was seen and examined at bedside. Pt states that he feels comfortable without day time O2 and feels well. Pt denies headache, dizziness, lightheadedness, fever, chills, body aches, CP, SOB, palpitations, abdominal pain, n/v, numbness/tingling. No other complaints at this time. on IV Invanz daily  6/11 - Pt was seen and examined at bedside. Pt has no complaints at this time. Pt denies headache, dizziness, lightheadedness, fever, chills, body aches, CP, SOB, palpitations, abdominal pain, n/v, numbness/tingling. No other complaints at this time. Plan for MID line    6/12 - Pt was seen and examined at bedside. Pt states that he is frustrated that he is not getting his usual inhaler and patient reported increased difficulty breathing. Patient also states he usually has more SOB when he lays flat and would need a nebulizer treatment ~30min before the IR procedure. Pt denies headache, dizziness, lightheadedness, fever, chills, body aches, CP, palpitations, abdominal pain, n/v, numbness/tingling. No other complaints at this time.     OVERNIGHT EVENTS: No acute overnight events.     Home Medications:  albuterol 90 mcg/inh inhalation aerosol: 2 puff(s) inhaled once a day as needed for  shortness of breath and/or wheezing (07 Jun 2024 17:45)  ascorbic acid 500 mg oral tablet: 1 orally once a day (in the morning) (07 Jun 2024 17:52)  aspirin 81 mg oral tablet: 1 orally once a day (in the morning) (07 Jun 2024 17:52)  B-12 500 mcg sublingual tablet: 1 sublingually once a day (at bedtime) (07 Jun 2024 17:52)  budesonide 0.5 mg/2 mL inhalation suspension: 2 puff(s) by nebulizer 2 times a day as needed for  shortness of breath and/or wheezing (07 Jun 2024 17:47)  cholecalciferol 25 mcg (1000 intl units) oral tablet: 1 orally once a day (at bedtime) (07 Jun 2024 17:52)  clopidogrel 75 mg oral tablet: 1 orally once a day (in the morning) (07 Jun 2024 17:52)  esomeprazole 40 mg oral delayed release capsule: 1 orally once a day (in the morning) (07 Jun 2024 17:52)  ipratropium-albuterol 0.5 mg-2.5 mg/3 mL inhalation solution: 3 puff(s) by nebulizer every 6 hours as needed for  shortness of breath and/or wheezing (07 Jun 2024 17:49)  nitrofurantoin macrocrystals 100 mg oral capsule: 1 orally once a day (at bedtime) (07 Jun 2024 17:52)  predniSONE 5 mg oral tablet: 1 tab(s) orally once a day as needed for  shortness of breath and/or wheezing (07 Jun 2024 17:51)  rosuvastatin 20 mg oral tablet: 1 orally once a day (at bedtime) (07 Jun 2024 17:52)  Spiriva 18 mcg inhalation capsule: 1 inhaled once a day (in the morning) (07 Jun 2024 17:52)  Symbicort 160 mcg-4.5 mcg/inh inhalation aerosol: 2 puff(s) inhaled 2 times a day (07 Jun 2024 17:47)  Vitamin B6 100 mg oral tablet: 1 orally once a day (at bedtime) (07 Jun 2024 17:52)  zinc (as gluconate) 50 mg oral tablet: orally once a day (in the morning) (07 Jun 2024 17:52)      MEDICATIONS  (STANDING):  albuterol/ipratropium for Nebulization 3 milliLiter(s) Nebulizer every 6 hours  ascorbic acid 500 milliGRAM(s) Oral daily  aspirin  chewable 81 milliGRAM(s) Oral daily  atorvastatin 80 milliGRAM(s) Oral at bedtime  buDESOnide    Inhalation Suspension 0.5 milliGRAM(s) Inhalation every 12 hours  budesonide 160 MICROgram(s)/formoterol 4.5 MICROgram(s) Inhaler 2 Puff(s) Inhalation two times a day  cholecalciferol 1000 Unit(s) Oral at bedtime  clopidogrel Tablet 75 milliGRAM(s) Oral daily  cyanocobalamin 1000 MICROGram(s) Oral daily  enoxaparin Injectable 40 milliGRAM(s) SubCutaneous every 24 hours  ertapenem  IVPB 1000 milliGRAM(s) IV Intermittent every 24 hours  pantoprazole    Tablet 40 milliGRAM(s) Oral before breakfast  potassium chloride   Powder 40 milliEquivalent(s) Oral once  pyridoxine 100 milliGRAM(s) Oral at bedtime  saccharomyces boulardii 250 milliGRAM(s) Oral two times a day  sodium chloride 0.9%. 1000 milliLiter(s) (75 mL/Hr) IV Continuous <Continuous>  tiotropium 2.5 MICROgram(s) Inhaler 2 Puff(s) Inhalation daily  zinc sulfate 220 milliGRAM(s) Oral daily    MEDICATIONS  (PRN):  acetaminophen     Tablet .. 650 milliGRAM(s) Oral every 6 hours PRN Temp greater or equal to 38C (100.4F), Mild Pain (1 - 3)  albuterol    90 MICROgram(s) HFA Inhaler 2 Puff(s) Inhalation every 6 hours PRN for shortness of breath and/or wheezing  aluminum hydroxide/magnesium hydroxide/simethicone Suspension 30 milliLiter(s) Oral every 4 hours PRN Dyspepsia  melatonin 3 milliGRAM(s) Oral at bedtime PRN Insomnia  ondansetron Injectable 4 milliGRAM(s) IV Push every 8 hours PRN Nausea and/or Vomiting      No Known Allergies      Social History:  Lives: At home with wife   ADLs: Fully independent, no problem ambulating   Alcohol Use: None   Tobacco Use: Former smoker quit >10yrs ago   Recreational Drug Use: None (07 Jun 2024 17:15)      REVIEW OF SYSTEMS:  CONSTITUTIONAL: No fever, No chills, No fatigue, No myalgia, No Body ache, No Weakness  EYES: No eye pain,  No visual disturbances, No discharge, No Redness  ENMT: No ear pain, No nose bleed, No vertigo; No sinus pain, No throat pain, No Congestion  NECK: No pain, No stiffness  RESPIRATORY: No cough, No wheezing, No hemoptysis, [ X ] shortness of breath  CARDIOVASCULAR: No chest pain, No palpitations  GASTROINTESTINAL: No abdominal pain, No epigastric pain. No nausea, No vomiting, No diarrhea, No constipation; [ x ] BM-  GENITOURINARY: No dysuria, No frequency, No urgency, No hematuria, No incontinence  NEUROLOGICAL: No headaches, No dizziness, No numbness, No tingling, No tremors, No weakness  EXTREMITIES: No Swelling, No Pain, No Edema  SKIN: [ x ] No itching, burning, rashes, or lesions   MUSCULOSKELETAL: No joint pain, No joint swelling; No muscle pain, No back pain, No extremity pain  PSYCHIATRIC: No depression, No anxiety, No mood swings, No difficulty sleeping at night  PAIN SCALE: [ X ] None  [  ] Other-  ROS Unable to obtain due to: [  ] Dementia  [  ] Lethargy  [  ] Sedated  [  ] Non verbal  REST OF REVIEW OF SYSTEMS: [ x ] Normal     Vital Signs Last 24 Hrs  T(C): 36.8 (12 Jun 2024 04:34), Max: 36.8 (12 Jun 2024 04:34)  T(F): 98.3 (12 Jun 2024 04:34), Max: 98.3 (12 Jun 2024 04:34)  HR: 69 (12 Jun 2024 04:34) (67 - 85)  BP: 114/68 (12 Jun 2024 04:34) (114/68 - 136/71)  BP(mean): --  RR: 16 (12 Jun 2024 04:34) (16 - 17)  SpO2: 98% (12 Jun 2024 04:34) (92% - 98%)    Parameters below as of 12 Jun 2024 04:34  Patient On (Oxygen Delivery Method): room air        CAPILLARY BLOOD GLUCOSE          I&O's Summary    PHYSICAL EXAM:  GENERAL:  [ x ] NAD, [ x ] Well appearing, [  ] Agitated, [  ] Drowsy, [  ] Lethargy, [  ] Confused   HEAD:  [ x ] Normal, [  ] Other  EYES:  [ x ] EOMI, [ x ] PERRLA, [ x ] Conjunctiva and sclera clear normal, [  ] Other, [  ] Pallor, [  ] Discharge  ENMT:  [ x ] Normal, [ x ] Moist mucous membranes, [  ] Good dentition, [  ] No thrush  NECK:  [ x ] Supple, [  ] No JVD, [ x ] Normal thyroid, [  ] Lymphadenopathy, [  ] Other  CHEST/LUNG:  [x  ] Clear to auscultation bilaterally, [ x ] Breath Sounds decreased b/l, [  ] Poor effort, [ x ] No rales, [ x ] No rhonchi, [ x ] mild wheeze b/l  HEART:  [ x ] Regular rate and rhythm, [  ] Tachycardia, [  ] Bradycardia, [  ] Irregular, [ x ] No murmurs, No rubs, No gallops, [  ] PPM in place (Mfr:  )  ABDOMEN:  [ x ] Soft, [ x ] Nontender, [ x ] Nondistended, [ x ] No mass, [ x ] Bowel sounds present, [  ] Obese  NERVOUS SYSTEM:  [ x ] Alert & Oriented x3__, [ x ] Nonfocal, [  ] Confusion, [  ] Encephalopathic, [  ] Sedated, [  ] Unable to assess, [  ] Dementia, [  ] Other-  EXTREMITIES:  [ x ] 2+ Peripheral Pulses, No clubbing, No cyanosis,  [  ] Edema B/L lower EXT, [  ] PVD stasis skin changes B/L lower EXT, [  ] Wound  LYMPH:  No lymphadenopathy noted  SKIN:  [ x ] No rashes or lesions, [  ] Pressure ulcers, [  ] Ecchymosis, [  ] Skin tears, [  ] Other    DIET: Diet, DASH/TLC:   Sodium & Cholesterol Restricted (06-07-24 @ 18:02)      LABS:                        14.2   10.01 )-----------( 190      ( 12 Jun 2024 07:18 )             42.3     12 Jun 2024 07:18    142    |  109    |  16     ----------------------------<  96     3.7     |  31     |  0.79     Ca    8.9        12 Jun 2024 07:18      PT/INR - ( 12 Jun 2024 07:18 )   PT: 10.9 sec;   INR: 0.93 ratio         PTT - ( 12 Jun 2024 07:18 )  PTT:27.8 sec  Urinalysis Basic - ( 12 Jun 2024 07:18 )    Color: x / Appearance: x / SG: x / pH: x  Gluc: 96 mg/dL / Ketone: x  / Bili: x / Urobili: x   Blood: x / Protein: x / Nitrite: x   Leuk Esterase: x / RBC: x / WBC x   Sq Epi: x / Non Sq Epi: x / Bacteria: x      Culture Results:   No growth at 48 Hours (06-09 @ 08:04)  Culture Results:   No growth at 48 Hours (06-09 @ 07:58)  Culture Results:   <10,000 CFU/mL Normal Urogenital Yancy (06-07 @ 11:40)  Culture Results:   No growth at 4 days (06-07 @ 10:00)  Culture Results:   Growth in anaerobic bottle: Escherichia coli ESBL  Direct identification is available within approximately 3-5  hours either by Blood Panel Multiplexed PCR or Direct  MALDI-TOF. Details: [email or web address] (06-07 @ 09:45)      CARDIAC MARKERS ( 08 Jun 2024 08:05 )  x     / x     / x     / x     / 6.6 ng/mL  CARDIAC MARKERS ( 07 Jun 2024 18:30 )  x     / x     / 236 U/L / x     / 5.0 ng/mL  CARDIAC MARKERS ( 07 Jun 2024 10:00 )  x     / x     / x     / x     / 3.8 ng/mL        Culture - Blood (collected 09 Jun 2024 08:04)  Source: .Blood Blood-Peripheral  Preliminary Report (11 Jun 2024 17:01):    No growth at 48 Hours    Culture - Blood (collected 09 Jun 2024 07:58)  Source: .Blood Blood-Peripheral  Preliminary Report (11 Jun 2024 17:01):    No growth at 48 Hours    Culture - Urine (collected 07 Jun 2024 11:40)  Source: Clean Catch Clean Catch (Midstream)  Final Report (08 Jun 2024 15:55):    <10,000 CFU/mL Normal Urogenital Yancy    Culture - Blood (collected 07 Jun 2024 10:00)  Source: .Blood Blood-Peripheral  Preliminary Report (11 Jun 2024 17:01):    No growth at 4 days    Culture - Blood (collected 07 Jun 2024 09:45)  Source: .Blood Blood-Peripheral  Gram Stain (08 Jun 2024 03:07):    Growth in anaerobic bottle: Gram Negative Rods  Final Report (09 Jun 2024 14:48):    Growth in anaerobic bottle: Escherichia coli ESBL    Direct identification is available within approximately 3-5    hours either by Blood Panel Multiplexed PCR or Direct    MALDI-TOF. Details: [email or web address]  Organism: Blood Culture PCR  Escherichia coli ESBL (09 Jun 2024 14:48)  Organism: Escherichia coli ESBL (09 Jun 2024 14:48)  Organism: Blood Culture PCR (09 Jun 2024 14:48)       Anemia Panel:      Thyroid Panel:  Thyroid Stimulating Hormone, Serum: 1.06 uIU/mL (06-07-24 @ 10:00)        Lipase: 25 U/L (06-07-24 @ 10:00)          RADIOLOGY & ADDITIONAL TESTS: _______      HEALTH ISSUES - PROBLEM Dx:  UTI (urinary tract infection)    HTN (hypertension)    HLD (hyperlipidemia)    Afib    CAD (coronary artery disease)    COPD with asthma    Need for prophylactic measure    GERD (gastroesophageal reflux disease)    Cardiac enzymes elevated          Consultant(s) Notes Reviewed:  [ x ] YES     Care Discussed with [ x ] Consultants, [ x ] Patient, [ x ] Family, [  ] HCP, [ x ] , [  ] Social Service, [ x ] RN, [  ] Physical Therapy, [  ] Palliative Care Team  DVT PPX: [ X ] Lovenox, [  ] SC Heparin, [  ] Coumadin, [  ] Xarelto, [  ] Eliquis, [  ] Pradaxa, [  ] IV Heparin drip, [  ] SCD, [  ] Ambulation, [  ] Contraindicated 2/2 GI Bleed, [  ] Contraindicated 2/2  Bleed, [  ] Contraindicated 2/2 Brain Bleed  Advanced Directive: [ X ] None, [  ] DNR/DNI Patient is a 78y old  Male who presents with a chief complaint of Sepsis (12 Jun 2024 05:36)    HPI:  77 yo M with PMHx of HTN, HLD, GERD, Atrial fibrillation, Asthma, COPD, hx of benign lung cancer R lobe s/p resection,  CAD (s/p 5 stents ~2001), and recurrent UTIs presents to the ED with dysuria. Patient notes he began having burning with urination about 3 days ago. He has Augmentin prescribed by his Urologist Dr. Sena as needed for when he begins to feel dysuria, however, patient did not take the antibiotics. Then this AM, he woke up around 3:00AM with rigors, SOB and nausea. Patient tried to do a home breathing treatment but had 1 episode of vomiting. He attempted to go back to sleep, but started to become a little confused on the time of the day. Patient felt it was necessary for him to come to ED for evaluation.     ED Course:   Vitals: BP: 83/46, HR: 114, Temp: 99.7, RR: 18, SpO2: 90% on RA    Labs: WBC 16.61, CKMB 3.8, Trop 11.4, proBNP 49, RVP negative   UA: Cloudy, Protein 300, Leku esterase Large, Blood small, WBC too numerous to count, occasional bacteria   EKG: Sinus Tachycardia 114 bpm, rightward axis shift   Received in the ED: Ofirmev x1, Solumedrol 125 mg IVP x1, Zosyn 3.375 mg IVPB, Vancomycin 1g IVPB, 2L NS bolus, Duoneb x3     Imaging:  CXR: no active disease   CT Chest PE: No pulmonary embolus.  CT Abdomen and pelvis: No acute abnormality in the abdomen or pelvis.     (07 Jun 2024 17:15)    INTERVAL HPI:  6/7 - admitted  6/8 - Pt was seen and examined at bedside. Pt states that he feels well and denies dysuria. Pt denies headache, dizziness, lightheadedness, fever, chills, body aches, CP, SOB, palpitations, abdominal pain, n/v, numbness/tingling. No other complaints at this time.  ESBL E COLI Bacteremia ,LEUKOCYTOSIS ,On IV Abx   6/9 - Pt was seen and examined at bedside. Pt states that he still feels well and continues to deny dysuria. Patient also endorsed an episode of loose stool but not diarrhea. Pt denies headache, dizziness, lightheadedness, fever, chills, body aches, CP, SOB, palpitations, abdominal pain, n/v, numbness/tingling. No other complaints at this time.   6/10 -  Pt was seen and examined at bedside. Pt states that he feels comfortable without day time O2 and feels well. Pt denies headache, dizziness, lightheadedness, fever, chills, body aches, CP, SOB, palpitations, abdominal pain, n/v, numbness/tingling. No other complaints at this time. on IV Invanz daily  6/11 - Pt was seen and examined at bedside. Pt has no complaints at this time. Pt denies headache, dizziness, lightheadedness, fever, chills, body aches, CP, SOB, palpitations, abdominal pain, n/v, numbness/tingling. No other complaints at this time. Plan for MID line    6/12 - Pt was seen and examined at bedside. Pt states that he is frustrated that he is not getting his usual inhaler and patient reported increased difficulty breathing. Patient also states he usually has more SOB when he lays flat and would need a nebulizer treatment ~30min before the IR procedure. Pt denies headache, dizziness, lightheadedness, fever, chills, body aches, CP, palpitations, abdominal pain, n/v, numbness/tingling. No other complaints at this time. D/C plan today with Home ABx     OVERNIGHT EVENTS: No acute overnight events.     Home Medications:  albuterol 90 mcg/inh inhalation aerosol: 2 puff(s) inhaled once a day as needed for  shortness of breath and/or wheezing (07 Jun 2024 17:45)  ascorbic acid 500 mg oral tablet: 1 orally once a day (in the morning) (07 Jun 2024 17:52)  aspirin 81 mg oral tablet: 1 orally once a day (in the morning) (07 Jun 2024 17:52)  B-12 500 mcg sublingual tablet: 1 sublingually once a day (at bedtime) (07 Jun 2024 17:52)  budesonide 0.5 mg/2 mL inhalation suspension: 2 puff(s) by nebulizer 2 times a day as needed for  shortness of breath and/or wheezing (07 Jun 2024 17:47)  cholecalciferol 25 mcg (1000 intl units) oral tablet: 1 orally once a day (at bedtime) (07 Jun 2024 17:52)  clopidogrel 75 mg oral tablet: 1 orally once a day (in the morning) (07 Jun 2024 17:52)  esomeprazole 40 mg oral delayed release capsule: 1 orally once a day (in the morning) (07 Jun 2024 17:52)  ipratropium-albuterol 0.5 mg-2.5 mg/3 mL inhalation solution: 3 puff(s) by nebulizer every 6 hours as needed for  shortness of breath and/or wheezing (07 Jun 2024 17:49)  nitrofurantoin macrocrystals 100 mg oral capsule: 1 orally once a day (at bedtime) (07 Jun 2024 17:52)  predniSONE 5 mg oral tablet: 1 tab(s) orally once a day as needed for  shortness of breath and/or wheezing (07 Jun 2024 17:51)  rosuvastatin 20 mg oral tablet: 1 orally once a day (at bedtime) (07 Jun 2024 17:52)  Spiriva 18 mcg inhalation capsule: 1 inhaled once a day (in the morning) (07 Jun 2024 17:52)  Symbicort 160 mcg-4.5 mcg/inh inhalation aerosol: 2 puff(s) inhaled 2 times a day (07 Jun 2024 17:47)  Vitamin B6 100 mg oral tablet: 1 orally once a day (at bedtime) (07 Jun 2024 17:52)  zinc (as gluconate) 50 mg oral tablet: orally once a day (in the morning) (07 Jun 2024 17:52)      MEDICATIONS  (STANDING):  albuterol/ipratropium for Nebulization 3 milliLiter(s) Nebulizer every 6 hours  ascorbic acid 500 milliGRAM(s) Oral daily  aspirin  chewable 81 milliGRAM(s) Oral daily  atorvastatin 80 milliGRAM(s) Oral at bedtime  buDESOnide    Inhalation Suspension 0.5 milliGRAM(s) Inhalation every 12 hours  budesonide 160 MICROgram(s)/formoterol 4.5 MICROgram(s) Inhaler 2 Puff(s) Inhalation two times a day  cholecalciferol 1000 Unit(s) Oral at bedtime  clopidogrel Tablet 75 milliGRAM(s) Oral daily  cyanocobalamin 1000 MICROGram(s) Oral daily  enoxaparin Injectable 40 milliGRAM(s) SubCutaneous every 24 hours  ertapenem  IVPB 1000 milliGRAM(s) IV Intermittent every 24 hours  pantoprazole    Tablet 40 milliGRAM(s) Oral before breakfast  potassium chloride   Powder 40 milliEquivalent(s) Oral once  pyridoxine 100 milliGRAM(s) Oral at bedtime  saccharomyces boulardii 250 milliGRAM(s) Oral two times a day  sodium chloride 0.9%. 1000 milliLiter(s) (75 mL/Hr) IV Continuous <Continuous>  tiotropium 2.5 MICROgram(s) Inhaler 2 Puff(s) Inhalation daily  zinc sulfate 220 milliGRAM(s) Oral daily    MEDICATIONS  (PRN):  acetaminophen     Tablet .. 650 milliGRAM(s) Oral every 6 hours PRN Temp greater or equal to 38C (100.4F), Mild Pain (1 - 3)  albuterol    90 MICROgram(s) HFA Inhaler 2 Puff(s) Inhalation every 6 hours PRN for shortness of breath and/or wheezing  aluminum hydroxide/magnesium hydroxide/simethicone Suspension 30 milliLiter(s) Oral every 4 hours PRN Dyspepsia  melatonin 3 milliGRAM(s) Oral at bedtime PRN Insomnia  ondansetron Injectable 4 milliGRAM(s) IV Push every 8 hours PRN Nausea and/or Vomiting      No Known Allergies      Social History:  Lives: At home with wife   ADLs: Fully independent, no problem ambulating   Alcohol Use: None   Tobacco Use: Former smoker quit >10yrs ago   Recreational Drug Use: None (07 Jun 2024 17:15)      REVIEW OF SYSTEMS:i am OK, wants to go home.  CONSTITUTIONAL: No fever, No chills, No fatigue, No myalgia, No Body ache, No Weakness  EYES: No eye pain,  No visual disturbances, No discharge, No Redness  ENMT: No ear pain, No nose bleed, No vertigo; No sinus pain, No throat pain, No Congestion  NECK: No pain, No stiffness  RESPIRATORY: No cough, No wheezing, No hemoptysis, [ X ] shortness of breath  CARDIOVASCULAR: No chest pain, No palpitations  GASTROINTESTINAL: No abdominal pain, No epigastric pain. No nausea, No vomiting, No diarrhea, No constipation; [ x ] BM-  GENITOURINARY: No dysuria, No frequency, No urgency, No hematuria, No incontinence  NEUROLOGICAL: No headaches, No dizziness, No numbness, No tingling, No tremors, No weakness  EXTREMITIES: No Swelling, No Pain, No Edema  SKIN: [ x ] No itching, burning, rashes, or lesions   MUSCULOSKELETAL: No joint pain, No joint swelling; No muscle pain, No back pain, No extremity pain  PSYCHIATRIC: No depression, No anxiety, No mood swings, No difficulty sleeping at night  PAIN SCALE: [ X ] None  [  ] Other-  ROS Unable to obtain due to: [  ] Dementia  [  ] Lethargy  [  ] Sedated  [  ] Non verbal  REST OF REVIEW OF SYSTEMS: [ x ] Normal     Vital Signs Last 24 Hrs  T(C): 36.8 (12 Jun 2024 04:34), Max: 36.8 (12 Jun 2024 04:34)  T(F): 98.3 (12 Jun 2024 04:34), Max: 98.3 (12 Jun 2024 04:34)  HR: 69 (12 Jun 2024 04:34) (67 - 85)  BP: 114/68 (12 Jun 2024 04:34) (114/68 - 136/71)  BP(mean): --  RR: 16 (12 Jun 2024 04:34) (16 - 17)  SpO2: 98% (12 Jun 2024 04:34) (92% - 98%)    Parameters below as of 12 Jun 2024 04:34  Patient On (Oxygen Delivery Method): room air        CAPILLARY BLOOD GLUCOSE      I&O's Summary    PHYSICAL EXAM:  GENERAL:  [ x ] NAD, [ x ] Well appearing, [  ] Agitated, [  ] Drowsy, [  ] Lethargy, [  ] Confused   HEAD:  [ x ] Normal, [  ] Other  EYES:  [ x ] EOMI, [ x ] PERRLA, [ x ] Conjunctiva and sclera clear normal, [  ] Other, [  ] Pallor, [  ] Discharge  ENMT:  [ x ] Normal, [ x ] Moist mucous membranes, [x  ] Good dentition, [x  ] No thrush  NECK:  [ x ] Supple, [  ] No JVD, [ x ] Normal thyroid, [  ] Lymphadenopathy, [  ] Other  CHEST/LUNG:  [x  ] Clear to auscultation bilaterally, [ x ] Breath Sounds decreased b/l, [ x ] Poor effort, [ x ] No rales, [ x ] No rhonchi, [ x ] mild wheeze b/l  HEART:  [ x ] Regular rate and rhythm, [  ] Tachycardia, [  ] Bradycardia, [  ] Irregular, [ x ] No murmurs, No rubs, No gallops, [  ] PPM in place (Mfr:  )  ABDOMEN:  [ x ] Soft, [ x ] Nontender, [ x ] Nondistended, [ x ] No mass, [ x ] Bowel sounds present, [x  ] Obese  NERVOUS SYSTEM:  [ x ] Alert & Oriented x3__, [ x ] Nonfocal, [  ] Confusion, [  ] Encephalopathic, [  ] Sedated, [  ] Unable to assess, [  ] Dementia, [  ] Other-  EXTREMITIES:  [ x ] 2+ Peripheral Pulses, No clubbing, No cyanosis,  [  ] Edema B/L lower EXT, [  ] PVD stasis skin changes B/L lower EXT, [  ] Wound  LYMPH:  No lymphadenopathy noted  SKIN:  [ x ] No rashes or lesions, [  ] Pressure ulcers, [  ] Ecchymosis, [  ] Skin tears, [  ] Other    DIET: Diet, DASH/TLC:   Sodium & Cholesterol Restricted (06-07-24 @ 18:02)      LABS:                        14.2   10.01 )-----------( 190      ( 12 Jun 2024 07:18 )             42.3     12 Jun 2024 07:18    142    |  109    |  16     ----------------------------<  96     3.7     |  31     |  0.79     Ca    8.9        12 Jun 2024 07:18      PT/INR - ( 12 Jun 2024 07:18 )   PT: 10.9 sec;   INR: 0.93 ratio         PTT - ( 12 Jun 2024 07:18 )  PTT:27.8 sec  Urinalysis Basic - ( 12 Jun 2024 07:18 )    Color: x / Appearance: x / SG: x / pH: x  Gluc: 96 mg/dL / Ketone: x  / Bili: x / Urobili: x   Blood: x / Protein: x / Nitrite: x   Leuk Esterase: x / RBC: x / WBC x   Sq Epi: x / Non Sq Epi: x / Bacteria: x      Culture Results:   No growth at 48 Hours (06-09 @ 08:04)  Culture Results:   No growth at 48 Hours (06-09 @ 07:58)  Culture Results:   <10,000 CFU/mL Normal Urogenital Yancy (06-07 @ 11:40)  Culture Results:   No growth at 4 days (06-07 @ 10:00)  Culture Results:   Growth in anaerobic bottle: Escherichia coli ESBL  Direct identification is available within approximately 3-5  hours either by Blood Panel Multiplexed PCR or Direct  MALDI-TOF. Details: [email or web address] (06-07 @ 09:45)      CARDIAC MARKERS ( 08 Jun 2024 08:05 )  x     / x     / x     / x     / 6.6 ng/mL  CARDIAC MARKERS ( 07 Jun 2024 18:30 )  x     / x     / 236 U/L / x     / 5.0 ng/mL  CARDIAC MARKERS ( 07 Jun 2024 10:00 )  x     / x     / x     / x     / 3.8 ng/mL        Culture - Blood (collected 09 Jun 2024 08:04)  Source: .Blood Blood-Peripheral  Preliminary Report (11 Jun 2024 17:01):    No growth at 48 Hours    Culture - Blood (collected 09 Jun 2024 07:58)  Source: .Blood Blood-Peripheral  Preliminary Report (11 Jun 2024 17:01):    No growth at 48 Hours    Culture - Urine (collected 07 Jun 2024 11:40)  Source: Clean Catch Clean Catch (Midstream)  Final Report (08 Jun 2024 15:55):    <10,000 CFU/mL Normal Urogenital Yancy    Culture - Blood (collected 07 Jun 2024 10:00)  Source: .Blood Blood-Peripheral  Preliminary Report (11 Jun 2024 17:01):    No growth at 4 days    Culture - Blood (collected 07 Jun 2024 09:45)  Source: .Blood Blood-Peripheral  Gram Stain (08 Jun 2024 03:07):    Growth in anaerobic bottle: Gram Negative Rods  Final Report (09 Jun 2024 14:48):    Growth in anaerobic bottle: Escherichia coli ESBL    Direct identification is available within approximately 3-5    hours either by Blood Panel Multiplexed PCR or Direct    MALDI-TOF. Details: [email or web address]  Organism: Blood Culture PCR  Escherichia coli ESBL (09 Jun 2024 14:48)  Organism: Escherichia coli ESBL (09 Jun 2024 14:48)  Organism: Blood Culture PCR (09 Jun 2024 14:48)       Anemia Panel:      Thyroid Panel:  Thyroid Stimulating Hormone, Serum: 1.06 uIU/mL (06-07-24 @ 10:00)        Lipase: 25 U/L (06-07-24 @ 10:00)      RADIOLOGY & ADDITIONAL TESTS:     HEALTH ISSUES - PROBLEM Dx:  UTI (urinary tract infection)    HTN (hypertension)    HLD (hyperlipidemia)    Afib    CAD (coronary artery disease)    COPD with asthma    Need for prophylactic measure    GERD (gastroesophageal reflux disease)    Cardiac enzymes elevated          Consultant(s) Notes Reviewed:  [ x ] YES     Care Discussed with [ x ] Consultants, [ x ] Patient, [ x ] Family, [  ] HCP, [ x ] , [  ] Social Service, [ x ] RN, [  ] Physical Therapy, [  ] Palliative Care Team  DVT PPX: [ X ] Lovenox, [  ] SC Heparin, [  ] Coumadin, [  ] Xarelto, [  ] Eliquis, [  ] Pradaxa, [  ] IV Heparin drip, [  ] SCD, [  ] Ambulation, [  ] Contraindicated 2/2 GI Bleed, [  ] Contraindicated 2/2  Bleed, [  ] Contraindicated 2/2 Brain Bleed  Advanced Directive: [ X ] None, [  ] DNR/DNI

## 2024-06-12 NOTE — PROGRESS NOTE ADULT - PROBLEM SELECTOR PROBLEM 4
Cardiac enzymes elevated

## 2024-06-12 NOTE — PROGRESS NOTE ADULT - ATTENDING COMMENTS
79 yo M with PMHx of HTN, HLD, GERD, Atrial fibrillation Not on AC , Asthma, COPD, hx of benign lung cancer R lobe s/p resection,  CAD (s/p 5 stents ~2001), presents to the ED with 3 days of Dysuria ,fever  Leukocytosis, Sepsis, UTI  Pt seen, Examined, case & care plan d/w pt, residents at detail.  Consults-  ID Dr Marc--Dr Rebolledo - ESBL -Bacteremia -IV Invanz daily. Repeat Blood c/sx 2 to follow up., Plan for MID line   Pulmonary-Dr Bunch- continue current care.  PT Eval--no need  AM Labs   PO diet  GI/DVT ppx .   Total care time is 60 minutes.
77 yo M with PMHx of HTN, HLD, GERD, Atrial fibrillation Not on AC , Asthma, COPD, hx of benign lung cancer R lobe s/p resection,  CAD (s/p 5 stents ~2001), presents to the ED with 3 days of Dysuria ,fever  Leukocytosis, Sepsis, UTI  Pt seen, Examined, case & care plan d/w pt, residents at detail.  Consults-  ID Dr Marc--Dr Rebolledo - ESBL -Bacteremia -IV Invanz daily.  Pulmonary-Dr Bunch- continue current care.  PT Eval  AM Labs   PO diet  GI/DVT ppx .   Total care time is 60 minutes.
79 yo M with PMHx of HTN, HLD, GERD, Atrial fibrillation Not on AC , Asthma, COPD, hx of benign lung cancer R lobe s/p resection,  CAD (s/p 5 stents ~2001), presents to the ED with 3 days of Dysuria ,fever  Leukocytosis, Sepsis, UTI  Pt seen, Examined, case & care plan d/w pt, residents at [facility].  Consults-  ID Dr Marc--Dr Rebolledo - ESBL -Bacteremia -IV Invanz daily. Repeat Blood c/sx 2 to follow up.  Pulmonary-Dr Bunch- continue current care.  PT Eval--no need  AM Labs   PO diet  GI/DVT ppx .   Total care time is 60 minutes.
77 yo M with PMHx of HTN, HLD, GERD, Atrial fibrillation Not on AC , Asthma, COPD, hx of benign lung cancer R lobe s/p resection,  CAD (s/p 5 stents ~2001), presents to the ED with 3 days of Dysuria ,fever  Leukocytosis, Sepsis, UTI  Pt seen, Examined, case & [facility]w [facility]  Consults-  ID Dr Marc--Dr Rebolledo - ESBL -Bacteremia -IV Invanz daily. Repeat Blood c/sx 2 -NEG ,S/P  MID line placed, .Last Dose 7/4/24   Pulmonary-Dr Bunch- continue current care. follow up with DR Dang as out pt.  PT Eval--no need  D/W Case management -Home IV Abx arranged ,IV Invanz 1 gm IVPB daily x 4 weeks -Last dose 7/4/24   D/C Home today , Home Abx case management arrangement .  PO diet  GI/DVT ppx .   Total  d/c care time is 60 minutes.
77 yo M with PMHx of HTN, HLD, GERD, Atrial fibrillation Not on AC , Asthma, COPD, hx of benign lung cancer R lobe s/p resection,  CAD (s/p 5 stents ~2001), presents to the ED with 3 days of Dysuria ,fever  Leukocytosis, Sepsis, UTI  Pt seen, Examined, case & care plan d/w pt, residents at detail.  Consults-  ID Dr Marc--Dr Rebolledo - ESBL -Bacteremia -IV Invanz daily. Repeat Blood c/sx 2 to follow up.  Pulmonary-Dr Bunch- continue current care.  PT Eval  AM Labs   PO diet  GI/DVT ppx .   Total care time is 60 minutes.

## 2024-06-12 NOTE — PROGRESS NOTE ADULT - PROBLEM SELECTOR PLAN 4
- CKMB 3.8, Trop 11.4  - Likely elevated CKMB in setting of demand   - No evidence of active ischemia on EKG, however too tachycardic to assess   - Repeat EKG: NSR @ 75 bpm  - Repeat cardiac enzymes: Trops still wnl, CKMB 5.0 & 6.6

## 2024-06-13 PROBLEM — N39.0 URINARY TRACT INFECTION, SITE NOT SPECIFIED: Chronic | Status: ACTIVE | Noted: 2024-06-07

## 2024-06-13 PROBLEM — I48.20 CHRONIC ATRIAL FIBRILLATION, UNSPECIFIED: Chronic | Status: ACTIVE | Noted: 2024-06-07

## 2024-06-14 LAB
CULTURE RESULTS: SIGNIFICANT CHANGE UP
CULTURE RESULTS: SIGNIFICANT CHANGE UP
SPECIMEN SOURCE: SIGNIFICANT CHANGE UP
SPECIMEN SOURCE: SIGNIFICANT CHANGE UP
ZINC SERPL-MCNC: 72 UG/DL — SIGNIFICANT CHANGE UP (ref 44–115)

## 2024-06-17 ENCOUNTER — APPOINTMENT (OUTPATIENT)
Dept: INTERNAL MEDICINE | Facility: CLINIC | Age: 79
End: 2024-06-17
Payer: MEDICARE

## 2024-06-17 VITALS
TEMPERATURE: 98.8 F | OXYGEN SATURATION: 95 % | DIASTOLIC BLOOD PRESSURE: 80 MMHG | BODY MASS INDEX: 26.63 KG/M2 | SYSTOLIC BLOOD PRESSURE: 130 MMHG | HEART RATE: 74 BPM | WEIGHT: 170 LBS

## 2024-06-17 VITALS — SYSTOLIC BLOOD PRESSURE: 120 MMHG | DIASTOLIC BLOOD PRESSURE: 80 MMHG

## 2024-06-17 DIAGNOSIS — A41.51 SEPSIS DUE TO ESCHERICHIA COLI [E. COLI]: ICD-10-CM

## 2024-06-17 DIAGNOSIS — J43.9 EMPHYSEMA, UNSPECIFIED: ICD-10-CM

## 2024-06-17 DIAGNOSIS — I25.10 ATHEROSCLEROTIC HEART DISEASE OF NATIVE CORONARY ARTERY W/OUT ANGINA PECTORIS: ICD-10-CM

## 2024-06-17 PROCEDURE — 99496 TRANSJ CARE MGMT HIGH F2F 7D: CPT

## 2024-06-17 NOTE — HISTORY OF PRESENT ILLNESS
[Post-hospitalization from ___ Hospital] : Post-hospitalization from [unfilled] Hospital [Admitted on: ___] : The patient was admitted on [unfilled] [Discharged on ___] : discharged on [unfilled] [Discharge Summary] : discharge summary [Pertinent Labs] : pertinent labs [Discharge Med List] : discharge medication list [Patient Contacted By: ____] : and contacted by [unfilled] [FreeTextEntry2] : Patient is status post recent hospitalization for E. coli ESBL positive sepsis that was treated with IV antibiotics and fluid resuscitation.  During the hospitalization he also had a COPD exacerbation was treated with inhaled bronchodilators as well as IV steroids. Patient eventually recovered and was discharged to home on a 4-week course of IV antibiotic. Discharge he has has been feeling well and denies any urinary symptoms, cough, shortness of breath, wheezing, chest pain.

## 2024-06-17 NOTE — PHYSICAL EXAM
[No Acute Distress] : no acute distress [Normal Sclera/Conjunctiva] : normal sclera/conjunctiva [No Lymphadenopathy] : no lymphadenopathy [Thyroid Normal, No Nodules] : the thyroid was normal and there were no nodules present [No Carotid Bruits] : no carotid bruits [No Abdominal Bruit] : a ~M bruit was not heard ~T in the abdomen [No Varicosities] : no varicosities [No Edema] : there was no peripheral edema [No Palpable Aorta] : no palpable aorta [Normal Appearance] : normal in appearance [Normal] : soft, non-tender, non-distended, no masses palpated, no HSM and normal bowel sounds [Normal Supraclavicular Nodes] : no supraclavicular lymphadenopathy [Normal Posterior Cervical Nodes] : no posterior cervical lymphadenopathy [Normal Anterior Cervical Nodes] : no anterior cervical lymphadenopathy [No CVA Tenderness] : no CVA  tenderness [No Rash] : no rash [No Focal Deficits] : no focal deficits [Normal Affect] : the affect was normal [Normal Insight/Judgement] : insight and judgment were intact [de-identified] : Decreased breath sounds throughout

## 2024-06-17 NOTE — REVIEW OF SYSTEMS
[Fever] : no fever [Chills] : no chills [Night Sweats] : no night sweats [Wheezing] : no wheezing [Cough] : no cough [Skin Rash] : no skin rash [Negative] : Genitourinary

## 2024-06-19 LAB
BASE EXCESS BLDV CALC-SCNC: 3.6 MMOL/L — HIGH (ref -2–3)
HCO3 BLDV-SCNC: 29 MMOL/L — SIGNIFICANT CHANGE UP (ref 22–29)
PCO2 BLDV: 50 MMHG — SIGNIFICANT CHANGE UP (ref 42–55)
PH BLDV: 7.37 — SIGNIFICANT CHANGE UP (ref 7.32–7.43)
PO2 BLDV: 61 MMHG — HIGH (ref 25–45)
SAO2 % BLDV: 92 % — HIGH (ref 67–88)

## 2025-01-10 NOTE — H&P PST ADULT - BMI (KG/M2)
Chief Complaint  Follow-up and Coronary Artery Disease    Subjective        History of Present Illness  Ricky W Sturgeon presents to [facility] CARDIOLOGY   Mr. Sturgeon is a 69-year-old male patient coming in today for cardiac follow-up.  Since previously seen in office, he has underwent SPECT stress test with no evidence of new ischemia, and echocardiogram showing normal LV systolic function, mild to moderate LVH and enlarged left atria.  He was prescribed Jardiance, but has not been able to start this medication due to cost barriers.  Since last seen, he has underwent left knee replacement, and recovered well.  He has no cardiac concerns today, specifically no complaints of chest pains or shortness of breath.      Past history:  CAD, MI in 2019 severe marginal stenosis about 90%, PCI with MAURCIIO, residual 60% stenosis of mid to distal LAD, and mild disease in RCA, 90% stenosis of septal branch of LAD and some residual stenosis around 50% of distal circumflex artery  MANDY with CPAP  Permanent pacemaker secondary to bradycardia    Past Medical History:   Diagnosis Date    Allergic rhinitis     Arthritis     Bulging lumbar disc     Chest pain     Chronic back pain     Congestive heart failure (CHF)     DM (diabetes mellitus)     Dysphagia     Elevated cholesterol     Follow-up examination following surgery 10/09/2014    Gastroesophageal reflux disease     GERD (gastroesophageal reflux disease)     Heart attack     Hemorrhoids, external     High blood pressure     High cholesterol     Lumbago     Mood disorder     Prostate disorder     Seizures 1999    Shortness of breath     Sleep apnea     TIA (transient ischemic attack)        Allergies   Allergen Reactions    Sulfa Antibiotics Angioedema    Latex, Natural Rubber Nausea Only        Past Surgical History:   Procedure Laterality Date    COLONOSCOPY      COLONOSCOPY N/A 07/28/2021    Procedure: COLONOSCOPY WITH POLYPECTOMY;  Surgeon: Jonas Barriga,  MD;  Location: [facility] ENDOSCOPY;  Service: General;  Laterality: N/A;  DIVERTICULOSIS, COLON POLYPS    CORONARY ANGIOPLASTY WITH STENT PLACEMENT  2019    ENDOSCOPY      ENDOSCOPY N/A 07/28/2021    Procedure: ESOPHAGOGASTRODUODENOSCOPY WITH BIOPSIES;  Surgeon: Jonas Barriga MD;  Location: [facility] ENDOSCOPY;  Service: General;  Laterality: N/A;  GASTRITIS    HEEL SPUR SURGERY Bilateral     HEEL SPURS REMOVED FROM BOTH HEELS    INGUINAL HERNIA REPAIR Left     PACEMAKER IMPLANTATION  2019    SHOULDER SURGERY Bilateral     TOTAL KNEE ARTHROPLASTY Left 11/8/2024    Procedure: LEFT TOTAL KNEE ARTHROPLASTY: Replace left knee with artificial implants;  Surgeon: Rita Calix MD;  Location: [facility] MAIN OR;  Service: Orthopedics;  Laterality: Left;        Social History  He  reports that he has never smoked. He has never used smokeless tobacco. He reports that he does not currently use alcohol. He reports that he does not use drugs.    Family History  His family history includes Cancer in an other family member; Colon cancer in his father and mother; Diabetes in his mother; Esophageal cancer in his brother; Stroke in an other family member.       Current Outpatient Medications on File Prior to Visit   Medication Sig    atorvastatin (LIPITOR) 80 MG tablet Take 1 tablet by mouth Every Night.    buPROPion XL (WELLBUTRIN XL) 150 MG 24 hr tablet Take 1 tablet by mouth Daily.    clopidogrel (PLAVIX) 75 MG tablet Take 1 tablet by mouth Daily.    Cyanocobalamin 1000 MCG sublingual tablet Place 1 tablet under the tongue Daily.    escitalopram (LEXAPRO) 10 MG tablet Take 1 tablet by mouth Daily.    furosemide (LASIX) 40 MG tablet Take 1 tablet by mouth 2 (Two) Times a Day. (Patient taking differently: Take 1 tablet by mouth Daily.)    HYDROcodone-acetaminophen (NORCO) 7.5-325 MG per tablet TAKE 1 TABLET BY MOUTH EVERY 4 HOURS AS NEEDED for moderate pain    hydrOXYzine (ATARAX) 10 MG tablet Take 1 tablet by mouth 2 (Two) Times a Day  "As Needed. for anxiety    metFORMIN ER (GLUCOPHAGE-XR) 500 MG 24 hr tablet Take 1 tablet by mouth 2 (Two) Times a Day.    metoprolol succinate XL (TOPROL-XL) 50 MG 24 hr tablet Take 1 tablet by mouth Daily.    pantoprazole (PROTONIX) 40 MG EC tablet Take 1 tablet by mouth Daily.    polyethylene glycol (MiraLax) 17 GM/SCOOP powder Take 17 g by mouth Daily.    tamsulosin (FLOMAX) 0.4 MG capsule 24 hr capsule Take 1 capsule by mouth Daily.    empagliflozin (JARDIANCE) 10 MG tablet tablet Take 1 tablet by mouth Daily. (Patient not taking: Reported on 1/10/2025)     No current facility-administered medications on file prior to visit.         Review of Systems   Constitutional:  Negative for fatigue.   Respiratory:  Negative for cough, chest tightness and shortness of breath.    Cardiovascular:  Negative for chest pain, palpitations and leg swelling.   Gastrointestinal:  Negative for nausea and vomiting.   Neurological:  Negative for dizziness and syncope.        Objective   Vitals:    01/10/25 0934   BP: 109/77   BP Location: Right arm   Pulse: 60   Weight: 104 kg (230 lb)   Height: 172.7 cm (68\")         Physical Exam  General : Alert, awake, no acute distress  Neck : Supple, no carotid bruit, no jugular venous distention  CVS : Regular rate and rhythm, no murmur, no rubs or gallops  Lungs: Clear to auscultation bilaterally, no crackles or rhonchi  Abdomen: Soft, nontender, bowel sounds active  Extremities: Warm, well-perfused, no pedal edema      Result Review     The following data was reviewed by DAWIT Dobbs  proBNP   Date Value Ref Range Status   02/02/2022 105.0 0.0 - 900.0 pg/mL Final     CMP          7/23/2024    00:46 10/23/2024    09:01 11/9/2024    04:08   CMP   Glucose 90  135  192    BUN 24  25  23    Creatinine 1.05  1.00  1.02    EGFR 77.3  82.0  80.1    Sodium 140  142  137    Potassium 4.1  4.6  4.0    Chloride 104  105  105    Calcium 9.5  9.0  8.3    Total Protein 7.0  6.9     Albumin 4.6  4.2 " " 3.4    Globulin 2.4  2.7     Total Bilirubin 1.2  0.9     Alkaline Phosphatase 85  85     AST (SGOT) 22  17     ALT (SGPT) 29  23     Albumin/Globulin Ratio 1.9  1.6     BUN/Creatinine Ratio 22.9  25.0  22.5    Anion Gap 8.1  9.2  7.5      CBC w/diff          7/23/2024    00:46 10/23/2024    09:01 11/9/2024    04:08   CBC w/Diff   WBC 8.68  7.12  10.04    RBC 4.30  4.28  3.27    Hemoglobin 13.9  13.8  10.3    Hematocrit 41.8  41.2  31.8    MCV 97.2  96.3  97.2    MCH 32.3  32.2  31.5    MCHC 33.3  33.5  32.4    RDW 12.8  12.4  12.4    Platelets 265  276  216    Neutrophil Rel % 48.4  59.9     Immature Granulocyte Rel % 0.2  0.3     Lymphocyte Rel % 38.1  29.2     Monocyte Rel % 10.3  7.3     Eosinophil Rel % 2.2  2.5     Basophil Rel % 0.8  0.8        Lab Results   Component Value Date    TSH 0.892 12/05/2022      Lab Results   Component Value Date    FREET4 1.36 12/05/2022   Pharmacist education to just order an ultrasound, losartan 1 year look enough to give a shows a 1 to get the posters most really was like you know so not cool try to go over the bed as a teenager had a [place] posterior  No results found for: \"DDIMERQUANT\"  Magnesium   Date Value Ref Range Status   07/23/2024 2.6 (H) 1.6 - 2.4 mg/dL Final      No results found for: \"DIGOXIN\"   Lab Results   Component Value Date    TROPONINT <0.010 07/17/2022           Lipid Panel          6/25/2024    10:21   Lipid Panel   Total Cholesterol 88    Triglycerides 106    HDL Cholesterol 28    VLDL Cholesterol 20    LDL Cholesterol  40    LDL/HDL Ratio 1.39          Results for orders placed during the hospital encounter of 10/25/24    Adult Transthoracic Echo Complete W/ Cont if Necessary Per Protocol    Interpretation Summary    Left ventricular ejection fraction appears to be 61 - 65%.    Left ventricular wall thickness is consistent with mild to moderate concentric hypertrophy.    Left ventricular diastolic function is consistent with (grade I) impaired " relaxation.    The left atrial cavity is severely dilated.    Estimated right ventricular systolic pressure from tricuspid regurgitation is mildly elevated (35-45 mmHg).    Results for orders placed during the hospital encounter of 10/25/24    Stress Test With Myocardial Perfusion One Day    Interpretation Summary    Impressions are consistent with an intermediate risk study.    Left ventricular ejection fraction is normal (Calculated EF = 63%).    There is a moderate perfusion defect at the inferolateral wall and apex suggestive of infarction with raj-infarct ischemia. .           Assessment and Plan   Diagnoses and all orders for this visit:    1. Coronary artery disease involving native coronary artery of native heart without angina pectoris (Primary)    2. Essential hypertension    3. Cardiac pacemaker    4. Mixed hyperlipidemia    Other orders  -     Discontinue: empagliflozin (Jardiance) 10 MG tablet tablet; Take 1 tablet by mouth Daily.  Dispense: 28 tablet; Refill: 0  -     empagliflozin (Jardiance) 10 MG tablet tablet; Take 1 tablet by mouth Daily.  Dispense: 28 tablet; Refill: 0      CAD-he is stable without symptoms of angina, recent SPECT stress test did not show any new evidence of ischemia.  Continue Plavix, beta-blocker and statin therapy.  Hypertension-blood pressure is well-controlled.  Echocardiogram shows evidence of LVH with grade 1 impaired relaxation, he would benefit from adding Jardiance for prevention of congestive heart failure symptoms, we will try to get him patient assistance, samples provided in the office.  In the meantime continue current doses of metoprolol and Plavix.    Pacemaker-normal device interrogation in August with 4 years battery life.  Hyperlipidemia well-controlled, most recent LDL is at goal, continue    Follow Up   Return in about 6 months (around 7/10/2025) for with Dr Kaplan or Tony Arroyo.    Patient was given instructions and counseling regarding his condition or  for health maintenance advice. Please see specific information pulled into the AVS if appropriate.     Signed,  Franchesca Hurt, APRN  01/10/2025     Dictated Utilizing Dragon Dictation: Please note that portions of this note were completed with a voice recognition program.  Part of this note may be an electronic transcription/translation of spoken language to printed text using the Dragon Dictation System.     25.7

## (undated) DEVICE — PLV-SCD MACHINE: Type: DURABLE MEDICAL EQUIPMENT

## (undated) DEVICE — WARMING BLANKET UPPER ADULT

## (undated) DEVICE — SOL IRR POUR NS 0.9% 1000ML

## (undated) DEVICE — DRAPE TOWEL BLUE 17" X 24"

## (undated) DEVICE — GLV 8 PROTEXIS (WHITE)

## (undated) DEVICE — ELCTR BOVIE TIP BLADE INSULATED 2.75" EDGE

## (undated) DEVICE — VENODYNE/SCD SLEEVE CALF MEDIUM

## (undated) DEVICE — DRSG MASTISOL

## (undated) DEVICE — VENODYNE/SCD SLEEVE CALF LARGE

## (undated) DEVICE — SUT SURGIPRO 1 30" GS-21

## (undated) DEVICE — SUT POLYSORB 2-0 30" V-20 UNDYED

## (undated) DEVICE — PLV/PSP-ESU FORCEFX F8J7721A: Type: DURABLE MEDICAL EQUIPMENT

## (undated) DEVICE — SOL IRR POUR H2O 1000ML

## (undated) DEVICE — ELCTR BOVIE PENCIL SMOKE EVACUATION

## (undated) DEVICE — NDL HYPO SAFE 25G X 1.5" (ORANGE)

## (undated) DEVICE — SUT MONOCRYL 4-0 27" PS-2 UNDYED

## (undated) DEVICE — PACK MINOR WITH LAP

## (undated) DEVICE — GOWN LG

## (undated) DEVICE — SUT POLYSORB 3-0 30" V-20 UNDYED

## (undated) DEVICE — GOWN XL W TOWEL

## (undated) DEVICE — DRAPE 3/4 SHEET W REINFORCEMENT 56X77"

## (undated) DEVICE — DRSG STERISTRIPS 0.5 X 4"

## (undated) DEVICE — SUT POLYSORB 2-0 30" GS-21 UNDYED